# Patient Record
Sex: FEMALE | Race: WHITE | NOT HISPANIC OR LATINO | Employment: UNEMPLOYED | URBAN - METROPOLITAN AREA
[De-identification: names, ages, dates, MRNs, and addresses within clinical notes are randomized per-mention and may not be internally consistent; named-entity substitution may affect disease eponyms.]

---

## 2017-01-12 ENCOUNTER — HOSPITAL ENCOUNTER (EMERGENCY)
Facility: MEDICAL CENTER | Age: 57
End: 2017-01-12

## 2017-01-12 VITALS
BODY MASS INDEX: 24.68 KG/M2 | TEMPERATURE: 96.6 F | DIASTOLIC BLOOD PRESSURE: 93 MMHG | OXYGEN SATURATION: 99 % | HEART RATE: 85 BPM | RESPIRATION RATE: 20 BRPM | WEIGHT: 148.15 LBS | HEIGHT: 65 IN | SYSTOLIC BLOOD PRESSURE: 163 MMHG

## 2017-01-12 PROCEDURE — 302449 STATCHG TRIAGE ONLY (STATISTIC)

## 2017-01-13 NOTE — ED NOTES
Chief Complaint   Patient presents with   • Abdominal Pain     x2days   • Nausea/Vomiting/Diarrhea     Pt ambulated to triage with above complaints. Denies dysuria. Unable to eat for 2days.

## 2017-08-08 ENCOUNTER — HOSPITAL ENCOUNTER (EMERGENCY)
Facility: MEDICAL CENTER | Age: 57
End: 2017-08-08
Attending: EMERGENCY MEDICINE
Payer: MEDICAID

## 2017-08-08 ENCOUNTER — APPOINTMENT (OUTPATIENT)
Dept: RADIOLOGY | Facility: MEDICAL CENTER | Age: 57
End: 2017-08-08
Attending: EMERGENCY MEDICINE
Payer: MEDICAID

## 2017-08-08 VITALS
SYSTOLIC BLOOD PRESSURE: 135 MMHG | RESPIRATION RATE: 18 BRPM | DIASTOLIC BLOOD PRESSURE: 70 MMHG | HEART RATE: 85 BPM | TEMPERATURE: 97 F | HEIGHT: 65 IN | BODY MASS INDEX: 21.67 KG/M2 | OXYGEN SATURATION: 99 % | WEIGHT: 130.07 LBS

## 2017-08-08 DIAGNOSIS — R10.11 RUQ ABDOMINAL PAIN: ICD-10-CM

## 2017-08-08 LAB
ALBUMIN SERPL BCP-MCNC: 3.9 G/DL (ref 3.2–4.9)
ALBUMIN/GLOB SERPL: 1 G/DL
ALP SERPL-CCNC: 99 U/L (ref 30–99)
ALT SERPL-CCNC: <5 U/L (ref 2–50)
ANION GAP SERPL CALC-SCNC: 10 MMOL/L (ref 0–11.9)
APPEARANCE UR: ABNORMAL
AST SERPL-CCNC: 8 U/L (ref 12–45)
BACTERIA #/AREA URNS HPF: ABNORMAL /HPF
BASOPHILS # BLD AUTO: 0.3 % (ref 0–1.8)
BASOPHILS # BLD: 0.04 K/UL (ref 0–0.12)
BILIRUB SERPL-MCNC: 0.4 MG/DL (ref 0.1–1.5)
BILIRUB UR QL STRIP.AUTO: NEGATIVE
BUN SERPL-MCNC: 14 MG/DL (ref 8–22)
CALCIUM SERPL-MCNC: 9.8 MG/DL (ref 8.5–10.5)
CHLORIDE SERPL-SCNC: 106 MMOL/L (ref 96–112)
CO2 SERPL-SCNC: 23 MMOL/L (ref 20–33)
COLOR UR: ABNORMAL
CREAT SERPL-MCNC: 1.05 MG/DL (ref 0.5–1.4)
CULTURE IF INDICATED INDCX: YES UA CULTURE
EOSINOPHIL # BLD AUTO: 0.24 K/UL (ref 0–0.51)
EOSINOPHIL NFR BLD: 2 % (ref 0–6.9)
EPI CELLS #/AREA URNS HPF: ABNORMAL /HPF
ERYTHROCYTE [DISTWIDTH] IN BLOOD BY AUTOMATED COUNT: 47.7 FL (ref 35.9–50)
GFR SERPL CREATININE-BSD FRML MDRD: 54 ML/MIN/1.73 M 2
GLOBULIN SER CALC-MCNC: 3.9 G/DL (ref 1.9–3.5)
GLUCOSE SERPL-MCNC: 79 MG/DL (ref 65–99)
GLUCOSE UR STRIP.AUTO-MCNC: NEGATIVE MG/DL
HCG SERPL QL: NEGATIVE
HCT VFR BLD AUTO: 39.9 % (ref 37–47)
HGB BLD-MCNC: 12.9 G/DL (ref 12–16)
HYALINE CASTS #/AREA URNS LPF: ABNORMAL /LPF
IMM GRANULOCYTES # BLD AUTO: 0.03 K/UL (ref 0–0.11)
IMM GRANULOCYTES NFR BLD AUTO: 0.3 % (ref 0–0.9)
KETONES UR STRIP.AUTO-MCNC: ABNORMAL MG/DL
LEUKOCYTE ESTERASE UR QL STRIP.AUTO: ABNORMAL
LIPASE SERPL-CCNC: 58 U/L (ref 11–82)
LYMPHOCYTES # BLD AUTO: 4.05 K/UL (ref 1–4.8)
LYMPHOCYTES NFR BLD: 34.3 % (ref 22–41)
MCH RBC QN AUTO: 28.3 PG (ref 27–33)
MCHC RBC AUTO-ENTMCNC: 32.3 G/DL (ref 33.6–35)
MCV RBC AUTO: 87.5 FL (ref 81.4–97.8)
MICRO URNS: ABNORMAL
MONOCYTES # BLD AUTO: 0.66 K/UL (ref 0–0.85)
MONOCYTES NFR BLD AUTO: 5.6 % (ref 0–13.4)
NEUTROPHILS # BLD AUTO: 6.8 K/UL (ref 2–7.15)
NEUTROPHILS NFR BLD: 57.5 % (ref 44–72)
NITRITE UR QL STRIP.AUTO: NEGATIVE
NRBC # BLD AUTO: 0 K/UL
NRBC BLD AUTO-RTO: 0 /100 WBC
PH UR STRIP.AUTO: 5 [PH]
PLATELET # BLD AUTO: 430 K/UL (ref 164–446)
PMV BLD AUTO: 9.8 FL (ref 9–12.9)
POTASSIUM SERPL-SCNC: 3.6 MMOL/L (ref 3.6–5.5)
PROT SERPL-MCNC: 7.8 G/DL (ref 6–8.2)
PROT UR QL STRIP: 30 MG/DL
RBC # BLD AUTO: 4.56 M/UL (ref 4.2–5.4)
RBC # URNS HPF: ABNORMAL /HPF
RBC UR QL AUTO: ABNORMAL
SODIUM SERPL-SCNC: 139 MMOL/L (ref 135–145)
SP GR UR STRIP.AUTO: 1.04
UROBILINOGEN UR STRIP.AUTO-MCNC: 1 MG/DL
WBC # BLD AUTO: 11.8 K/UL (ref 4.8–10.8)
WBC #/AREA URNS HPF: ABNORMAL /HPF

## 2017-08-08 PROCEDURE — 83690 ASSAY OF LIPASE: CPT

## 2017-08-08 PROCEDURE — 700117 HCHG RX CONTRAST REV CODE 255: Performed by: EMERGENCY MEDICINE

## 2017-08-08 PROCEDURE — 85025 COMPLETE CBC W/AUTO DIFF WBC: CPT

## 2017-08-08 PROCEDURE — 76705 ECHO EXAM OF ABDOMEN: CPT

## 2017-08-08 PROCEDURE — 74177 CT ABD & PELVIS W/CONTRAST: CPT

## 2017-08-08 PROCEDURE — 96361 HYDRATE IV INFUSION ADD-ON: CPT

## 2017-08-08 PROCEDURE — 99285 EMERGENCY DEPT VISIT HI MDM: CPT

## 2017-08-08 PROCEDURE — 81001 URINALYSIS AUTO W/SCOPE: CPT

## 2017-08-08 PROCEDURE — 700105 HCHG RX REV CODE 258: Performed by: EMERGENCY MEDICINE

## 2017-08-08 PROCEDURE — 84703 CHORIONIC GONADOTROPIN ASSAY: CPT

## 2017-08-08 PROCEDURE — 87086 URINE CULTURE/COLONY COUNT: CPT

## 2017-08-08 PROCEDURE — 96374 THER/PROPH/DIAG INJ IV PUSH: CPT | Mod: XU

## 2017-08-08 PROCEDURE — 96375 TX/PRO/DX INJ NEW DRUG ADDON: CPT

## 2017-08-08 PROCEDURE — 80053 COMPREHEN METABOLIC PANEL: CPT

## 2017-08-08 PROCEDURE — 700111 HCHG RX REV CODE 636 W/ 250 OVERRIDE (IP): Performed by: EMERGENCY MEDICINE

## 2017-08-08 RX ORDER — HYDROCODONE BITARTRATE AND ACETAMINOPHEN 5; 325 MG/1; MG/1
1-2 TABLET ORAL EVERY 6 HOURS PRN
Qty: 20 TAB | Refills: 0 | Status: SHIPPED | OUTPATIENT
Start: 2017-08-08 | End: 2022-07-21

## 2017-08-08 RX ORDER — ONDANSETRON 4 MG/1
4 TABLET, ORALLY DISINTEGRATING ORAL EVERY 8 HOURS PRN
Qty: 20 TAB | Refills: 0 | Status: SHIPPED | OUTPATIENT
Start: 2017-08-08 | End: 2022-07-21

## 2017-08-08 RX ORDER — ONDANSETRON 2 MG/ML
4 INJECTION INTRAMUSCULAR; INTRAVENOUS ONCE
Status: COMPLETED | OUTPATIENT
Start: 2017-08-08 | End: 2017-08-08

## 2017-08-08 RX ORDER — CLOPIDOGREL BISULFATE 75 MG/1
75 TABLET ORAL DAILY
Status: ON HOLD | COMMUNITY
End: 2022-07-24 | Stop reason: SDUPTHER

## 2017-08-08 RX ORDER — SODIUM CHLORIDE 9 MG/ML
1000 INJECTION, SOLUTION INTRAVENOUS ONCE
Status: COMPLETED | OUTPATIENT
Start: 2017-08-08 | End: 2017-08-08

## 2017-08-08 RX ORDER — LISINOPRIL 20 MG/1
20 TABLET ORAL DAILY
Status: ON HOLD | COMMUNITY
End: 2022-09-17 | Stop reason: SDUPTHER

## 2017-08-08 RX ADMIN — HYDROMORPHONE HYDROCHLORIDE 1 MG: 1 INJECTION, SOLUTION INTRAMUSCULAR; INTRAVENOUS; SUBCUTANEOUS at 19:36

## 2017-08-08 RX ADMIN — SODIUM CHLORIDE 1000 ML: 9 INJECTION, SOLUTION INTRAVENOUS at 19:03

## 2017-08-08 RX ADMIN — ONDANSETRON 4 MG: 2 INJECTION INTRAMUSCULAR; INTRAVENOUS at 19:03

## 2017-08-08 RX ADMIN — IOHEXOL 100 ML: 350 INJECTION, SOLUTION INTRAVENOUS at 20:01

## 2017-08-08 ASSESSMENT — PAIN SCALES - GENERAL
PAINLEVEL_OUTOF10: 0
PAINLEVEL_OUTOF10: 9

## 2017-08-08 NOTE — ED AVS SNAPSHOT
Home Care Instructions                                                                                                                Kelly Santamaria   MRN: [id number]    Department:  [facility], Emergency Dept   Date of Visit:  8/8/2017            [facility], Emergency Dept    [address] [place] [id number]    Phone:  [phone]      You were seen by     Sandro Meraz M.D.      Your Diagnosis Was     RUQ abdominal pain     R10.11       These are the medications you received during your hospitalization from 08/08/2017 1529 to 08/08/2017 2110     Date/Time Order Dose Route Action    08/08/2017 1903 NS infusion 1,000 mL 1,000 mL Intravenous New Bag    08/08/2017 1936 HYDROmorphone (DILAUDID) injection 0.5-1 mg 1 mg Intravenous Given    08/08/2017 1903 ondansetron (ZOFRAN) syringe/vial injection 4 mg 4 mg Intravenous Given    08/08/2017 2001 iohexol (OMNIPAQUE) 350 mg/mL 100 mL Intravenous Given      Follow-up Information     1. Follow up with [facility], Emergency Dept.    Specialty:  Emergency Medicine    Why:  In 1-2 days for recheck if not improved.    Contact information    [address] [place] [id number] [phone]      Medication Information     Review all of your home medications and newly ordered medications with your primary doctor and/or pharmacist as soon as possible. Follow medication instructions as directed by your doctor and/or pharmacist.     Please keep your complete medication list with you and share with your physician. Update the information when medications are discontinued, doses are changed, or new medications (including over-the-counter products) are added; and carry medication information at all times in the event of emergency situations.               Medication List      START taking these medications        Instructions    Morning Afternoon Evening Bedtime    hydrocodone-acetaminophen 5-325 MG Tabs per  tablet   Commonly known as:  NORCO        Take 1-2 Tabs by mouth every 6 hours as needed.   Dose:  1-2 Tab                        ondansetron 4 MG Tbdp   Commonly known as:  ZOFRAN ODT        Take 1 Tab by mouth every 8 hours as needed for Nausea/Vomiting.   Dose:  4 mg                          ASK your doctor about these medications        Instructions    Morning Afternoon Evening Bedtime    LISINOPRIL PO        Take  by mouth.                        METFORMIN HCL PO        Take  by mouth.                        PLAVIX PO        Take  by mouth.                             Where to Get Your Medications      You can get these medications from any pharmacy     Bring a paper prescription for each of these medications    - hydrocodone-acetaminophen 5-325 MG Tabs per tablet  - ondansetron 4 MG Tbdp            Procedures and tests performed during your visit     CARDIAC MONITORING    CBC WITH DIFFERENTIAL    COMP METABOLIC PANEL    CONSENT FOR CONTRAST INJECTION    CT-ABDOMEN-PELVIS WITH    Cardiac Monitoring    ESTIMATED GFR    HCG Qual Serum    IV Saline Lock    LIPASE    O2 Protocol    Pulse Ox    SALINE LOCK    URINALYSIS CULTURE, IF INDICATED    URINE CULTURE(NEW)    URINE MICROSCOPIC (W/UA)    US-GALLBLADDER        Discharge Instructions       Please follow-up with your primary care provider for blood pressure management.    Return to the ER in 1-2 days for recheck unless improved. Return sooner if pain is worse, fevers, vomiting.        Abdominal Pain, Adult  Many things can cause abdominal pain. Usually, abdominal pain is not caused by a disease and will improve without treatment. It can often be observed and treated at home. Your health care provider will do a physical exam and possibly order blood tests and X-rays to help determine the seriousness of your pain. However, in many cases, more time must pass before a clear cause of the pain can be found. Before that point, your health care provider may not know if  you need more testing or further treatment.  HOME CARE INSTRUCTIONS   Monitor your abdominal pain for any changes. The following actions may help to alleviate any discomfort you are experiencing:  · Only take over-the-counter or prescription medicines as directed by your health care provider.  · Do not take laxatives unless directed to do so by your health care provider.  · Try a clear liquid diet (broth, tea, or water) as directed by your health care provider. Slowly move to a bland diet as tolerated.  SEEK MEDICAL CARE IF:  · You have unexplained abdominal pain.  · You have abdominal pain associated with nausea or diarrhea.  · You have pain when you urinate or have a bowel movement.  · You experience abdominal pain that wakes you in the night.  · You have abdominal pain that is worsened or improved by eating food.  · You have abdominal pain that is worsened with eating fatty foods.  · You have a fever.  SEEK IMMEDIATE MEDICAL CARE IF:   · Your pain does not go away within 2 hours.  · You keep throwing up (vomiting).  · Your pain is felt only in portions of the abdomen, such as the right side or the left lower portion of the abdomen.  · You pass bloody or black tarry stools.  MAKE SURE YOU:  · Understand these instructions.    · Will watch your condition.    · Will get help right away if you are not doing well or get worse.       This information is not intended to replace advice given to you by your health care provider. Make sure you discuss any questions you have with your health care provider.     Document Released: 09/27/2006 Document Revised: 01/08/2016 Document Reviewed: 08/27/2014  [employer] Interactive Patient Education ©2016 Elsevier Inc.            Patient Information     Patient Information    Following emergency treatment: all patient requiring follow-up care must return either to a private physician or a clinic if your condition worsens before you are able to obtain further medical attention, please  return to the emergency room.     Billing Information    At [facility], we work to make the billing process streamlined for our patients.  Our Representatives are here to answer any questions you may have regarding your hospital bill.  If you have insurance coverage and have supplied your insurance information to us, we will submit a claim to your insurer on your behalf.  Should you have any questions regarding your bill, we can be reached online or by phone as follows:  Online: You are able pay your bills online or live chat with our representatives about any billing questions you may have. We are here to help Monday - Friday from 8:00am to 7:30pm and 9:00am - 12:00pm on Saturdays.  Please visit [email or web address]  for more information.   Phone:  [phone] or [phone]    Please note that your emergency physician, surgeon, pathologist, radiologist, anesthesiologist, and other specialists are not employed by [facility] and will therefore bill separately for their services.  Please contact them directly for any questions concerning their bills at the numbers below:     Emergency Physician Services:  [phone]  [facility]:  [phone]  [facility]:  [phone]  [facility]:  [phone]    1. Your final bill may vary from the amount quoted upon discharge if all procedures are not complete at that time, or if your doctor has additional procedures of which we are not aware. You will receive an additional bill if you return to the Emergency Department at [facility] for suture removal regardless of the facility of which the sutures were placed.     2. Please arrange for settlement of this account at the emergency registration.    3. All self-pay accounts are due in full at the time of treatment.  If you are unable to meet this obligation then payment is expected within 4-5 days.     4. If you have had radiology studies  (CT, X-ray, Ultrasound, MRI), you have received a preliminary result during your emergency department visit. Please contact the radiology department [phone] to receive a copy of your final result. Please discuss the Final result with your primary physician or with the follow up physician provided.     Crisis Hotline:  [place] Crisis Hotline:  4-132-RJQKCUS or 1-630.720.5142  [place] Crisis Hotline:    1-158.116.2180 or 527-531-1781         ED Discharge Follow Up Questions    1. In order to provide you with very good care, we would like to follow up with a phone call in the next few days.  May we have your permission to contact you?     YES /  NO    2. What is the best phone number to call you? (       )_____-__________    3. What is the best time to call you?      Morning  /  Afternoon  /  Evening                   Patient Signature:  ____________________________________________________________    Date:  ____________________________________________________________

## 2017-08-08 NOTE — ED NOTES
"Chief Complaint   Patient presents with   • Abdominal Pain     Pt having pain on the right side of her stomach.    • N/V     /70 mmHg  Pulse 83  Temp(Src) 36.1 °C (97 °F) (Temporal)  Resp 18  Ht 1.651 m (5' 5\")  Wt 59 kg (130 lb 1.1 oz)  BMI 21.65 kg/m2  SpO2 99%  Pt placed back in lobby, educated on triage process, and told to inform staff of any change in condition.     "

## 2017-08-08 NOTE — ED AVS SNAPSHOT
8/8/2017    Kelly Dickerson Clair  [address] [place] [address] [place] [address]    Dear Kelly:    [facility] wants to ensure your discharge home is safe and you or your loved ones have had all of your questions answered regarding your care after you leave the hospital.    Below is a list of resources and contact information should you have any questions regarding your hospital stay, follow-up instructions, or active medical symptoms.    Questions or Concerns Regarding… Contact   Medical Questions Related to Your Discharge  (7 days a week, 8am-5pm) Contact a Nurse Care Coordinator   [phone]   Medical Questions Not Related to Your Discharge  (24 hours a day / 7 days a week)  Contact the Nurse Health Line   [phone]    Medications or Discharge Instructions Refer to your discharge packet   or contact your [facility] Primary Care Provider   [phone]   Follow-up Appointment(s) Schedule your appointment via SPHARES   or contact Scheduling [phone]   Billing Review your statement via SPHARES  or contact Billing [phone]   Medical Records Review your records via SPHARES   or contact Medical Records [phone]     You may receive a telephone call within two days of discharge. This call is to make certain you understand your discharge instructions and have the opportunity to have any questions answered. You can also easily access your medical information, test results and upcoming appointments via the SPHARES free online health management tool. You can learn more and sign up at [email or web address]. For assistance setting up your SPHARES account, please call [phone].    Once again, we want to ensure your discharge home is safe and that you have a clear understanding of any next steps in your care. If you have any questions or concerns, please do not hesitate to contact us, we are here for you. Thank you for choosing [facility] for your healthcare needs.    Sincerely,    Your [facility] Healthcare Team

## 2017-08-08 NOTE — ED AVS SNAPSHOT
L[employer] Access Code: [email or web address]  Expires: 9/7/2017  9:10 PM    Lattice Power  A secure, online tool to manage your health information     [employer]’s Lattice Power® is a secure, online tool that connects you to your personalized health information from the privacy of your home -- day or night - making it very easy for you to manage your healthcare. Once the activation process is completed, you can even access your medical information using the Lattice Power afsaneh, which is available for free in the Apple Afsaneh store or Google Play store.     Lattice Power provides the following levels of access (as shown below):   My Chart Features   [facility] Primary Care Doctor [facility]  Specialists [facility]  Urgent  Care Non-[facility]  Primary Care  Doctor   Email your healthcare team securely and privately 24/7 X X X X   Manage appointments: schedule your next appointment; view details of past/upcoming appointments X      Request prescription refills. X      View recent personal medical records, including lab and immunizations X X X X   View health record, including health history, allergies, medications X X X X   Read reports about your outpatient visits, procedures, consult and ER notes X X X X   See your discharge summary, which is a recap of your hospital and/or ER visit that includes your diagnosis, lab results, and care plan. X X       How to register for Lattice Power:  1. Go to  [email or web address].  2. Click on the Sign Up Now box, which takes you to the New Member Sign Up page. You will need to provide the following information:  a. Enter your Lattice Power Access Code exactly as it appears at the top of this page. (You will not need to use this code after you’ve completed the sign-up process. If you do not sign up before the expiration date, you must request a new code.)   b. Enter your date of birth.   c. Enter your home email address.   d. Click Submit, and follow the next screen’s instructions.  3. Create a Lattice Power ID. This will be your Lattice Power  login ID and cannot be changed, so think of one that is secure and easy to remember.  4. Create a truedash password. You can change your password at any time.  5. Enter your Password Reset Question and Answer. This can be used at a later time if you forget your password.   6. Enter your e-mail address. This allows you to receive e-mail notifications when new information is available in truedash.  7. Click Sign Up. You can now view your health information.    For assistance activating your truedash account, call [phone]

## 2017-08-09 NOTE — DISCHARGE INSTRUCTIONS
Please follow-up with your primary care provider for blood pressure management.    Return to the ER in 1-2 days for recheck unless improved. Return sooner if pain is worse, fevers, vomiting.        Abdominal Pain, Adult  Many things can cause abdominal pain. Usually, abdominal pain is not caused by a disease and will improve without treatment. It can often be observed and treated at home. Your health care provider will do a physical exam and possibly order blood tests and X-rays to help determine the seriousness of your pain. However, in many cases, more time must pass before a clear cause of the pain can be found. Before that point, your health care provider may not know if you need more testing or further treatment.  HOME CARE INSTRUCTIONS   Monitor your abdominal pain for any changes. The following actions may help to alleviate any discomfort you are experiencing:  · Only take over-the-counter or prescription medicines as directed by your health care provider.  · Do not take laxatives unless directed to do so by your health care provider.  · Try a clear liquid diet (broth, tea, or water) as directed by your health care provider. Slowly move to a bland diet as tolerated.  SEEK MEDICAL CARE IF:  · You have unexplained abdominal pain.  · You have abdominal pain associated with nausea or diarrhea.  · You have pain when you urinate or have a bowel movement.  · You experience abdominal pain that wakes you in the night.  · You have abdominal pain that is worsened or improved by eating food.  · You have abdominal pain that is worsened with eating fatty foods.  · You have a fever.  SEEK IMMEDIATE MEDICAL CARE IF:   · Your pain does not go away within 2 hours.  · You keep throwing up (vomiting).  · Your pain is felt only in portions of the abdomen, such as the right side or the left lower portion of the abdomen.  · You pass bloody or black tarry stools.  MAKE SURE YOU:  · Understand these instructions.    · Will watch your  condition.    · Will get help right away if you are not doing well or get worse.       This information is not intended to replace advice given to you by your health care provider. Make sure you discuss any questions you have with your health care provider.     Document Released: 09/27/2006 Document Revised: 01/08/2016 Document Reviewed: 08/27/2014  [employer] Interactive Patient Education ©2016 Elsevier Inc.

## 2017-08-09 NOTE — ED NOTES
Patient given discharge paperwork and verbalized understanding. Patient out of ED ambulatory with . Patient in stable condition and vitals stable and no distress noted.

## 2017-08-09 NOTE — ED PROVIDER NOTES
"ED Provider Note    Scribed for Sandro Meraz M.D. by Balaji Alexander. 8/8/2017  6:14 PM    Primary care provider: None noted  Means of arrival: Walk in  History obtained from: Patient  History limited by: None    CHIEF COMPLAINT  Chief Complaint   Patient presents with   • Abdominal Pain     Pt having pain on the right side of her stomach.    • N/V       HPI  Kelly Santamaria is a 56 y.o. female who presents to the Emergency Department complaining of right sided abdominal pain onset 2 days ago. Patient states the pain is constant. She reports associated nausea and four episodes of vomiting. Patient denies any fevers, diarrhea, dysuria, or hematuria. She had a normal bowel movement this morning. Patient denies any recent abdominal surgeries.    REVIEW OF SYSTEMS  Pertinent positives include right sided abdominal pain, nausea, vomiting. Pertinent negatives include no  fevers, diarrhea, dysuria, or hematuria.  All other systems reviewed and negative.  C    PAST MEDICAL HISTORY   None noted    SURGICAL HISTORY  patient denies any surgical history    SOCIAL HISTORY  None noted    FAMILY HISTORY  None noted    CURRENT MEDICATIONS  Home Medications     Reviewed by Kim Fernandes R.N. (Registered Nurse) on 08/08/17 at 1539  Med List Status: Partial    Medication Last Dose Status    Clopidogrel Bisulfate (PLAVIX PO)  Active    LISINOPRIL PO  Active    METFORMIN HCL PO  Active                ALLERGIES  Allergies   Allergen Reactions   • Pcn [Penicillins]    • Toradol        PHYSICAL EXAM  VITAL SIGNS: /70 mmHg  Pulse 83  Temp(Src) 36.1 °C (97 °F) (Temporal)  Resp 18  Ht 1.651 m (5' 5\")  Wt 59 kg (130 lb 1.1 oz)  BMI 21.65 kg/m2  SpO2 99%  Constitutional: Well developed, Well nourished, no acute distress, Non-toxic appearance.   HENT: Normocephalic, Atraumatic, Bilateral external ears normal, slightly dry mucus membranes, No oral exudates.   Eyes: PERRLA, EOMI, Conjunctiva normal, No discharge.   Neck: " No tenderness, Supple, No stridor.   Lymphatic: No lymphadenopathy noted.   Cardiovascular: Normal heart rate, Normal rhythm.   Thorax & Lungs: Clear to auscultation bilaterally, No respiratory distress, No wheezing, No crackles.   Abdomen: Soft, focal tenderness to palpation of right upper quadrant, No masses, No pulsatile masses. No rebound. Negative Gill's  Skin: Warm, Dry, No erythema, No rash.   Extremities:, No edema No cyanosis.   Musculoskeletal: No tenderness to palpation or major deformities noted.  Intact distal pulses  Neurologic: Awake, alert. Moves all extremities spontaneously.  Psychiatric: Affect normal, Judgment normal, Mood normal.     LABS  Results for orders placed or performed during the hospital encounter of 08/08/17   CBC WITH DIFFERENTIAL   Result Value Ref Range    WBC 11.8 (H) 4.8 - 10.8 K/uL    RBC 4.56 4.20 - 5.40 M/uL    Hemoglobin 12.9 12.0 - 16.0 g/dL    Hematocrit 39.9 37.0 - 47.0 %    MCV 87.5 81.4 - 97.8 fL    MCH 28.3 27.0 - 33.0 pg    MCHC 32.3 (L) 33.6 - 35.0 g/dL    RDW 47.7 35.9 - 50.0 fL    Platelet Count 430 164 - 446 K/uL    MPV 9.8 9.0 - 12.9 fL    Neutrophils-Polys 57.50 44.00 - 72.00 %    Lymphocytes 34.30 22.00 - 41.00 %    Monocytes 5.60 0.00 - 13.40 %    Eosinophils 2.00 0.00 - 6.90 %    Basophils 0.30 0.00 - 1.80 %    Immature Granulocytes 0.30 0.00 - 0.90 %    Nucleated RBC 0.00 /100 WBC    Neutrophils (Absolute) 6.80 2.00 - 7.15 K/uL    Lymphs (Absolute) 4.05 1.00 - 4.80 K/uL    Monos (Absolute) 0.66 0.00 - 0.85 K/uL    Eos (Absolute) 0.24 0.00 - 0.51 K/uL    Baso (Absolute) 0.04 0.00 - 0.12 K/uL    Immature Granulocytes (abs) 0.03 0.00 - 0.11 K/uL    NRBC (Absolute) 0.00 K/uL   COMP METABOLIC PANEL   Result Value Ref Range    Sodium 139 135 - 145 mmol/L    Potassium 3.6 3.6 - 5.5 mmol/L    Chloride 106 96 - 112 mmol/L    Co2 23 20 - 33 mmol/L    Anion Gap 10.0 0.0 - 11.9    Glucose 79 65 - 99 mg/dL    Bun 14 8 - 22 mg/dL    Creatinine 1.05 0.50 - 1.40 mg/dL     Calcium 9.8 8.5 - 10.5 mg/dL    AST(SGOT) 8 (L) 12 - 45 U/L    ALT(SGPT) <5 2 - 50 U/L    Alkaline Phosphatase 99 30 - 99 U/L    Total Bilirubin 0.4 0.1 - 1.5 mg/dL    Albumin 3.9 3.2 - 4.9 g/dL    Total Protein 7.8 6.0 - 8.2 g/dL    Globulin 3.9 (H) 1.9 - 3.5 g/dL    A-G Ratio 1.0 g/dL   LIPASE   Result Value Ref Range    Lipase 58 11 - 82 U/L   ESTIMATED GFR   Result Value Ref Range    GFR If African American >60 >60 mL/min/1.73 m 2    GFR If Non African American 54 (A) >60 mL/min/1.73 m 2   URINALYSIS CULTURE, IF INDICATED   Result Value Ref Range    Color DK Yellow     Character Turbid (A)     Specific Gravity 1.037 <1.035    Ph 5.0 5.0-8.0    Glucose Negative Negative mg/dL    Ketones Trace (A) Negative mg/dL    Protein 30 (A) Negative mg/dL    Bilirubin Negative Negative    Urobilinogen, Urine 1.0 Negative    Nitrite Negative Negative    Leukocyte Esterase Small (A) Negative    Occult Blood Trace (A) Negative    Micro Urine Req Microscopic     Culture Indicated Yes UA Culture   URINE MICROSCOPIC (W/UA)   Result Value Ref Range    WBC 20-50 (A) /hpf    RBC 0-2 /hpf    Bacteria Moderate (A) None /hpf    Epithelial Cells Few /hpf    Hyaline Cast 11-20 (A) /lpf   HCG Qual Serum   Result Value Ref Range    Beta-Hcg Qualitative Serum Negative Negative    All labs reviewed by me.    RADIOLOGY  CT-ABDOMEN-PELVIS WITH   Final Result      1.  No evidence of abdominal or pelvic inflammatory change.   2.  10 mm indeterminate LEFT renal hemorrhagic or proteinaceous cyst or small renal mass. Recommend further assessment with ultrasound when clinically appropriate.   3.  Findings compatible with LEFT pelvic congestion syndrome in the appropriate clinical setting   4.  Atherosclerosis                  US-GALLBLADDER   Final Result      Unremarkable gallbladder ultrasound.           The radiologist's interpretation of all radiological studies have been reviewed by me.    COURSE & MEDICAL DECISION MAKING  Pertinent Labs &  Imaging studies reviewed. (See chart for details)        6:41 PM - Patient seen and examined at bedside. The patient will be resuscitated with 1L NS IV due to vomiting.  Patient will be treated with zofran 4 mg, dilaudid 0.5-1mg. Ordered US gallbladder, HCG qual serum, urine microscopic, urinalysis culture, estimated GFR, POC urine pregnancy, CBC, CMP, lipase to evaluate her symptoms. The differential diagnoses include but are not limited to: gallbladder, appendicitis, diverticulitis    Decision Making:  Patient is having is intermittent right upper quadrant abdominal pain, ultrasound was negative, laboratory tests are nonspecific, got a CT scan of the pelvis that was negative, will discharge the patient home, have the patient return in the next 1-2 days if not improved, return sooner with worsening symptoms.    I reviewed prescription monitoring program for patient's narcotic use before prescribing a scheduled drug.The patient will not drink alcohol nor drive with prescribed medications.} The patient will return for new or worsening symptoms and is stable at the time of discharge.    The patient is referred to a primary physician for blood pressure management, diabetic screening, and for all other preventative health concerns.    DISPOSITION:  Patient will be discharged home in stable condition.    FOLLOW UP:  [facility], Emergency Dept  [address] [address] [phone]    In 1-2 days for recheck if not improved.      OUTPATIENT MEDICATIONS:  Discharge Medication List as of 8/8/2017  9:10 PM      START taking these medications    Details   hydrocodone-acetaminophen (NORCO) 5-325 MG Tab per tablet Take 1-2 Tabs by mouth every 6 hours as needed., Disp-20 Tab, R-0, Print Rx Paper      ondansetron (ZOFRAN ODT) 4 MG TABLET DISPERSIBLE Take 1 Tab by mouth every 8 hours as needed for Nausea/Vomiting., Disp-20 Tab, R-0, Print Rx Paper                 FINAL IMPRESSION  1. RUQ  abdominal pain          I, Balaji Alexander (Scribe), am scribing for, and in the presence of, Sandro Meraz M.D..    Electronically signed by: Balaji Alexander (Scribe), 8/8/2017    ISandro M.D. personally performed the services described in this documentation, as scribed by Balaji Alexander in my presence, and it is both accurate and complete.    The note accurately reflects work and decisions made by me.  Sandro Meraz  8/8/2017  9:37 PM

## 2017-08-10 LAB
BACTERIA UR CULT: NORMAL
SIGNIFICANT IND 70042: NORMAL
SITE SITE: NORMAL
SOURCE SOURCE: NORMAL

## 2018-09-09 ENCOUNTER — APPOINTMENT (OUTPATIENT)
Dept: RADIOLOGY | Facility: MEDICAL CENTER | Age: 58
End: 2018-09-09
Attending: EMERGENCY MEDICINE
Payer: MEDICAID

## 2018-09-09 ENCOUNTER — HOSPITAL ENCOUNTER (EMERGENCY)
Facility: MEDICAL CENTER | Age: 58
End: 2018-09-09
Attending: EMERGENCY MEDICINE
Payer: MEDICAID

## 2018-09-09 VITALS
BODY MASS INDEX: 24.06 KG/M2 | OXYGEN SATURATION: 97 % | HEART RATE: 68 BPM | SYSTOLIC BLOOD PRESSURE: 133 MMHG | HEIGHT: 65 IN | DIASTOLIC BLOOD PRESSURE: 72 MMHG | WEIGHT: 144.4 LBS | RESPIRATION RATE: 18 BRPM | TEMPERATURE: 97 F

## 2018-09-09 DIAGNOSIS — S83.92XA SPRAIN OF LEFT KNEE, UNSPECIFIED LIGAMENT, INITIAL ENCOUNTER: ICD-10-CM

## 2018-09-09 DIAGNOSIS — T07.XXXA MULTIPLE ABRASIONS: ICD-10-CM

## 2018-09-09 DIAGNOSIS — S50.02XA CONTUSION OF LEFT ELBOW, INITIAL ENCOUNTER: ICD-10-CM

## 2018-09-09 PROCEDURE — 73700 CT LOWER EXTREMITY W/O DYE: CPT | Mod: LT

## 2018-09-09 PROCEDURE — 99284 EMERGENCY DEPT VISIT MOD MDM: CPT

## 2018-09-09 PROCEDURE — 73080 X-RAY EXAM OF ELBOW: CPT | Mod: LT

## 2018-09-09 PROCEDURE — 700102 HCHG RX REV CODE 250 W/ 637 OVERRIDE(OP): Performed by: EMERGENCY MEDICINE

## 2018-09-09 PROCEDURE — 73564 X-RAY EXAM KNEE 4 OR MORE: CPT | Mod: LT

## 2018-09-09 PROCEDURE — 96372 THER/PROPH/DIAG INJ SC/IM: CPT

## 2018-09-09 PROCEDURE — 73590 X-RAY EXAM OF LOWER LEG: CPT | Mod: LT

## 2018-09-09 PROCEDURE — A9270 NON-COVERED ITEM OR SERVICE: HCPCS | Performed by: EMERGENCY MEDICINE

## 2018-09-09 PROCEDURE — 700111 HCHG RX REV CODE 636 W/ 250 OVERRIDE (IP): Performed by: EMERGENCY MEDICINE

## 2018-09-09 RX ORDER — OXYCODONE HYDROCHLORIDE AND ACETAMINOPHEN 5; 325 MG/1; MG/1
1 TABLET ORAL ONCE
Status: COMPLETED | OUTPATIENT
Start: 2018-09-09 | End: 2018-09-09

## 2018-09-09 RX ORDER — ONDANSETRON 2 MG/ML
4 INJECTION INTRAMUSCULAR; INTRAVENOUS ONCE
Status: DISCONTINUED | OUTPATIENT
Start: 2018-09-09 | End: 2018-09-09

## 2018-09-09 RX ORDER — ONDANSETRON 4 MG/1
4 TABLET, ORALLY DISINTEGRATING ORAL ONCE
Status: COMPLETED | OUTPATIENT
Start: 2018-09-09 | End: 2018-09-09

## 2018-09-09 RX ORDER — HYDROMORPHONE HYDROCHLORIDE 2 MG/ML
0.5 INJECTION, SOLUTION INTRAMUSCULAR; INTRAVENOUS; SUBCUTANEOUS ONCE
Status: DISCONTINUED | OUTPATIENT
Start: 2018-09-09 | End: 2018-09-09

## 2018-09-09 RX ORDER — HYDROCODONE BITARTRATE AND ACETAMINOPHEN 5; 325 MG/1; MG/1
1 TABLET ORAL EVERY 6 HOURS PRN
Qty: 14 TAB | Refills: 0 | Status: SHIPPED | OUTPATIENT
Start: 2018-09-09 | End: 2018-09-12

## 2018-09-09 RX ORDER — HYDROMORPHONE HYDROCHLORIDE 2 MG/ML
0.5 INJECTION, SOLUTION INTRAMUSCULAR; INTRAVENOUS; SUBCUTANEOUS ONCE
Status: COMPLETED | OUTPATIENT
Start: 2018-09-09 | End: 2018-09-09

## 2018-09-09 RX ADMIN — ONDANSETRON 4 MG: 4 TABLET, ORALLY DISINTEGRATING ORAL at 21:43

## 2018-09-09 RX ADMIN — HYDROMORPHONE HYDROCHLORIDE 0.5 MG: 2 INJECTION INTRAMUSCULAR; INTRAVENOUS; SUBCUTANEOUS at 21:43

## 2018-09-09 RX ADMIN — OXYCODONE HYDROCHLORIDE AND ACETAMINOPHEN 1 TABLET: 5; 325 TABLET ORAL at 19:23

## 2018-09-09 ASSESSMENT — PAIN SCALES - GENERAL: PAINLEVEL_OUTOF10: 9

## 2018-09-10 NOTE — ED NOTES
Pt back to room and chart up for erp.  Swelling to left knee with abrasions.  Small abrasion to left elbow, able to move arm without difficulty.

## 2018-09-10 NOTE — ED TRIAGE NOTES
Chief Complaint   Patient presents with   • Knee Pain     left     Pt had a fall after her left leg fell into a small hole.  LLE has multiple abrasions.  Positive CMS.  Triage process explained to patient.  Pt back to waiting room.  Pt instructed to inform RN if any changes or questions arise.

## 2018-09-10 NOTE — ED NOTES
D/C instructions provided to patient at bedside, verbalizes understanding and states plans for follow-up with PCP as recommended.   Scripts given  All belongings accounted for, all questions answered at this time.   Pt ambulated with west to car without assistance. Family bedside to drive.

## 2018-09-10 NOTE — ED PROVIDER NOTES
ED Provider Note    HPI: Patient is a 57-year-old female who presented to the emergency department September 9, 2018 5:53 PM with a chief complaint of left elbow and knee pain.    Patient also has pain below the knee down to about midway in the left lower extremity. The patient fell in a parking lot after tripping in a small hole. The patient denied any head or neck trauma. She denied numbness or tingling of the affected extremities. Patient is right-hand dominant. No other somatic complaints.    Review of Systems: Positive left elbow knee and left lower extremity pain above the ankle negative for numbness or tingling of the affected extremities.    Past medical/surgical history:     Medications:  Plavix    Allergies: Penicillin Toradol    Social History: Patient smokes 1-1/2 pack of cigarettes daily denies alcohol use      Physical exam: Constitutional: Chronically ill-appearing female awake alert cooperative  Vital signs:  Temperature 97.0 pulse 86 respirations 18 blood pressure 133/72 pulse oximetry 98%  Musculoskeletal: He complains of pain diffusely throughout the left elbow area and some minimal soft tissue swelling is present. She has pain in the left knee and left tib-fib region with some swelling noted medially on the knee. No other pain with palpation or movement of muscle bone or joint. No other musculoskeletal deformities identified.  Neurologic: alert and awake answers questions appropriately. Decreased range of motion of left upper extremity due to pain in the elbow area but the patient is able to flex and extend wrist and fingers on the affected extremity with no difficulty. Positive for decreased range of motion of left knee due to pain but the patient is able to flex and extend ankle and toes unaffected extremity. No other focal neurologic abnormalities identified  Skin: Abrasions noted left elbow left knee. No laceration identified. No other rash or lesion seen, no other palpable dermatologic  lesions identified.  Psychiatric: Patient appeared to be somewhat anxious. She was not delusional or hallucinating    Medical decision making: Patient given a Percocet followed by morphine and Zofran for pain with some improvement     Given themechanism of injury and presenting complaints x-ray of the left tib-fib left knee and left elbow obtained; the elbow and tib-fib films were unrevealing. There was a small ascitic density proximal to the anterior tibial plateau on the knee film it could represent a fracture fragment. To further evaluate this, CT imaging of the knee obtained; no evidence of fracture seen.    Patient discharged her knee immobilizer.  Patient will follow-up with orthopedics.  I carefully explained to the patient and family that while it is encouraging her x-rays are negative this does not rule out an occult injury.  She is written for small amount of pain medication (see below) she was carefully counseled to return to the [facility] pain numbness tingling or any other problems.  Patient has no evidence of a neurovascular injury and outpatient treatment and follow-up seems reasonable    Patient verbalized understanding of these instructions and states she will comply    I reviewed prescription monitoring program for patient's narcotic use before prescribing a scheduled drug.The patient will not drink alcohol nor drive with prescribed medications. The patient will return for new or worsening symptoms and is stable at the time of discharge.        In prescribing controlled substances to this patient, I certify that I have obtained and reviewed the medical history of . I have also made a good tracey effort to obtain applicable records from other providers who have treated the patient and records did not demonstrate any increased risk of substance abuse that would prevent me from prescribing controlled substances.      I have conducted a physical exam and documented it. I have reviewed Ms St.Lizzie's  prescription history as maintained by the [place] Prescription Monitoring Program.      I have assessed the patient’s risk for abuse, dependency, and addiction using the validated Opioid Risk Tool available at https://www.mdcalc.com/hoexor-knyd-qzwd-ort-narcotic-abuse.      Given the above, I believe the benefits of controlled substance therapy outweigh the risks. The reasons for prescribing controlled substances include in my professional opinion, controlled substances are the only reasonable choice for this patientAccordingly, I have discussed the risk and benefits, treatment plan, and alternative therapies with the patient.     Impression 1) left knee sprain  2) left elbow contusion  3) multiple abrasion

## 2022-07-21 ENCOUNTER — HOSPITAL ENCOUNTER (INPATIENT)
Facility: MEDICAL CENTER | Age: 62
LOS: 3 days | DRG: 282 | End: 2022-07-24
Attending: EMERGENCY MEDICINE | Admitting: HOSPITALIST
Payer: MEDICAID

## 2022-07-21 ENCOUNTER — APPOINTMENT (OUTPATIENT)
Dept: RADIOLOGY | Facility: MEDICAL CENTER | Age: 62
DRG: 282 | End: 2022-07-21
Attending: EMERGENCY MEDICINE
Payer: MEDICAID

## 2022-07-21 DIAGNOSIS — I25.10 CORONARY ARTERY DISEASE INVOLVING NATIVE CORONARY ARTERY OF NATIVE HEART WITHOUT ANGINA PECTORIS: ICD-10-CM

## 2022-07-21 DIAGNOSIS — I74.10 AORTIC THROMBUS (HCC): ICD-10-CM

## 2022-07-21 PROBLEM — D64.9 ANEMIA: Status: ACTIVE | Noted: 2022-07-21

## 2022-07-21 PROBLEM — I70.229 CRITICAL ISCHEMIA OF LOWER EXTREMITY (HCC): Status: ACTIVE | Noted: 2022-07-21

## 2022-07-21 PROBLEM — R94.31 QT PROLONGATION: Status: ACTIVE | Noted: 2022-07-21

## 2022-07-21 LAB
ABO + RH BLD: NORMAL
ABO GROUP BLD: NORMAL
ALBUMIN SERPL BCP-MCNC: 3.9 G/DL (ref 3.2–4.9)
ALBUMIN/GLOB SERPL: 0.8 G/DL
ALP SERPL-CCNC: 97 U/L (ref 30–99)
ALT SERPL-CCNC: 8 U/L (ref 2–50)
ANION GAP SERPL CALC-SCNC: 14 MMOL/L (ref 7–16)
APTT PPP: 29.9 SEC (ref 24.7–36)
APTT PPP: 30.8 SEC (ref 24.7–36)
AST SERPL-CCNC: 12 U/L (ref 12–45)
BASOPHILS # BLD AUTO: 0.5 % (ref 0–1.8)
BASOPHILS # BLD: 0.05 K/UL (ref 0–0.12)
BILIRUB SERPL-MCNC: 0.5 MG/DL (ref 0.1–1.5)
BLD GP AB SCN SERPL QL: NORMAL
BUN SERPL-MCNC: 35 MG/DL (ref 8–22)
CALCIUM SERPL-MCNC: 9.1 MG/DL (ref 8.5–10.5)
CHLORIDE SERPL-SCNC: 102 MMOL/L (ref 96–112)
CK SERPL-CCNC: 44 U/L (ref 0–154)
CO2 SERPL-SCNC: 20 MMOL/L (ref 20–33)
CREAT SERPL-MCNC: 1.31 MG/DL (ref 0.5–1.4)
EKG IMPRESSION: NORMAL
EOSINOPHIL # BLD AUTO: 0.11 K/UL (ref 0–0.51)
EOSINOPHIL NFR BLD: 1 % (ref 0–6.9)
ERYTHROCYTE [DISTWIDTH] IN BLOOD BY AUTOMATED COUNT: 55.5 FL (ref 35.9–50)
GFR SERPLBLD CREATININE-BSD FMLA CKD-EPI: 47 ML/MIN/1.73 M 2
GLOBULIN SER CALC-MCNC: 4.6 G/DL (ref 1.9–3.5)
GLUCOSE SERPL-MCNC: 116 MG/DL (ref 65–99)
HCT VFR BLD AUTO: 31 % (ref 37–47)
HGB BLD-MCNC: 9.7 G/DL (ref 12–16)
IMM GRANULOCYTES # BLD AUTO: 0.04 K/UL (ref 0–0.11)
IMM GRANULOCYTES NFR BLD AUTO: 0.4 % (ref 0–0.9)
INR PPP: 1.11 (ref 0.87–1.13)
INR PPP: 1.12 (ref 0.87–1.13)
LACTATE SERPL-SCNC: 1.8 MMOL/L (ref 0.5–2)
LYMPHOCYTES # BLD AUTO: 1.88 K/UL (ref 1–4.8)
LYMPHOCYTES NFR BLD: 17.2 % (ref 22–41)
MCH RBC QN AUTO: 27.6 PG (ref 27–33)
MCHC RBC AUTO-ENTMCNC: 31.3 G/DL (ref 33.6–35)
MCV RBC AUTO: 88.1 FL (ref 81.4–97.8)
MONOCYTES # BLD AUTO: 1.23 K/UL (ref 0–0.85)
MONOCYTES NFR BLD AUTO: 11.3 % (ref 0–13.4)
NEUTROPHILS # BLD AUTO: 7.62 K/UL (ref 2–7.15)
NEUTROPHILS NFR BLD: 69.6 % (ref 44–72)
NRBC # BLD AUTO: 0 K/UL
NRBC BLD-RTO: 0 /100 WBC
PLATELET # BLD AUTO: 404 K/UL (ref 164–446)
PMV BLD AUTO: 9.6 FL (ref 9–12.9)
POTASSIUM SERPL-SCNC: 4 MMOL/L (ref 3.6–5.5)
PROT SERPL-MCNC: 8.5 G/DL (ref 6–8.2)
PROTHROMBIN TIME: 14.2 SEC (ref 12–14.6)
PROTHROMBIN TIME: 14.3 SEC (ref 12–14.6)
RBC # BLD AUTO: 3.52 M/UL (ref 4.2–5.4)
RH BLD: NORMAL
SODIUM SERPL-SCNC: 136 MMOL/L (ref 135–145)
TROPONIN T SERPL-MCNC: 325 NG/L (ref 6–19)
UFH PPP CHRO-ACNC: <0.1 IU/ML
WBC # BLD AUTO: 10.9 K/UL (ref 4.8–10.8)

## 2022-07-21 PROCEDURE — 80053 COMPREHEN METABOLIC PANEL: CPT

## 2022-07-21 PROCEDURE — 700111 HCHG RX REV CODE 636 W/ 250 OVERRIDE (IP): Performed by: EMERGENCY MEDICINE

## 2022-07-21 PROCEDURE — 84484 ASSAY OF TROPONIN QUANT: CPT

## 2022-07-21 PROCEDURE — 93005 ELECTROCARDIOGRAM TRACING: CPT | Performed by: EMERGENCY MEDICINE

## 2022-07-21 PROCEDURE — 96376 TX/PRO/DX INJ SAME DRUG ADON: CPT

## 2022-07-21 PROCEDURE — 82550 ASSAY OF CK (CPK): CPT

## 2022-07-21 PROCEDURE — 93978 VASCULAR STUDY: CPT

## 2022-07-21 PROCEDURE — 86900 BLOOD TYPING SEROLOGIC ABO: CPT

## 2022-07-21 PROCEDURE — 86850 RBC ANTIBODY SCREEN: CPT

## 2022-07-21 PROCEDURE — 93925 LOWER EXTREMITY STUDY: CPT

## 2022-07-21 PROCEDURE — 99223 1ST HOSP IP/OBS HIGH 75: CPT | Performed by: HOSPITALIST

## 2022-07-21 PROCEDURE — 85610 PROTHROMBIN TIME: CPT | Mod: 91

## 2022-07-21 PROCEDURE — A9270 NON-COVERED ITEM OR SERVICE: HCPCS | Performed by: HOSPITALIST

## 2022-07-21 PROCEDURE — 83605 ASSAY OF LACTIC ACID: CPT

## 2022-07-21 PROCEDURE — 85730 THROMBOPLASTIN TIME PARTIAL: CPT

## 2022-07-21 PROCEDURE — 85520 HEPARIN ASSAY: CPT

## 2022-07-21 PROCEDURE — 99285 EMERGENCY DEPT VISIT HI MDM: CPT

## 2022-07-21 PROCEDURE — 36415 COLL VENOUS BLD VENIPUNCTURE: CPT

## 2022-07-21 PROCEDURE — 770020 HCHG ROOM/CARE - TELE (206)

## 2022-07-21 PROCEDURE — 96375 TX/PRO/DX INJ NEW DRUG ADDON: CPT

## 2022-07-21 PROCEDURE — 96365 THER/PROPH/DIAG IV INF INIT: CPT

## 2022-07-21 PROCEDURE — 700102 HCHG RX REV CODE 250 W/ 637 OVERRIDE(OP): Performed by: HOSPITALIST

## 2022-07-21 PROCEDURE — 85025 COMPLETE CBC W/AUTO DIFF WBC: CPT

## 2022-07-21 PROCEDURE — 86901 BLOOD TYPING SEROLOGIC RH(D): CPT

## 2022-07-21 RX ORDER — EZETIMIBE 10 MG/1
10 TABLET ORAL DAILY
COMMUNITY
End: 2022-10-18 | Stop reason: SDUPTHER

## 2022-07-21 RX ORDER — AMOXICILLIN 250 MG
2 CAPSULE ORAL 2 TIMES DAILY
Status: DISCONTINUED | OUTPATIENT
Start: 2022-07-21 | End: 2022-07-24 | Stop reason: HOSPADM

## 2022-07-21 RX ORDER — BISACODYL 10 MG
10 SUPPOSITORY, RECTAL RECTAL
Status: DISCONTINUED | OUTPATIENT
Start: 2022-07-21 | End: 2022-07-24 | Stop reason: HOSPADM

## 2022-07-21 RX ORDER — PRAMIPEXOLE DIHYDROCHLORIDE 0.12 MG/1
0.12 TABLET ORAL 3 TIMES DAILY
COMMUNITY
End: 2023-01-03 | Stop reason: SDUPTHER

## 2022-07-21 RX ORDER — ACETAMINOPHEN 500 MG
1000 TABLET ORAL EVERY 6 HOURS PRN
Status: ON HOLD | COMMUNITY
End: 2022-07-24

## 2022-07-21 RX ORDER — ATORVASTATIN CALCIUM 80 MG/1
80 TABLET, FILM COATED ORAL NIGHTLY
COMMUNITY
End: 2023-10-09

## 2022-07-21 RX ORDER — EZETIMIBE 10 MG/1
10 TABLET ORAL DAILY
Status: DISCONTINUED | OUTPATIENT
Start: 2022-07-22 | End: 2022-07-24 | Stop reason: HOSPADM

## 2022-07-21 RX ORDER — ASPIRIN 81 MG/1
81 TABLET, CHEWABLE ORAL DAILY
Status: DISCONTINUED | OUTPATIENT
Start: 2022-07-21 | End: 2022-07-24

## 2022-07-21 RX ORDER — POLYETHYLENE GLYCOL 3350 17 G/17G
1 POWDER, FOR SOLUTION ORAL
Status: DISCONTINUED | OUTPATIENT
Start: 2022-07-21 | End: 2022-07-24 | Stop reason: HOSPADM

## 2022-07-21 RX ORDER — ASPIRIN 81 MG/1
81 TABLET, CHEWABLE ORAL DAILY
Status: ON HOLD | COMMUNITY
End: 2022-07-24

## 2022-07-21 RX ORDER — LISINOPRIL 20 MG/1
20 TABLET ORAL DAILY
Status: DISCONTINUED | OUTPATIENT
Start: 2022-07-21 | End: 2022-07-24 | Stop reason: HOSPADM

## 2022-07-21 RX ORDER — HEPARIN SODIUM 5000 [USP'U]/100ML
0-30 INJECTION, SOLUTION INTRAVENOUS CONTINUOUS
Status: DISCONTINUED | OUTPATIENT
Start: 2022-07-21 | End: 2022-07-24

## 2022-07-21 RX ORDER — HEPARIN SODIUM 1000 [USP'U]/ML
80 INJECTION, SOLUTION INTRAVENOUS; SUBCUTANEOUS ONCE
Status: COMPLETED | OUTPATIENT
Start: 2022-07-21 | End: 2022-07-21

## 2022-07-21 RX ORDER — HEPARIN SODIUM 1000 [USP'U]/ML
40 INJECTION, SOLUTION INTRAVENOUS; SUBCUTANEOUS PRN
Status: DISCONTINUED | OUTPATIENT
Start: 2022-07-21 | End: 2022-07-24

## 2022-07-21 RX ORDER — OXYCODONE HYDROCHLORIDE 5 MG/1
5-10 TABLET ORAL EVERY 4 HOURS PRN
Status: DISCONTINUED | OUTPATIENT
Start: 2022-07-21 | End: 2022-07-24 | Stop reason: HOSPADM

## 2022-07-21 RX ORDER — ATORVASTATIN CALCIUM 80 MG/1
80 TABLET, FILM COATED ORAL NIGHTLY
Status: DISCONTINUED | OUTPATIENT
Start: 2022-07-21 | End: 2022-07-24 | Stop reason: HOSPADM

## 2022-07-21 RX ORDER — ACETAMINOPHEN 325 MG/1
650 TABLET ORAL EVERY 6 HOURS PRN
Status: DISCONTINUED | OUTPATIENT
Start: 2022-07-21 | End: 2022-07-24 | Stop reason: HOSPADM

## 2022-07-21 RX ORDER — CLOPIDOGREL BISULFATE 75 MG/1
75 TABLET ORAL DAILY
Status: DISCONTINUED | OUTPATIENT
Start: 2022-07-21 | End: 2022-07-24 | Stop reason: HOSPADM

## 2022-07-21 RX ADMIN — LISINOPRIL 20 MG: 20 TABLET ORAL at 20:14

## 2022-07-21 RX ADMIN — PRAMIPEXOLE DIHYDROCHLORIDE 0.38 MG: 0.12 TABLET ORAL at 20:14

## 2022-07-21 RX ADMIN — HEPARIN SODIUM 18 UNITS/KG/HR: 5000 INJECTION, SOLUTION INTRAVENOUS at 16:40

## 2022-07-21 RX ADMIN — SENNOSIDES AND DOCUSATE SODIUM 2 TABLET: 50; 8.6 TABLET ORAL at 20:14

## 2022-07-21 RX ADMIN — ASPIRIN 81 MG 81 MG: 81 TABLET ORAL at 20:14

## 2022-07-21 RX ADMIN — OXYCODONE 10 MG: 5 TABLET ORAL at 20:21

## 2022-07-21 RX ADMIN — HEPARIN SODIUM 3800 UNITS: 1000 INJECTION, SOLUTION INTRAVENOUS; SUBCUTANEOUS at 16:40

## 2022-07-21 RX ADMIN — FENTANYL CITRATE 50 MCG: 50 INJECTION, SOLUTION INTRAMUSCULAR; INTRAVENOUS at 16:46

## 2022-07-21 RX ADMIN — CLOPIDOGREL BISULFATE 75 MG: 75 TABLET ORAL at 20:14

## 2022-07-21 RX ADMIN — FENTANYL CITRATE 50 MCG: 50 INJECTION, SOLUTION INTRAMUSCULAR; INTRAVENOUS at 13:31

## 2022-07-21 RX ADMIN — ATORVASTATIN CALCIUM 80 MG: 80 TABLET, FILM COATED ORAL at 20:14

## 2022-07-21 ASSESSMENT — LIFESTYLE VARIABLES
EVER HAD A DRINK FIRST THING IN THE MORNING TO STEADY YOUR NERVES TO GET RID OF A HANGOVER: NO
EVER FELT BAD OR GUILTY ABOUT YOUR DRINKING: NO
AVERAGE NUMBER OF DAYS PER WEEK YOU HAVE A DRINK CONTAINING ALCOHOL: 0
CONSUMPTION TOTAL: NEGATIVE
ALCOHOL_USE: NO
HAVE PEOPLE ANNOYED YOU BY CRITICIZING YOUR DRINKING: NO
HOW MANY TIMES IN THE PAST YEAR HAVE YOU HAD 5 OR MORE DRINKS IN A DAY: 0
ON A TYPICAL DAY WHEN YOU DRINK ALCOHOL HOW MANY DRINKS DO YOU HAVE: 0
TOTAL SCORE: 0
TOTAL SCORE: 0
HAVE YOU EVER FELT YOU SHOULD CUT DOWN ON YOUR DRINKING: NO
TOTAL SCORE: 0
DOES PATIENT WANT TO STOP DRINKING: NO

## 2022-07-21 ASSESSMENT — COGNITIVE AND FUNCTIONAL STATUS - GENERAL
MOVING TO AND FROM BED TO CHAIR: A LITTLE
TURNING FROM BACK TO SIDE WHILE IN FLAT BAD: A LITTLE
SUGGESTED CMS G CODE MODIFIER DAILY ACTIVITY: CK
MOBILITY SCORE: 10
HELP NEEDED FOR BATHING: A LITTLE
TOILETING: A LOT
WALKING IN HOSPITAL ROOM: TOTAL
EATING MEALS: A LITTLE
STANDING UP FROM CHAIR USING ARMS: TOTAL
DRESSING REGULAR LOWER BODY CLOTHING: A LOT
MOVING FROM LYING ON BACK TO SITTING ON SIDE OF FLAT BED: UNABLE
SUGGESTED CMS G CODE MODIFIER MOBILITY: CL
PERSONAL GROOMING: A LOT
DRESSING REGULAR UPPER BODY CLOTHING: A LOT
CLIMB 3 TO 5 STEPS WITH RAILING: TOTAL
DAILY ACTIVITIY SCORE: 14

## 2022-07-21 ASSESSMENT — ENCOUNTER SYMPTOMS
FEVER: 0
CLAUDICATION: 1
CHILLS: 0
SHORTNESS OF BREATH: 0

## 2022-07-21 ASSESSMENT — PAIN DESCRIPTION - PAIN TYPE: TYPE: ACUTE PAIN

## 2022-07-21 ASSESSMENT — FIBROSIS 4 INDEX: FIB4 SCORE: 0.62

## 2022-07-21 NOTE — ED PROVIDER NOTES
ED Provider Note    CHIEF COMPLAINT  Chief Complaint   Patient presents with   • Sent by MD     Pt sent by Dr. Bauer to be admitted for bilateral leg ischemia    • Leg Pain     Bilateral leg pain increasing over last few months        HPI  Fouzia Santamaria is a 61 y.o. female who presents bilateral lower extremity pain over the past several months though rather consistent over the past week.  She has a known history of coronary artery disease with recent stenting earlier this month at [facility].  She is on dual antiplatelet therapy with Plavix and aspirin.  Denies any missed doses in her antiplatelet medication since stents were placed.    She has been followed by a primary care physician regarding claudication-like symptoms.  She had outpatient imaging at [facility] that are not immediately available to us about 1 month ago.  She has followed up with Dr. Miguel hayes from vascular surgery today and he had the patient sent immediately to the emergency department for definitive management of bilateral lower extremity ischemia.    The patient notes that she has constant bilateral lower extremity pain that is worse with ambulation.  No acute changes in her pain symptoms though has had pain for several months now and rather consistent over the past month.  No acute changes in her level of pain in the past few weeks.    REVIEW OF SYSTEMS  See HPI for further details. All other systems are negative.     PAST MEDICAL HISTORY   has a past medical history of Chronic obstructive pulmonary disease (HCC), Diabetes (HCC), Heart attack (HCC), Hypertension, and Stroke (HCC).    SOCIAL HISTORY  Social History     Tobacco Use   • Smoking status: Current Every Day Smoker     Packs/day: 1.50     Types: Cigarettes   • Smokeless tobacco: Never Used   Substance and Sexual Activity   • Alcohol use: No   • Drug use: No   • Sexual activity: Not on file       SURGICAL HISTORY   has a past surgical history that includes thyroidectomy  "and stent placement.    CURRENT MEDICATIONS  Home Medications     Reviewed by Rosa Greco R.N. (Registered Nurse) on 07/21/22 at 1238  Med List Status: Partial   Medication Last Dose Status   aspirin (ASA) 81 MG Chew Tab chewable tablet 7/20/2022 Active   atorvastatin (LIPITOR) 80 MG tablet 7/20/2022 Active   Clopidogrel Bisulfate (PLAVIX PO) 7/20/2022 Active   ezetimibe (ZETIA) 10 MG Tab 7/20/2022 Active   hydrocodone-acetaminophen (NORCO) 5-325 MG Tab per tablet  Active   LISINOPRIL PO 7/20/2022 Active   ondansetron (ZOFRAN ODT) 4 MG TABLET DISPERSIBLE prn Active   pramipexole (MIRAPEX) 0.125 MG Tab 7/20/2022 Active                ALLERGIES  Allergies   Allergen Reactions   • Pcn [Penicillins]    • Toradol        PHYSICAL EXAM  VITAL SIGNS: BP (!) 176/101   Pulse 98   Temp 36.2 °C (97.2 °F) (Temporal)   Resp 12   Ht 1.651 m (5' 5\")   Wt 47.2 kg (104 lb)   SpO2 100%   BMI 17.31 kg/m²   Pulse ox interpretation: I interpret this pulse ox as normal.  Constitutional: Alert in no apparent distress.  HENT: No signs of trauma, Bilateral external ears normal, Nose normal.   Eyes: Pupils are equal and reactive, Conjunctiva normal, Non-icteric.   Neck: Normal range of motion, No tenderness, Supple, No stridor.   Cardiovascular: Regular rate and rhythm.  Unable to palpate DP or PT pulses bilaterally.  1+ femoral pulses bilaterally.  Thorax & Lungs: No respiratory distress, No wheezing, No chest tenderness.   Abdomen: Soft, No tenderness  Skin: Warm, Dry, No erythema, No rash.   Back: No bony tenderness, No CVA tenderness.   Extremities: Inability to palpate pulses in bilateral lower extremities, No edema, No tenderness, No cyanosis  Musculoskeletal: Good range of motion in all major joints. No major deformities noted.   Neurologic: Alert, No focal deficits noted.       DIAGNOSTIC STUDIES / PROCEDURES    EKG - Physician interpretation  Results for orders placed or performed during the hospital encounter of " 22   EKG   Result Value Ref Range    Report       [facility] Emergency Dept.    Test Date:  2022  Pt Name:    CHRISTINA BLOUNT               Department: ER  MRN:        [id number]                      Room:        20  Gender:     Female                       Technician: [id number]  :        [id number]                   Requested By:ABRAN CARTER  Order #:    [id number]                    Reading MD:    Measurements  Intervals                                Axis  Rate:       76                           P:          80  FL:         148                          QRS:        -10  QRSD:       90                           T:          212  QT:         456  QTc:        513    Interpretive Statements  SINUS RHYTHM  ANTERIOR INFARCT, AGE INDETERMINATE  NONSPECIFIC T ABNORMALITIES, INFERIOR LEADS  PROLONGED QT INTERVAL  No previous ECG available for comparison           LABS  Labs Reviewed   CBC WITH DIFFERENTIAL - Abnormal; Notable for the following components:       Result Value    WBC 10.9 (*)     RBC 3.52 (*)     Hemoglobin 9.7 (*)     Hematocrit 31.0 (*)     MCHC 31.3 (*)     RDW 55.5 (*)     Lymphocytes 17.20 (*)     Neutrophils (Absolute) 7.62 (*)     Monos (Absolute) 1.23 (*)     All other components within normal limits    Narrative:     Indicate which anticoagulants the patient is on:->NONE   COMP METABOLIC PANEL - Abnormal; Notable for the following components:    Glucose 116 (*)     Bun 35 (*)     Total Protein 8.5 (*)     Globulin 4.6 (*)     All other components within normal limits    Narrative:     Indicate which anticoagulants the patient is on:->NONE   ESTIMATED GFR - Abnormal; Notable for the following components:    GFR (CKD-EPI) 47 (*)     All other components within normal limits    Narrative:     Indicate which anticoagulants the patient is on:->NONE   TROPONIN - Abnormal; Notable for the following components:    Troponin T 325 (*)     All other components within normal limits     Narrative:     Indicate which anticoagulants the patient is on:->NONE   LACTIC ACID    Narrative:     Indicate which anticoagulants the patient is on:->NONE   PROTHROMBIN TIME    Narrative:     Indicate which anticoagulants the patient is on:->NONE   APTT    Narrative:     Indicate which anticoagulants the patient is on:->NONE   COD (ADULT)   CREATINE KINASE    Narrative:     Indicate which anticoagulants the patient is on:->NONE   ABO RH CONFIRM   APTT    Narrative:     Indicate which anticoagulants the patient is on:->HEPARIN   PROTHROMBIN TIME    Narrative:     Indicate which anticoagulants the patient is on:->HEPARIN   HEPARIN XA (UNFRACTIONATED)    Narrative:     Indicate which anticoagulants the patient is on:->HEPARIN   APTT   HEPARIN XA (UNFRACTIONATED)   PROTHROMBIN TIME         RADIOLOGY  US-AORTA/ILIACS DUPLEX COMPLETE         US-EXTREMITY ARTERY LOWER BILAT               COURSE & MEDICAL DECISION MAKING    Medications   heparin infusion 25,000 units in 500 mL 0.45% NACL (18 Units/kg/hr × 47.2 kg Intravenous Rate Verify 7/21/22 2012)   heparin injection 1,900 Units (has no administration in time range)   aspirin (ASA) chewable tab 81 mg (81 mg Oral Given 7/21/22 2014)   atorvastatin (LIPITOR) tablet 80 mg (80 mg Oral Given 7/21/22 2014)   clopidogrel (PLAVIX) tablet 75 mg (75 mg Oral Given 7/21/22 2014)   ezetimibe (ZETIA) tablet 10 mg (has no administration in time range)   lisinopril (PRINIVIL) tablet 20 mg (20 mg Oral Given 7/21/22 2014)   pramipexole (MIRAPEX) tablet 0.375 mg (0.375 mg Oral Given 7/21/22 2014)   senna-docusate (PERICOLACE or SENOKOT S) 8.6-50 MG per tablet 2 Tablet (2 Tablets Oral Given 7/21/22 2014)     And   polyethylene glycol/lytes (MIRALAX) PACKET 1 Packet (has no administration in time range)     And   magnesium hydroxide (MILK OF MAGNESIA) suspension 30 mL (has no administration in time range)     And   bisacodyl (DULCOLAX) suppository 10 mg (has no administration in time  "range)   acetaminophen (Tylenol) tablet 650 mg (has no administration in time range)   oxyCODONE immediate-release (ROXICODONE) tablet 5-10 mg (10 mg Oral Given 7/21/22 2021)   fentaNYL (SUBLIMAZE) injection 50 mcg (50 mcg Intravenous Given 7/21/22 1331)   heparin injection 3,800 Units (3,800 Units Intravenous Given 7/21/22 1640)   fentaNYL (SUBLIMAZE) injection 50 mcg (50 mcg Intravenous Given 7/21/22 1646)       Pertinent Labs & Imaging studies reviewed. (See chart for details)  61 y.o. female presenting at the recommendation of Dr. Bauer from vascular surgery due to chronic lower extremity pain likely related to ischemia.  Unable to palpate pulses in her lower extremities.  Communicated with Dr. Bauer who was recommending ultrasound of the lower extremity arteries.  Recommending hospitalization for further stabilization and he will plan on operative management.  Recommending EKG and ultrasound of the heart given recent STEMI which was treated at [facility].  Negative CPK and lactic acid.  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     I did communicate with the vascular surgeon once again after ultrasound results.  Recommending heparin drip and hospitalization.    EKG was performed and the patient does have lateral Q waves with some residual ST elevation.  Patient denies any active chest pain symptoms.  We were able to obtain EKGs from [facility] that showed very significant ST elevation at the time of her STEMI about 11 days ago.  Patient states that she has been compliant with her medications including Plavix and aspirin.    I spoke with Dr. Sampson from the hospitalist service and he is agreeable to the hospitalization.      BP (!) 141/101   Pulse 75   Temp 36.7 °C (98.1 °F) (Temporal)   Resp 16   Ht 1.651 m (5' 5\")   Wt 46.7 kg (102 lb 15.3 oz)   SpO2 96%   BMI 17.13 kg/m²       FINAL " IMPRESSION  Bilateral lower extremity claudication      Electronically signed by: Ghassan Lemus M.D., 7/21/2022 12:46 PM

## 2022-07-21 NOTE — ED NOTES
Pharmacy Medication Reconciliation      ~Medication reconciliation updated and complete per patient & patient family at bedside with Rx bottles. Reviewed Rx bottles and returned at bedside  ~Allergies have been verified and updated   ~No oral ABX within the last 30 days  ~Patient home pharmacy: [facility]

## 2022-07-21 NOTE — ED TRIAGE NOTES
Pt to triage .  Chief Complaint   Patient presents with   • Sent by MD     Pt sent by Dr. Bauer to be admitted for bilateral leg ischemia    • Leg Pain     Bilateral leg pain increasing over last few months

## 2022-07-21 NOTE — ED NOTES
Pt states she was sent here to be admitted for surgery for bilateral leg ischemia. Pt c/o bilateral LE pain x several months. Bilateral LE cool but pink, no pedal pulses felt to either foot.     Pt a/o x4, speaking in full sentences.

## 2022-07-22 ENCOUNTER — APPOINTMENT (OUTPATIENT)
Dept: RADIOLOGY | Facility: MEDICAL CENTER | Age: 62
DRG: 282 | End: 2022-07-22
Attending: STUDENT IN AN ORGANIZED HEALTH CARE EDUCATION/TRAINING PROGRAM
Payer: MEDICAID

## 2022-07-22 ENCOUNTER — APPOINTMENT (OUTPATIENT)
Dept: CARDIOLOGY | Facility: MEDICAL CENTER | Age: 62
DRG: 282 | End: 2022-07-22
Attending: STUDENT IN AN ORGANIZED HEALTH CARE EDUCATION/TRAINING PROGRAM
Payer: MEDICAID

## 2022-07-22 LAB
APTT PPP: 42.2 SEC (ref 24.7–36)
EKG IMPRESSION: NORMAL
INR PPP: 1.15 (ref 0.87–1.13)
LV EJECT FRACT  99904: 35
LV EJECT FRACT MOD 2C 99903: 59.17
LV EJECT FRACT MOD 4C 99902: 30.97
LV EJECT FRACT MOD BP 99901: 47.95
PROTHROMBIN TIME: 14.6 SEC (ref 12–14.6)
TROPONIN T SERPL-MCNC: 270 NG/L (ref 6–19)
UFH PPP CHRO-ACNC: 0.14 IU/ML
UFH PPP CHRO-ACNC: 0.36 IU/ML
UFH PPP CHRO-ACNC: 0.49 IU/ML

## 2022-07-22 PROCEDURE — 700102 HCHG RX REV CODE 250 W/ 637 OVERRIDE(OP): Performed by: HOSPITALIST

## 2022-07-22 PROCEDURE — 99233 SBSQ HOSP IP/OBS HIGH 50: CPT | Performed by: STUDENT IN AN ORGANIZED HEALTH CARE EDUCATION/TRAINING PROGRAM

## 2022-07-22 PROCEDURE — 99255 IP/OBS CONSLTJ NEW/EST HI 80: CPT | Performed by: INTERNAL MEDICINE

## 2022-07-22 PROCEDURE — 36415 COLL VENOUS BLD VENIPUNCTURE: CPT

## 2022-07-22 PROCEDURE — 93306 TTE W/DOPPLER COMPLETE: CPT

## 2022-07-22 PROCEDURE — 93925 LOWER EXTREMITY STUDY: CPT | Mod: 26 | Performed by: INTERNAL MEDICINE

## 2022-07-22 PROCEDURE — 700111 HCHG RX REV CODE 636 W/ 250 OVERRIDE (IP): Performed by: EMERGENCY MEDICINE

## 2022-07-22 PROCEDURE — A9270 NON-COVERED ITEM OR SERVICE: HCPCS | Performed by: HOSPITALIST

## 2022-07-22 PROCEDURE — 770020 HCHG ROOM/CARE - TELE (206)

## 2022-07-22 PROCEDURE — 93306 TTE W/DOPPLER COMPLETE: CPT | Mod: 26 | Performed by: INTERNAL MEDICINE

## 2022-07-22 PROCEDURE — 85520 HEPARIN ASSAY: CPT

## 2022-07-22 PROCEDURE — 93005 ELECTROCARDIOGRAM TRACING: CPT | Performed by: STUDENT IN AN ORGANIZED HEALTH CARE EDUCATION/TRAINING PROGRAM

## 2022-07-22 PROCEDURE — 93010 ELECTROCARDIOGRAM REPORT: CPT | Performed by: INTERNAL MEDICINE

## 2022-07-22 PROCEDURE — 84484 ASSAY OF TROPONIN QUANT: CPT

## 2022-07-22 PROCEDURE — 99406 BEHAV CHNG SMOKING 3-10 MIN: CPT

## 2022-07-22 PROCEDURE — 71045 X-RAY EXAM CHEST 1 VIEW: CPT

## 2022-07-22 PROCEDURE — 93978 VASCULAR STUDY: CPT | Mod: 26 | Performed by: INTERNAL MEDICINE

## 2022-07-22 RX ADMIN — ATORVASTATIN CALCIUM 80 MG: 80 TABLET, FILM COATED ORAL at 19:52

## 2022-07-22 RX ADMIN — SENNOSIDES AND DOCUSATE SODIUM 2 TABLET: 50; 8.6 TABLET ORAL at 05:56

## 2022-07-22 RX ADMIN — LISINOPRIL 20 MG: 20 TABLET ORAL at 16:38

## 2022-07-22 RX ADMIN — HEPARIN SODIUM 22 UNITS/KG/HR: 5000 INJECTION, SOLUTION INTRAVENOUS at 19:08

## 2022-07-22 RX ADMIN — HEPARIN SODIUM 1900 UNITS: 1000 INJECTION, SOLUTION INTRAVENOUS; SUBCUTANEOUS at 00:47

## 2022-07-22 RX ADMIN — ASPIRIN 81 MG 81 MG: 81 TABLET ORAL at 16:38

## 2022-07-22 RX ADMIN — PRAMIPEXOLE DIHYDROCHLORIDE 0.38 MG: 0.12 TABLET ORAL at 16:39

## 2022-07-22 RX ADMIN — CLOPIDOGREL BISULFATE 75 MG: 75 TABLET ORAL at 16:38

## 2022-07-22 RX ADMIN — EZETIMIBE 10 MG: 10 TABLET ORAL at 05:56

## 2022-07-22 ASSESSMENT — COPD QUESTIONNAIRES
COPD SCREENING SCORE: 4
IN THE PAST 12 MONTHS DO YOU DO LESS THAN YOU USED TO BECAUSE OF YOUR BREATHING PROBLEMS: DISAGREE/UNSURE
DO YOU EVER COUGH UP ANY MUCUS OR PHLEGM?: NO/ONLY WITH OCCASIONAL COLDS OR INFECTIONS
HAVE YOU SMOKED AT LEAST 100 CIGARETTES IN YOUR ENTIRE LIFE: YES
DURING THE PAST 4 WEEKS HOW MUCH DID YOU FEEL SHORT OF BREATH: SOME OF THE TIME

## 2022-07-22 ASSESSMENT — PAIN DESCRIPTION - PAIN TYPE
TYPE: ACUTE PAIN
TYPE: ACUTE PAIN

## 2022-07-22 ASSESSMENT — FIBROSIS 4 INDEX: FIB4 SCORE: 0.62

## 2022-07-22 NOTE — PROGRESS NOTES
Received call for critical troponin of 270. Previous troponin was 325, trending down. MAKEDA Wilkes notified per protocol.

## 2022-07-22 NOTE — CARE PLAN
The patient is Watcher - Medium risk of patient condition declining or worsening    Shift Goals  Clinical Goals: (P) Herpain gtt  Patient Goals: (P) pain management    Progress made toward(s) clinical / shift goals:    Problem: Skin Integrity  Goal: Skin integrity is maintained or improved  Outcome: Progressing     Problem: Fall Risk  Goal: Patient will remain free from falls  Outcome: Progressing     Problem: Pain - Standard  Goal: Alleviation of pain or a reduction in pain to the patient’s comfort goal  Outcome: Progressing       Patient is not progressing towards the following goals:

## 2022-07-22 NOTE — H&P
Hospital Medicine History & Physical Note    Date of Service  7/21/2022    Primary Care Physician  [facility]     Consultants  vascular surgery    Specialist Names: Juancarlos    Code Status  Full Code    Chief Complaint  Chief Complaint   Patient presents with   • Sent by MD     Pt sent by Dr. Bauer to be admitted for bilateral leg ischemia    • Leg Pain     Bilateral leg pain increasing over last few months        History of Presenting Illness  Fouzia Santamaria is a 61 y.o. female who presented 7/21/2022 with lower extremity pain.  Ms. Santamaria has a past medical history of coronary artery disease and tobacco dependence that presented to [facility] 7/3/2022 with chest pain and into the arm and was found to have ST elevation on her EKG and was admitted with 2 stents placed to the LAD.  She came back on 7/9/2022 with chest pain and had a third stent placed to the LAD.  She was discharged and has had no subsequent chest pain or shortness of breath.  She saw Dr. Bauer vascular surgery today in the office due to escalating claudication symptoms to the point where she can only walk a few feet and has pain in her feet.  He noted that pulses were not palpable and referred her to the emergency room where she will be initiated on IV heparin drip and he will arrange surgery.  Of note is her troponin is 325 and this is likely in the setting of trending down given the partial resolution of her ST elevation anteriorly no we will check another troponin in the morning to ensure this trend.    I discussed the plan of care with Dr. Lemus.  Her , Harris is at bedside.    Review of Systems  Review of Systems   Constitutional: Negative for chills and fever.   Respiratory: Negative for shortness of breath.    Cardiovascular: Positive for claudication. Negative for chest pain and leg swelling.   All other systems reviewed and are negative.      Past Medical History   has a past medical history of Chronic  obstructive pulmonary disease (HCC), Diabetes (HCC), Heart attack (HCC), Hypertension, and Stroke (HCC).    Surgical History   has a past surgical history that includes thyroidectomy and stent placement.     Family History  No family history of heart attacks.    Social History   reports that she quit smoking cigarettes 7/3. She had been smoking about 1.50 packs per day. She has never used smokeless tobacco. She reports that she does not drink alcohol and does not use drugs.    Allergies  Allergies   Allergen Reactions   • Pcn [Penicillins] Vomiting and Nausea   • Toradol Vomiting and Nausea       Medications  Prior to Admission Medications   Prescriptions Last Dose Informant Patient Reported? Taking?   acetaminophen (TYLENOL) 500 MG Tab 7/20/2022 at PR[facility] Family Member Yes Yes   Sig: Take 1,000 mg by mouth every 6 hours as needed for Moderate Pain.   aspirin (ASA) 81 MG Chew Tab chewable tablet 7/20/2022 at UNK Rx Bottle (For Med Information) Yes Yes   Sig: Chew 81 mg every day.   atorvastatin (LIPITOR) 80 MG tablet 7/20/2022 at UNK Rx Bottle (For Med Information) Yes Yes   Sig: Take 80 mg by mouth every evening.   clopidogrel (PLAVIX) 75 MG Tab 7/20/2022 at UNK Rx Bottle (For Med Information) Yes No   Sig: Take 75 mg by mouth every day.   ezetimibe (ZETIA) 10 MG Tab 7/20/2022 at UNK Rx Bottle (For Med Information) Yes Yes   Sig: Take 10 mg by mouth every day.   lisinopril (PRINIVIL) 20 MG Tab 7/20/2022 at UNK Rx Bottle (For Med Information) Yes No   Sig: Take 20 mg by mouth every day.   pramipexole (MIRAPEX) 0.125 MG Tab 7/20/2022 at UNK Rx Bottle (For Med Information) Yes Yes   Sig: Take 0.375 mg by mouth every evening.      Facility-Administered Medications: None       Physical Exam  Temp:  [36.2 °C (97.2 °F)] 36.2 °C (97.2 °F)  Pulse:  [77-98] 83  Resp:  [12] 12  BP: (136-176)/() 143/89  SpO2:  [98 %-100 %] 99 %  Blood Pressure: (!) 143/89   Temperature: 36.2 °C (97.2 °F)   Pulse: 83   Respiration: 12    Pulse Oximetry: 99 %       Physical Exam  Vitals and nursing note reviewed.   Constitutional:       General: She is not in acute distress.     Appearance: She is ill-appearing.   HENT:      Head: Normocephalic and atraumatic.      Mouth/Throat:      Mouth: Mucous membranes are dry.      Pharynx: Oropharynx is clear.      Comments: Edentulous   Eyes:      General: No scleral icterus.     Conjunctiva/sclera: Conjunctivae normal.   Cardiovascular:      Rate and Rhythm: Normal rate and regular rhythm.      Heart sounds: No murmur heard.  Pulmonary:      Effort: Pulmonary effort is normal.      Breath sounds: Normal breath sounds.   Abdominal:      General: There is no distension.      Tenderness: There is no abdominal tenderness.   Musculoskeletal:      Cervical back: Normal range of motion and neck supple.      Right lower leg: No edema.      Left lower leg: No edema.      Comments: Feet are cool to touch  Pulses are not palpable    Skin:     General: Skin is warm and dry.   Neurological:      General: No focal deficit present.      Mental Status: She is alert and oriented to person, place, and time.   Psychiatric:         Mood and Affect: Mood normal.         Behavior: Behavior normal.         Thought Content: Thought content normal.         Judgment: Judgment normal.         Laboratory:  Recent Labs     07/21/22  1316   WBC 10.9*   RBC 3.52*   HEMOGLOBIN 9.7*   HEMATOCRIT 31.0*   MCV 88.1   MCH 27.6   MCHC 31.3*   RDW 55.5*   PLATELETCT 404   MPV 9.6     Recent Labs     07/21/22  1316   SODIUM 136   POTASSIUM 4.0   CHLORIDE 102   CO2 20   GLUCOSE 116*   BUN 35*   CREATININE 1.31   CALCIUM 9.1     Recent Labs     07/21/22  1316   ALTSGPT 8   ASTSGOT 12   ALKPHOSPHAT 97   TBILIRUBIN 0.5   GLUCOSE 116*     Recent Labs     07/21/22  1316 07/21/22  1633   APTT 29.9 30.8   INR 1.11 1.12     No results for input(s): NTPROBNP in the last 72 hours.      Recent Labs     07/21/22  1316   TROPONINT 325*        Imaging:  US-AORTA/ILIACS DUPLEX COMPLETE         US-EXTREMITY ARTERY LOWER BILAT             EKG interpreted by me sinus rhythm QTC of 513, Q waves anteriorly with ST elevation in V2 and V3, inverted T waves laterally.  I have compared this to the EKG on 7/9/2022 from [place] revealing very large ST elevation anteriorly from V2 through V5 with ST elevation also noted on 7/3/2022 from [place] though not as high as the EKG on 7/9    Assessment/Plan:  Justification for Admission Status  I anticipate this patient will require at least two midnights for appropriate medical management, necessitating inpatient admission because IV heparin drip until she has vascular surgery on both legs.    Patient will need a Telemetry bed on TELEMETRY service .  The need is secondary to recent heart attack on 7/3 and 7/9 with troponin 325. This will need to be trended.  * Critical ischemia of lower extremity (HCC)- (present on admission)  Assessment & Plan  Dr. Bauer vascular surgery sent her from the office for a heparin drip and will plan on surgery.  Pain control.   High intensity statin  She quit smoking 7/3 after her heart attack    CAD (coronary artery disease)- (present on admission)  Assessment & Plan  She had a heart attack on 7/3 with two sents to the LAD and came back on 7/9 with another stent to LAD.  Continue plavix, aspirin, statin. She is not on a beta blocker.  EKG from 7/9 at [place] reveal very elevated ST segments anteriorly. Her current EKG reveals Q waves and partial resolution. Troponin remains elevated at 325 and likely is trending down from her last MI but will check another level in the morning. She will be on a heparin drip due to the lower extremity ischemia.     QT prolongation- (present on admission)  Assessment & Plan  QTc 513  Refrain from medications that prolong QT interval    Anemia- (present on admission)  Assessment & Plan  Hb 9.7  Her baseline is unknown      VTE prophylaxis: IV  heparin drip and titrate Xa levels

## 2022-07-22 NOTE — PROGRESS NOTES
Assumed care of patient. Bedside report received from Marley MONTALVO. Updated POC, call light within reach, fall precautions in place. Bed locked, and in lowest position, bed alarm on and working.  Patient is A&Ox4. Assessment complete. Patient instructed to call for assistance. All questions answered, no further needs at this time.

## 2022-07-22 NOTE — CARE PLAN
The patient is Stable - Low risk of patient condition declining or worsening    Shift Goals  Clinical Goals: heparin gtt, pain management  Patient Goals: pain control      Problem: Knowledge Deficit - Standard  Goal: Patient and family/care givers will demonstrate understanding of plan of care, disease process/condition, diagnostic tests and medications  Outcome: Progressing  Note: Patient educated on POC, verbalizes understanding.     Problem: Skin Integrity  Goal: Skin integrity is maintained or improved  Outcome: Progressing     Problem: Fall Risk  Goal: Patient will remain free from falls  Outcome: Progressing     Problem: Pain - Standard  Goal: Alleviation of pain or a reduction in pain to the patient’s comfort goal  Outcome: Progressing       Progress made toward(s) clinical / shift goals:  Progressing

## 2022-07-22 NOTE — RESPIRATORY CARE
"COPD EDUCATION by COPD CLINICAL EDUCATOR  7/22/2022  at  12:53 PM by Tamiko Nava RRT     Patient interviewed by COPD education team.  Patient does not have a formal diagnosis of COPD, but was interested in the information, therefore a short intervention has been conducted.  A comprehensive packet including information about COPD, types of treatments to manage their disease and safe home Oxygen usage was provided and reviewed with patient at the bedside.  We discussed obtaining a referral to see a pulmonologist, and having pulmonary function testing done to assess the state of her lungs after years of smoking. Patient does not currently have any respiratory medications, and denies a need for.     Smoking Cessation Intervention and education completed, 5 minutes spent on smoking cessation education with patient. Patient recently quit smoking after having a heart attach, and states she is doing well. Congratulated patient and encouraged continued abstinence.     Provided smoking cessation packet with \"Tips to Quit\" and brochure for \"Free Smoking Cessation Classes\".       COPD Screen  COPD Risk Screening  Do you have a history of COPD?: No  COPD Population Screener  During the past 4 weeks, how much did you feel short of breath?: Some of the time  Do you ever cough up any mucus or phlegm?: No/only with occasional colds or infections  In the past 12 months, you do less than you used to because of your breathing problems: Disagree/unsure  Have you smoked at least 100 cigarettes in your entire life?: Yes  How old are you?: 50-59  COPD Screening Score: 4  COPD Coordinator Not Recommended: Yes    COPD Assessment  COPD Clinical Specialists ONLY  COPD Education Initiated: Yes--Short Intervention (Given COPD and Smoking Cessation literature and list of local pulmonary clinics, no resp meds.)  DME Company: none prior  DME Equipment Type: none prior  Physician Name: Pt states she has a PCP, can't remember their name or name of " clinic  Pulmonologist Name: given list of local pulmonary clinics  Referrals Initiated: Yes  Pulmonary Rehab: N/A  Smoking Cessation: Yes  $ Smoking Cessation 3-10 Minutes: Asymptomatic (5 min, quit 7/3/2022)  Hospice: N/A  Home Health Care: N/A  [facility] Outreach: N/A  [facility]: N/A  [facility]: Declined (given info)  Private In-Home Care Agency: N/A  Is this a COPD exacerbation patient?: No  $ Demo/Eval of SVN's, MDI's and Aerosols:  (no resp meds)    PFT Results    No results found for: PFT    Meds to Beds  Would the patient like to opt in for Bedside Medication Delivery at Discharge?: No

## 2022-07-22 NOTE — ASSESSMENT & PLAN NOTE
Dr. Bauer vascular surgery sent her from the office for a heparin drip and will plan on surgery.  Pain control.   High intensity statin  She quit smoking 7/3 after her heart attack

## 2022-07-22 NOTE — ASSESSMENT & PLAN NOTE
She had a heart attack on 7/3 with two sents to the LAD and came back on 7/9 with another stent to LAD.  Continue plavix, aspirin, statin. She is not on a beta blocker.  EKG from 7/9 at [place] reveal very elevated ST segments anteriorly. Her current EKG reveals Q waves and partial resolution. Troponin remains elevated at 325 and likely is trending down from her last MI but will check another level in the morning. She will be on a heparin drip due to the lower extremity ischemia.

## 2022-07-22 NOTE — PROGRESS NOTES
4 Eyes Skin Assessment Completed by vidhi, RN and KATIA Segovia.    Head WDL  Ears WDL  Nose WDL  Mouth WDL  Neck WDL  Breast/Chest WDL  Shoulder Blades WDL  Spine WDL  (R) Arm/Elbow/Hand WDL  (L) Arm/Elbow/Hand WDL  Abdomen WDL  Groin WDL  Scrotum/Coccyx/Buttocks WDL  (R) Leg WDL  (L) Leg WDL  (R) Heel/Foot/Toe Boggy  (L) Heel/Foot/Toe Boggy          Devices In Places Tele Box and Pulse Ox      Interventions In Place Pillows and Pressure Redistribution Mattress    Possible Skin Injury No    Pictures Uploaded Into Epic N/A  Wound Consult Placed N/A  RN Wound Prevention Protocol Ordered No

## 2022-07-22 NOTE — DIETARY
"Nutrition services: Day 1 of admit.  Fouzia Santamaria is a 61 y.o. female with admitting DX of Critical ischemia of lower extremity     Consult received for Malnutrition Screening Tool: 5 (poor appetite and weight loss of 34 lbs for > 1 year)    RD able to visit pt at bedside. States appetite has bene poor for over a month, pt estimates <50% of normal intake. States has had weight loss and mentions UBW of 140-150 lbs, though unsure when last weighed this. Pt mention awareness of muscle and fat loss to specific areas of the body. Pt mentions did not touch breakfast, though does not know what she wants instead. Pt appears a bit emotionally overwhelmed during interview, is more agreeable to protein shakes to help with intake at this time.     Assessment:  Height: 165.1 cm (5' 5\")  Weight: 46.7 kg (102 lb 15.3 oz)- bed scale   Body mass index is 17.13 kg/m²., BMI classification: Underweight   Diet/Intake: Cardiac     Evaluation:   1. Hx of CAD, critical ischemia of lower extremity   2. Pt intake PTA was insufficient and likely contributed to weight loss unintentionally, per estimation of <50% of usual intake, this indicates risk of malnutrition. Per ADLs, pt consuming <25% of meals at this time. RD adding Boost Plus TID.   3. Per chart review, weight hx with unknown measurement sources  Wt Readings from Last 4 Encounters:   07/21/22 46.7 kg (102 lb 15.3 oz)   09/09/18 65.5 kg (144 lb 6.4 oz)   08/08/17 59 kg (130 lb 1.1 oz)   01/12/17 67.2 kg (148 lb 2.4 oz)   o Pt reports UBW of 140 lbs though unsure of timeline. Wt hx per chart difficult to determine recent changes.   4. Per nutrition focused physical assessment, pt presents with severe muscle depletion bilateral on lower extremities and severe muscle wasting at clavicular region. Noted pt experienced a heart attack on 7/3 and stent on 7/9, this is likely contributing to challenges with nutrition needs.   5. Skin: no edema nor wounds noted   6. Pertinent labs: " Glucose 116, BUN 35  7. Pertinent meds: lipitor, plavix, zetia, prinivil, miralax, heparin   8. Last BM PTA     Malnutrition Risk: Pt with severe chronic malnutrition related to recurrent CAD occurrences evidenced by <50% of estimated energy needs x 1 month and severe muscle wasting per NFPA.     Problem: Nutritional:  Goal: Achieve adequate nutritional intake    Recommendations/Plan:  1. RD to add Boost Plus TID   2. Patient will consume >50% for meals/ supplements  3. Encourage intake of meals/ supplements   4. Document intake of all meals and/or supplements as % taken in ADL's to provide interdisciplinary communication across all shifts.   5. Monitor weight.  6. Nutrition rep will continue to see patient for ongoing meal and snack preferences.     RD following

## 2022-07-22 NOTE — PROGRESS NOTES
Bedside report received. Pt A&Ox 3. No reports of pain. POC discussed with pt. Pt verbalizes understanding. Call light and belongings within reach. Bed locked and in lowest position. Alarm and fall precautions in place.

## 2022-07-23 LAB — UFH PPP CHRO-ACNC: 0.49 IU/ML

## 2022-07-23 PROCEDURE — 99233 SBSQ HOSP IP/OBS HIGH 50: CPT | Performed by: STUDENT IN AN ORGANIZED HEALTH CARE EDUCATION/TRAINING PROGRAM

## 2022-07-23 PROCEDURE — 700102 HCHG RX REV CODE 250 W/ 637 OVERRIDE(OP): Performed by: HOSPITALIST

## 2022-07-23 PROCEDURE — 700111 HCHG RX REV CODE 636 W/ 250 OVERRIDE (IP): Performed by: EMERGENCY MEDICINE

## 2022-07-23 PROCEDURE — 85520 HEPARIN ASSAY: CPT

## 2022-07-23 PROCEDURE — A9270 NON-COVERED ITEM OR SERVICE: HCPCS | Performed by: HOSPITALIST

## 2022-07-23 PROCEDURE — 36415 COLL VENOUS BLD VENIPUNCTURE: CPT

## 2022-07-23 PROCEDURE — 770020 HCHG ROOM/CARE - TELE (206)

## 2022-07-23 RX ORDER — CITALOPRAM 20 MG/1
10 TABLET ORAL DAILY
Status: DISCONTINUED | OUTPATIENT
Start: 2022-07-24 | End: 2022-07-24

## 2022-07-23 RX ADMIN — ASPIRIN 81 MG 81 MG: 81 TABLET ORAL at 17:39

## 2022-07-23 RX ADMIN — LISINOPRIL 20 MG: 20 TABLET ORAL at 17:39

## 2022-07-23 RX ADMIN — HEPARIN SODIUM 22 UNITS/KG/HR: 5000 INJECTION, SOLUTION INTRAVENOUS at 20:07

## 2022-07-23 RX ADMIN — SENNOSIDES AND DOCUSATE SODIUM 2 TABLET: 50; 8.6 TABLET ORAL at 04:25

## 2022-07-23 RX ADMIN — EZETIMIBE 10 MG: 10 TABLET ORAL at 04:26

## 2022-07-23 RX ADMIN — PRAMIPEXOLE DIHYDROCHLORIDE 0.38 MG: 0.12 TABLET ORAL at 17:39

## 2022-07-23 RX ADMIN — ATORVASTATIN CALCIUM 80 MG: 80 TABLET, FILM COATED ORAL at 19:41

## 2022-07-23 RX ADMIN — CLOPIDOGREL BISULFATE 75 MG: 75 TABLET ORAL at 17:39

## 2022-07-23 ASSESSMENT — ENCOUNTER SYMPTOMS
MYALGIAS: 0
EYE DISCHARGE: 0
DIARRHEA: 0
DIAPHORESIS: 0
SHORTNESS OF BREATH: 0
VOMITING: 0
DOUBLE VISION: 0
MEMORY LOSS: 0
SPUTUM PRODUCTION: 0
BRUISES/BLEEDS EASILY: 0
EYE REDNESS: 0
COUGH: 0
PALPITATIONS: 0
DIZZINESS: 0
HEADACHES: 0
NECK PAIN: 0
CHILLS: 0
INSOMNIA: 0
EYE PAIN: 0
NAUSEA: 0
FEVER: 0
WEIGHT LOSS: 0
SINUS PAIN: 0
STRIDOR: 0
BLOOD IN STOOL: 0
NERVOUS/ANXIOUS: 0
FLANK PAIN: 0
TINGLING: 0
BACK PAIN: 0
SEIZURES: 0
SORE THROAT: 0
WEAKNESS: 0
WHEEZING: 0
POLYDIPSIA: 0

## 2022-07-23 ASSESSMENT — FIBROSIS 4 INDEX: FIB4 SCORE: 0.62

## 2022-07-23 ASSESSMENT — PAIN DESCRIPTION - PAIN TYPE: TYPE: ACUTE PAIN

## 2022-07-23 NOTE — CONSULTS
Cardiology Consult Note:    Yovany Cheema M.D.  Date & Time note created:    7/22/2022   6:05 PM     Referring MD:  Dr. Lester    Patient ID:   Name:             Fouzia Santamaria   YOB: 1960  Age:                 61 y.o.  female   MRN:               [id number]                                                             Reason for Consult:      Pre-op    History of Present Illness:    61-year-old female with past medical history of CAD status post stent out her LAD back in early January.  She was admitted to hospital for symptoms of bilateral leg ischemia and admitted by Dr. Buaer.  She is been having worsening bilateral leg pain.  He is planning on doing surgery.  As part of her echocardiogram her abdominal aorta was found to have a thrombus in the mid descending aorta.  She was started on a heparin drip.  She denies chest pain palpitations or PND.  Her echocardiogram showed an EF of 35%.    Review of Systems:      Constitutional: Denies fevers, Denies weight changes  Eyes: Denies changes in vision, no eye pain  Ears/Nose/Throat/Mouth: Denies nasal congestion or sore throat   Cardiovascular: no chest pain, no palpitations   Respiratory: no shortness of breath , Denies cough  Gastrointestinal/Hepatic: Denies abdominal pain, nausea, vomiting, diarrhea, constipation or GI bleeding   Genitourinary: Denies dysuria or frequency  Musculoskeletal/Rheum: Denies  joint pain and swelling, no edema  Skin: Denies rash  Neurological: Denies headache, confusion, memory loss or focal weakness/parasthesias  Psychiatric: denies mood disorder   Endocrine: Altagracia thyroid problems  Heme/Oncology/Lymph Nodes: Denies enlarged lymph nodes, denies brusing or known bleeding disorder  All other systems were reviewed and are negative (AMA/CMS criteria)                Past Medical History:   Past Medical History:   Diagnosis Date   • Chronic obstructive pulmonary disease (HCC)    • Diabetes (HCC)    • Heart attack  ([facility])    • Hypertension    • Stroke ([facility])      Active Hospital Problems    Diagnosis    • Critical ischemia of lower extremity ([facility]) [I70.229]    • CAD (coronary artery disease) [I25.10]    • Anemia [D64.9]    • QT prolongation [R94.31]        Past Surgical History:  Past Surgical History:   Procedure Laterality Date   • STENT PLACEMENT     • THYROIDECTOMY         Hospital Medications:  Current Facility-Administered Medications   Medication Dose   • heparin infusion 25,000 units in 500 mL 0.45% NACL  0-30 Units/kg/hr   • heparin injection 1,900 Units  40 Units/kg   • aspirin (ASA) chewable tab 81 mg  81 mg   • atorvastatin (LIPITOR) tablet 80 mg  80 mg   • clopidogrel (PLAVIX) tablet 75 mg  75 mg   • ezetimibe (ZETIA) tablet 10 mg  10 mg   • lisinopril (PRINIVIL) tablet 20 mg  20 mg   • pramipexole (MIRAPEX) tablet 0.375 mg  0.375 mg   • senna-docusate (PERICOLACE or SENOKOT S) 8.6-50 MG per tablet 2 Tablet  2 Tablet    And   • polyethylene glycol/lytes (MIRALAX) PACKET 1 Packet  1 Packet    And   • magnesium hydroxide (MILK OF MAGNESIA) suspension 30 mL  30 mL    And   • bisacodyl (DULCOLAX) suppository 10 mg  10 mg   • acetaminophen (Tylenol) tablet 650 mg  650 mg   • oxyCODONE immediate-release (ROXICODONE) tablet 5-10 mg  5-10 mg         Current Outpatient Medications:  Medications Prior to Admission   Medication Sig Dispense Refill Last Dose   • aspirin (ASA) 81 MG Chew Tab chewable tablet Chew 81 mg every day.   7/20/2022 at [facility]   • ezetimibe (ZETIA) 10 MG Tab Take 10 mg by mouth every day.   7/20/2022 at [facility]   • atorvastatin (LIPITOR) 80 MG tablet Take 80 mg by mouth every evening.   7/20/2022 at [facility]   • pramipexole (MIRAPEX) 0.125 MG Tab Take 0.375 mg by mouth every evening.   7/20/2022 at [facility]   • acetaminophen (TYLENOL) 500 MG Tab Take 1,000 mg by mouth every 6 hours as needed for Moderate Pain.   7/20/2022 at [facility]   • lisinopril (PRINIVIL) 20 MG Tab Take 20 mg by mouth every day.   7/20/2022 at [facility]   •  "clopidogrel (PLAVIX) 75 MG Tab Take 75 mg by mouth every day.   7/20/2022 at [facility]       Medication Allergy:  Allergies   Allergen Reactions   • Pcn [Penicillins] Vomiting and Nausea   • Toradol Vomiting and Nausea       Family History:  No family history on file.    Social History:  Social History     Socioeconomic History   • Marital status:      Spouse name: Not on file   • Number of children: Not on file   • Years of education: Not on file   • Highest education level: Not on file   Occupational History   • Not on file   Tobacco Use   • Smoking status: Current Every Day Smoker     Packs/day: 1.50     Types: Cigarettes   • Smokeless tobacco: Never Used   Substance and Sexual Activity   • Alcohol use: No   • Drug use: No   • Sexual activity: Not on file   Other Topics Concern   • Not on file   Social History Narrative   • Not on file     Social Determinants of Health     Financial Resource Strain: Not on file   Food Insecurity: Not on file   Transportation Needs: Not on file   Physical Activity: Not on file   Stress: Not on file   Social Connections: Not on file   Intimate Partner Violence: Not on file   Housing Stability: Not on file         Physical Exam:  Vitals/ General Appearance:   Weight/BMI: Body mass index is 17.13 kg/m².  /65   Pulse 81   Temp 36.7 °C (98.1 °F) (Temporal)   Resp 20   Ht 1.651 m (5' 5\")   Wt 46.7 kg (102 lb 15.3 oz)   SpO2 100%   Vitals:    07/22/22 0442 07/22/22 0744 07/22/22 1134 07/22/22 1605   BP: 121/66 102/74 134/74 133/65   Pulse: 76 65 82 81   Resp: 18 16 16 20   Temp:  36.7 °C (98.1 °F) 36.8 °C (98.2 °F) 36.7 °C (98.1 °F)   TempSrc: Temporal Temporal Temporal Temporal   SpO2: 98% 98% 99% 100%   Weight:       Height:         Oxygen Therapy:  Pulse Oximetry: 100 %, O2 (LPM): 0, O2 Delivery Device: None - Room Air    Constitutional:   Well developed, Well nourished, No acute distress  HENMT:  Normocephalic, Atraumatic, Oropharynx moist mucous membranes, No oral " exudates, Nose normal.  No thyromegaly.  Eyes:  EOMI, Conjunctiva normal, No discharge.  Neck:  Normal range of motion, No cervical tenderness,  no JVD.  Cardiovascular:  Normal heart rate, Normal rhythm, No murmurs, No rubs, No gallops.   Extremitites with intact distal pulses, no cyanosis, or edema.  Lungs:  Normal breath sounds, breath sounds clear to auscultation bilaterally,  no crackles, no wheezing.   Abdomen: Bowel sounds normal, Soft, No tenderness, No guarding, No rebound, No masses, No hepatosplenomegaly.  Skin: Warm, Dry, No erythema, No rash, no induration.  Neurologic: Alert & oriented x 3, No focal deficits noted, cranial nerves II through X are grossly intact.  Psychiatric: Affect normal, Judgment normal, Mood normal.      MDM (Data Review):     Records reviewed and summarized in current documentation    Lab Data Review:  Recent Results (from the past 24 hour(s))   aPTT    Collection Time: 22 10:45 PM   Result Value Ref Range    APTT 42.2 (H) 24.7 - 36.0 sec   Heparin Xa (Unfractionated)    Collection Time: 22 10:45 PM   Result Value Ref Range    Heparin Xa (UFH) 0.14 IU/mL   Prothrombin Time    Collection Time: 22 10:45 PM   Result Value Ref Range    PT 14.6 12.0 - 14.6 sec    INR 1.15 (H) 0.87 - 1.13   TROPONIN    Collection Time: 22  2:07 AM   Result Value Ref Range    Troponin T 270 (H) 6 - 19 ng/L   Heparin Anti-Xa    Collection Time: 22  6:50 AM   Result Value Ref Range    Heparin Xa (UFH) 0.49 IU/mL   EKG    Collection Time: 22 11:43 AM   Result Value Ref Range    Report       [facility]    Test Date:  2022  Pt Name:    CHRISTINA BLOUNT               Department: 183  MRN:        [id number]                      Room:       T807  Gender:     Female                       Technician: BELA  :        1960                   Requested By:CHARMAINE ARRIAGA  Order #:    [id number]                    Reading MD: Ramsey Huerta,  MD    Measurements  Intervals                                Axis  Rate:       74                           P:          71  MD:         157                          QRS:        38  QRSD:       129                          T:          228  QT:         493  QTc:        547    Interpretive Statements  SINUS RHYTHM  IVCD, CONSIDER ATYPICAL RBBB  ANTERIOR INFARCT, RECENT  ABNORMAL T, CONSIDER ISCHEMIA, LATERAL LEADS  Electronically Signed On 7- 14:22:29 PDT by Ramsey Huerta MD     Heparin Anti-Xa    Collection Time: 07/22/22  2:17 PM   Result Value Ref Range    Heparin Xa (UFH) 0.36 IU/mL   EC-ECHOCARDIOGRAM COMPLETE W/O CONT    Collection Time: 07/22/22  4:53 PM   Result Value Ref Range    Eject.Frac. MOD BP 47.95     Eject.Frac. MOD 4C 30.97     Eject.Frac. MOD 2C 59.17     Left Ventrical Ejection Fraction 35        Imaging/Procedures Review:    Chest Xray:  Reviewed    EKG:   As in HPI dated 7/22/2022 personally viewed inter myself showing normal sinus rhythm anterior infarct age undetermined.    ECHO:  Echocardiogram dated 7/22/2022 personally viewed inter myself showing EF of 35% with no valvular heart disease.  No LV thrombus.  I thrombus noted in the descending aorta.  MDM (Assessment and Plan):     Active Hospital Problems    Diagnosis    • Critical ischemia of lower extremity (HCC) [I70.229]    • CAD (coronary artery disease) [I25.10]    • Anemia [D64.9]    • QT prolongation [R94.31]      61-year-old female with recent MI on dual antiplatelet therapy with a thrombus in her lower descending aorta.  For preoperative stratification please keep her on dual antiplatelet therapy as tolerated.  She is not having ischemia and is not decompensated in her heart failure and therefore is no higher risk for surgery.  Given the finding of what appears to be an acute thrombus in her descending aorta I have asked and discussed the case with the attending hospitalist.  Have asked and discussed the case with Dr. Hilario  from vascular surgery about if this would change his management perioperatively.

## 2022-07-24 ENCOUNTER — PHARMACY VISIT (OUTPATIENT)
Dept: PHARMACY | Facility: MEDICAL CENTER | Age: 62
End: 2022-07-24
Payer: COMMERCIAL

## 2022-07-24 VITALS
TEMPERATURE: 97 F | RESPIRATION RATE: 16 BRPM | WEIGHT: 101.41 LBS | OXYGEN SATURATION: 99 % | HEART RATE: 60 BPM | BODY MASS INDEX: 16.9 KG/M2 | SYSTOLIC BLOOD PRESSURE: 110 MMHG | HEIGHT: 65 IN | DIASTOLIC BLOOD PRESSURE: 76 MMHG

## 2022-07-24 PROCEDURE — 99239 HOSP IP/OBS DSCHRG MGMT >30: CPT | Performed by: STUDENT IN AN ORGANIZED HEALTH CARE EDUCATION/TRAINING PROGRAM

## 2022-07-24 PROCEDURE — A9270 NON-COVERED ITEM OR SERVICE: HCPCS | Performed by: HOSPITALIST

## 2022-07-24 PROCEDURE — 700102 HCHG RX REV CODE 250 W/ 637 OVERRIDE(OP): Performed by: STUDENT IN AN ORGANIZED HEALTH CARE EDUCATION/TRAINING PROGRAM

## 2022-07-24 PROCEDURE — 700102 HCHG RX REV CODE 250 W/ 637 OVERRIDE(OP): Performed by: HOSPITALIST

## 2022-07-24 PROCEDURE — A9270 NON-COVERED ITEM OR SERVICE: HCPCS | Performed by: STUDENT IN AN ORGANIZED HEALTH CARE EDUCATION/TRAINING PROGRAM

## 2022-07-24 PROCEDURE — RXMED WILLOW AMBULATORY MEDICATION CHARGE: Performed by: STUDENT IN AN ORGANIZED HEALTH CARE EDUCATION/TRAINING PROGRAM

## 2022-07-24 PROCEDURE — 99232 SBSQ HOSP IP/OBS MODERATE 35: CPT | Performed by: SURGERY

## 2022-07-24 RX ORDER — CLOPIDOGREL BISULFATE 75 MG/1
75 TABLET ORAL DAILY
Qty: 30 TABLET | Refills: 3 | Status: SHIPPED | OUTPATIENT
Start: 2022-07-24 | End: 2022-10-18 | Stop reason: SDUPTHER

## 2022-07-24 RX ADMIN — CITALOPRAM HYDROBROMIDE 10 MG: 20 TABLET ORAL at 05:37

## 2022-07-24 RX ADMIN — EZETIMIBE 10 MG: 10 TABLET ORAL at 05:37

## 2022-07-24 RX ADMIN — SENNOSIDES AND DOCUSATE SODIUM 2 TABLET: 50; 8.6 TABLET ORAL at 05:37

## 2022-07-24 RX ADMIN — RIVAROXABAN 15 MG: 15 TABLET, FILM COATED ORAL at 15:31

## 2022-07-24 NOTE — CARE PLAN
The patient is Stable - Low risk of patient condition declining or worsening    Shift Goals  Clinical Goals: heparin drip, pain mangement, monitor BLE  Patient Goals: pain management    Progress made toward(s) clinical / shift goals:  Pt is AAOx4 and tearful at times. VSS on RA. Pt a/w critical ischemia of BLE and also found to have thrombus in descending aorta. Pt remains HLOC for heparin gtt @22units/kg/hr (therapeutic antiXA x3) and dual antiplatelet therapy while awaiting vascular surgery planned for Monday 7/25. Pedal pulses absent via palpation and doppler, BLE pale & cool to touch. Pt ambulates x1 assist to BR. Pt denies any pain today.    Problem: Knowledge Deficit - Standard  Goal: Patient and family/care givers will demonstrate understanding of plan of care, disease process/condition, diagnostic tests and medications  Outcome: Progressing     Problem: Skin Integrity  Goal: Skin integrity is maintained or improved  Outcome: Progressing     Problem: Fall Risk  Goal: Patient will remain free from falls  Outcome: Progressing     Problem: Pain - Standard  Goal: Alleviation of pain or a reduction in pain to the patient’s comfort goal  Outcome: Progressing

## 2022-07-24 NOTE — PROGRESS NOTES
Hospital Medicine Daily Progress Note    Date of Service  7/23/2022    Chief Complaint  Fouzia Santamaria is a 61 y.o. female admitted 7/21/2022 with limb ischemia    Hospital Course  Fouzia Santamaria is a 61 y.o. female who presented 7/21/2022 with lower extremity pain.  Ms. Santamaria has a past medical history of coronary artery disease and tobacco dependence that presented to [facility] 7/3/2022 with chest pain and into the arm and was found to have ST elevation on her EKG and was admitted with 2 stents placed to the LAD.  She came back on 7/9/2022 with chest pain and had a third stent placed to the LAD.  She was discharged and has had no subsequent chest pain or shortness of breath.  She saw Dr. Bauer vascular surgery today in the office due to escalating claudication symptoms to the point where she can only walk a few feet and has pain in her feet.  He noted that pulses were not palpable and referred her to the emergency room where she will be initiated on IV heparin drip and he will arrange surgery.  Of note is her troponin is 325 and this is likely in the setting of trending down given the partial resolution of her ST elevation anteriorly no we will check another troponin in the morning to ensure this trend.      Interval Problem Update  7/22/2022:  Evaluated patient at bedside patient does have nonpalpable dorsalis pedis she is presently on heparin drip she has no complaints of chest pain whatsoever.  Discussed case with vascular surgery Dr. Bauer recommendations are appreciated.  Recommendations from cardiology are also appreciated for preoperative restratification.  Presently we will continue DAPT continue heparin drip follow-up on imaging and scans.    I have discussed this patient's plan of care and discharge plan at IDT rounds today with Case Management, Nursing, Nursing leadership, and other members of the IDT team.    Consultants/Specialty  cardiology and vascular surgery    Code  Status  Full Code    Disposition  Patient is not medically cleared for discharge.   Anticipate discharge to to home with organized home healthcare and close outpatient follow-up.  I have placed the appropriate orders for post-discharge needs.    Review of Systems  Review of Systems   Constitutional: Negative for chills, diaphoresis, fever, malaise/fatigue and weight loss.   HENT: Negative for congestion, ear discharge, ear pain, hearing loss, nosebleeds, sinus pain, sore throat and tinnitus.    Eyes: Negative for double vision, pain, discharge and redness.   Respiratory: Negative for cough, sputum production, shortness of breath, wheezing and stridor.    Cardiovascular: Negative for chest pain, palpitations and leg swelling.   Gastrointestinal: Negative for blood in stool, diarrhea, nausea and vomiting.   Genitourinary: Negative for flank pain, frequency and urgency.   Musculoskeletal: Negative for back pain, joint pain, myalgias and neck pain.        Leg pain   Skin: Negative for itching and rash.   Neurological: Negative for dizziness, tingling, seizures, weakness and headaches.   Endo/Heme/Allergies: Negative for polydipsia. Does not bruise/bleed easily.   Psychiatric/Behavioral: Negative for memory loss. The patient is not nervous/anxious and does not have insomnia.         Physical Exam  Temp:  [36.5 °C (97.7 °F)-37.1 °C (98.7 °F)] 37.1 °C (98.7 °F)  Pulse:  [61-78] 77  Resp:  [15-18] 15  BP: (109-134)/(81-91) 116/83  SpO2:  [96 %-100 %] 100 %    Physical Exam  Vitals reviewed.   Constitutional:       General: She is not in acute distress.     Appearance: Normal appearance. She is obese. She is not ill-appearing.   HENT:      Head: Normocephalic and atraumatic.      Right Ear: External ear normal.      Left Ear: External ear normal.      Nose: No congestion or rhinorrhea.      Mouth/Throat:      Pharynx: No oropharyngeal exudate or posterior oropharyngeal erythema.   Eyes:      General: No scleral icterus.      Extraocular Movements: Extraocular movements intact.      Conjunctiva/sclera: Conjunctivae normal.      Pupils: Pupils are equal, round, and reactive to light.   Cardiovascular:      Rate and Rhythm: Normal rate and regular rhythm.      Heart sounds: No murmur heard.    No friction rub.      Comments: Patient's lower extremities are warm down to the point of the right dorsalis pedis at which point there is a cold transition to the rest of foot.  There are no dopplerable pulses on dorsalis pedis.  Pulmonary:      Effort: Pulmonary effort is normal.      Breath sounds: Normal breath sounds. No stridor. No wheezing, rhonchi or rales.   Abdominal:      General: Bowel sounds are normal. There is no distension.      Palpations: Abdomen is soft.      Tenderness: There is no abdominal tenderness. There is no guarding or rebound.   Musculoskeletal:         General: No swelling, tenderness, deformity or signs of injury. Normal range of motion.      Cervical back: Normal range of motion and neck supple. No rigidity. No muscular tenderness.   Skin:     General: Skin is warm and dry.      Coloration: Skin is not jaundiced or pale.      Findings: No bruising or erythema.   Neurological:      General: No focal deficit present.      Mental Status: She is alert and oriented to person, place, and time.      Cranial Nerves: No cranial nerve deficit.      Sensory: No sensory deficit.      Motor: No weakness.      Coordination: Coordination normal.   Psychiatric:         Mood and Affect: Mood normal.         Behavior: Behavior normal.         Thought Content: Thought content normal.         Fluids    Intake/Output Summary (Last 24 hours) at 7/23/2022 1834  Last data filed at 7/22/2022 2000  Gross per 24 hour   Intake 360 ml   Output --   Net 360 ml       Laboratory  Recent Labs     07/21/22  1316   WBC 10.9*   RBC 3.52*   HEMOGLOBIN 9.7*   HEMATOCRIT 31.0*   MCV 88.1   MCH 27.6   MCHC 31.3*   RDW 55.5*   PLATELETCT 404   MPV 9.6      Recent Labs     07/21/22  1316   SODIUM 136   POTASSIUM 4.0   CHLORIDE 102   CO2 20   GLUCOSE 116*   BUN 35*   CREATININE 1.31   CALCIUM 9.1     Recent Labs     07/21/22  1316 07/21/22  1633 07/21/22  2245   APTT 29.9 30.8 42.2*   INR 1.11 1.12 1.15*               Imaging  EC-ECHOCARDIOGRAM COMPLETE W/O CONT   Final Result      DX-CHEST-PORTABLE (1 VIEW)   Final Result      No radiographic evidence of acute cardiopulmonary disease.      US-AORTA/ILIACS DUPLEX COMPLETE   Final Result      US-EXTREMITY ARTERY LOWER BILAT   Final Result           Assessment/Plan  * Critical ischemia of lower extremity (HCC)- (present on admission)  Assessment & Plan  Dr. Bauer vascular surgery sent her from the office for a heparin drip and will plan on surgery.  Pain control.   High intensity statin  She quit smoking 7/3 after her heart attack    QT prolongation- (present on admission)  Assessment & Plan  QTc 513  Refrain from medications that prolong QT interval    Anemia- (present on admission)  Assessment & Plan  Hb 9.7  Her baseline is unknown    CAD (coronary artery disease)- (present on admission)  Assessment & Plan  She had a heart attack on 7/3 with two sents to the LAD and came back on 7/9 with another stent to LAD.  Continue plavix, aspirin, statin. She is not on a beta blocker.  EKG from 7/9 at [facility] [place] [facility] reveal very elevated ST segments anteriorly. Her current EKG reveals Q waves and partial resolution. Troponin remains elevated at 325 and likely is trending down from her last MI but will check another level in the morning. She will be on a heparin drip due to the lower extremity ischemia.      Please note that this dictation was created using voice recognition software. I have made every reasonable attempt to correct obvious errors, but I expect that there are errors of grammar and possibly context that I did not discover before finalizing the note.    VTE prophylaxis: therapeutic anticoagulation with Continue with  heparin infusion    I have performed a physical exam and reviewed and updated ROS and Plan today (7/23/2022). In review of yesterday's note (7/22/2022), there are no changes except as documented above.

## 2022-07-24 NOTE — PROGRESS NOTES
Hospital Medicine Daily Progress Note    Date of Service  7/23/2022    Chief Complaint  Fouzia Santamaria is a 61 y.o. female admitted 7/21/2022 with limb ischemia    Hospital Course  Fouzia Santamaria is a 61 y.o. female who presented 7/21/2022 with lower extremity pain.  Ms. Santamaria has a past medical history of coronary artery disease and tobacco dependence that presented to [facility] 7/3/2022 with chest pain and into the arm and was found to have ST elevation on her EKG and was admitted with 2 stents placed to the LAD.  She came back on 7/9/2022 with chest pain and had a third stent placed to the LAD.  She was discharged and has had no subsequent chest pain or shortness of breath.  She saw Dr. Bauer vascular surgery today in the office due to escalating claudication symptoms to the point where she can only walk a few feet and has pain in her feet.  He noted that pulses were not palpable and referred her to the emergency room where she will be initiated on IV heparin drip and he will arrange surgery.  Of note is her troponin is 325 and this is likely in the setting of trending down given the partial resolution of her ST elevation anteriorly no we will check another troponin in the morning to ensure this trend.      Interval Problem Update  7/22/2022:  Evaluated patient at bedside patient does have nonpalpable dorsalis pedis she is presently on heparin drip she has no complaints of chest pain whatsoever.  Discussed case with vascular surgery Dr. Bauer recommendations are appreciated.  Recommendations from cardiology are also appreciated for preoperative restratification.  Presently we will continue DAPT continue heparin drip follow-up on imaging and scans.  7/23/2022:  Continue present medical therapy echocardiogram significant for descending aortic thrombus.  Patient already on continuous infusion of heparin.  Continue dual antiplatelet therapy.  Appreciate vascular surgery and cardiology  recommendations.      I have discussed this patient's plan of care and discharge plan at IDT rounds today with Case Management, Nursing, Nursing leadership, and other members of the IDT team.    Consultants/Specialty  cardiology and vascular surgery    Code Status  Full Code    Disposition  Patient is not medically cleared for discharge.   Anticipate discharge to to home with organized home healthcare and close outpatient follow-up.  I have placed the appropriate orders for post-discharge needs.    Review of Systems  Review of Systems   Constitutional: Negative for chills, diaphoresis, fever, malaise/fatigue and weight loss.   HENT: Negative for congestion, ear discharge, ear pain, hearing loss, nosebleeds, sinus pain, sore throat and tinnitus.    Eyes: Negative for double vision, pain, discharge and redness.   Respiratory: Negative for cough, sputum production, shortness of breath, wheezing and stridor.    Cardiovascular: Negative for chest pain, palpitations and leg swelling.   Gastrointestinal: Negative for blood in stool, diarrhea, nausea and vomiting.   Genitourinary: Negative for flank pain, frequency and urgency.   Musculoskeletal: Negative for back pain, joint pain, myalgias and neck pain.        Leg pain   Skin: Negative for itching and rash.   Neurological: Negative for dizziness, tingling, seizures, weakness and headaches.   Endo/Heme/Allergies: Negative for polydipsia. Does not bruise/bleed easily.   Psychiatric/Behavioral: Negative for memory loss. The patient is not nervous/anxious and does not have insomnia.         Physical Exam  Temp:  [36.5 °C (97.7 °F)-37.1 °C (98.7 °F)] 37.1 °C (98.7 °F)  Pulse:  [61-78] 77  Resp:  [15-18] 15  BP: (109-134)/(81-91) 116/83  SpO2:  [96 %-100 %] 100 %    Physical Exam  Vitals reviewed.   Constitutional:       General: She is not in acute distress.     Appearance: Normal appearance. She is obese. She is not ill-appearing.   HENT:      Head: Normocephalic and  atraumatic.      Right Ear: External ear normal.      Left Ear: External ear normal.      Nose: No congestion or rhinorrhea.      Mouth/Throat:      Pharynx: No oropharyngeal exudate or posterior oropharyngeal erythema.   Eyes:      General: No scleral icterus.     Extraocular Movements: Extraocular movements intact.      Conjunctiva/sclera: Conjunctivae normal.      Pupils: Pupils are equal, round, and reactive to light.   Cardiovascular:      Rate and Rhythm: Normal rate and regular rhythm.      Heart sounds: No murmur heard.    No friction rub.      Comments: Patient's lower extremities are warm down to the point of the right dorsalis pedis at which point there is a cold transition to the rest of foot.  There are no dopplerable pulses on dorsalis pedis.  Pulmonary:      Effort: Pulmonary effort is normal.      Breath sounds: Normal breath sounds. No stridor. No wheezing, rhonchi or rales.   Abdominal:      General: Bowel sounds are normal. There is no distension.      Palpations: Abdomen is soft.      Tenderness: There is no abdominal tenderness. There is no guarding or rebound.   Musculoskeletal:         General: No swelling, tenderness, deformity or signs of injury. Normal range of motion.      Cervical back: Normal range of motion and neck supple. No rigidity. No muscular tenderness.   Skin:     General: Skin is warm and dry.      Coloration: Skin is not jaundiced or pale.      Findings: No bruising or erythema.   Neurological:      General: No focal deficit present.      Mental Status: She is alert and oriented to person, place, and time.      Cranial Nerves: No cranial nerve deficit.      Sensory: No sensory deficit.      Motor: No weakness.      Coordination: Coordination normal.   Psychiatric:         Mood and Affect: Mood normal.         Behavior: Behavior normal.         Thought Content: Thought content normal.         Fluids    Intake/Output Summary (Last 24 hours) at 7/23/2022 1840  Last data filed at  7/22/2022 2000  Gross per 24 hour   Intake 360 ml   Output --   Net 360 ml       Laboratory  Recent Labs     07/21/22  1316   WBC 10.9*   RBC 3.52*   HEMOGLOBIN 9.7*   HEMATOCRIT 31.0*   MCV 88.1   MCH 27.6   MCHC 31.3*   RDW 55.5*   PLATELETCT 404   MPV 9.6     Recent Labs     07/21/22  1316   SODIUM 136   POTASSIUM 4.0   CHLORIDE 102   CO2 20   GLUCOSE 116*   BUN 35*   CREATININE 1.31   CALCIUM 9.1     Recent Labs     07/21/22  1316 07/21/22  1633 07/21/22  2245   APTT 29.9 30.8 42.2*   INR 1.11 1.12 1.15*               Imaging  EC-ECHOCARDIOGRAM COMPLETE W/O CONT   Final Result      DX-CHEST-PORTABLE (1 VIEW)   Final Result      No radiographic evidence of acute cardiopulmonary disease.      US-AORTA/ILIACS DUPLEX COMPLETE   Final Result      US-EXTREMITY ARTERY LOWER BILAT   Final Result           Assessment/Plan  * Critical ischemia of lower extremity (HCC)- (present on admission)  Assessment & Plan  Dr. Bauer vascular surgery sent her from the office for a heparin drip and will plan on surgery.  Pain control.   High intensity statin  She quit smoking 7/3 after her heart attack    QT prolongation- (present on admission)  Assessment & Plan  QTc 513  Refrain from medications that prolong QT interval    Anemia- (present on admission)  Assessment & Plan  Hb 9.7  Her baseline is unknown    CAD (coronary artery disease)- (present on admission)  Assessment & Plan  She had a heart attack on 7/3 with two sents to the LAD and came back on 7/9 with another stent to LAD.  Continue plavix, aspirin, statin. She is not on a beta blocker.  EKG from 7/9 at [place] reveal very elevated ST segments anteriorly. Her current EKG reveals Q waves and partial resolution. Troponin remains elevated at 325 and likely is trending down from her last MI but will check another level in the morning. She will be on a heparin drip due to the lower extremity ischemia.      Please note that this dictation was created using voice recognition  software. I have made every reasonable attempt to correct obvious errors, but I expect that there are errors of grammar and possibly context that I did not discover before finalizing the note.    VTE prophylaxis: therapeutic anticoagulation with Continue with heparin infusion    I have performed a physical exam and reviewed and updated ROS and Plan today (7/23/2022). In review of yesterday's note (7/22/2022), there are no changes except as documented above.

## 2022-07-24 NOTE — PROGRESS NOTES
Pt discharged at this time. All discharge paperwork provided and signed. Education provided in regards to importance of anticoagulation therapies. Pt verbalized understanding. All pt belongings taken and pt left facility via wheelchair stable with no problems

## 2022-07-24 NOTE — DISCHARGE INSTRUCTIONS
Rivaroxaban oral tablets  What is this medicine?  RIVAROXABAN (ri va NI a ban) is an anticoagulant (blood thinner). It is used to treat blood clots in the lungs or in the veins. It is also used to prevent blood clots in the lungs or in the veins. It is also used to lower the chance of stroke in people with a medical condition called atrial fibrillation.  This medicine may be used for other purposes; ask your health care provider or pharmacist if you have questions.  COMMON BRAND NAME(S): Xarelto, Xarelto Starter Pack  What should I tell my health care provider before I take this medicine?  They need to know if you have any of these conditions:  antiphospholipid antibody syndrome  artificial heart valve  bleeding disorders  bleeding in the brain  blood in your stools (black or tarry stools) or if you have blood in your vomit  history of blood clots  history of stomach bleeding  kidney disease  liver disease  low blood counts, like low white cell, platelet, or red cell counts  recent or planned spinal or epidural procedure  take medicines that treat or prevent blood clots  an unusual or allergic reaction to rivaroxaban, other medicines, foods, dyes, or preservatives  pregnant or trying to get pregnant  breast-feeding  How should I use this medicine?  Take this medicine by mouth with a glass of water. Follow the directions on the prescription label. Take your medicine at regular intervals. Do not take it more often than directed. Do not stop taking except on your doctor's advice. Stopping this medicine may increase your risk of a blood clot. Be sure to refill your prescription before you run out of medicine.  If you are taking this medicine after hip or knee replacement surgery, take it with or without food. If you are taking this medicine for atrial fibrillation, take it with your evening meal. If you are taking this medicine to treat blood clots, take it with food at the same time each day. If you are unable to  swallow your tablet, you may crush the tablet and mix it in applesauce. Then, immediately eat the applesauce. You should eat more food right after you eat the applesauce containing the crushed tablet.  Talk to your pediatrician regarding the use of this medicine in children. Special care may be needed.  Overdosage: If you think you have taken too much of this medicine contact a poison control center or emergency room at once.  NOTE: This medicine is only for you. Do not share this medicine with others.  What if I miss a dose?  If you take your medicine once a day and miss a dose, take the missed dose as soon as you remember. If it is almost time for your next dose, take only that dose. Do not take double or extra doses.  If you take your medicine twice a day and miss a dose, take the missed dose immediately. In this instance, 2 tablets may be taken at the same time. The next day you should take 1 tablet twice a day as directed.  What may interact with this medicine?  Do not take this medicine with any of the following medications:  defibrotide  This medicine may also interact with the following medications:  aspirin and aspirin-like medicines  certain antibiotics like erythromycin, azithromycin, and clarithromycin  certain medicines for fungal infections like ketoconazole and itraconazole  certain medicines for irregular heart beat like amiodarone, quinidine, dronedarone  certain medicines for seizures like carbamazepine, phenytoin  certain medicines that treat or prevent blood clots like warfarin, enoxaparin, and dalteparin  conivaptan  felodipine  indinavir  lopinavir; ritonavir  NSAIDS, medicines for pain and inflammation, like ibuprofen or naproxen  ranolazine  rifampin  ritonavir  SNRIs, medicines for depression, like desvenlafaxine, duloxetine, levomilnacipran, venlafaxine  SSRIs, medicines for depression, like citalopram, escitalopram, fluoxetine, fluvoxamine, paroxetine, sertraline  [facility]  wort  verapamil  This list may not describe all possible interactions. Give your health care provider a list of all the medicines, herbs, non-prescription drugs, or dietary supplements you use. Also tell them if you smoke, drink alcohol, or use illegal drugs. Some items may interact with your medicine.  What should I watch for while using this medicine?  Visit your healthcare professional for regular checks on your progress. You may need blood work done while you are taking this medicine. Your condition will be monitored carefully while you are receiving this medicine. It is important not to miss any appointments.  Avoid sports and activities that might cause injury while you are using this medicine. Severe falls or injuries can cause unseen bleeding. Be careful when using sharp tools or knives. Consider using an electric razor. Take special care brushing or flossing your teeth. Report any injuries, bruising, or red spots on the skin to your healthcare professional.  If you are going to need surgery or other procedure, tell your healthcare professional that you are taking this medicine.  Wear a medical ID bracelet or chain. Carry a card that describes your disease and details of your medicine and dosage times.  What side effects may I notice from receiving this medicine?  Side effects that you should report to your doctor or health care professional as soon as possible:  allergic reactions like skin rash, itching or hives, swelling of the face, lips, or tongue  back pain  redness, blistering, peeling or loosening of the skin, including inside the mouth  signs and symptoms of bleeding such as bloody or black, tarry stools; red or dark-brown urine; spitting up blood or brown material that looks like coffee grounds; red spots on the skin; unusual bruising or bleeding from the eye, gums, or nose  signs and symptoms of a blood clot such as chest pain; shortness of breath; pain, swelling, or warmth in the leg  signs and  symptoms of a stroke such as changes in vision; confusion; trouble speaking or understanding; severe headaches; sudden numbness or weakness of the face, arm or leg; trouble walking; dizziness; loss of coordination  Side effects that usually do not require medical attention (report to your doctor or health care professional if they continue or are bothersome):  dizziness  muscle pain  This list may not describe all possible side effects. Call your doctor for medical advice about side effects. You may report side effects to FDA at 5-280-UXX-6138.  Where should I keep my medicine?  Keep out of the reach of children.  Store at room temperature between 15 and 30 degrees C (59 and 86 degrees F). Throw away any unused medicine after the expiration date.  NOTE: This sheet is a summary. It may not cover all possible information. If you have questions about this medicine, talk to your doctor, pharmacist, or health care provider.  © 2020 [employer] (2020-03-16 09:45:59)

## 2022-07-24 NOTE — PROGRESS NOTES
VASCULAR SURGERY PROGRESS NOTE    Awake, no complaints.  Denies rest pain of lower extremities.  Only pain with ambulation.  AF, VSS.    General: Thin female who appears to be older than stated age.  Upper extremities: Palpable bilateral radial pulses.  Old ecchymosis in left upper arm.  Abdomen: Soft, nondistended, nontender.  Lower extremities: Femoral and pedal pulses are not palpable.  Legs are warm with no cyanosis, discoloration, tissue loss, and ulceration.    Labs: Reviewed.    Assessment:  1) Peripheral arterial disease with severe bilateral lower extremity claudication and no rest pain or nonhealing ulceration.  2) Coronary artery disease with recent coronary stentings.  3) Thrombus in descending thoracic aorta.    Plan:  I had a long discussion with patient.  With recent cardiac events and coronary artery stentings in early part of this month and thrombus in the descending thoracic aorta, lower extremity revascularization should not be performed unless it is emergent.  Fortunately, patient does not have rest pain or nonhealing ulceration and therefore, lower extremity revascularization for severe claudication can be done on an elective basis.  Patient indicated understanding.  I advised patient not to walk barefooted at any time, even inside her house, and not to dig into the soft tissue when she trims her toenails to avoid causing wound which will not heal secondary to circulation issue.    From vascular standpoint, once patient is cleared by medicine and cardiology services, she may be discharged home on dual antiplatelet therapy as well as anticoagulant for her descending thoracic aorta thrombus and follow-up with Dr. Bauer for elective revascularization of her lower extremities.

## 2022-07-24 NOTE — DISCHARGE PLANNING
Spoke to pharmacy and they will provide patient with coupon for one months free xarelto for the 15mg tabs.  Plavix is covered and does not require a PA.  PA sent for xarelto 20mg tabs with approval received.

## 2022-07-24 NOTE — HOSPITAL COURSE
Fouzia Santamaria is a 61 y.o. female who presented 7/21/2022 with lower extremity pain.  Ms. Santamaria has a past medical history of coronary artery disease and tobacco dependence that presented to [facility] 7/3/2022 with chest pain and into the arm and was found to have ST elevation on her EKG and was admitted with 2 stents placed to the LAD.  She came back on 7/9/2022 with chest pain and had a third stent placed to the LAD.  She was discharged and has had no subsequent chest pain or shortness of breath.  She saw Dr. Bauer vascular surgery today in the office due to escalating claudication symptoms to the point where she can only walk a few feet and has pain in her feet.  He noted that pulses were not palpable and referred her to the emergency room where she will be initiated on IV heparin drip and he will arrange surgery.  Of note is her troponin is 325 and this is likely in the setting of trending down given the partial resolution of her ST elevation anteriorly no we will check another troponin in the morning to ensure this trend.

## 2022-07-25 NOTE — DISCHARGE SUMMARY
Discharge Summary    CHIEF COMPLAINT ON ADMISSION  Chief Complaint   Patient presents with   • Sent by MD     Pt sent by Dr. Bauer to be admitted for bilateral leg ischemia    • Leg Pain     Bilateral leg pain increasing over last few months        Reason for Admission  Leg Pain      Admission Date  7/21/2022    CODE STATUS  Full Code    HPI & HOSPITAL COURSE  This is a 61 y.o. female here with leg pain  Fouzia Santamaria is a 61 y.o. female who presented 7/21/2022 with lower extremity pain.  Ms. Santamaria has a past medical history of coronary artery disease and tobacco dependence that presented to [facility] 7/3/2022 with chest pain and into the arm and was found to have ST elevation on her EKG and was admitted with 2 stents placed to the LAD.  She came back on 7/9/2022 with chest pain and had a third stent placed to the LAD.  She was discharged and has had no subsequent chest pain or shortness of breath.  She saw Dr. Bauer vascular surgery today in the office due to escalating claudication symptoms to the point where she can only walk a few feet and has pain in her feet.  He noted that pulses were not palpable and referred her to the emergency room where she will be initiated on IV heparin drip and he will arrange surgery.  Of note is her troponin is 325 and this is likely in the setting of trending down given the partial resolution of her ST elevation anteriorly no we will check another troponin in the morning to ensure this trend.    Patient was placed on several days a heparin drip which he tolerated without issue.  Furthermore there was concern because patient had recent percutaneous coronary intervention in the form of stents in early July 2022.  Vascular surgery requested cardiology evaluation in the preoperative setting.  Cardiology evaluated the patient and recommendations were appreciated.  Patient echocardiogram performed which had results of a preserved ejection fraction however  descending thoracic aorta showed evidence of a thrombus.  We will not a contraindication to surgery after further evaluation was deemed patient is bypass of the lower extremity could be postponed while patient underwent anticoagulation therapy for thrombus.  Patient was transitioned off heparin drip and placed on Xarelto DVT dosing.  This medication was provided to her at bedside prior to departure.  Furthermore patient was also provided with Plavix as she has somehow run out of this medication.  Plavix will cover her recent stenting.  There is medication for triple therapy as the risk of bleeding with triple therapy in the form of aspirin, Plavix, Xarelto is high and it is indicated to be on Xarelto and Plavix in the setting of recent stents with thrombus.  This was also reviewed by cardiology.    Patient will have close follow-up with vascular surgery information contained below.  Therefore, she is discharged in good and stable condition to home with close outpatient follow-up.    The patient met 2-midnight criteria for an inpatient stay at the time of discharge.    Discharge Date  7/24/2022    FOLLOW UP ITEMS POST DISCHARGE  Take all medication as prescribed  Go to all follow-up appointments as indicated    DISCHARGE DIAGNOSES  Principal Problem:    Critical ischemia of lower extremity (HCC) POA: Yes  Active Problems:    CAD (coronary artery disease) POA: Yes    Anemia POA: Yes    QT prolongation POA: Yes  Resolved Problems:    * No resolved hospital problems. *      FOLLOW UP  No future appointments.  Young Bauer M.D.  [address] [facility] [address]  [facility] [id number] [phone]    Schedule an appointment as soon as possible for a visit in 1 week(s)  make appointment within 2 weeks post hospital discharge    [facility] ([facility]) - Primary Care and Family Medicine  [address] [place] [id number]  [phone]  Schedule an appointment as soon as possible for a visit  As needed  Post  hospital discharge      MEDICATIONS ON DISCHARGE     Medication List      START taking these medications      Instructions   * Xarelto Starter Pack 15 & 20 MG Tbpk  Generic drug: Rivaroxaban   Take as directed on package- 1 tablet twice daily for 21 days (15mg) then take 1 tablet once daily (20mg)  Dose: 15 mg     * rivaroxaban 20 MG Tabs tablet  Start taking on: August 14, 2022  Commonly known as: XARELTO   Take 1 Tablet by mouth with dinner.  Dose: 20 mg         * This list has 2 medication(s) that are the same as other medications prescribed for you. Read the directions carefully, and ask your doctor or other care provider to review them with you.            CONTINUE taking these medications      Instructions   atorvastatin 80 MG tablet  Commonly known as: LIPITOR   Take 80 mg by mouth every evening.  Dose: 80 mg     clopidogrel 75 MG Tabs  Commonly known as: PLAVIX   Take 1 Tablet by mouth every day.  Dose: 75 mg     ezetimibe 10 MG Tabs  Commonly known as: ZETIA   Take 10 mg by mouth every day.  Dose: 10 mg     lisinopril 20 MG Tabs  Commonly known as: PRINIVIL   Take 20 mg by mouth every day.  Dose: 20 mg     pramipexole 0.125 MG Tabs  Commonly known as: MIRAPEX   Take 0.375 mg by mouth every evening.  Dose: 0.375 mg        STOP taking these medications    acetaminophen 500 MG Tabs  Commonly known as: TYLENOL     aspirin 81 MG Chew chewable tablet  Commonly known as: ASA            Allergies  Allergies   Allergen Reactions   • Pcn [Penicillins] Vomiting and Nausea   • Toradol Vomiting and Nausea       DIET  Orders Placed This Encounter   Procedures   • Diet Order Diet: Cardiac     Standing Status:   Standing     Number of Occurrences:   1     Order Specific Question:   Diet:     Answer:   Cardiac [6]       ACTIVITY  As tolerated.  Weight bearing as tolerated    CONSULTATIONS  Vascular surgery  Cardiology    PROCEDURES  None    LABORATORY  Lab Results   Component Value Date    SODIUM 136 07/21/2022    POTASSIUM  4.0 07/21/2022    CHLORIDE 102 07/21/2022    CO2 20 07/21/2022    GLUCOSE 116 (H) 07/21/2022    BUN 35 (H) 07/21/2022    CREATININE 1.31 07/21/2022        Lab Results   Component Value Date    WBC 10.9 (H) 07/21/2022    HEMOGLOBIN 9.7 (L) 07/21/2022    HEMATOCRIT 31.0 (L) 07/21/2022    PLATELETCT 404 07/21/2022      Please note that this dictation was created using voice recognition software. I have made every reasonable attempt to correct obvious errors, but I expect that there are errors of grammar and possibly context that I did not discover before finalizing the note.    Total time of the discharge process exceeds 35 minutes.

## 2022-08-23 ENCOUNTER — APPOINTMENT (OUTPATIENT)
Dept: RADIOLOGY | Facility: MEDICAL CENTER | Age: 62
DRG: 270 | End: 2022-08-23
Attending: EMERGENCY MEDICINE
Payer: MEDICAID

## 2022-08-23 ENCOUNTER — APPOINTMENT (OUTPATIENT)
Dept: RADIOLOGY | Facility: MEDICAL CENTER | Age: 62
DRG: 270 | End: 2022-08-23
Attending: STUDENT IN AN ORGANIZED HEALTH CARE EDUCATION/TRAINING PROGRAM
Payer: MEDICAID

## 2022-08-23 ENCOUNTER — HOSPITAL ENCOUNTER (INPATIENT)
Facility: MEDICAL CENTER | Age: 62
LOS: 25 days | DRG: 270 | End: 2022-09-17
Attending: EMERGENCY MEDICINE | Admitting: STUDENT IN AN ORGANIZED HEALTH CARE EDUCATION/TRAINING PROGRAM
Payer: MEDICAID

## 2022-08-23 DIAGNOSIS — M79.606 ISCHEMIC LEG PAIN: ICD-10-CM

## 2022-08-23 DIAGNOSIS — J42 CHRONIC BRONCHITIS, UNSPECIFIED CHRONIC BRONCHITIS TYPE (HCC): ICD-10-CM

## 2022-08-23 DIAGNOSIS — Z95.828 S/P AORTIC BIFURCATION BYPASS GRAFT: ICD-10-CM

## 2022-08-23 DIAGNOSIS — I42.9 CARDIOMYOPATHY, UNSPECIFIED TYPE (HCC): ICD-10-CM

## 2022-08-23 DIAGNOSIS — I99.8 ISCHEMIC LEG PAIN: ICD-10-CM

## 2022-08-23 DIAGNOSIS — I50.22 CHRONIC SYSTOLIC CONGESTIVE HEART FAILURE (HCC): ICD-10-CM

## 2022-08-23 DIAGNOSIS — I77.9 PERIPHERAL ARTERIAL OCCLUSIVE DISEASE (HCC): ICD-10-CM

## 2022-08-23 DIAGNOSIS — I25.10 CORONARY ARTERY DISEASE INVOLVING NATIVE CORONARY ARTERY OF NATIVE HEART WITHOUT ANGINA PECTORIS: ICD-10-CM

## 2022-08-23 DIAGNOSIS — I70.229 CRITICAL ISCHEMIA OF LOWER EXTREMITY (HCC): ICD-10-CM

## 2022-08-23 DIAGNOSIS — I10 HYPERTENSION, UNSPECIFIED TYPE: ICD-10-CM

## 2022-08-23 DIAGNOSIS — G89.18 POST-OPERATIVE PAIN: ICD-10-CM

## 2022-08-23 DIAGNOSIS — I63.00 CEREBROVASCULAR ACCIDENT (CVA) DUE TO THROMBOSIS OF PRECEREBRAL ARTERY (HCC): ICD-10-CM

## 2022-08-23 DIAGNOSIS — N17.9 AKI (ACUTE KIDNEY INJURY) (HCC): ICD-10-CM

## 2022-08-23 DIAGNOSIS — I70.0 AORTIC OCCLUSION (HCC): ICD-10-CM

## 2022-08-23 LAB
ALBUMIN SERPL BCP-MCNC: 4.5 G/DL (ref 3.2–4.9)
ALBUMIN/GLOB SERPL: 1 G/DL
ALP SERPL-CCNC: 85 U/L (ref 30–99)
ALT SERPL-CCNC: 5 U/L (ref 2–50)
ANION GAP SERPL CALC-SCNC: 18 MMOL/L (ref 7–16)
AST SERPL-CCNC: 12 U/L (ref 12–45)
BASOPHILS # BLD AUTO: 0.6 % (ref 0–1.8)
BASOPHILS # BLD: 0.05 K/UL (ref 0–0.12)
BILIRUB SERPL-MCNC: 0.4 MG/DL (ref 0.1–1.5)
BUN SERPL-MCNC: 73 MG/DL (ref 8–22)
CALCIUM SERPL-MCNC: 9.7 MG/DL (ref 8.5–10.5)
CHLORIDE SERPL-SCNC: 104 MMOL/L (ref 96–112)
CK SERPL-CCNC: 50 U/L (ref 0–154)
CK SERPL-CCNC: 56 U/L (ref 0–154)
CO2 SERPL-SCNC: 14 MMOL/L (ref 20–33)
CREAT SERPL-MCNC: 2.62 MG/DL (ref 0.5–1.4)
EKG IMPRESSION: NORMAL
EOSINOPHIL # BLD AUTO: 0.23 K/UL (ref 0–0.51)
EOSINOPHIL NFR BLD: 2.6 % (ref 0–6.9)
ERYTHROCYTE [DISTWIDTH] IN BLOOD BY AUTOMATED COUNT: 59.3 FL (ref 35.9–50)
GFR SERPLBLD CREATININE-BSD FMLA CKD-EPI: 20 ML/MIN/1.73 M 2
GLOBULIN SER CALC-MCNC: 4.4 G/DL (ref 1.9–3.5)
GLUCOSE SERPL-MCNC: 99 MG/DL (ref 65–99)
HCT VFR BLD AUTO: 39.1 % (ref 37–47)
HGB BLD-MCNC: 12.4 G/DL (ref 12–16)
IMM GRANULOCYTES # BLD AUTO: 0.03 K/UL (ref 0–0.11)
IMM GRANULOCYTES NFR BLD AUTO: 0.3 % (ref 0–0.9)
INR PPP: 1.73 (ref 0.87–1.13)
LACTATE SERPL-SCNC: 1.4 MMOL/L (ref 0.5–2)
LYMPHOCYTES # BLD AUTO: 2.85 K/UL (ref 1–4.8)
LYMPHOCYTES NFR BLD: 32 % (ref 22–41)
MAGNESIUM SERPL-MCNC: 1.6 MG/DL (ref 1.5–2.5)
MCH RBC QN AUTO: 28.2 PG (ref 27–33)
MCHC RBC AUTO-ENTMCNC: 31.7 G/DL (ref 33.6–35)
MCV RBC AUTO: 89.1 FL (ref 81.4–97.8)
MONOCYTES # BLD AUTO: 0.64 K/UL (ref 0–0.85)
MONOCYTES NFR BLD AUTO: 7.2 % (ref 0–13.4)
NEUTROPHILS # BLD AUTO: 5.12 K/UL (ref 2–7.15)
NEUTROPHILS NFR BLD: 57.3 % (ref 44–72)
NRBC # BLD AUTO: 0 K/UL
NRBC BLD-RTO: 0 /100 WBC
PHOSPHATE SERPL-MCNC: 4.9 MG/DL (ref 2.5–4.5)
PLATELET # BLD AUTO: 321 K/UL (ref 164–446)
PMV BLD AUTO: 9.7 FL (ref 9–12.9)
POTASSIUM SERPL-SCNC: 4.1 MMOL/L (ref 3.6–5.5)
PROT SERPL-MCNC: 8.9 G/DL (ref 6–8.2)
PROTHROMBIN TIME: 19.8 SEC (ref 12–14.6)
RBC # BLD AUTO: 4.39 M/UL (ref 4.2–5.4)
SODIUM SERPL-SCNC: 136 MMOL/L (ref 135–145)
TROPONIN T SERPL-MCNC: 52 NG/L (ref 6–19)
TROPONIN T SERPL-MCNC: 66 NG/L (ref 6–19)
WBC # BLD AUTO: 8.9 K/UL (ref 4.8–10.8)

## 2022-08-23 PROCEDURE — 80053 COMPREHEN METABOLIC PANEL: CPT

## 2022-08-23 PROCEDURE — 700111 HCHG RX REV CODE 636 W/ 250 OVERRIDE (IP): Performed by: EMERGENCY MEDICINE

## 2022-08-23 PROCEDURE — 75635 CT ANGIO ABDOMINAL ARTERIES: CPT

## 2022-08-23 PROCEDURE — 99254 IP/OBS CNSLTJ NEW/EST MOD 60: CPT | Performed by: SURGERY

## 2022-08-23 PROCEDURE — 84100 ASSAY OF PHOSPHORUS: CPT

## 2022-08-23 PROCEDURE — 83735 ASSAY OF MAGNESIUM: CPT

## 2022-08-23 PROCEDURE — 700102 HCHG RX REV CODE 250 W/ 637 OVERRIDE(OP): Performed by: STUDENT IN AN ORGANIZED HEALTH CARE EDUCATION/TRAINING PROGRAM

## 2022-08-23 PROCEDURE — 700105 HCHG RX REV CODE 258: Performed by: EMERGENCY MEDICINE

## 2022-08-23 PROCEDURE — 85025 COMPLETE CBC W/AUTO DIFF WBC: CPT

## 2022-08-23 PROCEDURE — 84484 ASSAY OF TROPONIN QUANT: CPT

## 2022-08-23 PROCEDURE — 36415 COLL VENOUS BLD VENIPUNCTURE: CPT

## 2022-08-23 PROCEDURE — 82550 ASSAY OF CK (CPK): CPT

## 2022-08-23 PROCEDURE — 700111 HCHG RX REV CODE 636 W/ 250 OVERRIDE (IP): Performed by: STUDENT IN AN ORGANIZED HEALTH CARE EDUCATION/TRAINING PROGRAM

## 2022-08-23 PROCEDURE — 83605 ASSAY OF LACTIC ACID: CPT

## 2022-08-23 PROCEDURE — 700105 HCHG RX REV CODE 258: Performed by: STUDENT IN AN ORGANIZED HEALTH CARE EDUCATION/TRAINING PROGRAM

## 2022-08-23 PROCEDURE — 99285 EMERGENCY DEPT VISIT HI MDM: CPT

## 2022-08-23 PROCEDURE — 770020 HCHG ROOM/CARE - TELE (206)

## 2022-08-23 PROCEDURE — 96374 THER/PROPH/DIAG INJ IV PUSH: CPT

## 2022-08-23 PROCEDURE — 76775 US EXAM ABDO BACK WALL LIM: CPT

## 2022-08-23 PROCEDURE — 99223 1ST HOSP IP/OBS HIGH 75: CPT | Performed by: STUDENT IN AN ORGANIZED HEALTH CARE EDUCATION/TRAINING PROGRAM

## 2022-08-23 PROCEDURE — 85610 PROTHROMBIN TIME: CPT

## 2022-08-23 PROCEDURE — 93005 ELECTROCARDIOGRAM TRACING: CPT | Performed by: EMERGENCY MEDICINE

## 2022-08-23 PROCEDURE — 700117 HCHG RX CONTRAST REV CODE 255: Performed by: EMERGENCY MEDICINE

## 2022-08-23 PROCEDURE — A9270 NON-COVERED ITEM OR SERVICE: HCPCS | Performed by: STUDENT IN AN ORGANIZED HEALTH CARE EDUCATION/TRAINING PROGRAM

## 2022-08-23 RX ORDER — BISACODYL 10 MG
10 SUPPOSITORY, RECTAL RECTAL
Status: DISCONTINUED | OUTPATIENT
Start: 2022-08-23 | End: 2022-08-26

## 2022-08-23 RX ORDER — ENOXAPARIN SODIUM 100 MG/ML
30 INJECTION SUBCUTANEOUS DAILY
Status: DISCONTINUED | OUTPATIENT
Start: 2022-08-23 | End: 2022-08-24

## 2022-08-23 RX ORDER — LABETALOL HYDROCHLORIDE 5 MG/ML
10 INJECTION, SOLUTION INTRAVENOUS EVERY 4 HOURS PRN
Status: DISCONTINUED | OUTPATIENT
Start: 2022-08-23 | End: 2022-09-17 | Stop reason: HOSPADM

## 2022-08-23 RX ORDER — SODIUM CHLORIDE, SODIUM LACTATE, POTASSIUM CHLORIDE, CALCIUM CHLORIDE 600; 310; 30; 20 MG/100ML; MG/100ML; MG/100ML; MG/100ML
INJECTION, SOLUTION INTRAVENOUS CONTINUOUS
Status: ACTIVE | OUTPATIENT
Start: 2022-08-23 | End: 2022-08-24

## 2022-08-23 RX ORDER — PROCHLORPERAZINE EDISYLATE 5 MG/ML
5-10 INJECTION INTRAMUSCULAR; INTRAVENOUS EVERY 4 HOURS PRN
Status: DISCONTINUED | OUTPATIENT
Start: 2022-08-23 | End: 2022-09-17 | Stop reason: HOSPADM

## 2022-08-23 RX ORDER — ONDANSETRON 4 MG/1
4 TABLET, ORALLY DISINTEGRATING ORAL EVERY 4 HOURS PRN
Status: DISCONTINUED | OUTPATIENT
Start: 2022-08-23 | End: 2022-08-23

## 2022-08-23 RX ORDER — AMOXICILLIN 250 MG
2 CAPSULE ORAL 2 TIMES DAILY
Status: DISCONTINUED | OUTPATIENT
Start: 2022-08-23 | End: 2022-08-26

## 2022-08-23 RX ORDER — POLYETHYLENE GLYCOL 3350 17 G/17G
1 POWDER, FOR SOLUTION ORAL
Status: DISCONTINUED | OUTPATIENT
Start: 2022-08-23 | End: 2022-08-26

## 2022-08-23 RX ORDER — EZETIMIBE 10 MG/1
10 TABLET ORAL DAILY
Status: DISCONTINUED | OUTPATIENT
Start: 2022-08-24 | End: 2022-08-28

## 2022-08-23 RX ORDER — PRAMIPEXOLE DIHYDROCHLORIDE 0.12 MG/1
0.12 TABLET ORAL 3 TIMES DAILY
Status: DISCONTINUED | OUTPATIENT
Start: 2022-08-23 | End: 2022-08-28

## 2022-08-23 RX ORDER — PROMETHAZINE HYDROCHLORIDE 25 MG/1
12.5-25 TABLET ORAL EVERY 4 HOURS PRN
Status: DISCONTINUED | OUTPATIENT
Start: 2022-08-23 | End: 2022-08-28

## 2022-08-23 RX ORDER — MORPHINE SULFATE 4 MG/ML
4 INJECTION INTRAVENOUS ONCE
Status: COMPLETED | OUTPATIENT
Start: 2022-08-23 | End: 2022-08-23

## 2022-08-23 RX ORDER — ATORVASTATIN CALCIUM 40 MG/1
80 TABLET, FILM COATED ORAL NIGHTLY
Status: DISCONTINUED | OUTPATIENT
Start: 2022-08-23 | End: 2022-08-28

## 2022-08-23 RX ORDER — SODIUM CHLORIDE, SODIUM LACTATE, POTASSIUM CHLORIDE, CALCIUM CHLORIDE 600; 310; 30; 20 MG/100ML; MG/100ML; MG/100ML; MG/100ML
1000 INJECTION, SOLUTION INTRAVENOUS ONCE
Status: COMPLETED | OUTPATIENT
Start: 2022-08-23 | End: 2022-08-23

## 2022-08-23 RX ORDER — CLOPIDOGREL BISULFATE 75 MG/1
75 TABLET ORAL DAILY
Status: DISCONTINUED | OUTPATIENT
Start: 2022-08-24 | End: 2022-09-02

## 2022-08-23 RX ORDER — PROMETHAZINE HYDROCHLORIDE 12.5 MG/1
12.5-25 SUPPOSITORY RECTAL EVERY 4 HOURS PRN
Status: DISCONTINUED | OUTPATIENT
Start: 2022-08-23 | End: 2022-09-17 | Stop reason: HOSPADM

## 2022-08-23 RX ORDER — ACETAMINOPHEN 325 MG/1
650 TABLET ORAL EVERY 6 HOURS PRN
Status: DISCONTINUED | OUTPATIENT
Start: 2022-08-23 | End: 2022-08-28

## 2022-08-23 RX ORDER — ONDANSETRON 2 MG/ML
4 INJECTION INTRAMUSCULAR; INTRAVENOUS EVERY 4 HOURS PRN
Status: DISCONTINUED | OUTPATIENT
Start: 2022-08-23 | End: 2022-08-23

## 2022-08-23 RX ADMIN — PRAMIPEXOLE DIHYDROCHLORIDE 0.12 MG: 0.12 TABLET ORAL at 21:37

## 2022-08-23 RX ADMIN — SODIUM CHLORIDE, POTASSIUM CHLORIDE, SODIUM LACTATE AND CALCIUM CHLORIDE 1000 ML: 600; 310; 30; 20 INJECTION, SOLUTION INTRAVENOUS at 17:03

## 2022-08-23 RX ADMIN — MORPHINE SULFATE 4 MG: 4 INJECTION INTRAVENOUS at 15:50

## 2022-08-23 RX ADMIN — DOCUSATE SODIUM 50 MG AND SENNOSIDES 8.6 MG 2 TABLET: 8.6; 5 TABLET, FILM COATED ORAL at 21:37

## 2022-08-23 RX ADMIN — IOHEXOL 100 ML: 350 INJECTION, SOLUTION INTRAVENOUS at 17:30

## 2022-08-23 RX ADMIN — ENOXAPARIN SODIUM 30 MG: 30 INJECTION SUBCUTANEOUS at 21:38

## 2022-08-23 RX ADMIN — SODIUM CHLORIDE, POTASSIUM CHLORIDE, SODIUM LACTATE AND CALCIUM CHLORIDE: 600; 310; 30; 20 INJECTION, SOLUTION INTRAVENOUS at 21:35

## 2022-08-23 RX ADMIN — ATORVASTATIN CALCIUM 80 MG: 80 TABLET, FILM COATED ORAL at 21:37

## 2022-08-23 ASSESSMENT — LIFESTYLE VARIABLES
TOTAL SCORE: 0
TOTAL SCORE: 0
ALCOHOL_USE: NO
HAVE PEOPLE ANNOYED YOU BY CRITICIZING YOUR DRINKING: NO
EVER HAD A DRINK FIRST THING IN THE MORNING TO STEADY YOUR NERVES TO GET RID OF A HANGOVER: NO
AVERAGE NUMBER OF DAYS PER WEEK YOU HAVE A DRINK CONTAINING ALCOHOL: 0
DOES PATIENT WANT TO STOP DRINKING: NO
EVER FELT BAD OR GUILTY ABOUT YOUR DRINKING: NO
HAVE YOU EVER FELT YOU SHOULD CUT DOWN ON YOUR DRINKING: NO
TOTAL SCORE: 0
CONSUMPTION TOTAL: NEGATIVE
ON A TYPICAL DAY WHEN YOU DRINK ALCOHOL HOW MANY DRINKS DO YOU HAVE: 0
HOW MANY TIMES IN THE PAST YEAR HAVE YOU HAD 5 OR MORE DRINKS IN A DAY: 0

## 2022-08-23 ASSESSMENT — PATIENT HEALTH QUESTIONNAIRE - PHQ9
SUM OF ALL RESPONSES TO PHQ9 QUESTIONS 1 AND 2: 0
2. FEELING DOWN, DEPRESSED, IRRITABLE, OR HOPELESS: NOT AT ALL
1. LITTLE INTEREST OR PLEASURE IN DOING THINGS: NOT AT ALL

## 2022-08-23 ASSESSMENT — COGNITIVE AND FUNCTIONAL STATUS - GENERAL
DAILY ACTIVITIY SCORE: 14
EATING MEALS: A LITTLE
DRESSING REGULAR LOWER BODY CLOTHING: A LOT
CLIMB 3 TO 5 STEPS WITH RAILING: TOTAL
MOBILITY SCORE: 10
HELP NEEDED FOR BATHING: A LITTLE
DRESSING REGULAR UPPER BODY CLOTHING: A LOT
TOILETING: A LOT
SUGGESTED CMS G CODE MODIFIER MOBILITY: CL
SUGGESTED CMS G CODE MODIFIER DAILY ACTIVITY: CK
MOVING FROM LYING ON BACK TO SITTING ON SIDE OF FLAT BED: UNABLE
MOVING TO AND FROM BED TO CHAIR: A LITTLE
STANDING UP FROM CHAIR USING ARMS: TOTAL
PERSONAL GROOMING: A LOT
WALKING IN HOSPITAL ROOM: TOTAL
TURNING FROM BACK TO SIDE WHILE IN FLAT BAD: A LITTLE

## 2022-08-23 ASSESSMENT — FIBROSIS 4 INDEX
FIB4 SCORE: 0.62
FIB4 SCORE: 0.99

## 2022-08-23 ASSESSMENT — PAIN DESCRIPTION - PAIN TYPE: TYPE: ACUTE PAIN

## 2022-08-23 NOTE — ED PROVIDER NOTES
"ED Provider Note      Means of Arrival: Private vehicle  History obtained from: Patient, medical record    CHIEF COMPLAINT  Chief Complaint   Patient presents with    Leg Pain     Patient reports bilateral leg pain \"for a while\". Patient poor historian, unable to answer most questions       HPI  Fouzia Santamaria is a 61 y.o. female who presents with leg pain.  The patient has been having worsening left leg pain that has been steadily worsening since yesterday evening but fluctuating over the past week.  She reports that she was recently seen in the hospital for poor blood flow to her limbs and was started on blood thinners.  She has not had any interventions such as a bypass or stenting.    REVIEW OF SYSTEMS  CONSTITUTIONAL:  No fever.  CARDIOVASCULAR:  No chest discomfort.  RESPIRATORY:  No pleuritic chest pain.  GASTROINTESTINAL:  No abdominal pain.  GENITOURINARY:   No dysuria.    See HPI for further details.   All other systems are negative.     PAST MEDICAL HISTORY  Past Medical History:   Diagnosis Date    Chronic obstructive pulmonary disease (HCC)     Diabetes (HCC)     Heart attack (HCC)     Hypertension     Stroke (HCC)        FAMILY HISTORY  No family history on file.    SOCIAL HISTORY   reports that she has been smoking cigarettes. She has been smoking an average of 1.5 packs per day. She has never used smokeless tobacco. She reports that she does not drink alcohol and does not use drugs.    SURGICAL HISTORY  Past Surgical History:   Procedure Laterality Date    STENT PLACEMENT      THYROIDECTOMY         CURRENT MEDICATIONS  Home Medications       Reviewed by Tana Landaverde R.N. (Registered Nurse) on 08/23/22 at 1437  Med List Status: <None>     Medication Last Dose Status   atorvastatin (LIPITOR) 80 MG tablet  Active   clopidogrel (PLAVIX) 75 MG Tab  Active   ezetimibe (ZETIA) 10 MG Tab  Active   lisinopril (PRINIVIL) 20 MG Tab  Active   pramipexole (MIRAPEX) 0.125 MG Tab  Active   rivaroxaban " "(XARELTO) 20 MG Tab tablet  Active   Rivaroxaban 15 & 20 MG Tablet Therapy Pack  Active                    ALLERGIES  Allergies   Allergen Reactions    Pcn [Penicillins] Vomiting and Nausea    Toradol Vomiting and Nausea       PHYSICAL EXAM  VITAL SIGNS: BP (!) 142/83   Pulse (!) 101   Temp 36.6 °C (97.9 °F) (Temporal)   Resp 19   Ht 1.651 m (5' 5\")   Wt 45.4 kg (100 lb)   SpO2 96%   BMI 16.64 kg/m²    Gen: Alert, uncomfortable appearing  HENT: ATNC  Eyes: Normal conjunctiva  Neck: trachea midline  Resp: no respiratory distress  CV: No JVD, RRR, no murmurs, rubs, gallops.  2+ right posterior tibial and dorsalis pedis pulse.  Absence of left posterior tibial or dorsalis pedis pulse.  Delayed capillary refill entire left limb.  Left limb cool, pale.  Abd: non-distended, soft, minimal diffuse tenderness  Ext: No deformities  Psych: normal mood  Neuro: speech fluent       RADIOLOGY/PROCEDURES  CT-CTA AORTA-RO WITH & W/O-POST PROCESS   Final Result      1.  Occlusion of the abdominal aorta just below level of the renal arteries with occlusion of the common and external iliac arteries with reconstitution of flow via epigastric collaterals.   2.  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   3.  Diffusely small caliber bilateral lower extremity arteries with significant atherosclerosis of the bilateral superficial femoral arteries without high-grade stenosis.   4.  Likely single vessel runoff on the right and 2 vessel runoff on the left.   5.  Focal severe stenoses at the bilateral renal artery origins.      These findings were discussed with ESPERANZA DO on 8/23/2022 5:29 PM.          LABS  Labs Reviewed   CBC WITH DIFFERENTIAL - Abnormal; Notable for the following components:       Result Value    MCHC 31.7 (*)     RDW 59.3 (*)     All other components within normal limits    Narrative:     Indicate which anticoagulants the " patient is on:->UNKNOWN   COMP METABOLIC PANEL - Abnormal; Notable for the following components:    Co2 14 (*)     Anion Gap 18.0 (*)     Bun 73 (*)     Creatinine 2.62 (*)     Total Protein 8.9 (*)     Globulin 4.4 (*)     All other components within normal limits    Narrative:     Indicate which anticoagulants the patient is on:->UNKNOWN   PROTHROMBIN TIME - Abnormal; Notable for the following components:    PT 19.8 (*)     INR 1.73 (*)     All other components within normal limits    Narrative:     Indicate which anticoagulants the patient is on:->UNKNOWN   TROPONIN - Abnormal; Notable for the following components:    Troponin T 66 (*)     All other components within normal limits    Narrative:     Indicate which anticoagulants the patient is on:->UNKNOWN   ESTIMATED GFR - Abnormal; Notable for the following components:    GFR (CKD-EPI) 20 (*)     All other components within normal limits    Narrative:     Indicate which anticoagulants the patient is on:->UNKNOWN   CREATINE KINASE    Narrative:     Indicate which anticoagulants the patient is on:->UNKNOWN   LACTIC ACID    Narrative:     Indicate which anticoagulants the patient is on:->UNKNOWN       COURSE & MEDICAL DECISION MAKING  Pertinent Labs & Imaging studies reviewed. (See chart for details)    Review of medical records demonstrates the patient has a history of a chronic infrarenal aortic occlusion.  She was recently hospitalized and found to have a descending aorta thrombus, was started on blood thinners.  There is plan for outpatient follow-up with potential surgical intervention.  Patient does appear to have ischemia of her left leg with poor flow.  She does have some abdominal pain with mild tenderness but I do not believe this reaches the threshold for mesenteric ischemia.  We will also order lactic acid to evaluate further for this.    The patient does have an elevated troponin, however compared to prior this is improved.  She recently did have an MI  at outside hospital.  EKG similar to prior, low suspicion for acute ACS.    Given the patient's complicated aortic history, I ordered a CTA with runoff.  I found that she had acute kidney injury with reduced GFR, and discussed the case with Dr. Linn, vascular surgery, who recommended deferring imaging and he will evaluate the patient to determine any imaging requirements.  He requests I admit the patient to the medicine service.    I called CAT scan to cancel the order, however the patient had just received the CT scan.  My review the CAT scan demonstrates an infrarenal aortic occlusion.    Patient has taken her Xarelto this morning, should be therapeutic for today, no indication for initiation of heparin drip at this moment.    Call from radiology: Patient has an occluded aorta with poor runoff on the right leg.  Interesting that her symptoms appear to be more on the left side.  The aortic occlusion has been present on prior CAT scans from June at [facility].    Hydration: Patient received IV fluids for acute kidney injury, dehydration.  Patient was hospitalized prior to completion of fluids.      Appropriate PPE were worn at this encounter.     FINAL IMPRESSION  1. Ischemic leg pain    2. IVON (acute kidney injury) (HCC)    3. 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HCC)          Case discussed with Dr. Adkins , who will evaluate the patient for hospitalization. Patient will be hospitalized in guarded condition.    CRITICAL CARE TIME 37 minutes  There was a very real possibility of deterioration of the patient's condition.  This patient required the highest level of care.  I provided critical care services which included: review of the medical record, treatment orders, ordering and reviewing test results, frequent reevaluation of the patient's condition and response to treatment, as well as discussing the case with appropriate personnel and various consultants. The critical care time associated with the care of this patient is  exclusive of any procedures or specific interventions.      This dictation was created using voice recognition software. The accuracy of the dictation is limited to the abilities of the software. I expect there may be some errors of grammar and possibly content. The nursing notes were reviewed and certain aspects of this information were incorporated into this note.

## 2022-08-23 NOTE — ED TRIAGE NOTES
"Chief Complaint   Patient presents with    Leg Pain     Patient reports bilateral leg pain \"for a while\". Patient poor historian, unable to answer most questions       62 yo female to triage for above complaint. Patient tearful in triage, states bilateral leg pain \"for a while\", denies trauma.    Pt is alert and oriented, speaking in full sentences, follows commands and responds appropriately to questions.     Patient placed back in lobby and educated on triage process. Asked to inform RN of any changes.    BP (!) 142/83   Pulse (!) 101   Temp 36.6 °C (97.9 °F) (Temporal)   Resp 19   Ht 1.651 m (5' 5\")   Wt 45.4 kg (100 lb)   SpO2 96%   BMI 16.64 kg/m²     "

## 2022-08-24 ENCOUNTER — APPOINTMENT (OUTPATIENT)
Dept: RADIOLOGY | Facility: MEDICAL CENTER | Age: 62
DRG: 270 | End: 2022-08-24
Attending: STUDENT IN AN ORGANIZED HEALTH CARE EDUCATION/TRAINING PROGRAM
Payer: MEDICAID

## 2022-08-24 PROBLEM — N13.30 HYDRONEPHROSIS: Status: ACTIVE | Noted: 2022-08-24

## 2022-08-24 PROBLEM — J44.9 COPD (CHRONIC OBSTRUCTIVE PULMONARY DISEASE) (HCC): Status: ACTIVE | Noted: 2022-08-24

## 2022-08-24 PROBLEM — I74.10 AORTIC THROMBUS (HCC): Status: ACTIVE | Noted: 2022-08-24

## 2022-08-24 LAB
ANION GAP SERPL CALC-SCNC: 13 MMOL/L (ref 7–16)
APTT PPP: 28.5 SEC (ref 24.7–36)
BUN SERPL-MCNC: 63 MG/DL (ref 8–22)
CALCIUM SERPL-MCNC: 9 MG/DL (ref 8.5–10.5)
CHLORIDE SERPL-SCNC: 106 MMOL/L (ref 96–112)
CO2 SERPL-SCNC: 15 MMOL/L (ref 20–33)
CREAT SERPL-MCNC: 1.66 MG/DL (ref 0.5–1.4)
GFR SERPLBLD CREATININE-BSD FMLA CKD-EPI: 35 ML/MIN/1.73 M 2
GLUCOSE SERPL-MCNC: 93 MG/DL (ref 65–99)
INR PPP: 1.1 (ref 0.87–1.13)
POTASSIUM SERPL-SCNC: 3.8 MMOL/L (ref 3.6–5.5)
PROTHROMBIN TIME: 14.1 SEC (ref 12–14.6)
SODIUM SERPL-SCNC: 134 MMOL/L (ref 135–145)
TROPONIN T SERPL-MCNC: 53 NG/L (ref 6–19)
UFH PPP CHRO-ACNC: 0.41 IU/ML

## 2022-08-24 PROCEDURE — 74176 CT ABD & PELVIS W/O CONTRAST: CPT

## 2022-08-24 PROCEDURE — 700102 HCHG RX REV CODE 250 W/ 637 OVERRIDE(OP): Performed by: STUDENT IN AN ORGANIZED HEALTH CARE EDUCATION/TRAINING PROGRAM

## 2022-08-24 PROCEDURE — 36415 COLL VENOUS BLD VENIPUNCTURE: CPT

## 2022-08-24 PROCEDURE — 85730 THROMBOPLASTIN TIME PARTIAL: CPT

## 2022-08-24 PROCEDURE — 85520 HEPARIN ASSAY: CPT

## 2022-08-24 PROCEDURE — A9270 NON-COVERED ITEM OR SERVICE: HCPCS | Performed by: STUDENT IN AN ORGANIZED HEALTH CARE EDUCATION/TRAINING PROGRAM

## 2022-08-24 PROCEDURE — 84484 ASSAY OF TROPONIN QUANT: CPT

## 2022-08-24 PROCEDURE — 80048 BASIC METABOLIC PNL TOTAL CA: CPT

## 2022-08-24 PROCEDURE — 85610 PROTHROMBIN TIME: CPT

## 2022-08-24 PROCEDURE — 770020 HCHG ROOM/CARE - TELE (206)

## 2022-08-24 PROCEDURE — 700111 HCHG RX REV CODE 636 W/ 250 OVERRIDE (IP): Performed by: STUDENT IN AN ORGANIZED HEALTH CARE EDUCATION/TRAINING PROGRAM

## 2022-08-24 PROCEDURE — 99233 SBSQ HOSP IP/OBS HIGH 50: CPT | Performed by: STUDENT IN AN ORGANIZED HEALTH CARE EDUCATION/TRAINING PROGRAM

## 2022-08-24 RX ORDER — MORPHINE SULFATE 4 MG/ML
2 INJECTION INTRAVENOUS EVERY 4 HOURS PRN
Status: DISCONTINUED | OUTPATIENT
Start: 2022-08-24 | End: 2022-08-30

## 2022-08-24 RX ORDER — OXYCODONE HYDROCHLORIDE AND ACETAMINOPHEN 5; 325 MG/1; MG/1
1 TABLET ORAL EVERY 4 HOURS PRN
Status: DISCONTINUED | OUTPATIENT
Start: 2022-08-24 | End: 2022-08-28

## 2022-08-24 RX ORDER — HEPARIN SODIUM 5000 [USP'U]/100ML
0-30 INJECTION, SOLUTION INTRAVENOUS CONTINUOUS
Status: DISCONTINUED | OUTPATIENT
Start: 2022-08-24 | End: 2022-08-25

## 2022-08-24 RX ADMIN — ACETAMINOPHEN 650 MG: 325 TABLET, FILM COATED ORAL at 09:38

## 2022-08-24 RX ADMIN — ASPIRIN 81 MG: 81 TABLET, COATED ORAL at 05:54

## 2022-08-24 RX ADMIN — PRAMIPEXOLE DIHYDROCHLORIDE 0.12 MG: 0.12 TABLET ORAL at 12:12

## 2022-08-24 RX ADMIN — CLOPIDOGREL BISULFATE 75 MG: 75 TABLET ORAL at 05:54

## 2022-08-24 RX ADMIN — PRAMIPEXOLE DIHYDROCHLORIDE 0.12 MG: 0.12 TABLET ORAL at 05:54

## 2022-08-24 RX ADMIN — PRAMIPEXOLE DIHYDROCHLORIDE 0.12 MG: 0.12 TABLET ORAL at 16:44

## 2022-08-24 RX ADMIN — HEPARIN SODIUM 18 UNITS/KG/HR: 5000 INJECTION, SOLUTION INTRAVENOUS at 15:23

## 2022-08-24 RX ADMIN — OXYCODONE AND ACETAMINOPHEN 1 TABLET: 5; 325 TABLET ORAL at 12:16

## 2022-08-24 RX ADMIN — ATORVASTATIN CALCIUM 80 MG: 80 TABLET, FILM COATED ORAL at 22:43

## 2022-08-24 RX ADMIN — EZETIMIBE 10 MG: 10 TABLET ORAL at 05:54

## 2022-08-24 ASSESSMENT — ENCOUNTER SYMPTOMS
SHORTNESS OF BREATH: 0
COUGH: 0
FEVER: 0
MYALGIAS: 1
BACK PAIN: 0
NERVOUS/ANXIOUS: 0
CLAUDICATION: 1
SENSORY CHANGE: 0
NAUSEA: 0
CHILLS: 0
BLURRED VISION: 0
SPEECH CHANGE: 0
ABDOMINAL PAIN: 0
PALPITATIONS: 0
FLANK PAIN: 0
VOMITING: 0
DOUBLE VISION: 0
NECK PAIN: 0
DIARRHEA: 0
LOSS OF CONSCIOUSNESS: 0
HEADACHES: 0

## 2022-08-24 ASSESSMENT — PAIN DESCRIPTION - PAIN TYPE
TYPE: ACUTE PAIN

## 2022-08-24 ASSESSMENT — FIBROSIS 4 INDEX: FIB4 SCORE: 0.99

## 2022-08-24 NOTE — H&P
"Hospital Medicine History & Physical Note    Date of Service  8/23/2022    Primary Care Physician  Pcp Pt States None    Consultants  vascular surgery    Specialist Names: Dr. Siu    Code Status  Full Code    Chief Complaint  Chief Complaint   Patient presents with    Leg Pain     Patient reports bilateral leg pain \"for a while\". Patient poor historian, unable to answer most questions       History of Presenting Illness  Fouzia Santamaria is a 61 y.o. female with coronary artery disease status post stent placement LAD, peripheral arterial disease with chronic aortic occlusion and mural thrombus, hyperlipidemia, hypertension, who presented 8/23/2022 with lower extremity pain.  Fouzia has been experiencing bilateral lower extremity pain that has been waxing and waning in severity over the past few weeks but steadily worse over the past few days prior to presentation, left greater than right.  She has known chronic infrarenal aortic occlusion with collateral flow and is followed with vascular surgery as an outpatient.  Due to lower extremity pain now at rest she presented for evaluation.  She denies any headache or vision changes, fevers chills, chest pain dyspnea cough nausea vomiting dysuria or diarrhea.  Patient recently admitted to [facility] on July 3, 2022 and had 2 stents placed to LAD, subsequently to that was in the hospital from 7/21-7/24 when she was sent by vascular surgeon due to escalating claudication symptoms.  During that admission it was felt that vascular bypassing/repair of aorto was too high risk and she was discharged Xarelto DVT dosing to follow-up as an outpatient.  She was discharged on Plavix, Xarelto, aspirin and was to follow-up with vascular surgery as outpatient but due to progression of her pain she presented today to the ED.    In ED patient is afebrile pulse is ranged from  normal respiratory rate and room air oxygen saturation, systolic blood pressures ranged from 115-142.  " Initial work-up shows normal cell counts and CBC, CHEM panel with bicarb 14 BUN 73 serum creatinine 2.62 (1.31 a month ago), normal liver function, troponin T 66 INR 1.73. CTA aorta with and without shows occlusion of the abdominal aorta just below the level of renal arteries with occlusion of common and external iliac arteries with reconstitution of flow via epigastric collaterals as well as diffuse ectasia/aneurysm of descending thoracic aorta and upper abdominal aorta with prominent mural thrombus and atherosclerosis, diffusely small caliber bilateral lower extremity arteries with significant atherosclerosis of bilateral superficial femoral arteries without high-grade stenosis, likely single-vessel runoff of the right and two-vessel runoff on the left, focal severe stenosis of the bilateral renal artery origins.  EKG showed sinus rhythm and biatrial enlargement with an incomplete right bundle branch block and left ventricular hypertrophy, prolonged QTC.  ERP discussed case with Dr. Siu agrees to evaluate patient.  Patient subsequently referred to hospitalist for admission.    I discussed the plan of care with patient, bedside RN, and pharmacy.    Review of Systems  Review of Systems   Constitutional:  Negative for chills and fever.   HENT:  Negative for congestion and nosebleeds.    Eyes:  Negative for blurred vision and double vision.   Respiratory:  Negative for cough and shortness of breath.    Cardiovascular:  Positive for claudication. Negative for chest pain and palpitations.   Gastrointestinal:  Negative for diarrhea, nausea and vomiting.   Genitourinary:  Negative for dysuria and urgency.   Musculoskeletal:  Positive for myalgias. Negative for back pain and neck pain.   Neurological:  Negative for sensory change, speech change, loss of consciousness and headaches.   Psychiatric/Behavioral:  Negative for suicidal ideas. The patient is not nervous/anxious.      Past Medical History   has a past medical  history of Chronic obstructive pulmonary disease (HCC), Diabetes (HCC), Heart attack (HCC), Hypertension, and Stroke (HCC).    Surgical History   has a past surgical history that includes thyroidectomy and stent placement.     Family History  family history is not on file.   Family history reviewed with patient. There is no family history that is pertinent to the chief complaint.     Social History   reports that she has been smoking cigarettes. She has been smoking an average of 1.5 packs per day. She has never used smokeless tobacco. She reports that she does not drink alcohol and does not use drugs.    Allergies  Allergies   Allergen Reactions    Pcn [Penicillins] Vomiting and Nausea    Toradol Vomiting and Nausea       Medications  Prior to Admission Medications   Prescriptions Last Dose Informant Patient Reported? Taking?   Rivaroxaban 15 & 20 MG Tablet Therapy Pack 8/23/2022 at 0800  No No   Sig: Take as directed on package- 1 tablet twice daily for 21 days (15mg) then take 1 tablet once daily (20mg)   aspirin EC (ECOTRIN) 81 MG Tablet Delayed Response 8/23/2022 at 0800  Yes Yes   Sig: Take 81 mg by mouth every day.   atorvastatin (LIPITOR) 80 MG tablet  at 1930 Rx Bottle (For Med Information) Yes No   Sig: Take 80 mg by mouth every evening.   clopidogrel (PLAVIX) 75 MG Tab 8/23/2022 at 0800  No No   Sig: Take 1 Tablet by mouth every day.   ezetimibe (ZETIA) 10 MG Tab 8/23/2022 at 0800 Rx Bottle (For Med Information) Yes No   Sig: Take 10 mg by mouth every day.   lisinopril (PRINIVIL) 20 MG Tab 8/23/2022 at 0800 Rx Bottle (For Med Information) Yes No   Sig: Take 20 mg by mouth every day.   pramipexole (MIRAPEX) 0.125 MG Tab 8/23/2022 at 0800 Rx Bottle (For Med Information) Yes No   Sig: Take 0.125 mg by mouth 3 times a day.   rivaroxaban (XARELTO) 20 MG Tab tablet has not at started  No No   Sig: Take 1 Tablet by mouth with dinner.      Facility-Administered Medications: None       Physical Exam  Temp:  [36.6  °C (97.9 °F)] 36.6 °C (97.9 °F)  Pulse:  [] 71  Resp:  [16-19] 16  BP: (115-142)/(73-83) 115/73  SpO2:  [96 %-100 %] 100 %  Blood Pressure: 115/73   Temperature: 36.6 °C (97.9 °F)   Pulse: 71   Respiration: 16   Pulse Oximetry: 100 %       Physical Exam  Vitals and nursing note reviewed.   Constitutional:       General: She is not in acute distress.     Appearance: She is not toxic-appearing.      Comments: 61-year-old female appears older than stated age alert and conversant able to speak full sentences without becoming breathless no acute distress nontoxic-appearing   HENT:      Head: Normocephalic and atraumatic.      Nose: Nose normal. No rhinorrhea.      Mouth/Throat:      Mouth: Mucous membranes are moist.      Pharynx: Oropharynx is clear.   Eyes:      General: No scleral icterus.     Extraocular Movements: Extraocular movements intact.      Conjunctiva/sclera: Conjunctivae normal.      Pupils: Pupils are equal, round, and reactive to light.   Cardiovascular:      Rate and Rhythm: Normal rate and regular rhythm.      Heart sounds: No murmur heard.     Comments: Absent femoral, popliteal, pedal pulses  Pulmonary:      Effort: Pulmonary effort is normal. No respiratory distress.      Breath sounds: Normal breath sounds. No wheezing, rhonchi or rales.   Abdominal:      General: Bowel sounds are normal.      Palpations: Abdomen is soft.      Tenderness: There is no abdominal tenderness. There is no guarding or rebound.   Musculoskeletal:         General: Normal range of motion.      Cervical back: Normal range of motion and neck supple. No rigidity or tenderness.      Right lower leg: No edema.      Left lower leg: No edema.   Skin:     General: Skin is warm and dry.      Capillary Refill: Capillary refill takes less than 2 seconds.   Neurological:      General: No focal deficit present.      Mental Status: She is alert and oriented to person, place, and time.      Cranial Nerves: No cranial nerve deficit.       Sensory: No sensory deficit.      Motor: No weakness.      Coordination: Coordination normal.   Psychiatric:         Mood and Affect: Mood normal.         Behavior: Behavior normal.         Thought Content: Thought content normal.         Judgment: Judgment normal.       Laboratory:  Recent Labs     08/23/22  1545   WBC 8.9   RBC 4.39   HEMOGLOBIN 12.4   HEMATOCRIT 39.1   MCV 89.1   MCH 28.2   MCHC 31.7*   RDW 59.3*   PLATELETCT 321   MPV 9.7     Recent Labs     08/23/22  1545   SODIUM 136   POTASSIUM 4.1   CHLORIDE 104   CO2 14*   GLUCOSE 99   BUN 73*   CREATININE 2.62*   CALCIUM 9.7     Recent Labs     08/23/22  1545   ALTSGPT 5   ASTSGOT 12   ALKPHOSPHAT 85   TBILIRUBIN 0.4   GLUCOSE 99     Recent Labs     08/23/22  1545   INR 1.73*     No results for input(s): NTPROBNP in the last 72 hours.      Recent Labs     08/23/22  1545   TROPONINT 66*       Imaging:  CT-CTA AORTA-RO WITH & W/O-POST PROCESS   Final Result      1.  Occlusion of the abdominal aorta just below level of the renal arteries with occlusion of the common and external iliac arteries with reconstitution of flow via epigastric collaterals.   2.  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   3.  Diffusely small caliber bilateral lower extremity arteries with significant atherosclerosis of the bilateral superficial femoral arteries without high-grade stenosis.   4.  Likely single vessel runoff on the right and 2 vessel runoff on the left.   5.  Focal severe stenoses at the bilateral renal artery origins.      These findings were discussed with ESPERANZA DO on 8/23/2022 5:29 PM.      US-RENAL    (Results Pending)       EKG:  I have personally reviewed the images and compared with prior images. and My impression is: EKG showed sinus rhythm and biatrial enlargement with an incomplete right bundle branch block and left ventricular hypertrophy, prolonged  QTC.    Assessment/Plan:  Justification for Admission Status  I anticipate this patient will require at least two midnights for appropriate medical management, necessitating inpatient admission because Aortic occlusion requiring intervention, IVON.        * Peripheral arterial occlusive disease (HCC)- (present on admission)  Assessment & Plan  Vascular consulted in ED, will follow, Dr. Siu evaluated patient and states no indication for systemic anticoagulation at this time.  Optimize for likely surgical intervention.  Continue statin, aspirin, Plavix.  Monitor for signs of lower extremity pain or critical limb ischemia.    IVON (acute kidney injury) (HCC)- (present on admission)  Assessment & Plan  CT with focal severe stenosis of bilateral renal artery origins, patient has occlusive aortic vascular disease, vascular surgery consulted planning on intervention once IVON improves.  Renal US  Check U/a & CPK  Rule out post obstruction  IVF  Renal dose meds and avoid nephrotoxins  Monitor I&O's  Follow renal function      Hypertension- (present on admission)  Assessment & Plan  Holding home lisinopril given IVON.  IV antihypertensives with parameters.    QT prolongation- (present on admission)  Assessment & Plan  Avoid QTC prolonging medications    CAD (coronary artery disease)- (present on admission)  Assessment & Plan  With recent stent to LAD, continue aspirin, statin, Plavix      VTE prophylaxis: SCDs/TEDs and enoxaparin ppx

## 2022-08-24 NOTE — PROGRESS NOTES
Patient transported to unit on zoll with all of their belongings by flex RN. Patient is A&Ox3; disoriented to time, on room air, vital signs are stable an patient denies any pain at this time. Patient placed on telemetry monitoring and monitor room notified. Patient oriented to room and educated on th use of call light. All fall precautions are in place, bed is in locked and lowest position, and bed alarm is on. Plan of care discussed with patient and no questions verbalized at this time. Hourly rounding in place. Will continue plan of care.

## 2022-08-24 NOTE — CONSULTS
VASCULAR SURGERY CONSULTATION      DATE OF CONSULTATION: 8/23/2022     REFERRING PHYSICIAN: Balaji Petty MD    CONSULTING PHYSICIAN: Brandyn Siu M.D.    REASON FOR CONSULTATION: Evaluate patient with bilateral lower extremity leg pain.     HISTORY OF PRESENT ILLNESS: The patient is a 61 y.o. female who is well-known to the vascular surgery service.  Patient was seen last month and diagnosed with a chronic infrarenal aortic occlusion.  Patient had collateral flow and was disposition for outpatient work-up and eventual intervention.  The patient's been seeing Dr Bauer.  The patient returned to the emergency room today with persistent and worsening pain in her lower extremities.  Patient's CT scan was evaluated.  She has an infrarenal aortic occlusion which is clearly chronic as she has multiple collaterals visible.  Lower extremities have no ulcerations but she does have some arterial insufficiency verging on rest pain.  The patient appears unhealthy and much older than her stated age..     PAST MEDICAL HISTORY:  has a past medical history of Chronic obstructive pulmonary disease (HCC), Diabetes (HCC), Heart attack (HCC), Hypertension, and Stroke (HCC).     PAST SURGICAL HISTORY:  has a past surgical history that includes thyroidectomy and stent placement.     ALLERGIES:   Allergies   Allergen Reactions    Pcn [Penicillins] Vomiting and Nausea    Toradol Vomiting and Nausea        CURRENT MEDICATIONS:   Home Medications       Reviewed by Tana Landaverde R.N. (Registered Nurse) on 08/23/22 at 1437  Med List Status: <None>     Medication Last Dose Status   atorvastatin (LIPITOR) 80 MG tablet  Active   clopidogrel (PLAVIX) 75 MG Tab  Active   ezetimibe (ZETIA) 10 MG Tab  Active   lisinopril (PRINIVIL) 20 MG Tab  Active   pramipexole (MIRAPEX) 0.125 MG Tab  Active   rivaroxaban (XARELTO) 20 MG Tab tablet  Active   Rivaroxaban 15 & 20 MG Tablet Therapy Pack  Active                    FAMILY HISTORY: No family history  on file.    History reviewed. No pertinent family history.       SOCIAL HISTORY:   Social History     Tobacco Use    Smoking status: Every Day     Packs/day: 1.50     Types: Cigarettes    Smokeless tobacco: Never   Substance and Sexual Activity    Alcohol use: No    Drug use: No    Sexual activity: Not on file       ROS   Patient reports bilateral lower extremity leg pain  All other systems were reviewed and are negative (AMA/CMS criteria)                Vital signs reviewed  Chronically ill-appearing  Appears older than her stated age  No distress  Slight erythema left thigh  Neck supple  Lungs clear  Abdomen soft nontender  Absent femoral popliteal and pedal pulses no lower extremity ulcerations present    Neurologically intact      LABORATORY VALUES:   Recent Labs     08/23/22  1545   WBC 8.9   RBC 4.39   HEMOGLOBIN 12.4   HEMATOCRIT 39.1   MCV 89.1   MCH 28.2   MCHC 31.7*   RDW 59.3*   PLATELETCT 321   MPV 9.7     Recent Labs     08/23/22  1545   SODIUM 136   POTASSIUM 4.1   CHLORIDE 104   CO2 14*   GLUCOSE 99   BUN 73*   CREATININE 2.62*   CALCIUM 9.7     Recent Labs     08/23/22  1545   ASTSGOT 12   ALTSGPT 5   TBILIRUBIN 0.4   ALKPHOSPHAT 85   GLOBULIN 4.4*   INR 1.73*     Recent Labs     08/23/22  1545   INR 1.73*        IMAGING:   CT-CTA AORTA-RO WITH & W/O-POST PROCESS   Final Result      1.  Occlusion of the abdominal aorta just below level of the renal arteries with occlusion of the common and external iliac arteries with reconstitution of flow via epigastric collaterals.   2.  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   3.  Diffusely small caliber bilateral lower extremity arteries with significant atherosclerosis of the bilateral superficial femoral arteries without high-grade stenosis.   4.  Likely single vessel runoff on the right and 2 vessel runoff on the left.   5.  Focal severe stenoses at the bilateral  renal artery origins.      These findings were discussed with ESPERANZA DO on 8/23/2022 5:29 PM.          IMPRESSION AND PLAN    Chronically occluded infrarenal aorta with associated bilateral lower extremity ischemia verging on rest pain  Patient will require an aortobifemoral reconstruction  Renal insufficiency    Recommendation-    Patient will be admitted to the hospital per hospitalist service.  Patient's medical condition will be optimized over the next day or so.  Will plan aortobifemoral bypass during this hospitalization    No indication for systemic anticoagulation  Pain control  Hospitalist to evaluate and hopefully optimized for surgery  Hydrate and follow renal function  When patient optimized will consider aortic reconstruction    ____________________________________   Brandyn Siu M.D.          DD: 8/23/2022   DT: 5:55 PM

## 2022-08-24 NOTE — PROGRESS NOTES
This RN attempted to place order for wound prevention supplies per protocol based on patient's Manolo score of 17, but unable to at this time. Supplies needed for wound prevention pulled from floor stock and put in place. Will continue plan of care.

## 2022-08-24 NOTE — PROGRESS NOTES
Pt being transported to tele 7 by Charge RN. Bedside report given to KATIA Jones. Pt sent with all belongings; pt denies cp/sob

## 2022-08-24 NOTE — ASSESSMENT & PLAN NOTE
Mild right-sided nephrosis.  Patient is denying any flank pain.  We will order a CT renal to evaluate for kidney stones.  8/25: CT does not show hydronephrosis as what was noted on the ultrasound.  No further work-up at this time.

## 2022-08-24 NOTE — THERAPY
Missed Therapy     Patient Name: Fouzia Santamaria  Age:  61 y.o., Sex:  female  Medical Record #: [id number]  Today's Date: 8/24/2022 08/24/22 1114   Initial Contact Note    Initial Contact Note Order Received and Verified, Occupational Therapy Evaluation in Progress with Full Report to Follow.   Interdisciplinary Plan of Care Collaboration   Collaboration Comments OT eval held pending possible aortobifemoral bypass, will monitor status and initiate as medically appropriate   Session Information   Date / Session Number  8/24 held   Priority   (needs eval)

## 2022-08-24 NOTE — DISCHARGE PLANNING
"CRISTOFER Maldonado introduced community care management program to the patient bedside.    Patient is a poor historian, unable to answer most questions / formulate thoughts.     CHW asked the pt if she has a PCP. She states \"yes she does\" but was unable to answer who that is.     Pt asked for CHW to call her after DC to go over the program again.     Plan: CHW will call the patient after DC. CHW asked the patient for her phone number and she provided an invalid number.     "

## 2022-08-24 NOTE — PROGRESS NOTES
[facility] Medicine Daily Progress Note    Date of Service  8/24/2022    Chief Complaint  Fouzia Santamaria is a 61 y.o. female admitted 8/23/2022 with vascular claudication    Hospital Course  Fouzia Santamaria is a 61 y.o. female with coronary artery disease status post stent placement LAD, peripheral arterial disease with chronic aortic occlusion and mural thrombus, hyperlipidemia, hypertension, who presented 8/23/2022 with lower extremity pain.  Fouzia has been experiencing bilateral lower extremity pain that has been waxing and waning in severity over the past few weeks but steadily worse over the past few days prior to presentation, left greater than right.  She has known chronic infrarenal aortic occlusion with collateral flow and is followed with vascular surgery as an outpatient.  Due to lower extremity pain now at rest she presented for evaluation.  She denies any headache or vision changes, fevers chills, chest pain dyspnea cough nausea vomiting dysuria or diarrhea.  Patient recently admitted to [place] on July 3, 2022 and had 2 stents placed to LAD, subsequently to that was in the hospital from 7/21-7/24 when she was sent by vascular surgeon due to escalating claudication symptoms.  During that admission it was felt that vascular bypassing/repair of aorto was too high risk and she was discharged Xarelto DVT dosing to follow-up as an outpatient.  She was discharged on Plavix, Xarelto, aspirin and was to follow-up with vascular surgery as outpatient but due to progression of her pain she presented today to the ED.     In ED patient is afebrile pulse is ranged from  normal respiratory rate and room air oxygen saturation, systolic blood pressures ranged from 115-142.  Initial work-up shows normal cell counts and CBC, CHEM panel with bicarb 14 BUN 73 serum creatinine 2.62 (1.31 a month ago), normal liver function, troponin T 66 INR 1.73. CTA aorta with and without shows occlusion of the abdominal  aorta just below the level of renal arteries with occlusion of common and external iliac arteries with reconstitution of flow via epigastric collaterals as well as diffuse ectasia/aneurysm of descending thoracic aorta and upper abdominal aorta with prominent mural thrombus and atherosclerosis, diffusely small caliber bilateral lower extremity arteries with significant atherosclerosis of bilateral superficial femoral arteries without high-grade stenosis, likely single-vessel runoff of the right and two-vessel runoff on the left, focal severe stenosis of the bilateral renal artery origins.  EKG showed sinus rhythm and biatrial enlargement with an incomplete right bundle branch block and left ventricular hypertrophy, prolonged QTC.  ERP discussed case with Dr. Siu agrees to evaluate patient.  Patient subsequently referred to hospitalist for admission.    Interval Problem Update  Vitals are stable.  Patient continues to endorse leg pain.  We will start the patient on IV morphine and as needed Percocets.  Renal function is improving.  Renal ultrasound showing mild right hydronephrosis unclear the significance.  Urinalysis and CT renal pending.  Patient has a history of a descending thoracic thrombus and has previously been on Xarelto.  We will place the patient on heparin drip and await vascular surgery recommendations.    I have discussed this patient's plan of care and discharge plan at IDT rounds today with Case Management, Nursing, Nursing leadership, and other members of the IDT team.    Consultants/Specialty  vascular surgery    Code Status  Full Code    Disposition  Patient is not medically cleared for discharge.   Anticipate discharge to to home with organized home healthcare and close outpatient follow-up.  I have placed the appropriate orders for post-discharge needs.    Review of Systems  Review of Systems   Constitutional:  Negative for chills and fever.   Respiratory:  Negative for cough and shortness of  breath.    Cardiovascular:  Negative for chest pain and palpitations.   Gastrointestinal:  Negative for abdominal pain, nausea and vomiting.   Genitourinary:  Negative for flank pain.   Musculoskeletal:         Left leg pain   All other systems reviewed and are negative.     Physical Exam  Temp:  [35.9 °C (96.7 °F)-36.8 °C (98.2 °F)] 36.8 °C (98.2 °F)  Pulse:  [] 68  Resp:  [16-20] 18  BP: (115-142)/(64-91) 122/81  SpO2:  [96 %-100 %] 98 %    Physical Exam  Vitals and nursing note reviewed.   Constitutional:       General: She is not in acute distress.     Appearance: Normal appearance.   HENT:      Head: Normocephalic and atraumatic.   Eyes:      General: No scleral icterus.        Right eye: No discharge.         Left eye: No discharge.   Cardiovascular:      Rate and Rhythm: Normal rate and regular rhythm.      Heart sounds: Normal heart sounds.   Pulmonary:      Effort: No respiratory distress.      Breath sounds: No wheezing or rales.   Abdominal:      General: Abdomen is flat. There is no distension.      Palpations: Abdomen is soft.      Tenderness: There is no abdominal tenderness. There is no guarding.   Neurological:      Mental Status: She is alert.       Fluids    Intake/Output Summary (Last 24 hours) at 8/24/2022 1407  Last data filed at 8/24/2022 1000  Gross per 24 hour   Intake 120 ml   Output --   Net 120 ml       Laboratory  Recent Labs     08/23/22  1545   WBC 8.9   RBC 4.39   HEMOGLOBIN 12.4   HEMATOCRIT 39.1   MCV 89.1   MCH 28.2   MCHC 31.7*   RDW 59.3*   PLATELETCT 321   MPV 9.7     Recent Labs     08/23/22  1545 08/24/22  0237   SODIUM 136 134*   POTASSIUM 4.1 3.8   CHLORIDE 104 106   CO2 14* 15*   GLUCOSE 99 93   BUN 73* 63*   CREATININE 2.62* 1.66*   CALCIUM 9.7 9.0     Recent Labs     08/23/22  1545   INR 1.73*               Imaging  US-RENAL   Final Result         1.  Mild right hydronephrosis   2.  Punctate left renal calculi without visualized obstructive changes.      CT-CTA  AORTA-RO WITH & W/O-POST PROCESS   Final Result      1.  Occlusion of the abdominal aorta just below level of the renal arteries with occlusion of the common and external iliac arteries with reconstitution of flow via epigastric collaterals.   2.  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   3.  Diffusely small caliber bilateral lower extremity arteries with significant atherosclerosis of the bilateral superficial femoral arteries without high-grade stenosis.   4.  Likely single vessel runoff on the right and 2 vessel runoff on the left.   5.  Focal severe stenoses at the bilateral renal artery origins.      These findings were discussed with ESPERANZA DO on 8/23/2022 5:29 PM.      CT-RENAL COLIC EVALUATION(A/P W/O)    (Results Pending)        Assessment/Plan  * Peripheral arterial occlusive disease (HCC)- (present on admission)  Assessment & Plan  Vascular consulted in ED, will follow, Dr. Siu evaluated patient and states no indication for systemic anticoagulation at this time.  Optimize for likely surgical intervention.  Continue statin, aspirin, Plavix.  Monitor for signs of lower extremity pain or critical limb ischemia.    Aortic thrombus (HCC)  Assessment & Plan  Patient with a history of aortic thrombus on Xarelto at home.  Start patient on heparin drip    COPD (chronic obstructive pulmonary disease) (HCC)  Assessment & Plan  As needed albuterol    Hydronephrosis  Assessment & Plan  Mild right-sided nephrosis.  Patient is denying any flank pain.  We will order a CT renal to evaluate for kidney stones.    IVON (acute kidney injury) (HCC)- (present on admission)  Assessment & Plan  CT with focal severe stenosis of bilateral renal artery origins, patient has occlusive aortic vascular disease, vascular surgery consulted planning on intervention once IVON improves.  Renal US  Check U/a & CPK  Rule out post  obstruction  IVF  Renal dose meds and avoid nephrotoxins  Monitor I&O's  Follow renal function      Hypertension- (present on admission)  Assessment & Plan  Holding home lisinopril given IVON.  IV antihypertensives with parameters.    QT prolongation- (present on admission)  Assessment & Plan  Avoid QTC prolonging medications    CAD (coronary artery disease)- (present on admission)  Assessment & Plan  With recent stent to LAD, continue aspirin, statin, Plavix       VTE prophylaxis: enoxaparin ppx    I have performed a physical exam and reviewed and updated ROS and Plan today (8/24/2022). In review of yesterday's note (8/23/2022), there are no changes except as documented above.

## 2022-08-24 NOTE — DISCHARGE PLANNING
Case Management Discharge Planning    Admission Date: 8/23/2022  GMLOS: 1.9  ALOS: 1    6-Clicks ADL Score: 14  6-Clicks Mobility Score: 10  PT and/or OT Eval ordered: Yes  Post-acute Referrals Ordered: Yes  Post-acute Choice Obtained: Yes  Has referral(s) been sent to post-acute provider:  Yes      Anticipated Discharge Dispo: Discharge Disposition: D/T to SNF with Medicare cert in anticipation of skilled care (03)    DME Needed: No    Action(s) Taken: Patient discussed with CRISTOFER Yuan, she reported that the patient had difficulty answering questions.  Patient was noted to be a poor historian when she was in the ED.  Bedside RN Randi clarified that the patient has word finding difficulties due to a previous stroke, and patient is stressed from a difficult morning.    Patient discussed during IDT rounds.  Patient is undergoing medical optimization in preparation for an aortobifemoral bypass, sugery is not yet scheduled.  Patient has 6 clicks scores of 14 and 10, but this may be due to the patient's severe leg pain.  PT/OT evals will be completed after procedure.  It should be noted that SNF placement will likely be difficult due to Medicaid FFS insurance.    Escalations Completed: Social Work    Medically Clear: No    Next Steps: Case coordination to follow up with the patient to discuss the discharge plan.    Barriers to Discharge: Medical clearance, pending PT/OT evaluation    Is the patient up for discharge tomorrow: No

## 2022-08-24 NOTE — PROGRESS NOTES
4 Eyes Skin Assessment Completed by KATIA Hoffman and KATIA Jones.    Head Scabbing / heavy dandruff w/ brown hue generalized over scalp.    Ears WDL  Nose WDL  Mouth WDL. Missing teeth (No teeth).   Neck WDL  Breast/Chest WDL  Shoulder Blades WDL  Spine WDL  (R) Arm/Elbow/Hand WDL  (L) Arm/Elbow/Hand WDL  Abdomen WDL  Groin WDL  Scrotum/Coccyx/Buttocks WDL  (R) Leg WDL  (L) Leg WDL  (R) Heel/Foot/Toe WDL  (L) Heel/Foot/Toe WDL    Pt skin grossly intact. Pt ill appearing, skin gray and pale, w/ bluish tint. Pt underweight. Malnutrition possible present.      Devices In Places Tele Box, Blood Pressure Cuff, Pulse Ox, and SCD's      Interventions In Place Pillows and Pressure Redistribution Mattress    Possible Skin Injury No    Pictures Uploaded Into Epic N/A  Wound Consult Placed N/A  RN Wound Prevention Protocol Ordered No

## 2022-08-24 NOTE — THERAPY
Physical Therapy Contact Note    PT consult received and acknowledged. Per chart and RN patient with infrarenal aortic occlusion and general surgery recommending aortobifemoral bypass. Will hold PT until post op for accurate assessment of needs and DC planning.    Lilly Ortiz, PT, DPT  [phone]

## 2022-08-24 NOTE — CARE PLAN
The patient is Stable - Low risk of patient condition declining or worsening    Shift Goals  Clinical Goals: pain management, hemodynamic stability  Patient Goals: pain control, rest  Family Goals: AFIA    Progress made toward(s) clinical / shift goals:  Patient has been having increased pain. Notified MD and are adding more PRN medications.    Problem: Knowledge Deficit - Standard  Goal: Patient and family/care givers will demonstrate understanding of plan of care, disease process/condition, diagnostic tests and medications  Outcome: Progressing     Problem: Skin Integrity  Goal: Skin integrity is maintained or improved  Outcome: Progressing     Problem: Fall Risk  Goal: Patient will remain free from falls  Outcome: Progressing     Problem: Communication  Goal: The ability to communicate needs accurately and effectively will improve  Outcome: Progressing     Problem: Pain - Standard  Goal: Alleviation of pain or a reduction in pain to the patient’s comfort goal  Outcome: Progressing       Patient is not progressing towards the following goals:Patient has been anxious throughout the shift.    Problem: Psychosocial  Goal: Patient's level of anxiety will decrease  Outcome: Not Progressing

## 2022-08-24 NOTE — DIETARY
"Nutrition services: Day 1 of admit.  Fouzia Santamaria is a 61 y.o. female with admitting DX of peripheral arterial occlusive disease.   Consult received for Poor oral intake. MST score of 5 related to weight loss and poor PO.  BMI also low.     I met with patient and family at bedside.  Patients notes that she has lost weight in the last year and it has accelerated in the last 2-3 months related to pain and a decreased appetite.  Patient denies nausea.  She reports she is generally not a picky eater, but that she has lost her appetite. Patient uncertain of her usual body weight or exactly how much she has lost. Per chart review, it appears she has lost ~ 9 pounds in the last month or 8.8% which is considered severe.  She appears thin upon visual observation.       Assessment:  Height: 165.1 cm (5' 5\")  Weight: 42.1 kg (92 lb 13 oz)  Body mass index is 15.45 kg/m²., BMI classification: underweight  Diet/Intake: consistent carb diet; PO intake 50-75%    Evaluation:   History of diabetes and COPD, HTN and stroke.  Labs and meds reviewed   Last BM PTA     Malnutrition Risk: Patient presents with severe acute illness related malnutrition as evidenced by severe 8.8% weight loss in the last month accompanied with decreased oral intake < 50% of normal for at least the last week as well as likely moderate muscle/fat loss evidenced by sunken temple regions and protruding clavicle bones.     Recommendations/Plan:  Boost glucose control added BID.    Encourage intake of meals and supplements.   Document intake of all PO  as % taken in ADL's to provide interdisciplinary communication across all shifts.   Monitor weight.  Nutrition rep will continue to see patient for ongoing meal and snack preferences.         RD monitoring   "

## 2022-08-24 NOTE — PROGRESS NOTES
Report received and assumed patient care. Pt A&O x 3, disoriented to time, and on room air. Pt has tele box in place. Pt updated on plan of care for the day and has call light within reach. Fall precautions are in place.

## 2022-08-24 NOTE — ED NOTES
Med rec updated and complete. Allergies reviewed.confirmed name and date of birth. Interviewed family ( ) at bedside.   reports that  pt is to start rivaroxban 20 mg on 08/24/22. Pt will complete rivaroxaban 15 mg  ( BID) dosing on 08/23/22 .  No antibiotic use in last 30 days at home.        Home pharmacy [facility] [phone]

## 2022-08-24 NOTE — CARE PLAN
The patient is Stable - Low risk of patient condition declining or worsening    Shift Goals  Clinical Goals: Maintain pateint safety; maintain hemodynamic stability; administer meds per MAR  Patient Goals: Rest  Family Goals: AFIA    Progress made toward(s) clinical / shift goals:      Problem: Knowledge Deficit - Standard  Goal: Patient and family/care givers will demonstrate understanding of plan of care, disease process/condition, diagnostic tests and medications  Outcome: Progressing     Problem: Skin Integrity  Goal: Skin integrity is maintained or improved  Outcome: Progressing     Problem: Hemodynamics  Goal: Patient's hemodynamics, fluid balance and neurologic status will be stable or improve  Outcome: Progressing       Patient is not progressing towards the following goals:

## 2022-08-24 NOTE — ASSESSMENT & PLAN NOTE
Likely due to complex aortic/renal artery surgery, blood loss etc   Slowly improving   Avoid renal toxins, dose adjust as needed   Stable encourage oral hydration

## 2022-08-24 NOTE — RESPIRATORY CARE
COPD EDUCATION by COPD CLINICAL EDUCATOR  8/24/2022 at 11:45 AM by Katherin Delgadillo, RRT     Patient interviewed by COPD education team. Patient refused COPD education. She states she recently quit smoking. Denies any respiratory medications. No Action Plan completed.    COPD Assessment  COPD Clinical Specialists ONLY  COPD Education Initiated: Yes--Short Intervention  DME Equipment Type: none  Physician Name: Has local clinic information declined further  Pulmonologist Name: denied  Smoking Cessation: Yes (refused states she quit)  Is this a COPD exacerbation patient?: No  (OP) Pulmonary Function Testing:  (none)    PFT Results  No results found for: PFT

## 2022-08-25 PROBLEM — I50.22 CHRONIC SYSTOLIC CONGESTIVE HEART FAILURE (HCC): Status: ACTIVE | Noted: 2022-08-25

## 2022-08-25 PROBLEM — I70.0 OCCLUDED AORTA (HCC): Status: ACTIVE | Noted: 2022-08-25

## 2022-08-25 LAB
ALBUMIN SERPL BCP-MCNC: 4.3 G/DL (ref 3.2–4.9)
BUN SERPL-MCNC: 49 MG/DL (ref 8–22)
CALCIUM SERPL-MCNC: 9.2 MG/DL (ref 8.5–10.5)
CHLORIDE SERPL-SCNC: 109 MMOL/L (ref 96–112)
CO2 SERPL-SCNC: 18 MMOL/L (ref 20–33)
CREAT SERPL-MCNC: 1.11 MG/DL (ref 0.5–1.4)
ERYTHROCYTE [DISTWIDTH] IN BLOOD BY AUTOMATED COUNT: 57.1 FL (ref 35.9–50)
GFR SERPLBLD CREATININE-BSD FMLA CKD-EPI: 57 ML/MIN/1.73 M 2
GLUCOSE SERPL-MCNC: 95 MG/DL (ref 65–99)
HCT VFR BLD AUTO: 35.2 % (ref 37–47)
HGB BLD-MCNC: 11.5 G/DL (ref 12–16)
MCH RBC QN AUTO: 28.1 PG (ref 27–33)
MCHC RBC AUTO-ENTMCNC: 32.7 G/DL (ref 33.6–35)
MCV RBC AUTO: 86.1 FL (ref 81.4–97.8)
PHOSPHATE SERPL-MCNC: 3.2 MG/DL (ref 2.5–4.5)
PLATELET # BLD AUTO: 275 K/UL (ref 164–446)
PMV BLD AUTO: 9.7 FL (ref 9–12.9)
POTASSIUM SERPL-SCNC: 4.1 MMOL/L (ref 3.6–5.5)
RBC # BLD AUTO: 4.09 M/UL (ref 4.2–5.4)
SODIUM SERPL-SCNC: 140 MMOL/L (ref 135–145)
UFH PPP CHRO-ACNC: 0.61 IU/ML
UFH PPP CHRO-ACNC: 0.83 IU/ML
WBC # BLD AUTO: 7.5 K/UL (ref 4.8–10.8)

## 2022-08-25 PROCEDURE — 700102 HCHG RX REV CODE 250 W/ 637 OVERRIDE(OP): Performed by: STUDENT IN AN ORGANIZED HEALTH CARE EDUCATION/TRAINING PROGRAM

## 2022-08-25 PROCEDURE — 770020 HCHG ROOM/CARE - TELE (206)

## 2022-08-25 PROCEDURE — 36415 COLL VENOUS BLD VENIPUNCTURE: CPT

## 2022-08-25 PROCEDURE — A9270 NON-COVERED ITEM OR SERVICE: HCPCS | Performed by: STUDENT IN AN ORGANIZED HEALTH CARE EDUCATION/TRAINING PROGRAM

## 2022-08-25 PROCEDURE — 99233 SBSQ HOSP IP/OBS HIGH 50: CPT | Performed by: STUDENT IN AN ORGANIZED HEALTH CARE EDUCATION/TRAINING PROGRAM

## 2022-08-25 PROCEDURE — 85027 COMPLETE CBC AUTOMATED: CPT

## 2022-08-25 PROCEDURE — 85520 HEPARIN ASSAY: CPT | Mod: 91

## 2022-08-25 PROCEDURE — 80069 RENAL FUNCTION PANEL: CPT

## 2022-08-25 RX ADMIN — ATORVASTATIN CALCIUM 80 MG: 80 TABLET, FILM COATED ORAL at 22:22

## 2022-08-25 RX ADMIN — OXYCODONE AND ACETAMINOPHEN 1 TABLET: 5; 325 TABLET ORAL at 22:25

## 2022-08-25 RX ADMIN — PRAMIPEXOLE DIHYDROCHLORIDE 0.12 MG: 0.12 TABLET ORAL at 05:30

## 2022-08-25 RX ADMIN — ASPIRIN 81 MG: 81 TABLET, COATED ORAL at 05:30

## 2022-08-25 RX ADMIN — DOCUSATE SODIUM 50 MG AND SENNOSIDES 8.6 MG 2 TABLET: 8.6; 5 TABLET, FILM COATED ORAL at 17:03

## 2022-08-25 RX ADMIN — EZETIMIBE 10 MG: 10 TABLET ORAL at 05:30

## 2022-08-25 RX ADMIN — PRAMIPEXOLE DIHYDROCHLORIDE 0.12 MG: 0.12 TABLET ORAL at 11:34

## 2022-08-25 RX ADMIN — PRAMIPEXOLE DIHYDROCHLORIDE 0.12 MG: 0.12 TABLET ORAL at 17:03

## 2022-08-25 RX ADMIN — OXYCODONE AND ACETAMINOPHEN 1 TABLET: 5; 325 TABLET ORAL at 14:00

## 2022-08-25 RX ADMIN — CLOPIDOGREL BISULFATE 75 MG: 75 TABLET ORAL at 05:29

## 2022-08-25 RX ADMIN — OXYCODONE AND ACETAMINOPHEN 1 TABLET: 5; 325 TABLET ORAL at 08:39

## 2022-08-25 ASSESSMENT — ENCOUNTER SYMPTOMS
PALPITATIONS: 0
WEIGHT LOSS: 1
ABDOMINAL PAIN: 0
VOMITING: 0
COUGH: 0
SHORTNESS OF BREATH: 0
WHEEZING: 0
CHILLS: 0
NAUSEA: 0
FEVER: 0
FLANK PAIN: 0

## 2022-08-25 ASSESSMENT — PAIN DESCRIPTION - PAIN TYPE
TYPE: ACUTE PAIN

## 2022-08-25 NOTE — DISCHARGE PLANNING
Pt resides in a [facility] with her spuse and 4 children. Her spouse is also her caregiver. At baseline, pt ambulates with a walker. She denied any services such as HH. Pt has a PCP, but could not remember their name. Pt does not have an AD. Provided permission for staff to contact spouse for information. Pt's preference is to return home, however, she understands she may need placement after surgery.   Care Transition Team Assessment    Information Source  Orientation Level: Oriented X4  Information Given By: Patient  Who is responsible for making decisions for patient? : Patient    Readmission Evaluation  Is this a readmission?: Yes - unplanned readmission    Elopement Risk  Legal Hold: No  Ambulatory or Self Mobile in Wheelchair: No-Not an Elopement Risk  Elopement Risk: Not at Risk for Elopement    Interdisciplinary Discharge Planning  Primary Care Physician: Unknown  Lives with - Patient's Self Care Capacity: Adult Children, Spouse  Patient or legal guardian wants to designate a caregiver: No  Support Systems: Children, Spouse / Significant Other  Housing / Facility: [address]  Do You Take your Prescribed Medications Regularly: Yes  Able to Return to Previous ADL's: Future Time w/Therapy  Mobility Issues: Yes  Prior Services: None  Patient Prefers to be Discharged to:: home  Assistance Needed: Yes  Durable Medical Equipment: Walker    Discharge Preparedness  What is your plan after discharge?: Home with help  What are your discharge supports?: Child, Spouse  Prior Functional Level: Needs Assist with Activities of Daily Living, Needs Assist with Medication Management, Uses Walker    Functional Assesment  Prior Functional Level: Needs Assist with Activities of Daily Living, Needs Assist with Medication Management, Uses Walker    Finances  Financial Barriers to Discharge: No  Prescription Coverage: Yes    Vision / Hearing Impairment  Vision Impairment : No  Hearing Impairment : No         Advance Directive  Advance  Directive?: None    Domestic Abuse  Have you ever been the victim of abuse or violence?: No  Physical Abuse or Sexual Abuse: No  Verbal Abuse or Emotional Abuse: No  Possible Abuse/Neglect Reported to:: Not Applicable    Psychological Assessment  History of Substance Abuse: None  History of Psychiatric Problems: No  Non-compliant with Treatment: No    Discharge Risks or Barriers  Discharge risks or barriers?: Post-acute placement / services    Anticipated Discharge Information  Discharge Disposition: D/T to SNF with Medicare cert in anticipation of skilled care (03)

## 2022-08-25 NOTE — CARE PLAN
The patient is Watcher - Medium risk of patient condition declining or worsening    Shift Goals  Clinical Goals: pain management, hemodynamaic stability  Patient Goals: pain control, comfort  Family Goals: AFIA    Progress made toward(s) clinical / shift goals:  Patient has had increasing pain but per pt it is manageable with PRN medications. Pt to go for surgery tomorrow.    Problem: Knowledge Deficit - Standard  Goal: Patient and family/care givers will demonstrate understanding of plan of care, disease process/condition, diagnostic tests and medications  Outcome: Progressing     Problem: Fall Risk  Goal: Patient will remain free from falls  Outcome: Progressing     Problem: Psychosocial  Goal: Patient's level of anxiety will decrease  Outcome: Progressing     Problem: Hemodynamics  Goal: Patient's hemodynamics, fluid balance and neurologic status will be stable or improve  Outcome: Progressing     Problem: Respiratory  Goal: Patient will achieve/maintain optimum respiratory ventilation and gas exchange  Outcome: Progressing     Problem: Urinary Elimination  Goal: Establish and maintain regular urinary output  Outcome: Progressing     Problem: Mobility  Goal: Patient's capacity to carry out activities will improve  Outcome: Progressing       Patient is not progressing towards the following goals:

## 2022-08-25 NOTE — PROGRESS NOTES
Report received and assumed patient care. Pt A&O x 4 and on room air. Pt has tele box in place. Pt updated on plan of care for the day and has call light within reach. Fall precautions are in place.

## 2022-08-25 NOTE — DISCHARGE PLANNING
Case Management Discharge Planning    Admission Date: 8/23/2022  GMLOS: 1.9  ALOS: 2    6-Clicks ADL Score: 14  6-Clicks Mobility Score: 10  PT and/or OT Eval ordered: Yes  Post-acute Referrals Ordered: Yes  Post-acute Choice Obtained: Yes  Has referral(s) been sent to post-acute provider:  Yes      Anticipated Discharge Dispo: Discharge Disposition: D/T to SNF with Medicare cert in anticipation of skilled care (03)    DME Needed: No    Action(s) Taken: Patient discussed during IDT rounds.  Patient is pending vascular surgery tomorrow, and she will need at least a day in ICU post-op.  PT/OT evals will be completed after surgery.    RN CM met with the patient at bedside to discuss the discharge plan.  RN CM explained that the patient will likely need SNF placement after surgery, patient verbalized understanding.  Patient has never stayed at a SNF before.  RN LUCHO explained that finding SNF beds can be difficult with Medicaid FFS insurance, patient consented to sending a blanket referral to all [place] SNFs that take Medicaid.  Choice form faxed to RAMIN Zee.    Escalations Completed: Pending Discharge Destination    Medically Clear: No    Next Steps: Case coordination to follow up with SNF referrals once PT/OT evals are completed.    Barriers to Discharge: Medical clearance, Pending Placement, and Pending PT Evaluation    Is the patient up for discharge tomorrow: No

## 2022-08-25 NOTE — CARE PLAN
The patient is Watcher - Medium risk of patient condition declining or worsening    Shift Goals  Clinical Goals: pain management, hemodynamic stability  Patient Goals: pain control, rest  Family Goals: AFIA    Progress made toward(s) clinical / shift goals:    Problem: Knowledge Deficit - Standard  Goal: Patient and family/care givers will demonstrate understanding of plan of care, disease process/condition, diagnostic tests and medications  Outcome: Progressing     Problem: Skin Integrity  Goal: Skin integrity is maintained or improved  Outcome: Progressing     Problem: Fall Risk  Goal: Patient will remain free from falls  Outcome: Progressing     Problem: Communication  Goal: The ability to communicate needs accurately and effectively will improve  Outcome: Progressing     Problem: Discharge Barriers/Planning  Goal: Patient's continuum of care needs are met  Outcome: Progressing       Patient is not progressing towards the following goals:

## 2022-08-25 NOTE — PROGRESS NOTES
[facility] Medicine Daily Progress Note    Date of Service  8/25/2022    Chief Complaint  Fouzia Santamaria is a 61 y.o. female admitted 8/23/2022 with vascular claudication    Hospital Course  Fouzia Santamaria is a 61 y.o. female with coronary artery disease status post stent placement LAD, peripheral arterial disease with chronic aortic occlusion and mural thrombus, hyperlipidemia, hypertension, who presented 8/23/2022 with lower extremity pain.  Fouzia has been experiencing bilateral lower extremity pain that has been waxing and waning in severity over the past few weeks but steadily worse over the past few days prior to presentation, left greater than right.  She has known chronic infrarenal aortic occlusion with collateral flow and is followed with vascular surgery as an outpatient.  Due to lower extremity pain now at rest she presented for evaluation.  She denies any headache or vision changes, fevers chills, chest pain dyspnea cough nausea vomiting dysuria or diarrhea.  Patient recently admitted to [facility] on July 3, 2022 and had 2 stents placed to LAD, subsequently to that was in the hospital from 7/21-7/24 when she was sent by vascular surgeon due to escalating claudication symptoms.  During that admission it was felt that vascular bypassing/repair of aorto was too high risk and she was discharged Xarelto DVT dosing to follow-up as an outpatient.  She was discharged on Plavix, Xarelto, aspirin and was to follow-up with vascular surgery as outpatient but due to progression of her pain she presented today to the ED.     In ED patient is afebrile pulse is ranged from  normal respiratory rate and room air oxygen saturation, systolic blood pressures ranged from 115-142.  Initial work-up shows normal cell counts and CBC, CHEM panel with bicarb 14 BUN 73 serum creatinine 2.62 (1.31 a month ago), normal liver function, troponin T 66 INR 1.73. CTA aorta with and without shows occlusion of the abdominal  aorta just below the level of renal arteries with occlusion of common and external iliac arteries with reconstitution of flow via epigastric collaterals as well as diffuse ectasia/aneurysm of descending thoracic aorta and upper abdominal aorta with prominent mural thrombus and atherosclerosis, diffusely small caliber bilateral lower extremity arteries with significant atherosclerosis of bilateral superficial femoral arteries without high-grade stenosis, likely single-vessel runoff of the right and two-vessel runoff on the left, focal severe stenosis of the bilateral renal artery origins.  EKG showed sinus rhythm and biatrial enlargement with an incomplete right bundle branch block and left ventricular hypertrophy, prolonged QTC.  ERP discussed case with Dr. Siu agrees to evaluate patient.  Patient subsequently referred to hospitalist for admission.    Interval Problem Update  Vitals are stable.  Patient continues to endorse leg pain.  We will start the patient on IV morphine and as needed Percocets.  Renal function is improving.  Renal ultrasound showing mild right hydronephrosis unclear the significance.  Urinalysis and CT renal pending.  Patient has a history of a descending thoracic thrombus and has previously been on Xarelto.  We will place the patient on heparin drip and await vascular surgery recommendations.    8/25: Patient continuing to endorse bilateral leg pain.  Renal function has is significantly improved.  Patient is medically optimized for surgery tomorrow.  N.p.o. at midnight for surgery around 2 PM tomorrow.  Continue as needed pain meds.    I have discussed this patient's plan of care and discharge plan at IDT rounds today with Case Management, Nursing, Nursing leadership, and other members of the IDT team.    Consultants/Specialty  vascular surgery    Code Status  Full Code    Disposition  Patient is not medically cleared for discharge.   Anticipate discharge to to home with organized home  healthcare and close outpatient follow-up.  I have placed the appropriate orders for post-discharge needs.    Review of Systems  Review of Systems   Constitutional:  Positive for weight loss. Negative for chills, fever and malaise/fatigue.   Respiratory:  Negative for cough, shortness of breath and wheezing.    Cardiovascular:  Negative for chest pain and palpitations.   Gastrointestinal:  Negative for abdominal pain, nausea and vomiting.   Genitourinary:  Negative for flank pain.   Musculoskeletal:         Bilateral leg pain   All other systems reviewed and are negative.     Physical Exam  Temp:  [36 °C (96.8 °F)-36.6 °C (97.9 °F)] 36.6 °C (97.9 °F)  Pulse:  [59-96] 65  Resp:  [16-18] 18  BP: ()/(70-97) 121/84  SpO2:  [97 %-99 %] 99 %    Physical Exam  Vitals and nursing note reviewed.   Constitutional:       General: She is not in acute distress.     Appearance: Normal appearance. She is ill-appearing.   HENT:      Head: Normocephalic and atraumatic.   Eyes:      General: No scleral icterus.        Right eye: No discharge.         Left eye: No discharge.   Cardiovascular:      Rate and Rhythm: Normal rate and regular rhythm.      Heart sounds: Normal heart sounds.   Pulmonary:      Effort: No respiratory distress.      Breath sounds: No wheezing or rales.   Abdominal:      General: Abdomen is flat. There is no distension.      Palpations: Abdomen is soft.      Tenderness: There is no abdominal tenderness. There is no guarding.   Musculoskeletal:         General: Tenderness present.   Skin:     General: Skin is warm.      Coloration: Skin is pale. Skin is not jaundiced.   Neurological:      General: No focal deficit present.      Mental Status: She is alert and oriented to person, place, and time.      Cranial Nerves: No cranial nerve deficit.      Motor: Weakness (Bilateral legs) present.   Psychiatric:         Mood and Affect: Mood normal.         Behavior: Behavior normal.         Thought Content: Thought  content normal.       Fluids    Intake/Output Summary (Last 24 hours) at 8/25/2022 1309  Last data filed at 8/25/2022 0900  Gross per 24 hour   Intake 420 ml   Output --   Net 420 ml       Laboratory  Recent Labs     08/23/22  1545 08/25/22  0209   WBC 8.9 7.5   RBC 4.39 4.09*   HEMOGLOBIN 12.4 11.5*   HEMATOCRIT 39.1 35.2*   MCV 89.1 86.1   MCH 28.2 28.1   MCHC 31.7* 32.7*   RDW 59.3* 57.1*   PLATELETCT 321 275   MPV 9.7 9.7     Recent Labs     08/23/22  1545 08/24/22  0237 08/25/22  0209   SODIUM 136 134* 140   POTASSIUM 4.1 3.8 4.1   CHLORIDE 104 106 109   CO2 14* 15* 18*   GLUCOSE 99 93 95   BUN 73* 63* 49*   CREATININE 2.62* 1.66* 1.11   CALCIUM 9.7 9.0 9.2     Recent Labs     08/23/22  1545 08/24/22  1433   APTT  --  28.5   INR 1.73* 1.10               Imaging  CT-RENAL COLIC EVALUATION(A/P W/O)   Final Result         1. No hydronephrosis. Nonobstructive bilateral renal calculi seen on previous exam cannot be evaluated due to contrast in the collecting system.      2. Retained contrast in the kidney from prior CTA, likely due to nephropathy/renal failure.      US-RENAL   Final Result         1.  Mild right hydronephrosis   2.  Punctate left renal calculi without visualized obstructive changes.      CT-CTA AORTA-RO WITH & W/O-POST PROCESS   Final Result      1.  Occlusion of the abdominal aorta just below level of the renal arteries with occlusion of the common and external iliac arteries with reconstitution of flow via epigastric collaterals.   2.  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   3.  Diffusely small caliber bilateral lower extremity arteries with significant atherosclerosis of the bilateral superficial femoral arteries without high-grade stenosis.   4.  Likely single vessel runoff on the right and 2 vessel runoff on the left.   5.  Focal severe stenoses at the bilateral renal artery origins.      These  findings were discussed with ESPERANZA DO on 8/23/2022 5:29 PM.           Assessment/Plan  * Peripheral arterial occlusive disease (HCC)- (present on admission)  Assessment & Plan  Vascular consulted in ED, will follow, Dr. Siu evaluated patient and states no indication for systemic anticoagulation at this time.  Optimize for likely surgical intervention.  Continue statin, aspirin, Plavix.  Monitor for signs of lower extremity pain or critical limb ischemia.    Occluded aorta (HCC)  Assessment & Plan  Patient with chronically occluded infrarenal aorta.  Patient complaining of bilateral lower extremity pain.  Patient will require inpatient intervention as per vascular surgery.  Patient will require aortobifemoral reconstruction which will be performed tomorrow.  Postoperatively patient will require close monitoring and will be transferred to the ICU  Vascular surgery reports anticoagulation not needed at this time since it is chronically occluded and the anticoagulation will provide minimal benefit.  Smoking cessation  Atorvastatin, Zetia    Chronic systolic congestive heart failure (HCC)  Assessment & Plan  Last echocardiogram from July 2022 showed an EF of 35%.  Patient is not acutely decompensated.  We will need to monitor closely for fluid overload.  At home patient is on lisinopril, which was held to the renal failure.  Postoperatively if the patient's kidney function will tolerate we will restart the patient on lisinopril.  Will initiate the patient on metoprolol XL 12.5 mg post-operatively.    Aortic thrombus ([facility])  Assessment & Plan  Patient with a history of aortic thrombus on Xarelto at home.  Evaluated by vascular surgery, patient will not need a heparin drip at this time but will need the vascular surgery for intervention which is scheduled for tomorrow.    COPD (chronic obstructive pulmonary disease) ([facility])  Assessment & Plan  As needed albuterol.  Not in acute  exacerbation.    Hydronephrosis  Assessment & Plan  Mild right-sided nephrosis.  Patient is denying any flank pain.  We will order a CT renal to evaluate for kidney stones.  8/25: CT does not show hydronephrosis as what was noted on the ultrasound.  No further work-up at this time.    IVON (acute kidney injury) (HCC)- (present on admission)  Assessment & Plan  CT with focal severe stenosis of bilateral renal artery origins, patient has occlusive aortic vascular disease, vascular surgery consulted planning on intervention once IVON improves.  Renal US  Check U/a & CPK  Rule out post obstruction  IVF  Renal dose meds and avoid nephrotoxins  Monitor I&O's  Follow renal function  8/25: Acute renal failure has improved she is almost back to her baseline.  Postoperatively she will need close monitoring since she is at increased risk for renal failure and dialysis.    Hypertension- (present on admission)  Assessment & Plan  Holding home lisinopril given IVON.  IV antihypertensives with parameters.    QT prolongation- (present on admission)  Assessment & Plan  Avoid QTC prolonging medications    CAD (coronary artery disease)- (present on admission)  Assessment & Plan  With recent stent to LAD, continue aspirin, statin, Plavix       VTE prophylaxis: enoxaparin ppx    I have performed a physical exam and reviewed and updated ROS and Plan today (8/25/2022). In review of yesterday's note (8/24/2022), there are no changes except as documented above.

## 2022-08-25 NOTE — PROGRESS NOTES
12 hour chart check.    Have a great shift!    Monitor Summary     SB/SR 59-75  PVC(R), Bigem(R)  17/10/42

## 2022-08-25 NOTE — ASSESSMENT & PLAN NOTE
Metoprolol  Add ACEI/ARB and Aldactone when creatinine noted to be stable and BP able to tolerate  Monitor volume status

## 2022-08-25 NOTE — THERAPY
Physical Therapy Contact Note     Patient Name: Fouzia Santamaria  Age:  61 y.o., Sex:  female  Medical Record #: [id number]  Today's Date: 8/25/2022    Per EMR, pt still pending sx intervention for vascular occlusion.  Will hold eval until post-op for most accurate assessment.     Pablo Bo, PT, DPT [id number]

## 2022-08-25 NOTE — THERAPY
Missed Therapy     Patient Name: Fouzia Santamaria  Age:  61 y.o., Sex:  female  Medical Record #: [id number]  Today's Date: 8/25/2022 08/25/22 1419   Initial Contact Note    Initial Contact Note Order Received and Verified, Occupational Therapy Evaluation in Progress with Full Report to Follow.   Interdisciplinary Plan of Care Collaboration   Collaboration Comments OT eval remains on hold, chart indicates sx scheduled for tomorrow. Will follow up post op as medically appropriate   Session Information   Date / Session Number  8/25 HOLD   Priority 4  (needs weval 8/27)

## 2022-08-25 NOTE — DOCUMENTATION QUERY
[facility]                                                                       Query Response Note      PATIENT:               CHRISTINA BLOUNT  ACCT #:                  [id number]  MRN:                     [id number]  :                      1960  ADMIT DATE:       2022 2:42 PM  DISCH DATE:          RESPONDING  PROVIDER #:        [id number]           QUERY TEXT:    Based off of your review of the clinical indicators provided, please select the most appropriate diagnosis:    The patient's Clinical Indicators include:   Dietary Note:  severe acute illness related malnutrition as evidenced by severe 8.8% wt loss in the last month accompanied w/ decreased oral intake < 50% of normal for at least the last week, moderate muscle/ fat loss.  BMI 15.45, sunken temple regions and protruding clavicle bones.  Appears thin    Progress Note: normal appearance   Risk Factors: pain/ decreased appetite and PO intake, 8% wt loss, moderate fat and muscle loss  Treatment: dietary Eval, Boost control BID, encourage/ document PO intake, monitor weight, IVF    Thank you,  Ngozi Bach RN, BSN  Clinical   Connect via [email or web address]  Options provided:   -- Severe protein calorie malnutrition   -- Moderate protein calorie malnutrition   -- Other explanation, -please specify   -- Unable to determine      Query created by: Ngozi Bach on 2022 10:54 AM    RESPONSE TEXT:    Severe protein calorie malnutrition       QUERY TEXT:    BMI 15.45  is documented in the Medical Record.  Per the Dietary Eval, pt appears thin with sunken temple regions and protruding clavicle bones. Can a diagnosis be provided for patients body habitus?    The patient's Clinical Indicators include:   Dietary Note:  severe acute illness related malnutrition as evidenced by severe 8.8% wt loss in the last month accompanied w/ decreased oral  intake < 50% of normal for at least the last week, moderate muscle/ fat loss.  BMI 15.45, sunken temple regions and protruding clavicle bones.  Appears thin   8/24 Progress Note: normal appearance   Risk Factors: pain/ decreased appetite and PO intake, 8% wt loss, moderate fat and muscle loss  Treatment: Dietary Eval, Boost control BID, encourage/ document PO intake, monitor weight, IVF    Thank you,  Ngozi Bach RN, BSN  Clinical   Connect via [employer]  Options provided:   -- Cachexia   -- Underweight   -- Other explanation, -please specify   -- Findings of no clinical significance   -- Unable to determine      Query created by: Ngozi Bach on 8/25/2022 10:56 AM    RESPONSE TEXT:    Cachexia          Electronically signed by:  JAYCE CERVANTES MD 8/25/2022 12:51 PM

## 2022-08-25 NOTE — DISCHARGE PLANNING
Received Choice form at [phone]  Agency/Facility Name: [facility]  Referral sent per Choice form @ [phone]

## 2022-08-25 NOTE — PROGRESS NOTES
Handoff report received from day shift nurse. Patient care assumed. Patient is currently resting in bed. Plan of care discussed with the patient and the patient verbalizes no questions at this time. Patient is A&O x4, on room air, telemetry monitoring in place and rhythm verified, and vital signs are stable. Patient states their pain is 3/10 at this time. Call light and belongings within reach, patient educated on the use of call light. All fall precautions are in place, bed is in locked and lowest position. Hourly rounding in place. Will continue plan of care.

## 2022-08-26 ENCOUNTER — ANESTHESIA EVENT (OUTPATIENT)
Dept: SURGERY | Facility: MEDICAL CENTER | Age: 62
DRG: 270 | End: 2022-08-26
Payer: MEDICAID

## 2022-08-26 ENCOUNTER — ANESTHESIA (OUTPATIENT)
Dept: SURGERY | Facility: MEDICAL CENTER | Age: 62
DRG: 270 | End: 2022-08-26
Payer: MEDICAID

## 2022-08-26 ENCOUNTER — APPOINTMENT (OUTPATIENT)
Dept: CARDIOLOGY | Facility: MEDICAL CENTER | Age: 62
DRG: 270 | End: 2022-08-26
Attending: EMERGENCY MEDICINE
Payer: MEDICAID

## 2022-08-26 PROBLEM — I74.09 CHRONIC DISTAL AORTIC OCCLUSION (HCC): Status: ACTIVE | Noted: 2022-08-26

## 2022-08-26 LAB
ABO GROUP BLD: NORMAL
ANION GAP SERPL CALC-SCNC: 17 MMOL/L (ref 7–16)
BARCODED ABORH UBTYP: 600
BARCODED ABORH UBTYP: 6200
BARCODED ABORH UBTYP: 8400
BARCODED ABORH UBTYP: 8400
BARCODED ABORH UBTYP: 9500
BARCODED PRD CODE UBPRD: NORMAL
BARCODED UNIT NUM UBUNT: NORMAL
BASE EXCESS BLDA CALC-SCNC: -10 MMOL/L (ref -4–3)
BASE EXCESS BLDA CALC-SCNC: -17 MMOL/L (ref -4–3)
BASE EXCESS BLDA CALC-SCNC: -17 MMOL/L (ref -4–3)
BASE EXCESS BLDA CALC-SCNC: -8 MMOL/L (ref -4–3)
BLD GP AB SCN SERPL QL: NORMAL
BODY TEMPERATURE: ABNORMAL DEGREES
BREATHS SETTING VENT: 16
BREATHS SETTING VENT: 20
BREATHS SETTING VENT: 24
BUN SERPL-MCNC: 24 MG/DL (ref 8–22)
CALCIUM SERPL-MCNC: 8.6 MG/DL (ref 8.5–10.5)
CFT BLD TEG: 4.3 MIN (ref 4.6–9.1)
CFT P HPASE BLD TEG: 4.1 MIN (ref 4.3–8.3)
CHLORIDE SERPL-SCNC: 111 MMOL/L (ref 96–112)
CLOT ANGLE BLD TEG: 73.2 DEGREES (ref 63–78)
CLOT LYSIS 30M P MA LENFR BLD TEG: 0 % (ref 0–2.6)
CO2 BLDA-SCNC: 10 MMOL/L (ref 20–33)
CO2 BLDA-SCNC: 11 MMOL/L (ref 20–33)
CO2 BLDA-SCNC: 16 MMOL/L (ref 20–33)
CO2 BLDA-SCNC: 18 MMOL/L (ref 20–33)
CO2 SERPL-SCNC: 12 MMOL/L (ref 20–33)
COMPONENT F 8504F: NORMAL
COMPONENT P 8504P: NORMAL
COMPONENT P 8504P: NORMAL
COMPONENT R 8504R: NORMAL
CREAT SERPL-MCNC: 0.75 MG/DL (ref 0.5–1.4)
CT.EXTRINSIC BLD ROTEM: 1.4 MIN (ref 0.8–2.1)
DELSYS IDSYS: ABNORMAL
END TIDAL CARBON DIOXIDE IECO2: 19 MMHG
END TIDAL CARBON DIOXIDE IECO2: 20 MMHG
END TIDAL CARBON DIOXIDE IECO2: 22 MMHG
END TIDAL CARBON DIOXIDE IECO2: 22 MMHG
ERYTHROCYTE [DISTWIDTH] IN BLOOD BY AUTOMATED COUNT: 56 FL (ref 35.9–50)
GFR SERPLBLD CREATININE-BSD FMLA CKD-EPI: 91 ML/MIN/1.73 M 2
GLUCOSE BLD STRIP.AUTO-MCNC: 219 MG/DL (ref 65–99)
GLUCOSE SERPL-MCNC: 250 MG/DL (ref 65–99)
HCO3 BLDA-SCNC: 10.3 MMOL/L (ref 17–25)
HCO3 BLDA-SCNC: 15.3 MMOL/L (ref 17–25)
HCO3 BLDA-SCNC: 17.5 MMOL/L (ref 17–25)
HCO3 BLDA-SCNC: 9.4 MMOL/L (ref 17–25)
HCT VFR BLD AUTO: 16.2 % (ref 37–47)
HCT VFR BLD AUTO: 25.9 % (ref 37–47)
HCT VFR BLD CALC: 27 % (ref 37–47)
HGB BLD-MCNC: 5.4 G/DL (ref 12–16)
HGB BLD-MCNC: 8.4 G/DL (ref 12–16)
HGB BLD-MCNC: 9.2 G/DL (ref 12–16)
HOROWITZ INDEX BLDA+IHG-RTO: 384 MM[HG]
HOROWITZ INDEX BLDA+IHG-RTO: 420 MM[HG]
HOROWITZ INDEX BLDA+IHG-RTO: 453 MM[HG]
LACTATE BLD-SCNC: 6.5 MMOL/L (ref 0.5–2)
LACTATE BLD-SCNC: 6.8 MMOL/L (ref 0.5–2)
LACTATE SERPL-SCNC: 4.3 MMOL/L (ref 0.5–2)
MCF BLD TEG: 55.5 MM (ref 52–69)
MCF.PLATELET INHIB BLD ROTEM: 16.3 MM (ref 15–32)
MCH RBC QN AUTO: 30.4 PG (ref 27–33)
MCHC RBC AUTO-ENTMCNC: 32.4 G/DL (ref 33.6–35)
MCV RBC AUTO: 93.8 FL (ref 81.4–97.8)
MODE IMODE: ABNORMAL
O2/TOTAL GAS SETTING VFR VENT: 100 %
O2/TOTAL GAS SETTING VFR VENT: 40 %
O2/TOTAL GAS SETTING VFR VENT: 40 %
PA AA BLD-ACNC: 85.3 % (ref 0–11)
PA ADP BLD-ACNC: 73.2 % (ref 0–17)
PCO2 BLDA: 24.5 MMHG (ref 26–37)
PCO2 BLDA: 28.1 MMHG (ref 26–37)
PCO2 BLDA: 29.9 MMHG (ref 26–37)
PCO2 BLDA: 33.2 MMHG (ref 26–37)
PCO2 TEMP ADJ BLDA: 21.7 MMHG (ref 26–37)
PCO2 TEMP ADJ BLDA: 24.1 MMHG (ref 26–37)
PCO2 TEMP ADJ BLDA: 26.5 MMHG (ref 26–37)
PCO2 TEMP ADJ BLDA: 30.4 MMHG (ref 26–37)
PEEP END EXPIRATORY PRESSURE IPEEP: 5 CMH20
PEEP END EXPIRATORY PRESSURE IPEEP: 8 CMH20
PEEP END EXPIRATORY PRESSURE IPEEP: 8 CMH20
PH BLDA: 7.17 [PH] (ref 7.4–7.5)
PH BLDA: 7.19 [PH] (ref 7.4–7.5)
PH BLDA: 7.32 [PH] (ref 7.4–7.5)
PH BLDA: 7.33 [PH] (ref 7.4–7.5)
PH TEMP ADJ BLDA: 7.22 [PH] (ref 7.4–7.5)
PH TEMP ADJ BLDA: 7.23 [PH] (ref 7.4–7.5)
PH TEMP ADJ BLDA: 7.36 [PH] (ref 7.4–7.5)
PH TEMP ADJ BLDA: 7.36 [PH] (ref 7.4–7.5)
PLATELET # BLD AUTO: 117 K/UL (ref 164–446)
PMV BLD AUTO: 10.5 FL (ref 9–12.9)
PO2 BLDA: 168 MMHG (ref 64–87)
PO2 BLDA: 181 MMHG (ref 64–87)
PO2 BLDA: 384 MMHG (ref 64–87)
PO2 BLDA: 471 MMHG (ref 64–87)
PO2 TEMP ADJ BLDA: 153 MMHG (ref 64–87)
PO2 TEMP ADJ BLDA: 163 MMHG (ref 64–87)
PO2 TEMP ADJ BLDA: 369 MMHG (ref 64–87)
PO2 TEMP ADJ BLDA: 458 MMHG (ref 64–87)
POTASSIUM SERPL-SCNC: 4 MMOL/L (ref 3.6–5.5)
PRODUCT TYPE UPROD: NORMAL
RBC # BLD AUTO: 2.76 M/UL (ref 4.2–5.4)
RH BLD: NORMAL
SAO2 % BLDA: 100 % (ref 93–99)
SAO2 % BLDA: 100 % (ref 93–99)
SAO2 % BLDA: 99 % (ref 93–99)
SAO2 % BLDA: 99 % (ref 93–99)
SODIUM SERPL-SCNC: 140 MMOL/L (ref 135–145)
SPECIMEN DRAWN FROM PATIENT: ABNORMAL
TEG ALGORITHM TGALG: ABNORMAL
TIDAL VOLUME IVT: 350 ML
TIDAL VOLUME IVT: 350 ML
TIDAL VOLUME IVT: 400 ML
TRIGL SERPL-MCNC: 105 MG/DL (ref 0–149)
TROPONIN T SERPL-MCNC: 40 NG/L (ref 6–19)
UNIT STATUS USTAT: NORMAL
WBC # BLD AUTO: 11.8 K/UL (ref 4.8–10.8)

## 2022-08-26 PROCEDURE — 93005 ELECTROCARDIOGRAM TRACING: CPT | Performed by: SURGERY

## 2022-08-26 PROCEDURE — 04C90ZZ EXTIRPATION OF MATTER FROM RIGHT RENAL ARTERY, OPEN APPROACH: ICD-10-PCS | Performed by: SURGERY

## 2022-08-26 PROCEDURE — 84484 ASSAY OF TROPONIN QUANT: CPT

## 2022-08-26 PROCEDURE — 700105 HCHG RX REV CODE 258: Performed by: SURGERY

## 2022-08-26 PROCEDURE — 93313 ECHO TRANSESOPHAGEAL: CPT | Mod: 51 | Performed by: EMERGENCY MEDICINE

## 2022-08-26 PROCEDURE — 700101 HCHG RX REV CODE 250: Performed by: EMERGENCY MEDICINE

## 2022-08-26 PROCEDURE — 5A1955Z RESPIRATORY VENTILATION, GREATER THAN 96 CONSECUTIVE HOURS: ICD-10-PCS | Performed by: SURGERY

## 2022-08-26 PROCEDURE — 160048 HCHG OR STATISTICAL LEVEL 1-5: Performed by: SURGERY

## 2022-08-26 PROCEDURE — 37799 UNLISTED PX VASCULAR SURGERY: CPT

## 2022-08-26 PROCEDURE — 85576 BLOOD PLATELET AGGREGATION: CPT | Mod: 91

## 2022-08-26 PROCEDURE — 86850 RBC ANTIBODY SCREEN: CPT

## 2022-08-26 PROCEDURE — 700102 HCHG RX REV CODE 250 W/ 637 OVERRIDE(OP): Performed by: STUDENT IN AN ORGANIZED HEALTH CARE EDUCATION/TRAINING PROGRAM

## 2022-08-26 PROCEDURE — 160029 HCHG SURGERY MINUTES - 1ST 30 MINS LEVEL 4: Performed by: SURGERY

## 2022-08-26 PROCEDURE — 700102 HCHG RX REV CODE 250 W/ 637 OVERRIDE(OP): Performed by: SURGERY

## 2022-08-26 PROCEDURE — C1894 INTRO/SHEATH, NON-LASER: HCPCS | Performed by: SURGERY

## 2022-08-26 PROCEDURE — 36430 TRANSFUSION BLD/BLD COMPNT: CPT

## 2022-08-26 PROCEDURE — 04CA0ZZ EXTIRPATION OF MATTER FROM LEFT RENAL ARTERY, OPEN APPROACH: ICD-10-PCS | Performed by: SURGERY

## 2022-08-26 PROCEDURE — 770022 HCHG ROOM/CARE - ICU (200)

## 2022-08-26 PROCEDURE — 04100JK BYPASS ABDOMINAL AORTA TO BILATERAL FEMORAL ARTERIES WITH SYNTHETIC SUBSTITUTE, OPEN APPROACH: ICD-10-PCS | Performed by: SURGERY

## 2022-08-26 PROCEDURE — P9017 PLASMA 1 DONOR FRZ W/IN 8 HR: HCPCS | Mod: 91

## 2022-08-26 PROCEDURE — P9016 RBC LEUKOCYTES REDUCED: HCPCS | Mod: 91

## 2022-08-26 PROCEDURE — 84295 ASSAY OF SERUM SODIUM: CPT | Mod: 91

## 2022-08-26 PROCEDURE — 80048 BASIC METABOLIC PNL TOTAL CA: CPT | Mod: 91

## 2022-08-26 PROCEDURE — 700101 HCHG RX REV CODE 250: Performed by: SURGERY

## 2022-08-26 PROCEDURE — 0W3H0ZZ CONTROL BLEEDING IN RETROPERITONEUM, OPEN APPROACH: ICD-10-PCS | Performed by: SURGERY

## 2022-08-26 PROCEDURE — 84478 ASSAY OF TRIGLYCERIDES: CPT

## 2022-08-26 PROCEDURE — C1751 CATH, INF, PER/CENT/MIDLINE: HCPCS | Performed by: SURGERY

## 2022-08-26 PROCEDURE — 700105 HCHG RX REV CODE 258: Performed by: ANESTHESIOLOGY

## 2022-08-26 PROCEDURE — 700101 HCHG RX REV CODE 250: Performed by: ANESTHESIOLOGY

## 2022-08-26 PROCEDURE — 86900 BLOOD TYPING SEROLOGIC ABO: CPT

## 2022-08-26 PROCEDURE — 85027 COMPLETE CBC AUTOMATED: CPT

## 2022-08-26 PROCEDURE — B24BZZ4 ULTRASONOGRAPHY OF HEART WITH AORTA, TRANSESOPHAGEAL: ICD-10-PCS | Performed by: EMERGENCY MEDICINE

## 2022-08-26 PROCEDURE — P9045 ALBUMIN (HUMAN), 5%, 250 ML: HCPCS | Performed by: EMERGENCY MEDICINE

## 2022-08-26 PROCEDURE — 82330 ASSAY OF CALCIUM: CPT | Mod: 91

## 2022-08-26 PROCEDURE — C1725 CATH, TRANSLUMIN NON-LASER: HCPCS | Performed by: SURGERY

## 2022-08-26 PROCEDURE — 94799 UNLISTED PULMONARY SVC/PX: CPT

## 2022-08-26 PROCEDURE — 110454 HCHG SHELL REV 250: Performed by: SURGERY

## 2022-08-26 PROCEDURE — 85025 COMPLETE CBC W/AUTO DIFF WBC: CPT

## 2022-08-26 PROCEDURE — 99232 SBSQ HOSP IP/OBS MODERATE 35: CPT | Performed by: STUDENT IN AN ORGANIZED HEALTH CARE EDUCATION/TRAINING PROGRAM

## 2022-08-26 PROCEDURE — 700111 HCHG RX REV CODE 636 W/ 250 OVERRIDE (IP): Performed by: EMERGENCY MEDICINE

## 2022-08-26 PROCEDURE — 99292 CRITICAL CARE ADDL 30 MIN: CPT | Mod: 25 | Performed by: SURGERY

## 2022-08-26 PROCEDURE — 82803 BLOOD GASES ANY COMBINATION: CPT | Mod: 91

## 2022-08-26 PROCEDURE — 160009 HCHG ANES TIME/MIN: Performed by: SURGERY

## 2022-08-26 PROCEDURE — 99140 ANES COMP EMERGENCY COND: CPT | Performed by: ANESTHESIOLOGY

## 2022-08-26 PROCEDURE — C9248 INJ, CLEVIDIPINE BUTYRATE: HCPCS | Performed by: EMERGENCY MEDICINE

## 2022-08-26 PROCEDURE — 86923 COMPATIBILITY TEST ELECTRIC: CPT | Mod: 91

## 2022-08-26 PROCEDURE — 700111 HCHG RX REV CODE 636 W/ 250 OVERRIDE (IP)

## 2022-08-26 PROCEDURE — 85014 HEMATOCRIT: CPT | Mod: 91

## 2022-08-26 PROCEDURE — 94002 VENT MGMT INPAT INIT DAY: CPT

## 2022-08-26 PROCEDURE — 700111 HCHG RX REV CODE 636 W/ 250 OVERRIDE (IP): Performed by: SURGERY

## 2022-08-26 PROCEDURE — 160031 HCHG SURGERY MINUTES - 1ST 30 MINS LEVEL 5: Performed by: SURGERY

## 2022-08-26 PROCEDURE — 700105 HCHG RX REV CODE 258: Performed by: EMERGENCY MEDICINE

## 2022-08-26 PROCEDURE — 160041 HCHG SURGERY MINUTES - EA ADDL 1 MIN LEVEL 4: Performed by: SURGERY

## 2022-08-26 PROCEDURE — 93312 ECHO TRANSESOPHAGEAL: CPT

## 2022-08-26 PROCEDURE — 83605 ASSAY OF LACTIC ACID: CPT | Mod: 91

## 2022-08-26 PROCEDURE — 86901 BLOOD TYPING SEROLOGIC RH(D): CPT

## 2022-08-26 PROCEDURE — 00790 ANES IPER UPR ABD NOS: CPT | Performed by: ANESTHESIOLOGY

## 2022-08-26 PROCEDURE — C1768 GRAFT, VASCULAR: HCPCS | Performed by: SURGERY

## 2022-08-26 PROCEDURE — 99291 CRITICAL CARE FIRST HOUR: CPT | Mod: 25 | Performed by: SURGERY

## 2022-08-26 PROCEDURE — A9270 NON-COVERED ITEM OR SERVICE: HCPCS | Performed by: STUDENT IN AN ORGANIZED HEALTH CARE EDUCATION/TRAINING PROGRAM

## 2022-08-26 PROCEDURE — 160042 HCHG SURGERY MINUTES - EA ADDL 1 MIN LEVEL 5: Performed by: SURGERY

## 2022-08-26 PROCEDURE — 82962 GLUCOSE BLOOD TEST: CPT | Mod: 91

## 2022-08-26 PROCEDURE — 85384 FIBRINOGEN ACTIVITY: CPT | Mod: 91

## 2022-08-26 PROCEDURE — 01656 ANES ART SHO&AX AX-FEM BPG: CPT | Performed by: EMERGENCY MEDICINE

## 2022-08-26 PROCEDURE — 84132 ASSAY OF SERUM POTASSIUM: CPT | Mod: 91

## 2022-08-26 PROCEDURE — 85018 HEMOGLOBIN: CPT

## 2022-08-26 PROCEDURE — 85347 COAGULATION TIME ACTIVATED: CPT | Mod: 91

## 2022-08-26 PROCEDURE — P9034 PLATELETS, PHERESIS: HCPCS

## 2022-08-26 PROCEDURE — 700111 HCHG RX REV CODE 636 W/ 250 OVERRIDE (IP): Performed by: ANESTHESIOLOGY

## 2022-08-26 DEVICE — GRAFT HEMA BIFUR MDV 14X7: Type: IMPLANTABLE DEVICE | Status: FUNCTIONAL

## 2022-08-26 RX ORDER — ALBUMIN, HUMAN INJ 5% 5 %
25 SOLUTION INTRAVENOUS ONCE
Status: DISCONTINUED | OUTPATIENT
Start: 2022-08-26 | End: 2022-08-26

## 2022-08-26 RX ORDER — SODIUM CHLORIDE, SODIUM GLUCONATE, SODIUM ACETATE, POTASSIUM CHLORIDE AND MAGNESIUM CHLORIDE 526; 502; 368; 37; 30 MG/100ML; MG/100ML; MG/100ML; MG/100ML; MG/100ML
INJECTION, SOLUTION INTRAVENOUS
Status: DISCONTINUED | OUTPATIENT
Start: 2022-08-26 | End: 2022-08-26 | Stop reason: SURG

## 2022-08-26 RX ORDER — HEPARIN SODIUM,PORCINE 1000/ML
VIAL (ML) INJECTION
Status: DISCONTINUED | OUTPATIENT
Start: 2022-08-26 | End: 2022-08-26 | Stop reason: HOSPADM

## 2022-08-26 RX ORDER — MIDAZOLAM HYDROCHLORIDE 1 MG/ML
INJECTION INTRAMUSCULAR; INTRAVENOUS PRN
Status: DISCONTINUED | OUTPATIENT
Start: 2022-08-26 | End: 2022-08-26 | Stop reason: SURG

## 2022-08-26 RX ORDER — SODIUM CHLORIDE 9 MG/ML
INJECTION, SOLUTION INTRAVENOUS CONTINUOUS
Status: DISCONTINUED | OUTPATIENT
Start: 2022-08-26 | End: 2022-08-26

## 2022-08-26 RX ORDER — AMOXICILLIN 250 MG
1 CAPSULE ORAL
Status: DISCONTINUED | OUTPATIENT
Start: 2022-08-26 | End: 2022-08-28

## 2022-08-26 RX ORDER — SODIUM CHLORIDE 9 MG/ML
INJECTION, SOLUTION INTRAVENOUS
Status: DISCONTINUED | OUTPATIENT
Start: 2022-08-26 | End: 2022-08-26 | Stop reason: SURG

## 2022-08-26 RX ORDER — EPINEPHRINE HCL IN 0.9 % NACL 4MG/250ML
0-10 PLASTIC BAG, INJECTION (ML) INTRAVENOUS CONTINUOUS
Status: DISCONTINUED | OUTPATIENT
Start: 2022-08-26 | End: 2022-08-28

## 2022-08-26 RX ORDER — SODIUM CHLORIDE 9 MG/ML
1000 INJECTION, SOLUTION INTRAVENOUS ONCE
Status: COMPLETED | OUTPATIENT
Start: 2022-08-26 | End: 2022-08-26

## 2022-08-26 RX ORDER — EPINEPHRINE 1 MG/ML(1)
AMPUL (ML) INJECTION PRN
Status: DISCONTINUED | OUTPATIENT
Start: 2022-08-26 | End: 2022-08-26 | Stop reason: SURG

## 2022-08-26 RX ORDER — SODIUM BICARBONATE IN D5W 150/1000ML
PLASTIC BAG, INJECTION (ML) INTRAVENOUS CONTINUOUS
Status: DISCONTINUED | OUTPATIENT
Start: 2022-08-26 | End: 2022-08-26

## 2022-08-26 RX ORDER — ROCURONIUM BROMIDE 10 MG/ML
INJECTION, SOLUTION INTRAVENOUS PRN
Status: DISCONTINUED | OUTPATIENT
Start: 2022-08-26 | End: 2022-08-26 | Stop reason: SURG

## 2022-08-26 RX ORDER — ENEMA 19; 7 G/133ML; G/133ML
1 ENEMA RECTAL
Status: DISCONTINUED | OUTPATIENT
Start: 2022-08-26 | End: 2022-09-17 | Stop reason: HOSPADM

## 2022-08-26 RX ORDER — SODIUM CHLORIDE, SODIUM LACTATE, POTASSIUM CHLORIDE, CALCIUM CHLORIDE 600; 310; 30; 20 MG/100ML; MG/100ML; MG/100ML; MG/100ML
INJECTION, SOLUTION INTRAVENOUS
Status: DISCONTINUED | OUTPATIENT
Start: 2022-08-26 | End: 2022-08-26 | Stop reason: SURG

## 2022-08-26 RX ORDER — LIDOCAINE HYDROCHLORIDE 20 MG/ML
INJECTION, SOLUTION EPIDURAL; INFILTRATION; INTRACAUDAL; PERINEURAL PRN
Status: DISCONTINUED | OUTPATIENT
Start: 2022-08-26 | End: 2022-08-26 | Stop reason: SURG

## 2022-08-26 RX ORDER — CALCIUM GLUCONATE 20 MG/ML
INJECTION, SOLUTION INTRAVENOUS
Status: ACTIVE
Start: 2022-08-26 | End: 2022-08-27

## 2022-08-26 RX ORDER — NOREPINEPHRINE BITARTRATE 0.03 MG/ML
0-30 INJECTION, SOLUTION INTRAVENOUS CONTINUOUS
Status: DISCONTINUED | OUTPATIENT
Start: 2022-08-26 | End: 2022-08-29

## 2022-08-26 RX ORDER — CALCIUM CHLORIDE 100 MG/ML
1 INJECTION INTRAVENOUS; INTRAVENTRICULAR ONCE
Status: COMPLETED | OUTPATIENT
Start: 2022-08-26 | End: 2022-08-26

## 2022-08-26 RX ORDER — PROTAMINE SULFATE 10 MG/ML
INJECTION, SOLUTION INTRAVENOUS PRN
Status: DISCONTINUED | OUTPATIENT
Start: 2022-08-26 | End: 2022-08-26 | Stop reason: SURG

## 2022-08-26 RX ORDER — ALBUMIN, HUMAN INJ 5% 5 %
SOLUTION INTRAVENOUS PRN
Status: DISCONTINUED | OUTPATIENT
Start: 2022-08-26 | End: 2022-08-26 | Stop reason: SURG

## 2022-08-26 RX ORDER — DOCUSATE SODIUM 100 MG/1
100 CAPSULE, LIQUID FILLED ORAL 2 TIMES DAILY
Status: DISCONTINUED | OUTPATIENT
Start: 2022-08-26 | End: 2022-08-28

## 2022-08-26 RX ORDER — CALCIUM GLUCONATE 20 MG/ML
1 INJECTION, SOLUTION INTRAVENOUS ONCE
Status: COMPLETED | OUTPATIENT
Start: 2022-08-26 | End: 2022-08-26

## 2022-08-26 RX ORDER — ONDANSETRON 2 MG/ML
4 INJECTION INTRAMUSCULAR; INTRAVENOUS EVERY 4 HOURS PRN
Status: DISCONTINUED | OUTPATIENT
Start: 2022-08-26 | End: 2022-08-26

## 2022-08-26 RX ORDER — CEFAZOLIN SODIUM 1 G/3ML
INJECTION, POWDER, FOR SOLUTION INTRAMUSCULAR; INTRAVENOUS PRN
Status: DISCONTINUED | OUTPATIENT
Start: 2022-08-26 | End: 2022-08-26 | Stop reason: SURG

## 2022-08-26 RX ORDER — CALCIUM CHLORIDE 100 MG/ML
INJECTION INTRAVENOUS; INTRAVENTRICULAR PRN
Status: DISCONTINUED | OUTPATIENT
Start: 2022-08-26 | End: 2022-08-26 | Stop reason: SURG

## 2022-08-26 RX ORDER — AMOXICILLIN 250 MG
1 CAPSULE ORAL NIGHTLY
Status: DISCONTINUED | OUTPATIENT
Start: 2022-08-26 | End: 2022-08-28

## 2022-08-26 RX ORDER — FAMOTIDINE 20 MG/1
20 TABLET, FILM COATED ORAL DAILY
Status: DISCONTINUED | OUTPATIENT
Start: 2022-08-27 | End: 2022-08-27

## 2022-08-26 RX ORDER — POLYETHYLENE GLYCOL 3350 17 G/17G
1 POWDER, FOR SOLUTION ORAL 2 TIMES DAILY
Status: DISCONTINUED | OUTPATIENT
Start: 2022-08-26 | End: 2022-08-28

## 2022-08-26 RX ORDER — BISACODYL 10 MG
10 SUPPOSITORY, RECTAL RECTAL
Status: DISCONTINUED | OUTPATIENT
Start: 2022-08-26 | End: 2022-09-17 | Stop reason: HOSPADM

## 2022-08-26 RX ORDER — HEPARIN SODIUM 1000 [USP'U]/ML
INJECTION, SOLUTION INTRAVENOUS; SUBCUTANEOUS PRN
Status: DISCONTINUED | OUTPATIENT
Start: 2022-08-26 | End: 2022-08-26 | Stop reason: SURG

## 2022-08-26 RX ADMIN — ALBUMIN (HUMAN) 250 ML: 2.5 SOLUTION INTRAVENOUS at 16:22

## 2022-08-26 RX ADMIN — PROTAMINE SULFATE 50 MG: 10 INJECTION, SOLUTION INTRAVENOUS at 16:55

## 2022-08-26 RX ADMIN — CALCIUM CHLORIDE 1 G: 100 INJECTION INTRAVENOUS; INTRAVENTRICULAR at 22:05

## 2022-08-26 RX ADMIN — EPINEPHRINE 16 MCG: 1 INJECTION INTRAMUSCULAR; INTRAVENOUS; SUBCUTANEOUS at 16:58

## 2022-08-26 RX ADMIN — HEPARIN SODIUM 5000 UNITS: 1000 INJECTION, SOLUTION INTRAVENOUS; SUBCUTANEOUS at 15:57

## 2022-08-26 RX ADMIN — CALCIUM CHLORIDE 0.5 G: 100 INJECTION INTRAVENOUS; INTRAVENTRICULAR at 16:57

## 2022-08-26 RX ADMIN — SODIUM BICARBONATE 100 MEQ: 84 INJECTION, SOLUTION INTRAVENOUS at 21:10

## 2022-08-26 RX ADMIN — PRAMIPEXOLE DIHYDROCHLORIDE 0.12 MG: 0.12 TABLET ORAL at 06:02

## 2022-08-26 RX ADMIN — NOREPINEPHRINE BITARTRATE 2 MCG/MIN: 1 INJECTION, SOLUTION, CONCENTRATE INTRAVENOUS at 17:06

## 2022-08-26 RX ADMIN — LIDOCAINE HYDROCHLORIDE 100 MG: 20 INJECTION, SOLUTION EPIDURAL; INFILTRATION; INTRACAUDAL at 15:05

## 2022-08-26 RX ADMIN — DOCUSATE SODIUM 50 MG AND SENNOSIDES 8.6 MG 2 TABLET: 8.6; 5 TABLET, FILM COATED ORAL at 06:02

## 2022-08-26 RX ADMIN — SODIUM CHLORIDE, SODIUM GLUCONATE, SODIUM ACETATE, POTASSIUM CHLORIDE AND MAGNESIUM CHLORIDE: 526; 502; 368; 37; 30 INJECTION, SOLUTION INTRAVENOUS at 16:00

## 2022-08-26 RX ADMIN — FENTANYL CITRATE 100 MCG: 50 INJECTION, SOLUTION INTRAMUSCULAR; INTRAVENOUS at 16:14

## 2022-08-26 RX ADMIN — SODIUM CHLORIDE: 9 INJECTION, SOLUTION INTRAVENOUS at 18:56

## 2022-08-26 RX ADMIN — EPINEPHRINE 0.02 MCG/KG/MIN: 1 INJECTION INTRAMUSCULAR; INTRAVENOUS; SUBCUTANEOUS at 16:34

## 2022-08-26 RX ADMIN — CALCIUM GLUCONATE 1 G: 20 INJECTION, SOLUTION INTRAVENOUS at 21:12

## 2022-08-26 RX ADMIN — SODIUM CHLORIDE 1000 ML: 9 INJECTION, SOLUTION INTRAVENOUS at 21:40

## 2022-08-26 RX ADMIN — EPINEPHRINE 16 MCG: 1 INJECTION INTRAMUSCULAR; INTRAVENOUS; SUBCUTANEOUS at 17:10

## 2022-08-26 RX ADMIN — ROCURONIUM BROMIDE 50 MG: 10 INJECTION, SOLUTION INTRAVENOUS at 22:20

## 2022-08-26 RX ADMIN — CALCIUM CHLORIDE 1 G: 100 INJECTION INTRAVENOUS; INTRAVENTRICULAR at 17:35

## 2022-08-26 RX ADMIN — EPINEPHRINE 16 MCG: 1 INJECTION INTRAMUSCULAR; INTRAVENOUS; SUBCUTANEOUS at 16:36

## 2022-08-26 RX ADMIN — SODIUM BICARBONATE 50 MEQ: 84 INJECTION, SOLUTION INTRAVENOUS at 22:45

## 2022-08-26 RX ADMIN — EPINEPHRINE 16 MCG: 1 INJECTION INTRAMUSCULAR; INTRAVENOUS; SUBCUTANEOUS at 17:06

## 2022-08-26 RX ADMIN — EPINEPHRINE 16 MCG: 1 INJECTION INTRAMUSCULAR; INTRAVENOUS; SUBCUTANEOUS at 16:39

## 2022-08-26 RX ADMIN — SODIUM CHLORIDE, POTASSIUM CHLORIDE, SODIUM LACTATE AND CALCIUM CHLORIDE: 600; 310; 30; 20 INJECTION, SOLUTION INTRAVENOUS at 14:54

## 2022-08-26 RX ADMIN — FENTANYL CITRATE 50 MCG: 50 INJECTION, SOLUTION INTRAMUSCULAR; INTRAVENOUS at 16:17

## 2022-08-26 RX ADMIN — CLEVIPIDINE 200 MCG: 0.5 EMULSION INTRAVENOUS at 15:33

## 2022-08-26 RX ADMIN — SODIUM CHLORIDE, SODIUM GLUCONATE, SODIUM ACETATE, POTASSIUM CHLORIDE AND MAGNESIUM CHLORIDE: 526; 502; 368; 37; 30 INJECTION, SOLUTION INTRAVENOUS at 16:53

## 2022-08-26 RX ADMIN — FENTANYL CITRATE 100 MCG: 50 INJECTION, SOLUTION INTRAMUSCULAR; INTRAVENOUS at 15:30

## 2022-08-26 RX ADMIN — EPINEPHRINE 16 MCG: 1 INJECTION INTRAMUSCULAR; INTRAVENOUS; SUBCUTANEOUS at 16:32

## 2022-08-26 RX ADMIN — FENTANYL CITRATE 100 MCG: 50 INJECTION, SOLUTION INTRAMUSCULAR; INTRAVENOUS at 16:25

## 2022-08-26 RX ADMIN — CALCIUM CHLORIDE 0.5 G: 100 INJECTION INTRAVENOUS; INTRAVENTRICULAR at 16:40

## 2022-08-26 RX ADMIN — EZETIMIBE 10 MG: 10 TABLET ORAL at 06:01

## 2022-08-26 RX ADMIN — CEFAZOLIN 2 G: 330 INJECTION, POWDER, FOR SOLUTION INTRAMUSCULAR; INTRAVENOUS at 15:25

## 2022-08-26 RX ADMIN — SODIUM BICARBONATE 100 MEQ: 84 INJECTION INTRAVENOUS at 23:19

## 2022-08-26 RX ADMIN — SODIUM CHLORIDE, POTASSIUM CHLORIDE, SODIUM LACTATE AND CALCIUM CHLORIDE: 600; 310; 30; 20 INJECTION, SOLUTION INTRAVENOUS at 22:19

## 2022-08-26 RX ADMIN — MIDAZOLAM HYDROCHLORIDE 2 MG: 1 INJECTION, SOLUTION INTRAMUSCULAR; INTRAVENOUS at 15:00

## 2022-08-26 RX ADMIN — ASPIRIN 81 MG: 81 TABLET, COATED ORAL at 06:01

## 2022-08-26 RX ADMIN — CLEVIPIDINE 200 MCG: 0.5 EMULSION INTRAVENOUS at 15:44

## 2022-08-26 RX ADMIN — ALBUMIN (HUMAN) 250 ML: 2.5 SOLUTION INTRAVENOUS at 18:16

## 2022-08-26 RX ADMIN — SODIUM CHLORIDE, SODIUM GLUCONATE, SODIUM ACETATE, POTASSIUM CHLORIDE AND MAGNESIUM CHLORIDE: 526; 502; 368; 37; 30 INJECTION, SOLUTION INTRAVENOUS at 15:45

## 2022-08-26 RX ADMIN — FENTANYL CITRATE 150 MCG: 50 INJECTION, SOLUTION INTRAMUSCULAR; INTRAVENOUS at 15:05

## 2022-08-26 RX ADMIN — CLEVIPIDINE 2 MG/HR: 0.5 EMULSION INTRAVENOUS at 16:07

## 2022-08-26 RX ADMIN — ROCURONIUM BROMIDE 100 MG: 10 INJECTION, SOLUTION INTRAVENOUS at 15:06

## 2022-08-26 RX ADMIN — SODIUM BICARBONATE: 84 INJECTION, SOLUTION INTRAVENOUS at 21:33

## 2022-08-26 RX ADMIN — CLOPIDOGREL BISULFATE 75 MG: 75 TABLET ORAL at 06:01

## 2022-08-26 RX ADMIN — INSULIN HUMAN 1 UNITS: 100 INJECTION, SOLUTION PARENTERAL at 23:47

## 2022-08-26 RX ADMIN — INSULIN HUMAN 2 UNITS: 100 INJECTION, SOLUTION PARENTERAL at 20:05

## 2022-08-26 RX ADMIN — EPINEPHRINE 1 MCG/MIN: 1 INJECTION INTRAMUSCULAR; INTRAVENOUS; SUBCUTANEOUS at 18:54

## 2022-08-26 RX ADMIN — SODIUM CHLORIDE: 9 INJECTION, SOLUTION INTRAVENOUS at 18:10

## 2022-08-26 RX ADMIN — PROPOFOL 40 MG: 10 INJECTION, EMULSION INTRAVENOUS at 15:05

## 2022-08-26 RX ADMIN — EPINEPHRINE 16 MCG: 1 INJECTION INTRAMUSCULAR; INTRAVENOUS; SUBCUTANEOUS at 17:17

## 2022-08-26 ASSESSMENT — PAIN DESCRIPTION - PAIN TYPE
TYPE: ACUTE PAIN;SURGICAL PAIN
TYPE: ACUTE PAIN

## 2022-08-26 ASSESSMENT — ENCOUNTER SYMPTOMS
FLANK PAIN: 0
FEVER: 0
ABDOMINAL PAIN: 0
COUGH: 0
SHORTNESS OF BREATH: 0
PALPITATIONS: 0
NAUSEA: 0
WHEEZING: 0
VOMITING: 0
NERVOUS/ANXIOUS: 0
CHILLS: 0
WEIGHT LOSS: 1

## 2022-08-26 ASSESSMENT — FIBROSIS 4 INDEX
FIB4 SCORE: 1.15
FIB4 SCORE: 1.62

## 2022-08-26 NOTE — ANESTHESIA PROCEDURE NOTES
Airway    Date/Time: 8/26/2022 3:09 PM  Performed by: Ike Hoffmann M.D.  Authorized by: Ike Hoffmann M.D.     Location:  OR  Urgency:  Elective  Difficult Airway: No    Indications for Airway Management:  Anesthesia      Spontaneous Ventilation: absent    Sedation Level:  Deep  Preoxygenated: Yes    Patient Position:  Sniffing  Mask Difficulty Assessment:  2 - vent by mask + OA or adjuvant +/- NMBA  Final Airway Type:  Endotracheal airway  Final Endotracheal Airway:  ETT  Cuffed: Yes    Technique Used for Successful ETT Placement:  Direct laryngoscopy    Insertion Site:  Oral  Blade Type:  Crawford  Laryngoscope Blade/Videolaryngoscope Blade Size:  2  ETT Size (mm):  7.5  Measured from:  Gums  ETT to Gums (cm):  21  Placement Verified by: auscultation and capnometry    Cormack-Lehane Classification:  Grade IIa - partial view of glottis  Number of Attempts at Approach:  1

## 2022-08-26 NOTE — PROGRESS NOTES
12 hour chart check.    Have a great shift!    Monitor Summary     SB/SR 51-64  PVC(R)   16/10/36

## 2022-08-26 NOTE — ANESTHESIA PROCEDURE NOTES
Arterial Line  Performed by: Ike Hoffmann M.D.  Authorized by: Ike Hoffmann M.D.     Start Time:  8/26/2022 3:00 PM  End Time:  8/26/2022 3:03 PM  Localization: ultrasound guidance and surface landmarks    Patient Location:  OR  Indication: continuous blood pressure monitoring        Catheter Size:  20 G  Seldinger Technique?: Yes    Laterality:  Left  Site:  Radial artery  Line Secured:  Antimicrobial disc, tape and transparent dressing  Events: patient tolerated procedure well with no complications

## 2022-08-26 NOTE — CARE PLAN
The patient is Watcher - Medium risk of patient condition declining or worsening    Shift Goals  Clinical Goals: surgery  Patient Goals: rest  Family Goals: alan    Progress made toward(s) clinical / shift goals:    Problem: Knowledge Deficit - Standard  Goal: Patient and family/care givers will demonstrate understanding of plan of care, disease process/condition, diagnostic tests and medications  Outcome: Progressing     Problem: Skin Integrity  Goal: Skin integrity is maintained or improved  Outcome: Progressing       Patient is not progressing towards the following goals:

## 2022-08-26 NOTE — PROGRESS NOTES
[facility] Medicine Daily Progress Note    Date of Service  8/26/2022    Chief Complaint  Fouzia Santamaria is a 61 y.o. female admitted 8/23/2022 with vascular claudication    Hospital Course  Fouzia Santamaria is a 61 y.o. female with coronary artery disease status post stent placement LAD, peripheral arterial disease with chronic aortic occlusion and mural thrombus, hyperlipidemia, hypertension, who presented 8/23/2022 with lower extremity pain.  Fouzia has been experiencing bilateral lower extremity pain that has been waxing and waning in severity over the past few weeks but steadily worse over the past few days prior to presentation, left greater than right.  She has known chronic infrarenal aortic occlusion with collateral flow and is followed with vascular surgery as an outpatient.  Due to lower extremity pain now at rest she presented for evaluation.  She denies any headache or vision changes, fevers chills, chest pain dyspnea cough nausea vomiting dysuria or diarrhea.  Patient recently admitted to [place] on July 3, 2022 and had 2 stents placed to LAD, subsequently to that was in the hospital from 7/21-7/24 when she was sent by vascular surgeon due to escalating claudication symptoms.  During that admission it was felt that vascular bypassing/repair of aorto was too high risk and she was discharged Xarelto DVT dosing to follow-up as an outpatient.  She was discharged on Plavix, Xarelto, aspirin and was to follow-up with vascular surgery as outpatient but due to progression of her pain she presented today to the ED.     In ED patient is afebrile pulse is ranged from  normal respiratory rate and room air oxygen saturation, systolic blood pressures ranged from 115-142.  Initial work-up shows normal cell counts and CBC, CHEM panel with bicarb 14 BUN 73 serum creatinine 2.62 (1.31 a month ago), normal liver function, troponin T 66 INR 1.73. CTA aorta with and without shows occlusion of the abdominal  aorta just below the level of renal arteries with occlusion of common and external iliac arteries with reconstitution of flow via epigastric collaterals as well as diffuse ectasia/aneurysm of descending thoracic aorta and upper abdominal aorta with prominent mural thrombus and atherosclerosis, diffusely small caliber bilateral lower extremity arteries with significant atherosclerosis of bilateral superficial femoral arteries without high-grade stenosis, likely single-vessel runoff of the right and two-vessel runoff on the left, focal severe stenosis of the bilateral renal artery origins.  EKG showed sinus rhythm and biatrial enlargement with an incomplete right bundle branch block and left ventricular hypertrophy, prolonged QTC.  ERP discussed case with Dr. Siu agrees to evaluate patient.  Patient subsequently referred to hospitalist for admission.    Interval Problem Update  Vitals are stable.  Patient continues to endorse leg pain.  We will start the patient on IV morphine and as needed Percocets.  Renal function is improving.  Renal ultrasound showing mild right hydronephrosis unclear the significance.  Urinalysis and CT renal pending.  Patient has a history of a descending thoracic thrombus and has previously been on Xarelto.  We will place the patient on heparin drip and await vascular surgery recommendations.    8/25: Patient continuing to endorse bilateral leg pain.  Renal function has is significantly improved.  Patient is medically optimized for surgery tomorrow.  N.p.o. at midnight for surgery around 2 PM tomorrow.  Continue as needed pain meds.  8/26: Patient continuing to have leg pain which is improved with pain meds.  She is n.p.o. awaiting surgery today.  Patient reports she understands the risk benefits of the surgery.  Postoperatively plan is for patient to transfer to the ICU.    I have discussed this patient's plan of care and discharge plan at IDT rounds today with Case Management, Nursing,  Nursing leadership, and other members of the IDT team.    Consultants/Specialty  vascular surgery    Code Status  Full Code    Disposition  Patient is not medically cleared for discharge.   Anticipate discharge to to home with organized home healthcare and close outpatient follow-up.  I have placed the appropriate orders for post-discharge needs.    Review of Systems  Review of Systems   Constitutional:  Positive for weight loss. Negative for chills, fever and malaise/fatigue.   Respiratory:  Negative for cough, shortness of breath and wheezing.    Cardiovascular:  Negative for chest pain, palpitations and leg swelling.   Gastrointestinal:  Negative for abdominal pain, nausea and vomiting.   Genitourinary:  Negative for flank pain.   Musculoskeletal:         Bilateral leg pain   Psychiatric/Behavioral:  The patient is not nervous/anxious.    All other systems reviewed and are negative.     Physical Exam  Temp:  [35.8 °C (96.4 °F)-36.7 °C (98.1 °F)] 35.8 °C (96.4 °F)  Pulse:  [63-83] 75  Resp:  [17-18] 18  BP: (111-122)/(80-87) 119/82  SpO2:  [96 %-100 %] 100 %    Physical Exam  Vitals and nursing note reviewed.   Constitutional:       General: She is not in acute distress.     Appearance: Normal appearance. She is ill-appearing.   HENT:      Head: Normocephalic and atraumatic.   Eyes:      General: No scleral icterus.        Right eye: No discharge.         Left eye: No discharge.   Cardiovascular:      Rate and Rhythm: Normal rate and regular rhythm.      Heart sounds: Normal heart sounds.   Pulmonary:      Effort: No respiratory distress.      Breath sounds: No wheezing or rales.   Abdominal:      General: Abdomen is flat. There is no distension.      Palpations: Abdomen is soft.      Tenderness: There is no abdominal tenderness. There is no guarding.   Musculoskeletal:         General: Tenderness present.      Right lower leg: No edema.      Left lower leg: No edema.   Skin:     General: Skin is warm.       Coloration: Skin is pale. Skin is not jaundiced.   Neurological:      General: No focal deficit present.      Mental Status: She is alert and oriented to person, place, and time.      Cranial Nerves: No cranial nerve deficit.      Motor: Weakness (Bilateral legs) present.   Psychiatric:         Mood and Affect: Mood normal.         Behavior: Behavior normal.         Thought Content: Thought content normal.       Fluids    Intake/Output Summary (Last 24 hours) at 8/26/2022 1218  Last data filed at 8/25/2022 2200  Gross per 24 hour   Intake 420 ml   Output --   Net 420 ml         Laboratory  Recent Labs     08/23/22  1545 08/25/22  0209   WBC 8.9 7.5   RBC 4.39 4.09*   HEMOGLOBIN 12.4 11.5*   HEMATOCRIT 39.1 35.2*   MCV 89.1 86.1   MCH 28.2 28.1   MCHC 31.7* 32.7*   RDW 59.3* 57.1*   PLATELETCT 321 275   MPV 9.7 9.7       Recent Labs     08/23/22  1545 08/24/22  0237 08/25/22  0209   SODIUM 136 134* 140   POTASSIUM 4.1 3.8 4.1   CHLORIDE 104 106 109   CO2 14* 15* 18*   GLUCOSE 99 93 95   BUN 73* 63* 49*   CREATININE 2.62* 1.66* 1.11   CALCIUM 9.7 9.0 9.2       Recent Labs     08/23/22  1545 08/24/22  1433   APTT  --  28.5   INR 1.73* 1.10                 Imaging  CT-RENAL COLIC EVALUATION(A/P W/O)   Final Result         1. No hydronephrosis. Nonobstructive bilateral renal calculi seen on previous exam cannot be evaluated due to contrast in the collecting system.      2. Retained contrast in the kidney from prior CTA, likely due to nephropathy/renal failure.      US-RENAL   Final Result         1.  Mild right hydronephrosis   2.  Punctate left renal calculi without visualized obstructive changes.      CT-CTA AORTA-RO WITH & W/O-POST PROCESS   Final Result      1.  Occlusion of the abdominal aorta just below level of the renal arteries with occlusion of the common and external iliac arteries with reconstitution of flow via epigastric collaterals.   2.  Diffuse ectasia/aneurysm of the descending thoracic aorta and upper  abdominal aorta with prominent mural thrombus and atherosclerosis.   3.  Diffusely small caliber bilateral lower extremity arteries with significant atherosclerosis of the bilateral superficial femoral arteries without high-grade stenosis.   4.  Likely single vessel runoff on the right and 2 vessel runoff on the left.   5.  Focal severe stenoses at the bilateral renal artery origins.      These findings were discussed with ESPERANZA DO on 8/23/2022 5:29 PM.             Assessment/Plan  * Peripheral arterial occlusive disease (HCC)- (present on admission)  Assessment & Plan  Vascular consulted in ED, will follow, Dr. Siu evaluated patient and states no indication for systemic anticoagulation at this time.  Optimize for likely surgical intervention.  Continue statin, aspirin, Plavix.  Monitor for signs of lower extremity pain or critical limb ischemia.    Occluded aorta (HCC)  Assessment & Plan  Patient with chronically occluded infrarenal aorta.  Patient complaining of bilateral lower extremity pain.  Patient will require inpatient intervention as per vascular surgery.  Patient will require aortobifemoral reconstruction which will be performed tomorrow.  Postoperatively patient will require close monitoring and will be transferred to the ICU  Vascular surgery reports anticoagulation not needed at this time since it is chronically occluded and the anticoagulation will provide minimal benefit.  Smoking cessation  Atorvastatin, Zetia  8/26: Patient continuing to have persistent lower extremity pain.  N.p.o. awaiting surgery today.    Chronic systolic congestive heart failure (HCC)  Assessment & Plan  Last echocardiogram from July 2022 showed an EF of 35%.  Patient is not acutely decompensated.  We will need to monitor closely for fluid overload.  At home patient is on lisinopril, which was held to the renal failure.  Postoperatively if the patient's kidney function will tolerate we will restart the patient on  lisinopril.  Will initiate the patient on metoprolol XL 12.5 mg post-operatively.    Aortic thrombus ([facility])  Assessment & Plan  Patient with a history of aortic thrombus on Xarelto at home.  Evaluated by vascular surgery, patient will not need a heparin drip at this time but will need the vascular surgery for intervention which is scheduled for tomorrow.    COPD (chronic obstructive pulmonary disease) ([facility])  Assessment & Plan  As needed albuterol.  Not in acute exacerbation.    Hydronephrosis  Assessment & Plan  Mild right-sided nephrosis.  Patient is denying any flank pain.  We will order a CT renal to evaluate for kidney stones.  8/25: CT does not show hydronephrosis as what was noted on the ultrasound.  No further work-up at this time.    IVON (acute kidney injury) ([facility])- (present on admission)  Assessment & Plan  CT with focal severe stenosis of bilateral renal artery origins, patient has occlusive aortic vascular disease, vascular surgery consulted planning on intervention once IVON improves.  Renal US  Check U/a & CPK  Rule out post obstruction  IVF  Renal dose meds and avoid nephrotoxins  Monitor I&O's  Follow renal function  8/25: Acute renal failure has improved she is almost back to her baseline.  Postoperatively she will need close monitoring since she is at increased risk for renal failure and dialysis.    Hypertension- (present on admission)  Assessment & Plan  Holding home lisinopril given IVON.  IV antihypertensives with parameters.    QT prolongation- (present on admission)  Assessment & Plan  Avoid QTC prolonging medications    CAD (coronary artery disease)- (present on admission)  Assessment & Plan  With recent stent to LAD, continue aspirin, statin, Plavix         VTE prophylaxis: enoxaparin ppx    I have performed a physical exam and reviewed and updated ROS and Plan today (8/26/2022). In review of yesterday's note (8/25/2022), there are no changes except as documented above.

## 2022-08-26 NOTE — ANESTHESIA PROCEDURE NOTES
Central Venous Line  Performed by: Ike Hoffmann M.D.  Authorized by: Ike Hoffmann M.D.     Start Time:  8/26/2022 3:12 PM  End Time:  8/26/2022 3:22 PM  Patient Location:  OR  Indication: central venous access        provider hand hygiene performed prior to central venous catheter insertion, all 5 sterile barriers used (gloves, gown, cap, mask, large sterile drape) during central venous catheter insertion and skin prep agent completely dried prior to procedure    Patient Position:  Trendelenburg  Site:  Internal jugular  Prep:  Chlorhexidine  Catheter Size:  7 Fr  Catheter Length (cm):  20  Number of Lumens:  Triple lumen  target vein identified, needle advanced into vein and blood aspirated and guidewire advanced into vein    Seldinger Technique?: Yes    Ultrasound-Guided: ultrasound-guided  Image captured, interpreted and electronically stored.  Sterile Gel and Probe Cover Used for Ultrasound?: Yes    Intravenous Verification: verified by ultrasound, venous blood return and chest x-ray pending    all ports aspirated, all ports flushed easily, guidewire was removed intact, biopatch was applied, line was sutured in place and dressing was applied    Events: patient tolerated procedure well with no complications    PA Catheter Placed?: No

## 2022-08-26 NOTE — ANESTHESIA PREPROCEDURE EVALUATION
Case: [id number] Date/Time: 08/26/22 1316    Procedure: CREATION, BYPASS, ARTERIAL, AORTA TO FEMORAL, BILATERAL    Location: [facility] OR 09 / SURGERY [facility]    Surgeons: Brandyn Siu M.D.          Relevant Problems   PULMONARY   (positive) COPD (chronic obstructive pulmonary disease) (HCC)      CARDIAC   (positive) Aortic thrombus (HCC)   (positive) CAD (coronary artery disease)   (positive) Chronic systolic congestive heart failure (HCC)   (positive) Critical ischemia of lower extremity (HCC)   (positive) Hypertension   (positive) Peripheral arterial occlusive disease (HCC)         (positive) IVON (acute kidney injury) (HCC)   (positive) Hydronephrosis       Physical Exam    Airway   Mallampati: II  TM distance: >3 FB  Neck ROM: full       Cardiovascular - normal exam  Rhythm: regular  Rate: normal  (-) murmur     Dental   (+) upper dentures, lower dentures           Pulmonary - normal exam  Breath sounds clear to auscultation  (+) wheezes     Abdominal    Neurological     Comments: R sided weakness from previous CVA; memory/cognitive deficits from previous CVA with some dysarthria             Anesthesia Plan    ASA 4       Plan - general       Airway plan will be ETT    (60 yo F pmh CAD s/p PCI to LAD < 3 months ago following MI, prior CVA with residual deficits, COPD, HTN, HLD now with thrombus in aorta to OR for aorta-fem bypass. Plan for postop intubation and transport to ICU. Discussed with patient significant elevated risk due to recent MI and PCI.)      Induction: intravenous    Postoperative Plan: Postoperative administration of opioids is intended.    Pertinent diagnostic labs and testing reviewed    Informed Consent:    Anesthetic plan and risks discussed with patient.    Use of blood products discussed with: patient whom consented to blood products.

## 2022-08-27 ENCOUNTER — APPOINTMENT (OUTPATIENT)
Dept: RADIOLOGY | Facility: MEDICAL CENTER | Age: 62
DRG: 270 | End: 2022-08-27
Payer: MEDICAID

## 2022-08-27 ENCOUNTER — APPOINTMENT (OUTPATIENT)
Dept: RADIOLOGY | Facility: MEDICAL CENTER | Age: 62
DRG: 270 | End: 2022-08-27
Attending: SURGERY
Payer: MEDICAID

## 2022-08-27 PROBLEM — E86.1 HYPOTENSION DUE TO HYPOVOLEMIA: Status: ACTIVE | Noted: 2022-08-26

## 2022-08-27 PROBLEM — I95.89 HYPOTENSION DUE TO HYPOVOLEMIA: Status: ACTIVE | Noted: 2022-08-26

## 2022-08-27 PROBLEM — D68.9 COAGULOPATHY (HCC): Status: ACTIVE | Noted: 2022-08-26

## 2022-08-27 PROBLEM — D62 ACUTE BLOOD LOSS ANEMIA: Status: ACTIVE | Noted: 2022-07-21

## 2022-08-27 PROBLEM — J95.821 ACUTE POSTOPERATIVE RESPIRATORY FAILURE (HCC): Status: ACTIVE | Noted: 2022-08-26

## 2022-08-27 PROBLEM — E87.20 LACTIC ACIDOSIS: Status: ACTIVE | Noted: 2022-08-26

## 2022-08-27 LAB
ALBUMIN SERPL BCP-MCNC: 2.9 G/DL (ref 3.2–4.9)
ALBUMIN/GLOB SERPL: 1.6 G/DL
ALP SERPL-CCNC: 42 U/L (ref 30–99)
ALT SERPL-CCNC: 85 U/L (ref 2–50)
ANION GAP SERPL CALC-SCNC: 21 MMOL/L (ref 7–16)
ANION GAP SERPL CALC-SCNC: 22 MMOL/L (ref 7–16)
AST SERPL-CCNC: 96 U/L (ref 12–45)
BARCODED ABORH UBTYP: 5100
BARCODED ABORH UBTYP: 5100
BARCODED ABORH UBTYP: 600
BARCODED ABORH UBTYP: 6200
BARCODED ABORH UBTYP: 6200
BARCODED ABORH UBTYP: 8400
BARCODED PRD CODE UBPRD: NORMAL
BARCODED UNIT NUM UBUNT: NORMAL
BASE EXCESS BLDA CALC-SCNC: -20 MMOL/L (ref -4–3)
BASE EXCESS BLDA CALC-SCNC: -7 MMOL/L (ref -4–3)
BASE EXCESS BLDA CALC-SCNC: -8 MMOL/L (ref -4–3)
BASE EXCESS BLDA CALC-SCNC: -9 MMOL/L (ref -4–3)
BASOPHILS # BLD AUTO: 0 % (ref 0–1.8)
BASOPHILS # BLD AUTO: 0.2 % (ref 0–1.8)
BASOPHILS # BLD: 0 K/UL (ref 0–0.12)
BASOPHILS # BLD: 0.01 K/UL (ref 0–0.12)
BILIRUB SERPL-MCNC: 1.7 MG/DL (ref 0.1–1.5)
BODY TEMPERATURE: ABNORMAL DEGREES
BREATHS SETTING VENT: 24
BREATHS SETTING VENT: 24
BUN SERPL-MCNC: 20 MG/DL (ref 8–22)
BUN SERPL-MCNC: 22 MG/DL (ref 8–22)
CA-I BLD ISE-SCNC: 0.87 MMOL/L (ref 1.1–1.3)
CA-I BLD ISE-SCNC: 1.04 MMOL/L (ref 1.1–1.3)
CALCIUM SERPL-MCNC: 8.5 MG/DL (ref 8.5–10.5)
CALCIUM SERPL-MCNC: 9.1 MG/DL (ref 8.5–10.5)
CFT BLD TEG: 5.8 MIN (ref 4.6–9.1)
CFT BLD TEG: 7.1 MIN (ref 4.6–9.1)
CFT P HPASE BLD TEG: 5 MIN (ref 4.3–8.3)
CFT P HPASE BLD TEG: 6.4 MIN (ref 4.3–8.3)
CHLORIDE SERPL-SCNC: 111 MMOL/L (ref 96–112)
CHLORIDE SERPL-SCNC: 113 MMOL/L (ref 96–112)
CLOT ANGLE BLD TEG: 66.6 DEGREES (ref 63–78)
CLOT ANGLE BLD TEG: 74.8 DEGREES (ref 63–78)
CLOT LYSIS 30M P MA LENFR BLD TEG: 0 % (ref 0–2.6)
CLOT LYSIS 30M P MA LENFR BLD TEG: >22 % (ref 0–2.6)
CO2 BLDA-SCNC: 12 MMOL/L (ref 20–33)
CO2 BLDA-SCNC: 17 MMOL/L (ref 20–33)
CO2 BLDA-SCNC: 18 MMOL/L (ref 20–33)
CO2 BLDA-SCNC: 19 MMOL/L (ref 20–33)
CO2 SERPL-SCNC: 18 MMOL/L (ref 20–33)
CO2 SERPL-SCNC: 20 MMOL/L (ref 20–33)
COMPONENT CT 8504CT: NORMAL
COMPONENT F 8504F: NORMAL
COMPONENT F 8504F: NORMAL
COMPONENT FT 8504FT: NORMAL
COMPONENT P 8504P: NORMAL
COMPONENT P 8504P: NORMAL
CREAT SERPL-MCNC: 0.86 MG/DL (ref 0.5–1.4)
CREAT SERPL-MCNC: 0.98 MG/DL (ref 0.5–1.4)
CT.EXTRINSIC BLD ROTEM: 1.1 MIN (ref 0.8–2.1)
CT.EXTRINSIC BLD ROTEM: 2.6 MIN (ref 0.8–2.1)
DELSYS IDSYS: ABNORMAL
DELSYS IDSYS: ABNORMAL
EKG IMPRESSION: NORMAL
END TIDAL CARBON DIOXIDE IECO2: 23 MMHG
END TIDAL CARBON DIOXIDE IECO2: 23 MMHG
EOSINOPHIL # BLD AUTO: 0.01 K/UL (ref 0–0.51)
EOSINOPHIL # BLD AUTO: 0.03 K/UL (ref 0–0.51)
EOSINOPHIL NFR BLD: 0.3 % (ref 0–6.9)
EOSINOPHIL NFR BLD: 0.5 % (ref 0–6.9)
ERYTHROCYTE [DISTWIDTH] IN BLOOD BY AUTOMATED COUNT: 45.5 FL (ref 35.9–50)
ERYTHROCYTE [DISTWIDTH] IN BLOOD BY AUTOMATED COUNT: 46.1 FL (ref 35.9–50)
GFR SERPLBLD CREATININE-BSD FMLA CKD-EPI: 66 ML/MIN/1.73 M 2
GFR SERPLBLD CREATININE-BSD FMLA CKD-EPI: 77 ML/MIN/1.73 M 2
GLOBULIN SER CALC-MCNC: 1.8 G/DL (ref 1.9–3.5)
GLUCOSE BLD STRIP.AUTO-MCNC: 129 MG/DL (ref 65–99)
GLUCOSE BLD STRIP.AUTO-MCNC: 131 MG/DL (ref 65–99)
GLUCOSE BLD STRIP.AUTO-MCNC: 152 MG/DL (ref 65–99)
GLUCOSE SERPL-MCNC: 174 MG/DL (ref 65–99)
GLUCOSE SERPL-MCNC: 192 MG/DL (ref 65–99)
HCO3 BLDA-SCNC: 10.4 MMOL/L (ref 17–25)
HCO3 BLDA-SCNC: 16 MMOL/L (ref 17–25)
HCO3 BLDA-SCNC: 17.4 MMOL/L (ref 17–25)
HCO3 BLDA-SCNC: 17.5 MMOL/L (ref 17–25)
HCT VFR BLD AUTO: 21.5 % (ref 37–47)
HCT VFR BLD AUTO: 27.6 % (ref 37–47)
HCT VFR BLD AUTO: 29.7 % (ref 37–47)
HCT VFR BLD AUTO: 31.9 % (ref 37–47)
HCT VFR BLD CALC: 33 % (ref 37–47)
HGB BLD-MCNC: 10.1 G/DL (ref 12–16)
HGB BLD-MCNC: 11.2 G/DL (ref 12–16)
HGB BLD-MCNC: 11.2 G/DL (ref 12–16)
HGB BLD-MCNC: 11.9 G/DL (ref 12–16)
HGB BLD-MCNC: 7.5 G/DL (ref 12–16)
HOROWITZ INDEX BLDA+IHG-RTO: 390 MM[HG]
HOROWITZ INDEX BLDA+IHG-RTO: 410 MM[HG]
IMM GRANULOCYTES # BLD AUTO: 0.03 K/UL (ref 0–0.11)
IMM GRANULOCYTES # BLD AUTO: 0.06 K/UL (ref 0–0.11)
IMM GRANULOCYTES NFR BLD AUTO: 0.8 % (ref 0–0.9)
IMM GRANULOCYTES NFR BLD AUTO: 0.9 % (ref 0–0.9)
LACTATE BLD-SCNC: 11 MMOL/L (ref 0.5–2)
LACTATE SERPL-SCNC: 12.2 MMOL/L (ref 0.5–2)
LACTATE SERPL-SCNC: 3.1 MMOL/L (ref 0.5–2)
LACTATE SERPL-SCNC: 4.3 MMOL/L (ref 0.5–2)
LACTATE SERPL-SCNC: 5.3 MMOL/L (ref 0.5–2)
LACTATE SERPL-SCNC: 8.9 MMOL/L (ref 0.5–2)
LYMPHOCYTES # BLD AUTO: 0.4 K/UL (ref 1–4.8)
LYMPHOCYTES # BLD AUTO: 0.73 K/UL (ref 1–4.8)
LYMPHOCYTES NFR BLD: 10 % (ref 22–41)
LYMPHOCYTES NFR BLD: 11.2 % (ref 22–41)
MCF BLD TEG: 63.1 MM (ref 52–69)
MCF BLD TEG: <40 MM (ref 52–69)
MCF.PLATELET INHIB BLD ROTEM: 20.9 MM (ref 15–32)
MCF.PLATELET INHIB BLD ROTEM: 7.6 MM (ref 15–32)
MCH RBC QN AUTO: 32.3 PG (ref 27–33)
MCH RBC QN AUTO: 32.6 PG (ref 27–33)
MCHC RBC AUTO-ENTMCNC: 34.9 G/DL (ref 33.6–35)
MCHC RBC AUTO-ENTMCNC: 36.6 G/DL (ref 33.6–35)
MCV RBC AUTO: 88.2 FL (ref 81.4–97.8)
MCV RBC AUTO: 93.5 FL (ref 81.4–97.8)
MODE IMODE: ABNORMAL
MODE IMODE: ABNORMAL
MONOCYTES # BLD AUTO: 0.44 K/UL (ref 0–0.85)
MONOCYTES # BLD AUTO: 0.47 K/UL (ref 0–0.85)
MONOCYTES NFR BLD AUTO: 11 % (ref 0–13.4)
MONOCYTES NFR BLD AUTO: 7.2 % (ref 0–13.4)
NEUTROPHILS # BLD AUTO: 3.12 K/UL (ref 2–7.15)
NEUTROPHILS # BLD AUTO: 5.2 K/UL (ref 2–7.15)
NEUTROPHILS NFR BLD: 77.9 % (ref 44–72)
NEUTROPHILS NFR BLD: 80 % (ref 44–72)
NRBC # BLD AUTO: 0 K/UL
NRBC # BLD AUTO: 0 K/UL
NRBC BLD-RTO: 0 /100 WBC
NRBC BLD-RTO: 0 /100 WBC
O2/TOTAL GAS SETTING VFR VENT: 40 %
O2/TOTAL GAS SETTING VFR VENT: 40 %
PA AA BLD-ACNC: 64.2 % (ref 0–11)
PA AA BLD-ACNC: ABNORMAL % (ref 0–11)
PA ADP BLD-ACNC: 50 % (ref 0–17)
PA ADP BLD-ACNC: ABNORMAL % (ref 0–17)
PCO2 BLDA: 27.5 MMHG (ref 26–37)
PCO2 BLDA: 30.4 MMHG (ref 26–37)
PCO2 BLDA: 34.7 MMHG (ref 26–37)
PCO2 BLDA: 40.1 MMHG (ref 26–37)
PCO2 TEMP ADJ BLDA: 26.2 MMHG (ref 26–37)
PCO2 TEMP ADJ BLDA: 28.3 MMHG (ref 26–37)
PEEP END EXPIRATORY PRESSURE IPEEP: 5 CMH20
PEEP END EXPIRATORY PRESSURE IPEEP: 5 CMH20
PH BLDA: 7.02 [PH] (ref 7.4–7.5)
PH BLDA: 7.31 [PH] (ref 7.4–7.5)
PH BLDA: 7.33 [PH] (ref 7.4–7.5)
PH BLDA: 7.41 [PH] (ref 7.4–7.5)
PH TEMP ADJ BLDA: 7.35 [PH] (ref 7.4–7.5)
PH TEMP ADJ BLDA: 7.43 [PH] (ref 7.4–7.5)
PLATELET # BLD AUTO: 165 K/UL (ref 164–446)
PLATELET # BLD AUTO: 196 K/UL (ref 164–446)
PMV BLD AUTO: 10.1 FL (ref 9–12.9)
PMV BLD AUTO: 10.6 FL (ref 9–12.9)
PO2 BLDA: 156 MMHG (ref 64–87)
PO2 BLDA: 164 MMHG (ref 64–87)
PO2 BLDA: 460 MMHG (ref 64–87)
PO2 BLDA: 481 MMHG (ref 64–87)
PO2 TEMP ADJ BLDA: 150 MMHG (ref 64–87)
PO2 TEMP ADJ BLDA: 155 MMHG (ref 64–87)
POTASSIUM BLD-SCNC: 3.2 MMOL/L (ref 3.6–5.5)
POTASSIUM BLD-SCNC: 4.1 MMOL/L (ref 3.6–5.5)
POTASSIUM SERPL-SCNC: 3.5 MMOL/L (ref 3.6–5.5)
POTASSIUM SERPL-SCNC: 3.8 MMOL/L (ref 3.6–5.5)
PRODUCT TYPE UPROD: NORMAL
PROT SERPL-MCNC: 4.7 G/DL (ref 6–8.2)
RBC # BLD AUTO: 2.3 M/UL (ref 4.2–5.4)
RBC # BLD AUTO: 3.13 M/UL (ref 4.2–5.4)
SAO2 % BLDA: 100 % (ref 93–99)
SAO2 % BLDA: 100 % (ref 93–99)
SAO2 % BLDA: 99 % (ref 93–99)
SAO2 % BLDA: 99 % (ref 93–99)
SODIUM BLD-SCNC: 139 MMOL/L (ref 135–145)
SODIUM BLD-SCNC: 146 MMOL/L (ref 135–145)
SODIUM SERPL-SCNC: 150 MMOL/L (ref 135–145)
SODIUM SERPL-SCNC: 155 MMOL/L (ref 135–145)
SPECIMEN DRAWN FROM PATIENT: ABNORMAL
TEG ALGORITHM TGALG: ABNORMAL
TEG ALGORITHM TGALG: ABNORMAL
TIDAL VOLUME IVT: 350 ML
TIDAL VOLUME IVT: 350 ML
TROPONIN T SERPL-MCNC: 117 NG/L (ref 6–19)
TROPONIN T SERPL-MCNC: 155 NG/L (ref 6–19)
TROPONIN T SERPL-MCNC: 239 NG/L (ref 6–19)
TROPONIN T SERPL-MCNC: 270 NG/L (ref 6–19)
UNIT STATUS USTAT: NORMAL
WBC # BLD AUTO: 4 K/UL (ref 4.8–10.8)
WBC # BLD AUTO: 6.5 K/UL (ref 4.8–10.8)

## 2022-08-27 PROCEDURE — 700111 HCHG RX REV CODE 636 W/ 250 OVERRIDE (IP): Performed by: SURGERY

## 2022-08-27 PROCEDURE — 85025 COMPLETE CBC W/AUTO DIFF WBC: CPT

## 2022-08-27 PROCEDURE — 85576 BLOOD PLATELET AGGREGATION: CPT

## 2022-08-27 PROCEDURE — 36430 TRANSFUSION BLD/BLD COMPNT: CPT

## 2022-08-27 PROCEDURE — 30233M1 TRANSFUSION OF NONAUTOLOGOUS PLASMA CRYOPRECIPITATE INTO PERIPHERAL VEIN, PERCUTANEOUS APPROACH: ICD-10-PCS | Performed by: SURGERY

## 2022-08-27 PROCEDURE — 85014 HEMATOCRIT: CPT

## 2022-08-27 PROCEDURE — 700105 HCHG RX REV CODE 258: Performed by: SURGERY

## 2022-08-27 PROCEDURE — 71045 X-RAY EXAM CHEST 1 VIEW: CPT

## 2022-08-27 PROCEDURE — 80053 COMPREHEN METABOLIC PANEL: CPT

## 2022-08-27 PROCEDURE — 82803 BLOOD GASES ANY COMBINATION: CPT | Mod: 91

## 2022-08-27 PROCEDURE — P9017 PLASMA 1 DONOR FRZ W/IN 8 HR: HCPCS

## 2022-08-27 PROCEDURE — 94003 VENT MGMT INPAT SUBQ DAY: CPT

## 2022-08-27 PROCEDURE — 700101 HCHG RX REV CODE 250: Performed by: STUDENT IN AN ORGANIZED HEALTH CARE EDUCATION/TRAINING PROGRAM

## 2022-08-27 PROCEDURE — 99291 CRITICAL CARE FIRST HOUR: CPT | Performed by: SURGERY

## 2022-08-27 PROCEDURE — 700102 HCHG RX REV CODE 250 W/ 637 OVERRIDE(OP): Performed by: SURGERY

## 2022-08-27 PROCEDURE — 700101 HCHG RX REV CODE 250: Performed by: SURGERY

## 2022-08-27 PROCEDURE — 37799 UNLISTED PX VASCULAR SURGERY: CPT

## 2022-08-27 PROCEDURE — 94799 UNLISTED PULMONARY SVC/PX: CPT

## 2022-08-27 PROCEDURE — 700102 HCHG RX REV CODE 250 W/ 637 OVERRIDE(OP): Performed by: STUDENT IN AN ORGANIZED HEALTH CARE EDUCATION/TRAINING PROGRAM

## 2022-08-27 PROCEDURE — 84484 ASSAY OF TROPONIN QUANT: CPT | Mod: 91

## 2022-08-27 PROCEDURE — 36620 INSERTION CATHETER ARTERY: CPT

## 2022-08-27 PROCEDURE — 30233N1 TRANSFUSION OF NONAUTOLOGOUS RED BLOOD CELLS INTO PERIPHERAL VEIN, PERCUTANEOUS APPROACH: ICD-10-PCS | Performed by: SURGERY

## 2022-08-27 PROCEDURE — 83605 ASSAY OF LACTIC ACID: CPT | Mod: 91

## 2022-08-27 PROCEDURE — 85384 FIBRINOGEN ACTIVITY: CPT

## 2022-08-27 PROCEDURE — P9016 RBC LEUKOCYTES REDUCED: HCPCS | Mod: 91

## 2022-08-27 PROCEDURE — 93010 ELECTROCARDIOGRAM REPORT: CPT | Performed by: INTERNAL MEDICINE

## 2022-08-27 PROCEDURE — A9270 NON-COVERED ITEM OR SERVICE: HCPCS | Performed by: STUDENT IN AN ORGANIZED HEALTH CARE EDUCATION/TRAINING PROGRAM

## 2022-08-27 PROCEDURE — 94150 VITAL CAPACITY TEST: CPT

## 2022-08-27 PROCEDURE — 770022 HCHG ROOM/CARE - ICU (200)

## 2022-08-27 PROCEDURE — 30233K1 TRANSFUSION OF NONAUTOLOGOUS FROZEN PLASMA INTO PERIPHERAL VEIN, PERCUTANEOUS APPROACH: ICD-10-PCS | Performed by: SURGERY

## 2022-08-27 PROCEDURE — 82962 GLUCOSE BLOOD TEST: CPT

## 2022-08-27 PROCEDURE — P9034 PLATELETS, PHERESIS: HCPCS

## 2022-08-27 PROCEDURE — 85018 HEMOGLOBIN: CPT | Mod: 91

## 2022-08-27 PROCEDURE — A9270 NON-COVERED ITEM OR SERVICE: HCPCS | Performed by: SURGERY

## 2022-08-27 PROCEDURE — 85347 COAGULATION TIME ACTIVATED: CPT

## 2022-08-27 PROCEDURE — P9012 CRYOPRECIPITATE EACH UNIT: HCPCS

## 2022-08-27 RX ORDER — CEFAZOLIN SODIUM 2 G/100ML
2 INJECTION, SOLUTION INTRAVENOUS EVERY 8 HOURS
Status: DISCONTINUED | OUTPATIENT
Start: 2022-08-27 | End: 2022-08-27

## 2022-08-27 RX ORDER — SODIUM CHLORIDE, SODIUM LACTATE, POTASSIUM CHLORIDE, AND CALCIUM CHLORIDE .6; .31; .03; .02 G/100ML; G/100ML; G/100ML; G/100ML
1000 INJECTION, SOLUTION INTRAVENOUS ONCE
Status: COMPLETED | OUTPATIENT
Start: 2022-08-27 | End: 2022-08-27

## 2022-08-27 RX ORDER — FAMOTIDINE 20 MG/1
20 TABLET, FILM COATED ORAL EVERY 12 HOURS
Status: DISCONTINUED | OUTPATIENT
Start: 2022-08-27 | End: 2022-08-28

## 2022-08-27 RX ADMIN — CEFAZOLIN SODIUM 2 G: 2 INJECTION, SOLUTION INTRAVENOUS at 06:44

## 2022-08-27 RX ADMIN — SODIUM BICARBONATE: 84 INJECTION, SOLUTION INTRAVENOUS at 09:18

## 2022-08-27 RX ADMIN — FAMOTIDINE 20 MG: 10 INJECTION, SOLUTION INTRAVENOUS at 06:44

## 2022-08-27 RX ADMIN — PRAMIPEXOLE DIHYDROCHLORIDE 0.12 MG: 0.12 TABLET ORAL at 11:42

## 2022-08-27 RX ADMIN — PIPERACILLIN AND TAZOBACTAM 3.38 G: 3; .375 INJECTION, POWDER, LYOPHILIZED, FOR SOLUTION INTRAVENOUS; PARENTERAL at 20:35

## 2022-08-27 RX ADMIN — TRANEXAMIC ACID 1000 MG: 100 INJECTION, SOLUTION INTRAVENOUS at 02:39

## 2022-08-27 RX ADMIN — PRAMIPEXOLE DIHYDROCHLORIDE 0.12 MG: 0.12 TABLET ORAL at 18:04

## 2022-08-27 RX ADMIN — FAMOTIDINE 20 MG: 20 TABLET, FILM COATED ORAL at 18:04

## 2022-08-27 RX ADMIN — POLYETHYLENE GLYCOL 3350 1 PACKET: 17 POWDER, FOR SOLUTION ORAL at 18:04

## 2022-08-27 RX ADMIN — DOCUSATE SODIUM 50 MG AND SENNOSIDES 8.6 MG 1 TABLET: 8.6; 5 TABLET, FILM COATED ORAL at 20:35

## 2022-08-27 RX ADMIN — BISACODYL 10 MG: 10 SUPPOSITORY RECTAL at 18:04

## 2022-08-27 RX ADMIN — ATORVASTATIN CALCIUM 80 MG: 80 TABLET, FILM COATED ORAL at 20:36

## 2022-08-27 RX ADMIN — PROPOFOL 5 MCG/KG/MIN: 10 INJECTION, EMULSION INTRAVENOUS at 03:25

## 2022-08-27 RX ADMIN — LABETALOL HYDROCHLORIDE 10 MG: 5 INJECTION, SOLUTION INTRAVENOUS at 12:57

## 2022-08-27 RX ADMIN — OXYCODONE AND ACETAMINOPHEN 1 TABLET: 5; 325 TABLET ORAL at 20:10

## 2022-08-27 RX ADMIN — SODIUM CHLORIDE, POTASSIUM CHLORIDE, SODIUM LACTATE AND CALCIUM CHLORIDE 1000 ML: 600; 310; 30; 20 INJECTION, SOLUTION INTRAVENOUS at 12:14

## 2022-08-27 RX ADMIN — Medication 50 MCG/HR: at 03:07

## 2022-08-27 RX ADMIN — PIPERACILLIN AND TAZOBACTAM 3.38 G: 3; .375 INJECTION, POWDER, LYOPHILIZED, FOR SOLUTION INTRAVENOUS; PARENTERAL at 11:42

## 2022-08-27 RX ADMIN — PIPERACILLIN AND TAZOBACTAM 3.38 G: 3; .375 INJECTION, POWDER, LYOPHILIZED, FOR SOLUTION INTRAVENOUS; PARENTERAL at 08:01

## 2022-08-27 RX ADMIN — INSULIN HUMAN 1 UNITS: 100 INJECTION, SOLUTION PARENTERAL at 06:45

## 2022-08-27 ASSESSMENT — PULMONARY FUNCTION TESTS: FVC: 375

## 2022-08-27 ASSESSMENT — PAIN DESCRIPTION - PAIN TYPE
TYPE: ACUTE PAIN

## 2022-08-27 ASSESSMENT — FIBROSIS 4 INDEX: FIB4 SCORE: 1.62

## 2022-08-27 NOTE — PROGRESS NOTES
Lab called for critical result for Lactic Acid of 5.3. Dr. Siu notified, no intervention needed at this time. Continuing to trend Lactic Acids every 4 hours at this time.

## 2022-08-27 NOTE — ANESTHESIA TIME REPORT
Anesthesia Start and Stop Event Times     Date Time Event    8/26/2022 2158 Ready for Procedure     2219 Anesthesia Start     2318 Anesthesia Stop        Responsible Staff  08/26/22    Name Role Begin End    Vincenzo Rebolledo M.D. Anesth [id number]        Overtime Reason:  no overtime (within assigned shift)    Comments:

## 2022-08-27 NOTE — CARE PLAN
Ventilator Daily Summary    Vent Day # 2     ETT 7.5 @ 21    Ventilator settings changed this shift:yes based on ABGs    APVcmv  24 350 5 30%    Weaning trials:no    Respiratory Procedures:no    Plan: Continue current ventilator settings and wean mechanical ventilation as tolerated per physician orders.

## 2022-08-27 NOTE — CARE PLAN
The patient is Unstable - High likelihood or risk of patient condition declining or worsening    Shift Goals  Clinical Goals: Hemodynamic stability, neuro improvement    Neuro improvement evident throughout shift.   Hemodynamics unstable during beginning of shift and stabilized 0100.     Patient Goals: AFIA  Family Goals: AFIA    Progress made toward(s) clinical / shift goals:    Problem: Fluid Volume  Goal: Fluid volume balance will be maintained  Outcome: Progressing    Pt received multiple RBCs, platelets, FFP, IV fluids throughout shift to maintain fluid volume     Problem: Urinary Elimination  Goal: Establish and maintain regular urinary output  Outcome: Progressing       Problem: Respiratory  Goal: Patient will achieve/maintain optimum respiratory ventilation and gas exchange  Outcome: Progressing  Improved throughout shift.

## 2022-08-27 NOTE — ANESTHESIA PREPROCEDURE EVALUATION
Case: [id number] Date/Time: 08/26/22 2055    Procedure: CREATION, BYPASS, ARTERIAL, FEMORAL TO FEMORAL, USING GRAFT    Location: [facility] OR 09 / SURGERY [facility]    Surgeons: Brandyn Siu M.D.        Aorto-bifem earlier today, bring back for bleeding and instability.     Relevant Problems   PULMONARY   (positive) COPD (chronic obstructive pulmonary disease) (HCC)      CARDIAC   (positive) Aortic thrombus (HCC)   (positive) CAD (coronary artery disease)   (positive) Chronic distal aortic occlusion (HCC)   (positive) Chronic systolic congestive heart failure (HCC)   (positive) Critical ischemia of lower extremity (HCC)   (positive) Hypertension   (positive) Peripheral arterial occlusive disease (HCC)         (positive) IVON (acute kidney injury) (HCC)   (positive) Hydronephrosis       Physical Exam    Airway - unable to assess       Cardiovascular - normal exam  Rhythm: regular  Rate: normal  (-) murmur     Dental    Pulmonary - normal exam  Breath sounds clear to auscultation     Abdominal    Neurological - sedated/unconcious                 Anesthesia Plan    ASA 4- EMERGENT   ASA physical status 4 criteria: MI - recent (< 3 months)ASA physical status emergent criteria: acute hemorrhage    Plan - general       Airway plan will be ETT          Induction: intravenous    Postoperative Plan: Postoperative administration of opioids is intended.    Pertinent diagnostic labs and testing reviewed    Informed Consent:  Emergent - Consent given by clinician

## 2022-08-27 NOTE — PROGRESS NOTES
Tech from Lab called with critical result of Lactic Acid of 4.3 at 12:00. Critical lab result read back to Tech.     12:10: Notified Dr. Damico of lab results to include Lactic, Hgb, and Troponin. MD notified of low urine output of 20mL in the last 2 hours, LR bolus to be infused.

## 2022-08-27 NOTE — PROGRESS NOTES
"  DATE: 8/27/2022    Post Operative Day  0  status-post aortobifemoral bypass .    INTERVAL EVENTS:  Returned from OR on Levophed, intra-operative had varying epinephrine and levophed requirements to maintain pressures. Post-procedure KLEBER in the OR showed depressed EF consistent with most recent Echo as well as decreased anterior wall motion consistent with recent infarct. Vasopressor requirement increased shortly after arrival to SICU, pressures fluid responsive but unable to wean vasopressors. Remained cold despite active rewarming measures. Abdomen initially soft however overtime became more distended. Discussed with Dr Siu who took the patient emergently back to the operating room for exploration where bleeding was found at the proximal anastomosis of the graft. Returned from OR with open abdomen and coagulopathic. Vasopressors weaned and transfused to correct TEG, urine output improved with slowly improving lactate.    REVIEW OF SYSTEMS: Comprehensive review of systems is negative with the exception of the aforementioned HPI, PMH, and PSH bullets in accordance with CMS guidelines.    PHYSICAL EXAMINATION:      Vital Signs: BP (!) 148/100   Pulse (!) 115   Temp 36.2 °C (97.2 °F)   Resp (!) 24   Ht 1.651 m (5' 5\")   Wt 55.5 kg (122 lb 5.7 oz)   SpO2 94%   Physical Exam  Vitals and nursing note reviewed.   Constitutional:       Appearance: She is underweight.      Interventions: She is intubated and restrained.   HENT:      Head: Normocephalic and atraumatic.      Right Ear: External ear normal.      Left Ear: External ear normal.      Nose: Nose normal.      Mouth/Throat:      Mouth: Mucous membranes are dry.      Comments: Endotracheal tube in place  Eyes:      General: No scleral icterus.     Pupils: Pupils are equal, round, and reactive to light.   Cardiovascular:      Rate and Rhythm: Regular rhythm. Tachycardia present.   Pulmonary:      Effort: She is intubated.      Comments: Mechanically " ventilated  Chest:      Chest wall: No lacerations, deformity, tenderness or crepitus.   Abdominal:      General: There is distension.      Palpations: Abdomen is soft.      Tenderness: There is no abdominal tenderness. There is no guarding or rebound.      Comments: Midline wound with dressing in place with strike-through.  Bilateral femoral wounds with dressing in place with strike-through.   Genitourinary:     Comments: Guerrero catheter in place  Musculoskeletal:         General: No swelling or deformity.      Cervical back: Neck supple.   Skin:     General: Skin is cool and dry.      Capillary Refill: Capillary refill takes 2 to 3 seconds.   Neurological:      GCS: GCS eye subscore is 1. GCS verbal subscore is 1. GCS motor subscore is 4.   Psychiatric:      Comments: Unable to evaluate due to clinical condition       LABORATORY VALUES:   Recent Labs     08/26/22 1845 08/26/22 2123 08/26/22 2330 08/27/22  0450   WBC 11.8*  --  6.5 4.0*   RBC 2.76*  --  2.30* 3.13*   HEMOGLOBIN 8.4* 5.4* 7.5* 10.1*   HEMATOCRIT 25.9* 16.2* 21.5* 27.6*   MCV 93.8  --  93.5 88.2   MCH 30.4  --  32.6 32.3   MCHC 32.4*  --  34.9 36.6*   RDW 56.0*  --  45.5 46.1   PLATELETCT 117*  --  196 165   MPV 10.5  --  10.6 10.1     Recent Labs     08/26/22 1845 08/26/22 2330 08/27/22  0450   SODIUM 140 155* 150*   POTASSIUM 4.0 3.8 3.5*   CHLORIDE 111 113* 111   CO2 12* 20 18*   GLUCOSE 250* 192* 174*   BUN 24* 20 22   CREATININE 0.75 0.86 0.98   CALCIUM 8.6 9.1 8.5     Recent Labs     08/24/22  1433 08/27/22  0450   ASTSGOT  --  96*   ALTSGPT  --  85*   TBILIRUBIN  --  1.7*   ALKPHOSPHAT  --  42   GLOBULIN  --  1.8*   INR 1.10  --      Recent Labs     08/24/22 1433   APTT 28.5   INR 1.10        IMAGING:   EC-KLEBER W/O CONT         CT-RENAL COLIC EVALUATION(A/P W/O)   Final Result         1. No hydronephrosis. Nonobstructive bilateral renal calculi seen on previous exam cannot be evaluated due to contrast in the collecting system.      2.  Retained contrast in the kidney from prior CTA, likely due to nephropathy/renal failure.      US-RENAL   Final Result         1.  Mild right hydronephrosis   2.  Punctate left renal calculi without visualized obstructive changes.      CT-CTA AORTA-RO WITH & W/O-POST PROCESS   Final Result      1.  Occlusion of the abdominal aorta just below level of the renal arteries with occlusion of the common and external iliac arteries with reconstitution of flow via epigastric collaterals.   2.  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   3.  Diffusely small caliber bilateral lower extremity arteries with significant atherosclerosis of the bilateral superficial femoral arteries without high-grade stenosis.   4.  Likely single vessel runoff on the right and 2 vessel runoff on the left.   5.  Focal severe stenoses at the bilateral renal artery origins.      These findings were discussed with ESPERANZA DO on 8/23/2022 5:29 PM.      DX-CHEST-PORTABLE (1 VIEW)    (Results Pending)       ASSESSMENT AND PLAN:     Acute postoperative respiratory failure (HCC)  Assessment & Plan  Remained intubated post-operatively.  Continue full mechanical ventilatory support. Ventilator bundle and Trauma weaning protocol.    Hypotension due to hypovolemia  Assessment & Plan  Given 2 FFP, 2 PRBC prior to initiation of massive transfusion and return to operating room for exploration.  Trending end-points of resuscitation.  Vasopressors weaned after take-back.    Coagulopathy (HCC)  Assessment & Plan  Post-take back TEG with low MA, functional fibrinogen, and high Ly30  Given Cryo, Platelets, and TXA  Output from wound vac decreasing  Trending serial labs and TEGs    Acute blood loss anemia  Assessment & Plan  Transfuse to maintain Hgb ~9 given recent cardiac event.    CAD (coronary artery disease)- (present on admission)  Assessment & Plan  History of MI with  PCI one-month ago, on DAPT prior to take-back.  Transfuse to maintain hemoglobin 9.  Wet read of intra-operative KLEBER during index procedure with EF 30-40% and anterior wall motion abnormality consistent with area of infarct from recent MI.  Follow-up final read of KLEBER, consider repeat Echo if vasopressor requirements return.    The patient is critically ill with acute respiratory failure, shock, and coagulopathy.  The patient was seen and examined, discussed with the multidisciplinary critical care team and consulting physicians. Critically evaluated laboratory tests, culture data, medications, imaging, and other diagnostic tests.    The patient has impairment of one or more vital organ systems and a high probability of imminent or life-threatening deterioration in condition. Provided high complexity decision making to assess, manipulate, and support vital system functions to treat vital organ system failure and/or to prevent further life-threatening deterioration of the patient's condition. Requires continued ICU and hospital admission.    Critical care interventions include: integration of multiple data points and associated complex medical decision making, ventilator management, titration of vasopressors, Evaluation and management of critical anemia and blood component transfusion therapy., traumatic shock resuscitation, and management of thrombotic surveillance and risk mitigation.    CRITICAL CARE TIME, EXCLUDING PROCEDURES: 100 minutes.     ____________________________________     Melquiades Russell M.D.    DD: 8/26/2022  6:08 PM

## 2022-08-27 NOTE — PROGRESS NOTES
Pt is tachycardic with HR in the 120s, then bradys down to HR 50s-60s, unable to obtain mechanical BP, manual BP 65/50. Pt placed on code cart, Dr. Russell at bedside,1 unit PRBCs ordered and 1 L bolus of NS started per Dr. Russell.

## 2022-08-27 NOTE — ANESTHESIA POSTPROCEDURE EVALUATION
Patient: Fouzia Santamaria    Procedure Summary     Date: 08/26/22 Room / Location: [facility] OR 09 / SURGERY [facility]    Anesthesia Start: 1454 Anesthesia Stop: 1850    Procedures:       CREATION, BYPASS, ARTERIAL, AORTA TO FEMORAL, BILATERAL (Bilateral: Abdomen)      RENAL ARTERY ENDARTERECTOMY (Bilateral: Abdomen) Diagnosis: (Infrarenal abdominal aortic occlusion with bilateral lower extremity rest pain, Ischemic cardiomyopathy)    Surgeons: Brandyn Siu M.D. Responsible Provider: Ike Hoffmann M.D.    Anesthesia Type: general ASA Status: 4          Final Anesthesia Type: general  Last vitals  BP   Blood Pressure: 119/82    Temp   (!) 35.8 °C (96.4 °F)    Pulse   75   Resp   18    SpO2   100 %      Anesthesia Post Evaluation    Patient location during evaluation: ICU  Patient participation: complete - patient cannot participate  Post-procedure mental status: Sedated.    Airway patency: patent  Anesthetic complications: no  Cardiovascular status: hypotensive and unstable  Respiratory status: acceptable, ventilator, intubated and ETT  Hydration status: euvolemic  Comments: Pt given extensive resuscitation in OR and appeared to be fluid responsive however would subsequently become hypotensive following fluid bolus. Intraoperative KLEBER for hypotension revealed some degree of hypovolemia as well as HFrEF with EF 35-40 with akinesis of the anterior, anteroseptal, and inferoseptal walls. The patient remains in critical condition. Extensive handoff given to the ICU team given the marked instability the patient demonstrated intraoperatively.     PONV: none          No notable events documented.     Nurse Pain Score: 0 (NPRS)

## 2022-08-27 NOTE — PROGRESS NOTES
Tech from Lab called with critical result drop in hgb, hct, and platelets at 1934. Critical lab result read back to tech.   Dr. Russell notified of critical lab result at 1935.  Critical lab result read back by Dr. Russell.

## 2022-08-27 NOTE — PROGRESS NOTES
Vascular surgery    Patient clinically improved this morning after takeback last night for bleeding  Patient clinical status seems to be stabilizing  Remains critically ill  ABThera in place  Serosanguineous drainage  Excellent urine output  Hemoglobin stabilized    Continue resuscitation and critical care management per surgical critical care team  Plan return to operating room tomorrow morning for abdominal closure  Discussed at length with surgical critical care team    Brandyn Siu MD

## 2022-08-27 NOTE — CARE PLAN
The patient is Watcher - Medium risk of patient condition declining or worsening    Shift Goals  Clinical Goals: Hemodynamic stability, Neuro improvement, and possible surgical closure of abdomen.  Patient Goals: AFIA  Family Goals: AFIA    Progress made toward(s) clinical / shift goals:  Patient to go to surgery tomorrow, 8/28/2022 to close abdomen. Patient is still intubated and neuro assessment has been the same throughout the shift, patient following commands intermittently. Family at bedside and updated on plan of care.     Problem: Knowledge Deficit - Standard  Goal: Patient and family/care givers will demonstrate understanding of plan of care, disease process/condition, diagnostic tests and medications  Outcome: Progressing     Problem: Fall Risk  Goal: Patient will remain free from falls  Outcome: Progressing     Problem: Hemodynamics  Goal: Patient's hemodynamics, fluid balance and neurologic status will be stable or improve  Outcome: Progressing     Problem: Safety - Medical Restraint  Goal: Remains free of injury from restraints (Restraint for Interference with Medical Device)  Outcome: Progressing

## 2022-08-27 NOTE — PROGRESS NOTES
tech from Lab called with critical result of lactic acid at 2003. Critical lab result read back to tech.   Dr. Russell notified of critical lab result at 2003.  Critical lab result read back by Dr. Russell.

## 2022-08-27 NOTE — ASSESSMENT & PLAN NOTE
8/29 Iron studies low - replace per protocol.  Continue to trend closely.  Transfuse 1 unit PRBC's for hemoglobin less than 7.

## 2022-08-27 NOTE — ASSESSMENT & PLAN NOTE
8/26 Aorto-bifem with renal artery endarterectomy. Take back for post op bleeding-packed.  8/28 Removed packing and closed. Prevena dressing.  Young Bauer MD. Vascular Surgery.

## 2022-08-27 NOTE — CARE PLAN
Problem: Ventilation  Goal: Ability to achieve and maintain unassisted ventilation or tolerate decreased levels of ventilator support  Description: Target End Date:  4 days     Document on Vent flowsheet    1.  Support and monitor invasive and noninvasive mechanical ventilation  2.  Monitor ventilator weaning response  3.  Perform ventilator associated pneumonia prevention interventions  4.  Manage ventilation therapy by monitoring diagnostic test results  Outcome: Progressing    Vent Day # 2     Ventilator settings changed this shift: %     Weaning trials: 1 hour in the PM     Respiratory Procedures: None     Plan: Continue current ventilator settings and wean mechanical ventilation as tolerated per physician orders.

## 2022-08-27 NOTE — PROGRESS NOTES
Total blood products received by pt during NOC shift:    11 U PRBCs  6 U FFP  3 U platelets  1 U cryoprecipitate  1 g TXA

## 2022-08-27 NOTE — ANESTHESIA POSTPROCEDURE EVALUATION
Patient: Fouzia Santamaria    Procedure Summary     Date: 08/26/22 Room / Location: [facility] OR 09 / SURGERY [facility]    Anesthesia Start: 2219 Anesthesia Stop: 2318    Procedures:       LAPAROTOMY, EXPLORATORY; CONTROL OF POST-OPERATIVE BLEEDING (Abdomen)      APPLICATION OR REPLACEMENT, WOUND VAC (Abdomen) Diagnosis: (Postoperative bleeding status post aortic reconstruction)    Surgeons: Brandyn Siu M.D. Responsible Provider: Vincenzo Rebolledo M.D.    Anesthesia Type: general ASA Status: 4 - Emergent          Final Anesthesia Type: general  Last vitals  BP   Blood Pressure: (!) 85/68    Temp   (!) 33.1 °C (91.6 °F)    Pulse   (!) 113   Resp   (!) 0    SpO2   97 %      Anesthesia Post Evaluation    Patient location during evaluation: ICU  Patient participation: complete - patient cannot participate  Level of consciousness: obtunded/minimal responses    Airway patency: patent  Anesthetic complications: no  Cardiovascular status: adequate (On epi/norepi infusions)  Respiratory status: intubated and ventilator  Hydration status: acceptable    PONV: none          Encounter Notable Events   Notable Event Outcome Phase Comment   Bleeding requiring intervention  Intraprocedure    Hypothermia requiring warming blanket  Intraprocedure    Temperature out of range (< 36°C)  Intraprocedure         Nurse Pain Score: 0 (NPRS)

## 2022-08-27 NOTE — PROGRESS NOTES
Pt arrives to floor with OR team at 1831, originally brought to OR from telemetry 7. Assumed pt care. Continuous monitoring in place. Received report. Per OR team, pt became hypotensive during surgery and received 2 U PRBCs, 4 fluid boluses, and 50 g albumin.    Temp: 92.5 F, bladder  VS per Epic  Weight: 44.8 kg    Pt unresponsive, GCS of 3 post-op, MD aware. Fall precautions in place. Bed alarm on. Bed is locked and in low position. Treaded slippers in place.     Pt belongings: cell phone and , danial in belongings bag in closet     2 RN skin check complete with KATIA Duff    Current impaired skin areas:    - facial swelling, R eye swelling  - midline abdominal incision with island dressing in place, sanguineous drainage, marked upon arrival  - bilateral groin sites with dressings in place, sanguineous drainage, marked upon arrival  - redness to sacrum with scarring     Devices in place: ETT, SpO2 probe, ECG electrodes x 5, BP cuff, PIV x 2, RIJ CVC, SCDs placed     Devices assessed and repositioned PRN.     The following preventative measures and interventions in place: Q2 turns, mepilex placed to sacrum, pillows used to float elbows and heels    Wound consult placedYES/NO: yes

## 2022-08-27 NOTE — ANESTHESIA PROCEDURE NOTES
KLEBER    Date/Time: 8/26/2022 5:30 PM  Performed by: Ike Hoffmann M.D.  Authorized by: Ike Hoffmann M.D.     Start Time:8/26/2022 5:30 PM  Preanesthetic Checklist: patient identified, IV checked, site marked, risks and benefits discussed, surgical consent, monitors and equipment checked, pre-op evaluation and timeout performed    Indication for KLEBER: diagnostic   Patient Location: OR  Intubated: Yes  Bite Block: No  Heart Visualized: Yes  Insertion: atraumatic    **See FULL KLEBER report in patient's chart via CV Synapse**

## 2022-08-27 NOTE — ASSESSMENT & PLAN NOTE
Given 2 FFP, 2 PRBC prior to initiation of massive transfusion and return to operating room for exploration.  Trending end-points of resuscitation.  Vasopressors weaned after take-back.

## 2022-08-27 NOTE — ASSESSMENT & PLAN NOTE
Post-take back TEG with low MA, functional fibrinogen, and high LY30  Given Cryo, Platelets, and TXA  Output from ABThera decreasing  Trending serial labs  Repeat TEG improved

## 2022-08-27 NOTE — ASSESSMENT & PLAN NOTE
7/3/22 MI with PCI she received 3 drug-eluting stents to the LAD.  On DAPT prior to take-back.  Transfuse to maintain hemoglobin 9.  Wet read of intra-operative KLEBER during index procedure with EF 30-40% and anterior wall motion abnormality consistent with area of infarct from recent MI.  8/29 Restart ASA. KLEBER Moderately depressed LV EF 30-35% visually estimated. Akinesis of anterior and anteroseptal walls.   Hypokinesis of anterolateral and inferoseptal segments. Normal wall motion of inferior and inferolateral walls. Preserved RV systolic.  9/1 Plavix restarted.

## 2022-08-27 NOTE — OP REPORT
[facility] OPERATIVE NOTE    08/26/22      PRE-OPERATIVE DIAGNOSIS -     Postoperative bleeding status post aortic reconstruction  Shock  Cardiomyopathy    POST-OPERATIVE DIAGNOSIS -same    PROCEDURE PERFORMED -     Emergent return to surgery for postoperative bleeding  Exploratory laparotomy  Control of postoperative bleeding  Abdominal packing and ABThera placement    SURGEON - Brandyn Siu M.D.    ASSISTANT -Melquiades Russell MD    TYPE OF ANESTHESIA -  General     ANESTHESIOLOGIST  - No anesthesia staff entered.     SPECIMENS -none    INDICATIONS - 61 y.o. who underwent bilateral renal artery eversion endarterectomy with aortobifemoral reconstruction earlier today.  The patient was suffering from severe bilateral lower extremity ischemia with intractable rest pain.  Patient had bilateral critical renal artery stenosis.  Patient was taken to the operating room for renal artery endarterectomy and reconstruction.  Patient's postoperative course was complicated by hypotension and evidence of hemorrhage.  Patient is being taken back to the operating room emergently for exploration.    PROCEDURE IN DETAIL -     Patient was taken to the operating suite emergently placed in the supine position.  The patient's abdomen was rapidly prepped and draped in sterile fashion.  The patient's staples were removed.  There was a large hemoperitoneum upon entering the patient's abdominal cavity.  Active resuscitation was being undertaken by anesthesia.  The patient's abdomen is rapidly evacuated and packed with multiple laparotomy pad pads.  Upon exploration of the abdominal cavity the patient noted to have bleeding from the aortotomy site at the level of the anastomosis.  Several 3-0 Prolene pledgeted sutures were placed to control this area of bleeding.  The patient had generalized oozing from the entire retroperitoneal space area.  The patient had no clot noted within the abdominal cavity suggesting significant coagulopathy.  Based on  the continuing use femoral surface of the retroperitoneum the decision was made to pack the patient place an ABThera and take the patient back to the intensive care unit for rewarming and correction of coagulopathy.  Multiple laparotomy pads were packed throughout the peritoneal cavity and ABThera was placed and the patient was taken to the SICU for rewarming and correction of coagulopathy.      _______________________  Brandyn Siu M.D.

## 2022-08-27 NOTE — PROGRESS NOTES
Trauma / Surgical Daily Progress Note    Date of Service  8/27/2022    Chief Complaint  61 y.o. 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 Underwent operative repair.    Interval Events  Hospital day #5  Critically ill  Requires continued ICU and hospital admission  Seen on rounds and discussed with multidisciplinary team  Injuries and physiologic derangements pose a significant risk of mortality and long term morbidity  Critical care interventions include:  integration of multiple data points and associated complex medical decisions    Mgt of ventilator-weaning  Ongoing post operative resuscitation continues-several crystalloid boluses. Serial labs. Following lactic acid and hgbs  Pain control  Plan return to OR in 1 day    Review of Systems  Review of Systems   Unable to perform ROS: Intubated      Vital Signs for last 24 hours  Temp:  [33.1 °C (91.6 °F)-36.3 °C (97.3 °F)] 36.2 °C (97.2 °F)  Pulse:  [] 97  Resp:  [0-32] 20  BP: ()/() 169/109  SpO2:  [85 %-100 %] 100 %    Hemodynamic parameters for last 24 hours       Respiratory Data     Respiration: 20, Pulse Oximetry: 100 %     Work Of Breathing / Effort: Vented  RUL Breath Sounds: Clear, RML Breath Sounds: Clear;Diminished, RLL Breath Sounds: Clear;Diminished, ZAINAB Breath Sounds: Clear, LLL Breath Sounds: Clear;Diminished    Physical Exam  Physical Exam  Vitals and nursing note reviewed.   Constitutional:       General: She is not in acute distress.     Appearance: She is ill-appearing. She is not toxic-appearing.   HENT:      Head: Normocephalic.      Right Ear: External ear normal.      Left Ear: External ear normal.   Eyes:      Extraocular Movements: Extraocular movements intact.      Pupils: Pupils are equal, round, and reactive to light.   Cardiovascular:      Rate and Rhythm: Normal rate and regular rhythm.      Pulses: Normal pulses.      Heart sounds: Normal heart sounds.   Pulmonary:      Effort: No respiratory  distress.      Breath sounds: No wheezing.      Comments: On vent  Abdominal:      General: There is distension.      Comments: Abthera in place with serosanguinous output   Genitourinary:     Comments: Guerrero in place  Musculoskeletal:         General: Normal range of motion.      Cervical back: Normal range of motion.   Skin:     General: Skin is warm and dry.      Capillary Refill: Capillary refill takes less than 2 seconds.   Neurological:      General: No focal deficit present.      Comments: Sedated  follows   Psychiatric:      Comments: Unable to assess       Laboratory  Recent Results (from the past 24 hour(s))   COD - Adult (Type and Screen)    Collection Time: 08/26/22  1:12 PM   Result Value Ref Range    ABO Grouping Only A     Rh Grouping Only NEG     Antibody Screen-Cod NEG     Component R       R99                 Red Cells, LR       [id number]   transfused   08/26/22   18:17      Product Type R99     Dispense Status transfused     Unit Number (Barcoded) [id number]     Product Code (Barcoded) Q3003Y25     Blood Type (Barcoded) 0600     Component R       R99                 Red Cells, LR       [id number]   transfused   08/26/22   14:30      Product Type R99     Dispense Status transfused     Unit Number (Barcoded) [id number]     Product Code (Barcoded) Y4060D18     Blood Type (Barcoded) 0600     Component R       R33                 Red Blood Cells     [id number]   transfused   08/26/22   19:29      Product Type Red Blood Cells LR Pheresis     Dispense Status transfused     Unit Number (Barcoded) [id number]     Product Code (Barcoded) H9513Z66     Blood Type (Barcoded) 0600     Component R       R3.                 Red Blood Cells     [id number]   transfused   08/26/22   20:46      Product Type Red Blood Cells  LR Pheresis     Dispense Status transfused     Unit Number (Barcoded) [id number]     Product Code (Barcoded) M1632A68     Blood Type (Barcoded) 0600     Component R        R99                 Red Cells, LR       [id number]   transfused   08/26/22   21:35      Product Type R99     Dispense Status transfused     Unit Number (Barcoded) [id number]     Product Code (Barcoded) W1229X68     Blood Type (Barcoded) 0600     Component R       R4                  Red Blood Cells     [id number]   issued       08/27/22   02:45      Product Type Red Blood Cells LR Pheresis     Dispense Status issued     Unit Number (Barcoded) [id number]     Product Code (Barcoded) G7643U44     Blood Type (Barcoded) 0600     Component R       R99                 Red Cells, LR       [id number]   issued       08/27/22   04:27      Product Type R99     Dispense Status issued     Unit Number (Barcoded) [id number]     Product Code (Barcoded) D0946L81     Blood Type (Barcoded) 9500    MASSIVE TRANSFUSION    Collection Time: 08/26/22  1:12 PM   Result Value Ref Range    Component P       P70                 Plts,Pheresis       [id number]   transfused   08/26/22   21:45      Product Type Platelets Pheresis LR     Dispense Status transfused     Unit Number (Barcoded) [id number]     Product Code (Barcoded) E7151N98     Blood Type (Barcoded) 6200     Component R       R7                  Red Blood Cells7    [id number]   transfused   08/26/22   21:45      Product Type Red Blood Cells LR Pheresis     Dispense Status transfused     Unit Number (Barcoded) [id number]     Product Code (Barcoded) X1029Q09     Blood Type (Barcoded) 0600     Component R       R99                 Red Cells, LR       [id number]   transfused   08/26/22   21:45      Product Type R99     Dispense Status transfused     Unit Number (Barcoded) [id number]     Product Code (Barcoded) K7221C09     Blood Type (Barcoded) 0600     Component R       R99                 Red Cells, LR       [id number]   transfused   08/26/22   21:45      Product Type R99     Dispense Status transfused     Unit Number (Barcoded) [id number]      Product Code (Barcoded) O6340M07     Blood Type (Barcoded) 0600     Component R       R99                 Red Cells, LR       [id number]   transfused   08/26/22   21:45      Product Type R99     Dispense Status transfused     Unit Number (Barcoded) [id number]     Product Code (Barcoded) V2911R07     Blood Type (Barcoded) 0600     Component F       LP                  LiquidPlasmaIRR     [id number]   transfused   08/26/22   21:45      Product Type LP     Dispense Status transfused     Unit Number (Barcoded) [id number]     Product Code (Barcoded) R3760S62     Blood Type (Barcoded) 0600     Component F       LP                  LiquidPlasmaIRR     [id number]   transfused   08/26/22   21:45      Product Type LP     Dispense Status transfused     Unit Number (Barcoded) [id number]     Product Code (Barcoded) E3493R94     Blood Type (Barcoded) 6200     Component F       LP                  LiquidPlasmaIRR     [id number]   transfused   08/26/22   21:45      Product Type LP     Dispense Status transfused     Unit Number (Barcoded) [id number]     Product Code (Barcoded) D5998K57     Blood Type (Barcoded) 8400     Component F       LP                  LiquidPlasmaIRR     [id number]   transfused   08/26/22   21:45      Product Type LP     Dispense Status transfused     Unit Number (Barcoded) [id number]     Product Code (Barcoded) Q9480D78     Blood Type (Barcoded) 0600    MASSIVE TRANSFUSION    Collection Time: 08/26/22  1:12 PM   Result Value Ref Range    Component R       R4                  Red Blood Cells     [id number]   transfused   08/26/22   22:25      Product Type Red Blood Cells LR Pheresis     Dispense Status transfused     Unit Number (Barcoded) [id number]     Product Code (Barcoded) P4661G14     Blood Type (Barcoded) 9500     Component R       R4                  Red Blood Cells     [id number]   released     08/27/22   01:43      Product Type Red Blood Cells LR Pheresis      Dispense Status /Released     Unit Number (Barcoded) [id number]     Product Code (Barcoded) A0932Q26     Blood Type (Barcoded) 0600     Component R       R99                 Red Cells, LR       [id number]   released     22   01:43      Product Type R99     Dispense Status /Released     Unit Number (Barcoded) [id number]     Product Code (Barcoded) U3238A60     Blood Type (Barcoded) 9500     Component R       R4                  Red Blood Cells     [id number]   released     22   01:43      Product Type Red Blood Cells LR Pheresis     Dispense Status /Released     Unit Number (Barcoded) [id number]     Product Code (Barcoded) F4649I78     Blood Type (Barcoded) 9500     Component F       LP                  LiquidPlasmaIRR     [id number]   released     22   01:38      Product Type LP     Dispense Status /Released     Unit Number (Barcoded) [id number]     Product Code (Barcoded) X8361G69     Blood Type (Barcoded) 8400     Component F       LP                  LiquidPlasmaIRR     [id number]   released     22   01:38      Product Type LP     Dispense Status /Released     Unit Number (Barcoded) [id number]     Product Code (Barcoded) A7800C62     Blood Type (Barcoded) 0600     Component F       LP                  LiquidPlasmaIRR     [id number]   released     22   01:38      Product Type LP     Dispense Status /Released     Unit Number (Barcoded) [id number]     Product Code (Barcoded) D3305Z73     Blood Type (Barcoded) 6200     Component F       LP                  LiquidPlasmaIRR     [id number]   released     22   01:38      Product Type LP     Dispense Status /Released     Unit Number (Barcoded) [id number]     Product Code (Barcoded) D0710E77     Blood Type (Barcoded) 6200     Component P       P72                 Plts,Pheresis       [id number]   released     22   00:55      Product Type  Platelets Pheresis LR     Dispense Status /Released     Unit Number (Barcoded) [id number]     Product Code (Barcoded) L0096L10     Blood Type (Barcoded) 9500    POCT arterial blood gas device results    Collection Time: 22  4:26 PM   Result Value Ref Range    Ph 7.330 (L) 7.400 - 7.500    Pco2 33.2 26.0 - 37.0 mmHg    Po2 471 (H) 64 - 87 mmHg    Tco2 18 (L) 20 - 33 mmol/L    S02 100 (H) 93 - 99 %    Hco3 17.5 17.0 - 25.0 mmol/L    BE -8 (L) -4 - 3 mmol/L    Body Temp 35.0 C degrees    Ph Temp Yoandy 7.358 (L) 7.400 - 7.500    Pco2 Temp Co 30.4 26.0 - 37.0 mmHg    Po2 Temp Cor 458 (H) 64 - 87 mmHg    Specimen Arterial     End Tidal Carbon Dioxide 22 mmhg   POCT sodium device results    Collection Time: 22  4:26 PM   Result Value Ref Range    Istat Sodium 139 135 - 145 mmol/L   POCT potassium device results    Collection Time: 22  4:26 PM   Result Value Ref Range    Istat Potassium 3.2 (L) 3.6 - 5.5 mmol/L   POCT ionized CA device results    Collection Time: 22  4:26 PM   Result Value Ref Range    Istat Ionized Calcium 1.04 (L) 1.10 - 1.30 mmol/L   POCT hematocrit and hemoglobin device results    Collection Time: 22  4:26 PM   Result Value Ref Range    Istat Hematocrit 27 (L) 37 - 47 %    Istat Hemoglobin 9.2 (L) 12.0 - 16.0 g/dL   POCT glucose device results    Collection Time: 22  6:42 PM   Result Value Ref Range    POC Glucose, Blood 219 (H) 65 - 99 mg/dL   Platelet Mapping (TEG) - Order if platelet inhibitions are required    Collection Time: 22  6:45 PM   Result Value Ref Range    Reaction Time Initial-R 4.3 (L) 4.6 - 9.1 min    React Time Initial Hep 4.1 (L) 4.3 - 8.3 min    Clot Kinetics-K 1.4 0.8 - 2.1 min    Clot Angle-Angle 73.2 63.0 - 78.0 degrees    Maximum Clot Strength-MA 55.5 52.0 - 69.0 mm    TEG Functional Fibrinogen(MA) 16.3 15.0 - 32.0 mm    Lysis 30 minutes-LY30 0.0 0.0 - 2.6 %    % Inhibition ADP 73.2 (H) 0.0 - 17.0 %    % Inhibition AA 85.3 (H)  0.0 - 11.0 %    TEG Algorithm Link Algorithm    CBC WITHOUT DIFFERENTIAL    Collection Time: 08/26/22  6:45 PM   Result Value Ref Range    WBC 11.8 (H) 4.8 - 10.8 K/uL    RBC 2.76 (L) 4.20 - 5.40 M/uL    Hemoglobin 8.4 (L) 12.0 - 16.0 g/dL    Hematocrit 25.9 (L) 37.0 - 47.0 %    MCV 93.8 81.4 - 97.8 fL    MCH 30.4 27.0 - 33.0 pg    MCHC 32.4 (L) 33.6 - 35.0 g/dL    RDW 56.0 (H) 35.9 - 50.0 fL    Platelet Count 117 (L) 164 - 446 K/uL    MPV 10.5 9.0 - 12.9 fL   Basic Metabolic Panel    Collection Time: 08/26/22  6:45 PM   Result Value Ref Range    Sodium 140 135 - 145 mmol/L    Potassium 4.0 3.6 - 5.5 mmol/L    Chloride 111 96 - 112 mmol/L    Co2 12 (L) 20 - 33 mmol/L    Glucose 250 (H) 65 - 99 mg/dL    Bun 24 (H) 8 - 22 mg/dL    Creatinine 0.75 0.50 - 1.40 mg/dL    Calcium 8.6 8.5 - 10.5 mg/dL    Anion Gap 17.0 (H) 7.0 - 16.0   TROPONIN    Collection Time: 08/26/22  6:45 PM   Result Value Ref Range    Troponin T 40 (H) 6 - 19 ng/L   POCT arterial blood gas device results    Collection Time: 08/26/22  6:45 PM   Result Value Ref Range    Ph 7.316 (L) 7.400 - 7.500    Pco2 29.9 26.0 - 37.0 mmHg    Po2 384 (H) 64 - 87 mmHg    Tco2 16 (L) 20 - 33 mmol/L    S02 100 (H) 93 - 99 %    Hco3 15.3 (L) 17.0 - 25.0 mmol/L    BE -10 (L) -4 - 3 mmol/L    Body Temp 93.6 F degrees    O2 Therapy 100 %    iPF Ratio 384     Ph Temp Yoandy 7.355 (L) 7.400 - 7.500    Pco2 Temp Co 26.5 26.0 - 37.0 mmHg    Po2 Temp Cor 369 (H) 64 - 87 mmHg    Specimen Arterial     DelSys Vent     End Tidal Carbon Dioxide 20 mmhg    Tidal Volume 400 mL    Peep End Expiratory Pressure 8 cmh20    Set Rate 20     Mode APV-CMV    Triglyceride    Collection Time: 08/26/22  6:45 PM   Result Value Ref Range    Triglycerides 105 0 - 149 mg/dL   LACTIC ACID    Collection Time: 08/26/22  6:45 PM   Result Value Ref Range    Lactic Acid 4.3 (HH) 0.5 - 2.0 mmol/L   ESTIMATED GFR    Collection Time: 08/26/22  6:45 PM   Result Value Ref Range    GFR (CKD-EPI) 91 >60  mL/min/1.73 m 2   EKG    Collection Time: 22  6:53 PM   Result Value Ref Range    Report       [facility]    Test Date:  2022  Pt Name:    CHRISTINA BLOUNT               Department: [facility]  MRN:        [id number]                      Room:       [facility]  Gender:     Female                       Technician: ESTEBAN  :        [id number]                   Requested By:ORLANDO SIMON  Order #:    [id number]                    Reading MD: Fidel Henson MD    Measurements  Intervals                                Axis  Rate:       129                          P:          90  WA:         139                          QRS:        73  QRSD:       88                           T:          94  QT:         298  QTc:        437    Interpretive Statements  SINUS TACHYCARDIA  ANTERIOR INFARCT, ACUTE (LAD)  Electronically Signed On 2022 8:07:59 PDT by Fidel Henson MD     POCT arterial blood gas device results    Collection Time: 22  7:33 PM   Result Value Ref Range    Ph 7.192 (LL) 7.400 - 7.500    Pco2 24.5 (L) 26.0 - 37.0 mmHg    Po2 168 (H) 64 - 87 mmHg    Tco2 10 (L) 20 - 33 mmol/L    S02 99 93 - 99 %    Hco3 9.4 (L) 17.0 - 25.0 mmol/L    BE -17 (L) -4 - 3 mmol/L    Body Temp 93.6 F degrees    O2 Therapy 40 %    iPF Ratio 420     Ph Temp Yoandy 7.229 (LL) 7.400 - 7.500    Pco2 Temp Co 21.7 (L) 26.0 - 37.0 mmHg    Po2 Temp Cor 153 (H) 64 - 87 mmHg    Specimen Arterial     DelSys Vent     End Tidal Carbon Dioxide 22 mmhg    Tidal Volume 350 mL    Peep End Expiratory Pressure 8 cmh20    Set Rate 16     Mode APV-CMV    POCT lactate device results    Collection Time: 22  7:33 PM   Result Value Ref Range    iStat Lactate 6.8 (HH) 0.5 - 2.0 mmol/L   FRESH FROZEN PLASMA    Collection Time: 22  7:44 PM   Result Value Ref Range    Component Ft       FPT                 Plasma, Thawed      [id number]   transfused   22   19:49      Product Type Plasma  Thawed     Dispense Status transfused     Unit Number  (Barcoded) [id number]     Product Code (Barcoded) T9158B51     Blood Type (Barcoded) 0600     Component F       TA4                 Thawed Plasma 4     [id number]   transfused   08/26/22   20:18      Product Type Thawed Apheresis Plasma 4th Cont     Dispense Status transfused     Unit Number (Barcoded) [id number]     Product Code (Barcoded) F7556S30     Blood Type (Barcoded) 8400     Component F       TA1                 Thawed Plasma 1     [id number]   issued       08/27/22   05:06      Product Type Thawed Apheresis Plasma 1st Cont     Dispense Status issued     Unit Number (Barcoded) [id number]     Product Code (Barcoded) Q0966J20     Blood Type (Barcoded) 6200    POCT arterial blood gas device results    Collection Time: 08/26/22  8:35 PM   Result Value Ref Range    Ph 7.171 (LL) 7.400 - 7.500    Pco2 28.1 26.0 - 37.0 mmHg    Po2 181 (H) 64 - 87 mmHg    Tco2 11 (L) 20 - 33 mmol/L    S02 99 93 - 99 %    Hco3 10.3 (L) 17.0 - 25.0 mmol/L    BE -17 (L) -4 - 3 mmol/L    Body Temp 92.3 F degrees    O2 Therapy 40 %    iPF Ratio 453     Ph Temp Yoandy 7.217 (LL) 7.400 - 7.500    Pco2 Temp Co 24.1 (L) 26.0 - 37.0 mmHg    Po2 Temp Cor 163 (H) 64 - 87 mmHg    Specimen Arterial     DelSys Vent     End Tidal Carbon Dioxide 19 mmhg    Tidal Volume 350 mL    Peep End Expiratory Pressure 5 cmh20    Set Rate 24     Mode APV-CMV    POCT lactate device results    Collection Time: 08/26/22  8:35 PM   Result Value Ref Range    iStat Lactate 6.5 (HH) 0.5 - 2.0 mmol/L   HEMOGLOBIN AND HEMATOCRIT    Collection Time: 08/26/22  9:23 PM   Result Value Ref Range    Hemoglobin 5.4 (LL) 12.0 - 16.0 g/dL    Hematocrit 16.2 (LL) 37.0 - 47.0 %   PLATELETS REQUEST    Collection Time: 08/26/22  9:24 PM   Result Value Ref Range    Component P       P72                 Plts,Pheresis       [id number]   transfused   08/26/22   21:53      Product Type Platelets Pheresis LR     Dispense Status transfused     Unit Number (Barcoded)  [id number]     Product Code (Barcoded) X8975K41     Blood Type (Barcoded) 5100     Component P       P71                 Plts,Pheresis       [id number]   issued       08/27/22   03:34      Product Type Platelets Pheresis LR     Dispense Status issued     Unit Number (Barcoded) [id number]     Product Code (Barcoded) O9086J16     Blood Type (Barcoded) 6200    PLATELET MAPPING WITH BASIC TEG    Collection Time: 08/26/22 10:38 PM   Result Value Ref Range    Reaction Time Initial-R 7.1 4.6 - 9.1 min    React Time Initial Hep 6.4 4.3 - 8.3 min    Clot Kinetics-K 2.6 (H) 0.8 - 2.1 min    Clot Angle-Angle 66.6 63.0 - 78.0 degrees    Maximum Clot Strength-MA <40.0 (L) 52.0 - 69.0 mm    TEG Functional Fibrinogen(MA) 7.6 (L) 15.0 - 32.0 mm    Lysis 30 minutes-LY30 >22.0 (HH) 0.0 - 2.6 %    % Inhibition ADP See Comment 0.0 - 17.0 %    % Inhibition AA See Comment 0.0 - 11.0 %    TEG Algorithm Link Algorithm    Basic Metabolic Panel    Collection Time: 08/26/22 11:30 PM   Result Value Ref Range    Sodium 155 (H) 135 - 145 mmol/L    Potassium 3.8 3.6 - 5.5 mmol/L    Chloride 113 (H) 96 - 112 mmol/L    Co2 20 20 - 33 mmol/L    Glucose 192 (H) 65 - 99 mg/dL    Bun 20 8 - 22 mg/dL    Creatinine 0.86 0.50 - 1.40 mg/dL    Calcium 9.1 8.5 - 10.5 mg/dL    Anion Gap 22.0 (H) 7.0 - 16.0   LACTIC ACID    Collection Time: 08/26/22 11:30 PM   Result Value Ref Range    Lactic Acid 12.2 (HH) 0.5 - 2.0 mmol/L   CBC WITH DIFFERENTIAL    Collection Time: 08/26/22 11:30 PM   Result Value Ref Range    WBC 6.5 4.8 - 10.8 K/uL    RBC 2.30 (L) 4.20 - 5.40 M/uL    Hemoglobin 7.5 (L) 12.0 - 16.0 g/dL    Hematocrit 21.5 (L) 37.0 - 47.0 %    MCV 93.5 81.4 - 97.8 fL    MCH 32.6 27.0 - 33.0 pg    MCHC 34.9 33.6 - 35.0 g/dL    RDW 45.5 35.9 - 50.0 fL    Platelet Count 196 164 - 446 K/uL    MPV 10.6 9.0 - 12.9 fL    Neutrophils-Polys 80.00 (H) 44.00 - 72.00 %    Lymphocytes 11.20 (L) 22.00 - 41.00 %    Monocytes 7.20 0.00 - 13.40 %    Eosinophils 0.50  0.00 - 6.90 %    Basophils 0.20 0.00 - 1.80 %    Immature Granulocytes 0.90 0.00 - 0.90 %    Nucleated RBC 0.00 /100 WBC    Neutrophils (Absolute) 5.20 2.00 - 7.15 K/uL    Lymphs (Absolute) 0.73 (L) 1.00 - 4.80 K/uL    Monos (Absolute) 0.47 0.00 - 0.85 K/uL    Eos (Absolute) 0.03 0.00 - 0.51 K/uL    Baso (Absolute) 0.01 0.00 - 0.12 K/uL    Immature Granulocytes (abs) 0.06 0.00 - 0.11 K/uL    NRBC (Absolute) 0.00 K/uL   TROPONIN    Collection Time: 08/26/22 11:30 PM   Result Value Ref Range    Troponin T 117 (H) 6 - 19 ng/L   ESTIMATED GFR    Collection Time: 08/26/22 11:30 PM   Result Value Ref Range    GFR (CKD-EPI) 77 >60 mL/min/1.73 m 2   POCT glucose device results    Collection Time: 08/26/22 11:43 PM   Result Value Ref Range    POC Glucose, Blood 152 (H) 65 - 99 mg/dL   POCT arterial blood gas device results    Collection Time: 08/27/22 12:12 AM   Result Value Ref Range    Ph 7.330 (L) 7.400 - 7.500    Pco2 30.4 26.0 - 37.0 mmHg    Po2 164 (H) 64 - 87 mmHg    Tco2 17 (L) 20 - 33 mmol/L    S02 99 93 - 99 %    Hco3 16.0 (L) 17.0 - 25.0 mmol/L    BE -9 (L) -4 - 3 mmol/L    Body Temp 95.7 F degrees    O2 Therapy 40 %    iPF Ratio 410     Ph Temp Yoandy 7.353 (L) 7.400 - 7.500    Pco2 Temp Co 28.3 26.0 - 37.0 mmHg    Po2 Temp Cor 155 (H) 64 - 87 mmHg    Specimen Arterial     DelSys Vent     End Tidal Carbon Dioxide 23 mmhg    Tidal Volume 350 mL    Peep End Expiratory Pressure 5 cmh20    Set Rate 24     Mode APV-CMV    POCT lactate device results    Collection Time: 08/27/22 12:12 AM   Result Value Ref Range    iStat Lactate 11.0 (HH) 0.5 - 2.0 mmol/L   CRYOPRECIPITATE    Collection Time: 08/27/22  1:52 AM   Result Value Ref Range    Component Ct       CTP                 Cryoprecipitate     [id number]   issued       08/27/22   02:08      Product Type CTP     Dispense Status issued     Unit Number (Barcoded) [id number]     Product Code (Barcoded) P6529Z06     Blood Type (Barcoded) 5100    POCT arterial blood  gas device results    Collection Time: 08/27/22  2:51 AM   Result Value Ref Range    Ph 7.411 7.400 - 7.500    Pco2 27.5 26.0 - 37.0 mmHg    Po2 156 (H) 64 - 87 mmHg    Tco2 18 (L) 20 - 33 mmol/L    S02 99 93 - 99 %    Hco3 17.4 17.0 - 25.0 mmol/L    BE -7 (L) -4 - 3 mmol/L    Body Temp 96.6 F degrees    O2 Therapy 40 %    iPF Ratio 390     Ph Temp Yoandy 7.427 7.400 - 7.500    Pco2 Temp Co 26.2 26.0 - 37.0 mmHg    Po2 Temp Cor 150 (H) 64 - 87 mmHg    Specimen Arterial     DelSys Vent     End Tidal Carbon Dioxide 23 mmhg    Tidal Volume 350 mL    Peep End Expiratory Pressure 5 cmh20    Set Rate 24     Mode APV-CMV    CBC with Differential: Tomorrow AM    Collection Time: 08/27/22  4:50 AM   Result Value Ref Range    WBC 4.0 (L) 4.8 - 10.8 K/uL    RBC 3.13 (L) 4.20 - 5.40 M/uL    Hemoglobin 10.1 (L) 12.0 - 16.0 g/dL    Hematocrit 27.6 (L) 37.0 - 47.0 %    MCV 88.2 81.4 - 97.8 fL    MCH 32.3 27.0 - 33.0 pg    MCHC 36.6 (H) 33.6 - 35.0 g/dL    RDW 46.1 35.9 - 50.0 fL    Platelet Count 165 164 - 446 K/uL    MPV 10.1 9.0 - 12.9 fL    Neutrophils-Polys 77.90 (H) 44.00 - 72.00 %    Lymphocytes 10.00 (L) 22.00 - 41.00 %    Monocytes 11.00 0.00 - 13.40 %    Eosinophils 0.30 0.00 - 6.90 %    Basophils 0.00 0.00 - 1.80 %    Immature Granulocytes 0.80 0.00 - 0.90 %    Nucleated RBC 0.00 /100 WBC    Neutrophils (Absolute) 3.12 2.00 - 7.15 K/uL    Lymphs (Absolute) 0.40 (L) 1.00 - 4.80 K/uL    Monos (Absolute) 0.44 0.00 - 0.85 K/uL    Eos (Absolute) 0.01 0.00 - 0.51 K/uL    Baso (Absolute) 0.00 0.00 - 0.12 K/uL    Immature Granulocytes (abs) 0.03 0.00 - 0.11 K/uL    NRBC (Absolute) 0.00 K/uL   PLATELET MAPPING WITH BASIC TEG    Collection Time: 08/27/22  4:50 AM   Result Value Ref Range    Reaction Time Initial-R 5.8 4.6 - 9.1 min    React Time Initial Hep 5.0 4.3 - 8.3 min    Clot Kinetics-K 1.1 0.8 - 2.1 min    Clot Angle-Angle 74.8 63.0 - 78.0 degrees    Maximum Clot Strength-MA 63.1 52.0 - 69.0 mm    TEG Functional  Fibrinogen(MA) 20.9 15.0 - 32.0 mm    Lysis 30 minutes-LY30 0.0 0.0 - 2.6 %    % Inhibition ADP 50.0 (H) 0.0 - 17.0 %    % Inhibition AA 64.2 (H) 0.0 - 11.0 %    TEG Algorithm Link Algorithm    TROPONIN    Collection Time: 08/27/22  4:50 AM   Result Value Ref Range    Troponin T 155 (H) 6 - 19 ng/L   Comp Metabolic Panel    Collection Time: 08/27/22  4:50 AM   Result Value Ref Range    Sodium 150 (H) 135 - 145 mmol/L    Potassium 3.5 (L) 3.6 - 5.5 mmol/L    Chloride 111 96 - 112 mmol/L    Co2 18 (L) 20 - 33 mmol/L    Anion Gap 21.0 (H) 7.0 - 16.0    Glucose 174 (H) 65 - 99 mg/dL    Bun 22 8 - 22 mg/dL    Creatinine 0.98 0.50 - 1.40 mg/dL    Calcium 8.5 8.5 - 10.5 mg/dL    AST(SGOT) 96 (H) 12 - 45 U/L    ALT(SGPT) 85 (H) 2 - 50 U/L    Alkaline Phosphatase 42 30 - 99 U/L    Total Bilirubin 1.7 (H) 0.1 - 1.5 mg/dL    Albumin 2.9 (L) 3.2 - 4.9 g/dL    Total Protein 4.7 (L) 6.0 - 8.2 g/dL    Globulin 1.8 (L) 1.9 - 3.5 g/dL    A-G Ratio 1.6 g/dL   LACTIC ACID    Collection Time: 08/27/22  4:50 AM   Result Value Ref Range    Lactic Acid 8.9 (HH) 0.5 - 2.0 mmol/L   ESTIMATED GFR    Collection Time: 08/27/22  4:50 AM   Result Value Ref Range    GFR (CKD-EPI) 66 >60 mL/min/1.73 m 2   LACTIC ACID    Collection Time: 08/27/22  7:50 AM   Result Value Ref Range    Lactic Acid 5.3 (HH) 0.5 - 2.0 mmol/L   HEMOGLOBIN AND HEMATOCRIT    Collection Time: 08/27/22 11:15 AM   Result Value Ref Range    Hemoglobin 11.2 (L) 12.0 - 16.0 g/dL    Hematocrit 29.7 (L) 37.0 - 47.0 %   TROPONIN    Collection Time: 08/27/22 11:15 AM   Result Value Ref Range    Troponin T 239 (H) 6 - 19 ng/L   LACTIC ACID    Collection Time: 08/27/22 11:15 AM   Result Value Ref Range    Lactic Acid 4.3 (HH) 0.5 - 2.0 mmol/L   POCT glucose device results    Collection Time: 08/27/22 11:16 AM   Result Value Ref Range    POC Glucose, Blood 131 (H) 65 - 99 mg/dL       Fluids    Intake/Output Summary (Last 24 hours) at 8/27/2022 1306  Last data filed at 8/27/2022  1200  Gross per 24 hour   Intake 63772.71 ml   Output 4480 ml   Net 94399.71 ml       Core Measures & Quality Metrics  Labs reviewed and Radiology images reviewed  Guerrero catheter: Critically Ill - Requiring Accurate Measurement of Urinary Output                RAP Score Total: 0  ETOH Screening    Assessment/Plan  Acute postoperative respiratory failure (HCC)  Assessment & Plan  Remained intubated post-operatively.  Continue full mechanical ventilatory support.   Ventilator bundle and Trauma weaning protocol.    Coagulopathy (HCC)  Assessment & Plan  Post-take back TEG with low MA, functional fibrinogen, and high Ly30  Given Cryo, Platelets, and TXA  Output from wound vac decreasing  Trending serial labs and TEGs    Lactic acidosis  Assessment & Plan  Significantly elevated post-op  Decreasing with resuscitation    Occluded aorta (HCC)  Assessment & Plan  aorto-bifem with renal artery endarterectomy  Take back for post op bleeding  Dr. Siu    Acute blood loss anemia  Assessment & Plan  Follow serial hgb  Transfuse to maintain Hgb ~9 given recent cardiac event.    CAD (coronary artery disease)- (present on admission)  Assessment & Plan  History of MI with PCI one-month ago, on DAPT prior to take-back.  Transfuse to maintain hemoglobin 9.  Wet read of intra-operative KLEBER during index procedure with EF 30-40% and anterior wall motion abnormality consistent with area of infarct from recent MI.   final read of KLEBER pending.     Hypotension due to hypovolemia  Assessment & Plan  Given 2 FFP, 2 PRBC prior to initiation of massive transfusion and return to operating room for exploration.  Trending end-points of resuscitation.  Vasopressors weaned after take-back.        Discussed patient condition with RN, RT, and Pharmacy.  CRITICAL CARE TIME EXCLUDING PROCEDURES: 38    minutes

## 2022-08-27 NOTE — OP REPORT
[facility] OPERATIVE NOTE    08/26/22      PRE-OPERATIVE DIAGNOSIS -     Infrarenal abdominal aortic occlusion with bilateral lower extremity rest pain  Bilateral critical renal artery stenosis  Ischemic cardiomyopathy    POST-OPERATIVE DIAGNOSIS -same    PROCEDURE PERFORMED -     Bilateral renal artery eversion endarterectomies  Aortobifemoral bypass using 14 x 7 bifurcated dacryon    SURGEON - Brandyn Siu M.D.    ASSISTANT - BINTA Gipson  The nature of the surgical procedure warranted additional skilled operative assistance from an Advanced Registered Nurse Practitioner (ARNP). The assistant was present during the entire operation. The surgical assistant performed the following provided assistance with optimal surgical exposure of the operative field and provided high complexity, subspecialty decision making input.     TYPE OF ANESTHESIA -  General     ANESTHESIOLOGIST  - Anesthesiologist: Ike Hoffmann M.D.     SPECIMENS -none    INDICATIONS - 61 y.o. female who has significant comorbidities including ischemic cardiomyopathy.  Patient has an infrarenal aortic occlusion which is resulting in bilateral lower extremity rest pain and severe disability.  Patient also has critical bilateral renal artery stenosis.  The patient underwent coronary artery angioplasty and stenting approximately 1 month ago.  Patient is being taken to the operating room to do today for aortobifemoral bypass surgery with bilateral renal artery endarterectomies.    PROCEDURE IN DETAIL -     Patient was taken to the operating suite placed in the supine position.  After adequate anesthesia the patient's abdomen and bilateral groins and thighs were prepped and draped in sterile fashion.  An incision was made in the right groin overlying the right femoral artery.  The incision was carried down to the subcutaneous tissue and the right common femoral profunda femoris and proximal superficial femoral arteries were dissected from the  surrounding tissue and isolated.  A mirror image incision was made on the contralateral side and the left common femoral artery was dissected from the surrounding tissue and isolated.  Next an incision was made from xiphoid to pubis the incision was carried down through the subcutaneous tissue and the peritoneal cavity was opened in standard fashion.  The patient's colon was retracted cephalad and the small bowel was retracted to the patient right.  The retroperitoneum was entered into and the aorta was identified.  The pararenal aorta was dissected from the surrounding tissue.  The left renal vein was retracted cephalad and the renal arteries were encircled using vessel loops.  After exposure tunnels were created between the abdominal incision in the groins bilaterally and umbilical tapes were passed.  Next 5000 units of heparin was administered and after an appropriate period of time a suprarenal aortic cross-clamp was placed.  A longitudinal aortotomy was made.  The renal arteries were secured with the Silastic loops prior to clamping to prevent any distal embolization.  After be the aortotomy was performed the aorta was cleared of all atheromatous debris and and endarterectomy was performed of the aorta as well as an eversion endarterectomy on both renal arteries successfully opening both renal arteries.  Once that was completed a 3-0 Prolene suture was then used to run the aortotomy closed down to below the renal arteries and the clamp was repositioned below the renal arteries.  Excellent Doppler flow was noted in both renal arteries after repositioning of the clamp.  Total suprarenal clamp time was less than 10 minutes.  Next a 14 x 7 bifurcated dacryon graft was selected and was properly spatulated.  An end-to-side anastomosis was was created between the aorta and the graft using 3-0 Prolene sutures in a running fashion.  Pledgets were also used at the origin and termination of the anastomosis.  Once that  anastomosis was secured the limbs were passed through the previously dissected tracks into the femoral arteries.  A longitudinal arteriotomy was made in the right common femoral artery and carried down onto the profunda femoris artery.  The right limb of the dacryon graft was spatulated and an end-to-side anastomosis was completed between the right limb of the graft and the femoral artery using 5-0 Prolene sutures in heel toe fashion.  After that anastomosis was completed flow was established first retrograde and then antegrade down the right leg.  Hemostasis was assured.  On the left side a mirror image incision was made in the common femoral artery the graft was properly spatulated and an end-to-side anastomosis was completed between the left limb of the dacryon graft and the common femoral artery using 5-0 Prolene sutures in heel toe fashion.  After flushing the anastomosis was completed and flow was established.  Hemostasis was assured at all anastomotic sites the aortotomy was also checked for hemostasis.  The dacryon graft was then closed behind the retroperitoneum by running a 2-0 Vicryl running suture.  Hemostasis was assured.  The peritoneal cavity was closed using #1 Vicryl running sutures x2.  The femoral subcutaneous tissue was reapproximated using interrupted 2-0 Vicryl sutures and skin staples were used for all skin incisions.  The patient was taken to the ICU intubated.      _______________________  Brandyn Siu M.D.

## 2022-08-27 NOTE — ASSESSMENT & PLAN NOTE
Remained intubated post-operatively.  Continue full mechanical ventilatory support. Ventilator bundle and Trauma weaning protocol.  9/2 Extubated.  Respiratory protocol.

## 2022-08-27 NOTE — ANESTHESIA TIME REPORT
Anesthesia Start and Stop Event Times     Date Time Event    8/26/2022 1414 Ready for Procedure     1454 Anesthesia Start     1850 Anesthesia Stop        Responsible Staff  08/26/22    Name Role Begin End    Ike Hoffmann M.D. Anesth 1454 1850        Overtime Reason:  overtime    Comments:

## 2022-08-27 NOTE — OR NURSING
Initial instrument count was incomplete upon patient arrival, and unable to be performed due to the emergent nature of the case. Patient abdomen was not closed; Abthera placed; 7 lap sponges left in the abdominal cavity; x-ray was not taken per Dr. Siu patient was too unstable. Final counts incorrect.

## 2022-08-28 ENCOUNTER — APPOINTMENT (OUTPATIENT)
Dept: RADIOLOGY | Facility: MEDICAL CENTER | Age: 62
DRG: 270 | End: 2022-08-28
Attending: PSYCHIATRY & NEUROLOGY
Payer: MEDICAID

## 2022-08-28 ENCOUNTER — ANESTHESIA (OUTPATIENT)
Dept: SURGERY | Facility: MEDICAL CENTER | Age: 62
DRG: 270 | End: 2022-08-28
Payer: MEDICAID

## 2022-08-28 ENCOUNTER — ANESTHESIA EVENT (OUTPATIENT)
Dept: SURGERY | Facility: MEDICAL CENTER | Age: 62
DRG: 270 | End: 2022-08-28
Payer: MEDICAID

## 2022-08-28 ENCOUNTER — APPOINTMENT (OUTPATIENT)
Dept: RADIOLOGY | Facility: MEDICAL CENTER | Age: 62
DRG: 270 | End: 2022-08-28
Attending: SURGERY
Payer: MEDICAID

## 2022-08-28 LAB
ALBUMIN SERPL BCP-MCNC: 2.1 G/DL (ref 3.2–4.9)
ALBUMIN SERPL BCP-MCNC: 2.2 G/DL (ref 3.2–4.9)
ALBUMIN/GLOB SERPL: 0.9 G/DL
ALBUMIN/GLOB SERPL: 0.9 G/DL
ALP SERPL-CCNC: 44 U/L (ref 30–99)
ALP SERPL-CCNC: 44 U/L (ref 30–99)
ALT SERPL-CCNC: 15 U/L (ref 2–50)
ALT SERPL-CCNC: 22 U/L (ref 2–50)
ANION GAP SERPL CALC-SCNC: 11 MMOL/L (ref 7–16)
ANION GAP SERPL CALC-SCNC: 12 MMOL/L (ref 7–16)
AST SERPL-CCNC: 42 U/L (ref 12–45)
AST SERPL-CCNC: 55 U/L (ref 12–45)
BASE EXCESS BLDA CALC-SCNC: 12 MMOL/L (ref -4–3)
BASOPHILS # BLD AUTO: 0 % (ref 0–1.8)
BASOPHILS # BLD AUTO: 0 % (ref 0–1.8)
BASOPHILS # BLD: 0 K/UL (ref 0–0.12)
BASOPHILS # BLD: 0 K/UL (ref 0–0.12)
BILIRUB SERPL-MCNC: 1.3 MG/DL (ref 0.1–1.5)
BILIRUB SERPL-MCNC: 1.8 MG/DL (ref 0.1–1.5)
BODY TEMPERATURE: ABNORMAL DEGREES
BUN SERPL-MCNC: 36 MG/DL (ref 8–22)
BUN SERPL-MCNC: 39 MG/DL (ref 8–22)
CALCIUM SERPL-MCNC: 7.4 MG/DL (ref 8.5–10.5)
CALCIUM SERPL-MCNC: 7.5 MG/DL (ref 8.5–10.5)
CHLORIDE SERPL-SCNC: 102 MMOL/L (ref 96–112)
CHLORIDE SERPL-SCNC: 104 MMOL/L (ref 96–112)
CO2 BLDA-SCNC: 34 MMOL/L (ref 20–33)
CO2 SERPL-SCNC: 28 MMOL/L (ref 20–33)
CO2 SERPL-SCNC: 29 MMOL/L (ref 20–33)
CREAT SERPL-MCNC: 1.82 MG/DL (ref 0.5–1.4)
CREAT SERPL-MCNC: 1.88 MG/DL (ref 0.5–1.4)
DELSYS IDSYS: ABNORMAL
END TIDAL CARBON DIOXIDE IECO2: 27 MMHG
EOSINOPHIL # BLD AUTO: 0 K/UL (ref 0–0.51)
EOSINOPHIL # BLD AUTO: 0 K/UL (ref 0–0.51)
EOSINOPHIL NFR BLD: 0 % (ref 0–6.9)
EOSINOPHIL NFR BLD: 0 % (ref 0–6.9)
ERYTHROCYTE [DISTWIDTH] IN BLOOD BY AUTOMATED COUNT: 47.4 FL (ref 35.9–50)
ERYTHROCYTE [DISTWIDTH] IN BLOOD BY AUTOMATED COUNT: 49.3 FL (ref 35.9–50)
GFR SERPLBLD CREATININE-BSD FMLA CKD-EPI: 30 ML/MIN/1.73 M 2
GFR SERPLBLD CREATININE-BSD FMLA CKD-EPI: 31 ML/MIN/1.73 M 2
GLOBULIN SER CALC-MCNC: 2.3 G/DL (ref 1.9–3.5)
GLOBULIN SER CALC-MCNC: 2.5 G/DL (ref 1.9–3.5)
GLUCOSE BLD STRIP.AUTO-MCNC: 108 MG/DL (ref 65–99)
GLUCOSE BLD STRIP.AUTO-MCNC: 90 MG/DL (ref 65–99)
GLUCOSE BLD STRIP.AUTO-MCNC: 97 MG/DL (ref 65–99)
GLUCOSE SERPL-MCNC: 109 MG/DL (ref 65–99)
GLUCOSE SERPL-MCNC: 112 MG/DL (ref 65–99)
HCO3 BLDA-SCNC: 33.5 MMOL/L (ref 17–25)
HCT VFR BLD AUTO: 30.2 % (ref 37–47)
HCT VFR BLD AUTO: 30.7 % (ref 37–47)
HGB BLD-MCNC: 11.1 G/DL (ref 12–16)
HGB BLD-MCNC: 11.1 G/DL (ref 12–16)
HOROWITZ INDEX BLDA+IHG-RTO: 230 MM[HG]
LACTATE SERPL-SCNC: 2.5 MMOL/L (ref 0.5–2)
LACTATE SERPL-SCNC: 2.7 MMOL/L (ref 0.5–2)
LYMPHOCYTES # BLD AUTO: 0.94 K/UL (ref 1–4.8)
LYMPHOCYTES # BLD AUTO: 1.29 K/UL (ref 1–4.8)
LYMPHOCYTES NFR BLD: 10.4 % (ref 22–41)
LYMPHOCYTES NFR BLD: 16.5 % (ref 22–41)
MANUAL DIFF BLD: ABNORMAL
MANUAL DIFF BLD: ABNORMAL
MCH RBC QN AUTO: 30.7 PG (ref 27–33)
MCH RBC QN AUTO: 30.7 PG (ref 27–33)
MCHC RBC AUTO-ENTMCNC: 36.2 G/DL (ref 33.6–35)
MCHC RBC AUTO-ENTMCNC: 36.8 G/DL (ref 33.6–35)
MCV RBC AUTO: 83.7 FL (ref 81.4–97.8)
MCV RBC AUTO: 85 FL (ref 81.4–97.8)
METAMYELOCYTES NFR BLD MANUAL: 8.7 %
METAMYELOCYTES NFR BLD MANUAL: 9.6 %
MODE IMODE: ABNORMAL
MONOCYTES # BLD AUTO: 0.07 K/UL (ref 0–0.85)
MONOCYTES # BLD AUTO: 0.23 K/UL (ref 0–0.85)
MONOCYTES NFR BLD AUTO: 0.9 % (ref 0–13.4)
MONOCYTES NFR BLD AUTO: 2.6 % (ref 0–13.4)
MORPHOLOGY BLD-IMP: NORMAL
MORPHOLOGY BLD-IMP: NORMAL
MYELOCYTES NFR BLD MANUAL: 0.9 %
MYELOCYTES NFR BLD MANUAL: 5.2 %
NEUTROPHILS # BLD AUTO: 5.69 K/UL (ref 2–7.15)
NEUTROPHILS # BLD AUTO: 6.5 K/UL (ref 2–7.15)
NEUTROPHILS NFR BLD: 36.5 % (ref 44–72)
NEUTROPHILS NFR BLD: 39.1 % (ref 44–72)
NEUTS BAND NFR BLD MANUAL: 33.1 % (ref 0–10)
NEUTS BAND NFR BLD MANUAL: 36.5 % (ref 0–10)
NRBC # BLD AUTO: 0 K/UL
NRBC # BLD AUTO: 0 K/UL
NRBC BLD-RTO: 0 /100 WBC
NRBC BLD-RTO: 0 /100 WBC
O2/TOTAL GAS SETTING VFR VENT: 30 %
PCO2 BLDA: 31.7 MMHG (ref 26–37)
PCO2 TEMP ADJ BLDA: 31.4 MMHG (ref 26–37)
PEEP END EXPIRATORY PRESSURE IPEEP: 5 CMH20
PERCENT MINUTE VOLUME IPMV: 120
PH BLDA: 7.63 [PH] (ref 7.4–7.5)
PH TEMP ADJ BLDA: 7.63 [PH] (ref 7.4–7.5)
PLATELET # BLD AUTO: 88 K/UL (ref 164–446)
PLATELET # BLD AUTO: 99 K/UL (ref 164–446)
PLATELET BLD QL SMEAR: NORMAL
PLATELET BLD QL SMEAR: NORMAL
PMV BLD AUTO: 11.1 FL (ref 9–12.9)
PMV BLD AUTO: 11.3 FL (ref 9–12.9)
PO2 BLDA: 69 MMHG (ref 64–87)
PO2 TEMP ADJ BLDA: 68 MMHG (ref 64–87)
POIKILOCYTOSIS BLD QL SMEAR: NORMAL
POIKILOCYTOSIS BLD QL SMEAR: NORMAL
POTASSIUM SERPL-SCNC: 3.5 MMOL/L (ref 3.6–5.5)
POTASSIUM SERPL-SCNC: 3.8 MMOL/L (ref 3.6–5.5)
PROT SERPL-MCNC: 4.4 G/DL (ref 6–8.2)
PROT SERPL-MCNC: 4.7 G/DL (ref 6–8.2)
RBC # BLD AUTO: 3.61 M/UL (ref 4.2–5.4)
RBC # BLD AUTO: 3.61 M/UL (ref 4.2–5.4)
RBC BLD AUTO: PRESENT
RBC BLD AUTO: PRESENT
SAO2 % BLDA: 97 % (ref 93–99)
SODIUM SERPL-SCNC: 142 MMOL/L (ref 135–145)
SODIUM SERPL-SCNC: 144 MMOL/L (ref 135–145)
SPECIMEN DRAWN FROM PATIENT: ABNORMAL
TARGETS BLD QL SMEAR: NORMAL
TARGETS BLD QL SMEAR: NORMAL
TROPONIN T SERPL-MCNC: 196 NG/L (ref 6–19)
TROPONIN T SERPL-MCNC: 206 NG/L (ref 6–19)
TROPONIN T SERPL-MCNC: 233 NG/L (ref 6–19)
WBC # BLD AUTO: 7.8 K/UL (ref 4.8–10.8)
WBC # BLD AUTO: 9 K/UL (ref 4.8–10.8)

## 2022-08-28 PROCEDURE — 99291 CRITICAL CARE FIRST HOUR: CPT | Performed by: SURGERY

## 2022-08-28 PROCEDURE — 94799 UNLISTED PULMONARY SVC/PX: CPT

## 2022-08-28 PROCEDURE — 71045 X-RAY EXAM CHEST 1 VIEW: CPT

## 2022-08-28 PROCEDURE — 700102 HCHG RX REV CODE 250 W/ 637 OVERRIDE(OP): Performed by: SURGERY

## 2022-08-28 PROCEDURE — 160048 HCHG OR STATISTICAL LEVEL 1-5: Performed by: SURGERY

## 2022-08-28 PROCEDURE — 99255 IP/OBS CONSLTJ NEW/EST HI 80: CPT | Performed by: PSYCHIATRY & NEUROLOGY

## 2022-08-28 PROCEDURE — 700105 HCHG RX REV CODE 258: Performed by: SURGERY

## 2022-08-28 PROCEDURE — 85025 COMPLETE CBC W/AUTO DIFF WBC: CPT

## 2022-08-28 PROCEDURE — 82803 BLOOD GASES ANY COMBINATION: CPT

## 2022-08-28 PROCEDURE — 160031 HCHG SURGERY MINUTES - 1ST 30 MINS LEVEL 5: Performed by: SURGERY

## 2022-08-28 PROCEDURE — 74018 RADEX ABDOMEN 1 VIEW: CPT

## 2022-08-28 PROCEDURE — 770022 HCHG ROOM/CARE - ICU (200)

## 2022-08-28 PROCEDURE — 0W9H0ZZ DRAINAGE OF RETROPERITONEUM, OPEN APPROACH: ICD-10-PCS | Performed by: SURGERY

## 2022-08-28 PROCEDURE — 99140 ANES COMP EMERGENCY COND: CPT | Performed by: ANESTHESIOLOGY

## 2022-08-28 PROCEDURE — A9270 NON-COVERED ITEM OR SERVICE: HCPCS | Performed by: SURGERY

## 2022-08-28 PROCEDURE — 84484 ASSAY OF TROPONIN QUANT: CPT

## 2022-08-28 PROCEDURE — 700111 HCHG RX REV CODE 636 W/ 250 OVERRIDE (IP): Performed by: STUDENT IN AN ORGANIZED HEALTH CARE EDUCATION/TRAINING PROGRAM

## 2022-08-28 PROCEDURE — 160009 HCHG ANES TIME/MIN: Performed by: SURGERY

## 2022-08-28 PROCEDURE — 770001 HCHG ROOM/CARE - MED/SURG/GYN PRIV*

## 2022-08-28 PROCEDURE — 700102 HCHG RX REV CODE 250 W/ 637 OVERRIDE(OP): Performed by: STUDENT IN AN ORGANIZED HEALTH CARE EDUCATION/TRAINING PROGRAM

## 2022-08-28 PROCEDURE — 80053 COMPREHEN METABOLIC PANEL: CPT

## 2022-08-28 PROCEDURE — 94003 VENT MGMT INPAT SUBQ DAY: CPT

## 2022-08-28 PROCEDURE — 00790 ANES IPER UPR ABD NOS: CPT | Performed by: ANESTHESIOLOGY

## 2022-08-28 PROCEDURE — 700111 HCHG RX REV CODE 636 W/ 250 OVERRIDE (IP): Performed by: SURGERY

## 2022-08-28 PROCEDURE — 70544 MR ANGIOGRAPHY HEAD W/O DYE: CPT

## 2022-08-28 PROCEDURE — 97602 WOUND(S) CARE NON-SELECTIVE: CPT

## 2022-08-28 PROCEDURE — 82962 GLUCOSE BLOOD TEST: CPT

## 2022-08-28 PROCEDURE — 70450 CT HEAD/BRAIN W/O DYE: CPT

## 2022-08-28 PROCEDURE — 37799 UNLISTED PX VASCULAR SURGERY: CPT

## 2022-08-28 PROCEDURE — 94760 N-INVAS EAR/PLS OXIMETRY 1: CPT

## 2022-08-28 PROCEDURE — 700101 HCHG RX REV CODE 250: Performed by: ANESTHESIOLOGY

## 2022-08-28 PROCEDURE — 92950 HEART/LUNG RESUSCITATION CPR: CPT

## 2022-08-28 PROCEDURE — 83605 ASSAY OF LACTIC ACID: CPT | Mod: 91

## 2022-08-28 PROCEDURE — 85007 BL SMEAR W/DIFF WBC COUNT: CPT

## 2022-08-28 PROCEDURE — 70551 MRI BRAIN STEM W/O DYE: CPT

## 2022-08-28 PROCEDURE — 700111 HCHG RX REV CODE 636 W/ 250 OVERRIDE (IP): Performed by: ANESTHESIOLOGY

## 2022-08-28 PROCEDURE — 160042 HCHG SURGERY MINUTES - EA ADDL 1 MIN LEVEL 5: Performed by: SURGERY

## 2022-08-28 PROCEDURE — A9270 NON-COVERED ITEM OR SERVICE: HCPCS | Performed by: STUDENT IN AN ORGANIZED HEALTH CARE EDUCATION/TRAINING PROGRAM

## 2022-08-28 RX ORDER — ACETAMINOPHEN 325 MG/1
650 TABLET ORAL EVERY 6 HOURS PRN
Status: DISCONTINUED | OUTPATIENT
Start: 2022-08-28 | End: 2022-09-03

## 2022-08-28 RX ORDER — SODIUM CHLORIDE, SODIUM LACTATE, POTASSIUM CHLORIDE, AND CALCIUM CHLORIDE .6; .31; .03; .02 G/100ML; G/100ML; G/100ML; G/100ML
500 INJECTION, SOLUTION INTRAVENOUS ONCE
Status: COMPLETED | OUTPATIENT
Start: 2022-08-28 | End: 2022-08-28

## 2022-08-28 RX ORDER — ATORVASTATIN CALCIUM 40 MG/1
80 TABLET, FILM COATED ORAL NIGHTLY
Status: DISCONTINUED | OUTPATIENT
Start: 2022-08-28 | End: 2022-09-03

## 2022-08-28 RX ORDER — SODIUM CHLORIDE 9 MG/ML
500 INJECTION, SOLUTION INTRAVENOUS ONCE
Status: COMPLETED | OUTPATIENT
Start: 2022-08-28 | End: 2022-08-28

## 2022-08-28 RX ORDER — ROCURONIUM BROMIDE 10 MG/ML
INJECTION, SOLUTION INTRAVENOUS PRN
Status: DISCONTINUED | OUTPATIENT
Start: 2022-08-28 | End: 2022-08-28 | Stop reason: SURG

## 2022-08-28 RX ORDER — PHENYLEPHRINE HCL IN 0.9% NACL 0.5 MG/5ML
SYRINGE (ML) INTRAVENOUS PRN
Status: DISCONTINUED | OUTPATIENT
Start: 2022-08-28 | End: 2022-08-28 | Stop reason: SURG

## 2022-08-28 RX ORDER — DOCUSATE SODIUM 50 MG/5ML
100 LIQUID ORAL 2 TIMES DAILY
Status: DISCONTINUED | OUTPATIENT
Start: 2022-08-28 | End: 2022-09-03

## 2022-08-28 RX ORDER — SODIUM CHLORIDE, SODIUM LACTATE, POTASSIUM CHLORIDE, CALCIUM CHLORIDE 600; 310; 30; 20 MG/100ML; MG/100ML; MG/100ML; MG/100ML
INJECTION, SOLUTION INTRAVENOUS CONTINUOUS
Status: DISCONTINUED | OUTPATIENT
Start: 2022-08-28 | End: 2022-09-03

## 2022-08-28 RX ORDER — SODIUM CHLORIDE, SODIUM LACTATE, POTASSIUM CHLORIDE, AND CALCIUM CHLORIDE .6; .31; .03; .02 G/100ML; G/100ML; G/100ML; G/100ML
1000 INJECTION, SOLUTION INTRAVENOUS ONCE
Status: COMPLETED | OUTPATIENT
Start: 2022-08-28 | End: 2022-08-28

## 2022-08-28 RX ORDER — FAMOTIDINE 20 MG/1
20 TABLET, FILM COATED ORAL DAILY
Status: DISCONTINUED | OUTPATIENT
Start: 2022-08-29 | End: 2022-09-02

## 2022-08-28 RX ORDER — SODIUM CHLORIDE 9 MG/ML
1000 INJECTION, SOLUTION INTRAVENOUS ONCE
Status: COMPLETED | OUTPATIENT
Start: 2022-08-28 | End: 2022-08-28

## 2022-08-28 RX ORDER — MIDAZOLAM HYDROCHLORIDE 1 MG/ML
INJECTION INTRAMUSCULAR; INTRAVENOUS PRN
Status: DISCONTINUED | OUTPATIENT
Start: 2022-08-28 | End: 2022-08-28 | Stop reason: SURG

## 2022-08-28 RX ORDER — AMOXICILLIN 250 MG
1 CAPSULE ORAL NIGHTLY
Status: DISCONTINUED | OUTPATIENT
Start: 2022-08-28 | End: 2022-09-03

## 2022-08-28 RX ORDER — EZETIMIBE 10 MG/1
10 TABLET ORAL DAILY
Status: DISCONTINUED | OUTPATIENT
Start: 2022-08-29 | End: 2022-09-03

## 2022-08-28 RX ORDER — OXYCODONE HYDROCHLORIDE AND ACETAMINOPHEN 5; 325 MG/1; MG/1
1 TABLET ORAL EVERY 4 HOURS PRN
Status: DISCONTINUED | OUTPATIENT
Start: 2022-08-28 | End: 2022-08-29

## 2022-08-28 RX ORDER — CEFAZOLIN SODIUM 1 G/3ML
INJECTION, POWDER, FOR SOLUTION INTRAMUSCULAR; INTRAVENOUS PRN
Status: DISCONTINUED | OUTPATIENT
Start: 2022-08-28 | End: 2022-08-28 | Stop reason: SURG

## 2022-08-28 RX ORDER — POLYETHYLENE GLYCOL 3350 17 G/17G
1 POWDER, FOR SOLUTION ORAL 2 TIMES DAILY
Status: DISCONTINUED | OUTPATIENT
Start: 2022-08-28 | End: 2022-09-03

## 2022-08-28 RX ORDER — AMOXICILLIN 250 MG
1 CAPSULE ORAL
Status: DISCONTINUED | OUTPATIENT
Start: 2022-08-28 | End: 2022-09-03

## 2022-08-28 RX ORDER — PROMETHAZINE HYDROCHLORIDE 25 MG/1
12.5-25 TABLET ORAL EVERY 4 HOURS PRN
Status: DISCONTINUED | OUTPATIENT
Start: 2022-08-28 | End: 2022-09-03

## 2022-08-28 RX ORDER — PRAMIPEXOLE DIHYDROCHLORIDE 0.12 MG/1
0.12 TABLET ORAL 3 TIMES DAILY
Status: DISCONTINUED | OUTPATIENT
Start: 2022-08-28 | End: 2022-09-03

## 2022-08-28 RX ADMIN — SODIUM CHLORIDE, POTASSIUM CHLORIDE, SODIUM LACTATE AND CALCIUM CHLORIDE: 600; 310; 30; 20 INJECTION, SOLUTION INTRAVENOUS at 19:02

## 2022-08-28 RX ADMIN — OXYCODONE AND ACETAMINOPHEN 1 TABLET: 5; 325 TABLET ORAL at 22:03

## 2022-08-28 RX ADMIN — FENTANYL CITRATE 50 MCG: 50 INJECTION, SOLUTION INTRAMUSCULAR; INTRAVENOUS at 10:30

## 2022-08-28 RX ADMIN — PRAMIPEXOLE DIHYDROCHLORIDE 0.12 MG: 0.12 TABLET ORAL at 12:46

## 2022-08-28 RX ADMIN — ROCURONIUM BROMIDE 20 MG: 10 INJECTION, SOLUTION INTRAVENOUS at 10:45

## 2022-08-28 RX ADMIN — PRAMIPEXOLE DIHYDROCHLORIDE 0.12 MG: 0.12 TABLET ORAL at 05:30

## 2022-08-28 RX ADMIN — SODIUM CHLORIDE 1000 ML: 9 INJECTION, SOLUTION INTRAVENOUS at 22:45

## 2022-08-28 RX ADMIN — PIPERACILLIN AND TAZOBACTAM 3.38 G: 3; .375 INJECTION, POWDER, LYOPHILIZED, FOR SOLUTION INTRAVENOUS; PARENTERAL at 12:46

## 2022-08-28 RX ADMIN — MORPHINE SULFATE 2 MG: 4 INJECTION INTRAVENOUS at 05:00

## 2022-08-28 RX ADMIN — SODIUM CHLORIDE 500 ML: 9 INJECTION, SOLUTION INTRAVENOUS at 21:30

## 2022-08-28 RX ADMIN — OXYCODONE AND ACETAMINOPHEN 1 TABLET: 5; 325 TABLET ORAL at 06:00

## 2022-08-28 RX ADMIN — SODIUM CHLORIDE, POTASSIUM CHLORIDE, SODIUM LACTATE AND CALCIUM CHLORIDE 500 ML: 600; 310; 30; 20 INJECTION, SOLUTION INTRAVENOUS at 09:37

## 2022-08-28 RX ADMIN — SODIUM CHLORIDE, POTASSIUM CHLORIDE, SODIUM LACTATE AND CALCIUM CHLORIDE: 600; 310; 30; 20 INJECTION, SOLUTION INTRAVENOUS at 02:12

## 2022-08-28 RX ADMIN — SUGAMMADEX 200 MG: 100 INJECTION, SOLUTION INTRAVENOUS at 11:05

## 2022-08-28 RX ADMIN — SODIUM CHLORIDE, POTASSIUM CHLORIDE, SODIUM LACTATE AND CALCIUM CHLORIDE 1000 ML: 600; 310; 30; 20 INJECTION, SOLUTION INTRAVENOUS at 15:51

## 2022-08-28 RX ADMIN — FENTANYL CITRATE 50 MCG: 50 INJECTION, SOLUTION INTRAMUSCULAR; INTRAVENOUS at 10:08

## 2022-08-28 RX ADMIN — POTASSIUM BICARBONATE 25 MEQ: 977.5 TABLET, EFFERVESCENT ORAL at 15:52

## 2022-08-28 RX ADMIN — FAMOTIDINE 20 MG: 20 TABLET, FILM COATED ORAL at 05:30

## 2022-08-28 RX ADMIN — FENTANYL CITRATE 50 MCG: 50 INJECTION, SOLUTION INTRAMUSCULAR; INTRAVENOUS at 10:36

## 2022-08-28 RX ADMIN — LABETALOL HYDROCHLORIDE 10 MG: 5 INJECTION, SOLUTION INTRAVENOUS at 06:04

## 2022-08-28 RX ADMIN — OXYCODONE AND ACETAMINOPHEN 1 TABLET: 5; 325 TABLET ORAL at 00:10

## 2022-08-28 RX ADMIN — FENTANYL CITRATE 50 MCG: 50 INJECTION, SOLUTION INTRAMUSCULAR; INTRAVENOUS at 10:40

## 2022-08-28 RX ADMIN — CEFAZOLIN 2 G: 330 INJECTION, POWDER, FOR SOLUTION INTRAMUSCULAR; INTRAVENOUS at 10:00

## 2022-08-28 RX ADMIN — PIPERACILLIN AND TAZOBACTAM 3.38 G: 3; .375 INJECTION, POWDER, LYOPHILIZED, FOR SOLUTION INTRAVENOUS; PARENTERAL at 05:31

## 2022-08-28 RX ADMIN — PRAMIPEXOLE DIHYDROCHLORIDE 0.12 MG: 0.12 TABLET ORAL at 19:02

## 2022-08-28 RX ADMIN — MIDAZOLAM HYDROCHLORIDE 2 MG: 1 INJECTION, SOLUTION INTRAMUSCULAR; INTRAVENOUS at 09:56

## 2022-08-28 RX ADMIN — SODIUM CHLORIDE, POTASSIUM CHLORIDE, SODIUM LACTATE AND CALCIUM CHLORIDE 1000 ML: 600; 310; 30; 20 INJECTION, SOLUTION INTRAVENOUS at 04:45

## 2022-08-28 RX ADMIN — PIPERACILLIN AND TAZOBACTAM 3.38 G: 3; .375 INJECTION, POWDER, LYOPHILIZED, FOR SOLUTION INTRAVENOUS; PARENTERAL at 21:42

## 2022-08-28 RX ADMIN — Medication 100 MCG: at 10:02

## 2022-08-28 RX ADMIN — EZETIMIBE 10 MG: 10 TABLET ORAL at 05:29

## 2022-08-28 RX ADMIN — DOCUSATE SODIUM 50 MG AND SENNOSIDES 8.6 MG 1 TABLET: 8.6; 5 TABLET, FILM COATED ORAL at 21:42

## 2022-08-28 RX ADMIN — ROCURONIUM BROMIDE 50 MG: 10 INJECTION, SOLUTION INTRAVENOUS at 10:00

## 2022-08-28 RX ADMIN — ROCURONIUM BROMIDE 30 MG: 10 INJECTION, SOLUTION INTRAVENOUS at 10:25

## 2022-08-28 RX ADMIN — ATORVASTATIN CALCIUM 80 MG: 40 TABLET, FILM COATED ORAL at 21:42

## 2022-08-28 ASSESSMENT — PAIN SCALES - GENERAL: PAIN_LEVEL: 0

## 2022-08-28 ASSESSMENT — FIBROSIS 4 INDEX: FIB4 SCORE: 3.72

## 2022-08-28 NOTE — CARE PLAN
Ventilator Daily Summary    Vent Day # 3    ETT  7.5 @ 21    Ventilator settings changed this shift:no    ASV  120 5 30%    Weaning trials:no    Respiratory Procedures:no    Plan: Continue current ventilator settings and wean mechanical ventilation as tolerated per physician orders.

## 2022-08-28 NOTE — ANESTHESIA POSTPROCEDURE EVALUATION
Patient: Fouzia Santamaria    Procedure Summary     Date: 08/28/22 Room / Location: [facility] 09 / SURGERY [facility]    Anesthesia Start: 0956 Anesthesia Stop: 1117    Procedure: LAPAROTOMY, EXPLORATORY (Abdomen) Diagnosis: (abdominal wound)    Surgeons: Brandyn Siu M.D. Responsible Provider: Young Sue M.D.    Anesthesia Type: general ASA Status: 4 - Emergent          Final Anesthesia Type: general  Last vitals  BP   Blood Pressure: (!) 90/67, Arterial BP: (!) 94/62    Temp   36.2 °C (97.2 °F)    Pulse   79   Resp   18    SpO2   99 %      Anesthesia Post Evaluation    Patient location during evaluation: ICU  Patient participation: complete - patient cannot participate  Level of consciousness: sleepy but conscious  Pain score: 0    Airway patency: patent  Anesthetic complications: no  Cardiovascular status: hemodynamically stable  Respiratory status: acceptable, ETT and ventilator  Hydration status: acceptable    PONV: none          No notable events documented.     Nurse Pain Score: 0 (NPRS)

## 2022-08-28 NOTE — ASSESSMENT & PLAN NOTE
Elevated creatinine with good urine output.  HD not indicated.  Renal dose medications. Avoid nephrotoxic medications.  Serial laboratory studies.

## 2022-08-28 NOTE — ASSESSMENT & PLAN NOTE
8/28 Right sided deficits.  CTA consistent with left sided infarcts.  8/29 Recommend:  - Plan for carotid dopplers on outpatient basis to complete embolic workup.  - May consider outpatient long-term cardiac monitoring.  - No need for high intensity statin, on zetia, LDL at goal < 70.  - Restart ASA 81mg daily, plavix 75mg daily when able.  8/29 Aspirin restarted.  9/1 Plavix restarted.  Neurology consulted- Dr. Terry (sign off 8/29).

## 2022-08-28 NOTE — WOUND TEAM
"[facility]  Inpatient Services  Wound and Skin Care Brief Evaluation    Admission Date: 8/23/2022     Last order of IP CONSULT TO WOUND CARE was found on 8/27/2022 from Hospital Encounter on 8/23/2022     HPI, PMH, SH: Reviewed    Chief Complaint   Patient presents with    Leg Pain     Patient reports bilateral leg pain \"for a while\". Patient poor historian, unable to answer most questions     Diagnosis: Peripheral arterial occlusive disease (HCC) [I77.9]  Chronic distal aortic occlusion (HCC) [I74.09]    Unit where seen by Wound Team: S111/00     Wound consult placed regarding sacrum. Chart and images reviewed. This discussed with bedside RN Angela . This RN in to assess patient. Pt pleasant and agreeable. 4 eyes skin check performed with bedside RNs. Non-selectively debrided with NS/wound cleanser and gauze OR moist warm washcloth and no rinse foam soap. No pressure injuries or advanced wound care needs identified. All areas are blanching, slow to modesta.  Mepilex applied.  Wound consult completed. No further follow up unless indicated and consulted.           RSKIN:   CURRENTLY IN PLACE (X), APPLIED THIS VISIT (A), ORDERED (O):   Q shift Manolo:  X  Q shift pressure point assessments:  X    Surface/Positioning   Pressure redistribution mattress            Low Airloss     ICU CHEO     Bariatric foam      Bariatric CHEO     Waffle cushion        Waffle Overlay          Reposition q 2 hours      TAPs Turning system     Z Milad Pillow     Offloading/Redistribution   Sacral Mepilex (Silicone dressing)   A  Heel Mepilex (Silicone dressing)         Heel float boots (Prevalon boot)     x        Float Heels off Bed with Pillows           Respiratory   Silicone O2 tubing         Gray Foam Ear protectors     Cannula fixation Device (Tender )          High flow offloading Clip  x  Elastic head band offloading device      Anchorfast                                                         Trach with " Optifoam split foam             Containment/Moisture Prevention     Rectal tube or BMS    Purwick/Condom Cath        Guerrero Catheter  x  Barrier wipes           Barrier paste       Antifungal tx      Interdry        Mobilization NA      Up to chair        Ambulate      PT/OT      Nutrition NA      Dietician        Diabetes Education      PO     TF     TPN     NPO   # days     Other

## 2022-08-28 NOTE — OR NURSING
Pt arrived to pre op holding. Consents for anesthesia and surgery completed over the phone with pt's spouse Basilio. Armbands verified with OR RN. Pt taken to OR quickly with OR staff. Full pre op assessment unable to be completed.

## 2022-08-28 NOTE — PROGRESS NOTES
Dr. Damico notified of patient's low urine output, 10mL in the last 2 hours. Dr. Damico gave orders for 1L LR bolus to be run now.

## 2022-08-28 NOTE — PROGRESS NOTES
Patient back from surgery and transported to CT scan with RT. Patient RASS -3 at this time.     Per Dr. Siu, mobility as tolerated with abdominal binder.

## 2022-08-28 NOTE — PROGRESS NOTES
4 Eyes Skin Assessment Completed by KATIA Morrison and KATIA Oquendo.    Head WDL  Ears WDL  Nose WDL  Mouth WDL  Neck WDL bruising L neck , central line R SCL  Breast/Chest WDL  Shoulder Blades WDL  Spine WDL  (R) Arm/Elbow/Hand Edema  (L) Arm/Elbow/Hand Edema  Abdomen Incision midline incision with island dressing CDI   Groin Bruising and Incision B/L femoral surgical sites with staples and dressings. Shadowing  and drainage from L groin site  Scrotum/Coccyx/Buttocks Redness, Blanching, and Scar  (R) Leg WDL small scabs to lower shin   (L) Leg Redness to knees  (R) Heel/Foot/Toe WDL bruising top of R foot   (L) Heel/Foot/Toe WDL          Devices In Places ECG, Blood Pressure Cuff, Pulse Ox, Guerrero, Arterial Line, SCD's, ET Tube, Cortrak, and Central Line      Interventions In Place Sacral Mepilex, TAP System, Pillows, Q2 Turns, ZFlo Pillow, Heels Loaded W/Pillows, and Pressure Redistribution Mattress

## 2022-08-28 NOTE — OP REPORT
[facility] OPERATIVE NOTE    08/28/22      PRE-OPERATIVE DIAGNOSIS -     Open abdomen  Status post bilateral renal artery endarterectomies and aortobifem  Respiratory failure  Possible stroke  Multisystem organ dysfunction    POST-OPERATIVE DIAGNOSIS -same    PROCEDURE PERFORMED -    Abdominal washout with removal of laparotomy pads  Delayed closure abdomen    SURGEON - Brandyn Siu M.D.    ASSISTANT - BINTA Gipson    TYPE OF ANESTHESIA -  General     ANESTHESIOLOGIST  - Anesthesiologist: Young Sue M.D.     SPECIMENS -none    PROCEDURE IN DETAIL -     Patient was taken to the operating suite intubated placed in the supine position.  The patient's ABThera was removed and the patient's abdomen was prepped and draped in sterile fashion.  Upon entering the peritoneal cavity the peritoneal cavity was irrigated with copious amounts of normal saline and the laparotomy pads were slowly withdrawn from the abdominal cavity.  There was no further bleeding noted.  The patient's aortobifemoral graft was noted to be patent as well as both renal arteries.  After irrigating the abdominal cavity with multiple liters of normal saline the retroperitoneum was closed over the aortobifemoral graft using 2-0 Vicryl running sutures.  Peritoneal cavity was then closed using #1 Vicryl running sutures x2.  This was followed by skin staples.  X-ray was obtained at the completion of the stapling and 1 retained sponge was noted in the right upper quadrant.  The upper portion of the incision was reopened and the sponge was retrieved.  After reclosure of the abdomen at the abdomen second x-ray demonstrated no further laparotomy pads in the abdominal cavity.  Sterile dressings were applied and the patient was taken back over to the intensive care unit in critical condition.      _______________________  Brandyn Siu M.D.

## 2022-08-28 NOTE — PROGRESS NOTES
1450-Neurology at the bedside. MRI/MRA ordered stat per MD. No plan for intervention at this time.     1455-MRI screening completed with  Basilio at the bedside. Call placed to Dr Siu regarding graft and MRI compatibility and updates provided. Per Dr Siu, MRI is not necessary at this time, and informed me to speak to Dr Damico about plan of care.      1500-Updated Dr Damico in unit, he would like the scans to be completed when safe/able.

## 2022-08-28 NOTE — PROGRESS NOTES
Cortrak Placement    Tube Team verified patient name and medical record number prior to tube placement.  Cortrak tube (55 inches, 10 Cymraes) placed at 60 cm in left nare.  Per Cortrak picture, tube appears to be in the stomach.  Nursing Instructions: Awaiting KUB to confirm placement before use for medications or feeding. Once placement confirmed, flush tube with 30 ml of water, and then remove and save stylet, in patient medication drawer.

## 2022-08-28 NOTE — ANESTHESIA TIME REPORT
Anesthesia Start and Stop Event Times     Date Time Event    8/28/2022 0929 Ready for Procedure     0956 Anesthesia Start     1117 Anesthesia Stop        Responsible Staff  08/28/22    Name Role Begin End    Young Sue M.D. Anesth 0956 1115        Overtime Reason:  no overtime (within assigned shift)    Comments:

## 2022-08-28 NOTE — PROGRESS NOTES
Trauma / Surgical Daily Progress Note    Date of Service  8/28/2022    Chief Complaint  61 y.o. 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 Underwent operative repair.    Interval Events  Hospital day #6  Critically ill  Requires continued ICU and hospital admission  Seen on rounds and discussed with multidisciplinary team  Injuries and physiologic derangements pose a significant risk of mortality and long term morbidity  Critical care interventions include:  integration of multiple data points and associated complex medical decisions    Mgt of ventilator-weaning  Ongoing post operative resuscitation continues-several crystalloid boluses. Serial labs. Following lactic acid and hgbs  Returned to OR today for pack removal and fascial closure  Clinically with stroke-w/u in progress, neurology consulted    Review of Systems  Review of Systems   Unable to perform ROS: Intubated      Vital Signs for last 24 hours  Temp:  [36.5 °C (97.7 °F)] 36.5 °C (97.7 °F)  Pulse:  [] 81  Resp:  [6-35] 12  BP: ()/() 154/86  SpO2:  [93 %-100 %] 98 %    Hemodynamic parameters for last 24 hours       Respiratory Data     Respiration: 12, Pulse Oximetry: 98 %     Work Of Breathing / Effort: Vented  RUL Breath Sounds: Clear, RML Breath Sounds: Clear;Diminished, RLL Breath Sounds: Clear;Diminished, ZAINAB Breath Sounds: Clear, LLL Breath Sounds: Clear;Diminished    Physical Exam  Physical Exam  Vitals and nursing note reviewed.   Constitutional:       General: She is not in acute distress.     Appearance: She is ill-appearing. She is not toxic-appearing.   HENT:      Head: Normocephalic.      Right Ear: External ear normal.      Left Ear: External ear normal.   Eyes:      General: No scleral icterus.     Extraocular Movements: Extraocular movements intact.      Pupils: Pupils are equal, round, and reactive to light.   Cardiovascular:      Rate and Rhythm: Normal rate and regular rhythm.      Pulses: Normal pulses.       Heart sounds: Normal heart sounds.   Pulmonary:      Effort: No respiratory distress.      Breath sounds: No wheezing.      Comments: On vent  Abdominal:      General: There is distension.      Comments: Abthera in place with serosanguinous output   Genitourinary:     Comments: Guerrero in place  Musculoskeletal:         General: Normal range of motion.      Cervical back: Normal range of motion.   Skin:     General: Skin is warm and dry.      Capillary Refill: Capillary refill takes less than 2 seconds.   Neurological:      Comments: Sedated  Follows  Right sided weakness noted   Psychiatric:      Comments: Unable to assess       Laboratory  Recent Results (from the past 24 hour(s))   HEMOGLOBIN AND HEMATOCRIT    Collection Time: 08/27/22  6:00 PM   Result Value Ref Range    Hemoglobin 11.9 (L) 12.0 - 16.0 g/dL    Hematocrit 31.9 (L) 37.0 - 47.0 %   TROPONIN    Collection Time: 08/27/22  6:00 PM   Result Value Ref Range    Troponin T 270 (H) 6 - 19 ng/L   LACTIC ACID    Collection Time: 08/27/22  6:00 PM   Result Value Ref Range    Lactic Acid 3.1 (H) 0.5 - 2.0 mmol/L   POCT glucose device results    Collection Time: 08/27/22  6:01 PM   Result Value Ref Range    POC Glucose, Blood 129 (H) 65 - 99 mg/dL   TROPONIN    Collection Time: 08/28/22 12:00 AM   Result Value Ref Range    Troponin T 233 (H) 6 - 19 ng/L   LACTIC ACID    Collection Time: 08/28/22 12:00 AM   Result Value Ref Range    Lactic Acid 2.7 (H) 0.5 - 2.0 mmol/L   POCT glucose device results    Collection Time: 08/28/22 12:20 AM   Result Value Ref Range    POC Glucose, Blood 97 65 - 99 mg/dL   POCT arterial blood gas device results    Collection Time: 08/28/22  1:39 AM   Result Value Ref Range    Ph 7.632 (H) 7.400 - 7.500    Pco2 31.7 26.0 - 37.0 mmHg    Po2 69 64 - 87 mmHg    Tco2 34 (H) 20 - 33 mmol/L    S02 97 93 - 99 %    Hco3 33.5 (H) 17.0 - 25.0 mmol/L    BE 12 (H) -4 - 3 mmol/L    Body Temp 98.2 F degrees    O2 Therapy 30 %    iPF Ratio 230      Ph Temp Yoandy 7.635 (H) 7.400 - 7.500    Pco2 Temp Co 31.4 26.0 - 37.0 mmHg    Po2 Temp Cor 68 64 - 87 mmHg    Specimen Arterial     DelSys Vent     End Tidal Carbon Dioxide 27 mmhg    Peep End Expiratory Pressure 5 cmh20    Percent Minute Volume 120     Mode ASV    TROPONIN    Collection Time: 08/28/22  5:15 AM   Result Value Ref Range    Troponin T 206 (H) 6 - 19 ng/L   LACTIC ACID    Collection Time: 08/28/22  5:15 AM   Result Value Ref Range    Lactic Acid 2.7 (H) 0.5 - 2.0 mmol/L   CBC with Differential: Tomorrow AM    Collection Time: 08/28/22  5:15 AM   Result Value Ref Range    WBC 9.0 4.8 - 10.8 K/uL    RBC 3.61 (L) 4.20 - 5.40 M/uL    Hemoglobin 11.1 (L) 12.0 - 16.0 g/dL    Hematocrit 30.2 (L) 37.0 - 47.0 %    MCV 83.7 81.4 - 97.8 fL    MCH 30.7 27.0 - 33.0 pg    MCHC 36.8 (H) 33.6 - 35.0 g/dL    RDW 47.4 35.9 - 50.0 fL    Platelet Count 99 (L) 164 - 446 K/uL    MPV 11.3 9.0 - 12.9 fL    Neutrophils-Polys 39.10 (L) 44.00 - 72.00 %    Lymphocytes 10.40 (L) 22.00 - 41.00 %    Monocytes 2.60 0.00 - 13.40 %    Eosinophils 0.00 0.00 - 6.90 %    Basophils 0.00 0.00 - 1.80 %    Nucleated RBC 0.00 /100 WBC    Neutrophils (Absolute) 6.50 2.00 - 7.15 K/uL    Lymphs (Absolute) 0.94 (L) 1.00 - 4.80 K/uL    Monos (Absolute) 0.23 0.00 - 0.85 K/uL    Eos (Absolute) 0.00 0.00 - 0.51 K/uL    Baso (Absolute) 0.00 0.00 - 0.12 K/uL    NRBC (Absolute) 0.00 K/uL   Comp Metabolic Panel (CMP): Tomorrow AM    Collection Time: 08/28/22  5:15 AM   Result Value Ref Range    Sodium 144 135 - 145 mmol/L    Potassium 3.5 (L) 3.6 - 5.5 mmol/L    Chloride 104 96 - 112 mmol/L    Co2 28 20 - 33 mmol/L    Anion Gap 12.0 7.0 - 16.0    Glucose 112 (H) 65 - 99 mg/dL    Bun 36 (H) 8 - 22 mg/dL    Creatinine 1.82 (H) 0.50 - 1.40 mg/dL    Calcium 7.5 (L) 8.5 - 10.5 mg/dL    AST(SGOT) 55 (H) 12 - 45 U/L    ALT(SGPT) 22 2 - 50 U/L    Alkaline Phosphatase 44 30 - 99 U/L    Total Bilirubin 1.8 (H) 0.1 - 1.5 mg/dL    Albumin 2.2 (L) 3.2 - 4.9 g/dL     Total Protein 4.7 (L) 6.0 - 8.2 g/dL    Globulin 2.5 1.9 - 3.5 g/dL    A-G Ratio 0.9 g/dL   ESTIMATED GFR    Collection Time: 08/28/22  5:15 AM   Result Value Ref Range    GFR (CKD-EPI) 31 (A) >60 mL/min/1.73 m 2   DIFFERENTIAL MANUAL    Collection Time: 08/28/22  5:15 AM   Result Value Ref Range    Bands-Stabs 33.10 (H) 0.00 - 10.00 %    Metamyelocytes 9.60 %    Myelocytes 5.20 %    Manual Diff Status PERFORMED    PERIPHERAL SMEAR REVIEW    Collection Time: 08/28/22  5:15 AM   Result Value Ref Range    Peripheral Smear Review see below    PLATELET ESTIMATE    Collection Time: 08/28/22  5:15 AM   Result Value Ref Range    Plt Estimation Decreased    MORPHOLOGY    Collection Time: 08/28/22  5:15 AM   Result Value Ref Range    RBC Morphology Present     Poikilocytosis 1+     Target Cells 1+    CBC WITH DIFFERENTIAL    Collection Time: 08/28/22 11:25 AM   Result Value Ref Range    WBC 7.8 4.8 - 10.8 K/uL    RBC 3.61 (L) 4.20 - 5.40 M/uL    Hemoglobin 11.1 (L) 12.0 - 16.0 g/dL    Hematocrit 30.7 (L) 37.0 - 47.0 %    MCV 85.0 81.4 - 97.8 fL    MCH 30.7 27.0 - 33.0 pg    MCHC 36.2 (H) 33.6 - 35.0 g/dL    RDW 49.3 35.9 - 50.0 fL    Platelet Count 88 (L) 164 - 446 K/uL    MPV 11.1 9.0 - 12.9 fL    Neutrophils-Polys 36.50 (L) 44.00 - 72.00 %    Lymphocytes 16.50 (L) 22.00 - 41.00 %    Monocytes 0.90 0.00 - 13.40 %    Eosinophils 0.00 0.00 - 6.90 %    Basophils 0.00 0.00 - 1.80 %    Nucleated RBC 0.00 /100 WBC    Neutrophils (Absolute) 5.69 2.00 - 7.15 K/uL    Lymphs (Absolute) 1.29 1.00 - 4.80 K/uL    Monos (Absolute) 0.07 0.00 - 0.85 K/uL    Eos (Absolute) 0.00 0.00 - 0.51 K/uL    Baso (Absolute) 0.00 0.00 - 0.12 K/uL    NRBC (Absolute) 0.00 K/uL   TROPONIN    Collection Time: 08/28/22 11:25 AM   Result Value Ref Range    Troponin T 196 (H) 6 - 19 ng/L   LACTIC ACID    Collection Time: 08/28/22 11:25 AM   Result Value Ref Range    Lactic Acid 2.5 (H) 0.5 - 2.0 mmol/L   Comp Metabolic Panel    Collection Time: 08/28/22 11:25  AM   Result Value Ref Range    Sodium 142 135 - 145 mmol/L    Potassium 3.8 3.6 - 5.5 mmol/L    Chloride 102 96 - 112 mmol/L    Co2 29 20 - 33 mmol/L    Anion Gap 11.0 7.0 - 16.0    Glucose 109 (H) 65 - 99 mg/dL    Bun 39 (H) 8 - 22 mg/dL    Creatinine 1.88 (H) 0.50 - 1.40 mg/dL    Calcium 7.4 (L) 8.5 - 10.5 mg/dL    AST(SGOT) 42 12 - 45 U/L    ALT(SGPT) 15 2 - 50 U/L    Alkaline Phosphatase 44 30 - 99 U/L    Total Bilirubin 1.3 0.1 - 1.5 mg/dL    Albumin 2.1 (L) 3.2 - 4.9 g/dL    Total Protein 4.4 (L) 6.0 - 8.2 g/dL    Globulin 2.3 1.9 - 3.5 g/dL    A-G Ratio 0.9 g/dL   DIFFERENTIAL MANUAL    Collection Time: 08/28/22 11:25 AM   Result Value Ref Range    Bands-Stabs 36.50 (H) 0.00 - 10.00 %    Metamyelocytes 8.70 %    Myelocytes 0.90 %    Manual Diff Status PERFORMED    PERIPHERAL SMEAR REVIEW    Collection Time: 08/28/22 11:25 AM   Result Value Ref Range    Peripheral Smear Review see below    PLATELET ESTIMATE    Collection Time: 08/28/22 11:25 AM   Result Value Ref Range    Plt Estimation Decreased    MORPHOLOGY    Collection Time: 08/28/22 11:25 AM   Result Value Ref Range    RBC Morphology Present     Poikilocytosis 1+     Target Cells 1+    ESTIMATED GFR    Collection Time: 08/28/22 11:25 AM   Result Value Ref Range    GFR (CKD-EPI) 30 (A) >60 mL/min/1.73 m 2   POCT glucose device results    Collection Time: 08/28/22 11:28 AM   Result Value Ref Range    POC Glucose, Blood 108 (H) 65 - 99 mg/dL       Fluids    Intake/Output Summary (Last 24 hours) at 8/28/2022 1326  Last data filed at 8/28/2022 1200  Gross per 24 hour   Intake 4454.6 ml   Output 2040 ml   Net 2414.6 ml         Core Measures & Quality Metrics  Labs reviewed and Radiology images reviewed  Guerrero catheter: Critically Ill - Requiring Accurate Measurement of Urinary Output      DVT Prophylaxis: Contraindicated - High bleeding risk  DVT prophylaxis - mechanical: SCDs  Ulcer prophylaxis: Yes      RAP Score Total: 0  ETOH  Screening    Assessment/Plan  Stroke ([facility])- (present on admission)  Assessment & Plan  Right sided deficits noted  CT consistent with left sided infarcts  Neurology consulted- Dr. Terry    Acute postoperative respiratory failure ([facility])  Assessment & Plan  Remained intubated post-operatively.  Continue full mechanical ventilatory support.   Ventilator bundle and Trauma weaning protocol.    Coagulopathy ([facility])  Assessment & Plan  Post-take back TEG with low MA, functional fibrinogen, and high Ly30  Given Cryo, Platelets, and TXA  Output from wound vac decreasing  Trending serial labs and TEGs    Lactic acidosis  Assessment & Plan  Significantly elevated post-op  Decreasing with resuscitation.    Occluded aorta ([facility])  Assessment & Plan  aorto-bifem with renal artery endarterectomy  Take back for post op bleeding-packed  8/28 removed packing and closed  Dr. Siu    Acute blood loss anemia  Assessment & Plan  Follow serial hgb  Transfuse to maintain Hgb ~9 given recent cardiac event.    CAD (coronary artery disease)- (present on admission)  Assessment & Plan  History of MI with PCI one-month ago, on DAPT prior to take-back.  Transfuse to maintain hemoglobin 9.  Wet read of intra-operative KLEBER during index procedure with EF 30-40% and anterior wall motion abnormality consistent with area of infarct from recent MI.  8/28  final read of KLEBER still pending.     Hypotension due to hypovolemia  Assessment & Plan  Given 2 FFP, 2 PRBC prior to initiation of massive transfusion and return to operating room for exploration.  Trending end-points of resuscitation.  Vasopressors weaned after take-back.    IVON (acute kidney injury) ([facility])- (present on admission)  Assessment & Plan  Responding to resuscitation        Discussed patient condition with RN, RT, and Pharmacy.  CRITICAL CARE TIME EXCLUDING PROCEDURES: 35   minutes

## 2022-08-28 NOTE — PROGRESS NOTES
Patient transported to OR on monitor and bag respirations per RT. LR 500mL bolus running and all other continuous drips held at this time. Report given to surgical team. Consents to be obtained by surgical team.

## 2022-08-28 NOTE — ANESTHESIA PREPROCEDURE EVALUATION
Case: [id number] Date/Time: 08/28/22 0945    Procedure: LAPAROTOMY, EXPLORATORY    Location: [facility] OR 09 / SURGERY [facility]    Surgeons: Brandyn Siu M.D.          Relevant Problems   PULMONARY   (positive) COPD (chronic obstructive pulmonary disease) (HCC)      NEURO   (positive) Stroke (HCC)      CARDIAC   (positive) Aortic thrombus (HCC)   (positive) CAD (coronary artery disease)   (positive) Chronic distal aortic occlusion (HCC)   (positive) Chronic systolic congestive heart failure (HCC)   (positive) Critical ischemia of lower extremity (HCC)   (positive) Hypertension   (positive) Peripheral arterial occlusive disease (HCC)         (positive) IVON (acute kidney injury) (HCC)   (positive) Hydronephrosis      Other   (positive) Acute blood loss anemia   (positive) Acute postoperative respiratory failure (HCC)   (positive) Coagulopathy (HCC)   (positive) Hypotension due to hypovolemia   (positive) Lactic acidosis   (positive) Occluded aorta (HCC)   (positive) QT prolongation   1mo ago, 3rd surg for oclcusive aorta, critically ill    Physical Exam    Airway - unable to assess  Patient is intubated/trached     Cardiovascular   Rate: abnormal     Dental     Unable to assess dental       Pulmonary    Abdominal    Neurological - sedated/unconcious                 Anesthesia Plan    ASA 4- EMERGENT   ASA physical status 4 criteria: CAD/stents - recent (< 3 months), CVA or TIA - recent (< 3 months), MI - recent (< 3 months), severe reduction of ejection fractions and respiratory failureASA physical status emergent criteria: compromised vital organ, limb or tissue and acute ischemia (limb, body part, tissue)    Plan - general       Airway plan will be ETT          Induction: intravenous    Postoperative Plan: Postoperative administration of opioids is intended.    Pertinent diagnostic labs and testing reviewed    Informed Consent:    Anesthetic plan and risks discussed with patient.    Use of blood products discussed  with: patient whom consented to blood products.

## 2022-08-28 NOTE — DIETARY
"Nutrition Support Assessment   Day 5 of admit.  Fouzia Santamaria is a 61 y.o. female with admitting DX of Peripheral arterial occlusive disease and chronic distal aortic occlusion.      Current problem list:  Stroke  Lactic acidosis   Coagulopathy   Hypotension due to hypovolemia   Acute postoperative respiratory failure  Occluded aorta   Chronic systolic congestive heart failure  Hydronephrosis   COPD  Aortic thrombus   HTN  IVON  CAD   Acute blood loss anemia   QT prolongation      Assessment:  Estimated Nutritional Needs: based on:   Height: 165.1 cm (5' 5\")  Weight: 50.5 kg (111 lb 5.3 oz) via bed scale   Weight to Use in Calculations: 50.5 kg (111 lb 5.3 oz)  Body mass index is 18.53 kg/m²., BMI classification: Normal but low for age    Calculation/Equation:   RMR per PSU (VE: 5.8 L/min and Tmax 24 hrs: 37 C)=1184 kcals/day   REE per HIY=9096 kcals/day (x1.5=3731 kcals/day)  Total Calories / day: 1263 - 1515 (Calories / k - 30)  Total Grams Protein / day: 51 - 61 (Grams Protein / k.0 - 1.2)     Evaluation:   Pt intubated s/p OR. TF consult placed. Pt with poor PO prior to intubation and noted severe wt loss in the last month. Nutrition support appropriate to meet pt's estimated needs.   Cortrak placed  confirmed via RN note however not yet confirmed by diagnostic imaging.   Current clinical picture and MD progress notes reviewed. Pt admitted with large thrombus in aorta, underwent operative repair  and returned to OR  for pack removal and fascial closure.    Labs () Glu 109 (H), Ca 7.4 (L),   Meds: LR @ 100 mL/hr  Skin: generalized edema noted, 2+ bilateral leg edema   +BM   Fibersource HN is an appropriate formula to meet pt's estimated needs.        Recommendations/Plan:  Once Cortrak placement is confirmed and MD advances TF rate, initiate Fibersouce HN @ 45 mL/hr providing 1296 kcals, 58 grams protein and 875 mL free water.   Fluids per MD   Diet advancement as medically " feasible per MD   Monitor weight     RD following

## 2022-08-29 ENCOUNTER — APPOINTMENT (OUTPATIENT)
Dept: RADIOLOGY | Facility: MEDICAL CENTER | Age: 62
DRG: 270 | End: 2022-08-29
Attending: SURGERY
Payer: MEDICAID

## 2022-08-29 PROBLEM — E83.42 HYPOMAGNESEMIA: Status: ACTIVE | Noted: 2022-08-29

## 2022-08-29 LAB
ALBUMIN SERPL BCP-MCNC: 1.9 G/DL (ref 3.2–4.9)
ALBUMIN/GLOB SERPL: 0.7 G/DL
ALP SERPL-CCNC: 51 U/L (ref 30–99)
ALT SERPL-CCNC: 7 U/L (ref 2–50)
ANION GAP SERPL CALC-SCNC: 12 MMOL/L (ref 7–16)
ANISOCYTOSIS BLD QL SMEAR: ABNORMAL
AST SERPL-CCNC: 36 U/L (ref 12–45)
BASE EXCESS BLDA CALC-SCNC: -21 MMOL/L (ref -4–3)
BASOPHILS # BLD AUTO: 0 % (ref 0–1.8)
BASOPHILS # BLD: 0 K/UL (ref 0–0.12)
BILIRUB SERPL-MCNC: 1.3 MG/DL (ref 0.1–1.5)
BODY TEMPERATURE: ABNORMAL DEGREES
BUN SERPL-MCNC: 41 MG/DL (ref 8–22)
CA-I BLD ISE-SCNC: 0.96 MMOL/L (ref 1.1–1.3)
CA-I BLD ISE-SCNC: 1.4 MMOL/L (ref 1.1–1.3)
CALCIUM SERPL-MCNC: 7 MG/DL (ref 8.5–10.5)
CHLORIDE SERPL-SCNC: 103 MMOL/L (ref 96–112)
CHOLEST SERPL-MCNC: 89 MG/DL (ref 100–199)
CO2 BLDA-SCNC: 11 MMOL/L (ref 20–33)
CO2 SERPL-SCNC: 27 MMOL/L (ref 20–33)
CREAT SERPL-MCNC: 2.29 MG/DL (ref 0.5–1.4)
CRP SERPL HS-MCNC: 38.36 MG/DL (ref 0–0.75)
EOSINOPHIL # BLD AUTO: 0 K/UL (ref 0–0.51)
EOSINOPHIL NFR BLD: 0 % (ref 0–6.9)
ERYTHROCYTE [DISTWIDTH] IN BLOOD BY AUTOMATED COUNT: 51.9 FL (ref 35.9–50)
EST. AVERAGE GLUCOSE BLD GHB EST-MCNC: 114 MG/DL
GFR SERPLBLD CREATININE-BSD FMLA CKD-EPI: 24 ML/MIN/1.73 M 2
GLOBULIN SER CALC-MCNC: 2.7 G/DL (ref 1.9–3.5)
GLUCOSE BLD STRIP.AUTO-MCNC: 103 MG/DL (ref 65–99)
GLUCOSE BLD STRIP.AUTO-MCNC: 79 MG/DL (ref 65–99)
GLUCOSE BLD STRIP.AUTO-MCNC: 87 MG/DL (ref 65–99)
GLUCOSE SERPL-MCNC: 88 MG/DL (ref 65–99)
HBA1C MFR BLD: 5.6 % (ref 4–5.6)
HCO3 BLDA-SCNC: 9.5 MMOL/L (ref 17–25)
HCT VFR BLD AUTO: 27.3 % (ref 37–47)
HCT VFR BLD CALC: 19 % (ref 37–47)
HDLC SERPL-MCNC: 32 MG/DL
HGB BLD-MCNC: 6.5 G/DL (ref 12–16)
HGB BLD-MCNC: 9.6 G/DL (ref 12–16)
IRON SATN MFR SERPL: 15 % (ref 15–55)
IRON SERPL-MCNC: 20 UG/DL (ref 40–170)
LDLC SERPL CALC-MCNC: 34 MG/DL
LYMPHOCYTES # BLD AUTO: 1.6 K/UL (ref 1–4.8)
LYMPHOCYTES NFR BLD: 11.3 % (ref 22–41)
MAGNESIUM SERPL-MCNC: 1 MG/DL (ref 1.5–2.5)
MANUAL DIFF BLD: NORMAL
MCH RBC QN AUTO: 30.4 PG (ref 27–33)
MCHC RBC AUTO-ENTMCNC: 35.2 G/DL (ref 33.6–35)
MCV RBC AUTO: 86.4 FL (ref 81.4–97.8)
MICROCYTES BLD QL SMEAR: ABNORMAL
MONOCYTES # BLD AUTO: 0 K/UL (ref 0–0.85)
MONOCYTES NFR BLD AUTO: 0 % (ref 0–13.4)
MORPHOLOGY BLD-IMP: NORMAL
NEUTROPHILS # BLD AUTO: 12.6 K/UL (ref 2–7.15)
NEUTROPHILS NFR BLD: 87 % (ref 44–72)
NEUTS BAND NFR BLD MANUAL: 1.7 % (ref 0–10)
NRBC # BLD AUTO: 0.02 K/UL
NRBC BLD-RTO: 0.1 /100 WBC
NT-PROBNP SERPL IA-MCNC: ABNORMAL PG/ML (ref 0–125)
PCO2 BLDA: 38.5 MMHG (ref 26–37)
PCO2 TEMP ADJ BLDA: 32 MMHG (ref 26–37)
PH BLDA: 7 [PH] (ref 7.4–7.5)
PH TEMP ADJ BLDA: 7.05 [PH] (ref 7.4–7.5)
PHOSPHATE SERPL-MCNC: 3.8 MG/DL (ref 2.5–4.5)
PLATELET # BLD AUTO: 75 K/UL (ref 164–446)
PLATELET BLD QL SMEAR: NORMAL
PMV BLD AUTO: 11.9 FL (ref 9–12.9)
PO2 BLDA: 438 MMHG (ref 64–87)
PO2 TEMP ADJ BLDA: 413 MMHG (ref 64–87)
POLYCHROMASIA BLD QL SMEAR: NORMAL
POTASSIUM BLD-SCNC: 3.3 MMOL/L (ref 3.6–5.5)
POTASSIUM SERPL-SCNC: 3.5 MMOL/L (ref 3.6–5.5)
PREALB SERPL-MCNC: 8.9 MG/DL (ref 18–38)
PROT SERPL-MCNC: 4.6 G/DL (ref 6–8.2)
RBC # BLD AUTO: 3.16 M/UL (ref 4.2–5.4)
RBC BLD AUTO: PRESENT
SAO2 % BLDA: 100 % (ref 93–99)
SODIUM BLD-SCNC: 140 MMOL/L (ref 135–145)
SODIUM SERPL-SCNC: 142 MMOL/L (ref 135–145)
SPECIMEN DRAWN FROM PATIENT: ABNORMAL
TIBC SERPL-MCNC: 132 UG/DL (ref 250–450)
TRIGL SERPL-MCNC: 117 MG/DL (ref 0–149)
UIBC SERPL-MCNC: 112 UG/DL (ref 110–370)
WBC # BLD AUTO: 14.2 K/UL (ref 4.8–10.8)

## 2022-08-29 PROCEDURE — 83550 IRON BINDING TEST: CPT

## 2022-08-29 PROCEDURE — 71045 X-RAY EXAM CHEST 1 VIEW: CPT

## 2022-08-29 PROCEDURE — 99291 CRITICAL CARE FIRST HOUR: CPT | Performed by: SURGERY

## 2022-08-29 PROCEDURE — 94003 VENT MGMT INPAT SUBQ DAY: CPT

## 2022-08-29 PROCEDURE — 80061 LIPID PANEL: CPT

## 2022-08-29 PROCEDURE — 83540 ASSAY OF IRON: CPT

## 2022-08-29 PROCEDURE — 85007 BL SMEAR W/DIFF WBC COUNT: CPT

## 2022-08-29 PROCEDURE — 700105 HCHG RX REV CODE 258: Performed by: SURGERY

## 2022-08-29 PROCEDURE — 86140 C-REACTIVE PROTEIN: CPT

## 2022-08-29 PROCEDURE — 700111 HCHG RX REV CODE 636 W/ 250 OVERRIDE (IP): Performed by: SURGERY

## 2022-08-29 PROCEDURE — 700102 HCHG RX REV CODE 250 W/ 637 OVERRIDE(OP): Performed by: SURGERY

## 2022-08-29 PROCEDURE — 82962 GLUCOSE BLOOD TEST: CPT | Mod: 91

## 2022-08-29 PROCEDURE — A9270 NON-COVERED ITEM OR SERVICE: HCPCS | Performed by: SURGERY

## 2022-08-29 PROCEDURE — 83036 HEMOGLOBIN GLYCOSYLATED A1C: CPT

## 2022-08-29 PROCEDURE — 770022 HCHG ROOM/CARE - ICU (200)

## 2022-08-29 PROCEDURE — 94760 N-INVAS EAR/PLS OXIMETRY 1: CPT

## 2022-08-29 PROCEDURE — 99232 SBSQ HOSP IP/OBS MODERATE 35: CPT | Performed by: PSYCHIATRY & NEUROLOGY

## 2022-08-29 PROCEDURE — 84134 ASSAY OF PREALBUMIN: CPT

## 2022-08-29 PROCEDURE — 83880 ASSAY OF NATRIURETIC PEPTIDE: CPT

## 2022-08-29 PROCEDURE — 84100 ASSAY OF PHOSPHORUS: CPT

## 2022-08-29 PROCEDURE — 83735 ASSAY OF MAGNESIUM: CPT

## 2022-08-29 PROCEDURE — 80053 COMPREHEN METABOLIC PANEL: CPT

## 2022-08-29 PROCEDURE — 85025 COMPLETE CBC W/AUTO DIFF WBC: CPT

## 2022-08-29 PROCEDURE — 770001 HCHG ROOM/CARE - MED/SURG/GYN PRIV*

## 2022-08-29 PROCEDURE — 94799 UNLISTED PULMONARY SVC/PX: CPT

## 2022-08-29 RX ORDER — OXYCODONE HYDROCHLORIDE 5 MG/1
5 TABLET ORAL EVERY 4 HOURS PRN
Status: DISCONTINUED | OUTPATIENT
Start: 2022-08-29 | End: 2022-09-03

## 2022-08-29 RX ORDER — HYDROMORPHONE HYDROCHLORIDE 1 MG/ML
0.5 INJECTION, SOLUTION INTRAMUSCULAR; INTRAVENOUS; SUBCUTANEOUS
Status: DISCONTINUED | OUTPATIENT
Start: 2022-08-29 | End: 2022-09-03

## 2022-08-29 RX ORDER — MAGNESIUM SULFATE HEPTAHYDRATE 40 MG/ML
2 INJECTION, SOLUTION INTRAVENOUS EVERY 6 HOURS
Status: COMPLETED | OUTPATIENT
Start: 2022-08-29 | End: 2022-08-29

## 2022-08-29 RX ORDER — ASPIRIN 81 MG/1
81 TABLET, CHEWABLE ORAL DAILY
Status: DISCONTINUED | OUTPATIENT
Start: 2022-08-29 | End: 2022-09-03

## 2022-08-29 RX ORDER — FUROSEMIDE 10 MG/ML
40 INJECTION INTRAMUSCULAR; INTRAVENOUS ONCE
Status: COMPLETED | OUTPATIENT
Start: 2022-08-29 | End: 2022-08-29

## 2022-08-29 RX ADMIN — OXYCODONE AND ACETAMINOPHEN 1 TABLET: 5; 325 TABLET ORAL at 06:00

## 2022-08-29 RX ADMIN — DOCUSATE SODIUM 100 MG: 50 LIQUID ORAL at 05:41

## 2022-08-29 RX ADMIN — PRAMIPEXOLE DIHYDROCHLORIDE 0.12 MG: 0.12 TABLET ORAL at 11:04

## 2022-08-29 RX ADMIN — PRAMIPEXOLE DIHYDROCHLORIDE 0.12 MG: 0.12 TABLET ORAL at 17:29

## 2022-08-29 RX ADMIN — MAGNESIUM SULFATE HEPTAHYDRATE 2 G: 40 INJECTION, SOLUTION INTRAVENOUS at 12:34

## 2022-08-29 RX ADMIN — PRAMIPEXOLE DIHYDROCHLORIDE 0.12 MG: 0.12 TABLET ORAL at 05:36

## 2022-08-29 RX ADMIN — ATORVASTATIN CALCIUM 80 MG: 40 TABLET, FILM COATED ORAL at 20:37

## 2022-08-29 RX ADMIN — MAGNESIUM SULFATE HEPTAHYDRATE 2 G: 40 INJECTION, SOLUTION INTRAVENOUS at 17:24

## 2022-08-29 RX ADMIN — FAMOTIDINE 20 MG: 20 TABLET, FILM COATED ORAL at 05:41

## 2022-08-29 RX ADMIN — DOCUSATE SODIUM 100 MG: 50 LIQUID ORAL at 17:23

## 2022-08-29 RX ADMIN — EZETIMIBE 10 MG: 10 TABLET ORAL at 05:34

## 2022-08-29 RX ADMIN — ASPIRIN 81 MG 81 MG: 81 TABLET ORAL at 11:02

## 2022-08-29 RX ADMIN — PIPERACILLIN AND TAZOBACTAM 3.38 G: 3; .375 INJECTION, POWDER, LYOPHILIZED, FOR SOLUTION INTRAVENOUS; PARENTERAL at 05:41

## 2022-08-29 RX ADMIN — FUROSEMIDE 40 MG: 10 INJECTION, SOLUTION INTRAMUSCULAR; INTRAVENOUS at 02:20

## 2022-08-29 RX ADMIN — OXYCODONE 5 MG: 5 TABLET ORAL at 15:07

## 2022-08-29 RX ADMIN — SODIUM CHLORIDE 250 MG: 9 INJECTION, SOLUTION INTRAVENOUS at 17:26

## 2022-08-29 RX ADMIN — POTASSIUM BICARBONATE 50 MEQ: 978 TABLET, EFFERVESCENT ORAL at 12:35

## 2022-08-29 RX ADMIN — OXYCODONE AND ACETAMINOPHEN 1 TABLET: 5; 325 TABLET ORAL at 02:10

## 2022-08-29 ASSESSMENT — FIBROSIS 4 INDEX: FIB4 SCORE: 9.12

## 2022-08-29 NOTE — CARE PLAN
Problem: Ventilation  Goal: Ability to achieve and maintain unassisted ventilation or tolerate decreased levels of ventilator support  Description: Target End Date:  4 days     Document on Vent flowsheet    1.  Support and monitor invasive and noninvasive mechanical ventilation  2.  Monitor ventilator weaning response  3.  Perform ventilator associated pneumonia prevention interventions  4.  Manage ventilation therapy by monitoring diagnostic test results  Outcome: Progressing       Ventilator Daily Summary    Vent Day # 3    Ventilator settings changed this shift: asv 100 +5 30%    Weaning trials: yes x1, failed due to apnea     Respiratory Procedures: none     Plan: Continue current ventilator settings and wean mechanical ventilation as tolerated per physician orders.

## 2022-08-29 NOTE — CARE PLAN
Problem: Ventilation  Goal: Ability to achieve and maintain unassisted ventilation or tolerate decreased levels of ventilator support  Description: Target End Date:  4 days     Document on Vent flowsheet    1.  Support and monitor invasive and noninvasive mechanical ventilation  2.  Monitor ventilator weaning response  3.  Perform ventilator associated pneumonia prevention interventions  4.  Manage ventilation therapy by monitoring diagnostic test results  Outcome: Progressing                                   Ventilator Daily Summary     Vent Day # 4     Ventilator settings changed this shift: asv 100 +5 30%     Weaning trials: none this shift      Respiratory Procedures: none      Plan: Continue current ventilator settings and wean mechanical ventilation as tolerated per physician orders.

## 2022-08-29 NOTE — CONSULTS
Referring Physician: Dr. Edward Damico    Referral Reason:    Stroke    HPI:  Ms. Fouzia Santamaria is a 61 y.o. female with history of coronary artery disease status post stent placement LAD, peripheral arterial disease with chronic aortic occlusion and mural thrombus, hyperlipidemia, hypertension, who presented 8/23/2022 with lower extremity pain.  Apparently has been experiencing bilateral lower extremity pain that has been waxing and waning in severity over the past few weeks but steadily worse over the past few days prior to presentation, left greater than right.  She has known chronic infrarenal aortic occlusion with collateral flow and is followed with vascular surgery as an outpatient.    Patient recently admitted to [place] on July 3, 2022 and had 2 stents placed to LAD, subsequently to that was in the hospital from 7/21-7/24 when she was sent by vascular surgeon due to escalating claudication symptoms.   CTA aorta with and without shows occlusion of the abdominal aorta just below the level of renal arteries with occlusion of common and external iliac arteries with reconstitution of flow via epigastric collaterals as well as diffuse ectasia/aneurysm of descending thoracic aorta and upper abdominal aorta with prominent mural thrombus and atherosclerosis, diffusely small caliber bilateral lower extremity arteries with significant atherosclerosis of bilateral superficial femoral arteries without high-grade stenosis, likely single-vessel runoff of the right and two-vessel runoff on the left, focal severe stenosis of the bilateral renal artery origins.  She was subsequently evaluated by her vascular surgeon Dr. Siu and underwent bilateral renal artery eversion endarterectomies  and aortobifemoral bypass on 8/26/2022.  She remains intubated and difficult to assess but last evening and today she was noted by nursing staff to have some right-sided weakness for which she underwent a brain CT which revealed  patchy areas of ill-defined hypodensity in the left frontal and parietal lobe concerning for multifocal infarcts for which I was asked to see her.  She remains intubated and her neurological examination is limited.  Her  and daughter are at bedside and told me she has had multiple stroke in the past but no significant residual symptom.    ROS:   Unable to obtain.    Past Medical History:   Past Medical History:   Diagnosis Date    Chronic obstructive pulmonary disease (HCC)     Diabetes (HCC)     Heart attack (HCC)     Hypertension     Stroke (HCC)        Past Surgical History:   Past Surgical History:   Procedure Laterality Date    NH EXPLORATORY OF ABDOMEN  8/28/2022    Procedure: LAPAROTOMY, EXPLORATORY;  Surgeon: Brandyn Siu M.D.;  Location: SURGERY [facility];  Service: General    AORTOBIFEM BYPASS Bilateral 8/26/2022    Procedure: CREATION, BYPASS, ARTERIAL, AORTA TO FEMORAL, BILATERAL;  Surgeon: Brandyn Siu M.D.;  Location: SURGERY [facility];  Service: General    ENDARTERECTOMY Bilateral 8/26/2022    Procedure: RENAL ARTERY ENDARTERECTOMY;  Surgeon: Brandyn Siu M.D.;  Location: SURGERY [facility];  Service: General    NH EXPLORATORY OF ABDOMEN  8/26/2022    Procedure: LAPAROTOMY, EXPLORATORY; CONTROL OF POST-OPERATIVE BLEEDING;  Surgeon: Brandyn Siu M.D.;  Location: SURGERY [facility];  Service: General    APPLICATION OR REPLACEMENT, WOUND VAC  8/26/2022    Procedure: APPLICATION OR REPLACEMENT, WOUND VAC;  Surgeon: Brandyn Siu M.D.;  Location: SURGERY [facility];  Service: General    STENT PLACEMENT      THYROIDECTOMY         Social History:   Social History     Socioeconomic History    Marital status:      Spouse name: Not on file    Number of children: Not on file    Years of education: Not on file    Highest education level: Not on file   Occupational History    Not on file   Tobacco Use    Smoking status: Every Day     Packs/day: 1.50     Types: Cigarettes    Smokeless  tobacco: Never   Substance and Sexual Activity    Alcohol use: No    Drug use: No    Sexual activity: Not on file   Other Topics Concern    Not on file   Social History Narrative    Not on file     Social Determinants of Health     Financial Resource Strain: Not on file   Food Insecurity: Not on file   Transportation Needs: Not on file   Physical Activity: Not on file   Stress: Not on file   Social Connections: Not on file   Intimate Partner Violence: Not on file   Housing Stability: Not on file       Family Hx: History reviewed. No pertinent family history.    Current Medications:   Current Facility-Administered Medications   Medication Dose Route Frequency Provider Last Rate Last Admin    lactated ringers infusion   Intravenous Continuous Melquiades Russell M.D. 100 mL/hr at 08/28/22 1230 Restarted at 08/28/22 1230    acetaminophen (Tylenol) tablet 650 mg  650 mg Enteral Tube Q6HRS PRN Red Damico M.D.        oxyCODONE-acetaminophen (PERCOCET) 5-325 MG per tablet 1 Tablet  1 Tablet Enteral Tube Q4HRS PRN Red Damico M.D.        atorvastatin (LIPITOR) tablet 80 mg  80 mg Enteral Tube Nightly Red Damico M.D.        [START ON 8/29/2022] ezetimibe (ZETIA) tablet 10 mg  10 mg Enteral Tube DAILY Red Damico M.D.        polyethylene glycol/lytes (MIRALAX) PACKET 1 Packet  1 Packet Enteral Tube BID Red Damico M.D.        senna-docusate (PERICOLACE or SENOKOT S) 8.6-50 MG per tablet 1 Tablet  1 Tablet Enteral Tube Nightly Red Damico M.D.        senna-docusate (PERICOLACE or SENOKOT S) 8.6-50 MG per tablet 1 Tablet  1 Tablet Enteral Tube Q24HRS PRN Red Damico M.D.        pramipexole (MIRAPEX) tablet 0.125 mg  0.125 mg Enteral Tube TID Red Damico M.D.   0.125 mg at 08/28/22 1246    promethazine (PHENERGAN) tablet 12.5-25 mg  12.5-25 mg Enteral Tube Q4HRS PRN Red Damico M.D.        [START ON 8/29/2022] famotidine (PEPCID) tablet 20 mg  20 mg Enteral Tube DAILY Red Damico M.D.        Or     [START ON 8/29/2022] famotidine (PEPCID) injection 20 mg  20 mg Intravenous DAILY Red Damico M.D.        Pharmacy Consult: Enteral tube insertion - review meds/change route/product selection  1 Each Other PHARMACY TO DOSE Red Damico M.D.        piperacillin-tazobactam (Zosyn) 3.375 g in  mL IVPB  3.375 g Intravenous Q8HRS Melquiades Russell M.D.   Stopped at 08/28/22 1646    Respiratory Therapy Consult   Nebulization Continuous RT Melquiades Russell M.D.        Pharmacy Consult Request ...Pain Management Review 1 Each  1 Each Other PHARMACY TO DOSE Melquiades Russell M.D.        docusate sodium (COLACE) capsule 100 mg  100 mg Oral BID Melquiades Russell M.D.        bisacodyl (DULCOLAX) suppository 10 mg  10 mg Rectal Q24HRS PRN Melquiades Russell M.D.   10 mg at 08/27/22 1804    sodium phosphate (Fleet) enema 133 mL  1 Each Rectal Once PRN Melquiades Russell M.D.        Respiratory Therapy Consult   Nebulization Continuous RT Melquiades Russell M.D.        propofol (DIPRIVAN) injection  0-80 mcg/kg/min Intravenous Continuous Melquiades Russell M.D.   Paused at 08/27/22 1210    fentaNYL (SUBLIMAZE) 50 mcg/mL in 50mL (Continuous Infusion)   Intravenous Continuous Melquiades Russell M.D.   Paused at 08/28/22 0945    insulin regular (HumuLIN R,NovoLIN R) injection  1-6 Units Subcutaneous Q6HRS Melquiades Russell M.D.   1 Units at 08/27/22 0645    And    dextrose 10 % BOLUS 25 g  25 g Intravenous Q15 MIN PRN Melquiades Russell M.D.        norepinephrine (Levophed) 8 mg in 250 mL NS infusion (premix)  0-30 mcg/min Intravenous Continuous Melquiades Russell M.D.   Stopped at 08/27/22 0000    morphine 4 MG/ML injection 2 mg  2 mg Intravenous Q4HRS PRN Brandyn Calabrese D.O.   2 mg at 08/28/22 0500    albuterol (PROVENTIL) 2.5mg/0.5ml nebulizer solution 2.5 mg  2.5 mg Nebulization Q4H PRN (RT) Brandyn Calabrese D.O.        [Held by provider] aspirin EC (ECOTRIN) tablet 81 mg  81 mg Oral DAILY Tani Adkins M.D.   81 mg at 08/26/22 0601    [Held by provider] clopidogrel (PLAVIX)  tablet 75 mg  75 mg Oral DAILY Tani Adkins M.D.   75 mg at 08/26/22 0601    Pharmacy Consult Request - to monitor for nephrotoxic agents  1 Each Other PHARMACY TO DOSE Tani Adkins M.D.        labetalol (NORMODYNE/TRANDATE) injection 10 mg  10 mg Intravenous Q4HRS PRN Tani Adkins M.D.   10 mg at 08/28/22 0604    promethazine (PHENERGAN) suppository 12.5-25 mg  12.5-25 mg Rectal Q4HRS PRN Tani Adkins M.D.        prochlorperazine (COMPAZINE) injection 5-10 mg  5-10 mg Intravenous Q4HRS PRN Tani Adkins M.D.           Allergies:   Allergies   Allergen Reactions    Pcn [Penicillins] Vomiting and Nausea    Toradol Vomiting and Nausea       Physical Exam:   Vitals:    08/28/22 1500 08/28/22 1600 08/28/22 1700 08/28/22 1800   BP: (!) 144/85 (!) 149/84 (!) 162/84    Pulse: 77 82 95 100   Resp: 20 16 18 19   Temp:       TempSrc:       SpO2: 100% 100% 99% 100%   Weight:       Height:           Physical Exam   GENERAL:  Lying in the hospital bed in no apparent distress.  Head: Normocephalic and atraumatic.   Eyes: Pupils are equal, round, and reactive to light.  Extraocular movement cannot be tested.  She open her eyes intermittently.  Cardiovascular: Normal rate and regular rhythm.    Pulmonary/Chest: Breath sounds normal.   Abdominal: Soft. Bowel sounds are normal. He exhibits no distension. There is no tenderness.   Skin: Skin is warm and dry. No rash noted. No erythema.  Neuro Exam  She is intubated and lightly sedated with fentanyl but no propofol or Versed.  I do not appreciate facial asymmetry.  Facial sensation cannot be tested.  Pupils are equal and reactive.  She does not follow commands.  Apparently she had some spontaneous movement of the left side but has no or limited movement of right upper and lower extremity.  Sensation and coordination cannot be tested.  Plantar reflexes are upgoing on the right, downgoing on the left.  GAIT:  Deferred       NIH Stroke Scale: Cannot be assessed as  patient is intubated and sedated and does not follow commands.          Labs:  Recent Labs     08/27/22  0450 08/27/22  1115 08/27/22  1800 08/28/22  0515 08/28/22  1125   WBC 4.0*  --   --  9.0 7.8   RBC 3.13*  --   --  3.61* 3.61*   HEMOGLOBIN 10.1*   < > 11.9* 11.1* 11.1*   HEMATOCRIT 27.6*   < > 31.9* 30.2* 30.7*   MCV 88.2  --   --  83.7 85.0   MCH 32.3  --   --  30.7 30.7   MCHC 36.6*  --   --  36.8* 36.2*   RDW 46.1  --   --  47.4 49.3   PLATELETCT 165  --   --  99* 88*   MPV 10.1  --   --  11.3 11.1    < > = values in this interval not displayed.     Recent Labs     08/27/22  0450 08/28/22  0515 08/28/22  1125   SODIUM 150* 144 142   POTASSIUM 3.5* 3.5* 3.8   CHLORIDE 111 104 102   CO2 18* 28 29   GLUCOSE 174* 112* 109*   BUN 22 36* 39*   CREATININE 0.98 1.82* 1.88*   CALCIUM 8.5 7.5* 7.4*                 Recent Labs     08/26/22  1845   TRIGLYCERIDE 105     Recent Labs     08/27/22  0450 08/28/22  0515 08/28/22  1125   SODIUM 150* 144 142   POTASSIUM 3.5* 3.5* 3.8   CHLORIDE 111 104 102   CO2 18* 28 29   GLUCOSE 174* 112* 109*   BUN 22 36* 39*     Recent Labs     08/27/22  0450 08/28/22  0515 08/28/22  1125   SODIUM 150* 144 142   POTASSIUM 3.5* 3.5* 3.8   CHLORIDE 111 104 102   CO2 18* 28 29   BUN 22 36* 39*   CREATININE 0.98 1.82* 1.88*   CALCIUM 8.5 7.5* 7.4*         No results found for this or any previous visit.      Imaging reviewed:    DX-ABDOMEN FOR TUBE PLACEMENT   Final Result         Feeding tube with tip projecting over the expected area of the stomach.      CT-HEAD W/O   Final Result         Patchy areas of ill-defined hypodensity in the left frontal and parietal lobes, concerning for acute multifocal infarcts. Further evaluation with MRI is recommended.      No acute intracranial hemorrhage.                  [id number] VIEW   Final Result         No radiopaque foreign body identified. Previous sponge was removed.      [id number] VIEW   Final Result      1.  There is a curvilinear density  in the right upper abdomen consistent with a retained surgical lap sponge. This was subsequently removed as there has already been a subsequent x-ray and this was no longer identified.   2.  There are new cutaneous skin staples in midline of the abdomen and pelvis.   3.  Bowel gas pattern is nonobstructive.      DX-CHEST-PORTABLE (1 VIEW)   Final Result      No acute cardiopulmonary abnormality.      DX-ABDOMEN FOR TUBE PLACEMENT   Final Result      1.  Enteric tube overlies the proximal stomach.      DX-CHEST-PORTABLE (1 VIEW)   Final Result         1.  No acute cardiopulmonary disease.   2.  Nasogastric tube is coiled back the side port with tip projecting cephalad terminating in the distal esophagus, recommend advancement.      EC-KLEBER W/O CONT         CT-RENAL COLIC EVALUATION(A/P W/O)   Final Result         1. No hydronephrosis. Nonobstructive bilateral renal calculi seen on previous exam cannot be evaluated due to contrast in the collecting system.      2. Retained contrast in the kidney from prior CTA, likely due to nephropathy/renal failure.      US-RENAL   Final Result         1.  Mild right hydronephrosis   2.  Punctate left renal calculi without visualized obstructive changes.      CT-CTA AORTA-RO WITH & W/O-POST PROCESS   Final Result      1.  Occlusion of the abdominal aorta just below level of the renal arteries with occlusion of the common and external iliac arteries with reconstitution of flow via epigastric collaterals.   2.  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   3.  Diffusely small caliber bilateral lower extremity arteries with significant atherosclerosis of the bilateral superficial femoral arteries without high-grade stenosis.   4.  Likely single vessel runoff on the right and 2 vessel runoff on the left.   5.  Focal severe stenoses at the bilateral renal artery origins.      These findings were  discussed with ESPERANZA DO on 8/23/2022 5:29 PM.      MR-BRAIN-W/O    (Results Pending)   MR-MRA HEAD-W/O    (Results Pending)          Assessment/Plan:   61 y.o. female with multiple medical problem as outlined above and in particular history of previous stroke with no significant residual symptom who was admitted on 8/23/2022 with lower extremity pain. apparently has been experiencing bilateral lower extremity pain that has been waxing and waning in severity over the past few weeks but steadily worse over the past few days prior to presentation, left greater than right.  She has known chronic infrarenal aortic occlusion with collateral flow and is followed with vascular surgery as an outpatient.   CTA aorta with and without shows occlusion of the abdominal aorta just below the level of renal arteries with occlusion of common and external iliac arteries with reconstitution of flow via epigastric collaterals as well as diffuse ectasia/aneurysm of descending thoracic aorta and upper abdominal aorta with prominent mural thrombus and atherosclerosis, diffusely small caliber bilateral lower extremity arteries with significant atherosclerosis of bilateral superficial femoral arteries without high-grade stenosis, likely single-vessel runoff of the right and two-vessel runoff on the left, focal severe stenosis of the bilateral renal artery origins.  She was subsequently evaluated by her vascular surgeon Dr. Siu and underwent bilateral renal artery eversion endarterectomies  and aortobifemoral bypass on 8/26/2022.  She was really taken to the OR today for abdominal washout with removal of laparotomy pads as well as delayed closure abdomen. She remains intubated and difficult to assess but last evening and today she was noted by nursing staff to have some right-sided weakness for which she underwent a brain CT which revealed patchy areas of ill-defined hypodensity in the left frontal and parietal lobe concerning for  multifocal infarcts for which I was asked to see her.  Initially ordered CT angiogram of the head and neck and CT perfusion, however due to her renal failure and evidence of subacute infarct on CT, CT angiogram was canceled.  I obtain a stat brain MRI and MRA of the head which confirmed acute infarct in left JOSAFAT as well as right thalamus.  MRA of the head revealed left distal JOSAFAT occlusion and case was discussed with neuro -interventionist and no intervention deemed indicated.  Given recent surgery and also coagulopathy her antiplatelet medication is on hold until cleared by surgery.  Etiology of her stroke is likely artery to artery emboli.  Unfortunately she is not a candidate for any acute stroke intervention.  Continue supportive care and obtain physical therapy, Occupational Therapy and speech therapy after she is extubated.  She underwent KLEBER and the result is pending.  Obtain lipid profile and hemoglobin A1c.  Continue with every 2 hours neuro check.      The patient situation is critical with a real chance of deterioration and life threatening worsening of symptom requiring my involvement. Total critical care time spent was 45 minutes.

## 2022-08-29 NOTE — CARE PLAN
The patient is Watcher - Medium risk of patient condition declining or worsening    Shift Goals  Clinical Goals: Surgical closure of abdomen, neuro assessments, CT scan.  Patient Goals: AFIA  Family Goals: AFIA    Progress made toward(s) clinical / shift goals:  Patient went to surgery to have abdomen closed. Post surgery the patient got a CT scan and an MRI. Family updated on plan of care.     Problem: Knowledge Deficit - Standard  Goal: Patient and family/care givers will demonstrate understanding of plan of care, disease process/condition, diagnostic tests and medications  Outcome: Progressing     Problem: Skin Integrity  Goal: Skin integrity is maintained or improved  Outcome: Progressing     Problem: Respiratory  Goal: Patient will achieve/maintain optimum respiratory ventilation and gas exchange  Outcome: Progressing     Problem: Pain - Standard  Goal: Alleviation of pain or a reduction in pain to the patient’s comfort goal  Outcome: Progressing     Problem: Safety - Medical Restraint  Goal: Remains free of injury from restraints (Restraint for Interference with Medical Device)  Outcome: Progressing

## 2022-08-29 NOTE — PROGRESS NOTES
ACUTE CARE VASCULAR SERVICE  PROGRESS NOTE    More alert today, still not following commands  Intubated, low settings  HDS, no pressors, Hgb 11 -> 9.6 today  Creatinine 1.9 -> 2.3 today, UOP about 100-200 per shift which   NPO presently  WBC 8 -> 14 today, no apparent infection so likely reactive, no ABx, will monitor    Palpable DP pulses bilaterally, Bandages with moderate serous drainage (change PRN)    Appreciate CC team support  Following along    Young Bauer MD  [facility]  [place] preferred. Otherwise text to cell [phone] or call my office [phone]  __________________________________________________________________  Patient:Fouzia Santamaria   MRN:[id number]   CSN:[id number]

## 2022-08-29 NOTE — PROGRESS NOTES
Neurology Progress Note  Neurohospitalist Service, [facility]    Referring Physician: Brandyn Calabrese D.O.      Interval History:  No change in exam, moves L side more than R.  Does not follow commands.     Review of systems: In addition to what is detailed in the HPI and/or updated in the interval history, all other systems reviewed and are negative.    Past Medical History, Past Surgical History and Social History reviewed and unchanged from prior    Medications:    Current Facility-Administered Medications:     oxyCODONE immediate-release (ROXICODONE) tablet 5 mg, 5 mg, Enteral Tube, Q4HRS PRN, Brandyn Jones M.D.    HYDROmorphone (Dilaudid) injection 0.5 mg, 0.5 mg, Intravenous, Q HOUR PRN, Brandyn Jones M.D.    aspirin (ASA) chewable tab 81 mg, 81 mg, Enteral Tube, DAILY, Brandyn Jones M.D., 81 mg at 08/29/22 1102    magnesium sulfate IVPB premix 2 g, 2 g, Intravenous, Q6HRS, Brandyn Jones M.D.    potassium bicarbonate (KLYTE) effervescent tablet 50 mEq, 50 mEq, Enteral Tube, Once, Brandyn Jones M.D.    ferric gluconate complex (Ferrlecit) 250 mg in  mL IVPB, 250 mg, Intravenous, BID, Brandyn Jones M.D.    lactated ringers infusion, , Intravenous, Continuous, Brandyn Jones M.D., Last Rate: 50 mL/hr at 08/29/22 0959, Rate Change at 08/29/22 0959    acetaminophen (Tylenol) tablet 650 mg, 650 mg, Enteral Tube, Q6HRS PRN, Red Damico M.D.    atorvastatin (LIPITOR) tablet 80 mg, 80 mg, Enteral Tube, Nightly, Red Damico M.D., 80 mg at 08/28/22 4322    ezetimibe (ZETIA) tablet 10 mg, 10 mg, Enteral Tube, DAILY, Red Damico M.D., 10 mg at 08/29/22 0503    polyethylene glycol/lytes (MIRALAX) PACKET 1 Packet, 1 Packet, Enteral Tube, BID, Red Damico M.D.    senna-docusate (PERICOLACE or SENOKOT S) 8.6-50 MG per tablet 1 Tablet, 1 Tablet, Enteral Tube, Nightly, Red Damico M.D., 1 Tablet at 08/28/22 2142    senna-docusate  (PERICOLACE or SENOKOT S) 8.6-50 MG per tablet 1 Tablet, 1 Tablet, Enteral Tube, Q24HRS PRN, Red Damico M.D.    pramipexole (MIRAPEX) tablet 0.125 mg, 0.125 mg, Enteral Tube, TID, Red Damico M.D., 0.125 mg at 08/29/22 1104    promethazine (PHENERGAN) tablet 12.5-25 mg, 12.5-25 mg, Enteral Tube, Q4HRS PRN, Red Damico M.D.    famotidine (PEPCID) tablet 20 mg, 20 mg, Enteral Tube, DAILY, 20 mg at 08/29/22 0541 **OR** famotidine (PEPCID) injection 20 mg, 20 mg, Intravenous, DAILY, Red Damico M.D.    Pharmacy Consult: Enteral tube insertion - review meds/change route/product selection, 1 Each, Other, PHARMACY TO DOSE, Red Damico M.D.    docusate sodium (Colace) oral solution 100 mg, 100 mg, Enteral Tube, BID, Red Damico M.D., 100 mg at 08/29/22 0541    Respiratory Therapy Consult, , Nebulization, Continuous RT, Melquiades Russell M.D.    Pharmacy Consult Request ...Pain Management Review 1 Each, 1 Each, Other, PHARMACY TO DOSE, Melquiades Russell M.D.    bisacodyl (DULCOLAX) suppository 10 mg, 10 mg, Rectal, Q24HRS PRN, Melquiades Russell M.D., 10 mg at 08/27/22 1804    sodium phosphate (Fleet) enema 133 mL, 1 Each, Rectal, Once PRN, Melquiades Russell M.D.    Respiratory Therapy Consult, , Nebulization, Continuous RT, Melquiades Russell M.D.    propofol (DIPRIVAN) injection, 0-80 mcg/kg/min, Intravenous, Continuous, Paused at 08/27/22 1210 **AND** Triglycerides Starting now and then Every 3 Days, , , Every 3 Days (0300), Melquiades Russell M.D.    insulin regular (HumuLIN R,NovoLIN R) injection, 1-6 Units, Subcutaneous, Q6HRS, 1 Units at 08/27/22 0645 **AND** POC blood glucose manual result, , , Q6H **AND** NOTIFY MD and PharmD, , , Once **AND** Administer 20 grams of glucose (approximately 8 ounces of fruit juice) every 15 minutes PRN FSBG less than 70 mg/dL, , , PRN **AND** dextrose 10 % BOLUS 25 g, 25 g, Intravenous, Q15 MIN PRN, Melquiades Russell M.D.    morphine 4 MG/ML injection 2 mg, 2 mg, Intravenous, Q4HRS PRN, Brandyn LANZA  "RODRIGO Calabrese, 2 mg at 08/28/22 0500    albuterol (PROVENTIL) 2.5mg/0.5ml nebulizer solution 2.5 mg, 2.5 mg, Nebulization, Q4H PRN (RT), Brandyn Calabrese D.O.    [Held by provider] clopidogrel (PLAVIX) tablet 75 mg, 75 mg, Oral, DAILY, Tani Adkins M.D., 75 mg at 08/26/22 0601    Pharmacy Consult Request - to monitor for nephrotoxic agents, 1 Each, Other, PHARMACY TO DOSE, Tani Adkins M.D.    labetalol (NORMODYNE/TRANDATE) injection 10 mg, 10 mg, Intravenous, Q4HRS PRN, Tani Adkins M.D., 10 mg at 08/28/22 0604    promethazine (PHENERGAN) suppository 12.5-25 mg, 12.5-25 mg, Rectal, Q4HRS PRN, Tani Adkins M.D.    prochlorperazine (COMPAZINE) injection 5-10 mg, 5-10 mg, Intravenous, Q4HRS PRN, Tani Adkins M.D.    Physical Examination:   /62   Pulse 91   Temp 36.8 °C (98.3 °F) (Temporal)   Resp 12   Ht 1.651 m (5' 5\")   Wt 61.7 kg (136 lb 0.4 oz)   SpO2 96%   BMI 22.64 kg/m²       General:  Intubated eyes closed  Neck: There is normal range of motion  CV: Regular rate   Extremities:  Warm, dry, and intact, without peripheral lower extremity edema    NEUROLOGICAL EXAM:     Mental status:  Intubated, eyes closed, opens to light tactile  Speech and language: Intubated, no command following  Cranial nerve exam: Blinks to threat bilaterally, does not track me.  Face grossly symmetric.   Motor exam: Does not participate in motor exam- there is more spontaneous movements noted on L, particularly with knee and elbow flexion.  She has triple flexion response in R LE, otherwise there is flexion withdrawal in all other limbs.  Sensory exam:  Reacts to tactile in all 4 extremities, no obvious neglect  Coordination: No ataxia on elicited movements    NIHSS: National Institutes of Health Stroke Scale    [1] 1a:Level of Consciousness    0-alert 1-drowsy   2-stupor   3-coma  [2] 1b:LOC Questions                  0-both  1-one      2-neither  [2] 1c:LOC Commands                   0-both  1-one    "   2-neither  [0] 2: Best Gaze                     0-nl    1-partial  2-forced  [0] 3: Visual Fields                   0-nl    1-partial  2-complete 3-bilat  [0] 4: Facial Paresis                0-nl    1-minor    2-partial  3-full  MOTOR                       0-nl  [3] 5: Right Arm           1-drift  [3] 6: Left Arm             2-some effort vs gravity  [3] 7: Right Leg           3-no effort vs gravity  [3] 8: Left Leg             4-no movement                             x-untestable  [0] 9: Limb Ataxia                    0-abs   1-1_limb   2-2+_limbs       x-untestable  [0] 10:Sensory                        0-nl    1-partial  2-dense  [2] 11:Best Language/Aphasia         0-nl    1-mild/mod 2-severe   3-mute  [x] 12:Dysarthria                     0-nl    1-mild/mod 2-severe       x-untestable  [0] 13:Neglect/Inattention            0-none  1-partial  2-complete  [19] TOTAL      Objective Data:    Labs:  Lab Results   Component Value Date/Time    PROTHROMBTM 14.1 08/24/2022 02:33 PM    INR 1.10 08/24/2022 02:33 PM      Lab Results   Component Value Date/Time    WBC 14.2 (H) 08/29/2022 05:15 AM    RBC 3.16 (L) 08/29/2022 05:15 AM    HEMOGLOBIN 9.6 (L) 08/29/2022 05:15 AM    HEMATOCRIT 27.3 (L) 08/29/2022 05:15 AM    MCV 86.4 08/29/2022 05:15 AM    MCH 30.4 08/29/2022 05:15 AM    MCHC 35.2 (H) 08/29/2022 05:15 AM    MPV 11.9 08/29/2022 05:15 AM    NEUTSPOLYS 87.00 (H) 08/29/2022 05:15 AM    LYMPHOCYTES 11.30 (L) 08/29/2022 05:15 AM    MONOCYTES 0.00 08/29/2022 05:15 AM    EOSINOPHILS 0.00 08/29/2022 05:15 AM    BASOPHILS 0.00 08/29/2022 05:15 AM    ANISOCYTOSIS 1+ 08/29/2022 05:15 AM      Lab Results   Component Value Date/Time    SODIUM 142 08/29/2022 05:15 AM    POTASSIUM 3.5 (L) 08/29/2022 05:15 AM    CHLORIDE 103 08/29/2022 05:15 AM    CO2 27 08/29/2022 05:15 AM    GLUCOSE 88 08/29/2022 05:15 AM    BUN 41 (H) 08/29/2022 05:15 AM    CREATININE 2.29 (H) 08/29/2022 05:15 AM      Lab Results   Component Value Date/Time     CHOLSTRLTOT 89 (L) 08/29/2022 05:15 AM    LDL 34 08/29/2022 05:15 AM    HDL 32 (A) 08/29/2022 05:15 AM    TRIGLYCERIDE 117 08/29/2022 05:15 AM       Lab Results   Component Value Date/Time    ALKPHOSPHAT 51 08/29/2022 05:15 AM    ASTSGOT 36 08/29/2022 05:15 AM    ALTSGPT 7 08/29/2022 05:15 AM    TBILIRUBIN 1.3 08/29/2022 05:15 AM        Imaging/Testing:    I interpreted and/or reviewed the patient's neuroimaging    DX-CHEST-PORTABLE (1 VIEW)   Final Result         1.  No acute cardiopulmonary disease.      MR-MRA HEAD-W/O   Final Result      1.  A2/A3 segment left anterior cerebral artery occlusion.   2.  Occlusion of the intradural left vertebral artery.   3.  Asymmetrically small caliber of the distal left internal carotid artery in the left middle cerebral artery.   4.  Focal stenosis of the distal M1 right middle cerebral artery.      MR-BRAIN-W/O   Final Result      1.  At least moderate sized acute/subacute left anterior cerebral artery infarct.   2.  Additional small recent right thalamic infarct.   3.  Multiple chronic bilateral cerebral and cerebellar infarcts.   4.  Cerebral atrophy and chronic microvascular ischemic type changes.   5.  No acute intracranial hemorrhage.      DX-ABDOMEN FOR TUBE PLACEMENT   Final Result         Feeding tube with tip projecting over the expected area of the stomach.      CT-HEAD W/O   Final Result         Patchy areas of ill-defined hypodensity in the left frontal and parietal lobes, concerning for acute multifocal infarcts. Further evaluation with MRI is recommended.      No acute intracranial hemorrhage.                  FV-XSAVUOC-0 VIEW   Final Result         No radiopaque foreign body identified. Previous sponge was removed.      EG-MAYTBBL-4 VIEW   Final Result      1.  There is a curvilinear density in the right upper abdomen consistent with a retained surgical lap sponge. This was subsequently removed as there has already been a subsequent x-ray and this was no  longer identified.   2.  There are new cutaneous skin staples in midline of the abdomen and pelvis.   3.  Bowel gas pattern is nonobstructive.      DX-CHEST-PORTABLE (1 VIEW)   Final Result      No acute cardiopulmonary abnormality.      DX-ABDOMEN FOR TUBE PLACEMENT   Final Result      1.  Enteric tube overlies the proximal stomach.      DX-CHEST-PORTABLE (1 VIEW)   Final Result         1.  No acute cardiopulmonary disease.   2.  Nasogastric tube is coiled back the side port with tip projecting cephalad terminating in the distal esophagus, recommend advancement.      EC-KLEBER W/O CONT         CT-RENAL COLIC EVALUATION(A/P W/O)   Final Result         1. No hydronephrosis. Nonobstructive bilateral renal calculi seen on previous exam cannot be evaluated due to contrast in the collecting system.      2. Retained contrast in the kidney from prior CTA, likely due to nephropathy/renal failure.      US-RENAL   Final Result         1.  Mild right hydronephrosis   2.  Punctate left renal calculi without visualized obstructive changes.      CT-CTA AORTA-RO WITH & W/O-POST PROCESS   Final Result      1.  Occlusion of the abdominal aorta just below level of the renal arteries with occlusion of the common and external iliac arteries with reconstitution of flow via epigastric collaterals.   2.  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   3.  Diffusely small caliber bilateral lower extremity arteries with significant atherosclerosis of the bilateral superficial femoral arteries without high-grade stenosis.   4.  Likely single vessel runoff on the right and 2 vessel runoff on the left.   5.  Focal severe stenoses at the bilateral renal artery origins.      These findings were discussed with ESPERANZA DO on 8/23/2022 5:29 PM.          Assessment and Plan:  Fouzia Santamaria is a 61 year-old woman with acute multifocal strokes following  vascular repair for occluded aorta.  Given temporal correlation, suspect perioperative event.  There was no acute stroke intervention possible.  She will return to [facility] given her cardiac stent when possible.  There is no need for permissive HTN at this time.    Problem list:   Multifocal stroke   Aortic dissection repair    Plan:   - q4h neurochecks ok   - no indication for permissive HTN, long-term goal 110-130/60-80 ok   - may get carotid dopplers on outpatient basis to complete embolic workup   - TTE with LVEF 35%, no evidence of atrial fib on telemetry to date- may consider outpatient long-term cardiac monitoring   - stroke labs:  HgbA1c, LDL 34   - no need for high intensity statin, on zetia, LDL at goal < 70   - restart ASA 81mg daily, plavix 75mg daily when able   - PT/OT/SLP when able   - please reconsult Neurology with additional questions or concerns    The evaluation of the patient, and recommended management, was discussed with the ICU staff. I have performed a physical exam and reviewed and updated ROS and Plan today (8/29/2022).     Basilio Johnson MD  Neurohospitalist, Acute Care Services

## 2022-08-29 NOTE — CARE PLAN
Problem: Ventilation  Goal: Ability to achieve and maintain unassisted ventilation or tolerate decreased levels of ventilator support  Description: Target End Date:  4 days     Document on Vent flowsheet    1.  Support and monitor invasive and noninvasive mechanical ventilation  2.  Monitor ventilator weaning response  3.  Perform ventilator associated pneumonia prevention interventions  4.  Manage ventilation therapy by monitoring diagnostic test results  Outcome: Not Met      Ventilator Daily Summary    Vent Day #4    Ventilator settings changed this shift: No (100/+8/30%)    Weaning trials: SBT x 2, not following.     Respiratory Procedures: No    Plan: Continue current ventilator settings and wean mechanical ventilation as tolerated per physician orders.

## 2022-08-29 NOTE — PROGRESS NOTES
Transport at bedside to take patient to MRI. Patient taken to MRI on monitor and ventilator with RT. Patient RASS -2.

## 2022-08-29 NOTE — CARE PLAN
The patient is Watcher - Medium risk of patient condition declining or worsening    Shift Goals  Clinical Goals: Improved neuro exam, hemodynamic stability  Patient Goals: Unable to assess  Family Goals: No family present    Progress made toward(s) clinical / shift goals:    Problem: Skin Integrity  Goal: Skin integrity is maintained or improved  Outcome: Progressing   Q2h turns. Pillows, wedges and heel float boots in use for support/positioning.     Problem: Fall Risk  Goal: Patient will remain free from falls  Outcome: Progressing   Fall precautions in place.     Problem: Mobility  Goal: Patient's capacity to carry out activities will improve  Outcome: Progressing     Problem: Pain - Standard  Goal: Alleviation of pain or a reduction in pain to the patient’s comfort goal  Outcome: Progressing     Problem: Safety - Medical Restraint  Goal: Remains free of injury from restraints (Restraint for Interference with Medical Device)  Outcome: Progressing   Q2h restraint monitoring.

## 2022-08-30 ENCOUNTER — APPOINTMENT (OUTPATIENT)
Dept: RADIOLOGY | Facility: MEDICAL CENTER | Age: 62
DRG: 270 | End: 2022-08-30
Attending: SURGERY
Payer: MEDICAID

## 2022-08-30 LAB
ALBUMIN SERPL BCP-MCNC: 1.3 G/DL (ref 3.2–4.9)
ALBUMIN/GLOB SERPL: ABNORMAL G/DL
ALP SERPL-CCNC: 60 U/L (ref 30–99)
ALT SERPL-CCNC: <5 U/L (ref 2–50)
ANION GAP SERPL CALC-SCNC: 12 MMOL/L (ref 7–16)
AST SERPL-CCNC: 37 U/L (ref 12–45)
BASOPHILS # BLD AUTO: 0.1 % (ref 0–1.8)
BASOPHILS # BLD: 0.02 K/UL (ref 0–0.12)
BILIRUB SERPL-MCNC: 0.9 MG/DL (ref 0.1–1.5)
BUN SERPL-MCNC: 46 MG/DL (ref 8–22)
CALCIUM SERPL-MCNC: 6.8 MG/DL (ref 8.5–10.5)
CHLORIDE SERPL-SCNC: 103 MMOL/L (ref 96–112)
CO2 SERPL-SCNC: 28 MMOL/L (ref 20–33)
CREAT SERPL-MCNC: 2.75 MG/DL (ref 0.5–1.4)
EOSINOPHIL # BLD AUTO: 0.1 K/UL (ref 0–0.51)
EOSINOPHIL NFR BLD: 0.7 % (ref 0–6.9)
ERYTHROCYTE [DISTWIDTH] IN BLOOD BY AUTOMATED COUNT: 54.1 FL (ref 35.9–50)
GFR SERPLBLD CREATININE-BSD FMLA CKD-EPI: 19 ML/MIN/1.73 M 2
GLOBULIN SER CALC-MCNC: ABNORMAL G/DL (ref 1.9–3.5)
GLUCOSE BLD STRIP.AUTO-MCNC: 119 MG/DL (ref 65–99)
GLUCOSE BLD STRIP.AUTO-MCNC: 124 MG/DL (ref 65–99)
GLUCOSE BLD STRIP.AUTO-MCNC: 137 MG/DL (ref 65–99)
GLUCOSE BLD STRIP.AUTO-MCNC: 139 MG/DL (ref 65–99)
GLUCOSE BLD STRIP.AUTO-MCNC: 99 MG/DL (ref 65–99)
GLUCOSE SERPL-MCNC: 117 MG/DL (ref 65–99)
HCT VFR BLD AUTO: 25.6 % (ref 37–47)
HGB BLD-MCNC: 8.8 G/DL (ref 12–16)
HGB BLD-MCNC: NORMAL G/DL (ref 12–16)
IMM GRANULOCYTES # BLD AUTO: 0.1 K/UL (ref 0–0.11)
IMM GRANULOCYTES NFR BLD AUTO: 0.7 % (ref 0–0.9)
IRON SATN MFR SERPL: 89 % (ref 15–55)
IRON SERPL-MCNC: 131 UG/DL (ref 40–170)
LYMPHOCYTES # BLD AUTO: 1.11 K/UL (ref 1–4.8)
LYMPHOCYTES NFR BLD: 7.3 % (ref 22–41)
MAGNESIUM SERPL-MCNC: 2.1 MG/DL (ref 1.5–2.5)
MCH RBC QN AUTO: 30.6 PG (ref 27–33)
MCHC RBC AUTO-ENTMCNC: 34.4 G/DL (ref 33.6–35)
MCV RBC AUTO: 88.9 FL (ref 81.4–97.8)
MONOCYTES # BLD AUTO: 0.65 K/UL (ref 0–0.85)
MONOCYTES NFR BLD AUTO: 4.3 % (ref 0–13.4)
NEUTROPHILS # BLD AUTO: 13.19 K/UL (ref 2–7.15)
NEUTROPHILS NFR BLD: 86.9 % (ref 44–72)
NRBC # BLD AUTO: 0.02 K/UL
NRBC BLD-RTO: 0.1 /100 WBC
PHOSPHATE SERPL-MCNC: 2.8 MG/DL (ref 2.5–4.5)
PLATELET # BLD AUTO: 88 K/UL (ref 164–446)
PMV BLD AUTO: 12 FL (ref 9–12.9)
POTASSIUM SERPL-SCNC: 3.9 MMOL/L (ref 3.6–5.5)
PROT SERPL-MCNC: 4.5 G/DL (ref 6–8.2)
RBC # BLD AUTO: 2.88 M/UL (ref 4.2–5.4)
SODIUM SERPL-SCNC: 143 MMOL/L (ref 135–145)
TIBC SERPL-MCNC: 148 UG/DL (ref 250–450)
UIBC SERPL-MCNC: <17 UG/DL (ref 110–370)
WBC # BLD AUTO: 15.2 K/UL (ref 4.8–10.8)

## 2022-08-30 PROCEDURE — 700102 HCHG RX REV CODE 250 W/ 637 OVERRIDE(OP): Performed by: SURGERY

## 2022-08-30 PROCEDURE — 94760 N-INVAS EAR/PLS OXIMETRY 1: CPT

## 2022-08-30 PROCEDURE — 84100 ASSAY OF PHOSPHORUS: CPT

## 2022-08-30 PROCEDURE — A9270 NON-COVERED ITEM OR SERVICE: HCPCS | Performed by: SURGERY

## 2022-08-30 PROCEDURE — 770001 HCHG ROOM/CARE - MED/SURG/GYN PRIV*

## 2022-08-30 PROCEDURE — 97167 OT EVAL HIGH COMPLEX 60 MIN: CPT

## 2022-08-30 PROCEDURE — 99291 CRITICAL CARE FIRST HOUR: CPT | Performed by: SURGERY

## 2022-08-30 PROCEDURE — 94150 VITAL CAPACITY TEST: CPT

## 2022-08-30 PROCEDURE — 770022 HCHG ROOM/CARE - ICU (200)

## 2022-08-30 PROCEDURE — 85025 COMPLETE CBC W/AUTO DIFF WBC: CPT

## 2022-08-30 PROCEDURE — 700105 HCHG RX REV CODE 258: Performed by: SURGERY

## 2022-08-30 PROCEDURE — 82962 GLUCOSE BLOOD TEST: CPT | Mod: 91

## 2022-08-30 PROCEDURE — 94003 VENT MGMT INPAT SUBQ DAY: CPT

## 2022-08-30 PROCEDURE — 83735 ASSAY OF MAGNESIUM: CPT

## 2022-08-30 PROCEDURE — 71045 X-RAY EXAM CHEST 1 VIEW: CPT

## 2022-08-30 PROCEDURE — 700111 HCHG RX REV CODE 636 W/ 250 OVERRIDE (IP): Performed by: SURGERY

## 2022-08-30 PROCEDURE — 94799 UNLISTED PULMONARY SVC/PX: CPT

## 2022-08-30 PROCEDURE — 83540 ASSAY OF IRON: CPT

## 2022-08-30 PROCEDURE — 80053 COMPREHEN METABOLIC PANEL: CPT

## 2022-08-30 PROCEDURE — 97163 PT EVAL HIGH COMPLEX 45 MIN: CPT

## 2022-08-30 PROCEDURE — 83550 IRON BINDING TEST: CPT

## 2022-08-30 RX ADMIN — POLYETHYLENE GLYCOL 3350 1 PACKET: 17 POWDER, FOR SOLUTION ORAL at 17:12

## 2022-08-30 RX ADMIN — ATORVASTATIN CALCIUM 80 MG: 40 TABLET, FILM COATED ORAL at 21:25

## 2022-08-30 RX ADMIN — FAMOTIDINE 20 MG: 20 TABLET, FILM COATED ORAL at 05:30

## 2022-08-30 RX ADMIN — PRAMIPEXOLE DIHYDROCHLORIDE 0.12 MG: 0.12 TABLET ORAL at 17:11

## 2022-08-30 RX ADMIN — EZETIMIBE 10 MG: 10 TABLET ORAL at 05:31

## 2022-08-30 RX ADMIN — SODIUM CHLORIDE, POTASSIUM CHLORIDE, SODIUM LACTATE AND CALCIUM CHLORIDE: 600; 310; 30; 20 INJECTION, SOLUTION INTRAVENOUS at 05:34

## 2022-08-30 RX ADMIN — PRAMIPEXOLE DIHYDROCHLORIDE 0.12 MG: 0.12 TABLET ORAL at 05:31

## 2022-08-30 RX ADMIN — OXYCODONE 5 MG: 5 TABLET ORAL at 17:11

## 2022-08-30 RX ADMIN — OXYCODONE 5 MG: 5 TABLET ORAL at 00:27

## 2022-08-30 RX ADMIN — DOCUSATE SODIUM 50 MG AND SENNOSIDES 8.6 MG 1 TABLET: 8.6; 5 TABLET, FILM COATED ORAL at 21:25

## 2022-08-30 RX ADMIN — OXYCODONE 5 MG: 5 TABLET ORAL at 21:25

## 2022-08-30 RX ADMIN — PRAMIPEXOLE DIHYDROCHLORIDE 0.12 MG: 0.12 TABLET ORAL at 11:30

## 2022-08-30 RX ADMIN — SODIUM CHLORIDE 250 MG: 9 INJECTION, SOLUTION INTRAVENOUS at 17:11

## 2022-08-30 RX ADMIN — OXYCODONE 5 MG: 5 TABLET ORAL at 10:15

## 2022-08-30 RX ADMIN — ASPIRIN 81 MG 81 MG: 81 TABLET ORAL at 05:30

## 2022-08-30 RX ADMIN — DOCUSATE SODIUM 100 MG: 50 LIQUID ORAL at 05:30

## 2022-08-30 RX ADMIN — DOCUSATE SODIUM 100 MG: 50 LIQUID ORAL at 17:11

## 2022-08-30 RX ADMIN — SODIUM CHLORIDE 250 MG: 9 INJECTION, SOLUTION INTRAVENOUS at 05:31

## 2022-08-30 ASSESSMENT — COGNITIVE AND FUNCTIONAL STATUS - GENERAL
DRESSING REGULAR UPPER BODY CLOTHING: TOTAL
EATING MEALS: TOTAL
WALKING IN HOSPITAL ROOM: TOTAL
DRESSING REGULAR LOWER BODY CLOTHING: TOTAL
SUGGESTED CMS G CODE MODIFIER MOBILITY: CN
HELP NEEDED FOR BATHING: TOTAL
TURNING FROM BACK TO SIDE WHILE IN FLAT BAD: UNABLE
MOVING FROM LYING ON BACK TO SITTING ON SIDE OF FLAT BED: UNABLE
DAILY ACTIVITIY SCORE: 6
STANDING UP FROM CHAIR USING ARMS: TOTAL
PERSONAL GROOMING: TOTAL
MOVING TO AND FROM BED TO CHAIR: UNABLE
SUGGESTED CMS G CODE MODIFIER DAILY ACTIVITY: CN
MOBILITY SCORE: 6
CLIMB 3 TO 5 STEPS WITH RAILING: TOTAL
TOILETING: TOTAL

## 2022-08-30 ASSESSMENT — FIBROSIS 4 INDEX: FIB4 SCORE: 11.69

## 2022-08-30 ASSESSMENT — PULMONARY FUNCTION TESTS: FVC: 0.5

## 2022-08-30 ASSESSMENT — GAIT ASSESSMENTS: GAIT LEVEL OF ASSIST: UNABLE TO PARTICIPATE

## 2022-08-30 NOTE — THERAPY
Occupational Therapy   Initial Evaluation     Patient Name: Fouzia Santamaria  Age:  61 y.o., Sex:  female  Medical Record #: [id number]  Today's Date: 8/30/2022    Precautions: (P) Fall Risk  Comments: abdominal binder on with mobilty    Assessment  Patient is 61 y.o. female admitted with aortoiliac occlusive disease s/p vascular repair for occluded aorta and had subsequent multifocal strokes following repair and respiratory failure requiring intubation. Pt seen for OT evaluation and presented at total A level for mobility and self cares. Pt not following commands, demonstrated spontaneous LUE/LLE movement, and RUE/LE flaccidity. Will follow for skilled OT services.    Plan    Recommend Occupational Therapy 3 times per week until therapy goals are met for the following treatments:  Adaptive Equipment, Cognitive Skill Development, Neuro Re-Education / Balance, Self Care/Activities of Daily Living, Therapeutic Activities, and Therapeutic Exercises.    DC Equipment Recommendations: (P) Unable to determine at this time  Discharge Recommendations: (P) Recommend post-acute placement for additional occupational therapy services prior to discharge home      08/30/22 1052   Prior Living Situation   Prior Services Unable To Determine At This Time   Housing / Facility Unable To Determine At This Time   Comments Per chart pt resides in a [facility] with her spouse and 4 children. Her spouse is also her caregiver. At baseline, pt ambulates with a walker.   Prior Level of ADL Function   Self Feeding Unable To Determine At This Time   Grooming / Hygiene Unable To Determine At This Time   Bathing Unable To Determine At This Time   Dressing Unable To Determine At This Time   Prior Level of IADL Function   Medication Management Unable To Determine At This Time   Precautions   Precautions Fall Risk   Cognition    Cognition / Consciousness X   Speech/ Communication Intubated / Trached   Level of Consciousness Responds to pain   Ability To  Follow Commands Unable to Follow 1 Step Commands   Comments poor command following   Active ROM Upper Body   Active ROM Upper Body  X   Comments spontaneous LUE, and LE movement, flaccid RUE   Strength Upper Body   Upper Body Strength  X   Balance Assessment   Sitting Balance (Static) Dependent   Sitting Balance (Dynamic) Dependent   Weight Shift Sitting Absent   Bed Mobility    Supine to Sit Total Assist   Sit to Supine Total Assist   Scooting Total Assist   ADL Assessment   Upper Body Dressing Total Assist   Lower Body Dressing Total Assist   Toileting Total Assist   How much help from another person does the patient currently need...   6 Clicks Daily Activity Score 6   Functional Mobility   Sit to Stand Unable to Participate   Bed, Chair, Wheelchair Transfer Unable to Participate   Edema / Skin Assessment   Comments edema in BLE and LUE   Patient / Family Goals   Patient / Family Goal #1 none stated   Short Term Goals   Short Term Goal # 1 Pt will initate purposeful movement with LUE   Short Term Goal # 2 Pt will follow 1 step commands with 100% accuracy   Short Term Goal # 3 Pt will demo fair- sitting balance in prep for seated ADLs   Education Group   Role of Occupational Therapist Patient Response Patient;No Learning Evidence   Problem List   Problem List Decreased Active Daily Living Skills;Decreased Homemaking Skills;Decreased Upper Extremity Strength Right;Decreased Upper Extremity Strength Left;Decreased Upper Extremity AROM Left;Decreased Upper Extremity AROM Right;Decreased Functional Mobility;Decreased Activity Tolerance;Safety Awareness Deficits / Cognition;Impaired Coordination Left Upper Extremity;Impaired Coordination Right Upper Extremity;Impaired Postural Control / Balance   Anticipated Discharge Equipment and Recommendations   DC Equipment Recommendations Unable to determine at this time   Discharge Recommendations Recommend post-acute placement for additional occupational therapy services prior  to discharge home

## 2022-08-30 NOTE — PROGRESS NOTES
Trauma / Surgical Daily Progress Note    Date of Service  8/29/2022    Chief Complaint  61 y.o. female admitted 8/23/2022 with aortoiliac occlusive disease    Interval Events    Remains hemodynamically stable  Ventilator weaning as tolerated  Restart ASA due to drug-eluting stents x3 in LAD  Continue rise in creatinine but improved urine output -continue to follow  Initiate iron replacement with ferric gluconate   Tube feeds initiated-increase per protocol to goal    Review of Systems  Review of Systems   Unable to perform ROS: Intubated      Vital Signs for last 24 hours  Temp:  [36.8 °C (98.2 °F)-37 °C (98.6 °F)] 36.8 °C (98.3 °F)  Pulse:  [] 79  Resp:  [12-31] 16  BP: (102-172)/(57-93) 138/70  SpO2:  [91 %-100 %] 98 %    Hemodynamic parameters for last 24 hours       Respiratory Data     Respiration: 16, Pulse Oximetry: 98 %     Work Of Breathing / Effort: Vented  RUL Breath Sounds: Clear, RML Breath Sounds: Diminished, RLL Breath Sounds: Diminished, ZAINAB Breath Sounds: Clear, LLL Breath Sounds: Diminished    Physical Exam  Physical Exam  Vitals and nursing note reviewed.   Constitutional:       General: She is not in acute distress.     Appearance: She is not ill-appearing or toxic-appearing.      Comments: Eyes are open   HENT:      Head: Normocephalic.      Right Ear: External ear normal.      Left Ear: External ear normal.   Eyes:      General: No scleral icterus.     Extraocular Movements: Extraocular movements intact.      Pupils: Pupils are equal, round, and reactive to light.   Cardiovascular:      Rate and Rhythm: Normal rate and regular rhythm.      Pulses: Normal pulses.      Heart sounds: Normal heart sounds.   Pulmonary:      Effort: No respiratory distress.      Breath sounds: No wheezing.      Comments: On vent  Abdominal:      General: There is distension.   Genitourinary:     Comments: Guerrero to gravity.  Musculoskeletal:         General: Normal range of motion.      Cervical back: Normal  range of motion.   Skin:     General: Skin is warm and dry.      Capillary Refill: Capillary refill takes less than 2 seconds.   Neurological:      Comments: Follows  Right sided weakness noted       Laboratory  Recent Results (from the past 24 hour(s))   POCT glucose device results    Collection Time: 08/29/22  1:36 AM   Result Value Ref Range    POC Glucose, Blood 79 65 - 99 mg/dL   Magnesium: Every Monday and Thursday AM    Collection Time: 08/29/22  5:15 AM   Result Value Ref Range    Magnesium 1.0 (L) 1.5 - 2.5 mg/dL   Phosphorus: Every Monday and Thursday AM    Collection Time: 08/29/22  5:15 AM   Result Value Ref Range    Phosphorus 3.8 2.5 - 4.5 mg/dL   Lipid Profile    Collection Time: 08/29/22  5:15 AM   Result Value Ref Range    Cholesterol,Tot 89 (L) 100 - 199 mg/dL    Triglycerides 117 0 - 149 mg/dL    HDL 32 (A) >=40 mg/dL    LDL 34 <100 mg/dL   HEMOGLOBIN A1C    Collection Time: 08/29/22  5:15 AM   Result Value Ref Range    Glycohemoglobin 5.6 4.0 - 5.6 %    Est Avg Glucose 114 mg/dL   CRP Quantitive (Non-Cardiac)    Collection Time: 08/29/22  5:15 AM   Result Value Ref Range    Stat C-Reactive Protein 38.36 (H) 0.00 - 0.75 mg/dL   Prealbumin    Collection Time: 08/29/22  5:15 AM   Result Value Ref Range    Pre-Albumin 8.9 (L) 18.0 - 38.0 mg/dL   IRON/TOTAL IRON BIND    Collection Time: 08/29/22  5:15 AM   Result Value Ref Range    Iron 20 (L) 40 - 170 ug/dL    Total Iron Binding 132 (L) 250 - 450 ug/dL    Unsat Iron Binding 112 110 - 370 ug/dL    % Saturation 15 15 - 55 %   proBrain Natriuretic Peptide, NT    Collection Time: 08/29/22  5:15 AM   Result Value Ref Range    NT-proBNP 17521 (H) 0 - 125 pg/mL   CBC WITH DIFFERENTIAL    Collection Time: 08/29/22  5:15 AM   Result Value Ref Range    WBC 14.2 (H) 4.8 - 10.8 K/uL    RBC 3.16 (L) 4.20 - 5.40 M/uL    Hemoglobin 9.6 (L) 12.0 - 16.0 g/dL    Hematocrit 27.3 (L) 37.0 - 47.0 %    MCV 86.4 81.4 - 97.8 fL    MCH 30.4 27.0 - 33.0 pg    MCHC 35.2 (H)  33.6 - 35.0 g/dL    RDW 51.9 (H) 35.9 - 50.0 fL    Platelet Count 75 (L) 164 - 446 K/uL    MPV 11.9 9.0 - 12.9 fL    Neutrophils-Polys 87.00 (H) 44.00 - 72.00 %    Lymphocytes 11.30 (L) 22.00 - 41.00 %    Monocytes 0.00 0.00 - 13.40 %    Eosinophils 0.00 0.00 - 6.90 %    Basophils 0.00 0.00 - 1.80 %    Nucleated RBC 0.10 /100 WBC    Neutrophils (Absolute) 12.60 (H) 2.00 - 7.15 K/uL    Lymphs (Absolute) 1.60 1.00 - 4.80 K/uL    Monos (Absolute) 0.00 0.00 - 0.85 K/uL    Eos (Absolute) 0.00 0.00 - 0.51 K/uL    Baso (Absolute) 0.00 0.00 - 0.12 K/uL    NRBC (Absolute) 0.02 K/uL    Anisocytosis 1+     Microcytosis 1+    Comp Metabolic Panel    Collection Time: 08/29/22  5:15 AM   Result Value Ref Range    Sodium 142 135 - 145 mmol/L    Potassium 3.5 (L) 3.6 - 5.5 mmol/L    Chloride 103 96 - 112 mmol/L    Co2 27 20 - 33 mmol/L    Anion Gap 12.0 7.0 - 16.0    Glucose 88 65 - 99 mg/dL    Bun 41 (H) 8 - 22 mg/dL    Creatinine 2.29 (H) 0.50 - 1.40 mg/dL    Calcium 7.0 (L) 8.5 - 10.5 mg/dL    AST(SGOT) 36 12 - 45 U/L    ALT(SGPT) 7 2 - 50 U/L    Alkaline Phosphatase 51 30 - 99 U/L    Total Bilirubin 1.3 0.1 - 1.5 mg/dL    Albumin 1.9 (L) 3.2 - 4.9 g/dL    Total Protein 4.6 (L) 6.0 - 8.2 g/dL    Globulin 2.7 1.9 - 3.5 g/dL    A-G Ratio 0.7 g/dL   ESTIMATED GFR    Collection Time: 08/29/22  5:15 AM   Result Value Ref Range    GFR (CKD-EPI) 24 (A) >60 mL/min/1.73 m 2   DIFFERENTIAL MANUAL    Collection Time: 08/29/22  5:15 AM   Result Value Ref Range    Bands-Stabs 1.70 0.00 - 10.00 %    Manual Diff Status PERFORMED    PERIPHERAL SMEAR REVIEW    Collection Time: 08/29/22  5:15 AM   Result Value Ref Range    Peripheral Smear Review see below    PLATELET ESTIMATE    Collection Time: 08/29/22  5:15 AM   Result Value Ref Range    Plt Estimation Decreased    MORPHOLOGY    Collection Time: 08/29/22  5:15 AM   Result Value Ref Range    RBC Morphology Present     Polychromia 1+    POCT glucose device results    Collection Time: 08/29/22  11:11 AM   Result Value Ref Range    POC Glucose, Blood 87 65 - 99 mg/dL   POCT glucose device results    Collection Time: 08/29/22  5:32 PM   Result Value Ref Range    POC Glucose, Blood 103 (H) 65 - 99 mg/dL       Fluids    Intake/Output Summary (Last 24 hours) at 8/29/2022 1818  Last data filed at 8/29/2022 1800  Gross per 24 hour   Intake 4000.84 ml   Output 860 ml   Net 3140.84 ml       Core Measures & Quality Metrics  Labs reviewed, Medications reviewed and Radiology images reviewed  Guerrero catheter: Critically Ill - Requiring Accurate Measurement of Urinary Output      DVT Prophylaxis: Contraindicated - High bleeding risk  DVT prophylaxis - mechanical: SCDs  Ulcer prophylaxis: Yes      RAP Score Total: 0  ETOH Screening    Assessment/Plan  Stroke (HCC)- (present on admission)  Assessment & Plan  8/28 Right sided deficits noted  CTA  consistent with left sided infarcts  Neurology consulted- Dr. Terry    Acute postoperative respiratory failure (HCC)  Assessment & Plan  Remained intubated post-operatively.  Continue full mechanical ventilatory support.   Ventilator bundle and Trauma weaning protocol.    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 Siu    Hypomagnesemia  Assessment & Plan  Magnesium low - replace and follow.    Lactic acidosis  Assessment & Plan  Significantly elevated post-op  Decreasing with resuscitation  8/28 LA 2.7    IVON (acute kidney injury) (HCC)- (present on admission)  Assessment & Plan  8/28  Cr 1.88  8/28 2.29  Renal dose medications  Avoid nephrotoxic medications    Acute blood loss anemia  Assessment & Plan  Follow serial Hb  Transfuse 1 unit PRBC if Hb < 9.0 given recent cardiac event.  8/29 Iron studies low - replace per protocol.    CAD (coronary artery disease)- (present on admission)  Assessment & Plan  7/3/22  MI with PCI she received 3 drug-eluting stents to the LAD.  On DAPT prior to take-back.  Transfuse to maintain hemoglobin 9.  Wet read of intra-operative KLEBER during index procedure with EF 30-40% and anterior wall motion abnormality consistent with area of infarct from recent MI.  8/29 Restart ASA / KLEBER result pending.     Hypotension due to hypovolemia  Assessment & Plan  Given 2 FFP, 2 PRBC prior to initiation of massive transfusion and return to operating room for exploration.  Trending end-points of resuscitation.  Vasopressors weaned after take-back.    Coagulopathy (HCC)  Assessment & Plan  Post-take back TEG with low MA, functional fibrinogen, and high LY30  Given Cryo, Platelets, and TXA  Output from ABThera decreasing  Trending serial labs  Repeat TEG improved      Discussed patient condition with RN, RT, Pharmacy, , Patient, and vascular surgery.  CRITICAL CARE TIME EXCLUDING PROCEDURES: 40  minutes

## 2022-08-30 NOTE — CARE PLAN
Problem: Ventilation  Goal: Ability to achieve and maintain unassisted ventilation or tolerate decreased levels of ventilator support  Description: Target End Date:  4 days     Document on Vent flowsheet    1.  Support and monitor invasive and noninvasive mechanical ventilation  2.  Monitor ventilator weaning response  3.  Perform ventilator associated pneumonia prevention interventions  4.  Manage ventilation therapy by monitoring diagnostic test results  Outcome: Not Met      Ventilator Daily Summary    Vent Day #5    Ventilator settings changed this shift: No (100%/+8/40%)    Weaning trials: SBT x 1    Respiratory Procedures: No    Plan: Continue current ventilator settings and wean mechanical ventilation as tolerated per physician orders.

## 2022-08-30 NOTE — CARE PLAN
The patient is Watcher - Medium risk of patient condition declining or worsening    Shift Goals  Clinical Goals: mobility, vent weaning, wound care  Patient Goals: unable to assess  Family Goals: unable to assess      Problem: Knowledge Deficit - Standard  Goal: Patient and family/care givers will demonstrate understanding of plan of care, disease process/condition, diagnostic tests and medications  Outcome: Progressing     Problem: Skin Integrity  Goal: Skin integrity is maintained or improved  Outcome: Progressing     Problem: Respiratory  Goal: Patient will achieve/maintain optimum respiratory ventilation and gas exchange  Outcome: Progressing     Problem: Mobility  Goal: Patient's capacity to carry out activities will improve  Outcome: Progressing

## 2022-08-30 NOTE — CARE PLAN
Problem: Ventilation  Goal: Ability to achieve and maintain unassisted ventilation or tolerate decreased levels of ventilator support  Description: Target End Date:  4 days     Document on Vent flowsheet    1.  Support and monitor invasive and noninvasive mechanical ventilation  2.  Monitor ventilator weaning response  3.  Perform ventilator associated pneumonia prevention interventions  4.  Manage ventilation therapy by monitoring diagnostic test results  Outcome: Not Met                                   Ventilator Daily Summary     Vent Day #5     Ventilator settings changed this shift: No (100/+8/30%)     Weaning trials: None this shift     Respiratory Procedures: No     Plan: Continue current ventilator settings and wean mechanical ventilation as tolerated per physician orders.

## 2022-08-30 NOTE — THERAPY
Physical Therapy   Initial Evaluation     Patient Name: Fouzia Santamaria  Age:  61 y.o., Sex:  female  Medical Record #: [id number]  Today's Date: 8/30/2022     Precautions  Precautions: Fall Risk;Other (See Comments)  Comments: abdominal binder on with mobilty    Assessment  Patient is 61 y.o. female admitted with lower extremity pain and found to have a large thrombus in her abdominal aorta. Pt is now POD #1 aorto-bifem with renal artery endarterectomy. Post operatively pt noted to have right sided deficits and found to have left sided infarcts as well as post op respiratory failure requiring intubation. Abdominal binder donned in supine prior to mobility. Total assist required for bed mob and EOB sitting. L LE movement noted while EOB but no movement noted in R LE throughout evaluation. No command following but pt tracking therapist in room and waved with L UE when therapist waved. PT will cont while pt is in acute care setting to address strength, ROM, balance, activity tolerance, and mobility    Plan    Recommend Physical Therapy 3 times per week until therapy goals are met for the following treatments:  Bed Mobility, Neuro Re-Education / Balance, Self Care/Home Evaluation, Therapeutic Activities, and Therapeutic Exercises    DC Equipment Recommendations: Unable to determine at this time  Discharge Recommendations: Recommend post-acute placement for additional physical therapy services prior to discharge home          08/30/22 1050   Prior Living Situation   Lives with - Patient's Self Care Capacity Adult Children;Spouse   Prior Level of Functional Mobility   Bed Mobility Unable To Determine At This Time   Cognition    Cognition / Consciousness X   Speech/ Communication Intubated / Trached   Level of Consciousness Responds to pain   Ability To Follow Commands Unable to Follow 1 Step Commands   Comments no command following noted   Active ROM Lower Body    Active ROM Lower Body  X   Comments slight movement  noted in L LE, no movement noted in R LE   Strength Lower Body   Lower Body Strength  X   Comments flaccid R LE, minimal movement noted in LLE   Sensation Lower Body   Lower Extremity Sensation   Not Tested   Lower Body Muscle Tone   Lower Body Muscle Tone  X   Rt Lower Extremity Muscle Tone Non Functional   Vision   Vision Comments spontaneous tracking noted around room when therapist and nurse walking   Balance Assessment   Sitting Balance (Static) Dependent   Sitting Balance (Dynamic) Dependent   Weight Shift Sitting Absent   Gait Analysis   Gait Level Of Assist Unable to Participate   Bed Mobility    Supine to Sit Total Assist   Sit to Supine Total Assist   Scooting Total Assist   Rolling Total Assist to Rt.   Functional Mobility   Sit to Stand Unable to Participate   Bed, Chair, Wheelchair Transfer Unable to Participate   Mobility EOB only   Edema / Skin Assessment   Edema / Skin  Not Assessed   Comments pitting edema throughout body   Patient / Family Goals    Patient / Family Goal #1 pt unable to state   Short Term Goals    Short Term Goal # 1 pt will be able to complete supine<>Sitting with mod assist in 6tx in order to progress   Short Term Goal # 2 pt will be able to sit EOB with fair- balance in 6tx in order to decrease fall risk   Short Term Goal # 3 pt will be able to complete bed<>chair transfer with max assist in 6tx in order to progress   Education Group   Education Provided Role of Physical Therapist   Role of Physical Therapist Patient Response Patient;Nonacceptance;Explanation;Demonstration;No Learning Evidence   Anticipated Discharge Equipment and Recommendations   DC Equipment Recommendations Unable to determine at this time   Discharge Recommendations Recommend post-acute placement for additional physical therapy services prior to discharge home

## 2022-08-30 NOTE — CARE PLAN
The patient is Watcher - Medium risk of patient condition declining or worsening    Shift Goals  Clinical Goals: hemodynamic stability, improved neuro  Patient Goals: AFIA  Family Goals: AFIA    Progress made toward(s) clinical / shift goals:    Problem: Knowledge Deficit - Standard  Goal: Patient and family/care givers will demonstrate understanding of plan of care, disease process/condition, diagnostic tests and medications  Outcome: Progressing     Problem: Skin Integrity  Goal: Skin integrity is maintained or improved  Outcome: Progressing     Problem: Fall Risk  Goal: Patient will remain free from falls  Outcome: Progressing     Problem: Psychosocial  Goal: Patient's level of anxiety will decrease  Outcome: Progressing

## 2022-08-30 NOTE — DIETARY
Nutrition Services Brief Update:  Pt tolerating Fibersource HN for tube feed. Tube feed advanced to 25 ml/hour per MD. Now ok to advance to goal rate per MD in IDT rounds. Continue Fibersource HN and advance to goal rate of 45 ml/hour. RD following.    Problem: Nutritional:  Goal: Nutrition support tolerated and meeting greater than 85% of estimated needs  Outcome: Progressing

## 2022-08-31 ENCOUNTER — APPOINTMENT (OUTPATIENT)
Dept: RADIOLOGY | Facility: MEDICAL CENTER | Age: 62
DRG: 270 | End: 2022-08-31
Attending: SURGERY
Payer: MEDICAID

## 2022-08-31 LAB
ALBUMIN SERPL BCP-MCNC: 2 G/DL (ref 3.2–4.9)
ALBUMIN/GLOB SERPL: 0.8 G/DL
ALP SERPL-CCNC: 79 U/L (ref 30–99)
ALT SERPL-CCNC: <5 U/L (ref 2–50)
ANION GAP SERPL CALC-SCNC: 10 MMOL/L (ref 7–16)
AST SERPL-CCNC: 39 U/L (ref 12–45)
BASOPHILS # BLD AUTO: 0.4 % (ref 0–1.8)
BASOPHILS # BLD: 0.05 K/UL (ref 0–0.12)
BILIRUB SERPL-MCNC: 0.9 MG/DL (ref 0.1–1.5)
BUN SERPL-MCNC: 51 MG/DL (ref 8–22)
CALCIUM SERPL-MCNC: 7.2 MG/DL (ref 8.5–10.5)
CHLORIDE SERPL-SCNC: 100 MMOL/L (ref 96–112)
CO2 SERPL-SCNC: 29 MMOL/L (ref 20–33)
CREAT SERPL-MCNC: 3.11 MG/DL (ref 0.5–1.4)
EOSINOPHIL # BLD AUTO: 0.28 K/UL (ref 0–0.51)
EOSINOPHIL NFR BLD: 2.2 % (ref 0–6.9)
ERYTHROCYTE [DISTWIDTH] IN BLOOD BY AUTOMATED COUNT: 54.3 FL (ref 35.9–50)
GFR SERPLBLD CREATININE-BSD FMLA CKD-EPI: 17 ML/MIN/1.73 M 2
GLOBULIN SER CALC-MCNC: 2.6 G/DL (ref 1.9–3.5)
GLUCOSE BLD STRIP.AUTO-MCNC: 136 MG/DL (ref 65–99)
GLUCOSE BLD STRIP.AUTO-MCNC: 143 MG/DL (ref 65–99)
GLUCOSE BLD STRIP.AUTO-MCNC: 157 MG/DL (ref 65–99)
GLUCOSE SERPL-MCNC: 155 MG/DL (ref 65–99)
HCT VFR BLD AUTO: 24.9 % (ref 37–47)
HGB BLD-MCNC: 8.4 G/DL (ref 12–16)
IMM GRANULOCYTES # BLD AUTO: 0.15 K/UL (ref 0–0.11)
IMM GRANULOCYTES NFR BLD AUTO: 1.2 % (ref 0–0.9)
LV EJECT FRACT  99904: 35
LYMPHOCYTES # BLD AUTO: 1.12 K/UL (ref 1–4.8)
LYMPHOCYTES NFR BLD: 8.8 % (ref 22–41)
MAGNESIUM SERPL-MCNC: 2 MG/DL (ref 1.5–2.5)
MCH RBC QN AUTO: 30.3 PG (ref 27–33)
MCHC RBC AUTO-ENTMCNC: 33.7 G/DL (ref 33.6–35)
MCV RBC AUTO: 89.9 FL (ref 81.4–97.8)
MONOCYTES # BLD AUTO: 1.07 K/UL (ref 0–0.85)
MONOCYTES NFR BLD AUTO: 8.4 % (ref 0–13.4)
NEUTROPHILS # BLD AUTO: 10.1 K/UL (ref 2–7.15)
NEUTROPHILS NFR BLD: 79 % (ref 44–72)
NRBC # BLD AUTO: 0.03 K/UL
NRBC BLD-RTO: 0.2 /100 WBC
PHOSPHATE SERPL-MCNC: 2.3 MG/DL (ref 2.5–4.5)
PLATELET # BLD AUTO: 87 K/UL (ref 164–446)
PMV BLD AUTO: 11 FL (ref 9–12.9)
POTASSIUM SERPL-SCNC: 3.7 MMOL/L (ref 3.6–5.5)
PROT SERPL-MCNC: 4.6 G/DL (ref 6–8.2)
RBC # BLD AUTO: 2.77 M/UL (ref 4.2–5.4)
SODIUM SERPL-SCNC: 139 MMOL/L (ref 135–145)
TRIGL SERPL-MCNC: 111 MG/DL (ref 0–149)
WBC # BLD AUTO: 12.8 K/UL (ref 4.8–10.8)

## 2022-08-31 PROCEDURE — 700111 HCHG RX REV CODE 636 W/ 250 OVERRIDE (IP): Performed by: SURGERY

## 2022-08-31 PROCEDURE — A9270 NON-COVERED ITEM OR SERVICE: HCPCS | Performed by: SURGERY

## 2022-08-31 PROCEDURE — 770022 HCHG ROOM/CARE - ICU (200)

## 2022-08-31 PROCEDURE — 700105 HCHG RX REV CODE 258: Performed by: SURGERY

## 2022-08-31 PROCEDURE — 700102 HCHG RX REV CODE 250 W/ 637 OVERRIDE(OP): Performed by: SURGERY

## 2022-08-31 PROCEDURE — 85025 COMPLETE CBC W/AUTO DIFF WBC: CPT

## 2022-08-31 PROCEDURE — 770001 HCHG ROOM/CARE - MED/SURG/GYN PRIV*

## 2022-08-31 PROCEDURE — 94799 UNLISTED PULMONARY SVC/PX: CPT

## 2022-08-31 PROCEDURE — 84478 ASSAY OF TRIGLYCERIDES: CPT

## 2022-08-31 PROCEDURE — 83735 ASSAY OF MAGNESIUM: CPT

## 2022-08-31 PROCEDURE — 94760 N-INVAS EAR/PLS OXIMETRY 1: CPT

## 2022-08-31 PROCEDURE — 80053 COMPREHEN METABOLIC PANEL: CPT

## 2022-08-31 PROCEDURE — 84100 ASSAY OF PHOSPHORUS: CPT

## 2022-08-31 PROCEDURE — 82962 GLUCOSE BLOOD TEST: CPT | Mod: 91

## 2022-08-31 PROCEDURE — 99291 CRITICAL CARE FIRST HOUR: CPT | Performed by: SURGERY

## 2022-08-31 PROCEDURE — 71045 X-RAY EXAM CHEST 1 VIEW: CPT

## 2022-08-31 PROCEDURE — 94150 VITAL CAPACITY TEST: CPT

## 2022-08-31 PROCEDURE — 94003 VENT MGMT INPAT SUBQ DAY: CPT

## 2022-08-31 RX ADMIN — OXYCODONE 5 MG: 5 TABLET ORAL at 05:36

## 2022-08-31 RX ADMIN — ASPIRIN 81 MG 81 MG: 81 TABLET ORAL at 05:28

## 2022-08-31 RX ADMIN — PRAMIPEXOLE DIHYDROCHLORIDE 0.12 MG: 0.12 TABLET ORAL at 17:23

## 2022-08-31 RX ADMIN — INSULIN HUMAN 1 UNITS: 100 INJECTION, SOLUTION PARENTERAL at 11:33

## 2022-08-31 RX ADMIN — PRAMIPEXOLE DIHYDROCHLORIDE 0.12 MG: 0.12 TABLET ORAL at 05:28

## 2022-08-31 RX ADMIN — ATORVASTATIN CALCIUM 80 MG: 40 TABLET, FILM COATED ORAL at 20:27

## 2022-08-31 RX ADMIN — OXYCODONE 5 MG: 5 TABLET ORAL at 20:27

## 2022-08-31 RX ADMIN — OXYCODONE 5 MG: 5 TABLET ORAL at 15:13

## 2022-08-31 RX ADMIN — POLYETHYLENE GLYCOL 3350 1 PACKET: 17 POWDER, FOR SOLUTION ORAL at 05:28

## 2022-08-31 RX ADMIN — SODIUM CHLORIDE 250 MG: 9 INJECTION, SOLUTION INTRAVENOUS at 05:29

## 2022-08-31 RX ADMIN — PRAMIPEXOLE DIHYDROCHLORIDE 0.12 MG: 0.12 TABLET ORAL at 11:33

## 2022-08-31 RX ADMIN — DOCUSATE SODIUM 100 MG: 50 LIQUID ORAL at 05:27

## 2022-08-31 RX ADMIN — EZETIMIBE 10 MG: 10 TABLET ORAL at 05:29

## 2022-08-31 RX ADMIN — FAMOTIDINE 20 MG: 20 TABLET, FILM COATED ORAL at 05:28

## 2022-08-31 ASSESSMENT — PULMONARY FUNCTION TESTS
FVC: 0

## 2022-08-31 ASSESSMENT — FIBROSIS 4 INDEX: FIB4 SCORE: 11.69

## 2022-08-31 NOTE — DOCUMENTATION QUERY
[facility]                                                                       Query Response Note      PATIENT:               CHRISTINA BLOUNT  ACCT #:                  [id number]  MRN:                     [id number]  :                      1960  ADMIT DATE:       2022 2:42 PM  DISCH DATE:          RESPONDING  PROVIDER #:        [id number]           QUERY TEXT:    Acute post operative respiratory failure is documented in the Progress Notes.  Pt remained intubated following  surgery with intubation time > 96 hours.  Can the relationship between respiratory failure and surgery be clarified?                        Documentation indicators that raised basis for question:   Per Progress Notes: acute postoperative respiratory failure, pt remained intubated post operatively   ABG Ph 6.999, 7.021   Risk Factors: s/p bilateral renal artery eversion endarterectomies/ aortibifemoral bypass complicated by   postoperative bleeding w/ return to OR, fluid overload   Treatment: mechanical ventilation, ABG's, ventilator weaning/ SBT's    Thank you,  Ngozi Bach RN, BSN  Clinical   Connect via [employer]  Options provided:   -- Postoperative respiratory failure is unexpected and is a complication of the procedure performed   -- Postoperative respiratory failure is not a complication due to or associated with the procedure (coded to acute respiratory failure -, (please document underlying etiology)   -- Other explanation, -please specify   -- Unable to determine      Query created by: Ngozi Bach on 2022 7:00 AM    RESPONSE TEXT:    Postoperative respiratory failure is unexpected and is a complication of the procedure performed          Electronically signed by:  JAYCE BLEVINS MD 2022 1:38 PM

## 2022-08-31 NOTE — PROGRESS NOTES
Trauma / Surgical Daily Progress Note    Date of Service  8/30/2022    Chief Complaint  61 y.o. female admitted 8/23/2022 with aortoiliac occlusive disease    Interval Events  POD #2 abdominal washout and closure  POD D #4 bilateral renal artery endarterectomy, aortobifemoral bypass /exploratory laparotomy with control bleeding and packing with temper abdominal closure    Alert and more interactive  Ventilator weaning -SBT trials  Continue markedly fluid overloaded  Improving urine output but creatinine continues to slowly rise  Minimize IV fluids and renal dose all medications  Increase tube feeds to goal  Ongoing iron replacement    Review of Systems  Review of Systems   Unable to perform ROS: Intubated      Vital Signs for last 24 hours  Temp:  [36.5 °C (97.7 °F)-36.9 °C (98.5 °F)] 36.8 °C (98.2 °F)  Pulse:  [] 104  Resp:  [0-20] 10  BP: (100-163)/() 158/75  SpO2:  [91 %-100 %] 98 %    Hemodynamic parameters for last 24 hours       Respiratory Data     Respiration: (!) 10, Pulse Oximetry: 98 %     Work Of Breathing / Effort: Vented  RUL Breath Sounds: Clear, RML Breath Sounds: Diminished, RLL Breath Sounds: Diminished, ZAINAB Breath Sounds: Clear, LLL Breath Sounds: Diminished    Physical Exam  Physical Exam  Vitals and nursing note reviewed.   Constitutional:       General: She is not in acute distress.     Appearance: She is not ill-appearing or toxic-appearing.   HENT:      Head: Normocephalic.      Right Ear: External ear normal.      Left Ear: External ear normal.      Mouth/Throat:      Mouth: Mucous membranes are moist.   Eyes:      General: No scleral icterus.     Extraocular Movements: Extraocular movements intact.      Pupils: Pupils are equal, round, and reactive to light.   Cardiovascular:      Rate and Rhythm: Normal rate and regular rhythm.      Pulses: Normal pulses.      Heart sounds: Normal heart sounds.   Pulmonary:      Effort: No respiratory distress.      Breath sounds: No  wheezing.      Comments: On vent  Abdominal:      General: There is no distension.      Tenderness: There is abdominal tenderness. There is no guarding or rebound.   Genitourinary:     Comments: Guerrero to gravity.  Musculoskeletal:         General: Normal range of motion.      Cervical back: Normal range of motion.   Skin:     General: Skin is warm and dry.      Capillary Refill: Capillary refill takes less than 2 seconds.   Neurological:      Comments: More awake and interactive  Follows  Right sided weakness noted       Laboratory  Recent Results (from the past 24 hour(s))   POCT glucose device results    Collection Time: 08/29/22 11:53 PM   Result Value Ref Range    POC Glucose, Blood 99 65 - 99 mg/dL   CBC with Differential: Tomorrow AM    Collection Time: 08/30/22  4:40 AM   Result Value Ref Range    WBC 15.2 (H) 4.8 - 10.8 K/uL    RBC 2.88 (L) 4.20 - 5.40 M/uL    Hemoglobin 8.8 (L) 12.0 - 16.0 g/dL    Hematocrit 25.6 (L) 37.0 - 47.0 %    MCV 88.9 81.4 - 97.8 fL    MCH 30.6 27.0 - 33.0 pg    MCHC 34.4 33.6 - 35.0 g/dL    RDW 54.1 (H) 35.9 - 50.0 fL    Platelet Count 88 (L) 164 - 446 K/uL    MPV 12.0 9.0 - 12.9 fL    Neutrophils-Polys 86.90 (H) 44.00 - 72.00 %    Lymphocytes 7.30 (L) 22.00 - 41.00 %    Monocytes 4.30 0.00 - 13.40 %    Eosinophils 0.70 0.00 - 6.90 %    Basophils 0.10 0.00 - 1.80 %    Immature Granulocytes 0.70 0.00 - 0.90 %    Nucleated RBC 0.10 /100 WBC    Neutrophils (Absolute) 13.19 (H) 2.00 - 7.15 K/uL    Lymphs (Absolute) 1.11 1.00 - 4.80 K/uL    Monos (Absolute) 0.65 0.00 - 0.85 K/uL    Eos (Absolute) 0.10 0.00 - 0.51 K/uL    Baso (Absolute) 0.02 0.00 - 0.12 K/uL    Immature Granulocytes (abs) 0.10 0.00 - 0.11 K/uL    NRBC (Absolute) 0.02 K/uL   Comp Metabolic Panel (CMP): Tomorrow AM    Collection Time: 08/30/22  4:40 AM   Result Value Ref Range    Sodium 143 135 - 145 mmol/L    Potassium 3.9 3.6 - 5.5 mmol/L    Chloride 103 96 - 112 mmol/L    Co2 28 20 - 33 mmol/L    Anion Gap 12.0 7.0 -  16.0    Glucose 117 (H) 65 - 99 mg/dL    Bun 46 (H) 8 - 22 mg/dL    Creatinine 2.75 (H) 0.50 - 1.40 mg/dL    Calcium 6.8 (LL) 8.5 - 10.5 mg/dL    AST(SGOT) 37 12 - 45 U/L    ALT(SGPT) <5 2 - 50 U/L    Alkaline Phosphatase 60 30 - 99 U/L    Total Bilirubin 0.9 0.1 - 1.5 mg/dL    Albumin 1.3 (L) 3.2 - 4.9 g/dL    Total Protein 4.5 (L) 6.0 - 8.2 g/dL    Globulin see below 1.9 - 3.5 g/dL    A-G Ratio see below g/dL   MAGNESIUM    Collection Time: 08/30/22  4:40 AM   Result Value Ref Range    Magnesium 2.1 1.5 - 2.5 mg/dL   PHOSPHORUS    Collection Time: 08/30/22  4:40 AM   Result Value Ref Range    Phosphorus 2.8 2.5 - 4.5 mg/dL   IRON/TOTAL IRON BIND    Collection Time: 08/30/22  4:40 AM   Result Value Ref Range    Iron 131 40 - 170 ug/dL    Total Iron Binding 148 (L) 250 - 450 ug/dL    Unsat Iron Binding <17 (L) 110 - 370 ug/dL    % Saturation 89 (H) 15 - 55 %   ESTIMATED GFR    Collection Time: 08/30/22  4:40 AM   Result Value Ref Range    GFR (CKD-EPI) 19 (A) >60 mL/min/1.73 m 2   POCT glucose device results    Collection Time: 08/30/22  5:53 AM   Result Value Ref Range    POC Glucose, Blood 119 (H) 65 - 99 mg/dL   POCT glucose device results    Collection Time: 08/30/22 11:30 AM   Result Value Ref Range    POC Glucose, Blood 139 (H) 65 - 99 mg/dL   POCT glucose device results    Collection Time: 08/30/22  5:10 PM   Result Value Ref Range    POC Glucose, Blood 137 (H) 65 - 99 mg/dL       Fluids    Intake/Output Summary (Last 24 hours) at 8/30/2022 1812  Last data filed at 8/30/2022 1600  Gross per 24 hour   Intake 1723.5 ml   Output 1075 ml   Net 648.5 ml       Core Measures & Quality Metrics  Labs reviewed, Medications reviewed and Radiology images reviewed  Guerrero catheter: Critically Ill - Requiring Accurate Measurement of Urinary Output      DVT Prophylaxis: Contraindicated - High bleeding risk  DVT prophylaxis - mechanical: SCDs  Ulcer prophylaxis: Yes      RAP Score Total: 0  ETOH  Screening    Assessment/Plan  Stroke ([facility])- (present on admission)  Assessment & Plan  8/28 Right sided deficits noted  CTA  consistent with left sided infarcts  Neurology consulted- Dr. Terry    Acute postoperative respiratory failure (HCC)  Assessment & Plan  Remained intubated post-operatively.  Continue full mechanical ventilatory support.   Ventilator bundle and Trauma weaning protocol.    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 Siu    Hypomagnesemia  Assessment & Plan  Magnesium low - replace and follow.    Lactic acidosis  Assessment & Plan  Significantly elevated post-op  Decreasing with resuscitation  8/28 LA 2.7    IVON (acute kidney injury) ([facility])- (present on admission)  Assessment & Plan  8/28  Cr 1.88  8/28 2.29  8/30 2.75 - increasing UO  Renal dose medications  Avoid nephrotoxic medications    Acute blood loss anemia  Assessment & Plan  Follow serial Hb  Transfuse 1 unit PRBC if Hb < 9.0 given recent cardiac event.  8/29 Iron studies low - replace per protocol.    CAD (coronary artery disease)- (present on admission)  Assessment & Plan  7/3/22 MI with PCI she received 3 drug-eluting stents to the LAD.  On DAPT prior to take-back.  Transfuse to maintain hemoglobin 9.  Wet read of intra-operative KLEBER during index procedure with EF 30-40% and anterior wall motion abnormality consistent with area of infarct from recent MI.  8/29 Restart ASA / KLEBER result pending.     Hypotension due to hypovolemia  Assessment & Plan  Given 2 FFP, 2 PRBC prior to initiation of massive transfusion and return to operating room for exploration.  Trending end-points of resuscitation.  Vasopressors weaned after take-back.    Coagulopathy ([facility])  Assessment & Plan  Post-take back TEG with low MA, functional fibrinogen, and high LY30  Given Cryo, Platelets,  and TXA  Output from ABThera decreasing  Trending serial labs  Repeat TEG improved        Discussed patient condition with RN, RT, Pharmacy, Dietary, , and Patient.  CRITICAL CARE TIME EXCLUDING PROCEDURES: 40  minutes

## 2022-08-31 NOTE — CARE PLAN
Problem: Ventilation  Goal: Ability to achieve and maintain unassisted ventilation or tolerate decreased levels of ventilator support  Description: Target End Date:  4 days     Document on Vent flowsheet    1.  Support and monitor invasive and noninvasive mechanical ventilation  2.  Monitor ventilator weaning response  3.  Perform ventilator associated pneumonia prevention interventions  4.  Manage ventilation therapy by monitoring diagnostic test results  Outcome: Not Met                                   Ventilator Daily Summary     Vent Day #6     Ventilator settings changed this shift: No (100%/+8/40%)     Weaning trials: None this shift     Respiratory Procedures: No     Plan: Continue current ventilator settings and wean mechanical ventilation as tolerated per physician orders.

## 2022-08-31 NOTE — CARE PLAN
The patient is Watcher - Medium risk of patient condition declining or worsening    Shift Goals  Clinical Goals: mobility  Patient Goals: unable to assess  Family Goals: unable to assess      Problem: Knowledge Deficit - Standard  Goal: Patient and family/care givers will demonstrate understanding of plan of care, disease process/condition, diagnostic tests and medications  Outcome: Progressing     Problem: Skin Integrity  Goal: Skin integrity is maintained or improved  Outcome: Progressing     Problem: Respiratory  Goal: Patient will achieve/maintain optimum respiratory ventilation and gas exchange  Outcome: Progressing     Problem: Bowel Elimination  Goal: Establish and maintain regular bowel function  Outcome: Progressing     Problem: Mobility  Goal: Patient's capacity to carry out activities will improve  Outcome: Progressing     Problem: Pain - Standard  Goal: Alleviation of pain or a reduction in pain to the patient’s comfort goal  Outcome: Progressing

## 2022-08-31 NOTE — CARE PLAN
The patient is Watcher - Medium risk of patient condition declining or worsening    Shift Goals  Clinical Goals: mobility, vent weaning, wound care  Patient Goals: unable to assess  Family Goals: unable to assess    Progress made toward(s) clinical / shift goals:    Problem: Mobility  Goal: Patient's capacity to carry out activities will improve  Outcome: Progressing     Problem: Infection - Standard  Goal: Patient will remain free from infection  Outcome: Progressing     Problem: Pain - Standard  Goal: Alleviation of pain or a reduction in pain to the patient’s comfort goal  Outcome: Progressing     Problem: Safety - Medical Restraint  Goal: Remains free of injury from restraints (Restraint for Interference with Medical Device)  Outcome: Progressing

## 2022-08-31 NOTE — DIETARY
Nutrition Services Brief Update:  Tube feed with Fibersource HN at goal rate of 45 ml/hour. RD following.  Problem: Nutritional:  Goal: Nutrition support tolerated and meeting greater than 85% of estimated needs  Outcome: Met

## 2022-08-31 NOTE — CARE PLAN
Problem: Ventilation  Goal: Ability to achieve and maintain unassisted ventilation or tolerate decreased levels of ventilator support  Description: Target End Date:  4 days     Document on Vent flowsheet    1.  Support and monitor invasive and noninvasive mechanical ventilation  2.  Monitor ventilator weaning response  3.  Perform ventilator associated pneumonia prevention interventions  4.  Manage ventilation therapy by monitoring diagnostic test results  Outcome: Not Progressing      Ventilator Daily Summary    Vent Day # 6    Ventilator settings changed this shift: no     Weaning trials:Sbt bid, does not follow, low vt and desat    Respiratory Procedures:no    Plan: Continue current ventilator settings and wean mechanical ventilation as tolerated per physician orders.

## 2022-09-01 ENCOUNTER — APPOINTMENT (OUTPATIENT)
Dept: RADIOLOGY | Facility: MEDICAL CENTER | Age: 62
DRG: 270 | End: 2022-09-01
Attending: SURGERY
Payer: MEDICAID

## 2022-09-01 PROBLEM — R13.12 OROPHARYNGEAL DYSPHAGIA: Status: ACTIVE | Noted: 2022-09-01

## 2022-09-01 LAB
ALBUMIN SERPL BCP-MCNC: 2 G/DL (ref 3.2–4.9)
ALBUMIN/GLOB SERPL: 0.6 G/DL
ALP SERPL-CCNC: 86 U/L (ref 30–99)
ALT SERPL-CCNC: <5 U/L (ref 2–50)
ANION GAP SERPL CALC-SCNC: 11 MMOL/L (ref 7–16)
AST SERPL-CCNC: 39 U/L (ref 12–45)
BASOPHILS # BLD AUTO: 0.3 % (ref 0–1.8)
BASOPHILS # BLD: 0.03 K/UL (ref 0–0.12)
BILIRUB SERPL-MCNC: 0.9 MG/DL (ref 0.1–1.5)
BUN SERPL-MCNC: 53 MG/DL (ref 8–22)
CALCIUM SERPL-MCNC: 7.7 MG/DL (ref 8.5–10.5)
CFT BLD TEG: 10.6 MIN (ref 4.6–9.1)
CFT P HPASE BLD TEG: 8.1 MIN (ref 4.3–8.3)
CHLORIDE SERPL-SCNC: 104 MMOL/L (ref 96–112)
CLOT ANGLE BLD TEG: 66.6 DEGREES (ref 63–78)
CLOT LYSIS 30M P MA LENFR BLD TEG: 3 % (ref 0–2.6)
CO2 SERPL-SCNC: 27 MMOL/L (ref 20–33)
CREAT SERPL-MCNC: 3.16 MG/DL (ref 0.5–1.4)
CT.EXTRINSIC BLD ROTEM: 1.8 MIN (ref 0.8–2.1)
EOSINOPHIL # BLD AUTO: 0.38 K/UL (ref 0–0.51)
EOSINOPHIL NFR BLD: 3.6 % (ref 0–6.9)
ERYTHROCYTE [DISTWIDTH] IN BLOOD BY AUTOMATED COUNT: 52.7 FL (ref 35.9–50)
GFR SERPLBLD CREATININE-BSD FMLA CKD-EPI: 16 ML/MIN/1.73 M 2
GLOBULIN SER CALC-MCNC: 3.1 G/DL (ref 1.9–3.5)
GLUCOSE BLD STRIP.AUTO-MCNC: 121 MG/DL (ref 65–99)
GLUCOSE BLD STRIP.AUTO-MCNC: 126 MG/DL (ref 65–99)
GLUCOSE BLD STRIP.AUTO-MCNC: 131 MG/DL (ref 65–99)
GLUCOSE BLD STRIP.AUTO-MCNC: 152 MG/DL (ref 65–99)
GLUCOSE SERPL-MCNC: 133 MG/DL (ref 65–99)
HCT VFR BLD AUTO: 25 % (ref 37–47)
HGB BLD-MCNC: 8.4 G/DL (ref 12–16)
IMM GRANULOCYTES # BLD AUTO: 0.42 K/UL (ref 0–0.11)
IMM GRANULOCYTES NFR BLD AUTO: 4 % (ref 0–0.9)
LYMPHOCYTES # BLD AUTO: 1.25 K/UL (ref 1–4.8)
LYMPHOCYTES NFR BLD: 11.9 % (ref 22–41)
MAGNESIUM SERPL-MCNC: 1.9 MG/DL (ref 1.5–2.5)
MCF BLD TEG: 65.2 MM (ref 52–69)
MCF.PLATELET INHIB BLD ROTEM: >52 MM (ref 15–32)
MCH RBC QN AUTO: 30.2 PG (ref 27–33)
MCHC RBC AUTO-ENTMCNC: 33.6 G/DL (ref 33.6–35)
MCV RBC AUTO: 89.9 FL (ref 81.4–97.8)
MONOCYTES # BLD AUTO: 1.41 K/UL (ref 0–0.85)
MONOCYTES NFR BLD AUTO: 13.5 % (ref 0–13.4)
NEUTROPHILS # BLD AUTO: 6.99 K/UL (ref 2–7.15)
NEUTROPHILS NFR BLD: 66.7 % (ref 44–72)
NRBC # BLD AUTO: 0.03 K/UL
NRBC BLD-RTO: 0.3 /100 WBC
PA AA BLD-ACNC: ABNORMAL % (ref 0–11)
PA ADP BLD-ACNC: ABNORMAL % (ref 0–17)
PHOSPHATE SERPL-MCNC: 2.4 MG/DL (ref 2.5–4.5)
PLATELET # BLD AUTO: 113 K/UL (ref 164–446)
PMV BLD AUTO: 11.2 FL (ref 9–12.9)
POTASSIUM SERPL-SCNC: 4.1 MMOL/L (ref 3.6–5.5)
PROT SERPL-MCNC: 5.1 G/DL (ref 6–8.2)
RBC # BLD AUTO: 2.78 M/UL (ref 4.2–5.4)
SODIUM SERPL-SCNC: 142 MMOL/L (ref 135–145)
TEG ALGORITHM TGALG: ABNORMAL
WBC # BLD AUTO: 10.5 K/UL (ref 4.8–10.8)

## 2022-09-01 PROCEDURE — 94799 UNLISTED PULMONARY SVC/PX: CPT

## 2022-09-01 PROCEDURE — 700102 HCHG RX REV CODE 250 W/ 637 OVERRIDE(OP): Performed by: SURGERY

## 2022-09-01 PROCEDURE — 85576 BLOOD PLATELET AGGREGATION: CPT | Mod: 91

## 2022-09-01 PROCEDURE — 85384 FIBRINOGEN ACTIVITY: CPT

## 2022-09-01 PROCEDURE — A9270 NON-COVERED ITEM OR SERVICE: HCPCS | Performed by: SURGERY

## 2022-09-01 PROCEDURE — 85025 COMPLETE CBC W/AUTO DIFF WBC: CPT

## 2022-09-01 PROCEDURE — 97530 THERAPEUTIC ACTIVITIES: CPT

## 2022-09-01 PROCEDURE — 84100 ASSAY OF PHOSPHORUS: CPT

## 2022-09-01 PROCEDURE — 71045 X-RAY EXAM CHEST 1 VIEW: CPT

## 2022-09-01 PROCEDURE — 85347 COAGULATION TIME ACTIVATED: CPT

## 2022-09-01 PROCEDURE — 80053 COMPREHEN METABOLIC PANEL: CPT

## 2022-09-01 PROCEDURE — 99291 CRITICAL CARE FIRST HOUR: CPT | Performed by: SURGERY

## 2022-09-01 PROCEDURE — 82962 GLUCOSE BLOOD TEST: CPT | Mod: 91

## 2022-09-01 PROCEDURE — 94760 N-INVAS EAR/PLS OXIMETRY 1: CPT

## 2022-09-01 PROCEDURE — 94003 VENT MGMT INPAT SUBQ DAY: CPT

## 2022-09-01 PROCEDURE — 83735 ASSAY OF MAGNESIUM: CPT

## 2022-09-01 PROCEDURE — 770001 HCHG ROOM/CARE - MED/SURG/GYN PRIV*

## 2022-09-01 PROCEDURE — 770022 HCHG ROOM/CARE - ICU (200)

## 2022-09-01 RX ORDER — CLOPIDOGREL BISULFATE 75 MG/1
300 TABLET ORAL ONCE
Status: COMPLETED | OUTPATIENT
Start: 2022-09-01 | End: 2022-09-01

## 2022-09-01 RX ADMIN — ASPIRIN 81 MG 81 MG: 81 TABLET ORAL at 05:05

## 2022-09-01 RX ADMIN — CLOPIDOGREL BISULFATE 300 MG: 75 TABLET ORAL at 11:23

## 2022-09-01 RX ADMIN — ATORVASTATIN CALCIUM 80 MG: 40 TABLET, FILM COATED ORAL at 22:23

## 2022-09-01 RX ADMIN — PRAMIPEXOLE DIHYDROCHLORIDE 0.12 MG: 0.12 TABLET ORAL at 05:06

## 2022-09-01 RX ADMIN — PRAMIPEXOLE DIHYDROCHLORIDE 0.12 MG: 0.12 TABLET ORAL at 17:24

## 2022-09-01 RX ADMIN — PRAMIPEXOLE DIHYDROCHLORIDE 0.12 MG: 0.12 TABLET ORAL at 11:23

## 2022-09-01 RX ADMIN — INSULIN HUMAN 1 UNITS: 100 INJECTION, SOLUTION PARENTERAL at 17:24

## 2022-09-01 RX ADMIN — OXYCODONE 5 MG: 5 TABLET ORAL at 17:24

## 2022-09-01 RX ADMIN — DIBASIC SODIUM PHOSPHATE, MONOBASIC POTASSIUM PHOSPHATE AND MONOBASIC SODIUM PHOSPHATE 500 MG: 852; 155; 130 TABLET ORAL at 17:24

## 2022-09-01 RX ADMIN — DIBASIC SODIUM PHOSPHATE, MONOBASIC POTASSIUM PHOSPHATE AND MONOBASIC SODIUM PHOSPHATE 500 MG: 852; 155; 130 TABLET ORAL at 11:22

## 2022-09-01 RX ADMIN — OXYCODONE 5 MG: 5 TABLET ORAL at 13:02

## 2022-09-01 RX ADMIN — OXYCODONE 5 MG: 5 TABLET ORAL at 05:05

## 2022-09-01 RX ADMIN — FAMOTIDINE 20 MG: 20 TABLET, FILM COATED ORAL at 05:05

## 2022-09-01 RX ADMIN — EZETIMIBE 10 MG: 10 TABLET ORAL at 05:06

## 2022-09-01 RX ADMIN — OXYCODONE 5 MG: 5 TABLET ORAL at 22:45

## 2022-09-01 ASSESSMENT — COGNITIVE AND FUNCTIONAL STATUS - GENERAL
DRESSING REGULAR LOWER BODY CLOTHING: TOTAL
DAILY ACTIVITIY SCORE: 6
SUGGESTED CMS G CODE MODIFIER DAILY ACTIVITY: CN
EATING MEALS: TOTAL
DRESSING REGULAR UPPER BODY CLOTHING: TOTAL
HELP NEEDED FOR BATHING: TOTAL
TOILETING: TOTAL
PERSONAL GROOMING: TOTAL

## 2022-09-01 ASSESSMENT — FIBROSIS 4 INDEX: FIB4 SCORE: 9.6

## 2022-09-01 NOTE — THERAPY
Occupational Therapy  Daily Treatment     Patient Name: Fouzia Santamaria  Age:  61 y.o., Sex:  female  Medical Record #: [id number]  Today's Date: 9/1/2022     Precautions  Precautions: Fall Risk  Comments: abdominal binder on with mobilty    Assessment    Pt currently limited by decreased functional mobility, activity tolerance, cognition, sensation, strength, AROM, coordination, balance, and pain which are affecting pt's ability to complete ADLs/IADLs at baseline. Pt would benefit from OT services in the acute care setting to maximize functional recovery.      Plan    Continue current treatment plan.    DC Equipment Recommendations: Unable to determine at this time  Discharge Recommendations: (P) Recommend post-acute placement for additional occupational therapy services prior to discharge home         09/01/22 0824   Activities of Daily Living   Grooming Total Assist   Upper Body Dressing Total Assist   Lower Body Dressing Total Assist   Toileting Total Assist   Functional Mobility   Sit to Stand Maximal Assist   Bed, Chair, Wheelchair Transfer Unable to Participate   Short Term Goals   Short Term Goal # 1 Pt will initate purposeful movement with LUE   Goal Outcome # 1 Progressing slower than expected   Short Term Goal # 2 Pt will follow 1 step commands with 100% accuracy   Goal Outcome # 2 Progressing slower than expected   Short Term Goal # 3 Pt will demo fair- sitting balance in prep for seated ADLs   Goal Outcome # 3 Progressing slower than expected   Anticipated Discharge Equipment and Recommendations   Discharge Recommendations Recommend post-acute placement for additional occupational therapy services prior to discharge home

## 2022-09-01 NOTE — PROGRESS NOTES
Trauma / Surgical Daily Progress Note    Date of Service  9/1/2022    Chief Complaint  61 y.o. female admitted 8/23/2022 with distal aortic occlusion    Interval Events    Not able to cooperate for vent weaning parameters  Continue SBT trials  If not able to cooperate more with as vent weaning will likely need trach  Increasing UO and stable Cr  Tolerating tube feeds at goal    Review of Systems  Review of Systems   Unable to perform ROS: Intubated      Vital Signs for last 24 hours  Pulse:  [] 89  Resp:  [5-30] 30  BP: (106-181)/() 181/101  SpO2:  [92 %-100 %] 99 %    Hemodynamic parameters for last 24 hours       Respiratory Data     Respiration: (!) 30, Pulse Oximetry: 99 %     Work Of Breathing / Effort: Vented  RUL Breath Sounds: Clear, RML Breath Sounds: Clear, RLL Breath Sounds: Crackles, ZAINAB Breath Sounds: Clear, LLL Breath Sounds: Crackles    Physical Exam  Physical Exam  Vitals and nursing note reviewed.   Constitutional:       General: She is not in acute distress.     Appearance: She is not ill-appearing or toxic-appearing.   HENT:      Head: Normocephalic.      Right Ear: External ear normal.      Left Ear: External ear normal.      Mouth/Throat:      Mouth: Mucous membranes are moist.   Eyes:      General: No scleral icterus.     Extraocular Movements: Extraocular movements intact.      Pupils: Pupils are equal, round, and reactive to light.   Cardiovascular:      Rate and Rhythm: Normal rate and regular rhythm.      Pulses: Normal pulses.      Heart sounds: Normal heart sounds.   Pulmonary:      Effort: No respiratory distress.      Breath sounds: No wheezing.      Comments: On vent  Abdominal:      General: There is no distension.      Palpations: Abdomen is soft.      Tenderness: There is no abdominal tenderness. There is no guarding or rebound.      Comments: Rachna to midline and groin incisions   Genitourinary:     Comments: Guerrero to gravity.  Musculoskeletal:         General:  Normal range of motion.      Cervical back: Normal range of motion.   Skin:     General: Skin is warm and dry.      Capillary Refill: Capillary refill takes less than 2 seconds.   Neurological:      Mental Status: She is alert.      Comments: More awake and interactive  Nodding to questions  Follows commands  Right sided weakness noted       Laboratory  Recent Results (from the past 24 hour(s))   POCT glucose device results    Collection Time: 08/31/22  5:22 PM   Result Value Ref Range    POC Glucose, Blood 136 (H) 65 - 99 mg/dL   POCT glucose device results    Collection Time: 09/01/22  1:48 AM   Result Value Ref Range    POC Glucose, Blood 121 (H) 65 - 99 mg/dL   CBC with Differential: Tomorrow AM    Collection Time: 09/01/22  5:10 AM   Result Value Ref Range    WBC 10.5 4.8 - 10.8 K/uL    RBC 2.78 (L) 4.20 - 5.40 M/uL    Hemoglobin 8.4 (L) 12.0 - 16.0 g/dL    Hematocrit 25.0 (L) 37.0 - 47.0 %    MCV 89.9 81.4 - 97.8 fL    MCH 30.2 27.0 - 33.0 pg    MCHC 33.6 33.6 - 35.0 g/dL    RDW 52.7 (H) 35.9 - 50.0 fL    Platelet Count 113 (L) 164 - 446 K/uL    MPV 11.2 9.0 - 12.9 fL    Neutrophils-Polys 66.70 44.00 - 72.00 %    Lymphocytes 11.90 (L) 22.00 - 41.00 %    Monocytes 13.50 (H) 0.00 - 13.40 %    Eosinophils 3.60 0.00 - 6.90 %    Basophils 0.30 0.00 - 1.80 %    Immature Granulocytes 4.00 (H) 0.00 - 0.90 %    Nucleated RBC 0.30 /100 WBC    Neutrophils (Absolute) 6.99 2.00 - 7.15 K/uL    Lymphs (Absolute) 1.25 1.00 - 4.80 K/uL    Monos (Absolute) 1.41 (H) 0.00 - 0.85 K/uL    Eos (Absolute) 0.38 0.00 - 0.51 K/uL    Baso (Absolute) 0.03 0.00 - 0.12 K/uL    Immature Granulocytes (abs) 0.42 (H) 0.00 - 0.11 K/uL    NRBC (Absolute) 0.03 K/uL   Comp Metabolic Panel (CMP): Tomorrow AM    Collection Time: 09/01/22  5:10 AM   Result Value Ref Range    Sodium 142 135 - 145 mmol/L    Potassium 4.1 3.6 - 5.5 mmol/L    Chloride 104 96 - 112 mmol/L    Co2 27 20 - 33 mmol/L    Anion Gap 11.0 7.0 - 16.0    Glucose 133 (H) 65 - 99  mg/dL    Bun 53 (H) 8 - 22 mg/dL    Creatinine 3.16 (H) 0.50 - 1.40 mg/dL    Calcium 7.7 (L) 8.5 - 10.5 mg/dL    AST(SGOT) 39 12 - 45 U/L    ALT(SGPT) <5 2 - 50 U/L    Alkaline Phosphatase 86 30 - 99 U/L    Total Bilirubin 0.9 0.1 - 1.5 mg/dL    Albumin 2.0 (L) 3.2 - 4.9 g/dL    Total Protein 5.1 (L) 6.0 - 8.2 g/dL    Globulin 3.1 1.9 - 3.5 g/dL    A-G Ratio 0.6 g/dL   Magnesium: Every Monday and Thursday AM    Collection Time: 09/01/22  5:10 AM   Result Value Ref Range    Magnesium 1.9 1.5 - 2.5 mg/dL   Phosphorus: Every Monday and Thursday AM    Collection Time: 09/01/22  5:10 AM   Result Value Ref Range    Phosphorus 2.4 (L) 2.5 - 4.5 mg/dL   PLATELET MAPPING WITH BASIC TEG    Collection Time: 09/01/22  5:10 AM   Result Value Ref Range    Reaction Time Initial-R 10.6 (H) 4.6 - 9.1 min    React Time Initial Hep 8.1 4.3 - 8.3 min    Clot Kinetics-K 1.8 0.8 - 2.1 min    Clot Angle-Angle 66.6 63.0 - 78.0 degrees    Maximum Clot Strength-MA 65.2 52.0 - 69.0 mm    TEG Functional Fibrinogen(MA) >52.0 (H) 15.0 - 32.0 mm    Lysis 30 minutes-LY30 3.0 (H) 0.0 - 2.6 %    % Inhibition ADP See Comment 0.0 - 17.0 %    % Inhibition AA See Comment 0.0 - 11.0 %    TEG Algorithm Link Algorithm    ESTIMATED GFR    Collection Time: 09/01/22  5:10 AM   Result Value Ref Range    GFR (CKD-EPI) 16 (A) >60 mL/min/1.73 m 2   POCT glucose device results    Collection Time: 09/01/22  5:12 AM   Result Value Ref Range    POC Glucose, Blood 126 (H) 65 - 99 mg/dL   POCT glucose device results    Collection Time: 09/01/22 11:13 AM   Result Value Ref Range    POC Glucose, Blood 131 (H) 65 - 99 mg/dL       Fluids    Intake/Output Summary (Last 24 hours) at 9/1/2022 1555  Last data filed at 9/1/2022 1400  Gross per 24 hour   Intake 1800 ml   Output 2165 ml   Net -365 ml       Core Measures & Quality Metrics  Labs reviewed, Medications reviewed and Radiology images reviewed  Guerrero catheter: Critically Ill - Requiring Accurate Measurement of Urinary  Output      DVT Prophylaxis: Contraindicated - High bleeding risk  DVT prophylaxis - mechanical: SCDs  Ulcer prophylaxis: Yes      RAP Score Total: 0  ETOH Screening    Assessment/Plan  Stroke (HCC)- (present on admission)  Assessment & Plan  8/28 Right sided deficits noted  CTA  consistent with left sided infarcts  Neurology consulted- Dr. Terry    Acute postoperative respiratory failure (HCC)  Assessment & Plan  Remained intubated post-operatively.  Continue full mechanical ventilatory support.   Ventilator bundle and Trauma weaning protocol.  9/1 not able to cooperate for weaning parameters    Occluded aorta (HCC)- (present on admission)  Assessment & Plan  8/26 Aorto-bifem with renal artery endarterectomy  Take back for post op bleeding-packed  8/28 removed packing and closed  Vascular Surgery - Dr. Siu    Hypomagnesemia  Assessment & Plan  Magnesium low - replace and follow.    Lactic acidosis  Assessment & Plan  Significantly elevated post-op  Decreasing with resuscitation  8/28 LA 2.7    IVON (acute kidney injury) (HCC)- (present on admission)  Assessment & Plan  8/28  Cr 1.88  8/30 2.75 - increasing UO  9/1 Cr 3.16 , OU 1665 cc  Renal dose medications  Avoid nephrotoxic medications    Acute blood loss anemia  Assessment & Plan  Follow serial Hb  Transfuse 1 unit PRBC if Hb < 9.0 given recent cardiac event.  8/29 Iron studies low - replace per protocol.    CAD (coronary artery disease)- (present on admission)  Assessment & Plan  7/3/22 MI with PCI she received 3 drug-eluting stents to the LAD.  On DAPT prior to take-back.  Transfuse to maintain hemoglobin 9.  Wet read of intra-operative KLEBER during index procedure with EF 30-40% and anterior wall motion abnormality consistent with area of infarct from recent MI.  8/29 Restart ASA / KLEBER Moderately depressed LV EF 30-35% visually estimated. Akinesis of anterior and anteroseptal walls.   Hypokinesis of anterolateral and inferoseptal segments. Normal wall  motion of inferior and inferolateral walls. Preserved RV systolic   9/1 restart Plavix     Hypotension due to hypovolemia  Assessment & Plan  Given 2 FFP, 2 PRBC prior to initiation of massive transfusion and return to operating room for exploration.  Trending end-points of resuscitation.  Vasopressors weaned after take-back.    Coagulopathy (HCC)  Assessment & Plan  Post-take back TEG with low MA, functional fibrinogen, and high LY30  Given Cryo, Platelets, and TXA  Output from ABThera decreasing  Trending serial labs  Repeat TEG improved        Discussed patient condition with Family, RN, RT, Pharmacy, , Patient, and vascular surgery.  CRITICAL CARE TIME EXCLUDING PROCEDURES: 40   minutes

## 2022-09-01 NOTE — PROGRESS NOTES
Trauma / Surgical Daily Progress Note    Date of Service  8/31/2022    Chief Complaint  61 y.o. female admitted 8/23/2022 with aortoiliac occlusive disease    Interval Events    Discussion with family at bedside regarding ongoing ventilator as well as renal issues  She continues to improve may be able to attempt extubation tomorrow  If she has further issues she may require tracheostomy    Patient with basic understanding of the current renal issues -she is starting to make more urine but not clearing her creatinine  Discussed potential need for hemodialysis  Patient is on ASA for her drug-eluting stents - check platelet mapping    Review of Systems  Review of Systems   Unable to perform ROS: Intubated      Vital Signs for last 24 hours  Temp:  [37.2 °C (99 °F)] 37.2 °C (99 °F)  Pulse:  [] 82  Resp:  [5-33] 19  BP: (106-174)/(58-98) 106/60  SpO2:  [85 %-100 %] 100 %    Hemodynamic parameters for last 24 hours       Respiratory Data     Respiration: 19, Pulse Oximetry: 100 %     Work Of Breathing / Effort: Vented  RUL Breath Sounds: Clear, RML Breath Sounds: Diminished, RLL Breath Sounds: Diminished, ZAINAB Breath Sounds: Clear, LLL Breath Sounds: Diminished    Physical Exam  Physical Exam  Vitals and nursing note reviewed.   Constitutional:       General: She is not in acute distress.     Appearance: She is not ill-appearing or toxic-appearing.   HENT:      Head: Normocephalic.      Right Ear: External ear normal.      Left Ear: External ear normal.      Mouth/Throat:      Mouth: Mucous membranes are moist.   Eyes:      General: No scleral icterus.     Extraocular Movements: Extraocular movements intact.      Pupils: Pupils are equal, round, and reactive to light.   Cardiovascular:      Rate and Rhythm: Normal rate and regular rhythm.      Pulses: Normal pulses.      Heart sounds: Normal heart sounds.   Pulmonary:      Effort: No respiratory distress.      Breath sounds: No wheezing.      Comments: On  vent  Abdominal:      General: There is no distension.      Tenderness: There is no abdominal tenderness. There is no guarding or rebound.      Comments: Rachna to midline and groin incisions   Genitourinary:     Comments: Guerrero to gravity.  Musculoskeletal:         General: Normal range of motion.      Cervical back: Normal range of motion.   Skin:     General: Skin is warm and dry.      Capillary Refill: Capillary refill takes less than 2 seconds.   Neurological:      Comments: More awake and interactive  Nodding to questions  Follows commands  Right sided weakness noted       Laboratory  Recent Results (from the past 24 hour(s))   POCT glucose device results    Collection Time: 08/30/22 11:13 PM   Result Value Ref Range    POC Glucose, Blood 124 (H) 65 - 99 mg/dL   CBC with Differential: Tomorrow AM    Collection Time: 08/31/22  4:25 AM   Result Value Ref Range    WBC 12.8 (H) 4.8 - 10.8 K/uL    RBC 2.77 (L) 4.20 - 5.40 M/uL    Hemoglobin 8.4 (L) 12.0 - 16.0 g/dL    Hematocrit 24.9 (L) 37.0 - 47.0 %    MCV 89.9 81.4 - 97.8 fL    MCH 30.3 27.0 - 33.0 pg    MCHC 33.7 33.6 - 35.0 g/dL    RDW 54.3 (H) 35.9 - 50.0 fL    Platelet Count 87 (L) 164 - 446 K/uL    MPV 11.0 9.0 - 12.9 fL    Neutrophils-Polys 79.00 (H) 44.00 - 72.00 %    Lymphocytes 8.80 (L) 22.00 - 41.00 %    Monocytes 8.40 0.00 - 13.40 %    Eosinophils 2.20 0.00 - 6.90 %    Basophils 0.40 0.00 - 1.80 %    Immature Granulocytes 1.20 (H) 0.00 - 0.90 %    Nucleated RBC 0.20 /100 WBC    Neutrophils (Absolute) 10.10 (H) 2.00 - 7.15 K/uL    Lymphs (Absolute) 1.12 1.00 - 4.80 K/uL    Monos (Absolute) 1.07 (H) 0.00 - 0.85 K/uL    Eos (Absolute) 0.28 0.00 - 0.51 K/uL    Baso (Absolute) 0.05 0.00 - 0.12 K/uL    Immature Granulocytes (abs) 0.15 (H) 0.00 - 0.11 K/uL    NRBC (Absolute) 0.03 K/uL   Comp Metabolic Panel (CMP): Tomorrow AM    Collection Time: 08/31/22  4:25 AM   Result Value Ref Range    Sodium 139 135 - 145 mmol/L    Potassium 3.7 3.6 - 5.5 mmol/L     Chloride 100 96 - 112 mmol/L    Co2 29 20 - 33 mmol/L    Anion Gap 10.0 7.0 - 16.0    Glucose 155 (H) 65 - 99 mg/dL    Bun 51 (H) 8 - 22 mg/dL    Creatinine 3.11 (H) 0.50 - 1.40 mg/dL    Calcium 7.2 (L) 8.5 - 10.5 mg/dL    AST(SGOT) 39 12 - 45 U/L    ALT(SGPT) <5 2 - 50 U/L    Alkaline Phosphatase 79 30 - 99 U/L    Total Bilirubin 0.9 0.1 - 1.5 mg/dL    Albumin 2.0 (L) 3.2 - 4.9 g/dL    Total Protein 4.6 (L) 6.0 - 8.2 g/dL    Globulin 2.6 1.9 - 3.5 g/dL    A-G Ratio 0.8 g/dL   MAGNESIUM    Collection Time: 08/31/22  4:25 AM   Result Value Ref Range    Magnesium 2.0 1.5 - 2.5 mg/dL   PHOSPHORUS    Collection Time: 08/31/22  4:25 AM   Result Value Ref Range    Phosphorus 2.3 (L) 2.5 - 4.5 mg/dL   Triglyceride    Collection Time: 08/31/22  4:25 AM   Result Value Ref Range    Triglycerides 111 0 - 149 mg/dL   ESTIMATED GFR    Collection Time: 08/31/22  4:25 AM   Result Value Ref Range    GFR (CKD-EPI) 17 (A) >60 mL/min/1.73 m 2   POCT glucose device results    Collection Time: 08/31/22  5:23 AM   Result Value Ref Range    POC Glucose, Blood 143 (H) 65 - 99 mg/dL   POCT glucose device results    Collection Time: 08/31/22 11:32 AM   Result Value Ref Range    POC Glucose, Blood 157 (H) 65 - 99 mg/dL   POCT glucose device results    Collection Time: 08/31/22  5:22 PM   Result Value Ref Range    POC Glucose, Blood 136 (H) 65 - 99 mg/dL       Fluids    Intake/Output Summary (Last 24 hours) at 8/31/2022 1959  Last data filed at 8/31/2022 1800  Gross per 24 hour   Intake 2858.33 ml   Output 2375 ml   Net 483.33 ml       Core Measures & Quality Metrics  Labs reviewed, Medications reviewed and Radiology images reviewed  Guerrero catheter: Critically Ill - Requiring Accurate Measurement of Urinary Output      DVT Prophylaxis: Contraindicated - High bleeding risk  DVT prophylaxis - mechanical: SCDs  Ulcer prophylaxis: Yes      RAP Score Total: 0  ETOH Screening    Assessment/Plan  Stroke (HCC)- (present on admission)  Assessment &  Plan  8/28 Right sided deficits noted  CTA  consistent with left sided infarcts  Neurology consulted- Dr. Terry    Acute postoperative respiratory failure (HCC)  Assessment & Plan  Remained intubated post-operatively.  Continue full mechanical ventilatory support.   Ventilator bundle and Trauma weaning protocol.    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 Siu    Hypomagnesemia  Assessment & Plan  Magnesium low - replace and follow.    Lactic acidosis  Assessment & Plan  Significantly elevated post-op  Decreasing with resuscitation  8/28 LA 2.7    IVON (acute kidney injury) ([facility])- (present on admission)  Assessment & Plan  8/28  Cr 1.88  8/28 2.29  8/30 2.75 - increasing UO  Renal dose medications  Avoid nephrotoxic medications    Acute blood loss anemia  Assessment & Plan  Follow serial Hb  Transfuse 1 unit PRBC if Hb < 9.0 given recent cardiac event.  8/29 Iron studies low - replace per protocol.    CAD (coronary artery disease)- (present on admission)  Assessment & Plan  7/3/22 MI with PCI she received 3 drug-eluting stents to the LAD.  On DAPT prior to take-back.  Transfuse to maintain hemoglobin 9.  Wet read of intra-operative KLEBER during index procedure with EF 30-40% and anterior wall motion abnormality consistent with area of infarct from recent MI.  8/29 Restart ASA / KLEBER result pending.     Hypotension due to hypovolemia  Assessment & Plan  Given 2 FFP, 2 PRBC prior to initiation of massive transfusion and return to operating room for exploration.  Trending end-points of resuscitation.  Vasopressors weaned after take-back.    Coagulopathy (HCC)  Assessment & Plan  Post-take back TEG with low MA, functional fibrinogen, and high LY30  Given Cryo, Platelets, and TXA  Output from ABThera decreasing  Trending serial labs  Repeat TEG  improved        Discussed patient condition with Family, RN, RT, Pharmacy, Dietary, , Patient, and vascular surgery.  CRITICAL CARE TIME EXCLUDING PROCEDURES: 45  minutes

## 2022-09-01 NOTE — CARE PLAN
The patient is Watcher - Medium risk of patient condition declining or worsening    Shift Goals  Clinical Goals: mobility, vent weaning  Patient Goals: alan  Family Goals: alan      Problem: Skin Integrity  Goal: Skin integrity is maintained or improved  Outcome: Progressing     Problem: Respiratory  Goal: Patient will achieve/maintain optimum respiratory ventilation and gas exchange  Outcome: Progressing     Problem: Mobility  Goal: Patient's capacity to carry out activities will improve  Outcome: Progressing     Problem: Pain - Standard  Goal: Alleviation of pain or a reduction in pain to the patient’s comfort goal  Outcome: Progressing

## 2022-09-01 NOTE — CARE PLAN
Problem: Knowledge Deficit - Standard  Goal: Patient and family/care givers will demonstrate understanding of plan of care, disease process/condition, diagnostic tests and medications  Outcome: Not Met     Problem: Skin Integrity  Goal: Skin integrity is maintained or improved  Outcome: Progressing     Problem: Fall Risk  Goal: Patient will remain free from falls  Outcome: Progressing   The patient is Watcher - Medium risk of patient condition declining or worsening    Shift Goals  Clinical Goals: stable vitals  Patient Goals: NA  Family Goals: unable to assess    Progress made toward(s) clinical / shift goals:  yes      Patient is not progressing towards the following goals:      Problem: Knowledge Deficit - Standard  Goal: Patient and family/care givers will demonstrate understanding of plan of care, disease process/condition, diagnostic tests and medications  Outcome: Not Met

## 2022-09-01 NOTE — DISCHARGE PLANNING
Chart review completed. Patient was transferred to ICU on 08/26 for postoperatively management. She remains intubated. Goal for extubation however if patient can not tolerate then plan for trach. Dr. Jones spoke with family yesterday regarding POC. MD aware of prior dc plan to SNF and the denials due to pt's insurance. LMSW will continue to follow.

## 2022-09-02 ENCOUNTER — APPOINTMENT (OUTPATIENT)
Dept: RADIOLOGY | Facility: MEDICAL CENTER | Age: 62
DRG: 270 | End: 2022-09-02
Attending: SURGERY
Payer: MEDICAID

## 2022-09-02 LAB
ALBUMIN SERPL BCP-MCNC: 1.9 G/DL (ref 3.2–4.9)
ALBUMIN/GLOB SERPL: 0.6 G/DL
ALP SERPL-CCNC: 93 U/L (ref 30–99)
ALT SERPL-CCNC: <5 U/L (ref 2–50)
ANION GAP SERPL CALC-SCNC: 10 MMOL/L (ref 7–16)
AST SERPL-CCNC: 31 U/L (ref 12–45)
BASOPHILS # BLD AUTO: 0.4 % (ref 0–1.8)
BASOPHILS # BLD: 0.06 K/UL (ref 0–0.12)
BILIRUB SERPL-MCNC: 1 MG/DL (ref 0.1–1.5)
BUN SERPL-MCNC: 53 MG/DL (ref 8–22)
CALCIUM SERPL-MCNC: 7.8 MG/DL (ref 8.5–10.5)
CHLORIDE SERPL-SCNC: 104 MMOL/L (ref 96–112)
CO2 SERPL-SCNC: 27 MMOL/L (ref 20–33)
CREAT SERPL-MCNC: 2.82 MG/DL (ref 0.5–1.4)
EOSINOPHIL # BLD AUTO: 0.3 K/UL (ref 0–0.51)
EOSINOPHIL NFR BLD: 2.2 % (ref 0–6.9)
ERYTHROCYTE [DISTWIDTH] IN BLOOD BY AUTOMATED COUNT: 52.8 FL (ref 35.9–50)
GFR SERPLBLD CREATININE-BSD FMLA CKD-EPI: 19 ML/MIN/1.73 M 2
GLOBULIN SER CALC-MCNC: 3.4 G/DL (ref 1.9–3.5)
GLUCOSE BLD STRIP.AUTO-MCNC: 117 MG/DL (ref 65–99)
GLUCOSE BLD STRIP.AUTO-MCNC: 138 MG/DL (ref 65–99)
GLUCOSE BLD STRIP.AUTO-MCNC: 142 MG/DL (ref 65–99)
GLUCOSE BLD STRIP.AUTO-MCNC: 146 MG/DL (ref 65–99)
GLUCOSE BLD STRIP.AUTO-MCNC: 167 MG/DL (ref 65–99)
GLUCOSE SERPL-MCNC: 166 MG/DL (ref 65–99)
HCT VFR BLD AUTO: 26.6 % (ref 37–47)
HGB BLD-MCNC: 8.7 G/DL (ref 12–16)
IMM GRANULOCYTES # BLD AUTO: 0.66 K/UL (ref 0–0.11)
IMM GRANULOCYTES NFR BLD AUTO: 4.8 % (ref 0–0.9)
LYMPHOCYTES # BLD AUTO: 1.26 K/UL (ref 1–4.8)
LYMPHOCYTES NFR BLD: 9.1 % (ref 22–41)
MCH RBC QN AUTO: 30.1 PG (ref 27–33)
MCHC RBC AUTO-ENTMCNC: 32.7 G/DL (ref 33.6–35)
MCV RBC AUTO: 92 FL (ref 81.4–97.8)
MONOCYTES # BLD AUTO: 1.6 K/UL (ref 0–0.85)
MONOCYTES NFR BLD AUTO: 11.6 % (ref 0–13.4)
NEUTROPHILS # BLD AUTO: 9.94 K/UL (ref 2–7.15)
NEUTROPHILS NFR BLD: 71.9 % (ref 44–72)
NRBC # BLD AUTO: 0.03 K/UL
NRBC BLD-RTO: 0.2 /100 WBC
PHOSPHATE SERPL-MCNC: 3.1 MG/DL (ref 2.5–4.5)
PLATELET # BLD AUTO: 146 K/UL (ref 164–446)
PMV BLD AUTO: 11.3 FL (ref 9–12.9)
POTASSIUM SERPL-SCNC: 4.2 MMOL/L (ref 3.6–5.5)
PROT SERPL-MCNC: 5.3 G/DL (ref 6–8.2)
RBC # BLD AUTO: 2.89 M/UL (ref 4.2–5.4)
SODIUM SERPL-SCNC: 141 MMOL/L (ref 135–145)
WBC # BLD AUTO: 13.8 K/UL (ref 4.8–10.8)

## 2022-09-02 PROCEDURE — 700102 HCHG RX REV CODE 250 W/ 637 OVERRIDE(OP): Performed by: SURGERY

## 2022-09-02 PROCEDURE — A9270 NON-COVERED ITEM OR SERVICE: HCPCS | Performed by: STUDENT IN AN ORGANIZED HEALTH CARE EDUCATION/TRAINING PROGRAM

## 2022-09-02 PROCEDURE — 700102 HCHG RX REV CODE 250 W/ 637 OVERRIDE(OP): Performed by: STUDENT IN AN ORGANIZED HEALTH CARE EDUCATION/TRAINING PROGRAM

## 2022-09-02 PROCEDURE — 94003 VENT MGMT INPAT SUBQ DAY: CPT

## 2022-09-02 PROCEDURE — 92610 EVALUATE SWALLOWING FUNCTION: CPT

## 2022-09-02 PROCEDURE — 700111 HCHG RX REV CODE 636 W/ 250 OVERRIDE (IP): Performed by: SURGERY

## 2022-09-02 PROCEDURE — 97530 THERAPEUTIC ACTIVITIES: CPT

## 2022-09-02 PROCEDURE — 71045 X-RAY EXAM CHEST 1 VIEW: CPT

## 2022-09-02 PROCEDURE — 99291 CRITICAL CARE FIRST HOUR: CPT | Performed by: SURGERY

## 2022-09-02 PROCEDURE — 84100 ASSAY OF PHOSPHORUS: CPT

## 2022-09-02 PROCEDURE — 82962 GLUCOSE BLOOD TEST: CPT | Mod: 91

## 2022-09-02 PROCEDURE — A9270 NON-COVERED ITEM OR SERVICE: HCPCS | Performed by: SURGERY

## 2022-09-02 PROCEDURE — 85025 COMPLETE CBC W/AUTO DIFF WBC: CPT

## 2022-09-02 PROCEDURE — 80053 COMPREHEN METABOLIC PANEL: CPT

## 2022-09-02 PROCEDURE — 770022 HCHG ROOM/CARE - ICU (200)

## 2022-09-02 PROCEDURE — 94799 UNLISTED PULMONARY SVC/PX: CPT

## 2022-09-02 RX ORDER — CLOPIDOGREL BISULFATE 75 MG/1
75 TABLET ORAL DAILY
Status: DISCONTINUED | OUTPATIENT
Start: 2022-09-03 | End: 2022-09-03

## 2022-09-02 RX ADMIN — INSULIN HUMAN 1 UNITS: 100 INJECTION, SOLUTION PARENTERAL at 06:28

## 2022-09-02 RX ADMIN — ATORVASTATIN CALCIUM 80 MG: 40 TABLET, FILM COATED ORAL at 21:45

## 2022-09-02 RX ADMIN — OXYCODONE 5 MG: 5 TABLET ORAL at 16:53

## 2022-09-02 RX ADMIN — PRAMIPEXOLE DIHYDROCHLORIDE 0.12 MG: 0.12 TABLET ORAL at 11:36

## 2022-09-02 RX ADMIN — PRAMIPEXOLE DIHYDROCHLORIDE 0.12 MG: 0.12 TABLET ORAL at 05:57

## 2022-09-02 RX ADMIN — FAMOTIDINE 20 MG: 20 TABLET, FILM COATED ORAL at 05:56

## 2022-09-02 RX ADMIN — HYDROMORPHONE HYDROCHLORIDE 0.5 MG: 1 INJECTION, SOLUTION INTRAMUSCULAR; INTRAVENOUS; SUBCUTANEOUS at 19:42

## 2022-09-02 RX ADMIN — CLOPIDOGREL BISULFATE 75 MG: 75 TABLET ORAL at 05:56

## 2022-09-02 RX ADMIN — ASPIRIN 81 MG 81 MG: 81 TABLET ORAL at 05:56

## 2022-09-02 RX ADMIN — PRAMIPEXOLE DIHYDROCHLORIDE 0.12 MG: 0.12 TABLET ORAL at 16:51

## 2022-09-02 RX ADMIN — EZETIMIBE 10 MG: 10 TABLET ORAL at 05:57

## 2022-09-02 ASSESSMENT — ENCOUNTER SYMPTOMS
ABDOMINAL DISTENTION: 0
NUMBNESS: 0
WHEEZING: 0
CONSTIPATION: 0
COUGH: 1
WEAKNESS: 0
MYALGIAS: 1
HEADACHES: 0
DIZZINESS: 0
BACK PAIN: 0
NAUSEA: 0
SHORTNESS OF BREATH: 0
STRIDOR: 0
CHEST TIGHTNESS: 0
NECK PAIN: 0
ABDOMINAL PAIN: 1
ARTHRALGIAS: 0

## 2022-09-02 ASSESSMENT — COGNITIVE AND FUNCTIONAL STATUS - GENERAL
CLIMB 3 TO 5 STEPS WITH RAILING: TOTAL
WALKING IN HOSPITAL ROOM: TOTAL
MOBILITY SCORE: 6
STANDING UP FROM CHAIR USING ARMS: TOTAL
SUGGESTED CMS G CODE MODIFIER MOBILITY: CN
MOVING TO AND FROM BED TO CHAIR: UNABLE
TURNING FROM BACK TO SIDE WHILE IN FLAT BAD: UNABLE
MOVING FROM LYING ON BACK TO SITTING ON SIDE OF FLAT BED: UNABLE

## 2022-09-02 ASSESSMENT — PAIN SCALES - WONG BAKER
WONGBAKER_NUMERICALRESPONSE: HURTS JUST A LITTLE BIT
WONGBAKER_NUMERICALRESPONSE: HURTS A WHOLE LOT

## 2022-09-02 ASSESSMENT — FIBROSIS 4 INDEX: FIB4 SCORE: 9.6

## 2022-09-02 ASSESSMENT — GAIT ASSESSMENTS: GAIT LEVEL OF ASSIST: UNABLE TO PARTICIPATE

## 2022-09-02 NOTE — THERAPY
"Physical Therapy   Daily Treatment     Patient Name: Fouzia Santamaria  Age:  61 y.o., Sex:  female  Medical Record #: [id number]  Today's Date: 9/2/2022     Precautions  Precautions: Fall Risk  Comments: abdominal binder on with mobilty    Assessment    Pt received in bed and agreeable to PT session. Pt was extubated this AM and RN assisted with mobilization this session. Pt presents with R hemiplegia, impaired balance, and difficulty with communication. Pt required max to total A for mobilizing to edge of bed and min to mod A maintaining seated balance at edge of bed. Pt was limited by fatigue and weakness. Will continue to follow for acute PT to progress as able. Recommending PM&R consult.    Plan    Continue current treatment plan.    DC Equipment Recommendations: Unable to determine at this time  Discharge Recommendations: Recommend post-acute placement for additional physical therapy services prior to discharge home      Subjective    Pt able to respond best to yes/no questions.     Objective       09/02/22 1131   Precautions   Precautions Fall Risk   Comments abdominal binder on with mobilty   Vitals   Vitals Comments Pt extubated this AM and now on room air. Vitals stable during session.   Cognition    Speech/ Communication Global Aphasia   Level of Consciousness Alert   Ability To Follow Commands 1 Step   Comments Pt now extubated and notable appears to be aphasic. Pt had some difficulty following commands, but more difficulty speaking. Pt mainly was able to respond with single word answers or short phrases.   Active ROM Lower Body    Comments Able to lift LLE against gravity, RLE flaccid   Strength Lower Body   Comments Flaccid RLE. Able to lift RLE against gravity, unable to do MMT due to impaired command following.   Sensation Lower Body   Comments Pt stated \"no\" when asked if RLE feels the same to light touch as the LLE.   Lower Body Muscle Tone   Rt Lower Extremity Muscle Tone Non Functional "   Neuro-Muscular Treatments   Neuro-Muscular Treatments Anterior weight shift;Tactile Cuing;Weight Shift Right;Weight Shift Left;Verbal Cuing   Comments Pt required cues to use LUE for support in sitting. Unable to maintain seated balance without assist.   Vision   Vision Comments Pt reports vision is normal.   Neurological Concerns   Comments RLE flaccid. Some return in RUE movement, grossly 2-/5 and pt not using for support.   Balance   Sitting Balance (Static) Poor -   Sitting Balance (Dynamic) Trace +   Weight Shift Sitting Poor   Skilled Intervention Verbal Cuing;Tactile Cuing;Facilitation   Comments Pt required facilitation for maintaining seated balance at edge of bed.   Gait Analysis   Gait Level Of Assist Unable to Participate   Bed Mobility    Supine to Sit Maximal Assist   Sit to Supine Total Assist   Scooting Total Assist   Rolling Maximal Assist to Rt.;Maximum Assist to Lt.   Skilled Intervention Sequencing;Tactile Cuing;Verbal Cuing;Facilitation   Functional Mobility   Sit to Stand Unable to Participate   Bed, Chair, Wheelchair Transfer Unable to Participate   How much difficulty does the patient currently have...   Turning over in bed (including adjusting bedclothes, sheets and blankets)? 1   Sitting down on and standing up from a chair with arms (e.g., wheelchair, bedside commode, etc.) 1   Moving from lying on back to sitting on the side of the bed? 1   How much help from another person does the patient currently need...   Moving to and from a bed to a chair (including a wheelchair)? 1   Need to walk in a hospital room? 1   Climbing 3-5 steps with a railing? 1   6 clicks Mobility Score 6   Short Term Goals    Short Term Goal # 1 pt will be able to complete supine<>Sitting with mod assist in 6tx in order to progress   Goal Outcome # 1 goal not met   Short Term Goal # 2 pt will be able to sit EOB with fair- balance in 6tx in order to decrease fall risk   Goal Outcome # 2 Goal not met   Short Term Goal  # 3 pt will be able to complete bed<>chair transfer with max assist in 6tx in order to progress   Goal Outcome # 3 Goal not met   Anticipated Discharge Equipment and Recommendations   DC Equipment Recommendations Unable to determine at this time   Discharge Recommendations Recommend post-acute placement for additional physical therapy services prior to discharge home   Interdisciplinary Plan of Care Collaboration   IDT Collaboration with  Nursing   Patient Position at End of Therapy In Bed;Call Light within Reach   Collaboration Comments RN present during session   Session Information   Date / Session Number  9/2-2(2/3, 9/5)

## 2022-09-02 NOTE — CARE PLAN
Problem: Fall Risk  Goal: Patient will remain free from falls  Outcome: Progressing     Problem: Hemodynamics  Goal: Patient's hemodynamics, fluid balance and neurologic status will be stable or improve  Outcome: Progressing     Problem: Nutrition  Goal: Enteral nutrition will be maintained or improve  Outcome: Progressing     Problem: Urinary Elimination  Goal: Establish and maintain regular urinary output  Outcome: Progressing     Problem: Mobility  Goal: Patient's capacity to carry out activities will improve  Outcome: Progressing     Problem: Mobility  Goal: Patient's capacity to carry out activities will improve  Outcome: Progressing     Problem: Self Care  Goal: Patient will have the ability to perform ADLs independently or with assistance (bathe, groom, dress, toilet and feed)  Outcome: Progressing     Problem: Infection - Standard  Goal: Patient will remain free from infection  Outcome: Progressing     Problem: Pain - Standard  Goal: Alleviation of pain or a reduction in pain to the patient’s comfort goal  Outcome: Progressing     Problem: Safety - Medical Restraint  Goal: Remains free of injury from restraints (Restraint for Interference with Medical Device)  Outcome: Progressing  Goal: Free from restraint(s) (Restraint for Interference with Medical Device)  Outcome: Progressing   The patient is Watcher - Medium risk of patient condition declining or worsening    Shift Goals  Clinical Goals: mobility, vent weaning  Patient Goals: alan  Family Goals: alan    Progress made toward(s) clinical / shift goals:  yes      Patient is not progressing towards the following goals:

## 2022-09-02 NOTE — CARE PLAN
The patient is Stable - Low risk of patient condition declining or worsening    Shift Goals  Clinical Goals: mobility, extubate  Patient Goals: drink soda  Family Goals: alan      Problem: Knowledge Deficit - Standard  Goal: Patient and family/care givers will demonstrate understanding of plan of care, disease process/condition, diagnostic tests and medications  Outcome: Progressing     Problem: Skin Integrity  Goal: Skin integrity is maintained or improved  Outcome: Progressing     Problem: Communication  Goal: The ability to communicate needs accurately and effectively will improve  Outcome: Progressing     Problem: Respiratory  Goal: Patient will achieve/maintain optimum respiratory ventilation and gas exchange  Outcome: Progressing     Problem: Mobility  Goal: Patient's capacity to carry out activities will improve  Outcome: Progressing     Problem: Pain - Standard  Goal: Alleviation of pain or a reduction in pain to the patient’s comfort goal  Outcome: Progressing

## 2022-09-02 NOTE — THERAPY
"Speech Language Pathology   Clinical Swallow Evaluation     Patient Name: Fouzia Santamaria  AGE:  61 y.o., SEX:  female  Medical Record #: [id number]  Today's Date: 2022     Precautions  Precautions: Fall Risk, Swallow Precautions ( See Comments)  Comments: abdominal binder on with mobilty    HPI: Pt is 61 y.o. female admitted with aortoiliac occlusive disease s/p vascular repair for occluded aorta and had subsequent multifocal strokes following repair and respiratory failure requiring intubation. Intubated -. No Hx SLP in [facility], however aphasia at baseline. S/p Bilateral renal artery eversion endarterectomies .    CMHx: Peripheral arterial occlusive disease, IVON, acute post-op respiratory failure  PMHx: L CVA, HTN, CAD, COPD, hx smoking 30 years    CXR 22:  \"1.  Hazy retrocardiac and left lung base atelectasis or infiltrate, somewhat increased since prior study.  2.  Trace left pleural effusion  3.  Atherosclerosis\"    Level of Consciousness: Alert, Awake  Affect/Behavior: Appropriate, Cooperative  Follows Directives: Yes - simple commands only  Orientation: Self,  - orientation assessed w/ Y/N. Oriented to city and state. Answered \"yes\" to both hospital and home. Oriented to season.   Hearing: Functional hearing  Vision: Functional vision      Prior Living Situation & Level of Function:  Pt w/ expressive aphasia at baseline. \"She knows what people are talking about, but she has a hard time getting things out\" per spouse at bedside. Spouse reports that pt sometimes has increased WOB w/ sequential sips.      Oral Mechanism Evaluation  Facial Symmetry: Equal  Facial Sensation: Equal  Labial Observations: WFL  Lingual Observations: Midline  Dentition: Edentulous  Comments:      Voice  Quality: WFL  Resonance: WFL  Intensity: Appropriate  Cough: Perceptually weak  Comments:      Current Method of Nutrition   Per RN and pt's family, pt pulled Cortrak today. Now NPO/NN.      Subjective  Pt awake and " alert, two familt members at bedside. Agreeable and cooperative w/ SLP evaluation, wanting PO. Of note - pt w/ aphasia at baseline. Followed one-step directions and answered Y/N questions to complete evaluation.       Assessment  Positioning: Solorio's (60-90 degrees)  Bolus Administration: SLP and Patient  Oxygen Requirements:  Room air, O2 available  Factor(s) Affecting Performance: None    Swallowing Trials  Ice: WFL  Thin Liquid (TN0): Impaired  Pureed (PU4): WFL  Regular (RG7): Impaired    Comments:  Pt w/ appropriate oral acceptance and mildly impaired bolus stripping from spoon. Appropriate oral clearance throughout. Pt w/ slightly increased mastication time, likely d/t edentulous state. Increased WOB noted and multiple swallows noted w/ solid consistencies, concerning for impaired swallowing efficiency. Pt demonstrated single and sequential sips via straw of thin liquids. Pt w/ exhale following each sip. Increased WOB w/ sequential sips thin during 3-oz water test. No cough/clear, change in O2 status, or change in vocal quality. Pt able to self-feed; however, pt did not demonstrate independent adherence to swallowing precautions as instructed by SLP. Concerning for impulsivity.        Clinical Impressions  Pt w/ limited s/sx of oropharyngeal dysphagia, including impaired stripping of bolus from spoon, increased WOB w/ mastication, and increased WOB w/ sequential sips. However, given swallow precautions, pt appropriate for a trial diet of minced and moist solids w/ single sips thin and high fullow up w/ SLP for tolerance. Given impulsivity, pt needs supervision during meals.  w/ good verbalized understanding of recommendations; pt can eat w/  or staff.     Recommendations  1.  TRIAL diet of minced and moist w/ thin liquids - SINGLE SIPS ONLY  2.  Instrumentation: Instrumental swallow study pending clinical progress, FEES or MBS given decline in status or increase in respiratory needs  3.   "Swallowing Instructions & Precautions:   Supervision: Direct supervision during meals, Monitor due to impulsive behavior, Encourage self-feeding  Positioning: Fully upright and midline during oral intake  Medication: Whole with puree, Crush with applesauce or puree, as appropriate  Strategies: Small bites/sips, Slow rate of intake  Oral Care: Q4h    Plan    Recommend Speech Therapy 3 times per week until therapy goals are met for the following treatments:  Dysphagia Training.    Discharge Recommendations: Other (Pending clinical progression)       Objective   09/02/22 1452   Initial Contact Note    Initial Contact Note  Order Received and Verified, Speech Therapy Evaluation in Progress with Full Report to Follow.   Precautions   Precautions Fall Risk;Swallow Precautions ( See Comments)   Comments abdominal binder on with mobilty   Vitals   O2 (LPM) 0   O2 Delivery Device Room air w/o2 available   Pain 0 - 10 Group   Therapist Pain Assessment Post Activity Pain Same as Prior to Activity;Nurse Notified;0   Prior Living Situation   Lives with - Patient's Self Care Capacity Adult Children;Spouse   Prior Level Of Function   Communication Impaired   Swallow Within Functional Limits   Dentition Edentulous   Patient's Primary Language English   Patient / Family Goals   Patient / Family Goal #1 \"Yes\" when asked if she wanted more to drink   Short Term Goals   Short Term Goal # 1 Pt will consume diet of MM5/TN0 given swallow precautions w/ no overt s/sx of aspiration and no worsening of respiratory status.   Education Group   Education Provided Dysphagia;Role of Speech Therapy   Dysphagia Patient Response Patient;Family;Significant Other;Acceptance;Explanation;Verbal Demonstration;Action Demonstration;Reinforcement Needed   Role of SLP Patient Response Patient;Family;Significant Other;Acceptance;Explanation;Verbal Demonstration;Reinforcement Needed   Additional Comments Pt understand possibility for instrumental swallowing " evaluation given changes and is agreeable.  w/ good recognition of precautions   Problem List   Problem List Dysphagia;Aphasia   Anticipated Discharge Needs   Discharge Recommendations Other  (Pending clinical progression)   Therapy Recommendations Upon DC Dysphagia Training;Patient / Family / Caregiver Education   Interdisciplinary Plan of Care Collaboration   IDT Collaboration with  Nursing;Family / Caregiver   Patient Position at End of Therapy In Bed;Bed Alarm On;Call Light within Reach;Family / Friend in Room  (All verbalized needs met)   Collaboration Comments RN aware of results and recommendations

## 2022-09-03 ENCOUNTER — APPOINTMENT (OUTPATIENT)
Dept: RADIOLOGY | Facility: MEDICAL CENTER | Age: 62
DRG: 270 | End: 2022-09-03
Attending: SURGERY
Payer: MEDICAID

## 2022-09-03 PROBLEM — I95.89 HYPOTENSION DUE TO HYPOVOLEMIA: Status: RESOLVED | Noted: 2022-08-26 | Resolved: 2022-09-03

## 2022-09-03 PROBLEM — E87.20 LACTIC ACIDOSIS: Status: RESOLVED | Noted: 2022-08-26 | Resolved: 2022-09-03

## 2022-09-03 PROBLEM — E86.1 HYPOTENSION DUE TO HYPOVOLEMIA: Status: RESOLVED | Noted: 2022-08-26 | Resolved: 2022-09-03

## 2022-09-03 LAB
ALBUMIN SERPL BCP-MCNC: 2 G/DL (ref 3.2–4.9)
ALBUMIN/GLOB SERPL: 0.6 G/DL
ALP SERPL-CCNC: 87 U/L (ref 30–99)
ALT SERPL-CCNC: <5 U/L (ref 2–50)
ANION GAP SERPL CALC-SCNC: 8 MMOL/L (ref 7–16)
AST SERPL-CCNC: 28 U/L (ref 12–45)
BASOPHILS # BLD AUTO: 0.3 % (ref 0–1.8)
BASOPHILS # BLD: 0.05 K/UL (ref 0–0.12)
BILIRUB SERPL-MCNC: 1.1 MG/DL (ref 0.1–1.5)
BUN SERPL-MCNC: 48 MG/DL (ref 8–22)
CALCIUM SERPL-MCNC: 7.8 MG/DL (ref 8.5–10.5)
CHLORIDE SERPL-SCNC: 104 MMOL/L (ref 96–112)
CO2 SERPL-SCNC: 28 MMOL/L (ref 20–33)
CREAT SERPL-MCNC: 2.46 MG/DL (ref 0.5–1.4)
EOSINOPHIL # BLD AUTO: 0.33 K/UL (ref 0–0.51)
EOSINOPHIL NFR BLD: 2.1 % (ref 0–6.9)
ERYTHROCYTE [DISTWIDTH] IN BLOOD BY AUTOMATED COUNT: 54 FL (ref 35.9–50)
GFR SERPLBLD CREATININE-BSD FMLA CKD-EPI: 22 ML/MIN/1.73 M 2
GLOBULIN SER CALC-MCNC: 3.1 G/DL (ref 1.9–3.5)
GLUCOSE BLD STRIP.AUTO-MCNC: 92 MG/DL (ref 65–99)
GLUCOSE SERPL-MCNC: 100 MG/DL (ref 65–99)
HCT VFR BLD AUTO: 23.3 % (ref 37–47)
HGB BLD-MCNC: 7.7 G/DL (ref 12–16)
IMM GRANULOCYTES # BLD AUTO: 0.65 K/UL (ref 0–0.11)
IMM GRANULOCYTES NFR BLD AUTO: 4.1 % (ref 0–0.9)
LYMPHOCYTES # BLD AUTO: 1.51 K/UL (ref 1–4.8)
LYMPHOCYTES NFR BLD: 9.6 % (ref 22–41)
MCH RBC QN AUTO: 30.8 PG (ref 27–33)
MCHC RBC AUTO-ENTMCNC: 33 G/DL (ref 33.6–35)
MCV RBC AUTO: 93.2 FL (ref 81.4–97.8)
MONOCYTES # BLD AUTO: 1.71 K/UL (ref 0–0.85)
MONOCYTES NFR BLD AUTO: 10.9 % (ref 0–13.4)
NEUTROPHILS # BLD AUTO: 11.49 K/UL (ref 2–7.15)
NEUTROPHILS NFR BLD: 73 % (ref 44–72)
NRBC # BLD AUTO: 0.02 K/UL
NRBC BLD-RTO: 0.1 /100 WBC
PHOSPHATE SERPL-MCNC: 2.7 MG/DL (ref 2.5–4.5)
PLATELET # BLD AUTO: 164 K/UL (ref 164–446)
PMV BLD AUTO: 11.1 FL (ref 9–12.9)
POTASSIUM SERPL-SCNC: 4.1 MMOL/L (ref 3.6–5.5)
PROT SERPL-MCNC: 5.1 G/DL (ref 6–8.2)
RBC # BLD AUTO: 2.5 M/UL (ref 4.2–5.4)
SODIUM SERPL-SCNC: 140 MMOL/L (ref 135–145)
WBC # BLD AUTO: 15.7 K/UL (ref 4.8–10.8)

## 2022-09-03 PROCEDURE — 700102 HCHG RX REV CODE 250 W/ 637 OVERRIDE(OP): Performed by: SURGERY

## 2022-09-03 PROCEDURE — A9270 NON-COVERED ITEM OR SERVICE: HCPCS | Performed by: SURGERY

## 2022-09-03 PROCEDURE — 84100 ASSAY OF PHOSPHORUS: CPT

## 2022-09-03 PROCEDURE — 700102 HCHG RX REV CODE 250 W/ 637 OVERRIDE(OP): Performed by: HOSPITALIST

## 2022-09-03 PROCEDURE — A9270 NON-COVERED ITEM OR SERVICE: HCPCS | Performed by: HOSPITALIST

## 2022-09-03 PROCEDURE — 82962 GLUCOSE BLOOD TEST: CPT

## 2022-09-03 PROCEDURE — 80053 COMPREHEN METABOLIC PANEL: CPT

## 2022-09-03 PROCEDURE — 85025 COMPLETE CBC W/AUTO DIFF WBC: CPT

## 2022-09-03 PROCEDURE — 770001 HCHG ROOM/CARE - MED/SURG/GYN PRIV*

## 2022-09-03 PROCEDURE — 99232 SBSQ HOSP IP/OBS MODERATE 35: CPT | Performed by: SURGERY

## 2022-09-03 PROCEDURE — 99233 SBSQ HOSP IP/OBS HIGH 50: CPT | Performed by: HOSPITALIST

## 2022-09-03 PROCEDURE — 71045 X-RAY EXAM CHEST 1 VIEW: CPT

## 2022-09-03 PROCEDURE — 700105 HCHG RX REV CODE 258: Performed by: SURGERY

## 2022-09-03 PROCEDURE — 700111 HCHG RX REV CODE 636 W/ 250 OVERRIDE (IP): Performed by: SURGERY

## 2022-09-03 PROCEDURE — 99233 SBSQ HOSP IP/OBS HIGH 50: CPT | Performed by: NURSE PRACTITIONER

## 2022-09-03 RX ORDER — PRAMIPEXOLE DIHYDROCHLORIDE 0.12 MG/1
0.12 TABLET ORAL 3 TIMES DAILY
Status: DISCONTINUED | OUTPATIENT
Start: 2022-09-03 | End: 2022-09-17 | Stop reason: HOSPADM

## 2022-09-03 RX ORDER — OXYCODONE HYDROCHLORIDE 5 MG/1
5 TABLET ORAL EVERY 4 HOURS PRN
Status: DISCONTINUED | OUTPATIENT
Start: 2022-09-03 | End: 2022-09-17 | Stop reason: HOSPADM

## 2022-09-03 RX ORDER — CLOPIDOGREL BISULFATE 75 MG/1
75 TABLET ORAL DAILY
Status: DISCONTINUED | OUTPATIENT
Start: 2022-09-04 | End: 2022-09-17 | Stop reason: HOSPADM

## 2022-09-03 RX ORDER — ATORVASTATIN CALCIUM 80 MG/1
80 TABLET, FILM COATED ORAL NIGHTLY
Status: DISCONTINUED | OUTPATIENT
Start: 2022-09-03 | End: 2022-09-17 | Stop reason: HOSPADM

## 2022-09-03 RX ORDER — POLYETHYLENE GLYCOL 3350 17 G/17G
1 POWDER, FOR SOLUTION ORAL 2 TIMES DAILY
Status: DISCONTINUED | OUTPATIENT
Start: 2022-09-03 | End: 2022-09-17 | Stop reason: HOSPADM

## 2022-09-03 RX ORDER — EZETIMIBE 10 MG/1
10 TABLET ORAL DAILY
Status: DISCONTINUED | OUTPATIENT
Start: 2022-09-04 | End: 2022-09-17 | Stop reason: HOSPADM

## 2022-09-03 RX ORDER — DOCUSATE SODIUM 100 MG/1
100 CAPSULE, LIQUID FILLED ORAL 2 TIMES DAILY
Status: DISCONTINUED | OUTPATIENT
Start: 2022-09-03 | End: 2022-09-17 | Stop reason: HOSPADM

## 2022-09-03 RX ORDER — AMOXICILLIN 250 MG
1 CAPSULE ORAL NIGHTLY
Status: DISCONTINUED | OUTPATIENT
Start: 2022-09-03 | End: 2022-09-17 | Stop reason: HOSPADM

## 2022-09-03 RX ORDER — AMOXICILLIN 250 MG
1 CAPSULE ORAL
Status: DISCONTINUED | OUTPATIENT
Start: 2022-09-03 | End: 2022-09-17 | Stop reason: HOSPADM

## 2022-09-03 RX ORDER — ASPIRIN 81 MG/1
81 TABLET, CHEWABLE ORAL DAILY
Status: DISCONTINUED | OUTPATIENT
Start: 2022-09-04 | End: 2022-09-17 | Stop reason: HOSPADM

## 2022-09-03 RX ORDER — ACETAMINOPHEN 325 MG/1
650 TABLET ORAL EVERY 6 HOURS PRN
Status: DISCONTINUED | OUTPATIENT
Start: 2022-09-03 | End: 2022-09-13

## 2022-09-03 RX ORDER — PROMETHAZINE HYDROCHLORIDE 25 MG/1
12.5-25 TABLET ORAL EVERY 4 HOURS PRN
Status: DISCONTINUED | OUTPATIENT
Start: 2022-09-03 | End: 2022-09-17 | Stop reason: HOSPADM

## 2022-09-03 RX ADMIN — OXYCODONE 5 MG: 5 TABLET ORAL at 14:58

## 2022-09-03 RX ADMIN — PRAMIPEXOLE DIHYDROCHLORIDE 0.12 MG: 0.12 TABLET ORAL at 13:14

## 2022-09-03 RX ADMIN — HYDROMORPHONE HYDROCHLORIDE 0.5 MG: 1 INJECTION, SOLUTION INTRAMUSCULAR; INTRAVENOUS; SUBCUTANEOUS at 04:01

## 2022-09-03 RX ADMIN — DOCUSATE SODIUM 50 MG AND SENNOSIDES 8.6 MG 1 TABLET: 8.6; 5 TABLET, FILM COATED ORAL at 20:21

## 2022-09-03 RX ADMIN — ASPIRIN 81 MG 81 MG: 81 TABLET ORAL at 05:53

## 2022-09-03 RX ADMIN — HYDROMORPHONE HYDROCHLORIDE 0.5 MG: 1 INJECTION, SOLUTION INTRAMUSCULAR; INTRAVENOUS; SUBCUTANEOUS at 01:09

## 2022-09-03 RX ADMIN — POLYETHYLENE GLYCOL 3350 1 PACKET: 17 POWDER, FOR SOLUTION ORAL at 18:26

## 2022-09-03 RX ADMIN — SODIUM CHLORIDE, POTASSIUM CHLORIDE, SODIUM LACTATE AND CALCIUM CHLORIDE: 600; 310; 30; 20 INJECTION, SOLUTION INTRAVENOUS at 04:30

## 2022-09-03 RX ADMIN — OXYCODONE 5 MG: 5 TABLET ORAL at 08:08

## 2022-09-03 RX ADMIN — OXYCODONE 5 MG: 5 TABLET ORAL at 20:26

## 2022-09-03 RX ADMIN — EZETIMIBE 10 MG: 10 TABLET ORAL at 05:53

## 2022-09-03 RX ADMIN — HYDROMORPHONE HYDROCHLORIDE 0.5 MG: 1 INJECTION, SOLUTION INTRAMUSCULAR; INTRAVENOUS; SUBCUTANEOUS at 05:54

## 2022-09-03 RX ADMIN — METOPROLOL TARTRATE 25 MG: 25 TABLET, FILM COATED ORAL at 18:25

## 2022-09-03 RX ADMIN — ATORVASTATIN CALCIUM 80 MG: 80 TABLET, FILM COATED ORAL at 20:21

## 2022-09-03 RX ADMIN — CLOPIDOGREL BISULFATE 75 MG: 75 TABLET ORAL at 05:53

## 2022-09-03 RX ADMIN — OXYCODONE 5 MG: 5 TABLET ORAL at 16:19

## 2022-09-03 RX ADMIN — PRAMIPEXOLE DIHYDROCHLORIDE 0.12 MG: 0.12 TABLET ORAL at 18:26

## 2022-09-03 ASSESSMENT — ENCOUNTER SYMPTOMS
FEVER: 0
BRUISES/BLEEDS EASILY: 0
FLANK PAIN: 0
RESPIRATORY NEGATIVE: 1
HEADACHES: 0
SHORTNESS OF BREATH: 0
CARDIOVASCULAR NEGATIVE: 1
HEARTBURN: 0
EYES NEGATIVE: 1
FOCAL WEAKNESS: 1
NAUSEA: 0
WHEEZING: 0
ABDOMINAL PAIN: 1
WEIGHT LOSS: 1
SPEECH CHANGE: 0
NECK PAIN: 0
NERVOUS/ANXIOUS: 1
PALPITATIONS: 0
CHILLS: 0
COUGH: 0
WEAKNESS: 1
POLYDIPSIA: 0
DIZZINESS: 0
SPEECH CHANGE: 1
DEPRESSION: 0
MUSCULOSKELETAL NEGATIVE: 1
VOMITING: 0
BACK PAIN: 0

## 2022-09-03 ASSESSMENT — PAIN SCALES - WONG BAKER
WONGBAKER_NUMERICALRESPONSE: HURTS JUST A LITTLE BIT
WONGBAKER_NUMERICALRESPONSE: HURTS A LITTLE MORE

## 2022-09-03 ASSESSMENT — FIBROSIS 4 INDEX: FIB4 SCORE: 5.26

## 2022-09-03 NOTE — PROGRESS NOTES
Trauma / Surgical Daily Progress Note    Date of Service  9/3/2022    Chief Complaint  61 y.o. female admitted 8/23/2022 with aortoiliac occlusive disease with postop ventilator dependent respiratory failure.    POD # 8 Bilateral renal artery eversion endarterectomies, aortobifemoral bypass using 14 x 7 bifurcated dacryon.  POD # 8 Emergent return to surgery for postoperative bleeding, exploratory laparotomy, control of postoperative bleeding, abdominal packing and ABThera placement.  POD # 6 Abdominal washout with removal of laparotomy pads, delayed closure abdomen.    Interval Events  Tolerating extubation and oral diet  Adequate pain control  WBC trend up, afebrile, nontoxic appearance  Prevena dressing removed with Dr. Bauer, wound ok, plan to replace by vascular surgery    - DC williamson  - Obtain PIV and remove central line  - Medicine consult to assume primary management  - Dr. Siu for vascular surgery  - Transfer to med/surg    Review of Systems  Review of Systems   Constitutional:  Negative for chills and fever.   Respiratory:  Negative for shortness of breath.    Cardiovascular:  Negative for chest pain.   Gastrointestinal:  Positive for abdominal pain (incisional). Negative for nausea and vomiting.   Skin:  Negative for rash.   Neurological:  Negative for speech change.      Vital Signs  Pulse:  [] 113  Resp:  [9-38] 11  BP: (117-196)/() 148/101  SpO2:  [95 %-99 %] 96 %    Physical Exam  Physical Exam  Vitals and nursing note reviewed.   Constitutional:       General: She is awake. She is not in acute distress.     Appearance: She is well-developed. She is ill-appearing. She is not toxic-appearing or diaphoretic.   Cardiovascular:      Pulses: Normal pulses.   Pulmonary:      Effort: Pulmonary effort is normal. No respiratory distress.   Abdominal:      Comments: Prevena removed, incision c/d/i   Genitourinary:     Comments: Williamson in place  Musculoskeletal:      Cervical back: Neck supple.    Skin:     General: Skin is warm and dry.      Coloration: Skin is pale.   Neurological:      Mental Status: She is alert and oriented to person, place, and time.   Psychiatric:         Mood and Affect: Mood normal.         Behavior: Behavior normal. Behavior is cooperative.       Laboratory  Recent Results (from the past 24 hour(s))   POCT glucose device results    Collection Time: 09/02/22  4:51 PM   Result Value Ref Range    POC Glucose, Blood 138 (H) 65 - 99 mg/dL   POCT glucose device results    Collection Time: 09/02/22  9:23 PM   Result Value Ref Range    POC Glucose, Blood 117 (H) 65 - 99 mg/dL   CBC with Differential: Tomorrow AM    Collection Time: 09/03/22  5:35 AM   Result Value Ref Range    WBC 15.7 (H) 4.8 - 10.8 K/uL    RBC 2.50 (L) 4.20 - 5.40 M/uL    Hemoglobin 7.7 (L) 12.0 - 16.0 g/dL    Hematocrit 23.3 (L) 37.0 - 47.0 %    MCV 93.2 81.4 - 97.8 fL    MCH 30.8 27.0 - 33.0 pg    MCHC 33.0 (L) 33.6 - 35.0 g/dL    RDW 54.0 (H) 35.9 - 50.0 fL    Platelet Count 164 164 - 446 K/uL    MPV 11.1 9.0 - 12.9 fL    Neutrophils-Polys 73.00 (H) 44.00 - 72.00 %    Lymphocytes 9.60 (L) 22.00 - 41.00 %    Monocytes 10.90 0.00 - 13.40 %    Eosinophils 2.10 0.00 - 6.90 %    Basophils 0.30 0.00 - 1.80 %    Immature Granulocytes 4.10 (H) 0.00 - 0.90 %    Nucleated RBC 0.10 /100 WBC    Neutrophils (Absolute) 11.49 (H) 2.00 - 7.15 K/uL    Lymphs (Absolute) 1.51 1.00 - 4.80 K/uL    Monos (Absolute) 1.71 (H) 0.00 - 0.85 K/uL    Eos (Absolute) 0.33 0.00 - 0.51 K/uL    Baso (Absolute) 0.05 0.00 - 0.12 K/uL    Immature Granulocytes (abs) 0.65 (H) 0.00 - 0.11 K/uL    NRBC (Absolute) 0.02 K/uL   Comp Metabolic Panel (CMP): Tomorrow AM    Collection Time: 09/03/22  5:35 AM   Result Value Ref Range    Sodium 140 135 - 145 mmol/L    Potassium 4.1 3.6 - 5.5 mmol/L    Chloride 104 96 - 112 mmol/L    Co2 28 20 - 33 mmol/L    Anion Gap 8.0 7.0 - 16.0    Glucose 100 (H) 65 - 99 mg/dL    Bun 48 (H) 8 - 22 mg/dL    Creatinine 2.46 (H)  0.50 - 1.40 mg/dL    Calcium 7.8 (L) 8.5 - 10.5 mg/dL    AST(SGOT) 28 12 - 45 U/L    ALT(SGPT) <5 2 - 50 U/L    Alkaline Phosphatase 87 30 - 99 U/L    Total Bilirubin 1.1 0.1 - 1.5 mg/dL    Albumin 2.0 (L) 3.2 - 4.9 g/dL    Total Protein 5.1 (L) 6.0 - 8.2 g/dL    Globulin 3.1 1.9 - 3.5 g/dL    A-G Ratio 0.6 g/dL   PHOSPHORUS    Collection Time: 09/03/22  5:35 AM   Result Value Ref Range    Phosphorus 2.7 2.5 - 4.5 mg/dL   ESTIMATED GFR    Collection Time: 09/03/22  5:35 AM   Result Value Ref Range    GFR (CKD-EPI) 22 (A) >60 mL/min/1.73 m 2   POCT glucose device results    Collection Time: 09/03/22  7:57 AM   Result Value Ref Range    POC Glucose, Blood 92 65 - 99 mg/dL       Fluids    Intake/Output Summary (Last 24 hours) at 9/3/2022 1216  Last data filed at 9/3/2022 1000  Gross per 24 hour   Intake 1270 ml   Output 2035 ml   Net -765 ml       Core Measures & Quality Metrics  Labs reviewed, Medications reviewed and Radiology images reviewed  Williamson Catheter: Trial williamson removal.  : Remove central line.    DVT: ASA/Plavix.  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 Take back for post op bleeding-packed.  8/28 Removed packing and closed. Prevena dressing.  Young Bauer MD. Vascular Surgery.    Oropharyngeal dysphagia- (present on admission)  Assessment & Plan  Cortrak with TF while intubated.  9/2 Swallow eval completed, minced and moist/thin liquids initiated.  SLP following.    Hypomagnesemia  Assessment & Plan  Magnesium low - replace and follow.    Stroke (HCC)- (present on admission)  Assessment & Plan  8/28 Right sided deficits.  CTA consistent with left sided infarcts.  8/29 Recommend:  - Plan for carotid dopplers on outpatient basis to complete embolic workup.  - May consider outpatient long-term cardiac  monitoring.  - No need for high intensity statin, on zetia, LDL at goal < 70.  - Restart ASA 81mg daily, plavix 75mg daily when able.  8/29 Aspirin restarted.  9/1 Plavix restarted.  Neurology consulted- Dr. Terry (sign off 8/29).    IVON (acute kidney injury) (HCC)- (present on admission)  Assessment & Plan  Elevated creatinine with good urine output.  HD not indicated.  Renal dose medications. Avoid nephrotoxic medications.  Serial laboratory studies.    Acute blood loss anemia  Assessment & Plan  8/29 Iron studies low - replace per protocol.  Continue to trend closely.  Transfuse 1 unit PRBC's for hemoglobin less than 7.    CAD (coronary artery disease)- (present on admission)  Assessment & Plan  7/3/22 MI with PCI she received 3 drug-eluting stents to the LAD.  On DAPT prior to take-back.  Transfuse to maintain hemoglobin 9.  Wet read of intra-operative KLEBER during index procedure with EF 30-40% and anterior wall motion abnormality consistent with area of infarct from recent MI.  8/29 Restart ASA. KLEBER Moderately depressed LV EF 30-35% visually estimated. Akinesis of anterior and anteroseptal walls.   Hypokinesis of anterolateral and inferoseptal segments. Normal wall motion of inferior and inferolateral walls. Preserved RV systolic.  9/1 Plavix restarted.    Acute postoperative respiratory failure (HCC)  Assessment & Plan  Remained intubated post-operatively.  Continue full mechanical ventilatory support. Ventilator bundle and Trauma weaning protocol.  9/2 Extubated.  Respiratory protocol.    Coagulopathy (HCC)  Assessment & Plan  Post-take back TEG with low MA, functional fibrinogen, and high LY30  Given Cryo, Platelets, and TXA  Output from ABThera decreasing  Trending serial labs  Repeat TEG improved      Discussed patient condition with RN, RT, Pharmacy, Dietary, , , Patient, and general surgery. Dr. Shen

## 2022-09-03 NOTE — CARE PLAN
Problem: Knowledge Deficit - Standard  Goal: Patient and family/care givers will demonstrate understanding of plan of care, disease process/condition, diagnostic tests and medications  Outcome: Not Progressing     Problem: Fall Risk  Goal: Patient will remain free from falls  Outcome: Not Progressing     Problem: Psychosocial  Goal: Patient's ability to verbalize feelings about condition will improve  Outcome: Not Progressing  Goal: Patient's ability to re-evaluate and adapt role responsibilities will improve  Outcome: Not Progressing  Goal: Patient and family will demonstrate ability to cope with life altering diagnosis and/or procedure  Outcome: Not Progressing     Problem: Communication  Goal: The ability to communicate needs accurately and effectively will improve  Outcome: Not Progressing     Problem: Self Care  Goal: Patient will have the ability to perform ADLs independently or with assistance (bathe, groom, dress, toilet and feed)  Outcome: Not Progressing     Problem: Pain - Standard  Goal: Alleviation of pain or a reduction in pain to the patient’s comfort goal  Outcome: Not Progressing   The patient is Stable - Low risk of patient condition declining or worsening    Shift Goals  Clinical Goals: mobility, vent weaning  Patient Goals: rest, sleep  Family Goals: alan    Progress made toward(s) clinical / shift goals:    Problem: Skin Integrity  Goal: Skin integrity is maintained or improved  Outcome: Progressing     Problem: Psychosocial  Goal: Patient's level of anxiety will decrease  Outcome: Progressing  Goal: Spiritual and cultural needs incorporated into hospitalization  Outcome: Progressing     Problem: Discharge Barriers/Planning  Goal: Patient's continuum of care needs are met  Outcome: Progressing     Problem: Hemodynamics  Goal: Patient's hemodynamics, fluid balance and neurologic status will be stable or improve  Outcome: Progressing     Problem: Respiratory  Goal: Patient will achieve/maintain  optimum respiratory ventilation and gas exchange  Outcome: Progressing     Problem: Fluid Volume  Goal: Fluid volume balance will be maintained  Outcome: Progressing     Problem: Nutrition  Goal: Patient's nutritional and fluid intake will be adequate or improve  Outcome: Progressing     Problem: Urinary Elimination  Goal: Establish and maintain regular urinary output  Outcome: Progressing     Problem: Bowel Elimination  Goal: Establish and maintain regular bowel function  Outcome: Progressing     Problem: Mobility  Goal: Patient's capacity to carry out activities will improve  Outcome: Progressing     Problem: Infection - Standard  Goal: Patient will remain free from infection  Outcome: Progressing       Patient is not progressing towards the following goals:      Problem: Knowledge Deficit - Standard  Goal: Patient and family/care givers will demonstrate understanding of plan of care, disease process/condition, diagnostic tests and medications  Outcome: Not Progressing     Problem: Fall Risk  Goal: Patient will remain free from falls  Outcome: Not Progressing     Problem: Psychosocial  Goal: Patient's ability to verbalize feelings about condition will improve  Outcome: Not Progressing  Goal: Patient's ability to re-evaluate and adapt role responsibilities will improve  Outcome: Not Progressing  Goal: Patient and family will demonstrate ability to cope with life altering diagnosis and/or procedure  Outcome: Not Progressing     Problem: Communication  Goal: The ability to communicate needs accurately and effectively will improve  Outcome: Not Progressing     Problem: Self Care  Goal: Patient will have the ability to perform ADLs independently or with assistance (bathe, groom, dress, toilet and feed)  Outcome: Not Progressing     Problem: Pain - Standard  Goal: Alleviation of pain or a reduction in pain to the patient’s comfort goal  Outcome: Not Progressing

## 2022-09-03 NOTE — CARE PLAN
Problem: Knowledge Deficit - Standard  Goal: Patient and family/care givers will demonstrate understanding of plan of care, disease process/condition, diagnostic tests and medications  Outcome: Progressing     Problem: Pain - Standard  Goal: Alleviation of pain or a reduction in pain to the patient’s comfort goal  Outcome: Progressing   The patient is Stable - Low risk of patient condition declining or worsening    Shift Goals  Clinical Goals: Hemodynamic stability  Patient Goals: Pain control  Family Goals: AFIA

## 2022-09-03 NOTE — PROGRESS NOTES
4 Eyes Skin Assessment Completed by KATIA Lopez and KATIA Bradshaw.    Head WDL  Ears WDL  Nose WDL  Mouth WDL  Neck Bruising  Breast/Chest: WDL  Shoulder Blades WDL  Spine WDL  (R) Arm/Elbow/Hand Bruising  (L) Arm/Elbow/Hand Bruising  Abdomen Bruising and Incision  Groin Bruising and Incision  Scrotum/Coccyx/Buttocks Scar  (R) Leg Swelling  (L) Leg Swelling  (R) Heel/Foot/Toe Swelling  (L) Heel/Foot/Toe Swelling          Devices In Places: Purewick, Woundvac      Interventions In Place Barrier Cream    Possible Skin Injury No    Pictures Uploaded Into Epic N/A  Wound Consult Placed N/A  RN Wound Prevention Protocol Ordered No

## 2022-09-03 NOTE — PROGRESS NOTES
"  Trauma / Surgical Daily Progress Note    Date of Service  9/2/2022    Chief Complaint  61 y.o. female admitted 8/23/2022 with aortoiliac occlusive disease with postop ventilator dependent respiratory failure    Interval Events  CRITICAL CARE DECISION MAKING:    Patient examined and discussed with team at bedside.    No signs of neurovascular compromise.:  Continue vascular checks    Addressed pulmonary hygiene concerns as well as oxygenation/ventilation.:  Awake with good weaning parameters today.  Extubated with team at the bedside.  Initial increased work of breathing this mitigated by aggressive pulmonary hygiene.  Patient on nasal cannula at this time.  Continue I-S.  Labs reviewed, electrolytes addressed, renal function assessed  Reviewed nutrition strategies, recent indices: Core track feeding, will try clears  Addressed GI prophylaxis and bowel frequency: Pepcid and bowel protocol  Assessed/discussed/titrated analgesics and need for sedatives: Hold sedatives, titrating analgesics  Addressed DVT prophylaxis: Lovenox and SCDs  Addressed line days, williamson catheter days, access needs: DC A-line, try to establish peripheral IV access  Addressed family and discharge concerns      Review of Systems  Review of Systems   Respiratory:  Positive for cough. Negative for chest tightness, shortness of breath, wheezing and stridor.    Gastrointestinal:  Positive for abdominal pain. Negative for abdominal distention, constipation and nausea.   Musculoskeletal:  Positive for myalgias. Negative for arthralgias, back pain and neck pain.   Neurological:  Negative for dizziness, weakness, numbness and headaches.      Vital Signs for last 24 hours  Pulse:  [] 105  Resp:  [11-40] 20  BP: (133-196)/() 196/104  SpO2:  [86 %-100 %] 99 %    Hemodynamic parameters for last 24 hours   BP (!) 196/104   Pulse (!) 105   Temp 37.2 °C (99 °F) (Temporal)   Resp 20   Ht 1.651 m (5' 5\")   Wt 65.2 kg (143 lb 11.8 oz)   SpO2 99% "   BMI 23.92 kg/m²     Respiratory Data     Respiration: 20, Pulse Oximetry: 99 %     Work Of Breathing / Effort: Within Normal Limits  RUL Breath Sounds: Clear, RML Breath Sounds: Diminished, RLL Breath Sounds: Diminished, ZAINAB Breath Sounds: Clear, LLL Breath Sounds: Diminished    Physical Exam  Physical Exam  Vitals and nursing note reviewed.   HENT:      Head: Normocephalic and atraumatic.      Nose: Nose normal.      Mouth/Throat:      Mouth: Mucous membranes are moist.      Pharynx: Oropharynx is clear.   Eyes:      Extraocular Movements: Extraocular movements intact.      Conjunctiva/sclera: Conjunctivae normal.      Pupils: Pupils are equal, round, and reactive to light.   Cardiovascular:      Rate and Rhythm: Normal rate and regular rhythm.      Pulses: Normal pulses.   Abdominal:      General: Abdomen is flat.      Palpations: Abdomen is soft.      Comments: Dressing CDI   Genitourinary:     Comments: Guerrero  Musculoskeletal:      Cervical back: Normal range of motion and neck supple.      Right lower leg: Edema present.      Left lower leg: Edema present.      Comments: Dressings CDI   Skin:     General: Skin is warm and dry.   Neurological:      General: No focal deficit present.      Mental Status: She is alert and oriented to person, place, and time.      Sensory: No sensory deficit.      Motor: No weakness.       Laboratory  Recent Results (from the past 24 hour(s))   POCT glucose device results    Collection Time: 09/02/22 12:20 AM   Result Value Ref Range    POC Glucose, Blood 142 (H) 65 - 99 mg/dL   CBC with Differential: Tomorrow AM    Collection Time: 09/02/22  4:40 AM   Result Value Ref Range    WBC 13.8 (H) 4.8 - 10.8 K/uL    RBC 2.89 (L) 4.20 - 5.40 M/uL    Hemoglobin 8.7 (L) 12.0 - 16.0 g/dL    Hematocrit 26.6 (L) 37.0 - 47.0 %    MCV 92.0 81.4 - 97.8 fL    MCH 30.1 27.0 - 33.0 pg    MCHC 32.7 (L) 33.6 - 35.0 g/dL    RDW 52.8 (H) 35.9 - 50.0 fL    Platelet Count 146 (L) 164 - 446 K/uL    MPV  11.3 9.0 - 12.9 fL    Neutrophils-Polys 71.90 44.00 - 72.00 %    Lymphocytes 9.10 (L) 22.00 - 41.00 %    Monocytes 11.60 0.00 - 13.40 %    Eosinophils 2.20 0.00 - 6.90 %    Basophils 0.40 0.00 - 1.80 %    Immature Granulocytes 4.80 (H) 0.00 - 0.90 %    Nucleated RBC 0.20 /100 WBC    Neutrophils (Absolute) 9.94 (H) 2.00 - 7.15 K/uL    Lymphs (Absolute) 1.26 1.00 - 4.80 K/uL    Monos (Absolute) 1.60 (H) 0.00 - 0.85 K/uL    Eos (Absolute) 0.30 0.00 - 0.51 K/uL    Baso (Absolute) 0.06 0.00 - 0.12 K/uL    Immature Granulocytes (abs) 0.66 (H) 0.00 - 0.11 K/uL    NRBC (Absolute) 0.03 K/uL   Comp Metabolic Panel (CMP): Tomorrow AM    Collection Time: 09/02/22  4:40 AM   Result Value Ref Range    Sodium 141 135 - 145 mmol/L    Potassium 4.2 3.6 - 5.5 mmol/L    Chloride 104 96 - 112 mmol/L    Co2 27 20 - 33 mmol/L    Anion Gap 10.0 7.0 - 16.0    Glucose 166 (H) 65 - 99 mg/dL    Bun 53 (H) 8 - 22 mg/dL    Creatinine 2.82 (H) 0.50 - 1.40 mg/dL    Calcium 7.8 (L) 8.5 - 10.5 mg/dL    AST(SGOT) 31 12 - 45 U/L    ALT(SGPT) <5 2 - 50 U/L    Alkaline Phosphatase 93 30 - 99 U/L    Total Bilirubin 1.0 0.1 - 1.5 mg/dL    Albumin 1.9 (L) 3.2 - 4.9 g/dL    Total Protein 5.3 (L) 6.0 - 8.2 g/dL    Globulin 3.4 1.9 - 3.5 g/dL    A-G Ratio 0.6 g/dL   PHOSPHORUS    Collection Time: 09/02/22  4:40 AM   Result Value Ref Range    Phosphorus 3.1 2.5 - 4.5 mg/dL   ESTIMATED GFR    Collection Time: 09/02/22  4:40 AM   Result Value Ref Range    GFR (CKD-EPI) 19 (A) >60 mL/min/1.73 m 2   POCT glucose device results    Collection Time: 09/02/22  6:25 AM   Result Value Ref Range    POC Glucose, Blood 167 (H) 65 - 99 mg/dL   POCT glucose device results    Collection Time: 09/02/22 11:20 AM   Result Value Ref Range    POC Glucose, Blood 146 (H) 65 - 99 mg/dL   POCT glucose device results    Collection Time: 09/02/22  4:51 PM   Result Value Ref Range    POC Glucose, Blood 138 (H) 65 - 99 mg/dL       Fluids    Intake/Output Summary (Last 24 hours) at  9/2/2022 1850  Last data filed at 9/2/2022 1800  Gross per 24 hour   Intake 2250 ml   Output 2460 ml   Net -210 ml       Core Measures & Quality Metrics    Guerrero catheter: Critically Ill - Requiring Accurate Measurement of Urinary Output  Central line in place: Need for access and Vasopressors    DVT Prophylaxis: Enoxaparin (Lovenox) and Contraindicated - High bleeding risk  DVT prophylaxis - mechanical: SCDs  Ulcer prophylaxis: Yes    Assessed for rehab: Patient was assess for and/or received rehabilitation services during this hospitalization  RAP Score Total: 0  ETOH Screening    Assessment/Plan  Stroke (HCC)- (present on admission)  Assessment & Plan  8/28 Right sided deficits noted  CTA  consistent with left sided infarcts  Neurology consulted- Dr. Terry    Acute postoperative respiratory failure (HCC)  Assessment & Plan  Remained intubated post-operatively.  Continue full mechanical ventilatory support.   Ventilator bundle and Trauma weaning protocol.  9/1 not able to cooperate for weaning parameters  9/2 Extubated.  Respiratory protocol.    Occluded aorta ([facility])- (present on admission)  Assessment & Plan  8/26 Aorto-bifem with renal artery endarterectomy  Take back for post op bleeding-packed  8/28 removed packing and closed  Vascular Surgery - Dr. Siu    Hypomagnesemia  Assessment & Plan  Magnesium low - replace and follow.    Lactic acidosis  Assessment & Plan  Significantly elevated post-op  Decreasing with resuscitation  8/28 LA 2.7    IVON (acute kidney injury) ([facility])- (present on admission)  Assessment & Plan  8/28  Cr 1.88  8/30 2.75 - increasing UO  9/1 Cr 3.16 , OU 1665 cc  Renal dose medications  Avoid nephrotoxic medications    Acute blood loss anemia  Assessment & Plan  Follow serial Hb  Transfuse 1 unit PRBC if Hb < 9.0 given recent cardiac event.  8/29 Iron studies low - replace per protocol.    CAD (coronary artery disease)- (present on admission)  Assessment & Plan  7/3/22 MI with PCI she  received 3 drug-eluting stents to the LAD.  On DAPT prior to take-back.  Transfuse to maintain hemoglobin 9.  Wet read of intra-operative KLEBER during index procedure with EF 30-40% and anterior wall motion abnormality consistent with area of infarct from recent MI.  8/29 Restart ASA / KLEBER Moderately depressed LV EF 30-35% visually estimated. Akinesis of anterior and anteroseptal walls.   Hypokinesis of anterolateral and inferoseptal segments. Normal wall motion of inferior and inferolateral walls. Preserved RV systolic   9/1 Non responder to ASA per TEG results. Plavix restarted.     Hypotension due to hypovolemia  Assessment & Plan  Given 2 FFP, 2 PRBC prior to initiation of massive transfusion and return to operating room for exploration.  Trending end-points of resuscitation.  Vasopressors weaned after take-back.    Coagulopathy (HCC)  Assessment & Plan  Post-take back TEG with low MA, functional fibrinogen, and high LY30  Given Cryo, Platelets, and TXA  Output from ABThera decreasing  Trending serial labs  Repeat TEG improved        Discussed patient condition with RN, RT, Pharmacy, Dietary, , and Patient.  CRITICAL CARE TIME EXCLUDING PROCEDURES: 35  minutes

## 2022-09-03 NOTE — ASSESSMENT & PLAN NOTE
Cortrak with TF while intubated.  9/2 Swallow eval completed, minced and moist/thin liquids initiated.  SLP following.

## 2022-09-03 NOTE — PROGRESS NOTES
ACUTE CARE VASCULAR SERVICE  PROGRESS NOTE    Extubated yesterday  More alert today, conversed with me somewhat, she is tolerating diet  HDS, no pressors, Hgb 11 -> 9.6 -> 8.7 -> 7.7 today  Creatinine 2.46 today, UOP about 75 per hour so auto-diuresing    WBC 10 -> 14 -> 16 today, no apparent infection, no ABx, will monitor    Palpable DP pulses bilaterally,  New prevena applied to cover all incisions    ASA/Plavix for recent coronary stent      Appreciate CC team support  TTF  Following along    Young Bauer MD  [facility]  Voalte preferred. Otherwise text to cell [phone] or call my office [phone]  __________________________________________________________________  Patient:oFuzia Santamaria   MRN:[id number]   CSN:[id number]

## 2022-09-03 NOTE — PROGRESS NOTES
Report given to John on S6. Pt transported to [address] with transport personnel. Belongings with patient at time of transfer.     Guerrero catheter removed at 1530 per order. Pt educated on voiding within 6 hours. Pt verbalized understanding.

## 2022-09-04 ENCOUNTER — APPOINTMENT (OUTPATIENT)
Dept: RADIOLOGY | Facility: MEDICAL CENTER | Age: 62
DRG: 270 | End: 2022-09-04
Attending: STUDENT IN AN ORGANIZED HEALTH CARE EDUCATION/TRAINING PROGRAM
Payer: MEDICAID

## 2022-09-04 PROBLEM — I70.0 AORTIC OCCLUSION (HCC): Status: ACTIVE | Noted: 2022-09-04

## 2022-09-04 LAB
ALBUMIN SERPL BCP-MCNC: 2.1 G/DL (ref 3.2–4.9)
ALBUMIN/GLOB SERPL: 0.6 G/DL
ALP SERPL-CCNC: 130 U/L (ref 30–99)
ALT SERPL-CCNC: 6 U/L (ref 2–50)
ANION GAP SERPL CALC-SCNC: 9 MMOL/L (ref 7–16)
AST SERPL-CCNC: 37 U/L (ref 12–45)
BILIRUB SERPL-MCNC: 1.9 MG/DL (ref 0.1–1.5)
BUN SERPL-MCNC: 44 MG/DL (ref 8–22)
CALCIUM SERPL-MCNC: 8.3 MG/DL (ref 8.5–10.5)
CHLORIDE SERPL-SCNC: 101 MMOL/L (ref 96–112)
CO2 SERPL-SCNC: 25 MMOL/L (ref 20–33)
CREAT SERPL-MCNC: 2.42 MG/DL (ref 0.5–1.4)
EKG IMPRESSION: NORMAL
ERYTHROCYTE [DISTWIDTH] IN BLOOD BY AUTOMATED COUNT: 53.8 FL (ref 35.9–50)
FERRITIN SERPL-MCNC: 4284 NG/ML (ref 10–291)
GFR SERPLBLD CREATININE-BSD FMLA CKD-EPI: 22 ML/MIN/1.73 M 2
GLOBULIN SER CALC-MCNC: 3.5 G/DL (ref 1.9–3.5)
GLUCOSE BLD STRIP.AUTO-MCNC: 153 MG/DL (ref 65–99)
GLUCOSE SERPL-MCNC: 121 MG/DL (ref 65–99)
HCT VFR BLD AUTO: 24.6 % (ref 37–47)
HGB BLD-MCNC: 7.9 G/DL (ref 12–16)
IRON SATN MFR SERPL: 22 % (ref 15–55)
IRON SERPL-MCNC: 40 UG/DL (ref 40–170)
MAGNESIUM SERPL-MCNC: 1.5 MG/DL (ref 1.5–2.5)
MCH RBC QN AUTO: 30.4 PG (ref 27–33)
MCHC RBC AUTO-ENTMCNC: 32.1 G/DL (ref 33.6–35)
MCV RBC AUTO: 94.6 FL (ref 81.4–97.8)
NT-PROBNP SERPL IA-MCNC: ABNORMAL PG/ML (ref 0–125)
PHOSPHATE SERPL-MCNC: 2.8 MG/DL (ref 2.5–4.5)
PLATELET # BLD AUTO: 233 K/UL (ref 164–446)
PMV BLD AUTO: 10.5 FL (ref 9–12.9)
POTASSIUM SERPL-SCNC: 4.2 MMOL/L (ref 3.6–5.5)
PROT SERPL-MCNC: 5.6 G/DL (ref 6–8.2)
RBC # BLD AUTO: 2.6 M/UL (ref 4.2–5.4)
SODIUM SERPL-SCNC: 135 MMOL/L (ref 135–145)
TIBC SERPL-MCNC: 182 UG/DL (ref 250–450)
TROPONIN T SERPL-MCNC: 134 NG/L (ref 6–19)
UIBC SERPL-MCNC: 142 UG/DL (ref 110–370)
WBC # BLD AUTO: 15.7 K/UL (ref 4.8–10.8)

## 2022-09-04 PROCEDURE — A9270 NON-COVERED ITEM OR SERVICE: HCPCS | Performed by: SURGERY

## 2022-09-04 PROCEDURE — 83735 ASSAY OF MAGNESIUM: CPT

## 2022-09-04 PROCEDURE — 93010 ELECTROCARDIOGRAM REPORT: CPT | Performed by: INTERNAL MEDICINE

## 2022-09-04 PROCEDURE — 93010 ELECTROCARDIOGRAM REPORT: CPT | Performed by: STUDENT IN AN ORGANIZED HEALTH CARE EDUCATION/TRAINING PROGRAM

## 2022-09-04 PROCEDURE — 82962 GLUCOSE BLOOD TEST: CPT

## 2022-09-04 PROCEDURE — 36415 COLL VENOUS BLD VENIPUNCTURE: CPT

## 2022-09-04 PROCEDURE — 93005 ELECTROCARDIOGRAM TRACING: CPT | Performed by: STUDENT IN AN ORGANIZED HEALTH CARE EDUCATION/TRAINING PROGRAM

## 2022-09-04 PROCEDURE — A9270 NON-COVERED ITEM OR SERVICE: HCPCS | Performed by: HOSPITALIST

## 2022-09-04 PROCEDURE — 700102 HCHG RX REV CODE 250 W/ 637 OVERRIDE(OP): Performed by: HOSPITALIST

## 2022-09-04 PROCEDURE — 71045 X-RAY EXAM CHEST 1 VIEW: CPT

## 2022-09-04 PROCEDURE — 83550 IRON BINDING TEST: CPT

## 2022-09-04 PROCEDURE — 770020 HCHG ROOM/CARE - TELE (206)

## 2022-09-04 PROCEDURE — 99232 SBSQ HOSP IP/OBS MODERATE 35: CPT | Performed by: STUDENT IN AN ORGANIZED HEALTH CARE EDUCATION/TRAINING PROGRAM

## 2022-09-04 PROCEDURE — 700102 HCHG RX REV CODE 250 W/ 637 OVERRIDE(OP): Performed by: SURGERY

## 2022-09-04 PROCEDURE — 80053 COMPREHEN METABOLIC PANEL: CPT

## 2022-09-04 PROCEDURE — 82728 ASSAY OF FERRITIN: CPT

## 2022-09-04 PROCEDURE — 84100 ASSAY OF PHOSPHORUS: CPT

## 2022-09-04 PROCEDURE — 83880 ASSAY OF NATRIURETIC PEPTIDE: CPT

## 2022-09-04 PROCEDURE — 83540 ASSAY OF IRON: CPT

## 2022-09-04 PROCEDURE — 84484 ASSAY OF TROPONIN QUANT: CPT

## 2022-09-04 PROCEDURE — 85027 COMPLETE CBC AUTOMATED: CPT

## 2022-09-04 RX ADMIN — OXYCODONE 5 MG: 5 TABLET ORAL at 00:40

## 2022-09-04 RX ADMIN — POLYETHYLENE GLYCOL 3350 1 PACKET: 17 POWDER, FOR SOLUTION ORAL at 05:05

## 2022-09-04 RX ADMIN — POLYETHYLENE GLYCOL 3350 1 PACKET: 17 POWDER, FOR SOLUTION ORAL at 17:21

## 2022-09-04 RX ADMIN — PRAMIPEXOLE DIHYDROCHLORIDE 0.12 MG: 0.12 TABLET ORAL at 05:05

## 2022-09-04 RX ADMIN — ASPIRIN 81 MG 81 MG: 81 TABLET ORAL at 05:05

## 2022-09-04 RX ADMIN — OXYCODONE 5 MG: 5 TABLET ORAL at 12:45

## 2022-09-04 RX ADMIN — ATORVASTATIN CALCIUM 80 MG: 80 TABLET, FILM COATED ORAL at 22:03

## 2022-09-04 RX ADMIN — DOCUSATE SODIUM 100 MG: 100 CAPSULE, LIQUID FILLED ORAL at 17:21

## 2022-09-04 RX ADMIN — OXYCODONE 5 MG: 5 TABLET ORAL at 22:03

## 2022-09-04 RX ADMIN — DOCUSATE SODIUM 50 MG AND SENNOSIDES 8.6 MG 1 TABLET: 8.6; 5 TABLET, FILM COATED ORAL at 21:00

## 2022-09-04 RX ADMIN — OXYCODONE 5 MG: 5 TABLET ORAL at 05:05

## 2022-09-04 RX ADMIN — CLOPIDOGREL BISULFATE 75 MG: 75 TABLET ORAL at 05:05

## 2022-09-04 RX ADMIN — PRAMIPEXOLE DIHYDROCHLORIDE 0.12 MG: 0.12 TABLET ORAL at 12:01

## 2022-09-04 RX ADMIN — DOCUSATE SODIUM 100 MG: 100 CAPSULE, LIQUID FILLED ORAL at 05:05

## 2022-09-04 RX ADMIN — METOPROLOL TARTRATE 25 MG: 25 TABLET, FILM COATED ORAL at 17:21

## 2022-09-04 RX ADMIN — EZETIMIBE 10 MG: 10 TABLET ORAL at 05:05

## 2022-09-04 RX ADMIN — OXYCODONE 5 MG: 5 TABLET ORAL at 17:21

## 2022-09-04 RX ADMIN — METOPROLOL TARTRATE 25 MG: 25 TABLET, FILM COATED ORAL at 05:05

## 2022-09-04 RX ADMIN — PRAMIPEXOLE DIHYDROCHLORIDE 0.12 MG: 0.12 TABLET ORAL at 17:21

## 2022-09-04 ASSESSMENT — ENCOUNTER SYMPTOMS
NAUSEA: 0
VOMITING: 0
ABDOMINAL PAIN: 0
EYES NEGATIVE: 1
SHORTNESS OF BREATH: 0
COUGH: 0
MYALGIAS: 0
WEAKNESS: 1
CHILLS: 0
SPEECH CHANGE: 1
FEVER: 0
FOCAL WEAKNESS: 1

## 2022-09-04 NOTE — PROGRESS NOTES
[facility] Medicine Daily Progress Note    Date of Service  9/3/2022    Chief Complaint  Fouzia Santamaria is a 61 y.o. female admitted 8/23/2022 with vascular claudication    Hospital Course  Fouzia Santamaria is a 61 y.o. female with coronary artery disease status post stent placement LAD, peripheral arterial disease with chronic aortic occlusion and mural thrombus, hyperlipidemia, hypertension, who presented 8/23/2022 with lower extremity pain.  Fouzia has been experiencing bilateral lower extremity pain that has been waxing and waning in severity over the past few weeks but steadily worse over the past few days prior to presentation, left greater than right.  She has known chronic infrarenal aortic occlusion with collateral flow and is followed with vascular surgery as an outpatient.  Due to lower extremity pain now at rest she presented for evaluation.  She denies any headache or vision changes, fevers chills, chest pain dyspnea cough nausea vomiting dysuria or diarrhea.  Patient recently admitted to [facility] on July 3, 2022 and had 2 stents placed to LAD, subsequently to that was in the hospital from 7/21-7/24 when she was sent by vascular surgeon due to escalating claudication symptoms.  During that admission it was felt that vascular bypassing/repair of aorto was too high risk and she was discharged Xarelto DVT dosing to follow-up as an outpatient.  She was discharged on Plavix, Xarelto, aspirin and was to follow-up with vascular surgery as outpatient but due to progression of her pain she presented today to the ED.     In ED patient is afebrile pulse is ranged from  normal respiratory rate and room air oxygen saturation, systolic blood pressures ranged from 115-142.  Initial work-up shows normal cell counts and CBC, CHEM panel with bicarb 14 BUN 73 serum creatinine 2.62 (1.31 a month ago), normal liver function, troponin T 66 INR 1.73. CTA aorta with and without shows occlusion of the abdominal  aorta just below the level of renal arteries with occlusion of common and external iliac arteries with reconstitution of flow via epigastric collaterals as well as diffuse ectasia/aneurysm of descending thoracic aorta and upper abdominal aorta with prominent mural thrombus and atherosclerosis, diffusely small caliber bilateral lower extremity arteries with significant atherosclerosis of bilateral superficial femoral arteries without high-grade stenosis, likely single-vessel runoff of the right and two-vessel runoff on the left, focal severe stenosis of the bilateral renal artery origins.  EKG showed sinus rhythm and biatrial enlargement with an incomplete right bundle branch block and left ventricular hypertrophy, prolonged QTC.  ERP discussed case with Dr. Siu agrees to evaluate patient.  Patient subsequently referred to hospitalist for admission.  The patient was treated with vascular surgery intervention including aorto bifemoral bypass as well as bilateral left renal artery eversion endarterectomies, the patient had some postoperative bleeding and required to have revision for bleeding,  The patient developed a CVA with right-sided deficits and expressive aphasia, a CTA confirmed left-sided infarcts, the patient was restarted on aspirin and Plavix.  Neurology was consulted.  The patient identified to have HF R EF with ejection fraction of 30 % and anterior wall motion abnormalities.  Interval Problem Update  Patient seen and examined today.  Data, Medication data reviewed.  Case discussed with nursing as available.  Plan of Care reviewed with patient and notified of changes.   9/3 the patient extubated, tolerating, tolerating oral diet, the patient is moaning and groaning and is frustrated because of her expressive aphasia, surgical dressings are in place, Guerrero is in place, laboratory data reviewed, a white cell count 15.7, hemoglobin 7.7, renal function is slowly improving, creatinine 2.46, good urine  output    I have discussed this patient's plan of care and discharge plan at IDT rounds today with Case Management, Nursing, Nursing leadership, and other members of the IDT team.    Consultants/Specialty  vascular surgery  Surgical intensivist    Code Status  Full Code    Disposition  Patient is not medically cleared for discharge.   Anticipate discharge to to home with organized home healthcare and close outpatient follow-up.  I have placed the appropriate orders for post-discharge needs.    Review of Systems  Review of Systems   Constitutional:  Positive for weight loss. Negative for chills, fever and malaise/fatigue.   HENT: Negative.     Eyes: Negative.    Respiratory: Negative.  Negative for cough, shortness of breath and wheezing.    Cardiovascular: Negative.  Negative for chest pain, palpitations and leg swelling.   Gastrointestinal:  Positive for abdominal pain. Negative for heartburn, nausea and vomiting.   Genitourinary: Negative.  Negative for dysuria, flank pain and frequency.   Musculoskeletal: Negative.  Negative for back pain and neck pain.        Bilateral leg pain   Skin: Negative.  Negative for itching and rash.   Neurological:  Positive for speech change, focal weakness and weakness. Negative for dizziness and headaches.   Endo/Heme/Allergies: Negative.  Negative for polydipsia. Does not bruise/bleed easily.   Psychiatric/Behavioral:  Negative for depression. The patient is nervous/anxious.    All other systems reviewed and are negative.     Physical Exam  Temp:  [36.7 °C (98 °F)] 36.7 °C (98 °F)  Pulse:  [] 99  Resp:  [9-32] 20  BP: (117-172)/() 144/80  SpO2:  [94 %-99 %] 98 %    Physical Exam  Vitals and nursing note reviewed.   Constitutional:       General: She is not in acute distress.     Appearance: Normal appearance. She is well-developed. She is ill-appearing. She is not diaphoretic.   HENT:      Head: Normocephalic and atraumatic.      Nose: Nose normal.      Mouth/Throat:       Mouth: Mucous membranes are dry.      Comments: Poor dentition  Eyes:      General: No scleral icterus.        Right eye: No discharge.         Left eye: No discharge.      Conjunctiva/sclera: Conjunctivae normal.      Pupils: Pupils are equal, round, and reactive to light.   Neck:      Thyroid: No thyromegaly.      Vascular: No JVD.   Cardiovascular:      Rate and Rhythm: Regular rhythm. Tachycardia present.      Heart sounds: Normal heart sounds.     No friction rub. No gallop.   Pulmonary:      Effort: Pulmonary effort is normal. No respiratory distress.      Breath sounds: Rhonchi present. No wheezing or rales.   Abdominal:      General: Abdomen is flat. Bowel sounds are normal. There is no distension.      Palpations: Abdomen is soft. There is no mass.      Tenderness: There is abdominal tenderness. There is no guarding or rebound.      Comments: Surgical dressing in place   Musculoskeletal:         General: Tenderness present. Normal range of motion.      Cervical back: Normal range of motion and neck supple.      Right lower leg: No edema.      Left lower leg: No edema.   Lymphadenopathy:      Cervical: No cervical adenopathy.   Skin:     General: Skin is warm and dry.      Coloration: Skin is pale. Skin is not jaundiced.      Findings: Bruising present.   Neurological:      Mental Status: She is alert. She is disoriented.      Cranial Nerves: No cranial nerve deficit.      Motor: Weakness (Bilateral legs) present.      Coordination: Coordination abnormal.      Comments: Aphasia   Psychiatric:      Comments: Patient appears frustrated secondary to her neurologic debility, aphasia       Fluids    Intake/Output Summary (Last 24 hours) at 9/3/2022 1713  Last data filed at 9/3/2022 1500  Gross per 24 hour   Intake 715 ml   Output 2060 ml   Net -1345 ml         Laboratory  Recent Labs     09/01/22  0510 09/02/22  0440 09/03/22  0535   WBC 10.5 13.8* 15.7*   RBC 2.78* 2.89* 2.50*   HEMOGLOBIN 8.4* 8.7* 7.7*    HEMATOCRIT 25.0* 26.6* 23.3*   MCV 89.9 92.0 93.2   MCH 30.2 30.1 30.8   MCHC 33.6 32.7* 33.0*   RDW 52.7* 52.8* 54.0*   PLATELETCT 113* 146* 164   MPV 11.2 11.3 11.1       Recent Labs     09/01/22  0510 09/02/22  0440 09/03/22  0535   SODIUM 142 141 140   POTASSIUM 4.1 4.2 4.1   CHLORIDE 104 104 104   CO2 27 27 28   GLUCOSE 133* 166* 100*   BUN 53* 53* 48*   CREATININE 3.16* 2.82* 2.46*   CALCIUM 7.7* 7.8* 7.8*                       Imaging  DX-CHEST-PORTABLE (1 VIEW)   Final Result      1.  Interval removal of endotracheal tube and enteric catheter      2.  No other change      DX-CHEST-PORTABLE (1 VIEW)   Final Result      No significant interval change.      DX-CHEST-PORTABLE (1 VIEW)   Final Result         1.  Hazy retrocardiac and left lung base atelectasis or infiltrate, somewhat increased since prior study.   2.  Trace left pleural effusion   3.  Atherosclerosis      DX-CHEST-PORTABLE (1 VIEW)   Final Result         1.  Hazy retrocardiac and left lung base atelectasis or infiltrate.   2.  Trace left pleural effusion   3.  Atherosclerosis      DX-CHEST-PORTABLE (1 VIEW)   Final Result         1.  No acute cardiopulmonary disease.      DX-CHEST-PORTABLE (1 VIEW)   Final Result         1.  No acute cardiopulmonary disease.      MR-MRA HEAD-W/O   Final Result      1.  A2/A3 segment left anterior cerebral artery occlusion.   2.  Occlusion of the intradural left vertebral artery.   3.  Asymmetrically small caliber of the distal left internal carotid artery in the left middle cerebral artery.   4.  Focal stenosis of the distal M1 right middle cerebral artery.      MR-BRAIN-W/O   Final Result      1.  At least moderate sized acute/subacute left anterior cerebral artery infarct.   2.  Additional small recent right thalamic infarct.   3.  Multiple chronic bilateral cerebral and cerebellar infarcts.   4.  Cerebral atrophy and chronic microvascular ischemic type changes.   5.  No acute intracranial hemorrhage.       DX-ABDOMEN FOR TUBE PLACEMENT   Final Result         Feeding tube with tip projecting over the expected area of the stomach.      CT-HEAD W/O   Final Result         Patchy areas of ill-defined hypodensity in the left frontal and parietal lobes, concerning for acute multifocal infarcts. Further evaluation with MRI is recommended.      No acute intracranial hemorrhage.                  [id number] VIEW   Final Result         No radiopaque foreign body identified. Previous sponge was removed.      [id number] VIEW   Final Result      1.  There is a curvilinear density in the right upper abdomen consistent with a retained surgical lap sponge. This was subsequently removed as there has already been a subsequent x-ray and this was no longer identified.   2.  There are new cutaneous skin staples in midline of the abdomen and pelvis.   3.  Bowel gas pattern is nonobstructive.      DX-CHEST-PORTABLE (1 VIEW)   Final Result      No acute cardiopulmonary abnormality.      DX-ABDOMEN FOR TUBE PLACEMENT   Final Result      1.  Enteric tube overlies the proximal stomach.      DX-CHEST-PORTABLE (1 VIEW)   Final Result         1.  No acute cardiopulmonary disease.   2.  Nasogastric tube is coiled back the side port with tip projecting cephalad terminating in the distal esophagus, recommend advancement.      EC-KLEBER W/O CONT   Final Result      CT-RENAL COLIC EVALUATION(A/P W/O)   Final Result         1. No hydronephrosis. Nonobstructive bilateral renal calculi seen on previous exam cannot be evaluated due to contrast in the collecting system.      2. Retained contrast in the kidney from prior CTA, likely due to nephropathy/renal failure.      US-RENAL   Final Result         1.  Mild right hydronephrosis   2.  Punctate left renal calculi without visualized obstructive changes.      CT-CTA AORTA-RO WITH & W/O-POST PROCESS   Final Result      1.  Occlusion of the abdominal aorta just below level of the renal arteries with occlusion  of the common and external iliac arteries with reconstitution of flow via epigastric collaterals.   2.  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   3.  Diffusely small caliber bilateral lower extremity arteries with significant atherosclerosis of the bilateral superficial femoral arteries without high-grade stenosis.   4.  Likely single vessel runoff on the right and 2 vessel runoff on the left.   5.  Focal severe stenoses at the bilateral renal artery origins.      These findings were discussed with ESPERANZA DO on 8/23/2022 5:29 PM.             Assessment/Plan  * Peripheral arterial occlusive disease (HCC)- (present on admission)  Assessment & Plan  S/p operative vascular intervention,    Oropharyngeal dysphagia- (present on admission)  Assessment & Plan  Patient passed speech evaluation, monitor    Hypomagnesemia  Assessment & Plan  Monitor, replace as indicated    Stroke (HCC)- (present on admission)  Assessment & Plan  Acute likely intraoperative CVA  S/p evaluation from neurology, no intervention at the time  Antiplatelet therapy  Consult rehab  PT OT as tolerated    Acute postoperative respiratory failure (HCC)  Assessment & Plan  Improved, currently extubated, monitor, respiratory protocol, incentive spirometry as tolerated    Coagulopathy (HCC)  Assessment & Plan  Monitor,    Chronic distal aortic occlusion (HCC)- (present on admission)  Assessment & Plan  S/p aortobifem bypass  Vascular surgery managing    Chronic systolic congestive heart failure (HCC)- (present on admission)  Assessment & Plan  Ejection fraction around 30%.   Reinstitute medical regimen as tolerated  Monitor volume status closely    Occluded aorta (HCC)- (present on admission)  Assessment & Plan  S/p aortobifemoral bypass, endovascular repair    Aortic thrombus (HCC)- (present on admission)  Assessment & Plan  S/p vascular repair    COPD (chronic  obstructive pulmonary disease) (HCC)- (present on admission)  Assessment & Plan  Respiratory protocol    IVON (acute kidney injury) (HCC)- (present on admission)  Assessment & Plan  CT with focal severe stenosis of bilateral renal artery origins, patient has occlusive aortic vascular disease  S/p surgical intervention on bilateral renal arteries  Creatinine slowly improving, good urine output  Monitor closely  Avoid nephrotoxins    Hypertension- (present on admission)  Assessment & Plan  History of, monitor, as needed medication available  Will reinstitute beta-blockade    QT prolongation- (present on admission)  Assessment & Plan  Avoid QTC prolonging medications    Acute blood loss anemia  Assessment & Plan  Secondary to blood loss from surgery, currently recovering, monitor, transfuse as indicated    CAD (coronary artery disease)- (present on admission)  Assessment & Plan  With recent stent to LAD, continue aspirin, statin, Plavix     Plan  Continue with dual antiplatelet therapy  Continue to monitor renal function and avoid nephrotoxins  Blood pressure control  Eventually will need reinstitution of her heart failure medication once renal function is improved  PT OT eval  Rehab eval  Pain control  Medically complex high risk patient  See orders    VTE prophylaxis: enoxaparin ppx    I have performed a physical exam and reviewed and updated ROS and Plan today (9/3/2022). In review of yesterday's note (9/2/2022), there are no changes except as documented above.      Please note that this dictation was created using voice recognition software. I have made every reasonable attempt to correct obvious errors, but I expect that there are errors of grammar and possibly context that I did not discover before finalizing the note.

## 2022-09-04 NOTE — CARE PLAN
The patient is Stable - Low risk of patient condition declining or worsening    Shift Goals  Clinical Goals: pain manangement  Patient Goals: pain control  Family Goals: AFIA    Progress made toward(s) clinical / shift goals:  Pt was moaning and restless most of the night.  She received her ordered pain medication    Patient is not progressing towards the following goals:      Problem: Pain - Standard  Goal: Alleviation of pain or a reduction in pain to the patient’s comfort goal  Outcome: Not Progressing

## 2022-09-04 NOTE — PROGRESS NOTES
4 Eyes Skin Assessment Completed by KATIA Meek and KATIA Weldon.    Head WDL  Ears WDL  Nose WDL  Mouth WDL  Neck WDL  Breast/Chest WDL  Shoulder Blades WDL  Spine WDL  (R) Arm/Elbow/Hand WDL  (L) Arm/Elbow/Hand WDL  Abdomen Surgical incision with prevena wound vac  Groin WDL  Scrotum/Coccyx/Buttocks Blanching/Redness  (R) Leg Bruising  (L) Leg Bruising  (R) Heel/Foot/Toe WDL  (L) Heel/Foot/Toe WDL          Devices In Places N/A      Interventions In Place TAP System and Q2 Turns    Possible Skin Injury No    Pictures Uploaded Into Epic N/A  Wound Consult Placed N/A  RN Wound Prevention Protocol Ordered No

## 2022-09-04 NOTE — PROGRESS NOTES
Patient arrived to T428 at 0845. Patient has expressive aphasia. No complaints of pain. Assessment complete, patient on room air. Prevena wound vac to abdomen, dressing CD&I. Patient updated on plan of care, encouraged to use call light for any needs/assistance. Safety education provided.     At 1230, patient moaning and restless in bed. Patient reporting pain and pointing to mid chest. PRN oxycodone administered. Heart rate ranging between 70 and 120. Rapid response called. Dr. Mg and Dr. Bauer notified. New orders placed by rapid RNs. Dr. Mg to bedside. Remote telemetry initiated.

## 2022-09-04 NOTE — HOSPITAL COURSE
A 61-year-old woman with h/o HTN, HLD, CAD, s/p stent in LAD, PAD with chronic aortic occlusion and mural thrombus presented with lower extremity pain.    Patient recently admitted to [facility] on July 3, 2022 and had 2 stents placed to LAD, subsequently to that was in the hospital from 7/21-7/24 when she was sent by vascular surgeon due to escalating claudication symptoms. During that admission it was felt that vascular bypassing/repair of aorto was too high risk and she was discharged on Plavix, Xarelto, aspirin.     CHEM panel with bicarb 14 BUN 73 serum creatinine 2.62 (1.31 a month ago), troponin T 66 INR 1.73. CTA showed occlusion of the abdominal aorta just below the level of renal arteries with occlusion of common and external iliac arteries with reconstitution of flow via epigastric collaterals as well as diffuse ectasia/aneurysm of descending thoracic aorta and upper abdominal aorta with prominent mural thrombus and atherosclerosis, diffusely small caliber bilateral lower extremity arteries with significant atherosclerosis of bilateral superficial femoral arteries without high-grade stenosis, likely single-vessel runoff of the right and two-vessel runoff on the left, focal severe stenosis of the bilateral renal artery origins.  EKG showed sinus rhythm and biatrial enlargement with an incomplete right bundle branch block and left ventricular hypertrophy, prolonged QTC.    Patient underwent aorto bifemoral bypass, bilateral left renal artery eversion endarterectomies.  Patient had some postoperative bleeding requiring revision.  Patient developed CVA with right-sided deficits and expressive aphasia. CTA confirmed left-sided infarcts, the patient was restarted on aspirin and Plavix.  Neurology was consulted.  The patient identified to have HF R EF with ejection fraction of 30 % and anterior wall motion abnormalities.    On 9/3 patient extubated.

## 2022-09-04 NOTE — PROGRESS NOTES
Hospital Medicine Daily Progress Note    Date of Service  9/4/2022    Chief Complaint  Fouzia Santamaria is a 61 y.o. female admitted 8/23/2022 with lower extremity pain    Hospital Course  A 61-year-old woman with h/o HTN, HLD, CAD, s/p stent in LAD, PAD with chronic aortic occlusion and mural thrombus presented with lower extremity pain.    Patient recently admitted to [facility] on July 3, 2022 and had 2 stents placed to LAD, subsequently to that was in the hospital from 7/21-7/24 when she was sent by vascular surgeon due to escalating claudication symptoms. During that admission it was felt that vascular bypassing/repair of aorto was too high risk and she was discharged on Plavix, Xarelto, aspirin.     CHEM panel with bicarb 14 BUN 73 serum creatinine 2.62 (1.31 a month ago), troponin T 66 INR 1.73. CTA showed occlusion of the abdominal aorta just below the level of renal arteries with occlusion of common and external iliac arteries with reconstitution of flow via epigastric collaterals as well as diffuse ectasia/aneurysm of descending thoracic aorta and upper abdominal aorta with prominent mural thrombus and atherosclerosis, diffusely small caliber bilateral lower extremity arteries with significant atherosclerosis of bilateral superficial femoral arteries without high-grade stenosis, likely single-vessel runoff of the right and two-vessel runoff on the left, focal severe stenosis of the bilateral renal artery origins.  EKG showed sinus rhythm and biatrial enlargement with an incomplete right bundle branch block and left ventricular hypertrophy, prolonged QTC.    Patient underwent aorto bifemoral bypass, bilateral left renal artery eversion endarterectomies.  Patient had some postoperative bleeding requiring revision.  Patient developed CVA with right-sided deficits and expressive aphasia. CTA confirmed left-sided infarcts, the patient was restarted on aspirin and Plavix.  Neurology was consulted.  The  patient identified to have HF R EF with ejection fraction of 30 % and anterior wall motion abnormalities.    On 9/3 patient extubated.    Interval Problem Update  Transferred from ICU to S6 overnight. Afebrile, tachycardic, . BP stable. On room air.  Patient denies any pain.  PT, OT recommended post-acute placement.  SNF referral placed.    RRT was called in the afternoon. Patient was complaining of chest pain, tachycardic.  ECG showed sinus tachycardia, no new ST, T wave changes. Ordered troponin. Cardiac monitoring.     I have discussed this patient's plan of care and discharge plan at IDT rounds today with Case Management, Nursing, Nursing leadership, and other members of the IDT team.    Consultants/Specialty  general surgery and neurology    Code Status  Full Code    Disposition  Patient is not medically cleared for discharge.   Anticipate discharge to to skilled nursing facility.  I have placed the appropriate orders for post-discharge needs.    Review of Systems  Review of Systems   Constitutional:  Positive for malaise/fatigue. Negative for chills and fever.   HENT: Negative.     Eyes: Negative.    Respiratory:  Negative for cough and shortness of breath.    Cardiovascular:  Negative for chest pain and leg swelling.   Gastrointestinal:  Negative for abdominal pain, nausea and vomiting.   Genitourinary:  Negative for dysuria.   Musculoskeletal:  Negative for myalgias.   Skin:  Negative for rash.   Neurological:  Positive for speech change, focal weakness and weakness.      Physical Exam  Temp:  [35.9 °C (96.7 °F)-37.2 °C (98.9 °F)] 35.9 °C (96.7 °F)  Pulse:  [] 88  Resp:  [11-30] 17  BP: (126-171)/() 167/96  SpO2:  [93 %-98 %] 93 %    Physical Exam  Vitals and nursing note reviewed.   Constitutional:       Appearance: She is ill-appearing.   HENT:      Head: Normocephalic and atraumatic.      Nose: Nose normal.      Mouth/Throat:      Mouth: Mucous membranes are moist.      Pharynx:  Oropharynx is clear.   Eyes:      Conjunctiva/sclera: Conjunctivae normal.      Pupils: Pupils are equal, round, and reactive to light.   Cardiovascular:      Rate and Rhythm: Normal rate and regular rhythm.   Pulmonary:      Effort: Pulmonary effort is normal. No respiratory distress.      Breath sounds: No wheezing or rales.   Abdominal:      General: Abdomen is flat. Bowel sounds are normal. There is no distension.      Tenderness: There is no abdominal tenderness. There is no guarding.   Musculoskeletal:         General: Normal range of motion.      Cervical back: Normal range of motion and neck supple.   Skin:     General: Skin is warm.   Neurological:      Mental Status: She is alert and oriented to person, place, and time.      Motor: Weakness (b/l legs) present.      Comments: Aphasia        Fluids    Intake/Output Summary (Last 24 hours) at 9/4/2022 0940  Last data filed at 9/4/2022 0600  Gross per 24 hour   Intake 285 ml   Output 675 ml   Net -390 ml       Laboratory  Recent Labs     09/02/22  0440 09/03/22  0535   WBC 13.8* 15.7*   RBC 2.89* 2.50*   HEMOGLOBIN 8.7* 7.7*   HEMATOCRIT 26.6* 23.3*   MCV 92.0 93.2   MCH 30.1 30.8   MCHC 32.7* 33.0*   RDW 52.8* 54.0*   PLATELETCT 146* 164   MPV 11.3 11.1     Recent Labs     09/02/22  0440 09/03/22  0535   SODIUM 141 140   POTASSIUM 4.2 4.1   CHLORIDE 104 104   CO2 27 28   GLUCOSE 166* 100*   BUN 53* 48*   CREATININE 2.82* 2.46*   CALCIUM 7.8* 7.8*                   Imaging  DX-CHEST-PORTABLE (1 VIEW)   Final Result      1.  Interval removal of endotracheal tube and enteric catheter      2.  No other change      DX-CHEST-PORTABLE (1 VIEW)   Final Result      No significant interval change.      DX-CHEST-PORTABLE (1 VIEW)   Final Result         1.  Hazy retrocardiac and left lung base atelectasis or infiltrate, somewhat increased since prior study.   2.  Trace left pleural effusion   3.  Atherosclerosis      DX-CHEST-PORTABLE (1 VIEW)   Final Result         1.   Hazy retrocardiac and left lung base atelectasis or infiltrate.   2.  Trace left pleural effusion   3.  Atherosclerosis      DX-CHEST-PORTABLE (1 VIEW)   Final Result         1.  No acute cardiopulmonary disease.      DX-CHEST-PORTABLE (1 VIEW)   Final Result         1.  No acute cardiopulmonary disease.      MR-MRA HEAD-W/O   Final Result      1.  A2/A3 segment left anterior cerebral artery occlusion.   2.  Occlusion of the intradural left vertebral artery.   3.  Asymmetrically small caliber of the distal left internal carotid artery in the left middle cerebral artery.   4.  Focal stenosis of the distal M1 right middle cerebral artery.      MR-BRAIN-W/O   Final Result      1.  At least moderate sized acute/subacute left anterior cerebral artery infarct.   2.  Additional small recent right thalamic infarct.   3.  Multiple chronic bilateral cerebral and cerebellar infarcts.   4.  Cerebral atrophy and chronic microvascular ischemic type changes.   5.  No acute intracranial hemorrhage.      DX-ABDOMEN FOR TUBE PLACEMENT   Final Result         Feeding tube with tip projecting over the expected area of the stomach.      CT-HEAD W/O   Final Result         Patchy areas of ill-defined hypodensity in the left frontal and parietal lobes, concerning for acute multifocal infarcts. Further evaluation with MRI is recommended.      No acute intracranial hemorrhage.                  [id number] VIEW   Final Result         No radiopaque foreign body identified. Previous sponge was removed.      [id number] VIEW   Final Result      1.  There is a curvilinear density in the right upper abdomen consistent with a retained surgical lap sponge. This was subsequently removed as there has already been a subsequent x-ray and this was no longer identified.   2.  There are new cutaneous skin staples in midline of the abdomen and pelvis.   3.  Bowel gas pattern is nonobstructive.      DX-CHEST-PORTABLE (1 VIEW)   Final Result      No acute  cardiopulmonary abnormality.      DX-ABDOMEN FOR TUBE PLACEMENT   Final Result      1.  Enteric tube overlies the proximal stomach.      DX-CHEST-PORTABLE (1 VIEW)   Final Result         1.  No acute cardiopulmonary disease.   2.  Nasogastric tube is coiled back the side port with tip projecting cephalad terminating in the distal esophagus, recommend advancement.      EC-KLEBER W/O CONT   Final Result      CT-RENAL COLIC EVALUATION(A/P W/O)   Final Result         1. No hydronephrosis. Nonobstructive bilateral renal calculi seen on previous exam cannot be evaluated due to contrast in the collecting system.      2. Retained contrast in the kidney from prior CTA, likely due to nephropathy/renal failure.      US-RENAL   Final Result         1.  Mild right hydronephrosis   2.  Punctate left renal calculi without visualized obstructive changes.      CT-CTA AORTA-RO WITH & W/O-POST PROCESS   Final Result      1.  Occlusion of the abdominal aorta just below level of the renal arteries with occlusion of the common and external iliac arteries with reconstitution of flow via epigastric collaterals.   2.  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   3.  Diffusely small caliber bilateral lower extremity arteries with significant atherosclerosis of the bilateral superficial femoral arteries without high-grade stenosis.   4.  Likely single vessel runoff on the right and 2 vessel runoff on the left.   5.  Focal severe stenoses at the bilateral renal artery origins.      These findings were discussed with ESPERANZA DO on 8/23/2022 5:29 PM.           Assessment/Plan  * Peripheral arterial occlusive disease (HCC)- (present on admission)  Assessment & Plan  S/p operative vascular intervention,    Oropharyngeal dysphagia- (present on admission)  Assessment & Plan  Patient passed speech evaluation, monitor    Hypomagnesemia  Assessment &  Plan  Monitor, replace as indicated    Stroke ([facility])- (present on admission)  Assessment & Plan  Acute likely intraoperative CVA  S/p evaluation from neurology, no intervention at the time  Antiplatelet therapy  Consult rehab  PT OT as tolerated    Acute postoperative respiratory failure ([facility])  Assessment & Plan  Improved, currently extubated, monitor, respiratory protocol, incentive spirometry as tolerated    Coagulopathy ([facility])  Assessment & Plan  Monitor,    Chronic distal aortic occlusion ([facility])- (present on admission)  Assessment & Plan  S/p aortobifem bypass  Vascular surgery managing    Chronic systolic congestive heart failure ([facility])- (present on admission)  Assessment & Plan  Ejection fraction around 30%.   Reinstitute medical regimen as tolerated  Monitor volume status closely    Occluded aorta ([facility])- (present on admission)  Assessment & Plan  S/p aortobifemoral bypass, endovascular repair    Aortic thrombus ([facility])- (present on admission)  Assessment & Plan  S/p vascular repair    COPD (chronic obstructive pulmonary disease) ([facility])- (present on admission)  Assessment & Plan  Respiratory protocol    IVON (acute kidney injury) ([facility])- (present on admission)  Assessment & Plan  CT with focal severe stenosis of bilateral renal artery origins, patient has occlusive aortic vascular disease  S/p surgical intervention on bilateral renal arteries  Creatinine slowly improving, good urine output  Monitor closely  Avoid nephrotoxins    Hypertension- (present on admission)  Assessment & Plan  History of, monitor, as needed medication available  Will reinstitute beta-blockade    QT prolongation- (present on admission)  Assessment & Plan  Avoid QTC prolonging medications    Acute blood loss anemia  Assessment & Plan  Secondary to blood loss from surgery, currently recovering, monitor, transfuse as indicated    CAD (coronary artery disease)- (present on admission)  Assessment & Plan  With recent stent to LAD, continue aspirin,  statin, Plavix         VTE prophylaxis: SCDs/TEDs    I have performed a physical exam and reviewed and updated ROS and Plan today (9/4/2022). In review of yesterday's note (9/3/2022), there are no changes except as documented above.

## 2022-09-04 NOTE — PROGRESS NOTES
Report received at bedside from NOC RN, pt care assumed. Pt aaox4, no signs of distress noted at this time. Patient resting in bed moaning and distressed. POC discussed with pt - pt has expressive aphasia and is unable to verbalize concerns. Pt c/o of no pain but is moaning and grimacing. Pt denies any additional needs at this time. Bed in lowest position, pt educated on fall risk and verbalized understanding, call light within reach, bed alarm plugged in and on.

## 2022-09-05 LAB
ALBUMIN SERPL BCP-MCNC: 2.2 G/DL (ref 3.2–4.9)
ALBUMIN/GLOB SERPL: 0.6 G/DL
ALP SERPL-CCNC: 139 U/L (ref 30–99)
ALT SERPL-CCNC: 6 U/L (ref 2–50)
ANION GAP SERPL CALC-SCNC: 9 MMOL/L (ref 7–16)
AST SERPL-CCNC: 39 U/L (ref 12–45)
BILIRUB SERPL-MCNC: 1.8 MG/DL (ref 0.1–1.5)
BUN SERPL-MCNC: 41 MG/DL (ref 8–22)
CALCIUM SERPL-MCNC: 8.3 MG/DL (ref 8.5–10.5)
CHLORIDE SERPL-SCNC: 103 MMOL/L (ref 96–112)
CO2 SERPL-SCNC: 24 MMOL/L (ref 20–33)
CREAT SERPL-MCNC: 2.4 MG/DL (ref 0.5–1.4)
GFR SERPLBLD CREATININE-BSD FMLA CKD-EPI: 23 ML/MIN/1.73 M 2
GLOBULIN SER CALC-MCNC: 3.7 G/DL (ref 1.9–3.5)
GLUCOSE SERPL-MCNC: 107 MG/DL (ref 65–99)
POTASSIUM SERPL-SCNC: 4.4 MMOL/L (ref 3.6–5.5)
PROT SERPL-MCNC: 5.9 G/DL (ref 6–8.2)
SODIUM SERPL-SCNC: 136 MMOL/L (ref 135–145)

## 2022-09-05 PROCEDURE — A9270 NON-COVERED ITEM OR SERVICE: HCPCS | Performed by: HOSPITALIST

## 2022-09-05 PROCEDURE — 700111 HCHG RX REV CODE 636 W/ 250 OVERRIDE (IP): Performed by: HOSPITALIST

## 2022-09-05 PROCEDURE — 700102 HCHG RX REV CODE 250 W/ 637 OVERRIDE(OP): Performed by: HOSPITALIST

## 2022-09-05 PROCEDURE — 97530 THERAPEUTIC ACTIVITIES: CPT

## 2022-09-05 PROCEDURE — 97112 NEUROMUSCULAR REEDUCATION: CPT

## 2022-09-05 PROCEDURE — 97535 SELF CARE MNGMENT TRAINING: CPT

## 2022-09-05 PROCEDURE — 770020 HCHG ROOM/CARE - TELE (206)

## 2022-09-05 PROCEDURE — 700102 HCHG RX REV CODE 250 W/ 637 OVERRIDE(OP): Performed by: SURGERY

## 2022-09-05 PROCEDURE — 80053 COMPREHEN METABOLIC PANEL: CPT

## 2022-09-05 PROCEDURE — A9270 NON-COVERED ITEM OR SERVICE: HCPCS | Performed by: SURGERY

## 2022-09-05 PROCEDURE — 92526 ORAL FUNCTION THERAPY: CPT

## 2022-09-05 PROCEDURE — 36415 COLL VENOUS BLD VENIPUNCTURE: CPT

## 2022-09-05 PROCEDURE — 99233 SBSQ HOSP IP/OBS HIGH 50: CPT | Performed by: HOSPITALIST

## 2022-09-05 RX ORDER — ENOXAPARIN SODIUM 100 MG/ML
40 INJECTION SUBCUTANEOUS DAILY
Status: DISCONTINUED | OUTPATIENT
Start: 2022-09-05 | End: 2022-09-05

## 2022-09-05 RX ORDER — METOPROLOL TARTRATE 50 MG/1
50 TABLET, FILM COATED ORAL TWICE DAILY
Status: DISCONTINUED | OUTPATIENT
Start: 2022-09-05 | End: 2022-09-07

## 2022-09-05 RX ORDER — HEPARIN SODIUM 5000 [USP'U]/ML
5000 INJECTION, SOLUTION INTRAVENOUS; SUBCUTANEOUS EVERY 8 HOURS
Status: DISCONTINUED | OUTPATIENT
Start: 2022-09-05 | End: 2022-09-17 | Stop reason: HOSPADM

## 2022-09-05 RX ADMIN — PRAMIPEXOLE DIHYDROCHLORIDE 0.12 MG: 0.12 TABLET ORAL at 17:12

## 2022-09-05 RX ADMIN — METOPROLOL TARTRATE 25 MG: 25 TABLET, FILM COATED ORAL at 05:00

## 2022-09-05 RX ADMIN — DOCUSATE SODIUM 50 MG AND SENNOSIDES 8.6 MG 1 TABLET: 8.6; 5 TABLET, FILM COATED ORAL at 21:30

## 2022-09-05 RX ADMIN — HEPARIN SODIUM 5000 UNITS: 5000 INJECTION, SOLUTION INTRAVENOUS; SUBCUTANEOUS at 21:31

## 2022-09-05 RX ADMIN — PRAMIPEXOLE DIHYDROCHLORIDE 0.12 MG: 0.12 TABLET ORAL at 11:29

## 2022-09-05 RX ADMIN — EZETIMIBE 10 MG: 10 TABLET ORAL at 05:00

## 2022-09-05 RX ADMIN — ACETAMINOPHEN 650 MG: 325 TABLET ORAL at 05:00

## 2022-09-05 RX ADMIN — PRAMIPEXOLE DIHYDROCHLORIDE 0.12 MG: 0.12 TABLET ORAL at 05:00

## 2022-09-05 RX ADMIN — OXYCODONE 5 MG: 5 TABLET ORAL at 15:42

## 2022-09-05 RX ADMIN — POLYETHYLENE GLYCOL 3350 1 PACKET: 17 POWDER, FOR SOLUTION ORAL at 05:00

## 2022-09-05 RX ADMIN — OXYCODONE 5 MG: 5 TABLET ORAL at 11:43

## 2022-09-05 RX ADMIN — CLOPIDOGREL BISULFATE 75 MG: 75 TABLET ORAL at 05:00

## 2022-09-05 RX ADMIN — METOPROLOL TARTRATE 50 MG: 50 TABLET, FILM COATED ORAL at 17:11

## 2022-09-05 RX ADMIN — OXYCODONE 5 MG: 5 TABLET ORAL at 21:29

## 2022-09-05 RX ADMIN — HEPARIN SODIUM 5000 UNITS: 5000 INJECTION, SOLUTION INTRAVENOUS; SUBCUTANEOUS at 14:35

## 2022-09-05 RX ADMIN — ASPIRIN 81 MG 81 MG: 81 TABLET ORAL at 05:01

## 2022-09-05 RX ADMIN — OXYCODONE 5 MG: 5 TABLET ORAL at 02:10

## 2022-09-05 RX ADMIN — ATORVASTATIN CALCIUM 80 MG: 80 TABLET, FILM COATED ORAL at 21:29

## 2022-09-05 ASSESSMENT — COGNITIVE AND FUNCTIONAL STATUS - GENERAL
SUGGESTED CMS G CODE MODIFIER MOBILITY: CM
TOILETING: A LOT
HELP NEEDED FOR BATHING: A LOT
TOILETING: A LOT
TURNING FROM BACK TO SIDE WHILE IN FLAT BAD: UNABLE
PERSONAL GROOMING: A LOT
SUGGESTED CMS G CODE MODIFIER MOBILITY: CN
MOVING TO AND FROM BED TO CHAIR: UNABLE
TURNING FROM BACK TO SIDE WHILE IN FLAT BAD: A LOT
DRESSING REGULAR LOWER BODY CLOTHING: TOTAL
MOVING FROM LYING ON BACK TO SITTING ON SIDE OF FLAT BED: UNABLE
DRESSING REGULAR LOWER BODY CLOTHING: TOTAL
MOVING TO AND FROM BED TO CHAIR: A LOT
STANDING UP FROM CHAIR USING ARMS: TOTAL
DRESSING REGULAR UPPER BODY CLOTHING: A LOT
SUGGESTED CMS G CODE MODIFIER DAILY ACTIVITY: CL
SUGGESTED CMS G CODE MODIFIER DAILY ACTIVITY: CL
MOVING FROM LYING ON BACK TO SITTING ON SIDE OF FLAT BED: A LOT
CLIMB 3 TO 5 STEPS WITH RAILING: TOTAL
DRESSING REGULAR UPPER BODY CLOTHING: A LOT
WALKING IN HOSPITAL ROOM: TOTAL
CLIMB 3 TO 5 STEPS WITH RAILING: TOTAL
WALKING IN HOSPITAL ROOM: TOTAL
DAILY ACTIVITIY SCORE: 9
STANDING UP FROM CHAIR USING ARMS: TOTAL
EATING MEALS: TOTAL
MOBILITY SCORE: 6
PERSONAL GROOMING: A LOT
MOBILITY SCORE: 9
EATING MEALS: TOTAL
DAILY ACTIVITIY SCORE: 10
HELP NEEDED FOR BATHING: TOTAL

## 2022-09-05 ASSESSMENT — GAIT ASSESSMENTS: GAIT LEVEL OF ASSIST: UNABLE TO PARTICIPATE

## 2022-09-05 NOTE — CARE PLAN
The patient is Stable - Low risk of patient condition declining or worsening    Shift Goals  Clinical Goals: pain control, monitor wound vac, Q2 hour turns  Patient Goals: pain control  Family Goals: n/a    Progress made toward(s) clinical / shift goals:  Patient engaged in occupational therapy today and was able to start brushing her own hair. Patient has been tolerating current interventions in place. Q4 hour pulse checks have been discontinued by order. Patient has been medicated for pain according to MAR parameters.    Problem: Knowledge Deficit - Standard  Goal: Patient and family/care givers will demonstrate understanding of plan of care, disease process/condition, diagnostic tests and medications  Outcome: Progressing     Problem: Skin Integrity  Goal: Skin integrity is maintained or improved  Outcome: Progressing     Problem: Urinary Elimination  Goal: Establish and maintain regular urinary output  Outcome: Progressing     Problem: Self Care  Goal: Patient will have the ability to perform ADLs independently or with assistance (bathe, groom, dress, toilet and feed)  Outcome: Progressing     Patient is not progressing towards the following goals: Patient has not yet been cleared to discharge.

## 2022-09-05 NOTE — THERAPY
"Speech Language Pathology  Daily Treatment     Patient Name: Fouzia Santamaria  Age:  61 y.o., Sex:  female  Medical Record #: [id number]  Today's Date: 9/5/2022     Precautions  Precautions: (P) Fall Risk, Swallow Precautions ( See Comments)  Comments: abdominal binder on with mobilty    Assessment    The patient was seen on this date for a dysphagia therapy with her minced moist breakfast tray. The patient noted to be restless, and moaning upon arrival. With yes/no questions, patient denying current pain but endorsing feeling sad and overwhelmed. Agreeable to limited trials of PO intake. Patient consumed x3 sips of thin liquids without any overt s/sx of aspiration. Consumed x2 bites of puree without any difficulty. Appropriate oral clearance appreciated. No change in respiratory status, patient appears appropriate to resume current diet texture.     Recommendations  1.  Continue minced and moist w/ thin liquids  2.  Instrumentation: Instrumental swallow study pending clinical progress  3.  Swallowing Instructions & Precautions:   Supervision: Monitor due to impulsive behavior, Encourage self-feeding  Positioning: Fully upright and midline during oral intake  Medication: Whole with puree, Crush with applesauce or puree, as appropriate  Strategies: Small bites/sips, Slow rate of intake  Oral Care: Q4h    Plan    Continue current treatment plan.    Discharge Recommendations: Other (Pending clinical progression)     Objective       09/05/22 0759   Precautions   Precautions Fall Risk;Swallow Precautions ( See Comments)   Vitals   O2 Delivery Device None - Room Air   Pain 0 - 10 Group   Therapist Pain Assessment Post Activity Pain Same as Prior to Activity;Nurse Notified;0   Patient / Family Goals   Patient / Family Goal #1 \"Yes\" when asked if she wanted more to drink   Goal #1 Outcome Progressing as expected   Short Term Goals   Short Term Goal # 1 Pt will consume diet of MM5/TN0 given swallow precautions w/ no overt " s/sx of aspiration and no worsening of respiratory status.   Goal Outcome # 1 Progressing as expected   Education Group   Dysphagia Patient Response Patient;Acceptance;Explanation;Verbal Demonstration;Reinforcement Needed

## 2022-09-05 NOTE — PROGRESS NOTES
Hospital Medicine Daily Progress Note    Date of Service  9/5/2022    Chief Complaint  Fouzia Santamaria is a 61 y.o. female admitted 8/23/2022 with leg pain    Hospital Course  62yo CAD with stetn to LAD, PAD with chronic aortic occlusion, HLD, HTN, DM, ongoing tobacco abuse, COPD.  In [facility] for AMI and had stent to LAD in July this year.  At that time found to have infra-renal aortic occlusion which was not addressed as it was felt to be too high risk.  DC'd on Rivaroxaban.  Presented here with increasing claudication pain.  Creat 2.6 up from 1.3.  on 8/26 went for Aortobifem bypass and renal artery eversion endarterectomies with Dr Siu with subsequent return to the OR on 8/28 for washout and closure.  While still on the vent 8/28 noted to have R side weakness.  CT  imaging showing L frontal/parietal infarcts.  MRA showing JOSAFAT occlusion.  Evaluated by Neuro and Neuro Rad and felt not to be an interventional candidate.  Extubated 9/2    Interval Problem Update  ROS limited by expressive aphasia    AFebrile  Sinus 90s  -120s  On RA    DW  Basilio by phone    I have discussed this patient's plan of care and discharge plan at IDT rounds today with Case Management, Nursing, Nursing leadership, and other members of the IDT team.    Consultants/Specialty  nephrology and neurology    Code Status  Full Code    Disposition  Patient is medically cleared for discharge.   Anticipate discharge to to an inpatient rehabilitation hospital.  I have placed the appropriate orders for post-discharge needs.    Review of Systems  Review of Systems   Unable to perform ROS: Other      Physical Exam  Temp:  [35.9 °C (96.7 °F)-37.7 °C (99.8 °F)] 36.4 °C (97.5 °F)  Pulse:  [] 96  Resp:  [15-22] 18  BP: ()/() 125/68  SpO2:  [93 %-100 %] 96 %    Physical Exam  Vitals reviewed.   Constitutional:       General: She is not in acute distress.     Appearance: Normal appearance. She is well-developed. She is not  diaphoretic.   HENT:      Head: Normocephalic and atraumatic.   Neck:      Vascular: No JVD.   Cardiovascular:      Rate and Rhythm: Normal rate and regular rhythm.      Heart sounds: Murmur heard.   Pulmonary:      Effort: Pulmonary effort is normal. No respiratory distress.      Breath sounds: No stridor. No wheezing or rales.   Abdominal:      Palpations: Abdomen is soft.      Tenderness: There is no abdominal tenderness. There is no guarding or rebound.   Musculoskeletal:         General: No tenderness.      Cervical back: Normal range of motion.      Right lower leg: No edema.      Left lower leg: No edema.   Skin:     General: Skin is warm and dry.      Capillary Refill: Capillary refill takes less than 2 seconds.      Findings: No rash.   Neurological:      Mental Status: She is alert.      Comments: Alert and attentive  Follows visual commands fairly consistently  Does not follow verbal commands  Speech is word salad  Puropseful with B upper extremities; unable to follow to test R leg       Fluids    Intake/Output Summary (Last 24 hours) at 9/5/2022 0710  Last data filed at 9/5/2022 0708  Gross per 24 hour   Intake 120 ml   Output 0 ml   Net 120 ml       Laboratory  Recent Labs     09/03/22  0535 09/04/22  1101   WBC 15.7* 15.7*   RBC 2.50* 2.60*   HEMOGLOBIN 7.7* 7.9*   HEMATOCRIT 23.3* 24.6*   MCV 93.2 94.6   MCH 30.8 30.4   MCHC 33.0* 32.1*   RDW 54.0* 53.8*   PLATELETCT 164 233   MPV 11.1 10.5     Recent Labs     09/03/22  0535 09/04/22  1101 09/05/22  0059   SODIUM 140 135 136   POTASSIUM 4.1 4.2 4.4   CHLORIDE 104 101 103   CO2 28 25 24   GLUCOSE 100* 121* 107*   BUN 48* 44* 41*   CREATININE 2.46* 2.42* 2.40*   CALCIUM 7.8* 8.3* 8.3*                   Imaging  DX-CHEST-PORTABLE (1 VIEW)   Final Result      1.  Mild worsening hypoinflation.   2.  Supportive tubing as described above.   3.  No other significant change from prior exam.            DX-CHEST-PORTABLE (1 VIEW)   Final Result      1.  Interval  removal of endotracheal tube and enteric catheter      2.  No other change      DX-CHEST-PORTABLE (1 VIEW)   Final Result      No significant interval change.      DX-CHEST-PORTABLE (1 VIEW)   Final Result         1.  Hazy retrocardiac and left lung base atelectasis or infiltrate, somewhat increased since prior study.   2.  Trace left pleural effusion   3.  Atherosclerosis      DX-CHEST-PORTABLE (1 VIEW)   Final Result         1.  Hazy retrocardiac and left lung base atelectasis or infiltrate.   2.  Trace left pleural effusion   3.  Atherosclerosis      DX-CHEST-PORTABLE (1 VIEW)   Final Result         1.  No acute cardiopulmonary disease.      DX-CHEST-PORTABLE (1 VIEW)   Final Result         1.  No acute cardiopulmonary disease.      MR-MRA HEAD-W/O   Final Result      1.  A2/A3 segment left anterior cerebral artery occlusion.   2.  Occlusion of the intradural left vertebral artery.   3.  Asymmetrically small caliber of the distal left internal carotid artery in the left middle cerebral artery.   4.  Focal stenosis of the distal M1 right middle cerebral artery.      MR-BRAIN-W/O   Final Result      1.  At least moderate sized acute/subacute left anterior cerebral artery infarct.   2.  Additional small recent right thalamic infarct.   3.  Multiple chronic bilateral cerebral and cerebellar infarcts.   4.  Cerebral atrophy and chronic microvascular ischemic type changes.   5.  No acute intracranial hemorrhage.      DX-ABDOMEN FOR TUBE PLACEMENT   Final Result         Feeding tube with tip projecting over the expected area of the stomach.      CT-HEAD W/O   Final Result         Patchy areas of ill-defined hypodensity in the left frontal and parietal lobes, concerning for acute multifocal infarcts. Further evaluation with MRI is recommended.      No acute intracranial hemorrhage.                  BA-EBBOUIG-9 VIEW   Final Result         No radiopaque foreign body identified. Previous sponge was removed.       OY-[id number]-7 VIEW   Final Result      1.  There is a curvilinear density in the right upper abdomen consistent with a retained surgical lap sponge. This was subsequently removed as there has already been a subsequent x-ray and this was no longer identified.   2.  There are new cutaneous skin staples in midline of the abdomen and pelvis.   3.  Bowel gas pattern is nonobstructive.      DX-CHEST-PORTABLE (1 VIEW)   Final Result      No acute cardiopulmonary abnormality.      DX-ABDOMEN FOR TUBE PLACEMENT   Final Result      1.  Enteric tube overlies the proximal stomach.      DX-CHEST-PORTABLE (1 VIEW)   Final Result         1.  No acute cardiopulmonary disease.   2.  Nasogastric tube is coiled back the side port with tip projecting cephalad terminating in the distal esophagus, recommend advancement.      EC-KLEBER W/O CONT   Final Result      CT-RENAL COLIC EVALUATION(A/P W/O)   Final Result         1. No hydronephrosis. Nonobstructive bilateral renal calculi seen on previous exam cannot be evaluated due to contrast in the collecting system.      2. Retained contrast in the kidney from prior CTA, likely due to nephropathy/renal failure.      US-RENAL   Final Result         1.  Mild right hydronephrosis   2.  Punctate left renal calculi without visualized obstructive changes.      CT-CTA AORTA-RO WITH & W/O-POST PROCESS   Final Result      1.  Occlusion of the abdominal aorta just below level of the renal arteries with occlusion of the common and external iliac arteries with reconstitution of flow via epigastric collaterals.   2.  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   3.  Diffusely small caliber bilateral lower extremity arteries with significant atherosclerosis of the bilateral superficial femoral arteries without high-grade stenosis.   4.  Likely single vessel runoff on the right and 2 vessel runoff on the left.   5.   Focal severe stenoses at the bilateral renal artery origins.      These findings were discussed with ESPERANZA DO on 8/23/2022 5:29 PM.           Assessment/Plan  * Peripheral arterial occlusive disease (HCC)- (present on admission)  Assessment & Plan  S/p operative vascular intervention,    Oropharyngeal dysphagia- (present on admission)  Assessment & Plan  Patient passed speech evaluation, monitor    Hypomagnesemia  Assessment & Plan  Monitor, replace as indicated    Stroke ([facility])- (present on admission)  Assessment & Plan  Acute likely intraoperative CVA  S/p evaluation from neurology, no intervention at the time  Antiplatelet therapy  Consult rehab  PT OT as tolerated    Acute postoperative respiratory failure ([facility])  Assessment & Plan  Improved, currently extubated, monitor, respiratory protocol, incentive spirometry as tolerated    Coagulopathy ([facility])  Assessment & Plan  Monitor,    Chronic distal aortic occlusion ([facility])- (present on admission)  Assessment & Plan  S/p aortobifem bypass  Vascular surgery managing    Chronic systolic congestive heart failure ([facility])- (present on admission)  Assessment & Plan  Ejection fraction around 30%.   Increase metoprolol to 50mg BID: plan transition to SR on DC  Add ARB as renal function and pressures allow  Monitor volume status    Occluded aorta ([facility])- (present on admission)  Assessment & Plan  S/p aortobifemoral bypass, endovascular repair    Aortic thrombus ([facility])- (present on admission)  Assessment & Plan  S/p vascular repair    COPD (chronic obstructive pulmonary disease) ([facility])- (present on admission)  Assessment & Plan  Respiratory protocol    IVON (acute kidney injury) ([facility])- (present on admission)  Assessment & Plan  CT with focal severe stenosis of bilateral renal artery origins, patient has occlusive aortic vascular disease  S/p surgical intervention on bilateral renal arteries  Creatinine slowly improving, good urine output  Monitor closely  Avoid  nephrotoxins    Hypertension- (present on admission)  Assessment & Plan  History of, monitor, as needed medication available  Will reinstitute beta-blockade    QT prolongation- (present on admission)  Assessment & Plan  Avoid QTC prolonging medications    Acute blood loss anemia  Assessment & Plan  Secondary to blood loss from surgery, currently recovering, monitor, transfuse as indicated    CAD (coronary artery disease)- (present on admission)  Assessment & Plan  stent to LAD in July: Needs to continue on DAPT  Cont BB and statin       VTE prophylaxis: heparin ppx    I have performed a physical exam and reviewed and updated ROS and Plan today (9/5/2022). In review of yesterday's note (9/4/2022), there are no changes except as documented above.

## 2022-09-05 NOTE — THERAPY
Physical Therapy   Daily Treatment     Patient Name: Fouzia Santamaria  Age:  61 y.o., Sex:  female  Medical Record #: [id number]  Today's Date: 9/5/2022     Precautions  Precautions: Fall Risk;Swallow Precautions ( See Comments)  Comments: abdominal binder on with mobilty    Assessment    Pt progressing as expected given diagnosis. Pt mobilized as detailed below. Pt requiring BUE support seated EOB, requiring Min/ModA to maintain midline. Worked on seated reaching with LUE anteriorly/towards L 2/2 R lean. L elbow propping x4 with ModA and hand over hand facilitation to complete. Joint approximation to BLE on floor. Co-contraction of BUE extremities in supine to facilitate RUE functional flexion/extension with improvement compared to RUE solely. Recommend placement 2/2 R hemiparesis, impaired balance, cognition, activity tolerance and endurance.     Plan    Continue current treatment plan.    DC Equipment Recommendations: Unable to determine at this time  Discharge Recommendations: Recommend post-acute placement for additional physical therapy services prior to discharge home      Subjective    Pt able to verbalize Yes and No.      Objective     09/05/22 1437   Vitals   O2 Delivery Device None - Room Air   Pain 0 - 10 Group   Therapist Pain Assessment Post Activity Pain Same as Prior to Activity;Nurse Notified  (intermittent c/o pain, not rated, agreeable to mobility)   Cognition    Cognition / Consciousness X   Speech/ Communication Global Aphasia;Slurred;Nods Appropriately   Level of Consciousness Alert   Ability To Follow Commands 1 Step  (75% of time follows verbal commands and gestures)   Sequencing Impaired   Initiation Impaired   Comments Pleasant and cooperative. Able to verbally respond to yes/no questions   Passive ROM Lower Body   Passive ROM Lower Body X   Comments R ankle DF to neutral   Active ROM Lower Body    Active ROM Lower Body  X   Comments no active movement noted RLE, LLE able to lift against  gravity   Strength Lower Body   Lower Body Strength  X   Comments Flaccid RLE. Able to lift RLE against gravity, unable to do MMT due to impaired command following.   Sensation Lower Body   Lower Extremity Sensation   X   Comments impaired pain and LT to RUE/LE compared to Left   Lower Body Muscle Tone   Lower Body Muscle Tone  X   Rt Lower Extremity Muscle Tone Non Functional   Comments Pt fluctuated btw RLE rigid and flaccid durring session. Initially unable to flex R knee, however, once sitting up RLE appearing flaccid. Upon passive range appeared to have some tone   Supine Lower Body Exercise   Comments passive stretching R ankle into PF, 5 seconds x5   Neuro-Muscular Treatments   Neuro-Muscular Treatments Anterior weight shift;Tactile Cuing;Weight Shift Right;Weight Shift Left;Verbal Cuing;Reciprocal Inhibition;Postural Facilitation;Joint Approximation;Facilitation;Co-Contraction   Comments BUE support seated EOB, requiring Min/ModA to maintain midline. Worked on seated reaching with LUE anteriorly/towards L 2/2 R lean. L elbow propping x4 with ModA and hand over hand facilitation to complete. Joint approximation to BLE on floor. Co-contraction of BUE extremities in supine to facilitate RUE functional flexion/extension with improvement compared to RUE solely.   Neurological Concerns   Neurological Concerns Yes   Comments Given R hemiparesis and global aphasia   Balance   Sitting Balance (Static) Poor -   Sitting Balance (Dynamic) Trace +   Weight Shift Sitting Poor   Skilled Intervention Verbal Cuing;Tactile Cuing;Sequencing;Postural Facilitation;Facilitation;Compensatory Strategies   Comments Via Min/ModA to maintain midline EOB 2/2 R lean and poor trunk control   Gait Analysis   Gait Level Of Assist Unable to Participate   Bed Mobility    Supine to Sit Total Assist   Sit to Supine Total Assist   Scooting Total Assist   Rolling Maximum Assist to Lt.;Maximal Assist to Rt.   Skilled Intervention Verbal  Cuing;Tactile Cuing;Facilitation;Sequencing;Compensatory Strategies   Functional Mobility   Sit to Stand Unable to Participate   Bed, Chair, Wheelchair Transfer Unable to Participate   Mobility EOB only   How much difficulty does the patient currently have...   Turning over in bed (including adjusting bedclothes, sheets and blankets)? 1   Sitting down on and standing up from a chair with arms (e.g., wheelchair, bedside commode, etc.) 1   Moving from lying on back to sitting on the side of the bed? 1   How much help from another person does the patient currently need...   Moving to and from a bed to a chair (including a wheelchair)? 1   Need to walk in a hospital room? 1   Climbing 3-5 steps with a railing? 1   6 clicks Mobility Score 6   Activity Tolerance   Sitting Edge of Bed 10 min   Comments limited 2/2 balance, R hemibody weakness   Short Term Goals    Short Term Goal # 1 pt will be able to complete supine<>Sitting with mod assist in 6tx in order to progress   Goal Outcome # 1 goal not met   Short Term Goal # 2 pt will be able to sit EOB with fair- balance in 6tx in order to decrease fall risk   Goal Outcome # 2 Goal not met   Short Term Goal # 3 pt will be able to complete bed<>chair transfer with max assist in 6tx in order to progress   Goal Outcome # 3 Goal not met   Education Group   Education Provided Role of Physical Therapist   Role of Physical Therapist Patient Response Patient;Acceptance;Verbal Demonstration;Reinforcement Needed   Anticipated Discharge Equipment and Recommendations   DC Equipment Recommendations Unable to determine at this time   Discharge Recommendations Recommend post-acute placement for additional physical therapy services prior to discharge home   Interdisciplinary Plan of Care Collaboration   IDT Collaboration with  Nursing;Therapy Tech   Patient Position at End of Therapy In Bed;Bed Alarm On;Call Light within Reach;Phone within Reach;Tray Table within Reach;Family / Friend in  Room  (RN in room)   Collaboration Comments Rn updated   Session Information   Date / Session Number  9/5- 3 (3/3, 9/5)

## 2022-09-05 NOTE — PROGRESS NOTES
Report received from Mary MONTALVO and care of patient assumed at 0645. Prevena wound vac is covering patient's abdominal incisions. No leak assessed. Patient was medicated for pain according to MAR parameters. Patient tolerated occupational therapy session earlier this afternoon. Patient continues to require 1:1 supervision and assistance with feeding. Doppler pulses confirmed BLE. Patient is being encouraged to change position frequently. Waffle mattress overlay in place. White board updated; call light within patient reach.

## 2022-09-05 NOTE — PROGRESS NOTES
"2 RN skin check complete:     Devices in place: Prevena wound vac to wound vac cannister. Purewick.     Skin assessed under devices: above as much as possible.     Scattered bruising noted to bilateral neck, BUE, labia, BLE. Large areas of purpura to back or right upper arm and right hip/upper thigh. Prevena wound vac in upside down \"Y\" shaped presentation to abdomen/lower abdominal region towards groin. Truncal, perineal, BLE edema noted. BLE cool, pale. Blanchable redness to sacrum, discoloration noted. Scattered tattoos.     Confirmed pressure ulcers found on: NA.  New potential pressure ulcers noted on: sacrum.     Wound consult placed: NA.  The following interventions in place: Q2 turns. Pillows for limb positioning/elevation. Waffle overlay/TAP system ordered. Protective mepilex placed to sacral area.   "

## 2022-09-05 NOTE — PROGRESS NOTES
Report received from AM RN; assumed care. Assessment partially completed. Unable to assess fully d/t expressive aphasia. Patient stating yes to questions asked. A&O person/place. Patient couldn't articulate time/situation. VSS, intermittently tachycardic/irregular. 97% on RA. Medicated for pain based on restlessness/grimacing/moaning; see MAR. Abdomen distended, round, semi-firm. Hypoactive BS x 4 noted. + void per CNA, lost to linens. Purewick in place. + eructation. + flatus heard while turning patient from side to side. LBM 09/3 per charging. Patient has SLP diet, patient slept through dinner. Patient is a set up/supervision/feeder when needing. PT/OT evaluation ordered.  Patient unable to ambulate per report. Discussed plan of care with patient. All questions answered.  High fall risk. Bed/strip alarm engaged. Bed in locked/lowest position.  Call light/personal belongings within reach.  All needs met, patient resting at present time.

## 2022-09-05 NOTE — CARE PLAN
The patient is Watcher - Medium risk of patient condition declining or worsening    Shift Goals  Clinical Goals: Pain management  Patient Goals: Pain management    Progress made toward(s) clinical / shift goals: PRN oxycodone administered per MAR.

## 2022-09-05 NOTE — PROGRESS NOTES
ACUTE CARE VASCULAR SERVICE  PROGRESS NOTE    TTF yesterday  More alert today, no distress, she is tolerating diet  HDS, no pressors, Hgb 11 -> 9.6 -> 8.7 -> 7.7 -> 7.9 yesterday  Creatinine 2.4 yesterday, UOP adequate    WBC 10 -> 14 -> 16 -> 16 yesterday, no apparent infection, no ABx, will monitor    Palpable DP pulses bilaterally,  New prevena applied to cover all incisions with minimal output    ASA/Plavix for recent coronary stent    PT/OT, dispo (possible SNF)    Appreciate Hospitalist support  Following along    Young Bauer MD  [facility]  Voalte preferred. Otherwise text to cell [phone] or call my office [phone]  __________________________________________________________________  Patient:Fouzia [address]   MRN:[id number]   CSN:[id number]

## 2022-09-05 NOTE — PROGRESS NOTES
Monitor Summary  SB-ST   Touchdown 40   Rare pvc   Rare pac  (B) pac   .13/.08/.44    Per monitor room

## 2022-09-05 NOTE — DIETARY
Nutrition Services: Brief Update    Pt previously on tube feeding.  Swallow evaluation by SLP 9/2; recommended diet advancement to L5/L0 (minced and moist with thin liquids).  Per ADL flow sheet, PO has been 25-75% of meals.  Cortrak removed, tube feeding order discontinued.    Recommendations/Plan:  Diet texture per SLP.  Encourage intake of meals.  Document intake of all meals as % taken in ADLs to provide interdisciplinary communication across all shifts.   Monitor weight.  Nutrition rep will continue to see patient for ongoing meal and snack preferences.   Oral nutrition supplements as needed to optimize intake.    RD will continue to follow.

## 2022-09-05 NOTE — CARE PLAN
The patient is Watcher.     Shift Goals  Clinical Goals: Comfort,  repositioning, UO, Q2 turns, tele monitoring, rest  Patient Goals: Pain control  Family Goals: AFIA    Progress made toward(s) clinical / shift goals:  Medicated for pain based on nonverbal communication. Q2 turning being provided. Pulse checks performed. Tele monitoring in place. Patient sleeping intermittently during shift.     Patient is not progressing towards the following goals: NA.

## 2022-09-05 NOTE — PROGRESS NOTES
Received call from Autumn in lab at 1700 with critical troponin of 134. Updated Dr. Mg via Voalte message.

## 2022-09-05 NOTE — THERAPY
Occupational Therapy  Daily Treatment     Patient Name: Fouzia Santamaria  Age:  61 y.o., Sex:  female  Medical Record #: [id number]  Today's Date: 9/5/2022     Precautions  Precautions: Fall Risk, Swallow Precautions ( See Comments)  Comments: abdominal binder on with mobilty    Assessment    Pt seen for OT tx w/ therapy tech. Pt was able to intermittently answer yes/no questions appropriately throughout the session. Pt required total A to move from supine to sit and initially required CGA to sit EOB. With fatigue, pt required max A to remain EOB. Pt unable to voluntarily flex shoulder, flex/extend elbow, or flex/extend wrist on R UE; grasping noted, but unclear whether it was voluntary or reflexive. Additionally, pt noted to have sublux in R shoulder. Pt able to initiate brushing hair w/ L hand while sitting EOB, but required max A to complete the task. Pt noted to have posterior/R push w/ fatigue this session. Pt demonstrates impaired cognition, balance, strength, and demonstrates hemiparesis that are limiting functional performance. Pt will continue to benefit from skilled OT while admitted to acute care.     Plan    Continue current treatment plan.    DC Equipment Recommendations: Unable to determine at this time  Discharge Recommendations: Recommend post-acute placement for additional occupational therapy services prior to discharge home    Subjective    Pleasant and agreeable; nodded yes when informed of plan for OT session     Objective     09/05/22 1144   Pain   Pain Scales 0 to 10 Scale    Pain 0 - 10 Group   Therapist Pain Assessment Post Activity Pain Same as Prior to Activity  (intermittent complaints of pain throughout session; answered yes when asked if it was in her R UE)   Non Verbal Descriptors   Non Verbal Scale  Calm;Unlabored Breathing   Cognition    Cognition / Consciousness X   Speech/ Communication Global Aphasia;Nods Appropriately;Slurred  (intermittently answering yes/no questions  appropriately)   Level of Consciousness Alert   Ability To Follow Commands 1 Step   Sequencing Impaired   Initiation Impaired   Comments Pleasant and cooperative.   Active ROM Upper Body   Active ROM Upper Body  X   Comments able to squeeze R hand, unclear if reflexive or voluntary   Upper Body Muscle Tone   Upper Body Muscle Tone  X   Rt Upper Extremity Muscle Tone Hypertonic   Comments sublux R shoulder noted   Balance   Sitting Balance (Static) Poor -   Sitting Balance (Dynamic) Trace +   Weight Shift Sitting Poor   Skilled Intervention Verbal Cuing;Tactile Cuing;Postural Facilitation;Compensatory Strategies;Facilitation   Comments started w/ CGA, but progressed to max A by end of session w/ posterior push   Bed Mobility    Supine to Sit Total Assist   Sit to Supine Total Assist   Scooting Total Assist   Skilled Intervention Verbal Cuing;Tactile Cuing;Facilitation;Compensatory Strategies   Comments HOB raised   Activities of Daily Living   Grooming Maximal Assist  (sitting EOB, brushing hair)   Skilled Intervention Verbal Cuing;Tactile Cuing;Facilitation;Compensatory Strategies   Functional Mobility   Sit to Stand Unable to Participate   Bed, Chair, Wheelchair Transfer Unable to Participate   Mobility EOB only due to poor cog, pain, and posterior push   Skilled Intervention Facilitation;Tactile Cuing;Verbal Cuing;Postural Facilitation   Visual Perception   Visual Perception  Not Tested   Activity Tolerance   Sitting in Chair NT   Sitting Edge of Bed ~10 min   Standing NT   Comments impaired strength and hemibody weakness   Patient / Family Goals   Patient / Family Goal #1 none stated   Short Term Goals   Short Term Goal # 1 Pt will initate purposeful movement with LUE   Goal Outcome # 1 Progressing slower than expected   Short Term Goal # 2 Pt will follow 1 step commands with 100% accuracy   Goal Outcome # 2 Progressing as expected   Short Term Goal # 3 Pt will demo fair- sitting balance in prep for seated ADLs    Goal Outcome # 3 Progressing slower than expected   Education Group   Education Provided Role of Occupational Therapist;Activities of Daily Living   Role of Occupational Therapist Patient Response Patient;Explanation;Verbal Demonstration;Acceptance   ADL Patient Response Patient;Acceptance;Explanation;Demonstration;Verbal Demonstration;Action Demonstration;Reinforcement Needed

## 2022-09-06 LAB
ANION GAP SERPL CALC-SCNC: 12 MMOL/L (ref 7–16)
BUN SERPL-MCNC: 40 MG/DL (ref 8–22)
CALCIUM SERPL-MCNC: 8.5 MG/DL (ref 8.5–10.5)
CHLORIDE SERPL-SCNC: 107 MMOL/L (ref 96–112)
CO2 SERPL-SCNC: 19 MMOL/L (ref 20–33)
CREAT SERPL-MCNC: 2.33 MG/DL (ref 0.5–1.4)
GFR SERPLBLD CREATININE-BSD FMLA CKD-EPI: 23 ML/MIN/1.73 M 2
GLUCOSE SERPL-MCNC: 94 MG/DL (ref 65–99)
POTASSIUM SERPL-SCNC: 5.2 MMOL/L (ref 3.6–5.5)
SODIUM SERPL-SCNC: 138 MMOL/L (ref 135–145)

## 2022-09-06 PROCEDURE — 700111 HCHG RX REV CODE 636 W/ 250 OVERRIDE (IP): Performed by: HOSPITALIST

## 2022-09-06 PROCEDURE — 36415 COLL VENOUS BLD VENIPUNCTURE: CPT

## 2022-09-06 PROCEDURE — 700102 HCHG RX REV CODE 250 W/ 637 OVERRIDE(OP): Performed by: SURGERY

## 2022-09-06 PROCEDURE — 700102 HCHG RX REV CODE 250 W/ 637 OVERRIDE(OP): Performed by: HOSPITALIST

## 2022-09-06 PROCEDURE — 770020 HCHG ROOM/CARE - TELE (206)

## 2022-09-06 PROCEDURE — 80048 BASIC METABOLIC PNL TOTAL CA: CPT

## 2022-09-06 PROCEDURE — A9270 NON-COVERED ITEM OR SERVICE: HCPCS | Performed by: HOSPITALIST

## 2022-09-06 PROCEDURE — 99254 IP/OBS CNSLTJ NEW/EST MOD 60: CPT | Performed by: PHYSICAL MEDICINE & REHABILITATION

## 2022-09-06 PROCEDURE — A9270 NON-COVERED ITEM OR SERVICE: HCPCS | Performed by: SURGERY

## 2022-09-06 PROCEDURE — 99232 SBSQ HOSP IP/OBS MODERATE 35: CPT | Performed by: HOSPITALIST

## 2022-09-06 RX ADMIN — DOCUSATE SODIUM 100 MG: 100 CAPSULE, LIQUID FILLED ORAL at 17:24

## 2022-09-06 RX ADMIN — POLYETHYLENE GLYCOL 3350 1 PACKET: 17 POWDER, FOR SOLUTION ORAL at 06:05

## 2022-09-06 RX ADMIN — METOPROLOL TARTRATE 50 MG: 50 TABLET, FILM COATED ORAL at 17:20

## 2022-09-06 RX ADMIN — PRAMIPEXOLE DIHYDROCHLORIDE 0.12 MG: 0.12 TABLET ORAL at 17:20

## 2022-09-06 RX ADMIN — ASPIRIN 81 MG 81 MG: 81 TABLET ORAL at 06:04

## 2022-09-06 RX ADMIN — OXYCODONE 5 MG: 5 TABLET ORAL at 02:49

## 2022-09-06 RX ADMIN — ATORVASTATIN CALCIUM 80 MG: 80 TABLET, FILM COATED ORAL at 20:47

## 2022-09-06 RX ADMIN — HEPARIN SODIUM 5000 UNITS: 5000 INJECTION, SOLUTION INTRAVENOUS; SUBCUTANEOUS at 20:46

## 2022-09-06 RX ADMIN — DOCUSATE SODIUM 50 MG AND SENNOSIDES 8.6 MG 1 TABLET: 8.6; 5 TABLET, FILM COATED ORAL at 20:46

## 2022-09-06 RX ADMIN — PRAMIPEXOLE DIHYDROCHLORIDE 0.12 MG: 0.12 TABLET ORAL at 12:27

## 2022-09-06 RX ADMIN — METOPROLOL TARTRATE 50 MG: 50 TABLET, FILM COATED ORAL at 06:05

## 2022-09-06 RX ADMIN — CLOPIDOGREL BISULFATE 75 MG: 75 TABLET ORAL at 06:04

## 2022-09-06 RX ADMIN — POLYETHYLENE GLYCOL 3350 1 PACKET: 17 POWDER, FOR SOLUTION ORAL at 17:24

## 2022-09-06 RX ADMIN — OXYCODONE 5 MG: 5 TABLET ORAL at 17:21

## 2022-09-06 RX ADMIN — OXYCODONE 5 MG: 5 TABLET ORAL at 11:19

## 2022-09-06 RX ADMIN — OXYCODONE 5 MG: 5 TABLET ORAL at 21:19

## 2022-09-06 RX ADMIN — PRAMIPEXOLE DIHYDROCHLORIDE 0.12 MG: 0.12 TABLET ORAL at 06:05

## 2022-09-06 RX ADMIN — EZETIMIBE 10 MG: 10 TABLET ORAL at 06:04

## 2022-09-06 RX ADMIN — HEPARIN SODIUM 5000 UNITS: 5000 INJECTION, SOLUTION INTRAVENOUS; SUBCUTANEOUS at 06:07

## 2022-09-06 NOTE — PROGRESS NOTES
Hospital Medicine Daily Progress Note    Date of Service  9/6/2022    Chief Complaint  Fouzia Santamaria is a 61 y.o. female admitted 8/23/2022 with leg pain    Hospital Course  60yo CAD with stetn to LAD, PAD with chronic aortic occlusion, HLD, HTN, DM, ongoing tobacco abuse, COPD.  In [facility] for AMI and had stent to LAD in July this year.  At that time found to have infra-renal aortic occlusion which was not addressed as it was felt to be too high risk.  DC'd on Rivaroxaban.  Presented here with increasing claudication pain.  Creat 2.6 up from 1.3.  on 8/26 went for Aortobifem bypass and renal artery eversion endarterectomies with Dr Siu with subsequent return to the OR on 8/28 for washout and closure.  While still on the vent 8/28 noted to have R side weakness.  CT  imaging showing L frontal/parietal infarcts.  MRA showing JOSAFAT occlusion.  Evaluated by Neuro and Neuro Rad and felt not to be an interventional candidate.  Extubated 9/2    Interval Problem Update  9/6: Patient was upset on bedside. Rehab has denied the patient due to low function.  I have ordered LTAC since she also has a Rachna.  I have ordered blood work for tomorrow since her hemoglobin was 7.9.    I have discussed this patient's plan of car placement in. short e and discharge plan at IDT rounds today with Case Management, Nursing, Nursing leadership, and other members of the IDT team.    Consultants/Specialty  nephrology and neurology    Code Status  Full Code    Disposition  Patient is medically cleared for discharge.   Anticipate discharge to to an inpatient rehabilitation hospital.  I have placed the appropriate orders for post-discharge needs.    Review of Systems  Review of Systems   Unable to perform ROS: Other      Physical Exam  Temp:  [36.1 °C (97 °F)-37.2 °C (98.9 °F)] 37 °C (98.6 °F)  Pulse:  [74-97] 97  Resp:  [18-19] 19  BP: (105-131)/(52-83) 129/77  SpO2:  [97 %-100 %] 99 %    Physical Exam  Vitals reviewed.   Constitutional:        General: She is not in acute distress.     Appearance: Normal appearance. She is well-developed. She is not diaphoretic.   HENT:      Head: Normocephalic and atraumatic.   Neck:      Vascular: No JVD.   Cardiovascular:      Rate and Rhythm: Normal rate and regular rhythm.      Heart sounds: Murmur heard.   Pulmonary:      Effort: Pulmonary effort is normal. No respiratory distress.      Breath sounds: No stridor. No wheezing or rales.   Abdominal:      Palpations: Abdomen is soft.      Tenderness: There is no abdominal tenderness. There is no guarding or rebound.   Musculoskeletal:         General: No tenderness.      Cervical back: Normal range of motion.      Right lower leg: No edema.      Left lower leg: No edema.   Skin:     General: Skin is warm and dry.      Capillary Refill: Capillary refill takes less than 2 seconds.      Findings: No rash.   Neurological:      Mental Status: She is alert.      Comments: Alert and attentive  Follows visual commands fairly consistently  Does not follow verbal commands  Speech is word salad  Puropseful with B upper extremities; unable to follow to test R leg       Fluids    Intake/Output Summary (Last 24 hours) at 9/6/2022 1357  Last data filed at 9/6/2022 1210  Gross per 24 hour   Intake --   Output 1125 ml   Net -1125 ml       Laboratory  Recent Labs     09/04/22  1101   WBC 15.7*   RBC 2.60*   HEMOGLOBIN 7.9*   HEMATOCRIT 24.6*   MCV 94.6   MCH 30.4   MCHC 32.1*   RDW 53.8*   PLATELETCT 233   MPV 10.5     Recent Labs     09/04/22  1101 09/05/22  0059 09/06/22  1019   SODIUM 135 136 138   POTASSIUM 4.2 4.4 5.2   CHLORIDE 101 103 107   CO2 25 24 19*   GLUCOSE 121* 107* 94   BUN 44* 41* 40*   CREATININE 2.42* 2.40* 2.33*   CALCIUM 8.3* 8.3* 8.5                   Imaging  DX-CHEST-PORTABLE (1 VIEW)   Final Result      1.  Mild worsening hypoinflation.   2.  Supportive tubing as described above.   3.  No other significant change from prior exam.             DX-CHEST-PORTABLE (1 VIEW)   Final Result      1.  Interval removal of endotracheal tube and enteric catheter      2.  No other change      DX-CHEST-PORTABLE (1 VIEW)   Final Result      No significant interval change.      DX-CHEST-PORTABLE (1 VIEW)   Final Result         1.  Hazy retrocardiac and left lung base atelectasis or infiltrate, somewhat increased since prior study.   2.  Trace left pleural effusion   3.  Atherosclerosis      DX-CHEST-PORTABLE (1 VIEW)   Final Result         1.  Hazy retrocardiac and left lung base atelectasis or infiltrate.   2.  Trace left pleural effusion   3.  Atherosclerosis      DX-CHEST-PORTABLE (1 VIEW)   Final Result         1.  No acute cardiopulmonary disease.      DX-CHEST-PORTABLE (1 VIEW)   Final Result         1.  No acute cardiopulmonary disease.      MR-MRA HEAD-W/O   Final Result      1.  A2/A3 segment left anterior cerebral artery occlusion.   2.  Occlusion of the intradural left vertebral artery.   3.  Asymmetrically small caliber of the distal left internal carotid artery in the left middle cerebral artery.   4.  Focal stenosis of the distal M1 right middle cerebral artery.      MR-BRAIN-W/O   Final Result      1.  At least moderate sized acute/subacute left anterior cerebral artery infarct.   2.  Additional small recent right thalamic infarct.   3.  Multiple chronic bilateral cerebral and cerebellar infarcts.   4.  Cerebral atrophy and chronic microvascular ischemic type changes.   5.  No acute intracranial hemorrhage.      DX-ABDOMEN FOR TUBE PLACEMENT   Final Result         Feeding tube with tip projecting over the expected area of the stomach.      CT-HEAD W/O   Final Result         Patchy areas of ill-defined hypodensity in the left frontal and parietal lobes, concerning for acute multifocal infarcts. Further evaluation with MRI is recommended.      No acute intracranial hemorrhage.                  [id number] VIEW   Final Result         No radiopaque foreign  body identified. Previous sponge was removed.      [id number] VIEW   Final Result      1.  There is a curvilinear density in the right upper abdomen consistent with a retained surgical lap sponge. This was subsequently removed as there has already been a subsequent x-ray and this was no longer identified.   2.  There are new cutaneous skin staples in midline of the abdomen and pelvis.   3.  Bowel gas pattern is nonobstructive.      DX-CHEST-PORTABLE (1 VIEW)   Final Result      No acute cardiopulmonary abnormality.      DX-ABDOMEN FOR TUBE PLACEMENT   Final Result      1.  Enteric tube overlies the proximal stomach.      DX-CHEST-PORTABLE (1 VIEW)   Final Result         1.  No acute cardiopulmonary disease.   2.  Nasogastric tube is coiled back the side port with tip projecting cephalad terminating in the distal esophagus, recommend advancement.      EC-KLEBER W/O CONT   Final Result      CT-RENAL COLIC EVALUATION(A/P W/O)   Final Result         1. No hydronephrosis. Nonobstructive bilateral renal calculi seen on previous exam cannot be evaluated due to contrast in the collecting system.      2. Retained contrast in the kidney from prior CTA, likely due to nephropathy/renal failure.      US-RENAL   Final Result         1.  Mild right hydronephrosis   2.  Punctate left renal calculi without visualized obstructive changes.      CT-CTA AORTA-RO WITH & W/O-POST PROCESS   Final Result      1.  Occlusion of the abdominal aorta just below level of the renal arteries with occlusion of the common and external iliac arteries with reconstitution of flow via epigastric collaterals.   2.  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   3.  Diffusely small caliber bilateral lower extremity arteries with significant atherosclerosis of the bilateral superficial femoral arteries without high-grade stenosis.   4.  Likely single vessel runoff on  the right and 2 vessel runoff on the left.   5.  Focal severe stenoses at the bilateral renal artery origins.      These findings were discussed with ESPERANZA DO on 8/23/2022 5:29 PM.           Assessment/Plan  * Peripheral arterial occlusive disease (HCC)- (present on admission)  Assessment & Plan  S/p operative vascular intervention,    Oropharyngeal dysphagia- (present on admission)  Assessment & Plan  Patient passed speech evaluation, monitor    Hypomagnesemia  Assessment & Plan  Monitor, replace as indicated    Stroke ([facility])- (present on admission)  Assessment & Plan  Acute possibly intraoperative CVA  S/p evaluation from neurology, no intervention at the time  Antiplatelet therapy  Statin  Consult rehab  PT OT SLP  Plan Rehab DC if appropriate per  Physiatry    Acute postoperative respiratory failure ([facility])  Assessment & Plan  Improved, currently extubated, monitor, respiratory protocol, incentive spirometry as tolerated    Coagulopathy ([facility])  Assessment & Plan  Monitor,    Chronic distal aortic occlusion ([facility])- (present on admission)  Assessment & Plan  S/p aortobifem bypass  Vascular surgery managing    Chronic systolic congestive heart failure ([facility])- (present on admission)  Assessment & Plan  Ejection fraction around 30%.   Increase metoprolol to 50mg BID: plan transition to SR on DC  Add ARB as renal function and pressures allow  Monitor volume status    Occluded aorta ([facility])- (present on admission)  Assessment & Plan  S/p aortobifemoral bypass, endovascular repair    Aortic thrombus ([facility])- (present on admission)  Assessment & Plan  S/p vascular repair    COPD (chronic obstructive pulmonary disease) ([facility])- (present on admission)  Assessment & Plan  Respiratory protocol    IVON (acute kidney injury) ([facility])- (present on admission)  Assessment & Plan  CT with focal severe stenosis of bilateral renal artery origins, patient has occlusive aortic vascular disease  S/p surgical intervention on bilateral renal  arteries  Creatinine slowly improving, good urine output  Monitor closely  Avoid nephrotoxins    Hypertension- (present on admission)  Assessment & Plan  History of, monitor, as needed medication available  Will reinstitute beta-blockade    QT prolongation- (present on admission)  Assessment & Plan  Avoid QTC prolonging medications    Acute blood loss anemia  Assessment & Plan  Secondary to blood loss from surgery, currently recovering, monitor, transfuse as indicated    CAD (coronary artery disease)- (present on admission)  Assessment & Plan  stent to LAD in July: Needs to continue on DAPT  Cont BB and statin       VTE prophylaxis: heparin ppx    I have performed a physical exam and reviewed and updated ROS and Plan today (9/6/2022). In review of yesterday's note (9/5/2022), there are no changes except as documented above.

## 2022-09-06 NOTE — DISCHARGE PLANNING
[facility] Transitional Care Coordination    Referral from: Dr. Nair    Insurance Provider on Facesheet: CHRISTIANO FFS    Potential Rehab Diagnosis: CVA    Chart review indicates patient may have on going medical management and may have therapy needs to possibly meet inpatient rehab facility criteria with the goal of returning to community.    D/C support will need to be verified: Spouse    Physiatry consultation forwarded per protocol.      Thank you for the referral.

## 2022-09-06 NOTE — PROGRESS NOTES
4 Eyes Skin Assessment Completed by KATIA Linda and KATIA Gunderson.    Head WDL  Ears WDL  Nose WDL  Mouth WDL  Neck Bruising  Breast/Chest Bruising  Shoulder Blades WDL  Spine WDL  (R) Arm/Elbow/Hand WDL  (L) Arm/Elbow/Hand WDL  Abdomen Incision  Groin Redness, Blanching, and Swelling  Scrotum/Coccyx/Buttocks Redness and Blanching  (R) Leg Bruising  (L) Leg Bruising  (R) Heel/Foot/Toe Edema  (L) Heel/Foot/Toe Edema          Devices In Places Pulse Ox      Interventions In Place Waffle Overlay, Pillows, Elbow Mepilex, Q2 Turns, Barrier Cream, and Pressure Redistribution Mattress    Possible Skin Injury No    Pictures Uploaded Into Epic N/A  Wound Consult Placed - Previously placed  RN Wound Prevention Protocol Ordered No

## 2022-09-06 NOTE — DISCHARGE PLANNING
Agency/Facility Name: [facility]   Outcome: DPA could not leave a voice message. Voicemail was full.  DPA will call back for updates.

## 2022-09-06 NOTE — CONSULTS
Physical Medicine and Rehabilitation Consultation              Date of initial consultation: 2022  Requesting provider: Micheal Bee DO   Consulting provider: Cathleen Mcgee D.O.  Reason for consultation: assess for acute inpatient rehab appropriateness  LOS: 14 Day(s)    Chief complaint: Right sided weakness, expressive aphasia    HPI: The patient is a 61 y.o. female with a past medical history of HTN, DM, HLD, tobacco abuse, and PAD with chronic aortic occlusion;  who presented on 2022  2:42 PM with incrasing claudication pain in her leg. Patient was evaluated in the ED and found to have elevated Cr, patient was admitted and vascular surgery was consulted. Patient was taken to the OR on  for renal artery eversion endarterectomies performed by Dr. Siu. Patient required return to OR on  for wash out and closure. Post operatively, patient had R sided weakness. Neurology was consulted due to concern for cva. CT head was obtained that showed L frontal/parietal infarccts. MRA showed JOSAFAT occlusion. Patient was deemed not to be a candidate for intervention. Currently, patient continues to have a wound vac in place for her abdo wash out, and she is total A for mobility.     Unable to obtained ROS due to expressive aphasia     Social Hx:  Patient lives with , unclear of housing set up. Family not present, unlikely to have  support. Family has not been persent per nursing.     Tobacco: regular tobacco use   Alcohol: unknown   Drugs: unknown     THERAPY:  Restrictions: Fall Risk, Swallow Precautions   PT: Functional mobility    Total A bed mobility, unable to participate in sit to stand     OT: ADLs   Max A  seated grooming     SLP:    Trial of minced and moist with thin liquids     IMAGIN/28 CT Head   IMPRESSION:        Patchy areas of ill-defined hypodensity in the left frontal and parietal lobes, concerning for acute multifocal infarcts. Further evaluation with  MRI is recommended.     No acute intracranial hemorrhage.    8/28 MRI Brain   IMPRESSION:     1.  At least moderate sized acute/subacute left anterior cerebral artery infarct.  2.  Additional small recent right thalamic infarct.  3.  Multiple chronic bilateral cerebral and cerebellar infarcts.  4.  Cerebral atrophy and chronic microvascular ischemic type changes.  5.  No acute intracranial hemorrhage.    PROCEDURES:  8/28 Open abdomen s/p bilateral renal artery endartectomy performed by Dr. Siu     PMH:  Past Medical History:   Diagnosis Date    Chronic obstructive pulmonary disease (HCC)     Diabetes (HCC)     Heart attack (HCC)     Hypertension     Stroke (HCC)        PSH:  Past Surgical History:   Procedure Laterality Date    MS EXPLORATORY OF ABDOMEN  8/28/2022    Procedure: LAPAROTOMY, EXPLORATORY;  Surgeon: Brandyn Siu M.D.;  Location: SURGERY [facility];  Service: General    AORTOBIFEM BYPASS Bilateral 8/26/2022    Procedure: CREATION, BYPASS, ARTERIAL, AORTA TO FEMORAL, BILATERAL;  Surgeon: Branydn Siu M.D.;  Location: SURGERY [facility];  Service: General    ENDARTERECTOMY Bilateral 8/26/2022    Procedure: RENAL ARTERY ENDARTERECTOMY;  Surgeon: Brandyn Siu M.D.;  Location: SURGERY [facility];  Service: General    MS EXPLORATORY OF ABDOMEN  8/26/2022    Procedure: LAPAROTOMY, EXPLORATORY; CONTROL OF POST-OPERATIVE BLEEDING;  Surgeon: Brandyn Siu M.D.;  Location: SURGERY [facility];  Service: General    APPLICATION OR REPLACEMENT, WOUND VAC  8/26/2022    Procedure: APPLICATION OR REPLACEMENT, WOUND VAC;  Surgeon: Brandyn Siu M.D.;  Location: SURGERY [facility];  Service: General    STENT PLACEMENT      THYROIDECTOMY         FHX:  History reviewed. No pertinent family history.    Medications:  Current Facility-Administered Medications   Medication Dose    metoprolol tartrate (LOPRESSOR) tablet 50 mg  50 mg    heparin injection 5,000 Units  5,000 Units    acetaminophen (Tylenol) tablet  "650 mg  650 mg    aspirin (ASA) chewable tab 81 mg  81 mg    oxyCODONE immediate-release (ROXICODONE) tablet 5 mg  5 mg    atorvastatin (LIPITOR) tablet 80 mg  80 mg    ezetimibe (ZETIA) tablet 10 mg  10 mg    docusate sodium (COLACE) capsule 100 mg  100 mg    polyethylene glycol/lytes (MIRALAX) PACKET 1 Packet  1 Packet    senna-docusate (PERICOLACE or SENOKOT S) 8.6-50 MG per tablet 1 Tablet  1 Tablet    senna-docusate (PERICOLACE or SENOKOT S) 8.6-50 MG per tablet 1 Tablet  1 Tablet    clopidogrel (PLAVIX) tablet 75 mg  75 mg    pramipexole (MIRAPEX) tablet 0.125 mg  0.125 mg    promethazine (PHENERGAN) tablet 12.5-25 mg  12.5-25 mg    Pharmacy Consult Request ...Pain Management Review 1 Each  1 Each    bisacodyl (DULCOLAX) suppository 10 mg  10 mg    sodium phosphate (Fleet) enema 133 mL  1 Each    Respiratory Therapy Consult      albuterol (PROVENTIL) 2.5mg/0.5ml nebulizer solution 2.5 mg  2.5 mg    Pharmacy Consult Request - to monitor for nephrotoxic agents  1 Each    labetalol (NORMODYNE/TRANDATE) injection 10 mg  10 mg    promethazine (PHENERGAN) suppository 12.5-25 mg  12.5-25 mg    prochlorperazine (COMPAZINE) injection 5-10 mg  5-10 mg       Allergies:  Allergies   Allergen Reactions    Pcn [Penicillins] Vomiting and Nausea    Toradol Vomiting and Nausea       Physical Exam:  Vitals: /73   Pulse 81   Temp 36.1 °C (97 °F) (Temporal)   Resp 18   Ht 1.651 m (5' 5\")   Wt 62.6 kg (138 lb 0.1 oz)   SpO2 100%   Gen: NAD, laying in bed, says \"ow\" during turning with nurses   Head: NC/AT  Eyes/ Nose/ Mouth: PERRLA, moist mucous membranes  Cardio: RRR, good distal perfusion, warm extremities  Pulm: normal respiratory effort, no cyanosis   Abd: Soft NT, wound vac in place   Ext: No peripheral edema. No calf tenderness. No clubbing.    Mental status: not oriented to person, place, situation   Speech: non fluent, states \"ow\"     CRANIAL NERVES:  2,3: visual acuity grossly intact, PERRL  3,4,6: EOMI " bilaterally, no nystagmus or diplopia  5: sensation intact to light touch bilaterally and symmetric  7: no facial asymmetry    Motor:moves LUE and LLE freely at least 3/5, unable to perform formal MMT due to impaired command following     Sensory:   intact to light touch through out left side       Labs: Reviewed and significant for   Recent Labs     09/04/22  1101   RBC 2.60*   HEMOGLOBIN 7.9*   HEMATOCRIT 24.6*   PLATELETCT 233   IRON 40   FERRITIN 4284.0*   TOTIRONBC 182*     Recent Labs     09/04/22  1101 09/05/22  0059   SODIUM 135 136   POTASSIUM 4.2 4.4   CHLORIDE 101 103   CO2 25 24   GLUCOSE 121* 107*   BUN 44* 41*   CREATININE 2.42* 2.40*   CALCIUM 8.3* 8.3*     No results found for this or any previous visit (from the past 24 hour(s)).      ASSESSMENT:  Patient is a 61 y.o. female admitted with PAD, now with L JOSAFAT     IGC Code / Diagnosis to Support: 0001.2 - Stroke: Right Body Involvement (Left Brain)    Rehabilitation: Impaired ADLs and mobility  Patient is a poor candidate for inpatient rehab based on poor tolerance for intensive reahb     Barriers to transfer include: Insurance authorization, TCCs to verify disposition, medical clearance and bed availability     Additional Recommendations:  Acute L JOSAFAT CVA  - greatest deficits are R sided weakness, expressive aphasia, and dysphagia  - occurred 8/8, post operatively after vascular intervention for open abdomen  artery eversion endarterectomies performed by Dr. Siu   - MRI with evidence of L JOSAFAT CVA  - neurology consulted, secondary stroke ppx with ASA, statin, plavix  - continue with PT/OT/SLP, goals for improved seated tolerance after deconditioned abdomen and R sided weakness       Chronic distal aortic occlusion  -s/p 8/26 aortobifem bypass  - with return to OR on 8/28  for open abomen endovascular repair   - vascular following, has wound vac in place     Dispo:  - patient is currently functioning below her level of function, will need post acute  rehab  - given patient's total A level of function and impaired command following, in addition to her wound vacs, recommend sending referals to LTAC; discussed with Hospitalist   - patient is currently not a candidate for IRF level therapy   - PM&R to follow peripherally         Medical Complexity:  PAD   L JOSAFAT occlusion   Chronic distal aortic occlusion   Dysphagia  Expressive aphasia   HTN  CAD  ABLA         DVT PPX: Heparin       Thank you for allowing us to participate in the care of this patient.     Patient was seen for 85 minutes on unit/floor of which > 50% of time was spent on counseling and coordination of care regarding the above, including prognosis, risk reduction, benefits of treatment, and options for next stage of care.    Cathleen Mcgee D.O.   Physical Medicine and Rehabilitation     Please note that this dictation was created using voice recognition software. I have made every reasonable attempt to correct obvious errors, but there may be errors of grammar and possibly content that I did not discover before finalizing the note.

## 2022-09-06 NOTE — DISCHARGE PLANNING
Case Management Discharge Planning    Admission Date: 8/23/2022  GMLOS: 6.6  ALOS: 14    6-Clicks ADL Score: 9  6-Clicks Mobility Score: 6  PT and/or OT Eval ordered: Yes  Post-acute Referrals Ordered: Yes  Post-acute Choice Obtained: Yes  Has referral(s) been sent to post-acute provider:  Yes      Anticipated Discharge Dispo: Discharge Disposition: D/T to SNF with Medicare cert in anticipation of skilled care (03)    DME Needed: No    Action(s) Taken: OTHER. Per [facility], [facility] declined. LSW requested DPA continues to monitor the referral to SNF.    Escalations Completed: Social Work. Ida, Director of [facility]. Dara, Supervisor of . Irwin, Supervisor of Social.      Medically Clear: Yes    Next Steps: Awaiting Accepting SNF.    Barriers to Discharge: Pending Placement, Medicaid FFS.    Is the patient up for discharge tomorrow: VÍCTOR

## 2022-09-06 NOTE — PROGRESS NOTES
Vascular surgery    Patient awake  Persistent neurologic deficits consistent with patient's stroke  Patient will need placement    Appreciate hospitalist help    Vascular surgery following    Brandyn Siu MD

## 2022-09-06 NOTE — WOUND TEAM
"[facility]  Inpatient Services  Wound and Skin Care Brief Evaluation    Admission Date: 8/23/2022     Last order of IP CONSULT TO WOUND CARE was found on 9/5/2022 from Hospital Encounter on 8/23/2022     HPI, PMH, SH: Reviewed    Chief Complaint   Patient presents with    Leg Pain     Patient reports bilateral leg pain \"for a while\". Patient poor historian, unable to answer most questions     Diagnosis: Peripheral arterial occlusive disease (HCC) [I77.9]  Chronic distal aortic occlusion (HCC) [I74.09]  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HCC) [I70.0]    Unit where seen by Wound Team: T428/02     Wound consult placed regarding Sacrum. Chart and images reviewed. This discussed with bedside RN, Evelyn. This RN in to assess patient. Pt pleasant and agreeable. Pt was turned to the right side with some assistance from bedside RN. Non-selectively debrided with  moist warm washcloth and no rinse foam soap. No pressure injuries or advanced wound care needs identified. Wound consult completed. No further follow up unless indicated and consulted.               RSKIN:   CURRENTLY IN PLACE (X), APPLIED THIS VISIT (A), ORDERED (O):   Q shift Manolo:  X  Q shift pressure point assessments:  X    Surface/Positioning   Pressure redistribution mattress       X     Low Airloss          Bariatric foam      Bariatric CHEO     Waffle cushion        Waffle Overlay    X      Reposition q 2 hours   X   TAPs Turning system   X  Z Milad Pillow     Offloading/Redistribution   Sacral Mepilex (Silicone dressing)   NA incontinence  Heel Mepilex (Silicone dressing)         Heel float boots (Prevalon boot)             Float Heels off Bed with Pillows           Respiratory   Silicone O2 tubing         Gray Foam Ear protectors     Cannula fixation Device (Tender )          High flow offloading Clip    Elastic head band offloading device      Anchorfast                                                         Trach with Optifoam split foam           "   Containment/Moisture Prevention     Rectal tube or BMS    Purwick/Condom Cath        Guerrero Catheter    Barrier wipes    X       Barrier paste     X  Antifungal tx      Interdry        Mobilization X, AFIA      Up to chair        Ambulate      PT/OT      Nutrition       Dietician        Diabetes Education      PO  X   TF     TPN     NPO   # days     Other

## 2022-09-06 NOTE — CARE PLAN
The patient is Stable - Low risk of patient condition declining or worsening    Shift Goals  Clinical Goals: Pain control, Q2 turns, WV monitoring, rest  Patient Goals: AFIA  Family Goals: n/a    Progress made toward(s) clinical / shift goals:  Medicated for pain; see MAR. Q2 turns in place. WV, no drainage noted/cannister changed. On prevena setting. Patient slept intermittently during shift.     Patient is not progressing towards the following goals: NA.

## 2022-09-07 LAB
ALBUMIN SERPL BCP-MCNC: 2.5 G/DL (ref 3.2–4.9)
ALBUMIN/GLOB SERPL: 0.6 G/DL
ALP SERPL-CCNC: 132 U/L (ref 30–99)
ALT SERPL-CCNC: 6 U/L (ref 2–50)
ANION GAP SERPL CALC-SCNC: 11 MMOL/L (ref 7–16)
AST SERPL-CCNC: 32 U/L (ref 12–45)
BASOPHILS # BLD AUTO: 0.6 % (ref 0–1.8)
BASOPHILS # BLD: 0.08 K/UL (ref 0–0.12)
BILIRUB SERPL-MCNC: 1.5 MG/DL (ref 0.1–1.5)
BUN SERPL-MCNC: 39 MG/DL (ref 8–22)
CALCIUM SERPL-MCNC: 8.3 MG/DL (ref 8.5–10.5)
CHLORIDE SERPL-SCNC: 108 MMOL/L (ref 96–112)
CO2 SERPL-SCNC: 20 MMOL/L (ref 20–33)
CREAT SERPL-MCNC: 2.3 MG/DL (ref 0.5–1.4)
EOSINOPHIL # BLD AUTO: 0.18 K/UL (ref 0–0.51)
EOSINOPHIL NFR BLD: 1.4 % (ref 0–6.9)
ERYTHROCYTE [DISTWIDTH] IN BLOOD BY AUTOMATED COUNT: 59.1 FL (ref 35.9–50)
GFR SERPLBLD CREATININE-BSD FMLA CKD-EPI: 24 ML/MIN/1.73 M 2
GLOBULIN SER CALC-MCNC: 3.9 G/DL (ref 1.9–3.5)
GLUCOSE SERPL-MCNC: 83 MG/DL (ref 65–99)
HCT VFR BLD AUTO: 24.7 % (ref 37–47)
HGB BLD-MCNC: 8 G/DL (ref 12–16)
IMM GRANULOCYTES # BLD AUTO: 0.21 K/UL (ref 0–0.11)
IMM GRANULOCYTES NFR BLD AUTO: 1.6 % (ref 0–0.9)
LYMPHOCYTES # BLD AUTO: 1.78 K/UL (ref 1–4.8)
LYMPHOCYTES NFR BLD: 13.9 % (ref 22–41)
MCH RBC QN AUTO: 31.4 PG (ref 27–33)
MCHC RBC AUTO-ENTMCNC: 32.4 G/DL (ref 33.6–35)
MCV RBC AUTO: 96.9 FL (ref 81.4–97.8)
MONOCYTES # BLD AUTO: 1.26 K/UL (ref 0–0.85)
MONOCYTES NFR BLD AUTO: 9.9 % (ref 0–13.4)
NEUTROPHILS # BLD AUTO: 9.26 K/UL (ref 2–7.15)
NEUTROPHILS NFR BLD: 72.6 % (ref 44–72)
NRBC # BLD AUTO: 0.03 K/UL
NRBC BLD-RTO: 0.2 /100 WBC
PLATELET # BLD AUTO: 446 K/UL (ref 164–446)
PMV BLD AUTO: 10.4 FL (ref 9–12.9)
POTASSIUM SERPL-SCNC: 4.8 MMOL/L (ref 3.6–5.5)
PROT SERPL-MCNC: 6.4 G/DL (ref 6–8.2)
RBC # BLD AUTO: 2.55 M/UL (ref 4.2–5.4)
SODIUM SERPL-SCNC: 139 MMOL/L (ref 135–145)
WBC # BLD AUTO: 12.8 K/UL (ref 4.8–10.8)

## 2022-09-07 PROCEDURE — 700111 HCHG RX REV CODE 636 W/ 250 OVERRIDE (IP): Performed by: HOSPITALIST

## 2022-09-07 PROCEDURE — 700102 HCHG RX REV CODE 250 W/ 637 OVERRIDE(OP): Performed by: HOSPITALIST

## 2022-09-07 PROCEDURE — 99232 SBSQ HOSP IP/OBS MODERATE 35: CPT | Performed by: HOSPITALIST

## 2022-09-07 PROCEDURE — 80053 COMPREHEN METABOLIC PANEL: CPT

## 2022-09-07 PROCEDURE — 85025 COMPLETE CBC W/AUTO DIFF WBC: CPT

## 2022-09-07 PROCEDURE — 770020 HCHG ROOM/CARE - TELE (206)

## 2022-09-07 PROCEDURE — A9270 NON-COVERED ITEM OR SERVICE: HCPCS | Performed by: SURGERY

## 2022-09-07 PROCEDURE — 36415 COLL VENOUS BLD VENIPUNCTURE: CPT

## 2022-09-07 PROCEDURE — 700102 HCHG RX REV CODE 250 W/ 637 OVERRIDE(OP): Performed by: SURGERY

## 2022-09-07 PROCEDURE — A9270 NON-COVERED ITEM OR SERVICE: HCPCS | Performed by: HOSPITALIST

## 2022-09-07 PROCEDURE — 97535 SELF CARE MNGMENT TRAINING: CPT

## 2022-09-07 RX ADMIN — OXYCODONE 5 MG: 5 TABLET ORAL at 01:57

## 2022-09-07 RX ADMIN — METOPROLOL TARTRATE 25 MG: 25 TABLET, FILM COATED ORAL at 18:07

## 2022-09-07 RX ADMIN — METOPROLOL TARTRATE 50 MG: 50 TABLET, FILM COATED ORAL at 06:50

## 2022-09-07 RX ADMIN — DOCUSATE SODIUM 100 MG: 100 CAPSULE, LIQUID FILLED ORAL at 06:50

## 2022-09-07 RX ADMIN — HEPARIN SODIUM 5000 UNITS: 5000 INJECTION, SOLUTION INTRAVENOUS; SUBCUTANEOUS at 06:51

## 2022-09-07 RX ADMIN — HEPARIN SODIUM 5000 UNITS: 5000 INJECTION, SOLUTION INTRAVENOUS; SUBCUTANEOUS at 22:17

## 2022-09-07 RX ADMIN — ATORVASTATIN CALCIUM 80 MG: 80 TABLET, FILM COATED ORAL at 22:17

## 2022-09-07 RX ADMIN — ASPIRIN 81 MG 81 MG: 81 TABLET ORAL at 06:50

## 2022-09-07 RX ADMIN — DOCUSATE SODIUM 50 MG AND SENNOSIDES 8.6 MG 1 TABLET: 8.6; 5 TABLET, FILM COATED ORAL at 22:20

## 2022-09-07 RX ADMIN — HEPARIN SODIUM 5000 UNITS: 5000 INJECTION, SOLUTION INTRAVENOUS; SUBCUTANEOUS at 13:20

## 2022-09-07 RX ADMIN — PRAMIPEXOLE DIHYDROCHLORIDE 0.12 MG: 0.12 TABLET ORAL at 13:20

## 2022-09-07 RX ADMIN — CLOPIDOGREL BISULFATE 75 MG: 75 TABLET ORAL at 06:50

## 2022-09-07 RX ADMIN — OXYCODONE 5 MG: 5 TABLET ORAL at 06:51

## 2022-09-07 RX ADMIN — ACETAMINOPHEN 650 MG: 325 TABLET ORAL at 03:58

## 2022-09-07 RX ADMIN — PRAMIPEXOLE DIHYDROCHLORIDE 0.12 MG: 0.12 TABLET ORAL at 06:50

## 2022-09-07 RX ADMIN — PRAMIPEXOLE DIHYDROCHLORIDE 0.12 MG: 0.12 TABLET ORAL at 18:08

## 2022-09-07 RX ADMIN — OXYCODONE 5 MG: 5 TABLET ORAL at 22:17

## 2022-09-07 RX ADMIN — EZETIMIBE 10 MG: 10 TABLET ORAL at 06:50

## 2022-09-07 RX ADMIN — OXYCODONE 5 MG: 5 TABLET ORAL at 16:09

## 2022-09-07 ASSESSMENT — LIFESTYLE VARIABLES: SUBSTANCE_ABUSE: 0

## 2022-09-07 ASSESSMENT — ENCOUNTER SYMPTOMS
HEARTBURN: 0
TINGLING: 0
BLURRED VISION: 0
HEADACHES: 0
HEMOPTYSIS: 0
SPUTUM PRODUCTION: 0
BRUISES/BLEEDS EASILY: 0
EYE PAIN: 0
POLYDIPSIA: 0
PHOTOPHOBIA: 0
SINUS PAIN: 0
ABDOMINAL PAIN: 0
CHILLS: 0
SHORTNESS OF BREATH: 0
DIZZINESS: 0
DEPRESSION: 0
FEVER: 0
DIAPHORESIS: 0
DOUBLE VISION: 0
TREMORS: 0
FLANK PAIN: 0
VOMITING: 0
WEAKNESS: 0
FALLS: 0
BACK PAIN: 0
STRIDOR: 0
HALLUCINATIONS: 0
WHEEZING: 0
PND: 0
NECK PAIN: 0
CONSTIPATION: 0
ORTHOPNEA: 0
NAUSEA: 0
COUGH: 0
CLAUDICATION: 0
MYALGIAS: 0
PALPITATIONS: 0
SORE THROAT: 0
BLOOD IN STOOL: 0
DIARRHEA: 0

## 2022-09-07 NOTE — PROGRESS NOTES
Diminished vasculature; Multiple attempts to start PIV by this RN unsuccessful  Escalated to 2 different Ultra sound certified ICU RNs, both unsuccessful. NOC VAT team unavailable.  MD notified, order received ok to leave out

## 2022-09-07 NOTE — CARE PLAN
Problem: Skin Integrity  Goal: Skin integrity is maintained or improved  Outcome: Progressing     Problem: Fall Risk  Goal: Patient will remain free from falls  Outcome: Progressing     The patient is Watcher - Medium risk of patient condition declining or worsening    Shift Goals  Clinical Goals: Pain control  Patient Goals: rest  Family Goals: n/a    Progress made toward(s) clinical / shift goals:  Pain medicated PRN, non-verbal pain assessment scale in place. Q2H turns in place, pt compliant. 1-1 supervised feeds, diet per SLP    Patient is not progressing towards the following goals:

## 2022-09-07 NOTE — THERAPY
"   09/07/22 1400   Interdisciplinary Plan of Care Collaboration   Collaboration Comments OT tx attempted x2. Pt batting at therapist/tech's hands and stating \"no\" when therapist/tech attempted to move blankets or initiate therapy. Attempted to determine whether pt in pain, fatigued, or nauseous with yes/no questions. Pt stated \"no\" to these questions. Pt stated yes and responded w/ \"I am frustrated\" with slow/dysarthric speech when asked if pt feeling down. Therapist educated pt on importance of getting OOB, and pathology of bedrest. OT discussed missed therapy w/ RN who reported pt mentation was better in the am this date and agreed that it may be more beneficial to attempt again tomorrow. Will follow up for OT tx tomorrow as able.     "

## 2022-09-07 NOTE — PROGRESS NOTES
Hospital Medicine Daily Progress Note    Date of Service  9/7/2022    Chief Complaint  Fouzia Santamaria is a 61 y.o. female admitted 8/23/2022 with leg pain    Hospital Course  62yo CAD with stetn to LAD, PAD with chronic aortic occlusion, HLD, HTN, DM, ongoing tobacco abuse, COPD.  In [facility] for AMI and had stent to LAD in July this year.  At that time found to have infra-renal aortic occlusion which was not addressed as it was felt to be too high risk.  DC'd on Rivaroxaban.  Presented here with increasing claudication pain.  Creat 2.6 up from 1.3.  on 8/26 went for Aortobifem bypass and renal artery eversion endarterectomies with Dr Siu with subsequent return to the OR on 8/28 for washout and closure.  While still on the vent 8/28 noted to have R side weakness.  CT  imaging showing L frontal/parietal infarcts.  MRA showing JOSAFAT occlusion.  Evaluated by Neuro and Neuro Rad and felt not to be an interventional candidate.  Extubated 9/2    Interval Problem Update  9/6: Patient was upset on bedside. Rehab has denied the patient due to low function.  I have ordered LTAC since she also has a Rachna.  I have ordered blood work for tomorrow since her hemoglobin was 7.9.  9/7: Hemoglobin has improved to 8.  WBC has down trended to 12.8.  She was less tearful on examination today.  SNF placement pending.    I have discussed this patient's plan of car placement in. short e and discharge plan at IDT rounds today with Case Management, Nursing, Nursing leadership, and other members of the IDT team.    Consultants/Specialty  nephrology and neurology    Code Status  Full Code    Disposition  Patient is medically cleared for discharge.   Anticipate discharge to to an inpatient rehabilitation hospital.  I have placed the appropriate orders for post-discharge needs.    Review of Systems  Review of Systems   Unable to perform ROS: Acuity of condition   Constitutional:  Negative for chills, diaphoresis, fever and malaise/fatigue.    HENT:  Negative for congestion, ear discharge, ear pain, hearing loss, nosebleeds, sinus pain, sore throat and tinnitus.    Eyes:  Negative for blurred vision, double vision, photophobia and pain.   Respiratory:  Negative for cough, hemoptysis, sputum production, shortness of breath, wheezing and stridor.    Cardiovascular:  Negative for chest pain, palpitations, orthopnea, claudication, leg swelling and PND.   Gastrointestinal:  Negative for abdominal pain, blood in stool, constipation, diarrhea, heartburn, melena, nausea and vomiting.   Genitourinary:  Negative for dysuria, flank pain, frequency, hematuria and urgency.   Musculoskeletal:  Negative for back pain, falls, joint pain, myalgias and neck pain.   Skin:  Negative for itching and rash.   Neurological:  Negative for dizziness, tingling, tremors, weakness and headaches.   Endo/Heme/Allergies:  Negative for environmental allergies and polydipsia. Does not bruise/bleed easily.   Psychiatric/Behavioral:  Negative for depression, hallucinations, substance abuse and suicidal ideas.       Physical Exam  Temp:  [36.2 °C (97.2 °F)-37 °C (98.6 °F)] 36.8 °C (98.3 °F)  Pulse:  [] 88  Resp:  [18] 18  BP: (110-146)/(66-84) 110/80  SpO2:  [94 %-100 %] 100 %    Physical Exam  Vitals reviewed.   Constitutional:       General: She is not in acute distress.     Appearance: Normal appearance. She is well-developed. She is not diaphoretic.   HENT:      Head: Normocephalic and atraumatic.   Neck:      Vascular: No JVD.   Cardiovascular:      Rate and Rhythm: Normal rate and regular rhythm.      Heart sounds: Murmur heard.   Pulmonary:      Effort: Pulmonary effort is normal. No respiratory distress.      Breath sounds: No stridor. No wheezing or rales.   Abdominal:      Palpations: Abdomen is soft.      Tenderness: There is no abdominal tenderness. There is no guarding or rebound.   Musculoskeletal:         General: No tenderness.      Cervical back: Normal range of  motion.      Right lower leg: No edema.      Left lower leg: No edema.   Skin:     General: Skin is warm and dry.      Capillary Refill: Capillary refill takes less than 2 seconds.      Findings: No rash.   Neurological:      Mental Status: She is alert.      Comments: Alert and attentive  Follows visual commands fairly consistently  Does not follow verbal commands  Speech is word salad  Puropseful with B upper extremities; unable to follow to test R leg       Fluids    Intake/Output Summary (Last 24 hours) at 9/7/2022 1238  Last data filed at 9/7/2022 1046  Gross per 24 hour   Intake 0 ml   Output 400 ml   Net -400 ml       Laboratory  Recent Labs     09/07/22  1027   WBC 12.8*   RBC 2.55*   HEMOGLOBIN 8.0*   HEMATOCRIT 24.7*   MCV 96.9   MCH 31.4   MCHC 32.4*   RDW 59.1*   PLATELETCT 446   MPV 10.4     Recent Labs     09/05/22  0059 09/06/22  1019 09/07/22  0310   SODIUM 136 138 139   POTASSIUM 4.4 5.2 4.8   CHLORIDE 103 107 108   CO2 24 19* 20   GLUCOSE 107* 94 83   BUN 41* 40* 39*   CREATININE 2.40* 2.33* 2.30*   CALCIUM 8.3* 8.5 8.3*                   Imaging  DX-CHEST-PORTABLE (1 VIEW)   Final Result      1.  Mild worsening hypoinflation.   2.  Supportive tubing as described above.   3.  No other significant change from prior exam.            DX-CHEST-PORTABLE (1 VIEW)   Final Result      1.  Interval removal of endotracheal tube and enteric catheter      2.  No other change      DX-CHEST-PORTABLE (1 VIEW)   Final Result      No significant interval change.      DX-CHEST-PORTABLE (1 VIEW)   Final Result         1.  Hazy retrocardiac and left lung base atelectasis or infiltrate, somewhat increased since prior study.   2.  Trace left pleural effusion   3.  Atherosclerosis      DX-CHEST-PORTABLE (1 VIEW)   Final Result         1.  Hazy retrocardiac and left lung base atelectasis or infiltrate.   2.  Trace left pleural effusion   3.  Atherosclerosis      DX-CHEST-PORTABLE (1 VIEW)   Final Result         1.  No  acute cardiopulmonary disease.      DX-CHEST-PORTABLE (1 VIEW)   Final Result         1.  No acute cardiopulmonary disease.      MR-MRA HEAD-W/O   Final Result      1.  A2/A3 segment left anterior cerebral artery occlusion.   2.  Occlusion of the intradural left vertebral artery.   3.  Asymmetrically small caliber of the distal left internal carotid artery in the left middle cerebral artery.   4.  Focal stenosis of the distal M1 right middle cerebral artery.      MR-BRAIN-W/O   Final Result      1.  At least moderate sized acute/subacute left anterior cerebral artery infarct.   2.  Additional small recent right thalamic infarct.   3.  Multiple chronic bilateral cerebral and cerebellar infarcts.   4.  Cerebral atrophy and chronic microvascular ischemic type changes.   5.  No acute intracranial hemorrhage.      DX-ABDOMEN FOR TUBE PLACEMENT   Final Result         Feeding tube with tip projecting over the expected area of the stomach.      CT-HEAD W/O   Final Result         Patchy areas of ill-defined hypodensity in the left frontal and parietal lobes, concerning for acute multifocal infarcts. Further evaluation with MRI is recommended.      No acute intracranial hemorrhage.                  [id number] VIEW   Final Result         No radiopaque foreign body identified. Previous sponge was removed.      [id number] VIEW   Final Result      1.  There is a curvilinear density in the right upper abdomen consistent with a retained surgical lap sponge. This was subsequently removed as there has already been a subsequent x-ray and this was no longer identified.   2.  There are new cutaneous skin staples in midline of the abdomen and pelvis.   3.  Bowel gas pattern is nonobstructive.      DX-CHEST-PORTABLE (1 VIEW)   Final Result      No acute cardiopulmonary abnormality.      DX-ABDOMEN FOR TUBE PLACEMENT   Final Result      1.  Enteric tube overlies the proximal stomach.      DX-CHEST-PORTABLE (1 VIEW)   Final Result          1.  No acute cardiopulmonary disease.   2.  Nasogastric tube is coiled back the side port with tip projecting cephalad terminating in the distal esophagus, recommend advancement.      EC-KLEBER W/O CONT   Final Result      CT-RENAL COLIC EVALUATION(A/P W/O)   Final Result         1. No hydronephrosis. Nonobstructive bilateral renal calculi seen on previous exam cannot be evaluated due to contrast in the collecting system.      2. Retained contrast in the kidney from prior CTA, likely due to nephropathy/renal failure.      US-RENAL   Final Result         1.  Mild right hydronephrosis   2.  Punctate left renal calculi without visualized obstructive changes.      CT-CTA AORTA-RO WITH & W/O-POST PROCESS   Final Result      1.  Occlusion of the abdominal aorta just below level of the renal arteries with occlusion of the common and external iliac arteries with reconstitution of flow via epigastric collaterals.   2.  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   3.  Diffusely small caliber bilateral lower extremity arteries with significant atherosclerosis of the bilateral superficial femoral arteries without high-grade stenosis.   4.  Likely single vessel runoff on the right and 2 vessel runoff on the left.   5.  Focal severe stenoses at the bilateral renal artery origins.      These findings were discussed with ESPERANZA DO on 8/23/2022 5:29 PM.           Assessment/Plan  * Peripheral arterial occlusive disease (HCC)- (present on admission)  Assessment & Plan  S/p operative vascular intervention,    Oropharyngeal dysphagia- (present on admission)  Assessment & Plan  Patient passed speech evaluation, monitor    Hypomagnesemia  Assessment & Plan  Monitor, replace as indicated    Stroke (HCC)- (present on admission)  Assessment & Plan  Acute possibly intraoperative CVA  S/p evaluation from neurology, no intervention at the  time  Antiplatelet therapy  Statin  Consult rehab  PT OT SLP  Plan Rehab DC if appropriate per  Physiatry    Acute postoperative respiratory failure (HCC)  Assessment & Plan  Improved, currently extubated, monitor, respiratory protocol, incentive spirometry as tolerated    Coagulopathy ([facility])  Assessment & Plan  Monitor,    Chronic distal aortic occlusion ([facility])- (present on admission)  Assessment & Plan  S/p aortobifem bypass  Vascular surgery managing    Chronic systolic congestive heart failure ([facility])- (present on admission)  Assessment & Plan  Ejection fraction around 30%.   Increase metoprolol to 50mg BID: plan transition to SR on DC  Add ARB as renal function and pressures allow  Monitor volume status    Occluded aorta ([facility])- (present on admission)  Assessment & Plan  S/p aortobifemoral bypass, endovascular repair    Aortic thrombus ([facility])- (present on admission)  Assessment & Plan  S/p vascular repair    COPD (chronic obstructive pulmonary disease) ([facility])- (present on admission)  Assessment & Plan  Respiratory protocol    IVON (acute kidney injury) ([facility])- (present on admission)  Assessment & Plan  CT with focal severe stenosis of bilateral renal artery origins, patient has occlusive aortic vascular disease  S/p surgical intervention on bilateral renal arteries  Creatinine slowly improving, good urine output  Monitor closely  Avoid nephrotoxins    Hypertension- (present on admission)  Assessment & Plan  History of, monitor, as needed medication available  Will reinstitute beta-blockade    QT prolongation- (present on admission)  Assessment & Plan  Avoid QTC prolonging medications    Acute blood loss anemia  Assessment & Plan  Secondary to blood loss from surgery, currently recovering, monitor, transfuse as indicated    CAD (coronary artery disease)- (present on admission)  Assessment & Plan  stent to LAD in July: Needs to continue on DAPT  Cont BB and statin       VTE prophylaxis: heparin ppx    I have performed a  physical exam and reviewed and updated ROS and Plan today (9/7/2022). In review of yesterday's note (9/6/2022), there are no changes except as documented above.

## 2022-09-07 NOTE — DISCHARGE PLANNING
Following for post acute services per Dr. Mcgee consult Patient is a poor candidate for inpatient rehab based on poor tolerance for intensive rehab. Anticipate skilled nursing for post acute care.

## 2022-09-07 NOTE — DISCHARGE PLANNING
Agency/Facility Name: [facility]   Spoke To: Juan Antonio   Outcome: Referral is currently under review at this time. DPA was notfied that updated PT/OT noted are needed.    SW notifed

## 2022-09-08 ENCOUNTER — APPOINTMENT (OUTPATIENT)
Dept: RADIOLOGY | Facility: MEDICAL CENTER | Age: 62
DRG: 270 | End: 2022-09-08
Attending: HOSPITALIST
Payer: MEDICAID

## 2022-09-08 LAB
ALBUMIN SERPL BCP-MCNC: 2.6 G/DL (ref 3.2–4.9)
ALBUMIN/GLOB SERPL: 0.7 G/DL
ALP SERPL-CCNC: 115 U/L (ref 30–99)
ALT SERPL-CCNC: <5 U/L (ref 2–50)
ANION GAP SERPL CALC-SCNC: 9 MMOL/L (ref 7–16)
AST SERPL-CCNC: 27 U/L (ref 12–45)
BASOPHILS # BLD AUTO: 0.2 % (ref 0–1.8)
BASOPHILS # BLD: 0.03 K/UL (ref 0–0.12)
BILIRUB SERPL-MCNC: 0.8 MG/DL (ref 0.1–1.5)
BUN SERPL-MCNC: 38 MG/DL (ref 8–22)
CALCIUM SERPL-MCNC: 8.2 MG/DL (ref 8.5–10.5)
CHLORIDE SERPL-SCNC: 110 MMOL/L (ref 96–112)
CO2 SERPL-SCNC: 21 MMOL/L (ref 20–33)
CREAT SERPL-MCNC: 2.4 MG/DL (ref 0.5–1.4)
EKG IMPRESSION: NORMAL
EOSINOPHIL # BLD AUTO: 0.12 K/UL (ref 0–0.51)
EOSINOPHIL NFR BLD: 1 % (ref 0–6.9)
ERYTHROCYTE [DISTWIDTH] IN BLOOD BY AUTOMATED COUNT: 62.6 FL (ref 35.9–50)
GFR SERPLBLD CREATININE-BSD FMLA CKD-EPI: 23 ML/MIN/1.73 M 2
GLOBULIN SER CALC-MCNC: 3.7 G/DL (ref 1.9–3.5)
GLUCOSE SERPL-MCNC: 115 MG/DL (ref 65–99)
HCT VFR BLD AUTO: 23.4 % (ref 37–47)
HGB BLD-MCNC: 7.3 G/DL (ref 12–16)
IMM GRANULOCYTES # BLD AUTO: 0.11 K/UL (ref 0–0.11)
IMM GRANULOCYTES NFR BLD AUTO: 0.9 % (ref 0–0.9)
LYMPHOCYTES # BLD AUTO: 1.52 K/UL (ref 1–4.8)
LYMPHOCYTES NFR BLD: 12.4 % (ref 22–41)
MCH RBC QN AUTO: 31.3 PG (ref 27–33)
MCHC RBC AUTO-ENTMCNC: 31.2 G/DL (ref 33.6–35)
MCV RBC AUTO: 100.4 FL (ref 81.4–97.8)
MONOCYTES # BLD AUTO: 1.16 K/UL (ref 0–0.85)
MONOCYTES NFR BLD AUTO: 9.5 % (ref 0–13.4)
NEUTROPHILS # BLD AUTO: 9.29 K/UL (ref 2–7.15)
NEUTROPHILS NFR BLD: 76 % (ref 44–72)
NRBC # BLD AUTO: 0 K/UL
NRBC BLD-RTO: 0 /100 WBC
PLATELET # BLD AUTO: 526 K/UL (ref 164–446)
PMV BLD AUTO: 10.1 FL (ref 9–12.9)
POTASSIUM SERPL-SCNC: 5.3 MMOL/L (ref 3.6–5.5)
PROT SERPL-MCNC: 6.3 G/DL (ref 6–8.2)
RBC # BLD AUTO: 2.33 M/UL (ref 4.2–5.4)
SODIUM SERPL-SCNC: 140 MMOL/L (ref 135–145)
TROPONIN T SERPL-MCNC: 103 NG/L (ref 6–19)
TROPONIN T SERPL-MCNC: 106 NG/L (ref 6–19)
WBC # BLD AUTO: 12.2 K/UL (ref 4.8–10.8)

## 2022-09-08 PROCEDURE — 36415 COLL VENOUS BLD VENIPUNCTURE: CPT

## 2022-09-08 PROCEDURE — 84484 ASSAY OF TROPONIN QUANT: CPT | Mod: 91

## 2022-09-08 PROCEDURE — 99232 SBSQ HOSP IP/OBS MODERATE 35: CPT | Performed by: HOSPITALIST

## 2022-09-08 PROCEDURE — 97535 SELF CARE MNGMENT TRAINING: CPT

## 2022-09-08 PROCEDURE — 92526 ORAL FUNCTION THERAPY: CPT

## 2022-09-08 PROCEDURE — 93005 ELECTROCARDIOGRAM TRACING: CPT | Performed by: HOSPITALIST

## 2022-09-08 PROCEDURE — 85025 COMPLETE CBC W/AUTO DIFF WBC: CPT

## 2022-09-08 PROCEDURE — 97530 THERAPEUTIC ACTIVITIES: CPT

## 2022-09-08 PROCEDURE — 80053 COMPREHEN METABOLIC PANEL: CPT

## 2022-09-08 PROCEDURE — A9270 NON-COVERED ITEM OR SERVICE: HCPCS | Performed by: HOSPITALIST

## 2022-09-08 PROCEDURE — 700111 HCHG RX REV CODE 636 W/ 250 OVERRIDE (IP): Performed by: HOSPITALIST

## 2022-09-08 PROCEDURE — 97112 NEUROMUSCULAR REEDUCATION: CPT

## 2022-09-08 PROCEDURE — 700102 HCHG RX REV CODE 250 W/ 637 OVERRIDE(OP): Performed by: SURGERY

## 2022-09-08 PROCEDURE — A9270 NON-COVERED ITEM OR SERVICE: HCPCS | Performed by: SURGERY

## 2022-09-08 PROCEDURE — 93010 ELECTROCARDIOGRAM REPORT: CPT | Performed by: INTERNAL MEDICINE

## 2022-09-08 PROCEDURE — 770020 HCHG ROOM/CARE - TELE (206)

## 2022-09-08 PROCEDURE — 700102 HCHG RX REV CODE 250 W/ 637 OVERRIDE(OP): Performed by: HOSPITALIST

## 2022-09-08 RX ADMIN — ASPIRIN 81 MG 81 MG: 81 TABLET ORAL at 06:24

## 2022-09-08 RX ADMIN — OXYCODONE 5 MG: 5 TABLET ORAL at 06:27

## 2022-09-08 RX ADMIN — CLOPIDOGREL BISULFATE 75 MG: 75 TABLET ORAL at 06:24

## 2022-09-08 RX ADMIN — ACETAMINOPHEN 650 MG: 325 TABLET ORAL at 09:27

## 2022-09-08 RX ADMIN — EZETIMIBE 10 MG: 10 TABLET ORAL at 06:24

## 2022-09-08 RX ADMIN — PRAMIPEXOLE DIHYDROCHLORIDE 0.12 MG: 0.12 TABLET ORAL at 12:05

## 2022-09-08 RX ADMIN — HEPARIN SODIUM 5000 UNITS: 5000 INJECTION, SOLUTION INTRAVENOUS; SUBCUTANEOUS at 14:59

## 2022-09-08 RX ADMIN — ATORVASTATIN CALCIUM 80 MG: 80 TABLET, FILM COATED ORAL at 21:09

## 2022-09-08 RX ADMIN — OXYCODONE 5 MG: 5 TABLET ORAL at 17:27

## 2022-09-08 RX ADMIN — OXYCODONE 5 MG: 5 TABLET ORAL at 12:09

## 2022-09-08 RX ADMIN — HEPARIN SODIUM 5000 UNITS: 5000 INJECTION, SOLUTION INTRAVENOUS; SUBCUTANEOUS at 21:09

## 2022-09-08 RX ADMIN — HEPARIN SODIUM 5000 UNITS: 5000 INJECTION, SOLUTION INTRAVENOUS; SUBCUTANEOUS at 06:24

## 2022-09-08 RX ADMIN — METOPROLOL TARTRATE 25 MG: 25 TABLET, FILM COATED ORAL at 06:24

## 2022-09-08 RX ADMIN — POLYETHYLENE GLYCOL 3350 1 PACKET: 17 POWDER, FOR SOLUTION ORAL at 17:28

## 2022-09-08 RX ADMIN — DOCUSATE SODIUM 100 MG: 100 CAPSULE, LIQUID FILLED ORAL at 17:27

## 2022-09-08 RX ADMIN — PRAMIPEXOLE DIHYDROCHLORIDE 0.12 MG: 0.12 TABLET ORAL at 17:27

## 2022-09-08 RX ADMIN — PRAMIPEXOLE DIHYDROCHLORIDE 0.12 MG: 0.12 TABLET ORAL at 06:44

## 2022-09-08 RX ADMIN — DOCUSATE SODIUM 100 MG: 100 CAPSULE, LIQUID FILLED ORAL at 06:24

## 2022-09-08 ASSESSMENT — ENCOUNTER SYMPTOMS
NAUSEA: 0
BLURRED VISION: 0
NECK PAIN: 0
SINUS PAIN: 0
SORE THROAT: 0
DIZZINESS: 0
WHEEZING: 0
BLOOD IN STOOL: 0
COUGH: 0
CONSTIPATION: 0
BRUISES/BLEEDS EASILY: 0
TREMORS: 0
STRIDOR: 0
HEADACHES: 0
VOMITING: 0
CHILLS: 0
SHORTNESS OF BREATH: 0
HALLUCINATIONS: 0
POLYDIPSIA: 0
PND: 0
SPUTUM PRODUCTION: 0
BACK PAIN: 0
MYALGIAS: 0
DEPRESSION: 0
TINGLING: 0
HEARTBURN: 0
FALLS: 0
CLAUDICATION: 0
EYE PAIN: 0
PALPITATIONS: 0
PHOTOPHOBIA: 0
WEAKNESS: 0
HEMOPTYSIS: 0
ORTHOPNEA: 0
FLANK PAIN: 0
DIARRHEA: 0
DOUBLE VISION: 0
DIAPHORESIS: 0
FEVER: 0
ABDOMINAL PAIN: 0

## 2022-09-08 ASSESSMENT — COGNITIVE AND FUNCTIONAL STATUS - GENERAL
HELP NEEDED FOR BATHING: A LOT
WALKING IN HOSPITAL ROOM: TOTAL
TURNING FROM BACK TO SIDE WHILE IN FLAT BAD: UNABLE
MOVING FROM LYING ON BACK TO SITTING ON SIDE OF FLAT BED: UNABLE
MOVING TO AND FROM BED TO CHAIR: UNABLE
DAILY ACTIVITIY SCORE: 12
DRESSING REGULAR LOWER BODY CLOTHING: A LOT
STANDING UP FROM CHAIR USING ARMS: TOTAL
EATING MEALS: A LOT
DRESSING REGULAR UPPER BODY CLOTHING: A LOT
TOILETING: A LOT
MOBILITY SCORE: 6
CLIMB 3 TO 5 STEPS WITH RAILING: TOTAL
SUGGESTED CMS G CODE MODIFIER MOBILITY: CN
SUGGESTED CMS G CODE MODIFIER DAILY ACTIVITY: CL
PERSONAL GROOMING: A LOT

## 2022-09-08 ASSESSMENT — LIFESTYLE VARIABLES: SUBSTANCE_ABUSE: 0

## 2022-09-08 ASSESSMENT — GAIT ASSESSMENTS: GAIT LEVEL OF ASSIST: UNABLE TO PARTICIPATE

## 2022-09-08 NOTE — THERAPY
Occupational Therapy  Daily Treatment     Patient Name: Fouzia Santamaria  Age:  61 y.o., Sex:  female  Medical Record #: [id number]  Today's Date: 9/8/2022     Precautions  Precautions: Fall Risk, Swallow Precautions ( See Comments)  Comments: abdominal binder on with mobilty    Assessment    Pt was seen for OT tx in collaboration w/PT given complexity of bx and mobility. Pt is inconsistent w/command following through out session. Question behavioral component/frustration vs confusion. Pt was frantic w/EOB sitting often pushing w/LUE and not following commands, observed increased tone in RUE was able to bring hand to face but could not relax into full elbow extension or prop w/LUE on to bed. Once BTB pt calmed and was better able to participate in supported supine feeding, grooming and dressing. OT will continue to follow pt may need further medical intervention to address mood and tone issues related to her CVA. (physiatry to assist?)     Plan  Continue current treatment plan.    DC Equipment Recommendations: Unable to determine at this time  Discharge Recommendations: Recommend post-acute placement for additional occupational therapy services prior to discharge home    Subjective    Yes-no responses      Objective     09/08/22 1416   Treatment Charges   Charges Yes   OT Self Care / ADL 1   OT Therapy Activity 1   Total Time Spent   OT Time Spent Yes   OT Self Care / ADL (Minutes) 15   OT Therapy Activity (Minutes) 16   OT Total Time Spent (Calculated) 31   Precautions   Precautions Fall Risk;Swallow Precautions ( See Comments)   Comments abdominal binder on with mobilty   Pain 0 - 10 Group   Therapist Pain Assessment During Activity;Nurse Notified  (appears uncomfortable to unable to verbalize pain location or severity d/t aphasia)   Cognition    Cognition / Consciousness X   Speech/ Communication Global Aphasia;Mouths Words;Nods Appropriately   Level of Consciousness Alert   Ability To Follow Commands 1 Step    Safety Awareness Impaired;Impulsive   New Learning Impaired   Attention Impaired   Sequencing Impaired   Initiation Impaired   Comments inconsistent w/command following and cues, verbalizes yes/no and appears to be at least 50% accurate, but at times will only say no to all questions or cues; question behvaioral vs confusion   Passive ROM Upper Body   Passive ROM Upper Body X   Comments RUE w/increased hypertonicity at elbow   Active ROM Upper Body   Active ROM Upper Body  X   Comments Using RUE distally to hold object and touch ace, but has limited elbow extention; fluctuating tone; LUE WFL   Strength Upper Body   Upper Body Strength  X   Comments RUE 2/5 LUE WFL   Sensation Upper Body   Upper Extremity Sensation  Not Tested   Upper Body Muscle Tone   Upper Body Muscle Tone  X   Rt Upper Extremity Muscle Tone Hypertonic;Fluctuating   Other Treatments   Other Treatments Provided Focused on sitting EOB in midline, attempting to have pt follow simple command w/extremity movement but appeared frantic and confused, once back in supine more agreeable particpated in grooming, gown change and self feeding   Balance   Sitting Balance (Static) Poor +   Sitting Balance (Dynamic) Poor   Weight Shift Sitting Poor   Skilled Intervention Compensatory Strategies;Facilitation;Tactile Cuing;Verbal Cuing   Bed Mobility    Supine to Sit Maximal Assist   Sit to Supine Maximal Assist   Skilled Intervention Verbal Cuing;Tactile Cuing;Facilitation;Compensatory Strategies   Comments able to use LUE but is inconsistent w/volitional movement/command following   Activities of Daily Living   Grooming Maximal Assist;Seated   Upper Body Dressing Moderate Assist   Lower Body Dressing Total Assist   Toileting   (NT)   Skilled Intervention Verbal Cuing;Tactile Cuing;Facilitation;Compensatory Strategies   Comments able to hold milk in R hand and drink w/L hand, assist w/lifting RUE into sleeve for gown change   How much help from another person  does the patient currently need...   6 Clicks Daily Activity Score 12   Modified Lajas (mRS)   Modified Lajas Score 4   Functional Mobility   Sit to Stand Unable to Participate   Bed, Chair, Wheelchair Transfer Unable to Participate   Mobility EOB   Skilled Intervention Verbal Cuing;Facilitation;Compensatory Strategies   Visual Perception   Visual Perception  Not Tested   Activity Tolerance   Comments limited by bx   Patient / Family Goals   Patient / Family Goal #1 none stated   Short Term Goals   Short Term Goal # 1 Pt will initate purposeful movement with LUE   Goal Outcome # 1 Progressing as expected   Short Term Goal # 2 Pt will follow 1 step commands with 100% accuracy   Goal Outcome # 2 Progressing slower than expected   Short Term Goal # 3 Pt will demo fair- sitting balance in prep for seated ADLs   Goal Outcome # 3 Progressing slower than expected   Education Group   Role of Occupational Therapist Patient Response Patient;Explanation;Verbal Demonstration;Acceptance   ADL Patient Response Patient;Acceptance;Explanation;Demonstration;Verbal Demonstration;Action Demonstration;Reinforcement Needed

## 2022-09-08 NOTE — PROGRESS NOTES
Hospital Medicine Daily Progress Note    Date of Service  9/8/2022    Chief Complaint  Fouzia Santamaria is a 61 y.o. female admitted 8/23/2022 with leg pain    Hospital Course  60yo CAD with stetn to LAD, PAD with chronic aortic occlusion, HLD, HTN, DM, ongoing tobacco abuse, COPD.  In [facility] for AMI and had stent to LAD in July this year.  At that time found to have infra-renal aortic occlusion which was not addressed as it was felt to be too high risk.  DC'd on Rivaroxaban.  Presented here with increasing claudication pain.  Creat 2.6 up from 1.3.  on 8/26 went for Aortobifem bypass and renal artery eversion endarterectomies with Dr Siu with subsequent return to the OR on 8/28 for washout and closure.  While still on the vent 8/28 noted to have R side weakness.  CT  imaging showing L frontal/parietal infarcts.  MRA showing JOSAFAT occlusion.  Evaluated by Neuro and Neuro Rad and felt not to be an interventional candidate.  Extubated 9/2    Interval Problem Update  9/6: Patient was upset on bedside. Rehab has denied the patient due to low function.  I have ordered LTAC since she also has a Rachna.  I have ordered blood work for tomorrow since her hemoglobin was 7.9.  9/7: Hemoglobin has improved to 8.  WBC has down trended to 12.8.  She was less tearful on examination today.  SNF placement pending.  9/8: Patient was found to be bradycardic.  EKG found sinus bradycardia with T wave inversions in the anterior leads.  I reviewed her EKGs in the past and T wave inversions are old.  There is no significant change from a prior EKG.  Troponin is elevated secondary to kidney failure  I will continue to trend this.    I have discussed this patient's plan of car placement in. short e and discharge plan at IDT rounds today with Case Management, Nursing, Nursing leadership, and other members of the IDT team.    Consultants/Specialty  nephrology and neurology    Code Status  Full Code    Disposition  Patient is medically  cleared for discharge.   Anticipate discharge to to an inpatient rehabilitation hospital.  I have placed the appropriate orders for post-discharge needs.    Review of Systems  Review of Systems   Unable to perform ROS: Acuity of condition   Constitutional:  Negative for chills, diaphoresis, fever and malaise/fatigue.   HENT:  Negative for congestion, ear discharge, ear pain, hearing loss, nosebleeds, sinus pain, sore throat and tinnitus.    Eyes:  Negative for blurred vision, double vision, photophobia and pain.   Respiratory:  Negative for cough, hemoptysis, sputum production, shortness of breath, wheezing and stridor.    Cardiovascular:  Negative for chest pain, palpitations, orthopnea, claudication, leg swelling and PND.   Gastrointestinal:  Negative for abdominal pain, blood in stool, constipation, diarrhea, heartburn, melena, nausea and vomiting.   Genitourinary:  Negative for dysuria, flank pain, frequency, hematuria and urgency.   Musculoskeletal:  Negative for back pain, falls, joint pain, myalgias and neck pain.   Skin:  Negative for itching and rash.   Neurological:  Negative for dizziness, tingling, tremors, weakness and headaches.   Endo/Heme/Allergies:  Negative for environmental allergies and polydipsia. Does not bruise/bleed easily.   Psychiatric/Behavioral:  Negative for depression, hallucinations, substance abuse and suicidal ideas.       Physical Exam  Temp:  [36.2 °C (97.1 °F)-36.8 °C (98.2 °F)] 36.6 °C (97.8 °F)  Pulse:  [56-92] 56  Resp:  [17-18] 17  BP: (107-132)/(59-83) 107/64  SpO2:  [92 %-99 %] 92 %    Physical Exam  Vitals reviewed.   Constitutional:       General: She is not in acute distress.     Appearance: Normal appearance. She is well-developed. She is not diaphoretic.   HENT:      Head: Normocephalic and atraumatic.   Neck:      Vascular: No JVD.   Cardiovascular:      Rate and Rhythm: Normal rate and regular rhythm.      Heart sounds: Murmur heard.   Pulmonary:      Effort:  Pulmonary effort is normal. No respiratory distress.      Breath sounds: No stridor. No wheezing or rales.   Abdominal:      Palpations: Abdomen is soft.      Tenderness: There is no abdominal tenderness. There is no guarding or rebound.   Musculoskeletal:         General: No tenderness.      Cervical back: Normal range of motion.      Right lower leg: No edema.      Left lower leg: No edema.   Skin:     General: Skin is warm and dry.      Capillary Refill: Capillary refill takes less than 2 seconds.      Findings: No rash.   Neurological:      Mental Status: She is alert.      Comments: Alert and attentive  Follows visual commands fairly consistently  Does not follow verbal commands  Speech is word salad  Puropseful with B upper extremities; unable to follow to test R leg       Fluids    Intake/Output Summary (Last 24 hours) at 9/8/2022 1309  Last data filed at 9/8/2022 0305  Gross per 24 hour   Intake 0 ml   Output 0 ml   Net 0 ml       Laboratory  Recent Labs     09/07/22  1027   WBC 12.8*   RBC 2.55*   HEMOGLOBIN 8.0*   HEMATOCRIT 24.7*   MCV 96.9   MCH 31.4   MCHC 32.4*   RDW 59.1*   PLATELETCT 446   MPV 10.4     Recent Labs     09/06/22  1019 09/07/22  0310   SODIUM 138 139   POTASSIUM 5.2 4.8   CHLORIDE 107 108   CO2 19* 20   GLUCOSE 94 83   BUN 40* 39*   CREATININE 2.33* 2.30*   CALCIUM 8.5 8.3*                   Imaging  IR-US GUIDED PIV   Final Result    Ultrasound-guided PERIPHERAL IV INSERTION performed by    qualified nursing staff as above.      DX-CHEST-PORTABLE (1 VIEW)   Final Result      1.  Mild worsening hypoinflation.   2.  Supportive tubing as described above.   3.  No other significant change from prior exam.            DX-CHEST-PORTABLE (1 VIEW)   Final Result      1.  Interval removal of endotracheal tube and enteric catheter      2.  No other change      DX-CHEST-PORTABLE (1 VIEW)   Final Result      No significant interval change.      DX-CHEST-PORTABLE (1 VIEW)   Final Result         1.   Hazy retrocardiac and left lung base atelectasis or infiltrate, somewhat increased since prior study.   2.  Trace left pleural effusion   3.  Atherosclerosis      DX-CHEST-PORTABLE (1 VIEW)   Final Result         1.  Hazy retrocardiac and left lung base atelectasis or infiltrate.   2.  Trace left pleural effusion   3.  Atherosclerosis      DX-CHEST-PORTABLE (1 VIEW)   Final Result         1.  No acute cardiopulmonary disease.      DX-CHEST-PORTABLE (1 VIEW)   Final Result         1.  No acute cardiopulmonary disease.      MR-MRA HEAD-W/O   Final Result      1.  A2/A3 segment left anterior cerebral artery occlusion.   2.  Occlusion of the intradural left vertebral artery.   3.  Asymmetrically small caliber of the distal left internal carotid artery in the left middle cerebral artery.   4.  Focal stenosis of the distal M1 right middle cerebral artery.      MR-BRAIN-W/O   Final Result      1.  At least moderate sized acute/subacute left anterior cerebral artery infarct.   2.  Additional small recent right thalamic infarct.   3.  Multiple chronic bilateral cerebral and cerebellar infarcts.   4.  Cerebral atrophy and chronic microvascular ischemic type changes.   5.  No acute intracranial hemorrhage.      DX-ABDOMEN FOR TUBE PLACEMENT   Final Result         Feeding tube with tip projecting over the expected area of the stomach.      CT-HEAD W/O   Final Result         Patchy areas of ill-defined hypodensity in the left frontal and parietal lobes, concerning for acute multifocal infarcts. Further evaluation with MRI is recommended.      No acute intracranial hemorrhage.                  XG-[id number]-1 VIEW   Final Result         No radiopaque foreign body identified. Previous sponge was removed.      UW-[id number]-3 VIEW   Final Result      1.  There is a curvilinear density in the right upper abdomen consistent with a retained surgical lap sponge. This was subsequently removed as there has already been a subsequent x-ray and  this was no longer identified.   2.  There are new cutaneous skin staples in midline of the abdomen and pelvis.   3.  Bowel gas pattern is nonobstructive.      DX-CHEST-PORTABLE (1 VIEW)   Final Result      No acute cardiopulmonary abnormality.      DX-ABDOMEN FOR TUBE PLACEMENT   Final Result      1.  Enteric tube overlies the proximal stomach.      DX-CHEST-PORTABLE (1 VIEW)   Final Result         1.  No acute cardiopulmonary disease.   2.  Nasogastric tube is coiled back the side port with tip projecting cephalad terminating in the distal esophagus, recommend advancement.      EC-KLEBER W/O CONT   Final Result      CT-RENAL COLIC EVALUATION(A/P W/O)   Final Result         1. No hydronephrosis. Nonobstructive bilateral renal calculi seen on previous exam cannot be evaluated due to contrast in the collecting system.      2. Retained contrast in the kidney from prior CTA, likely due to nephropathy/renal failure.      US-RENAL   Final Result         1.  Mild right hydronephrosis   2.  Punctate left renal calculi without visualized obstructive changes.      CT-CTA AORTA-RO WITH & W/O-POST PROCESS   Final Result      1.  Occlusion of the abdominal aorta just below level of the renal arteries with occlusion of the common and external iliac arteries with reconstitution of flow via epigastric collaterals.   2.  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   3.  Diffusely small caliber bilateral lower extremity arteries with significant atherosclerosis of the bilateral superficial femoral arteries without high-grade stenosis.   4.  Likely single vessel runoff on the right and 2 vessel runoff on the left.   5.  Focal severe stenoses at the bilateral renal artery origins.      These findings were discussed with ESPERANZA DO on 8/23/2022 5:29 PM.           Assessment/Plan  * Peripheral arterial occlusive disease (HCC)- (present on  admission)  Assessment & Plan  S/p operative vascular intervention,    Oropharyngeal dysphagia- (present on admission)  Assessment & Plan  Patient passed speech evaluation, monitor    Hypomagnesemia  Assessment & Plan  Monitor, replace as indicated    Stroke ([facility])- (present on admission)  Assessment & Plan  Acute possibly intraoperative CVA  S/p evaluation from neurology, no intervention at the time  Antiplatelet therapy  Statin  Consult rehab  PT OT SLP  Plan Rehab DC if appropriate per  Physiatry    Acute postoperative respiratory failure ([facility])  Assessment & Plan  Improved, currently extubated, monitor, respiratory protocol, incentive spirometry as tolerated    Coagulopathy ([facility])  Assessment & Plan  Monitor,    Chronic distal aortic occlusion ([facility])- (present on admission)  Assessment & Plan  S/p aortobifem bypass  Vascular surgery managing    Chronic systolic congestive heart failure ([facility])- (present on admission)  Assessment & Plan  Ejection fraction around 30%.   Hold metoprolol due to bradycardia  Add ARB as renal function and pressures allow  Monitor volume status    Occluded aorta ([facility])- (present on admission)  Assessment & Plan  S/p aortobifemoral bypass, endovascular repair    Aortic thrombus ([facility])- (present on admission)  Assessment & Plan  S/p vascular repair    COPD (chronic obstructive pulmonary disease) ([facility])- (present on admission)  Assessment & Plan  Respiratory protocol    IVON (acute kidney injury) ([facility])- (present on admission)  Assessment & Plan  CT with focal severe stenosis of bilateral renal artery origins, patient has occlusive aortic vascular disease  S/p surgical intervention on bilateral renal arteries  Creatinine slowly improving, good urine output  Monitor closely  Avoid nephrotoxins    Hypertension- (present on admission)  Assessment & Plan  History of, monitor, as needed medication available  Will reinstitute beta-blockade    QT prolongation- (present on admission)  Assessment &  Plan  Avoid QTC prolonging medications    Acute blood loss anemia  Assessment & Plan  Secondary to blood loss from surgery, currently recovering, monitor, transfuse as indicated    CAD (coronary artery disease)- (present on admission)  Assessment & Plan  stent to LAD in July: Needs to continue on DAPT  Cont BB and statin       VTE prophylaxis: heparin ppx    I have performed a physical exam and reviewed and updated ROS and Plan today (9/8/2022). In review of yesterday's note (9/7/2022), there are no changes except as documented above.

## 2022-09-08 NOTE — THERAPY
"Physical Therapy Education Note    Patient Name: Fouzia Santamaria  Age:  61 y.o., Sex:  female  Medical Record #: [id number]  Today's Date: 2022    PT session attempted. Pt repeatedly stating \"no\" and \" I can't\" with multiple attempts at OOB mobility and supine therex despite education regarding negative effects of prolonged bed rest. Pt appropriately responding yes/no to name and . Pt with difficulty elaborating on why she is refusing mobility, responding no to pain and fatigue, asked if 2/2 frustration x2 as pt declined mobility for that reason yesterday, pt ultimately responding \"yes\". Pt did reply \"yes\" when asked if willing to participate tomorrow. Will re-attempt as appropriate.     Michele Martinez  "

## 2022-09-08 NOTE — THERAPY
"Speech Language Pathology  Daily Treatment     Patient Name: Fouzia Santamaria  Age:  61 y.o., Sex:  female  Medical Record #: [id number]  Today's Date: 9/8/2022     Precautions  Precautions: (P) Swallow Precautions ( See Comments)  Comments: abdominal binder on with mobilty    Assessment    The patient was seen on this date for a dysphagia treatment session. The patient was agreeable to sips of soda and jamil crackers. Patient is edentulous and appreciated to have prolonged and effortful mastication of solid textures. When asked if she would like to continue her minced moist diet she was agreeable.  The patient consumed sequential sips of thin liquids without any overt s/sx of aspiration appreciated.     Recommendations  1.  Minced moist w/ thin liquids  2.  Instrumentation: None indicated  3.  Swallowing Instructions & Precautions:   Supervision: Encourage self-feeding  Positioning: Fully upright and midline during oral intake  Medication: Whole with puree, Crush with applesauce or puree, as appropriate  Strategies: Small bites/sips, Slow rate of intake  Oral Care: Q4h     Plan    Continue current treatment plan.    Discharge Recommendations: Recommend home health for continued speech therapy services     Objective       09/08/22 1650   Precautions   Precautions Swallow Precautions ( See Comments)   Vitals   O2 Delivery Device None - Room Air   Pain 0 - 10 Group   Therapist Pain Assessment Post Activity Pain Same as Prior to Activity;Nurse Notified;0   Patient / Family Goals   Patient / Family Goal #1 \"Yes\" when asked if she wanted more to drink   Goal #1 Outcome Progressing as expected   Short Term Goals   Short Term Goal # 1 Pt will consume diet of MM5/TN0 given swallow precautions w/ no overt s/sx of aspiration and no worsening of respiratory status.   Goal Outcome # 1 Progressing as expected   Education Group   Education Provided Dysphagia   Dysphagia Patient Response Patient;Acceptance;Explanation;Verbal " Demonstration;Reinforcement Needed   Anticipated Discharge Needs   Discharge Recommendations Recommend home health for continued speech therapy services   Therapy Recommendations Upon DC Dysphagia Training;Community Re-Integration;Patient / Family / Caregiver Education

## 2022-09-08 NOTE — PROGRESS NOTES
Received report from previous shift RN  Assessment complete.  Patient is nonverbal, only able to nod yes or no. Unable to assess orientation.  Patient x2 assist. Bed alarm on.   Patient crying and restless in bed, medicated per MAR.  Patient tolerating level 5 minced and moist diet with 1:1 assist.  Patient has surgical incision to midline and transverse groin, wound vac in place, CDI.  + void, - flatus, BM 9/6.  Patient denies SOB. Spo2>95% on room air.  SCD's on.  Patient repositioned Q2h.  Reviewed plan of care with patient. Call light and personal belongings with in reach. Hourly rounding in place. All needs met at this time.

## 2022-09-08 NOTE — CARE PLAN
Problem: Knowledge Deficit - Standard  Goal: Patient and family/care givers will demonstrate understanding of plan of care, disease process/condition, diagnostic tests and medications  Outcome: Not Progressing     Problem: Skin Integrity  Goal: Skin integrity is maintained or improved  Outcome: Progressing     Problem: Fall Risk  Goal: Patient will remain free from falls  Outcome: Progressing     The patient is Watcher - Medium risk of patient condition declining or worsening    Shift Goals  Clinical Goals: skin integrity, comfort, pain managment, wv management  Patient Goals: rest, comfort  Family Goals: no family present at this time    Progress made toward(s) clinical / shift goals:      Patient is not progressing towards the following goals:      Problem: Knowledge Deficit - Standard  Goal: Patient and family/care givers will demonstrate understanding of plan of care, disease process/condition, diagnostic tests and medications  Outcome: Not Progressing

## 2022-09-08 NOTE — CARE PLAN
Problem: Fall Risk  Goal: Patient will remain free from falls  Outcome: Progressing     Problem: Skin Integrity  Goal: Skin integrity is maintained or improved  Outcome: Progressing     Problem: Knowledge Deficit - Standard  Goal: Patient and family/care givers will demonstrate understanding of plan of care, disease process/condition, diagnostic tests and medications  Outcome: Not Progressing     The patient is Stable - Low risk of patient condition declining or worsening    Shift Goals  Clinical Goals: cardiac monitoring  Patient Goals: comfort  Family Goals: no family present at this time    Progress made toward(s) clinical / shift goals:  Tele-monitoring in place. Non-verbal pain assessment in place, medicated per MAR    Patient is not progressing towards the following goals:

## 2022-09-08 NOTE — THERAPY
"Physical Therapy   Daily Treatment     Patient Name: Fouzia Santamaria  Age:  61 y.o., Sex:  female  Medical Record #: [id number]  Today's Date: 9/8/2022     Precautions  Precautions: Fall Risk;Swallow Precautions ( See Comments)  Comments: abdominal binder on with mobilty    Assessment    Returned for PT session as pt agreeable to participate. Pt with improved bed mobility this session, increased initiation and use of L hemibody. Pt very emotionally labile throughout session, frantic and thrashing about at the EOB, however, noted volitional use of RUE. Pt with inconsistent command following, appears to be more global aphasia and follows more volitional tasks naturally such as grabbing a drink or touching her index finger. However, when presented with pure verbal commands pt mostly unable to follow such as \"touch your nose\" or \"kick your leg\". Pt with significantly improved mood after drinking chocolate milk, suspect some behavioral aspect superimposed onto L frontal CVA deficits. Pt mobilized as detailed below. Will continue to find improved ways of communication with pt as she is frustrated she is unable to express her needs clearly.     Plan    Continue current treatment plan.    DC Equipment Recommendations: Unable to determine at this time  Discharge Recommendations: Recommend post-acute placement for additional physical therapy services prior to discharge home      Subjective    \"NO!\"      Objective     09/08/22 1357   Vitals   O2 Delivery Device None - Room Air   Pain 0 - 10 Group   Therapist Pain Assessment   (appears in pain but difficulty communicating 2/2 aphasia)   Cognition    Cognition / Consciousness X   Speech/ Communication Global Aphasia;Slurred   Level of Consciousness Alert   Ability To Follow Commands 1 Step   Safety Awareness Impulsive;Impaired   New Learning Impaired   Attention Impaired   Sequencing Impaired   Initiation Impaired   Comments Inconsistent verbal responses to yes/no during " session, approx 50% of time appropriate. At times pt only replying no to all questions when noted to be more irritated, unsure if behavioral at times. Pt also appears to better follow commands that are more automatic in nature, less success with pure verbal commands   Passive ROM Lower Body   Passive ROM Lower Body X   Comments R ankle DF to neutral, otherwise WDL   Active ROM Lower Body    Active ROM Lower Body  X   Comments no active movement noted RLE, LLE able to lift against gravity   Strength Lower Body   Lower Body Strength  X   Comments Flaccid RLE. Able to lift RLE against gravity, unable to do MMT due to impaired command following.   Sensation Lower Body   Lower Extremity Sensation   X   Comments impaired pain and LT to RUE/LE compared to Left   Neuro-Muscular Treatments   Neuro-Muscular Treatments Anterior weight shift;Tactile Cuing;Weight Shift Right;Weight Shift Left;Verbal Cuing;Reciprocal Inhibition;Postural Facilitation;Joint Approximation;Facilitation   Comments Worked on seated EOB balance 5-10 min. Pt requiring Min/ModA mainly, with LUE support could maintain midline with CGA for 3 seconds 2/2 cognition. Pt irritated and thrashing about at EOB, attempted to return to supine multiple times. Would not participate in RLE therex at EOB despite Max VC/TC, suspect behavioral superimposed on neglect/weakness   Balance   Sitting Balance (Static) Poor +   Sitting Balance (Dynamic) Poor   Weight Shift Sitting Poor   Skilled Intervention Verbal Cuing;Tactile Cuing;Sequencing;Compensatory Strategies;Postural Facilitation   Gait Analysis   Gait Level Of Assist Unable to Participate   Bed Mobility    Supine to Sit Maximal Assist   Sit to Supine Maximal Assist   Skilled Intervention Verbal Cuing;Tactile Cuing;Sequencing;Compensatory Strategies   Comments improved use of LUE/RUE for bed mobility   Functional Mobility   Sit to Stand Unable to Participate   Bed, Chair, Wheelchair Transfer Unable to Participate    Mobility EOB only   Comments poor balance at EOB, frantic and thrashing about, deferred STS   How much difficulty does the patient currently have...   Turning over in bed (including adjusting bedclothes, sheets and blankets)? 1   Sitting down on and standing up from a chair with arms (e.g., wheelchair, bedside commode, etc.) 1   Moving from lying on back to sitting on the side of the bed? 1   How much help from another person does the patient currently need...   Moving to and from a bed to a chair (including a wheelchair)? 1   Need to walk in a hospital room? 1   Climbing 3-5 steps with a railing? 1   6 clicks Mobility Score 6   Activity Tolerance   Sitting in Chair TN   Sitting Edge of Bed 10 min   Standing NT   Comments limited 2/2 cognition, weakness   Short Term Goals    Short Term Goal # 1 pt will be able to complete supine<>Sitting with mod assist in 6tx in order to progress   Goal Outcome # 1 goal not met   Short Term Goal # 2 pt will be able to sit EOB with fair- balance in 6tx in order to decrease fall risk   Goal Outcome # 2 Goal not met   Short Term Goal # 3 pt will be able to complete bed<>chair transfer with max assist in 6tx in order to progress   Goal Outcome # 3 Goal not met   Education Group   Education Provided Role of Physical Therapist   Role of Physical Therapist Patient Response Patient;Acceptance;Verbal Demonstration;Reinforcement Needed   Anticipated Discharge Equipment and Recommendations   DC Equipment Recommendations Unable to determine at this time   Discharge Recommendations Recommend post-acute placement for additional physical therapy services prior to discharge home   Interdisciplinary Plan of Care Collaboration   IDT Collaboration with  Nursing;Occupational Therapist  (Co-treatment required 2/2 high complexity of pt. This therapist focused on EOB balance and bed mobility while OT provided treatment within OT POC.)   Patient Position at End of Therapy In Bed;Bed Alarm On;Call Light  within Reach;Tray Table within Reach;Phone within Reach   Collaboration Comments RN updated   Session Information   Date / Session Number  9/8- 5 (2/3, 9/12)

## 2022-09-08 NOTE — CARE PLAN
The patient is Watcher - Medium risk of patient condition declining or worsening    Shift Goals  Clinical Goals: safety,cardiac monitoring, skin integrity  Patient Goals: comfort  Family Goals: no family present at this time    Progress made toward(s) clinical / shift goals:  Patient on Q2H turns. Patient wearing heel float boots.     Patient is not progressing towards the following goals:

## 2022-09-08 NOTE — DISCHARGE PLANNING
Agency/Facility Name: [facility]   Spoke To: Juan Antonio  Outcome: Per Juan Antonio referral is currently under review at this time. DPA will get a call back for updates.

## 2022-09-09 ENCOUNTER — APPOINTMENT (OUTPATIENT)
Dept: RADIOLOGY | Facility: MEDICAL CENTER | Age: 62
DRG: 270 | End: 2022-09-09
Attending: HOSPITALIST
Payer: MEDICAID

## 2022-09-09 LAB
ALBUMIN SERPL BCP-MCNC: 2 G/DL (ref 3.2–4.9)
ALBUMIN/GLOB SERPL: 0.5 G/DL
ALP SERPL-CCNC: 120 U/L (ref 30–99)
ALT SERPL-CCNC: 6 U/L (ref 2–50)
ANION GAP SERPL CALC-SCNC: 9 MMOL/L (ref 7–16)
ANISOCYTOSIS BLD QL SMEAR: ABNORMAL
AST SERPL-CCNC: 29 U/L (ref 12–45)
BASOPHILS # BLD AUTO: 0.9 % (ref 0–1.8)
BASOPHILS # BLD: 0.12 K/UL (ref 0–0.12)
BILIRUB SERPL-MCNC: 0.8 MG/DL (ref 0.1–1.5)
BUN SERPL-MCNC: 36 MG/DL (ref 8–22)
CALCIUM SERPL-MCNC: 8.2 MG/DL (ref 8.5–10.5)
CHLORIDE SERPL-SCNC: 112 MMOL/L (ref 96–112)
CO2 SERPL-SCNC: 17 MMOL/L (ref 20–33)
CREAT SERPL-MCNC: 2.1 MG/DL (ref 0.5–1.4)
EOSINOPHIL # BLD AUTO: 0 K/UL (ref 0–0.51)
EOSINOPHIL NFR BLD: 0 % (ref 0–6.9)
ERYTHROCYTE [DISTWIDTH] IN BLOOD BY AUTOMATED COUNT: 67.8 FL (ref 35.9–50)
GFR SERPLBLD CREATININE-BSD FMLA CKD-EPI: 26 ML/MIN/1.73 M 2
GLOBULIN SER CALC-MCNC: 4.3 G/DL (ref 1.9–3.5)
GLUCOSE SERPL-MCNC: 117 MG/DL (ref 65–99)
HCT VFR BLD AUTO: 26.6 % (ref 37–47)
HGB BLD-MCNC: 8.1 G/DL (ref 12–16)
LYMPHOCYTES # BLD AUTO: 0.82 K/UL (ref 1–4.8)
LYMPHOCYTES NFR BLD: 6.1 % (ref 22–41)
MACROCYTES BLD QL SMEAR: ABNORMAL
MANUAL DIFF BLD: NORMAL
MCH RBC QN AUTO: 31 PG (ref 27–33)
MCHC RBC AUTO-ENTMCNC: 30.5 G/DL (ref 33.6–35)
MCV RBC AUTO: 101.9 FL (ref 81.4–97.8)
MONOCYTES # BLD AUTO: 0.7 K/UL (ref 0–0.85)
MONOCYTES NFR BLD AUTO: 5.2 % (ref 0–13.4)
MORPHOLOGY BLD-IMP: NORMAL
NEUTROPHILS # BLD AUTO: 11.77 K/UL (ref 2–7.15)
NEUTROPHILS NFR BLD: 87.8 % (ref 44–72)
NRBC # BLD AUTO: 0 K/UL
NRBC BLD-RTO: 0 /100 WBC
PLATELET # BLD AUTO: 566 K/UL (ref 164–446)
PLATELET BLD QL SMEAR: NORMAL
PMV BLD AUTO: 10.1 FL (ref 9–12.9)
POLYCHROMASIA BLD QL SMEAR: NORMAL
POTASSIUM SERPL-SCNC: 5.2 MMOL/L (ref 3.6–5.5)
PROCALCITONIN SERPL-MCNC: 0.34 NG/ML
PROT SERPL-MCNC: 6.3 G/DL (ref 6–8.2)
RBC # BLD AUTO: 2.61 M/UL (ref 4.2–5.4)
RBC BLD AUTO: PRESENT
SODIUM SERPL-SCNC: 138 MMOL/L (ref 135–145)
WBC # BLD AUTO: 13.4 K/UL (ref 4.8–10.8)

## 2022-09-09 PROCEDURE — 700111 HCHG RX REV CODE 636 W/ 250 OVERRIDE (IP): Performed by: HOSPITALIST

## 2022-09-09 PROCEDURE — A9270 NON-COVERED ITEM OR SERVICE: HCPCS | Performed by: SURGERY

## 2022-09-09 PROCEDURE — 80053 COMPREHEN METABOLIC PANEL: CPT

## 2022-09-09 PROCEDURE — 85007 BL SMEAR W/DIFF WBC COUNT: CPT

## 2022-09-09 PROCEDURE — 71045 X-RAY EXAM CHEST 1 VIEW: CPT

## 2022-09-09 PROCEDURE — 770020 HCHG ROOM/CARE - TELE (206)

## 2022-09-09 PROCEDURE — 700102 HCHG RX REV CODE 250 W/ 637 OVERRIDE(OP): Performed by: SURGERY

## 2022-09-09 PROCEDURE — 700111 HCHG RX REV CODE 636 W/ 250 OVERRIDE (IP): Performed by: STUDENT IN AN ORGANIZED HEALTH CARE EDUCATION/TRAINING PROGRAM

## 2022-09-09 PROCEDURE — 85025 COMPLETE CBC W/AUTO DIFF WBC: CPT

## 2022-09-09 PROCEDURE — 99232 SBSQ HOSP IP/OBS MODERATE 35: CPT | Performed by: HOSPITALIST

## 2022-09-09 PROCEDURE — 36415 COLL VENOUS BLD VENIPUNCTURE: CPT

## 2022-09-09 PROCEDURE — 302146: Performed by: HOSPITALIST

## 2022-09-09 PROCEDURE — 84145 PROCALCITONIN (PCT): CPT

## 2022-09-09 RX ADMIN — OXYCODONE 5 MG: 5 TABLET ORAL at 11:48

## 2022-09-09 RX ADMIN — PRAMIPEXOLE DIHYDROCHLORIDE 0.12 MG: 0.12 TABLET ORAL at 11:45

## 2022-09-09 RX ADMIN — DOCUSATE SODIUM 100 MG: 100 CAPSULE, LIQUID FILLED ORAL at 06:07

## 2022-09-09 RX ADMIN — PRAMIPEXOLE DIHYDROCHLORIDE 0.12 MG: 0.12 TABLET ORAL at 16:39

## 2022-09-09 RX ADMIN — OXYCODONE 5 MG: 5 TABLET ORAL at 16:39

## 2022-09-09 RX ADMIN — ATORVASTATIN CALCIUM 80 MG: 80 TABLET, FILM COATED ORAL at 21:00

## 2022-09-09 RX ADMIN — OXYCODONE 5 MG: 5 TABLET ORAL at 23:25

## 2022-09-09 RX ADMIN — HEPARIN SODIUM 5000 UNITS: 5000 INJECTION, SOLUTION INTRAVENOUS; SUBCUTANEOUS at 06:07

## 2022-09-09 RX ADMIN — OXYCODONE 5 MG: 5 TABLET ORAL at 03:12

## 2022-09-09 RX ADMIN — DOCUSATE SODIUM 50 MG AND SENNOSIDES 8.6 MG 1 TABLET: 8.6; 5 TABLET, FILM COATED ORAL at 21:00

## 2022-09-09 RX ADMIN — HEPARIN SODIUM 5000 UNITS: 5000 INJECTION, SOLUTION INTRAVENOUS; SUBCUTANEOUS at 13:31

## 2022-09-09 RX ADMIN — HEPARIN SODIUM 5000 UNITS: 5000 INJECTION, SOLUTION INTRAVENOUS; SUBCUTANEOUS at 21:00

## 2022-09-09 RX ADMIN — ASPIRIN 81 MG 81 MG: 81 TABLET ORAL at 06:06

## 2022-09-09 RX ADMIN — EZETIMIBE 10 MG: 10 TABLET ORAL at 06:06

## 2022-09-09 RX ADMIN — CLOPIDOGREL BISULFATE 75 MG: 75 TABLET ORAL at 06:06

## 2022-09-09 RX ADMIN — PROCHLORPERAZINE EDISYLATE 10 MG: 5 INJECTION INTRAMUSCULAR; INTRAVENOUS at 03:15

## 2022-09-09 RX ADMIN — PRAMIPEXOLE DIHYDROCHLORIDE 0.12 MG: 0.12 TABLET ORAL at 06:05

## 2022-09-09 ASSESSMENT — ENCOUNTER SYMPTOMS
DOUBLE VISION: 0
SPUTUM PRODUCTION: 0
BACK PAIN: 0
DIZZINESS: 0
SHORTNESS OF BREATH: 0
FLANK PAIN: 0
FEVER: 0
NAUSEA: 0
DEPRESSION: 0
BLURRED VISION: 0
BRUISES/BLEEDS EASILY: 0
HEADACHES: 0
TINGLING: 0
STRIDOR: 0
DIAPHORESIS: 0
HALLUCINATIONS: 0
CHILLS: 0
COUGH: 0
SORE THROAT: 0
ABDOMINAL PAIN: 0
TREMORS: 0
MYALGIAS: 0
PHOTOPHOBIA: 0
WEAKNESS: 0
VOMITING: 0
WHEEZING: 0
PND: 0
CONSTIPATION: 0
SINUS PAIN: 0
EYE PAIN: 0
FALLS: 0
DIARRHEA: 0
HEARTBURN: 0
CLAUDICATION: 0
NECK PAIN: 0
POLYDIPSIA: 0
HEMOPTYSIS: 0
PALPITATIONS: 0
ORTHOPNEA: 0
BLOOD IN STOOL: 0

## 2022-09-09 ASSESSMENT — LIFESTYLE VARIABLES: SUBSTANCE_ABUSE: 0

## 2022-09-09 NOTE — PROGRESS NOTES
Monitor summary:     SR 62-95  Down to 32; 3.1 second pause; (R) PVC  .12/.09/.44

## 2022-09-09 NOTE — PROGRESS NOTES
Hospital Medicine Daily Progress Note    Date of Service  9/9/2022    Chief Complaint  Fouzia Santamaria is a 61 y.o. female admitted 8/23/2022 with leg pain    Hospital Course  60yo CAD with stetn to LAD, PAD with chronic aortic occlusion, HLD, HTN, DM, ongoing tobacco abuse, COPD.  In [facility] for AMI and had stent to LAD in July this year.  At that time found to have infra-renal aortic occlusion which was not addressed as it was felt to be too high risk.  DC'd on Rivaroxaban.  Presented here with increasing claudication pain.  Creat 2.6 up from 1.3.  on 8/26 went for Aortobifem bypass and renal artery eversion endarterectomies with Dr Siu with subsequent return to the OR on 8/28 for washout and closure.  While still on the vent 8/28 noted to have R side weakness.  CT  imaging showing L frontal/parietal infarcts.  MRA showing JOSAFAT occlusion.  Evaluated by Neuro and Neuro Rad and felt not to be an interventional candidate.  Extubated 9/2    Interval Problem Update  9/6: Patient was upset on bedside. Rehab has denied the patient due to low function.  I have ordered LTAC since she also has a Rachna.  I have ordered blood work for tomorrow since her hemoglobin was 7.9.  9/7: Hemoglobin has improved to 8.  WBC has down trended to 12.8.  She was less tearful on examination today.  SNF placement pending.  9/8: Patient was found to be bradycardic.  EKG found sinus bradycardia with T wave inversions in the anterior leads.  I reviewed her EKGs in the past and T wave inversions are old.  There is no significant change from a prior EKG.  Troponin is elevated secondary to kidney failure  I will continue to trend this.  9/9: Patient's symptoms are improving after stopping metoprolol.  Her speech had improved and her right arm swelling improved.  SNF placement is pending.  I have discussed this patient's plan of car placement in. short e and discharge plan at IDT rounds today with Case Management, Nursing, Nursing leadership,  and other members of the IDT team.    Consultants/Specialty  nephrology and neurology    Code Status  Full Code    Disposition  Patient is medically cleared for discharge.   Anticipate discharge to to an inpatient rehabilitation hospital.  I have placed the appropriate orders for post-discharge needs.    Review of Systems  Review of Systems   Unable to perform ROS: Acuity of condition   Constitutional:  Negative for chills, diaphoresis, fever and malaise/fatigue.   HENT:  Negative for congestion, ear discharge, ear pain, hearing loss, nosebleeds, sinus pain, sore throat and tinnitus.    Eyes:  Negative for blurred vision, double vision, photophobia and pain.   Respiratory:  Negative for cough, hemoptysis, sputum production, shortness of breath, wheezing and stridor.    Cardiovascular:  Negative for chest pain, palpitations, orthopnea, claudication, leg swelling and PND.   Gastrointestinal:  Negative for abdominal pain, blood in stool, constipation, diarrhea, heartburn, melena, nausea and vomiting.   Genitourinary:  Negative for dysuria, flank pain, frequency, hematuria and urgency.   Musculoskeletal:  Negative for back pain, falls, joint pain, myalgias and neck pain.   Skin:  Negative for itching and rash.   Neurological:  Negative for dizziness, tingling, tremors, weakness and headaches.   Endo/Heme/Allergies:  Negative for environmental allergies and polydipsia. Does not bruise/bleed easily.   Psychiatric/Behavioral:  Negative for depression, hallucinations, substance abuse and suicidal ideas.       Physical Exam  Temp:  [35.9 °C (96.7 °F)-36.3 °C (97.3 °F)] 36 °C (96.8 °F)  Pulse:  [60-83] 78  Resp:  [15-18] 15  BP: ()/(62-84) 135/84  SpO2:  [92 %-99 %] 99 %    Physical Exam  Vitals reviewed.   Constitutional:       General: She is not in acute distress.     Appearance: Normal appearance. She is well-developed. She is not diaphoretic.   HENT:      Head: Normocephalic and atraumatic.   Neck:      Vascular:  No JVD.   Cardiovascular:      Rate and Rhythm: Normal rate and regular rhythm.      Heart sounds: Murmur heard.   Pulmonary:      Effort: Pulmonary effort is normal. No respiratory distress.      Breath sounds: No stridor. No wheezing or rales.   Abdominal:      Palpations: Abdomen is soft.      Tenderness: There is no abdominal tenderness. There is no guarding or rebound.   Musculoskeletal:         General: No tenderness.      Cervical back: Normal range of motion.      Right lower leg: No edema.      Left lower leg: No edema.   Skin:     General: Skin is warm and dry.      Capillary Refill: Capillary refill takes less than 2 seconds.      Findings: No rash.   Neurological:      Mental Status: She is alert.      Comments: Alert and attentive  Follows visual commands fairly consistently  Does not follow verbal commands  Speech is word salad  Puropseful with B upper extremities; unable to follow to test R leg       Fluids    Intake/Output Summary (Last 24 hours) at 9/9/2022 1312  Last data filed at 9/9/2022 0438  Gross per 24 hour   Intake 180 ml   Output 0 ml   Net 180 ml       Laboratory  Recent Labs     09/07/22  1027 09/08/22  1424 09/09/22  1111   WBC 12.8* 12.2* 13.4*   RBC 2.55* 2.33* 2.61*   HEMOGLOBIN 8.0* 7.3* 8.1*   HEMATOCRIT 24.7* 23.4* 26.6*   MCV 96.9 100.4* 101.9*   MCH 31.4 31.3 31.0   MCHC 32.4* 31.2* 30.5*   RDW 59.1* 62.6* 67.8*   PLATELETCT 446 526* 566*   MPV 10.4 10.1 10.1     Recent Labs     09/07/22  0310 09/08/22  1424 09/09/22  1111   SODIUM 139 140 138   POTASSIUM 4.8 5.3 5.2   CHLORIDE 108 110 112   CO2 20 21 17*   GLUCOSE 83 115* 117*   BUN 39* 38* 36*   CREATININE 2.30* 2.40* 2.10*   CALCIUM 8.3* 8.2* 8.2*                   Imaging  IR-US GUIDED PIV   Final Result    Ultrasound-guided PERIPHERAL IV INSERTION performed by    qualified nursing staff as above.      DX-CHEST-PORTABLE (1 VIEW)   Final Result      1.  Mild worsening hypoinflation.   2.  Supportive tubing as described above.    3.  No other significant change from prior exam.            DX-CHEST-PORTABLE (1 VIEW)   Final Result      1.  Interval removal of endotracheal tube and enteric catheter      2.  No other change      DX-CHEST-PORTABLE (1 VIEW)   Final Result      No significant interval change.      DX-CHEST-PORTABLE (1 VIEW)   Final Result         1.  Hazy retrocardiac and left lung base atelectasis or infiltrate, somewhat increased since prior study.   2.  Trace left pleural effusion   3.  Atherosclerosis      DX-CHEST-PORTABLE (1 VIEW)   Final Result         1.  Hazy retrocardiac and left lung base atelectasis or infiltrate.   2.  Trace left pleural effusion   3.  Atherosclerosis      DX-CHEST-PORTABLE (1 VIEW)   Final Result         1.  No acute cardiopulmonary disease.      DX-CHEST-PORTABLE (1 VIEW)   Final Result         1.  No acute cardiopulmonary disease.      MR-MRA HEAD-W/O   Final Result      1.  A2/A3 segment left anterior cerebral artery occlusion.   2.  Occlusion of the intradural left vertebral artery.   3.  Asymmetrically small caliber of the distal left internal carotid artery in the left middle cerebral artery.   4.  Focal stenosis of the distal M1 right middle cerebral artery.      MR-BRAIN-W/O   Final Result      1.  At least moderate sized acute/subacute left anterior cerebral artery infarct.   2.  Additional small recent right thalamic infarct.   3.  Multiple chronic bilateral cerebral and cerebellar infarcts.   4.  Cerebral atrophy and chronic microvascular ischemic type changes.   5.  No acute intracranial hemorrhage.      DX-ABDOMEN FOR TUBE PLACEMENT   Final Result         Feeding tube with tip projecting over the expected area of the stomach.      CT-HEAD W/O   Final Result         Patchy areas of ill-defined hypodensity in the left frontal and parietal lobes, concerning for acute multifocal infarcts. Further evaluation with MRI is recommended.      No acute intracranial hemorrhage.                   [id number] VIEW   Final Result         No radiopaque foreign body identified. Previous sponge was removed.      [id number] VIEW   Final Result      1.  There is a curvilinear density in the right upper abdomen consistent with a retained surgical lap sponge. This was subsequently removed as there has already been a subsequent x-ray and this was no longer identified.   2.  There are new cutaneous skin staples in midline of the abdomen and pelvis.   3.  Bowel gas pattern is nonobstructive.      DX-CHEST-PORTABLE (1 VIEW)   Final Result      No acute cardiopulmonary abnormality.      DX-ABDOMEN FOR TUBE PLACEMENT   Final Result      1.  Enteric tube overlies the proximal stomach.      DX-CHEST-PORTABLE (1 VIEW)   Final Result         1.  No acute cardiopulmonary disease.   2.  Nasogastric tube is coiled back the side port with tip projecting cephalad terminating in the distal esophagus, recommend advancement.      EC-KLEBER W/O CONT   Final Result      CT-RENAL COLIC EVALUATION(A/P W/O)   Final Result         1. No hydronephrosis. Nonobstructive bilateral renal calculi seen on previous exam cannot be evaluated due to contrast in the collecting system.      2. Retained contrast in the kidney from prior CTA, likely due to nephropathy/renal failure.      US-RENAL   Final Result         1.  Mild right hydronephrosis   2.  Punctate left renal calculi without visualized obstructive changes.      CT-CTA AORTA-RO WITH & W/O-POST PROCESS   Final Result      1.  Occlusion of the abdominal aorta just below level of the renal arteries with occlusion of the common and external iliac arteries with reconstitution of flow via epigastric collaterals.   2.  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   3.  Diffusely small caliber bilateral lower extremity arteries with significant atherosclerosis of the bilateral superficial femoral arteries  without high-grade stenosis.   4.  Likely single vessel runoff on the right and 2 vessel runoff on the left.   5.  Focal severe stenoses at the bilateral renal artery origins.      These findings were discussed with ESPERANZA DO on 8/23/2022 5:29 PM.           Assessment/Plan  * Peripheral arterial occlusive disease (HCC)- (present on admission)  Assessment & Plan  S/p operative vascular intervention,    Oropharyngeal dysphagia- (present on admission)  Assessment & Plan  Patient passed speech evaluation, monitor    Hypomagnesemia  Assessment & Plan  Monitor, replace as indicated    Stroke ([facility])- (present on admission)  Assessment & Plan  Acute possibly intraoperative CVA  S/p evaluation from neurology, no intervention at the time  Antiplatelet therapy  Statin  Consult rehab  PT OT SLP  Plan Rehab DC if appropriate per  Physiatry    Acute postoperative respiratory failure ([facility])  Assessment & Plan  Improved, currently extubated, monitor, respiratory protocol, incentive spirometry as tolerated    Coagulopathy ([facility])  Assessment & Plan  Monitor,    Chronic distal aortic occlusion ([facility])- (present on admission)  Assessment & Plan  S/p aortobifem bypass  Vascular surgery managing    Chronic systolic congestive heart failure ([facility])- (present on admission)  Assessment & Plan  Ejection fraction around 30%.   Hold metoprolol due to bradycardia  Add ARB as renal function and pressures allow  Monitor volume status    Occluded aorta ([facility])- (present on admission)  Assessment & Plan  S/p aortobifemoral bypass, endovascular repair    Aortic thrombus ([facility])- (present on admission)  Assessment & Plan  S/p vascular repair    COPD (chronic obstructive pulmonary disease) ([facility])- (present on admission)  Assessment & Plan  Respiratory protocol    IVON (acute kidney injury) ([facility])- (present on admission)  Assessment & Plan  CT with focal severe stenosis of bilateral renal artery origins, patient has occlusive aortic vascular disease  S/p surgical  intervention on bilateral renal arteries  Creatinine slowly improving, good urine output  Monitor closely  Avoid nephrotoxins    Hypertension- (present on admission)  Assessment & Plan  History of, monitor, as needed medication available  Will reinstitute beta-blockade    QT prolongation- (present on admission)  Assessment & Plan  Avoid QTC prolonging medications    Acute blood loss anemia  Assessment & Plan  Secondary to blood loss from surgery, currently recovering, monitor, transfuse as indicated    CAD (coronary artery disease)- (present on admission)  Assessment & Plan  stent to LAD in July: Needs to continue on DAPT  Cont BB and statin       VTE prophylaxis: heparin ppx    I have performed a physical exam and reviewed and updated ROS and Plan today (9/9/2022). In review of yesterday's note (9/8/2022), there are no changes except as documented above.

## 2022-09-09 NOTE — DIETARY
Nutrition Services Brief Update:    Day 17 of admit.  Fouzai Santmaaria is a 61 y.o. female with admitting DX of Peripheral arterial occlusive disease (HCC) [I77.9]  Chronic distal aortic occlusion (HCC) [I74.09]  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HCC) [I70.0]    Current Diet: Level 5- minced and moist with Level 0 - thin liquids     Recorded PO intake poor at 0 to < 25% of most meals. Pt had difficulty with conversation when RD visited. Family helped with conversation. Pt does not like diet texture. RD encouraged pt to work with SLP to strengthen her swallow. She agreed to chocolate Boost Plus TID for additional nutrition    Problem: Nutritional:  Goal: Achieve adequate nutritional intake  Description: Patient will consume 50% of meals  Outcome: not progressing.    RD will follow

## 2022-09-09 NOTE — DISCHARGE PLANNING
Agency/Facility Name: [facility]  Spoke To: Juan Antonio  Outcome: Per Juan Antonio will get back to this DPA for updates.    1529  Agency/Facility Name: [facility]   Spoke To: Juan Antonio   Outcome: Per Juan Antonio referral is under review at this time.

## 2022-09-09 NOTE — CARE PLAN
The patient is Stable - Low risk of patient condition declining or worsening    Shift Goals  Clinical Goals: cardiac monitoring; safety;rest  Patient Goals: comfort  Family Goals: no family present    Progress made toward(s) clinical / shift goals:  Bed alarm on. Reinforced use of call light to prevent falls. Q 2 turns in place.    Problem: Skin Integrity  Goal: Skin integrity is maintained or improved  Outcome: Progressing     Problem: Fall Risk  Goal: Patient will remain free from falls  Outcome: Progressing       Patient is not progressing towards the following goals:

## 2022-09-09 NOTE — DISCHARGE PLANNING
Case Management Discharge Planning    Admission Date: 8/23/2022  GMLOS: 6.6  ALOS: 17    6-Clicks ADL Score: 12  6-Clicks Mobility Score: 6  PT and/or OT Eval ordered: Yes  Post-acute Referrals Ordered: Yes  Post-acute Choice Obtained: Yes  Has referral(s) been sent to post-acute provider:  Yes      Anticipated Discharge Dispo: Discharge Disposition: D/T to SNF with Medicare cert in anticipation of skilled care (03)    DME Needed: No    Action(s) Taken: OTHER. Discussed this case during IDT Rounds. Per MD, patient is medically clear for transfer to SNF.    LSW reported, [facility] requested / received updated PTOT Notes yesterday and is reviewing the referral.    LSW met with patient and patient's  at bedside, update regarding discharge disposition provided.    Escalations Completed: None    Medically Clear: Yes    Next Steps: Awaiting acceptance from [facility].     Barriers to Discharge: Pending Placement    Is the patient up for discharge tomorrow: TBD

## 2022-09-09 NOTE — PROGRESS NOTES
Sinus arrythmia/Junctional rhythm  47-93  Rare pvc   0.13/0.11/0.37    6.35 second arrest at (0700)

## 2022-09-10 ENCOUNTER — APPOINTMENT (OUTPATIENT)
Dept: RADIOLOGY | Facility: MEDICAL CENTER | Age: 62
DRG: 270 | End: 2022-09-10
Attending: HOSPITALIST
Payer: MEDICAID

## 2022-09-10 LAB
ALBUMIN SERPL BCP-MCNC: 2.8 G/DL (ref 3.2–4.9)
ALBUMIN/GLOB SERPL: 0.7 G/DL
ALP SERPL-CCNC: 128 U/L (ref 30–99)
ALT SERPL-CCNC: 6 U/L (ref 2–50)
ANION GAP SERPL CALC-SCNC: 7 MMOL/L (ref 7–16)
AST SERPL-CCNC: 28 U/L (ref 12–45)
BASOPHILS # BLD AUTO: 0.4 % (ref 0–1.8)
BASOPHILS # BLD: 0.05 K/UL (ref 0–0.12)
BILIRUB SERPL-MCNC: 0.6 MG/DL (ref 0.1–1.5)
BUN SERPL-MCNC: 33 MG/DL (ref 8–22)
CALCIUM SERPL-MCNC: 8.5 MG/DL (ref 8.5–10.5)
CHLORIDE SERPL-SCNC: 111 MMOL/L (ref 96–112)
CO2 SERPL-SCNC: 20 MMOL/L (ref 20–33)
CREAT SERPL-MCNC: 2.01 MG/DL (ref 0.5–1.4)
EOSINOPHIL # BLD AUTO: 0.17 K/UL (ref 0–0.51)
EOSINOPHIL NFR BLD: 1.5 % (ref 0–6.9)
ERYTHROCYTE [DISTWIDTH] IN BLOOD BY AUTOMATED COUNT: 71.5 FL (ref 35.9–50)
GFR SERPLBLD CREATININE-BSD FMLA CKD-EPI: 28 ML/MIN/1.73 M 2
GLOBULIN SER CALC-MCNC: 4.3 G/DL (ref 1.9–3.5)
GLUCOSE SERPL-MCNC: 92 MG/DL (ref 65–99)
HCT VFR BLD AUTO: 26.4 % (ref 37–47)
HGB BLD-MCNC: 8.2 G/DL (ref 12–16)
IMM GRANULOCYTES # BLD AUTO: 0.12 K/UL (ref 0–0.11)
IMM GRANULOCYTES NFR BLD AUTO: 1.1 % (ref 0–0.9)
LYMPHOCYTES # BLD AUTO: 1.59 K/UL (ref 1–4.8)
LYMPHOCYTES NFR BLD: 14.1 % (ref 22–41)
MAGNESIUM SERPL-MCNC: 1.7 MG/DL (ref 1.5–2.5)
MCH RBC QN AUTO: 31.7 PG (ref 27–33)
MCHC RBC AUTO-ENTMCNC: 31.1 G/DL (ref 33.6–35)
MCV RBC AUTO: 101.9 FL (ref 81.4–97.8)
MONOCYTES # BLD AUTO: 1.05 K/UL (ref 0–0.85)
MONOCYTES NFR BLD AUTO: 9.3 % (ref 0–13.4)
NEUTROPHILS # BLD AUTO: 8.27 K/UL (ref 2–7.15)
NEUTROPHILS NFR BLD: 73.6 % (ref 44–72)
NRBC # BLD AUTO: 0 K/UL
NRBC BLD-RTO: 0 /100 WBC
PHOSPHATE SERPL-MCNC: 3.8 MG/DL (ref 2.5–4.5)
PLATELET # BLD AUTO: 664 K/UL (ref 164–446)
PMV BLD AUTO: 9.7 FL (ref 9–12.9)
POTASSIUM SERPL-SCNC: 5.6 MMOL/L (ref 3.6–5.5)
PROCALCITONIN SERPL-MCNC: 0.35 NG/ML
PROT SERPL-MCNC: 7.1 G/DL (ref 6–8.2)
RBC # BLD AUTO: 2.59 M/UL (ref 4.2–5.4)
SODIUM SERPL-SCNC: 138 MMOL/L (ref 135–145)
WBC # BLD AUTO: 11.3 K/UL (ref 4.8–10.8)

## 2022-09-10 PROCEDURE — 74018 RADEX ABDOMEN 1 VIEW: CPT

## 2022-09-10 PROCEDURE — 36415 COLL VENOUS BLD VENIPUNCTURE: CPT

## 2022-09-10 PROCEDURE — 85025 COMPLETE CBC W/AUTO DIFF WBC: CPT

## 2022-09-10 PROCEDURE — 83735 ASSAY OF MAGNESIUM: CPT

## 2022-09-10 PROCEDURE — 700111 HCHG RX REV CODE 636 W/ 250 OVERRIDE (IP): Performed by: HOSPITALIST

## 2022-09-10 PROCEDURE — 80053 COMPREHEN METABOLIC PANEL: CPT

## 2022-09-10 PROCEDURE — 700102 HCHG RX REV CODE 250 W/ 637 OVERRIDE(OP): Performed by: SURGERY

## 2022-09-10 PROCEDURE — 770020 HCHG ROOM/CARE - TELE (206)

## 2022-09-10 PROCEDURE — 84145 PROCALCITONIN (PCT): CPT

## 2022-09-10 PROCEDURE — 84100 ASSAY OF PHOSPHORUS: CPT

## 2022-09-10 PROCEDURE — 700105 HCHG RX REV CODE 258: Performed by: HOSPITALIST

## 2022-09-10 PROCEDURE — 99232 SBSQ HOSP IP/OBS MODERATE 35: CPT | Performed by: HOSPITALIST

## 2022-09-10 PROCEDURE — A9270 NON-COVERED ITEM OR SERVICE: HCPCS | Performed by: SURGERY

## 2022-09-10 RX ORDER — SODIUM CHLORIDE 9 MG/ML
INJECTION, SOLUTION INTRAVENOUS CONTINUOUS
Status: DISCONTINUED | OUTPATIENT
Start: 2022-09-10 | End: 2022-09-11

## 2022-09-10 RX ADMIN — PRAMIPEXOLE DIHYDROCHLORIDE 0.12 MG: 0.12 TABLET ORAL at 12:33

## 2022-09-10 RX ADMIN — SODIUM CHLORIDE: 9 INJECTION, SOLUTION INTRAVENOUS at 14:29

## 2022-09-10 RX ADMIN — ATORVASTATIN CALCIUM 80 MG: 80 TABLET, FILM COATED ORAL at 20:28

## 2022-09-10 RX ADMIN — DOCUSATE SODIUM 100 MG: 100 CAPSULE, LIQUID FILLED ORAL at 18:11

## 2022-09-10 RX ADMIN — DOCUSATE SODIUM 100 MG: 100 CAPSULE, LIQUID FILLED ORAL at 05:15

## 2022-09-10 RX ADMIN — OXYCODONE 5 MG: 5 TABLET ORAL at 23:10

## 2022-09-10 RX ADMIN — ACETAMINOPHEN 650 MG: 325 TABLET ORAL at 20:28

## 2022-09-10 RX ADMIN — ASPIRIN 81 MG 81 MG: 81 TABLET ORAL at 05:15

## 2022-09-10 RX ADMIN — OXYCODONE 5 MG: 5 TABLET ORAL at 12:33

## 2022-09-10 RX ADMIN — DOCUSATE SODIUM 50 MG AND SENNOSIDES 8.6 MG 1 TABLET: 8.6; 5 TABLET, FILM COATED ORAL at 20:28

## 2022-09-10 RX ADMIN — CLOPIDOGREL BISULFATE 75 MG: 75 TABLET ORAL at 05:15

## 2022-09-10 RX ADMIN — PRAMIPEXOLE DIHYDROCHLORIDE 0.12 MG: 0.12 TABLET ORAL at 18:11

## 2022-09-10 RX ADMIN — OXYCODONE 5 MG: 5 TABLET ORAL at 18:11

## 2022-09-10 RX ADMIN — HEPARIN SODIUM 5000 UNITS: 5000 INJECTION, SOLUTION INTRAVENOUS; SUBCUTANEOUS at 20:28

## 2022-09-10 RX ADMIN — HEPARIN SODIUM 5000 UNITS: 5000 INJECTION, SOLUTION INTRAVENOUS; SUBCUTANEOUS at 14:34

## 2022-09-10 RX ADMIN — HEPARIN SODIUM 5000 UNITS: 5000 INJECTION, SOLUTION INTRAVENOUS; SUBCUTANEOUS at 05:15

## 2022-09-10 RX ADMIN — EZETIMIBE 10 MG: 10 TABLET ORAL at 05:15

## 2022-09-10 RX ADMIN — OXYCODONE 5 MG: 5 TABLET ORAL at 07:59

## 2022-09-10 ASSESSMENT — ENCOUNTER SYMPTOMS
CLAUDICATION: 0
TREMORS: 0
WEAKNESS: 0
SHORTNESS OF BREATH: 0
PHOTOPHOBIA: 0
BRUISES/BLEEDS EASILY: 0
CONSTIPATION: 0
POLYDIPSIA: 0
PALPITATIONS: 0
STRIDOR: 0
SINUS PAIN: 0
CHILLS: 0
DEPRESSION: 0
ABDOMINAL PAIN: 0
ORTHOPNEA: 0
HEADACHES: 0
WHEEZING: 0
PND: 0
DOUBLE VISION: 0
DIARRHEA: 0
TINGLING: 0
BLOOD IN STOOL: 0
HEMOPTYSIS: 0
DIAPHORESIS: 0
SORE THROAT: 0
MYALGIAS: 0
FALLS: 0
BLURRED VISION: 0
FLANK PAIN: 0
NECK PAIN: 0
HALLUCINATIONS: 0
FEVER: 0
NAUSEA: 0
SPUTUM PRODUCTION: 0
DIZZINESS: 0
EYE PAIN: 0
COUGH: 0
VOMITING: 0
HEARTBURN: 0
BACK PAIN: 0

## 2022-09-10 ASSESSMENT — LIFESTYLE VARIABLES: SUBSTANCE_ABUSE: 0

## 2022-09-10 NOTE — PROGRESS NOTES
4 Eyes Skin Assessment Completed by Siara Freyer, RN and Jaquelin Velarde, KATIA.    Head WDL  Ears WDL  Nose WDL  Mouth WDL  Neck WDL  Breast/Chest WDL  Shoulder Blades WDL  Spine WDL  (R) Arm/Elbow/Hand WDL  (L) Arm/Elbow/Hand WDL  Abdomen Incision with wound vac. L flank skin tear with mepitel.  Groin Incision with wound vac  Scrotum/Coccyx/Buttocks WDL  (R) Leg Bruising inner thigh  (L) Leg Bruising inner thigh  (R) Heel/Foot/Toe WDL  (L) Heel/Foot/Toe WDL          Devices In Places Tele Box and Pulse Ox      Interventions In Place TAP System, Pillows, Q2 Turns, Barrier Cream, Dri-Milad Pads, and Pressure Redistribution Mattress    Possible Skin Injury Yes    Pictures Uploaded Into Epic N/A  Wound Consult Placed N/A  RN Wound Prevention Protocol Ordered Yes

## 2022-09-10 NOTE — CARE PLAN
The patient is Stable - Low risk of patient condition declining or worsening    Shift Goals  Clinical Goals: Pain managment; rest  Patient Goals: rest  Family Goals: no family present    Progress made toward(s) clinical / shift goals:  Pain frequently assessed. Medicated per MAR. Pt nods when she understands plan of care. Skin frequently assessed. Q2 turns in place.    Problem: Knowledge Deficit - Standard  Goal: Patient and family/care givers will demonstrate understanding of plan of care, disease process/condition, diagnostic tests and medications  Outcome: Progressing     Problem: Skin Integrity  Goal: Skin integrity is maintained or improved  Outcome: Progressing     Problem: Fall Risk  Goal: Patient will remain free from falls  Outcome: Progressing       Patient is not progressing towards the following goals:

## 2022-09-10 NOTE — PROCEDURES
Assumed care of pt at 0700. Pt A/O x4 and can make needs known. Call light within reach, bed locked in lowest position and hourly rounding initiated. Labs/ orders reviewed and communication board updated.

## 2022-09-10 NOTE — PROGRESS NOTES
Hospital Medicine Daily Progress Note    Date of Service  9/10/2022    Chief Complaint  Fouzia Santamaria is a 61 y.o. female admitted 8/23/2022 with leg pain    Hospital Course  60yo CAD with stetn to LAD, PAD with chronic aortic occlusion, HLD, HTN, DM, ongoing tobacco abuse, COPD.  In [facility] for AMI and had stent to LAD in July this year.  At that time found to have infra-renal aortic occlusion which was not addressed as it was felt to be too high risk.  DC'd on Rivaroxaban.  Presented here with increasing claudication pain.  Creat 2.6 up from 1.3.  on 8/26 went for Aortobifem bypass and renal artery eversion endarterectomies with Dr Siu with subsequent return to the OR on 8/28 for washout and closure.  While still on the vent 8/28 noted to have R side weakness.  CT  imaging showing L frontal/parietal infarcts.  MRA showing JOSAFAT occlusion.  Evaluated by Neuro and Neuro Rad and felt not to be an interventional candidate.  Extubated 9/2    Interval Problem Update  9/6: Patient was upset on bedside. Rehab has denied the patient due to low function.  I have ordered LTAC since she also has a Rachna.  I have ordered blood work for tomorrow since her hemoglobin was 7.9.  9/7: Hemoglobin has improved to 8.  WBC has down trended to 12.8.  She was less tearful on examination today.  SNF placement pending.  9/8: Patient was found to be bradycardic.  EKG found sinus bradycardia with T wave inversions in the anterior leads.  I reviewed her EKGs in the past and T wave inversions are old.  There is no significant change from a prior EKG.  Troponin is elevated secondary to kidney failure  I will continue to trend this.  9/9: Patient's symptoms are improving after stopping metoprolol.  Her speech had improved and her right arm swelling improved.  SNF placement is pending.  9/10: Patient was complaining of abdominal pain.  Abdominal x-ray did not show any evidence of obstruction.  Patient states that she has not had any bowel  movement.  She was dehydrated on examination and I will start IV fluids.  Placement is pending.  I have discussed this patient's plan of car placement in. short e and discharge plan at IDT rounds today with Case Management, Nursing, Nursing leadership, and other members of the IDT team.    Consultants/Specialty  nephrology and neurology    Code Status  Full Code    Disposition  Patient is medically cleared for discharge.   Anticipate discharge to to an inpatient rehabilitation hospital.  I have placed the appropriate orders for post-discharge needs.    Review of Systems  Review of Systems   Unable to perform ROS: Acuity of condition   Constitutional:  Negative for chills, diaphoresis, fever and malaise/fatigue.   HENT:  Negative for congestion, ear discharge, ear pain, hearing loss, nosebleeds, sinus pain, sore throat and tinnitus.    Eyes:  Negative for blurred vision, double vision, photophobia and pain.   Respiratory:  Negative for cough, hemoptysis, sputum production, shortness of breath, wheezing and stridor.    Cardiovascular:  Negative for chest pain, palpitations, orthopnea, claudication, leg swelling and PND.   Gastrointestinal:  Negative for abdominal pain, blood in stool, constipation, diarrhea, heartburn, melena, nausea and vomiting.   Genitourinary:  Negative for dysuria, flank pain, frequency, hematuria and urgency.   Musculoskeletal:  Negative for back pain, falls, joint pain, myalgias and neck pain.   Skin:  Negative for itching and rash.   Neurological:  Negative for dizziness, tingling, tremors, weakness and headaches.   Endo/Heme/Allergies:  Negative for environmental allergies and polydipsia. Does not bruise/bleed easily.   Psychiatric/Behavioral:  Negative for depression, hallucinations, substance abuse and suicidal ideas.       Physical Exam  Temp:  [36.1 °C (96.9 °F)-37.2 °C (98.9 °F)] 37.2 °C (98.9 °F)  Pulse:  [62-87] 72  Resp:  [15-18] 18  BP: (103-142)/(70-88) 117/75  SpO2:  [98 %-100 %]  98 %    Physical Exam  Vitals reviewed.   Constitutional:       General: She is not in acute distress.     Appearance: Normal appearance. She is well-developed. She is not diaphoretic.   HENT:      Head: Normocephalic and atraumatic.   Neck:      Vascular: No JVD.   Cardiovascular:      Rate and Rhythm: Normal rate and regular rhythm.      Heart sounds: Murmur heard.   Pulmonary:      Effort: Pulmonary effort is normal. No respiratory distress.      Breath sounds: No stridor. No wheezing or rales.   Abdominal:      Palpations: Abdomen is soft.      Tenderness: There is no abdominal tenderness. There is no guarding or rebound.   Musculoskeletal:         General: No tenderness.      Cervical back: Normal range of motion.      Right lower leg: No edema.      Left lower leg: No edema.   Skin:     General: Skin is warm and dry.      Capillary Refill: Capillary refill takes less than 2 seconds.      Findings: No rash.   Neurological:      Mental Status: She is alert.      Comments: Alert and attentive  Follows visual commands fairly consistently  Does not follow verbal commands  Speech is word salad  Puropseful with B upper extremities; unable to follow to test R leg       Fluids    Intake/Output Summary (Last 24 hours) at 9/10/2022 1421  Last data filed at 9/10/2022 0749  Gross per 24 hour   Intake 320 ml   Output 350 ml   Net -30 ml         Laboratory  Recent Labs     09/08/22  1424 09/09/22  1111   WBC 12.2* 13.4*   RBC 2.33* 2.61*   HEMOGLOBIN 7.3* 8.1*   HEMATOCRIT 23.4* 26.6*   .4* 101.9*   MCH 31.3 31.0   MCHC 31.2* 30.5*   RDW 62.6* 67.8*   PLATELETCT 526* 566*   MPV 10.1 10.1       Recent Labs     09/08/22  1424 09/09/22  1111   SODIUM 140 138   POTASSIUM 5.3 5.2   CHLORIDE 110 112   CO2 21 17*   GLUCOSE 115* 117*   BUN 38* 36*   CREATININE 2.40* 2.10*   CALCIUM 8.2* 8.2*                     Imaging  [id number] VIEW   Final Result      No evidence of bowel obstruction.      DX-CHEST-LIMITED (1 VIEW)    Final Result         Minimal left basilar opacities, likely atelectasis.      IR-US GUIDED PIV   Final Result    Ultrasound-guided PERIPHERAL IV INSERTION performed by    qualified nursing staff as above.      DX-CHEST-PORTABLE (1 VIEW)   Final Result      1.  Mild worsening hypoinflation.   2.  Supportive tubing as described above.   3.  No other significant change from prior exam.            DX-CHEST-PORTABLE (1 VIEW)   Final Result      1.  Interval removal of endotracheal tube and enteric catheter      2.  No other change      DX-CHEST-PORTABLE (1 VIEW)   Final Result      No significant interval change.      DX-CHEST-PORTABLE (1 VIEW)   Final Result         1.  Hazy retrocardiac and left lung base atelectasis or infiltrate, somewhat increased since prior study.   2.  Trace left pleural effusion   3.  Atherosclerosis      DX-CHEST-PORTABLE (1 VIEW)   Final Result         1.  Hazy retrocardiac and left lung base atelectasis or infiltrate.   2.  Trace left pleural effusion   3.  Atherosclerosis      DX-CHEST-PORTABLE (1 VIEW)   Final Result         1.  No acute cardiopulmonary disease.      DX-CHEST-PORTABLE (1 VIEW)   Final Result         1.  No acute cardiopulmonary disease.      MR-MRA HEAD-W/O   Final Result      1.  A2/A3 segment left anterior cerebral artery occlusion.   2.  Occlusion of the intradural left vertebral artery.   3.  Asymmetrically small caliber of the distal left internal carotid artery in the left middle cerebral artery.   4.  Focal stenosis of the distal M1 right middle cerebral artery.      MR-BRAIN-W/O   Final Result      1.  At least moderate sized acute/subacute left anterior cerebral artery infarct.   2.  Additional small recent right thalamic infarct.   3.  Multiple chronic bilateral cerebral and cerebellar infarcts.   4.  Cerebral atrophy and chronic microvascular ischemic type changes.   5.  No acute intracranial hemorrhage.      DX-ABDOMEN FOR TUBE PLACEMENT   Final Result          Feeding tube with tip projecting over the expected area of the stomach.      CT-HEAD W/O   Final Result         Patchy areas of ill-defined hypodensity in the left frontal and parietal lobes, concerning for acute multifocal infarcts. Further evaluation with MRI is recommended.      No acute intracranial hemorrhage.                  AH-ICAVEAP-0 VIEW   Final Result         No radiopaque foreign body identified. Previous sponge was removed.      [id number] VIEW   Final Result      1.  There is a curvilinear density in the right upper abdomen consistent with a retained surgical lap sponge. This was subsequently removed as there has already been a subsequent x-ray and this was no longer identified.   2.  There are new cutaneous skin staples in midline of the abdomen and pelvis.   3.  Bowel gas pattern is nonobstructive.      DX-CHEST-PORTABLE (1 VIEW)   Final Result      No acute cardiopulmonary abnormality.      DX-ABDOMEN FOR TUBE PLACEMENT   Final Result      1.  Enteric tube overlies the proximal stomach.      DX-CHEST-PORTABLE (1 VIEW)   Final Result         1.  No acute cardiopulmonary disease.   2.  Nasogastric tube is coiled back the side port with tip projecting cephalad terminating in the distal esophagus, recommend advancement.      EC-KLEBER W/O CONT   Final Result      CT-RENAL COLIC EVALUATION(A/P W/O)   Final Result         1. No hydronephrosis. Nonobstructive bilateral renal calculi seen on previous exam cannot be evaluated due to contrast in the collecting system.      2. Retained contrast in the kidney from prior CTA, likely due to nephropathy/renal failure.      US-RENAL   Final Result         1.  Mild right hydronephrosis   2.  Punctate left renal calculi without visualized obstructive changes.      CT-CTA AORTA-RO WITH & W/O-POST PROCESS   Final Result      1.  Occlusion of the abdominal aorta just below level of the renal arteries with occlusion of the common and external iliac arteries with  reconstitution of flow via epigastric collaterals.   2.  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   3.  Diffusely small caliber bilateral lower extremity arteries with significant atherosclerosis of the bilateral superficial femoral arteries without high-grade stenosis.   4.  Likely single vessel runoff on the right and 2 vessel runoff on the left.   5.  Focal severe stenoses at the bilateral renal artery origins.      These findings were discussed with ESPERANZA DO on 8/23/2022 5:29 PM.             Assessment/Plan  * Peripheral arterial occlusive disease (HCC)- (present on admission)  Assessment & Plan  S/p operative vascular intervention,    Oropharyngeal dysphagia- (present on admission)  Assessment & Plan  Patient passed speech evaluation, monitor    Hypomagnesemia  Assessment & Plan  Monitor, replace as indicated    Stroke (HCC)- (present on admission)  Assessment & Plan  Acute possibly intraoperative CVA  S/p evaluation from neurology, no intervention at the time  Antiplatelet therapy  Statin  Consult rehab  PT OT SLP  Plan Rehab DC if appropriate per  Physiatry    Acute postoperative respiratory failure (HCC)  Assessment & Plan  Improved, currently extubated, monitor, respiratory protocol, incentive spirometry as tolerated    Coagulopathy (HCC)  Assessment & Plan  Monitor,    Chronic distal aortic occlusion (HCC)- (present on admission)  Assessment & Plan  S/p aortobifem bypass  Vascular surgery managing    Chronic systolic congestive heart failure (HCC)- (present on admission)  Assessment & Plan  Ejection fraction around 30%.   Hold metoprolol due to bradycardia  Add ARB as renal function and pressures allow  Monitor volume status    Occluded aorta (HCC)- (present on admission)  Assessment & Plan  S/p aortobifemoral bypass, endovascular repair    Aortic thrombus (HCC)- (present on admission)  Assessment & Plan  S/p  vascular repair    COPD (chronic obstructive pulmonary disease) ([facility])- (present on admission)  Assessment & Plan  Respiratory protocol    IVON (acute kidney injury) ([facility])- (present on admission)  Assessment & Plan  CT with focal severe stenosis of bilateral renal artery origins, patient has occlusive aortic vascular disease  S/p surgical intervention on bilateral renal arteries  Creatinine slowly improving, good urine output  Monitor closely  Avoid nephrotoxins    Hypertension- (present on admission)  Assessment & Plan  History of, monitor, as needed medication available  Will reinstitute beta-blockade    QT prolongation- (present on admission)  Assessment & Plan  Avoid QTC prolonging medications    Acute blood loss anemia  Assessment & Plan  Secondary to blood loss from surgery, currently recovering, monitor, transfuse as indicated    CAD (coronary artery disease)- (present on admission)  Assessment & Plan  stent to LAD in July: Needs to continue on DAPT  Cont BB and statin         VTE prophylaxis: heparin ppx    I have performed a physical exam and reviewed and updated ROS and Plan today (9/10/2022). In review of yesterday's note (9/9/2022), there are no changes except as documented above.

## 2022-09-10 NOTE — PROGRESS NOTES
Received report from previous shift RN  Assessment complete.  Pt nonverbal and only able to nod yes and no. Unable to assess orientation. Patient calls appropriately.  Patient ambulates with x2 assist. Bed alarm on.   Patient has 6/10 pain. Pain managed with prescribed medications.  Denies N&V. Tolerating level 5 minced and moist diet.  Surgical incision to midline and transverse groin, wound vac in place, CDI.  + void, + flatus, - BM.  Patient denies SOB.  SCD's on.  Patient repositioned Q2.  Review plan with of care with patient. Call light and personal belongings with in reach. Hourly rounding in place. All needs met at this time.

## 2022-09-11 LAB
ALBUMIN SERPL BCP-MCNC: 2.6 G/DL (ref 3.2–4.9)
ALBUMIN/GLOB SERPL: 0.6 G/DL
ALP SERPL-CCNC: 123 U/L (ref 30–99)
ALT SERPL-CCNC: 9 U/L (ref 2–50)
ANION GAP SERPL CALC-SCNC: 9 MMOL/L (ref 7–16)
AST SERPL-CCNC: 36 U/L (ref 12–45)
BASOPHILS # BLD AUTO: 0.4 % (ref 0–1.8)
BASOPHILS # BLD: 0.04 K/UL (ref 0–0.12)
BILIRUB SERPL-MCNC: 0.5 MG/DL (ref 0.1–1.5)
BUN SERPL-MCNC: 29 MG/DL (ref 8–22)
CALCIUM SERPL-MCNC: 8.1 MG/DL (ref 8.5–10.5)
CHLORIDE SERPL-SCNC: 110 MMOL/L (ref 96–112)
CO2 SERPL-SCNC: 18 MMOL/L (ref 20–33)
CREAT SERPL-MCNC: 1.83 MG/DL (ref 0.5–1.4)
EOSINOPHIL # BLD AUTO: 0.13 K/UL (ref 0–0.51)
EOSINOPHIL NFR BLD: 1.3 % (ref 0–6.9)
ERYTHROCYTE [DISTWIDTH] IN BLOOD BY AUTOMATED COUNT: 73 FL (ref 35.9–50)
GFR SERPLBLD CREATININE-BSD FMLA CKD-EPI: 31 ML/MIN/1.73 M 2
GLOBULIN SER CALC-MCNC: 4.2 G/DL (ref 1.9–3.5)
GLUCOSE SERPL-MCNC: 105 MG/DL (ref 65–99)
HCT VFR BLD AUTO: 26.1 % (ref 37–47)
HGB BLD-MCNC: 8.1 G/DL (ref 12–16)
IMM GRANULOCYTES # BLD AUTO: 0.1 K/UL (ref 0–0.11)
IMM GRANULOCYTES NFR BLD AUTO: 1 % (ref 0–0.9)
LYMPHOCYTES # BLD AUTO: 1.35 K/UL (ref 1–4.8)
LYMPHOCYTES NFR BLD: 13.1 % (ref 22–41)
MCH RBC QN AUTO: 31.5 PG (ref 27–33)
MCHC RBC AUTO-ENTMCNC: 31 G/DL (ref 33.6–35)
MCV RBC AUTO: 101.6 FL (ref 81.4–97.8)
MONOCYTES # BLD AUTO: 0.88 K/UL (ref 0–0.85)
MONOCYTES NFR BLD AUTO: 8.5 % (ref 0–13.4)
NEUTROPHILS # BLD AUTO: 7.84 K/UL (ref 2–7.15)
NEUTROPHILS NFR BLD: 75.7 % (ref 44–72)
NRBC # BLD AUTO: 0 K/UL
NRBC BLD-RTO: 0 /100 WBC
PLATELET # BLD AUTO: 659 K/UL (ref 164–446)
PMV BLD AUTO: 9.6 FL (ref 9–12.9)
POTASSIUM SERPL-SCNC: 4.9 MMOL/L (ref 3.6–5.5)
PROT SERPL-MCNC: 6.8 G/DL (ref 6–8.2)
RBC # BLD AUTO: 2.57 M/UL (ref 4.2–5.4)
SODIUM SERPL-SCNC: 137 MMOL/L (ref 135–145)
WBC # BLD AUTO: 10.3 K/UL (ref 4.8–10.8)

## 2022-09-11 PROCEDURE — 85025 COMPLETE CBC W/AUTO DIFF WBC: CPT

## 2022-09-11 PROCEDURE — 700111 HCHG RX REV CODE 636 W/ 250 OVERRIDE (IP): Performed by: HOSPITALIST

## 2022-09-11 PROCEDURE — 770020 HCHG ROOM/CARE - TELE (206)

## 2022-09-11 PROCEDURE — A9270 NON-COVERED ITEM OR SERVICE: HCPCS | Performed by: SURGERY

## 2022-09-11 PROCEDURE — 700102 HCHG RX REV CODE 250 W/ 637 OVERRIDE(OP): Performed by: HOSPITALIST

## 2022-09-11 PROCEDURE — A9270 NON-COVERED ITEM OR SERVICE: HCPCS | Performed by: HOSPITALIST

## 2022-09-11 PROCEDURE — 700111 HCHG RX REV CODE 636 W/ 250 OVERRIDE (IP): Performed by: STUDENT IN AN ORGANIZED HEALTH CARE EDUCATION/TRAINING PROGRAM

## 2022-09-11 PROCEDURE — 700105 HCHG RX REV CODE 258: Performed by: HOSPITALIST

## 2022-09-11 PROCEDURE — 36415 COLL VENOUS BLD VENIPUNCTURE: CPT

## 2022-09-11 PROCEDURE — 700102 HCHG RX REV CODE 250 W/ 637 OVERRIDE(OP): Performed by: SURGERY

## 2022-09-11 PROCEDURE — 80053 COMPREHEN METABOLIC PANEL: CPT

## 2022-09-11 PROCEDURE — 99232 SBSQ HOSP IP/OBS MODERATE 35: CPT | Performed by: HOSPITALIST

## 2022-09-11 RX ADMIN — ASPIRIN 81 MG 81 MG: 81 TABLET ORAL at 04:20

## 2022-09-11 RX ADMIN — OXYCODONE 5 MG: 5 TABLET ORAL at 04:20

## 2022-09-11 RX ADMIN — EZETIMIBE 10 MG: 10 TABLET ORAL at 04:20

## 2022-09-11 RX ADMIN — ACETAMINOPHEN 650 MG: 325 TABLET ORAL at 02:40

## 2022-09-11 RX ADMIN — HEPARIN SODIUM 5000 UNITS: 5000 INJECTION, SOLUTION INTRAVENOUS; SUBCUTANEOUS at 23:36

## 2022-09-11 RX ADMIN — CLOPIDOGREL BISULFATE 75 MG: 75 TABLET ORAL at 04:20

## 2022-09-11 RX ADMIN — ATORVASTATIN CALCIUM 80 MG: 80 TABLET, FILM COATED ORAL at 19:55

## 2022-09-11 RX ADMIN — DOCUSATE SODIUM 50 MG AND SENNOSIDES 8.6 MG 1 TABLET: 8.6; 5 TABLET, FILM COATED ORAL at 19:55

## 2022-09-11 RX ADMIN — METOPROLOL TARTRATE 12.5 MG: 25 TABLET, FILM COATED ORAL at 17:53

## 2022-09-11 RX ADMIN — DOCUSATE SODIUM 100 MG: 100 CAPSULE, LIQUID FILLED ORAL at 04:20

## 2022-09-11 RX ADMIN — PRAMIPEXOLE DIHYDROCHLORIDE 0.12 MG: 0.12 TABLET ORAL at 04:28

## 2022-09-11 RX ADMIN — PROCHLORPERAZINE EDISYLATE 10 MG: 5 INJECTION INTRAMUSCULAR; INTRAVENOUS at 02:47

## 2022-09-11 RX ADMIN — PRAMIPEXOLE DIHYDROCHLORIDE 0.12 MG: 0.12 TABLET ORAL at 12:10

## 2022-09-11 RX ADMIN — DOCUSATE SODIUM 100 MG: 100 CAPSULE, LIQUID FILLED ORAL at 17:53

## 2022-09-11 RX ADMIN — HEPARIN SODIUM 5000 UNITS: 5000 INJECTION, SOLUTION INTRAVENOUS; SUBCUTANEOUS at 04:20

## 2022-09-11 RX ADMIN — SODIUM CHLORIDE: 9 INJECTION, SOLUTION INTRAVENOUS at 02:47

## 2022-09-11 RX ADMIN — OXYCODONE 5 MG: 5 TABLET ORAL at 17:54

## 2022-09-11 RX ADMIN — OXYCODONE 5 MG: 5 TABLET ORAL at 12:09

## 2022-09-11 RX ADMIN — PRAMIPEXOLE DIHYDROCHLORIDE 0.12 MG: 0.12 TABLET ORAL at 17:53

## 2022-09-11 ASSESSMENT — ENCOUNTER SYMPTOMS
NAUSEA: 0
DIARRHEA: 0
DIAPHORESIS: 0
FEVER: 0
HALLUCINATIONS: 0
TINGLING: 0
HEMOPTYSIS: 0
SORE THROAT: 0
BRUISES/BLEEDS EASILY: 0
HEARTBURN: 0
BLOOD IN STOOL: 0
CHILLS: 0
WEAKNESS: 0
FALLS: 0
CONSTIPATION: 0
SPUTUM PRODUCTION: 0
HEADACHES: 0
ABDOMINAL PAIN: 0
PHOTOPHOBIA: 0
EYE PAIN: 0
MYALGIAS: 0
SHORTNESS OF BREATH: 0
CLAUDICATION: 0
DOUBLE VISION: 0
PALPITATIONS: 0
BACK PAIN: 0
DIZZINESS: 0
DEPRESSION: 0
ORTHOPNEA: 0
FLANK PAIN: 0
VOMITING: 0
PND: 0
WHEEZING: 0
POLYDIPSIA: 0
STRIDOR: 0
NECK PAIN: 0
BLURRED VISION: 0
TREMORS: 0
COUGH: 0
SINUS PAIN: 0

## 2022-09-11 ASSESSMENT — LIFESTYLE VARIABLES: SUBSTANCE_ABUSE: 0

## 2022-09-11 NOTE — CARE PLAN
The patient is Stable - Low risk of patient condition declining or worsening    Shift Goals  Clinical Goals: Pain management, rest, await bed placement  Patient Goals: Pain management, rest  Family Goals: No family present at this time

## 2022-09-11 NOTE — PROGRESS NOTES
Bedside report received from previous shift RN and assumed full care of patient.  Assessments complete as per flowsheets.    Alert & Oriented x4.   Patient denies chest pain/shortness of breath.  Patient reports 6/10 pain.     Medication given per MAR.     Patient tolerating diet, currently denies nausea and vomiting.    Patient currently resting in bed, refusing ambulation at this time.    All patient needs have been met at this time, call light within reach.  Reinforced falls program rationale with patient.   Verified bed is in low and locked position, non-skid socks in place.    Hourly rounding in place.

## 2022-09-11 NOTE — CARE PLAN
The patient is Stable - Low risk of patient condition declining or worsening    Shift Goals  Clinical Goals: pain management  Patient Goals: pain management  Family Goals: No family present at this time    Progress made toward(s) clinical / shift goals:  Pain frequently assessed. Medicated per MAR. Taps system and waffle in place. Pt educated on plan of care.    Problem: Knowledge Deficit - Standard  Goal: Patient and family/care givers will demonstrate understanding of plan of care, disease process/condition, diagnostic tests and medications  Outcome: Progressing     Problem: Skin Integrity  Goal: Skin integrity is maintained or improved  Outcome: Progressing     Problem: Fall Risk  Goal: Patient will remain free from falls  Outcome: Progressing       Patient is not progressing towards the following goals:

## 2022-09-11 NOTE — PROGRESS NOTES
Educated pt on high fall risk score per Em Dickens and necessary interventions. Pt verbalized understanding but refusing bed alarm at this time. Pt alert and oriented x4, pt calls appropriately, and does not try to ambulate without assistance. Fall risk arm band on, nonslip socks in place, fall risk sign on door. Call light and belongings within reach. Charge RN notified.

## 2022-09-11 NOTE — PROGRESS NOTES
Hospital Medicine Daily Progress Note    Date of Service  9/11/2022    Chief Complaint  Fouzia Santamaria is a 61 y.o. female admitted 8/23/2022 with leg pain    Hospital Course  62yo CAD with stetn to LAD, PAD with chronic aortic occlusion, HLD, HTN, DM, ongoing tobacco abuse, COPD.  In [facility] for AMI and had stent to LAD in July this year.  At that time found to have infra-renal aortic occlusion which was not addressed as it was felt to be too high risk.  DC'd on Rivaroxaban.  Presented here with increasing claudication pain.  Creat 2.6 up from 1.3.  on 8/26 went for Aortobifem bypass and renal artery eversion endarterectomies with Dr Siu with subsequent return to the OR on 8/28 for washout and closure.  While still on the vent 8/28 noted to have R side weakness.  CT  imaging showing L frontal/parietal infarcts.  MRA showing JOSAFAT occlusion.  Evaluated by Neuro and Neuro Rad and felt not to be an interventional candidate.  Extubated 9/2    Interval Problem Update  9/6: Patient was upset on bedside. Rehab has denied the patient due to low function.  I have ordered LTAC since she also has a Rachna.  I have ordered blood work for tomorrow since her hemoglobin was 7.9.  9/7: Hemoglobin has improved to 8.  WBC has down trended to 12.8.  She was less tearful on examination today.  SNF placement pending.  9/8: Patient was found to be bradycardic.  EKG found sinus bradycardia with T wave inversions in the anterior leads.  I reviewed her EKGs in the past and T wave inversions are old.  There is no significant change from a prior EKG.  Troponin is elevated secondary to kidney failure  I will continue to trend this.  9/9: Patient's symptoms are improving after stopping metoprolol.  Her speech had improved and her right arm swelling improved.  SNF placement is pending.  9/10: Patient was complaining of abdominal pain.  Abdominal x-ray did not show any evidence of obstruction.  Patient states that she has not had any bowel  movement.  She was dehydrated on examination and I will start IV fluids.  Placement is pending.  9/11: Patient's blood pressure improved after IV hydration.  Patient was tachycardic and I will restart metoprolol 12.5 twice daily.        I have discussed this patient's plan of car placement in. short e and discharge plan at IDT rounds today with Case Management, Nursing, Nursing leadership, and other members of the IDT team.    Consultants/Specialty  nephrology and neurology    Code Status  Full Code    Disposition  Patient is medically cleared for discharge.   Anticipate discharge to to an inpatient rehabilitation hospital.  I have placed the appropriate orders for post-discharge needs.    Review of Systems  Review of Systems   Unable to perform ROS: Acuity of condition   Constitutional:  Negative for chills, diaphoresis, fever and malaise/fatigue.   HENT:  Negative for congestion, ear discharge, ear pain, hearing loss, nosebleeds, sinus pain, sore throat and tinnitus.    Eyes:  Negative for blurred vision, double vision, photophobia and pain.   Respiratory:  Negative for cough, hemoptysis, sputum production, shortness of breath, wheezing and stridor.    Cardiovascular:  Negative for chest pain, palpitations, orthopnea, claudication, leg swelling and PND.   Gastrointestinal:  Negative for abdominal pain, blood in stool, constipation, diarrhea, heartburn, melena, nausea and vomiting.   Genitourinary:  Negative for dysuria, flank pain, frequency, hematuria and urgency.   Musculoskeletal:  Negative for back pain, falls, joint pain, myalgias and neck pain.   Skin:  Negative for itching and rash.   Neurological:  Negative for dizziness, tingling, tremors, weakness and headaches.   Endo/Heme/Allergies:  Negative for environmental allergies and polydipsia. Does not bruise/bleed easily.   Psychiatric/Behavioral:  Negative for depression, hallucinations, substance abuse and suicidal ideas.       Physical Exam  Temp:  [36 °C  (96.8 °F)-36.7 °C (98 °F)] 36.2 °C (97.1 °F)  Pulse:  [] 100  Resp:  [16-20] 18  BP: ()/(68-90) 156/82  SpO2:  [91 %-100 %] 100 %    Physical Exam  Vitals reviewed.   Constitutional:       General: She is not in acute distress.     Appearance: Normal appearance. She is well-developed. She is not diaphoretic.   HENT:      Head: Normocephalic and atraumatic.   Neck:      Vascular: No JVD.   Cardiovascular:      Rate and Rhythm: Normal rate and regular rhythm.      Heart sounds: Murmur heard.   Pulmonary:      Effort: Pulmonary effort is normal. No respiratory distress.      Breath sounds: No stridor. No wheezing or rales.   Abdominal:      Palpations: Abdomen is soft.      Tenderness: There is no abdominal tenderness. There is no guarding or rebound.   Musculoskeletal:         General: No tenderness.      Cervical back: Normal range of motion.      Right lower leg: No edema.      Left lower leg: No edema.   Skin:     General: Skin is warm and dry.      Capillary Refill: Capillary refill takes less than 2 seconds.      Findings: No rash.   Neurological:      Mental Status: She is alert.      Comments: Alert and attentive  Follows visual commands fairly consistently  Does not follow verbal commands  Speech is word salad  Puropseful with B upper extremities; unable to follow to test R leg       Fluids    Intake/Output Summary (Last 24 hours) at 9/11/2022 1453  Last data filed at 9/11/2022 1200  Gross per 24 hour   Intake 517.1 ml   Output 600 ml   Net -82.9 ml       Laboratory  Recent Labs     09/09/22  1111 09/10/22  1532 09/11/22  1131   WBC 13.4* 11.3* 10.3   RBC 2.61* 2.59* 2.57*   HEMOGLOBIN 8.1* 8.2* 8.1*   HEMATOCRIT 26.6* 26.4* 26.1*   .9* 101.9* 101.6*   MCH 31.0 31.7 31.5   MCHC 30.5* 31.1* 31.0*   RDW 67.8* 71.5* 73.0*   PLATELETCT 566* 664* 659*   MPV 10.1 9.7 9.6     Recent Labs     09/09/22  1111 09/10/22  1532 09/11/22  1131   SODIUM 138 138 137   POTASSIUM 5.2 5.6* 4.9   CHLORIDE 112  111 110   CO2 17* 20 18*   GLUCOSE 117* 92 105*   BUN 36* 33* 29*   CREATININE 2.10* 2.01* 1.83*   CALCIUM 8.2* 8.5 8.1*                   Imaging  [id number] VIEW   Final Result      No evidence of bowel obstruction.      DX-CHEST-LIMITED (1 VIEW)   Final Result         Minimal left basilar opacities, likely atelectasis.      IR-US GUIDED PIV   Final Result    Ultrasound-guided PERIPHERAL IV INSERTION performed by    qualified nursing staff as above.      DX-CHEST-PORTABLE (1 VIEW)   Final Result      1.  Mild worsening hypoinflation.   2.  Supportive tubing as described above.   3.  No other significant change from prior exam.            DX-CHEST-PORTABLE (1 VIEW)   Final Result      1.  Interval removal of endotracheal tube and enteric catheter      2.  No other change      DX-CHEST-PORTABLE (1 VIEW)   Final Result      No significant interval change.      DX-CHEST-PORTABLE (1 VIEW)   Final Result         1.  Hazy retrocardiac and left lung base atelectasis or infiltrate, somewhat increased since prior study.   2.  Trace left pleural effusion   3.  Atherosclerosis      DX-CHEST-PORTABLE (1 VIEW)   Final Result         1.  Hazy retrocardiac and left lung base atelectasis or infiltrate.   2.  Trace left pleural effusion   3.  Atherosclerosis      DX-CHEST-PORTABLE (1 VIEW)   Final Result         1.  No acute cardiopulmonary disease.      DX-CHEST-PORTABLE (1 VIEW)   Final Result         1.  No acute cardiopulmonary disease.      MR-MRA HEAD-W/O   Final Result      1.  A2/A3 segment left anterior cerebral artery occlusion.   2.  Occlusion of the intradural left vertebral artery.   3.  Asymmetrically small caliber of the distal left internal carotid artery in the left middle cerebral artery.   4.  Focal stenosis of the distal M1 right middle cerebral artery.      MR-BRAIN-W/O   Final Result      1.  At least moderate sized acute/subacute left anterior cerebral artery infarct.   2.  Additional small recent right  thalamic infarct.   3.  Multiple chronic bilateral cerebral and cerebellar infarcts.   4.  Cerebral atrophy and chronic microvascular ischemic type changes.   5.  No acute intracranial hemorrhage.      DX-ABDOMEN FOR TUBE PLACEMENT   Final Result         Feeding tube with tip projecting over the expected area of the stomach.      CT-HEAD W/O   Final Result         Patchy areas of ill-defined hypodensity in the left frontal and parietal lobes, concerning for acute multifocal infarcts. Further evaluation with MRI is recommended.      No acute intracranial hemorrhage.                  [id number] VIEW   Final Result         No radiopaque foreign body identified. Previous sponge was removed.      [id number] VIEW   Final Result      1.  There is a curvilinear density in the right upper abdomen consistent with a retained surgical lap sponge. This was subsequently removed as there has already been a subsequent x-ray and this was no longer identified.   2.  There are new cutaneous skin staples in midline of the abdomen and pelvis.   3.  Bowel gas pattern is nonobstructive.      DX-CHEST-PORTABLE (1 VIEW)   Final Result      No acute cardiopulmonary abnormality.      DX-ABDOMEN FOR TUBE PLACEMENT   Final Result      1.  Enteric tube overlies the proximal stomach.      DX-CHEST-PORTABLE (1 VIEW)   Final Result         1.  No acute cardiopulmonary disease.   2.  Nasogastric tube is coiled back the side port with tip projecting cephalad terminating in the distal esophagus, recommend advancement.      EC-KLEBER W/O CONT   Final Result      CT-RENAL COLIC EVALUATION(A/P W/O)   Final Result         1. No hydronephrosis. Nonobstructive bilateral renal calculi seen on previous exam cannot be evaluated due to contrast in the collecting system.      2. Retained contrast in the kidney from prior CTA, likely due to nephropathy/renal failure.      US-RENAL   Final Result         1.  Mild right hydronephrosis   2.  Punctate left renal  calculi without visualized obstructive changes.      CT-CTA AORTA-RO WITH & W/O-POST PROCESS   Final Result      1.  Occlusion of the abdominal aorta just below level of the renal arteries with occlusion of the common and external iliac arteries with reconstitution of flow via epigastric collaterals.   2.  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   3.  Diffusely small caliber bilateral lower extremity arteries with significant atherosclerosis of the bilateral superficial femoral arteries without high-grade stenosis.   4.  Likely single vessel runoff on the right and 2 vessel runoff on the left.   5.  Focal severe stenoses at the bilateral renal artery origins.      These findings were discussed with ESPERANZA DO on 8/23/2022 5:29 PM.             Assessment/Plan  * Peripheral arterial occlusive disease (HCC)- (present on admission)  Assessment & Plan  S/p operative vascular intervention,    Oropharyngeal dysphagia- (present on admission)  Assessment & Plan  Patient passed speech evaluation, monitor    Hypomagnesemia  Assessment & Plan  Monitor, replace as indicated    Stroke (HCC)- (present on admission)  Assessment & Plan  Acute possibly intraoperative CVA  S/p evaluation from neurology, no intervention at the time  Antiplatelet therapy  Statin  Consult rehab  PT OT SLP  Plan Rehab DC if appropriate per  Physiatry    Acute postoperative respiratory failure (HCC)  Assessment & Plan  Improved, currently extubated, monitor, respiratory protocol, incentive spirometry as tolerated    Coagulopathy (HCC)  Assessment & Plan  Monitor,    Chronic distal aortic occlusion (HCC)- (present on admission)  Assessment & Plan  S/p aortobifem bypass  Vascular surgery managing    Chronic systolic congestive heart failure (HCC)- (present on admission)  Assessment & Plan  Ejection fraction around 30%.   Hold metoprolol due to bradycardia  Add  ARB as renal function and pressures allow  Monitor volume status    Occluded aorta (HCC)- (present on admission)  Assessment & Plan  S/p aortobifemoral bypass, endovascular repair    Aortic thrombus (HCC)- (present on admission)  Assessment & Plan  S/p vascular repair    COPD (chronic obstructive pulmonary disease) (HCC)- (present on admission)  Assessment & Plan  Respiratory protocol    IVON (acute kidney injury) ([facility])- (present on admission)  Assessment & Plan  CT with focal severe stenosis of bilateral renal artery origins, patient has occlusive aortic vascular disease  S/p surgical intervention on bilateral renal arteries  Creatinine slowly improving, good urine output  Monitor closely  Avoid nephrotoxins    Hypertension- (present on admission)  Assessment & Plan  History of, monitor, as needed medication available  Will reinstitute beta-blockade    QT prolongation- (present on admission)  Assessment & Plan  Avoid QTC prolonging medications    Acute blood loss anemia  Assessment & Plan  Secondary to blood loss from surgery, currently recovering, monitor, transfuse as indicated    CAD (coronary artery disease)- (present on admission)  Assessment & Plan  stent to LAD in July: Needs to continue on DAPT  Cont BB and statin       VTE prophylaxis: heparin ppx    I have performed a physical exam and reviewed and updated ROS and Plan today (9/11/2022). In review of yesterday's note (9/10/2022), there are no changes except as documented above.

## 2022-09-11 NOTE — PROGRESS NOTES
Bedside report received from previous shift RN and assumed full care of patient.  Assessments complete as per flowsheets.    Alert & Oriented x4, with expressive aphasia.  Patient denies chest pain/shortness of breath.  Patient reports 4/10 pain.     Medication given per MAR.     Patient tolerating diet, currently denies nausea and vomiting.    Patient currently resting in bed, refusing ambulation.    All patient needs have been met at this time, call light within reach.  Reinforced falls program rationale with patient.   Verified bed is in low and locked position, non-skid socks in place.    Hourly rounding in place.

## 2022-09-11 NOTE — PROGRESS NOTES
Received report from previous shift RN  Assessment complete.  A&O x 4. Patient calls appropriately.  Patient mobilizes with x2 assist. Bed alarm refused.   Patient has 6/10 pain. Pain managed with prescribed medications.  Denies N&V. Tolerating level 5 minced and moist diet.  Surgical incision to midline and transverse groin, wound vac in place, CDI.  + void via purewick, + flatus, + BM.  Patient denies SOB.  SCD's refused.  Patient pleasant and cooperative with plan of care.  Review plan with of care with patient. Call light and personal belongings with in reach. Hourly rounding in place. All needs met at this time.

## 2022-09-11 NOTE — PROGRESS NOTES
Patient c/o 9/10 pain and current PRNs not touching the pain.    This RN contacted Hospitalist MD Trujillo to advise via VOALTE.   - This RN requested multimodal pain regimen in addition to current PRN   - No new orders at this time, MD will review case and issue orders if appropriate.

## 2022-09-11 NOTE — CARE PLAN
The patient is Stable - Low risk of patient condition declining or worsening    Shift Goals  Clinical Goals: Pain managment, await SNF bed placement  Patient Goals: Pain management, go home  Family Goals: No family present

## 2022-09-11 NOTE — PROGRESS NOTES
This RN received a call from Monitor [employer] - patient HR in the high 30s to 40s.  This RN responded bedside to find bedside HR monitor at 41 with patient playing games on her mobile phone.  Immediately after This RN started to converse with the patient, her HR went up to 118 and sustained while this RN was bedside.      This RN contacted Monitor Tech Peyman to verify if patient is in Afib, per Peyman patient is SR with clear P-wave and stated that he noticed that immediately after talking to This RN that patients HR raised.    This RN contacted Hospitalist MD Trujillo via VOALTE to notify.   - Message received and read at 0757am   - No new orders received

## 2022-09-12 PROCEDURE — A9270 NON-COVERED ITEM OR SERVICE: HCPCS | Performed by: HOSPITALIST

## 2022-09-12 PROCEDURE — 92526 ORAL FUNCTION THERAPY: CPT

## 2022-09-12 PROCEDURE — 700102 HCHG RX REV CODE 250 W/ 637 OVERRIDE(OP): Performed by: SURGERY

## 2022-09-12 PROCEDURE — 700111 HCHG RX REV CODE 636 W/ 250 OVERRIDE (IP): Performed by: HOSPITALIST

## 2022-09-12 PROCEDURE — 770020 HCHG ROOM/CARE - TELE (206)

## 2022-09-12 PROCEDURE — 99232 SBSQ HOSP IP/OBS MODERATE 35: CPT | Performed by: HOSPITALIST

## 2022-09-12 PROCEDURE — 700102 HCHG RX REV CODE 250 W/ 637 OVERRIDE(OP): Performed by: HOSPITALIST

## 2022-09-12 PROCEDURE — A9270 NON-COVERED ITEM OR SERVICE: HCPCS | Performed by: SURGERY

## 2022-09-12 RX ADMIN — PRAMIPEXOLE DIHYDROCHLORIDE 0.12 MG: 0.12 TABLET ORAL at 11:57

## 2022-09-12 RX ADMIN — DOCUSATE SODIUM 100 MG: 100 CAPSULE, LIQUID FILLED ORAL at 05:00

## 2022-09-12 RX ADMIN — METOPROLOL TARTRATE 12.5 MG: 25 TABLET, FILM COATED ORAL at 17:51

## 2022-09-12 RX ADMIN — EZETIMIBE 10 MG: 10 TABLET ORAL at 05:00

## 2022-09-12 RX ADMIN — PRAMIPEXOLE DIHYDROCHLORIDE 0.12 MG: 0.12 TABLET ORAL at 05:00

## 2022-09-12 RX ADMIN — ATORVASTATIN CALCIUM 80 MG: 80 TABLET, FILM COATED ORAL at 20:36

## 2022-09-12 RX ADMIN — PRAMIPEXOLE DIHYDROCHLORIDE 0.12 MG: 0.12 TABLET ORAL at 17:51

## 2022-09-12 RX ADMIN — ASPIRIN 81 MG 81 MG: 81 TABLET ORAL at 05:00

## 2022-09-12 RX ADMIN — METOPROLOL TARTRATE 12.5 MG: 25 TABLET, FILM COATED ORAL at 05:00

## 2022-09-12 RX ADMIN — DOCUSATE SODIUM 100 MG: 100 CAPSULE, LIQUID FILLED ORAL at 17:51

## 2022-09-12 RX ADMIN — HEPARIN SODIUM 5000 UNITS: 5000 INJECTION, SOLUTION INTRAVENOUS; SUBCUTANEOUS at 13:58

## 2022-09-12 RX ADMIN — CLOPIDOGREL BISULFATE 75 MG: 75 TABLET ORAL at 05:00

## 2022-09-12 RX ADMIN — DOCUSATE SODIUM 50 MG AND SENNOSIDES 8.6 MG 1 TABLET: 8.6; 5 TABLET, FILM COATED ORAL at 20:36

## 2022-09-12 RX ADMIN — OXYCODONE 5 MG: 5 TABLET ORAL at 13:58

## 2022-09-12 RX ADMIN — HEPARIN SODIUM 5000 UNITS: 5000 INJECTION, SOLUTION INTRAVENOUS; SUBCUTANEOUS at 05:00

## 2022-09-12 RX ADMIN — HEPARIN SODIUM 5000 UNITS: 5000 INJECTION, SOLUTION INTRAVENOUS; SUBCUTANEOUS at 21:27

## 2022-09-12 RX ADMIN — OXYCODONE 5 MG: 5 TABLET ORAL at 20:35

## 2022-09-12 ASSESSMENT — ENCOUNTER SYMPTOMS
SORE THROAT: 0
FEVER: 0
BLOOD IN STOOL: 0
HEMOPTYSIS: 0
CONSTIPATION: 0
BLURRED VISION: 0
VOMITING: 0
STRIDOR: 0
NECK PAIN: 0
HEARTBURN: 0
DIAPHORESIS: 0
DEPRESSION: 0
ORTHOPNEA: 0
HEADACHES: 0
PALPITATIONS: 0
SINUS PAIN: 0
PND: 0
MYALGIAS: 0
SHORTNESS OF BREATH: 0
TREMORS: 0
COUGH: 0
POLYDIPSIA: 0
DOUBLE VISION: 0
NAUSEA: 0
ABDOMINAL PAIN: 0
DIARRHEA: 0
BACK PAIN: 0
DIZZINESS: 0
SPUTUM PRODUCTION: 0
FALLS: 0
WHEEZING: 0
TINGLING: 0
CHILLS: 0
EYE PAIN: 0
PHOTOPHOBIA: 0
CLAUDICATION: 0
WEAKNESS: 0
BRUISES/BLEEDS EASILY: 0
FLANK PAIN: 0
HALLUCINATIONS: 0

## 2022-09-12 ASSESSMENT — PAIN SCALES - WONG BAKER: WONGBAKER_NUMERICALRESPONSE: HURTS JUST A LITTLE BIT

## 2022-09-12 ASSESSMENT — LIFESTYLE VARIABLES: SUBSTANCE_ABUSE: 0

## 2022-09-12 NOTE — DISCHARGE PLANNING
Agency/Facility Name: [facility]  Spoke To: Juan Antonio   Outcome: DPA will get a call back for updates regarding referral.    [id number]  Agency/Facility Name: [facility]   Spoke To: Juan Antonio   Outcome: Per Juan Antonio Pt was declined due to care exceeds capacity      RN CM notified    1521  Agency/Facility Name: [facility]   Spoke To: Pablo   Outcome: Pt referral is currently under review. DPA waiting on a call back for updates.    1536  Agency/Facility Name: [facility]   Outcome: [facility] declined via Epic due to care exceeds capacity.

## 2022-09-12 NOTE — PROGRESS NOTES
Hospital Medicine Daily Progress Note    Date of Service  9/12/2022    Chief Complaint  Fouzia Santamaria is a 61 y.o. female admitted 8/23/2022 with leg pain    Hospital Course  60yo CAD with stetn to LAD, PAD with chronic aortic occlusion, HLD, HTN, DM, ongoing tobacco abuse, COPD.  In [facility] for AMI and had stent to LAD in July this year.  At that time found to have infra-renal aortic occlusion which was not addressed as it was felt to be too high risk.  DC'd on Rivaroxaban.  Presented here with increasing claudication pain.  Creat 2.6 up from 1.3.  on 8/26 went for Aortobifem bypass and renal artery eversion endarterectomies with Dr Siu with subsequent return to the OR on 8/28 for washout and closure.  While still on the vent 8/28 noted to have R side weakness.  CT  imaging showing L frontal/parietal infarcts.  MRA showing JOSAFAT occlusion.  Evaluated by Neuro and Neuro Rad and felt not to be an interventional candidate.  Extubated 9/2    Interval Problem Update  9/6: Patient was upset on bedside. Rehab has denied the patient due to low function.  I have ordered LTAC since she also has a Rachna.  I have ordered blood work for tomorrow since her hemoglobin was 7.9.  9/7: Hemoglobin has improved to 8.  WBC has down trended to 12.8.  She was less tearful on examination today.  SNF placement pending.  9/8: Patient was found to be bradycardic.  EKG found sinus bradycardia with T wave inversions in the anterior leads.  I reviewed her EKGs in the past and T wave inversions are old.  There is no significant change from a prior EKG.  Troponin is elevated secondary to kidney failure  I will continue to trend this.  9/9: Patient's symptoms are improving after stopping metoprolol.  Her speech had improved and her right arm swelling improved.  SNF placement is pending.  9/10: Patient was complaining of abdominal pain.  Abdominal x-ray did not show any evidence of obstruction.  Patient states that she has not had any bowel  movement.  She was dehydrated on examination and I will start IV fluids.  Placement is pending.  9/11: Patient's blood pressure improved after IV hydration.  Patient was tachycardic and I will restart metoprolol 12.5 twice daily.  9/12: Patient's heart rate and blood pressure are stable today.  WBC and platelets count has trended downwards.  Creatinine has also improved.  Patient is a difficult placement since she's been denied by SNF due to medicaid FFS.         I have discussed this patient's plan of car placement in. short e and discharge plan at IDT rounds today with Case Management, Nursing, Nursing leadership, and other members of the IDT team.    Consultants/Specialty  nephrology and neurology    Code Status  Full Code    Disposition  Patient is medically cleared for discharge.   Anticipate discharge to to an inpatient rehabilitation hospital.  I have placed the appropriate orders for post-discharge needs.    Review of Systems  Review of Systems   Unable to perform ROS: Acuity of condition   Constitutional:  Negative for chills, diaphoresis, fever and malaise/fatigue.   HENT:  Negative for congestion, ear discharge, ear pain, hearing loss, nosebleeds, sinus pain, sore throat and tinnitus.    Eyes:  Negative for blurred vision, double vision, photophobia and pain.   Respiratory:  Negative for cough, hemoptysis, sputum production, shortness of breath, wheezing and stridor.    Cardiovascular:  Negative for chest pain, palpitations, orthopnea, claudication, leg swelling and PND.   Gastrointestinal:  Negative for abdominal pain, blood in stool, constipation, diarrhea, heartburn, melena, nausea and vomiting.   Genitourinary:  Negative for dysuria, flank pain, frequency, hematuria and urgency.   Musculoskeletal:  Negative for back pain, falls, joint pain, myalgias and neck pain.   Skin:  Negative for itching and rash.   Neurological:  Negative for dizziness, tingling, tremors, weakness and headaches.    Endo/Heme/Allergies:  Negative for environmental allergies and polydipsia. Does not bruise/bleed easily.   Psychiatric/Behavioral:  Negative for depression, hallucinations, substance abuse and suicidal ideas.       Physical Exam  Temp:  [36 °C (96.8 °F)-37.2 °C (98.9 °F)] 37.2 °C (98.9 °F)  Pulse:  [77-90] 77  Resp:  [16-18] 17  BP: (120-148)/(65-84) 124/81  SpO2:  [97 %-99 %] 98 %    Physical Exam  Vitals reviewed.   Constitutional:       General: She is not in acute distress.     Appearance: Normal appearance. She is well-developed. She is not diaphoretic.   HENT:      Head: Normocephalic and atraumatic.   Neck:      Vascular: No JVD.   Cardiovascular:      Rate and Rhythm: Normal rate and regular rhythm.      Heart sounds: Murmur heard.   Pulmonary:      Effort: Pulmonary effort is normal. No respiratory distress.      Breath sounds: No stridor. No wheezing or rales.   Abdominal:      Palpations: Abdomen is soft.      Tenderness: There is no abdominal tenderness. There is no guarding or rebound.   Musculoskeletal:         General: No tenderness.      Cervical back: Normal range of motion.      Right lower leg: No edema.      Left lower leg: No edema.   Skin:     General: Skin is warm and dry.      Capillary Refill: Capillary refill takes less than 2 seconds.      Findings: No rash.   Neurological:      Mental Status: She is alert.      Comments: Alert and attentive  Follows visual commands fairly consistently  Does not follow verbal commands  Speech is word salad  Puropseful with B upper extremities; unable to follow to test R leg       Fluids    Intake/Output Summary (Last 24 hours) at 9/12/2022 1406  Last data filed at 9/12/2022 0300  Gross per 24 hour   Intake 996 ml   Output 500 ml   Net 496 ml       Laboratory  Recent Labs     09/10/22  1532 09/11/22  1131   WBC 11.3* 10.3   RBC 2.59* 2.57*   HEMOGLOBIN 8.2* 8.1*   HEMATOCRIT 26.4* 26.1*   .9* 101.6*   MCH 31.7 31.5   MCHC 31.1* 31.0*   RDW 71.5*  73.0*   PLATELETCT 664* 659*   MPV 9.7 9.6     Recent Labs     09/10/22  1532 09/11/22  1131   SODIUM 138 137   POTASSIUM 5.6* 4.9   CHLORIDE 111 110   CO2 20 18*   GLUCOSE 92 105*   BUN 33* 29*   CREATININE 2.01* 1.83*   CALCIUM 8.5 8.1*                   Imaging  [id number] VIEW   Final Result      No evidence of bowel obstruction.      DX-CHEST-LIMITED (1 VIEW)   Final Result         Minimal left basilar opacities, likely atelectasis.      IR-US GUIDED PIV   Final Result    Ultrasound-guided PERIPHERAL IV INSERTION performed by    qualified nursing staff as above.      DX-CHEST-PORTABLE (1 VIEW)   Final Result      1.  Mild worsening hypoinflation.   2.  Supportive tubing as described above.   3.  No other significant change from prior exam.            DX-CHEST-PORTABLE (1 VIEW)   Final Result      1.  Interval removal of endotracheal tube and enteric catheter      2.  No other change      DX-CHEST-PORTABLE (1 VIEW)   Final Result      No significant interval change.      DX-CHEST-PORTABLE (1 VIEW)   Final Result         1.  Hazy retrocardiac and left lung base atelectasis or infiltrate, somewhat increased since prior study.   2.  Trace left pleural effusion   3.  Atherosclerosis      DX-CHEST-PORTABLE (1 VIEW)   Final Result         1.  Hazy retrocardiac and left lung base atelectasis or infiltrate.   2.  Trace left pleural effusion   3.  Atherosclerosis      DX-CHEST-PORTABLE (1 VIEW)   Final Result         1.  No acute cardiopulmonary disease.      DX-CHEST-PORTABLE (1 VIEW)   Final Result         1.  No acute cardiopulmonary disease.      MR-MRA HEAD-W/O   Final Result      1.  A2/A3 segment left anterior cerebral artery occlusion.   2.  Occlusion of the intradural left vertebral artery.   3.  Asymmetrically small caliber of the distal left internal carotid artery in the left middle cerebral artery.   4.  Focal stenosis of the distal M1 right middle cerebral artery.      MR-BRAIN-W/O   Final Result      1.   At least moderate sized acute/subacute left anterior cerebral artery infarct.   2.  Additional small recent right thalamic infarct.   3.  Multiple chronic bilateral cerebral and cerebellar infarcts.   4.  Cerebral atrophy and chronic microvascular ischemic type changes.   5.  No acute intracranial hemorrhage.      DX-ABDOMEN FOR TUBE PLACEMENT   Final Result         Feeding tube with tip projecting over the expected area of the stomach.      CT-HEAD W/O   Final Result         Patchy areas of ill-defined hypodensity in the left frontal and parietal lobes, concerning for acute multifocal infarcts. Further evaluation with MRI is recommended.      No acute intracranial hemorrhage.                  [id number] VIEW   Final Result         No radiopaque foreign body identified. Previous sponge was removed.      [id number] VIEW   Final Result      1.  There is a curvilinear density in the right upper abdomen consistent with a retained surgical lap sponge. This was subsequently removed as there has already been a subsequent x-ray and this was no longer identified.   2.  There are new cutaneous skin staples in midline of the abdomen and pelvis.   3.  Bowel gas pattern is nonobstructive.      DX-CHEST-PORTABLE (1 VIEW)   Final Result      No acute cardiopulmonary abnormality.      DX-ABDOMEN FOR TUBE PLACEMENT   Final Result      1.  Enteric tube overlies the proximal stomach.      DX-CHEST-PORTABLE (1 VIEW)   Final Result         1.  No acute cardiopulmonary disease.   2.  Nasogastric tube is coiled back the side port with tip projecting cephalad terminating in the distal esophagus, recommend advancement.      EC-KLEBER W/O CONT   Final Result      CT-RENAL COLIC EVALUATION(A/P W/O)   Final Result         1. No hydronephrosis. Nonobstructive bilateral renal calculi seen on previous exam cannot be evaluated due to contrast in the collecting system.      2. Retained contrast in the kidney from prior CTA, likely due to  nephropathy/renal failure.      US-RENAL   Final Result         1.  Mild right hydronephrosis   2.  Punctate left renal calculi without visualized obstructive changes.      CT-CTA AORTA-RO WITH & W/O-POST PROCESS   Final Result      1.  Occlusion of the abdominal aorta just below level of the renal arteries with occlusion of the common and external iliac arteries with reconstitution of flow via epigastric collaterals.   2.  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   3.  Diffusely small caliber bilateral lower extremity arteries with significant atherosclerosis of the bilateral superficial femoral arteries without high-grade stenosis.   4.  Likely single vessel runoff on the right and 2 vessel runoff on the left.   5.  Focal severe stenoses at the bilateral renal artery origins.      These findings were discussed with ESPERANZA DO on 8/23/2022 5:29 PM.             Assessment/Plan  * Peripheral arterial occlusive disease (HCC)- (present on admission)  Assessment & Plan  S/p operative vascular intervention,    Oropharyngeal dysphagia- (present on admission)  Assessment & Plan  Patient passed speech evaluation, monitor    Hypomagnesemia  Assessment & Plan  Monitor, replace as indicated    Stroke (HCC)- (present on admission)  Assessment & Plan  Acute possibly intraoperative CVA  S/p evaluation from neurology, no intervention at the time  Antiplatelet therapy  Statin  Consult rehab  PT OT SLP  Plan Rehab DC if appropriate per  Physiatry    Acute postoperative respiratory failure (HCC)  Assessment & Plan  Improved, currently extubated, monitor, respiratory protocol, incentive spirometry as tolerated    Coagulopathy (HCC)  Assessment & Plan  Monitor,    Chronic distal aortic occlusion (HCC)- (present on admission)  Assessment & Plan  S/p aortobifem bypass  Vascular surgery managing    Chronic systolic congestive heart failure  (HCC)- (present on admission)  Assessment & Plan  Ejection fraction around 30%.   Hold metoprolol due to bradycardia  Add ARB as renal function and pressures allow  Monitor volume status    Occluded aorta ([facility])- (present on admission)  Assessment & Plan  S/p aortobifemoral bypass, endovascular repair    Aortic thrombus ([facility])- (present on admission)  Assessment & Plan  S/p vascular repair    COPD (chronic obstructive pulmonary disease) ([facility])- (present on admission)  Assessment & Plan  Respiratory protocol    IVON (acute kidney injury) ([facility])- (present on admission)  Assessment & Plan  CT with focal severe stenosis of bilateral renal artery origins, patient has occlusive aortic vascular disease  S/p surgical intervention on bilateral renal arteries  Creatinine slowly improving, good urine output  Monitor closely  Avoid nephrotoxins    Hypertension- (present on admission)  Assessment & Plan  History of, monitor, as needed medication available  Will reinstitute beta-blockade    QT prolongation- (present on admission)  Assessment & Plan  Avoid QTC prolonging medications    Acute blood loss anemia  Assessment & Plan  Secondary to blood loss from surgery, currently recovering, monitor, transfuse as indicated    CAD (coronary artery disease)- (present on admission)  Assessment & Plan  stent to LAD in July: Needs to continue on DAPT  Cont BB and statin       VTE prophylaxis: heparin ppx    I have performed a physical exam and reviewed and updated ROS and Plan today (9/12/2022). In review of yesterday's note (9/11/2022), there are no changes except as documented above.

## 2022-09-12 NOTE — THERAPY
Speech Language Pathology  Daily Treatment     Patient Name: Fouzia Santamaria  Age:  61 y.o., Sex:  female  Medical Record #: [id number]  Today's Date: 9/12/2022     Precautions  Precautions: (P) Swallow Precautions ( See Comments), Fall Risk  Comments: abdominal binder on with mobilty    Assessment    Pt seen this date for dysphagia intervention. Per RN, NOC RN reporting pt having difficulty with pills overnight. PO trials of LQ3, MM5, SB6, reg and thins by straw assessed. Timely swallow initiation and clear vocal quality appreciated. Prolonged but functional mastication of SB6 appreciated. Prolonged mastication >90 seconds appreciated with regular. No other s/sx of aspiration appreciated with any consistencies consumed. Of note, pt presenting with significant dysarthria and possible word finding deficits. Pt reports some dysarthria at baseline but current presentation is worse than normal.      Recommend diet upgrade to soft and bite size/thins with adherence to the following: upright at 90* for PO, slow rate, small bites/sips, straws okay.   Meds crushed or floated in applesauce.   Pt would benefit from cognitive-linguistic (and dysarthria) evaluation    SLP following.     Plan    Continue current treatment plan.    Discharge Recommendations: (P) Recommend home health for continued speech therapy services    Subjective    Pt alert, followed directions and required min encouragement for participation.      Objective       09/12/22 1127   Charge Group   SLP Swallowing Dysfunction Treatment Swallowing Dysfunction Treatment   Total Treatment Time   SLP Time Spent Yes   SLP Swallowing Dysfunction Treatment (Mins) 11   Precautions   Precautions Swallow Precautions ( See Comments);Fall Risk   Vitals   O2 Delivery Device None - Room Air   Pain 0 - 10 Group   Location Abdomen   Therapist Pain Assessment Post Activity Pain Same as Prior to Activity;Nurse Notified;7   Dysphagia    Dysphagia X   Other Treatments PO trials  "  Diet / Liquid Recommendation Thin (0);Soft & Bite-Sized (6) - (Dysphagia III)   Nutritional Liquid Intake Rating Scale Non thickened beverages   Nutritional Food Intake Rating Scale Total oral diet with multiple consistencies but requiring special preparation or compensations   Skilled Intervention Compensatory Strategies;Verbal Cueing   Recommended Route of Medication Administration   Medication Administration  Float Whole with Puree   Patient / Family Goals   Patient / Family Goal #1 \"Yes\" when asked if she wanted more to drink   Goal #1 Outcome Progressing as expected   Short Term Goals   Short Term Goal # 1 Pt will consume diet of MM5/TN0 given swallow precautions w/ no overt s/sx of aspiration and no worsening of respiratory status.   Goal Outcome # 1 Goal met, new goal added   Short Term Goal # 1 B  Pt will consume a diet of SB/TN with no s/sx of aspiration and min cues.   Education Group   Education Provided Dysphagia   Dysphagia Patient Response Patient;Acceptance;Demonstration;Explanation;Verbal Demonstration;Action Demonstration;Reinforcement Needed   Anticipated Discharge Needs   Discharge Recommendations Recommend home health for continued speech therapy services   Therapy Recommendations Upon DC Dysphagia Training   Interdisciplinary Plan of Care Collaboration   IDT Collaboration with  Nursing   Patient Position at End of Therapy Seated;In Bed;Bed Alarm On;Call Light within Reach;Tray Table within Reach   Collaboration Comments RN aware of results/recs     "

## 2022-09-12 NOTE — CARE PLAN
Problem: Knowledge Deficit - Standard  Goal: Patient and family/care givers will demonstrate understanding of plan of care, disease process/condition, diagnostic tests and medications  Outcome: Progressing     Problem: Skin Integrity  Goal: Skin integrity is maintained or improved  Outcome: Progressing     Problem: Fall Risk  Goal: Patient will remain free from falls  Outcome: Progressing   The patient is Stable - Low risk of patient condition declining or worsening    Shift Goals  Clinical Goals: placement  Patient Goals: advance diet  Family Goals: No family present    Progress made toward(s) clinical / shift goals:       Patient is not progressing towards the following goals:

## 2022-09-12 NOTE — PROGRESS NOTES
Assumed care at 1845. Pt resting in bed. A&ox 4  Expressive aphasia. Rt side weakness noted  Denies pain  O2 on RA  Abd and b/l groin WV in place  Tolerating minced and moist diet. Pills crushed with applesauce  Incontinent of urine. Purewick in place. Adequate output  No bm overnight  Q2 turn in place.   Bed alarm in place  Call light within reach. Hourly rounding in place

## 2022-09-12 NOTE — DISCHARGE PLANNING
Case Management Discharge Planning    Admission Date: 8/23/2022  GMLOS: 6.6  ALOS: 20    6-Clicks ADL Score: 12  6-Clicks Mobility Score: 6  PT and/or OT Eval ordered: Yes  Post-acute Referrals Ordered: Yes  Post-acute Choice Obtained: Yes  Has referral(s) been sent to post-acute provider:  Yes      Anticipated Discharge Dispo: Discharge Disposition: D/T to SNF with Medicare cert in anticipation of skilled care (03)    DME Needed: No    Action(s) Taken: Patient has been declined to all local SNFs due to behaviors. Will speak to leadership about other options for patient.    Asked [facility] to re-review and they stated they need recent PT/OT notes. Will follow.    Escalations Completed: Leadership    Medically Clear: Yes    Next Steps: will follow    Barriers to Discharge: Pending Placement    Is the patient up for discharge tomorrow: No

## 2022-09-12 NOTE — THERAPY
"   09/12/22 1027   Interdisciplinary Plan of Care Collaboration   Collaboration Comments Attempted OT eval. Pt politely declined, but stated \"maybe tomorrow\". Provided multiple options for therapeutic activity and educated on pathology of bedrest, however pt continued to decline. Will attempt again.     "

## 2022-09-12 NOTE — DIETARY
Nutrition Services Brief Update:    Day 20 of admit.  Fouzia Santamaria is a 61 y.o. female with admitting DX of Peripheral arterial occlusive disease (HCC) [I77.9]  Chronic distal aortic occlusion (HCC) [I74.09]  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HCC) [I70.0]    Current Diet: L5/L0 (minced and moist solids with thin liquids)    Problem: Nutritional:  Goal: Achieve adequate nutritional intake  Description: Patient will consume 50% of meals  Outcome: Met  PO avg 50% of meals.

## 2022-09-12 NOTE — CARE PLAN
The patient is Stable - Low risk of patient condition declining or worsening    Shift Goals  Clinical Goals: pain mgmt  Patient Goals: Pain management, go home  Family Goals: No family present    Progress made toward(s) clinical / shift goals:  pain controlled. Q2 hours checks. Bed alarm on     Patient is not progressing towards the following goals:    Problem: Skin Integrity  Goal: Skin integrity is maintained or improved  Outcome: Progressing     Problem: Fall Risk  Goal: Patient will remain free from falls  Outcome: Progressing

## 2022-09-13 PROBLEM — I70.1 RENAL ARTERY STENOSIS (HCC): Status: ACTIVE | Noted: 2022-09-13

## 2022-09-13 PROBLEM — E87.5 HYPERKALEMIA: Status: ACTIVE | Noted: 2022-09-13

## 2022-09-13 PROBLEM — I42.9 CARDIOMYOPATHY (HCC): Status: ACTIVE | Noted: 2022-09-13

## 2022-09-13 PROBLEM — N18.30 CKD (CHRONIC KIDNEY DISEASE) STAGE 3, GFR 30-59 ML/MIN: Status: ACTIVE | Noted: 2022-09-13

## 2022-09-13 PROBLEM — Z91.89 AT RISK FOR DELIRIUM: Status: ACTIVE | Noted: 2022-09-13

## 2022-09-13 PROBLEM — I97.618 POSTOPERATIVE HEMORRHAGE INVOLVING CIRCULATORY SYSTEM FOLLOWING CIRCULATORY SYSTEM PROCEDURE: Status: ACTIVE | Noted: 2022-09-13

## 2022-09-13 LAB
ANION GAP SERPL CALC-SCNC: 12 MMOL/L (ref 7–16)
BUN SERPL-MCNC: 26 MG/DL (ref 8–22)
CALCIUM SERPL-MCNC: 8.5 MG/DL (ref 8.5–10.5)
CHLORIDE SERPL-SCNC: 108 MMOL/L (ref 96–112)
CO2 SERPL-SCNC: 19 MMOL/L (ref 20–33)
CREAT SERPL-MCNC: 1.81 MG/DL (ref 0.5–1.4)
ERYTHROCYTE [DISTWIDTH] IN BLOOD BY AUTOMATED COUNT: 74.4 FL (ref 35.9–50)
GFR SERPLBLD CREATININE-BSD FMLA CKD-EPI: 32 ML/MIN/1.73 M 2
GLUCOSE SERPL-MCNC: 114 MG/DL (ref 65–99)
HCT VFR BLD AUTO: 27 % (ref 37–47)
HGB BLD-MCNC: 8.2 G/DL (ref 12–16)
MAGNESIUM SERPL-MCNC: 1.6 MG/DL (ref 1.5–2.5)
MCH RBC QN AUTO: 31.1 PG (ref 27–33)
MCHC RBC AUTO-ENTMCNC: 30.4 G/DL (ref 33.6–35)
MCV RBC AUTO: 102.3 FL (ref 81.4–97.8)
PHOSPHATE SERPL-MCNC: 3.8 MG/DL (ref 2.5–4.5)
PLATELET # BLD AUTO: 695 K/UL (ref 164–446)
PMV BLD AUTO: 9.5 FL (ref 9–12.9)
POTASSIUM SERPL-SCNC: 4.6 MMOL/L (ref 3.6–5.5)
PTH-INTACT SERPL-MCNC: 105 PG/ML (ref 14–72)
RBC # BLD AUTO: 2.64 M/UL (ref 4.2–5.4)
SODIUM SERPL-SCNC: 139 MMOL/L (ref 135–145)
TSH SERPL DL<=0.005 MIU/L-ACNC: 3.43 UIU/ML (ref 0.38–5.33)
WBC # BLD AUTO: 9 K/UL (ref 4.8–10.8)

## 2022-09-13 PROCEDURE — 97535 SELF CARE MNGMENT TRAINING: CPT

## 2022-09-13 PROCEDURE — 36415 COLL VENOUS BLD VENIPUNCTURE: CPT

## 2022-09-13 PROCEDURE — A9270 NON-COVERED ITEM OR SERVICE: HCPCS | Performed by: HOSPITALIST

## 2022-09-13 PROCEDURE — 80048 BASIC METABOLIC PNL TOTAL CA: CPT

## 2022-09-13 PROCEDURE — 700111 HCHG RX REV CODE 636 W/ 250 OVERRIDE (IP): Performed by: HOSPITALIST

## 2022-09-13 PROCEDURE — 700102 HCHG RX REV CODE 250 W/ 637 OVERRIDE(OP): Performed by: FAMILY MEDICINE

## 2022-09-13 PROCEDURE — 84443 ASSAY THYROID STIM HORMONE: CPT

## 2022-09-13 PROCEDURE — 700102 HCHG RX REV CODE 250 W/ 637 OVERRIDE(OP): Performed by: SURGERY

## 2022-09-13 PROCEDURE — 700102 HCHG RX REV CODE 250 W/ 637 OVERRIDE(OP): Performed by: HOSPITALIST

## 2022-09-13 PROCEDURE — A9270 NON-COVERED ITEM OR SERVICE: HCPCS | Performed by: SURGERY

## 2022-09-13 PROCEDURE — A9270 NON-COVERED ITEM OR SERVICE: HCPCS | Performed by: FAMILY MEDICINE

## 2022-09-13 PROCEDURE — 770020 HCHG ROOM/CARE - TELE (206)

## 2022-09-13 PROCEDURE — 85027 COMPLETE CBC AUTOMATED: CPT

## 2022-09-13 PROCEDURE — 83735 ASSAY OF MAGNESIUM: CPT

## 2022-09-13 PROCEDURE — 99233 SBSQ HOSP IP/OBS HIGH 50: CPT | Performed by: FAMILY MEDICINE

## 2022-09-13 PROCEDURE — 97530 THERAPEUTIC ACTIVITIES: CPT

## 2022-09-13 PROCEDURE — 84100 ASSAY OF PHOSPHORUS: CPT

## 2022-09-13 PROCEDURE — 83970 ASSAY OF PARATHORMONE: CPT

## 2022-09-13 PROCEDURE — 700111 HCHG RX REV CODE 636 W/ 250 OVERRIDE (IP): Performed by: FAMILY MEDICINE

## 2022-09-13 RX ORDER — MAGNESIUM SULFATE HEPTAHYDRATE 40 MG/ML
2 INJECTION, SOLUTION INTRAVENOUS ONCE
Status: COMPLETED | OUTPATIENT
Start: 2022-09-13 | End: 2022-09-13

## 2022-09-13 RX ORDER — ACETAMINOPHEN 500 MG
1000 TABLET ORAL 3 TIMES DAILY
Status: DISCONTINUED | OUTPATIENT
Start: 2022-09-13 | End: 2022-09-17 | Stop reason: HOSPADM

## 2022-09-13 RX ADMIN — OXYCODONE 5 MG: 5 TABLET ORAL at 12:31

## 2022-09-13 RX ADMIN — HEPARIN SODIUM 5000 UNITS: 5000 INJECTION, SOLUTION INTRAVENOUS; SUBCUTANEOUS at 14:51

## 2022-09-13 RX ADMIN — OXYCODONE 5 MG: 5 TABLET ORAL at 17:11

## 2022-09-13 RX ADMIN — POLYETHYLENE GLYCOL 3350 1 PACKET: 17 POWDER, FOR SOLUTION ORAL at 17:10

## 2022-09-13 RX ADMIN — PRAMIPEXOLE DIHYDROCHLORIDE 0.12 MG: 0.12 TABLET ORAL at 04:46

## 2022-09-13 RX ADMIN — HEPARIN SODIUM 5000 UNITS: 5000 INJECTION, SOLUTION INTRAVENOUS; SUBCUTANEOUS at 22:44

## 2022-09-13 RX ADMIN — METOPROLOL TARTRATE 12.5 MG: 25 TABLET, FILM COATED ORAL at 04:43

## 2022-09-13 RX ADMIN — EZETIMIBE 10 MG: 10 TABLET ORAL at 04:43

## 2022-09-13 RX ADMIN — ATORVASTATIN CALCIUM 80 MG: 80 TABLET, FILM COATED ORAL at 22:41

## 2022-09-13 RX ADMIN — ACETAMINOPHEN 1000 MG: 500 TABLET ORAL at 17:11

## 2022-09-13 RX ADMIN — OXYCODONE 5 MG: 5 TABLET ORAL at 04:43

## 2022-09-13 RX ADMIN — OXYCODONE 5 MG: 5 TABLET ORAL at 22:50

## 2022-09-13 RX ADMIN — CLOPIDOGREL BISULFATE 75 MG: 75 TABLET ORAL at 04:43

## 2022-09-13 RX ADMIN — DOCUSATE SODIUM 100 MG: 100 CAPSULE, LIQUID FILLED ORAL at 17:10

## 2022-09-13 RX ADMIN — PRAMIPEXOLE DIHYDROCHLORIDE 0.12 MG: 0.12 TABLET ORAL at 17:12

## 2022-09-13 RX ADMIN — MAGNESIUM SULFATE HEPTAHYDRATE 2 G: 40 INJECTION, SOLUTION INTRAVENOUS at 17:10

## 2022-09-13 RX ADMIN — ASPIRIN 81 MG 81 MG: 81 TABLET ORAL at 04:43

## 2022-09-13 RX ADMIN — PRAMIPEXOLE DIHYDROCHLORIDE 0.12 MG: 0.12 TABLET ORAL at 12:31

## 2022-09-13 RX ADMIN — HEPARIN SODIUM 5000 UNITS: 5000 INJECTION, SOLUTION INTRAVENOUS; SUBCUTANEOUS at 06:40

## 2022-09-13 ASSESSMENT — ENCOUNTER SYMPTOMS
SHORTNESS OF BREATH: 0
DIZZINESS: 0
NAUSEA: 0
FOCAL WEAKNESS: 1
DIAPHORESIS: 0
FLANK PAIN: 0
WHEEZING: 0
PALPITATIONS: 0
SORE THROAT: 0
WEAKNESS: 1
ABDOMINAL PAIN: 1
NERVOUS/ANXIOUS: 1
BLURRED VISION: 0
DIARRHEA: 0
COUGH: 0
SENSORY CHANGE: 0
SPEECH CHANGE: 1
MYALGIAS: 0
NECK PAIN: 0
CHILLS: 0
FEVER: 0
BACK PAIN: 0
HEADACHES: 0
VOMITING: 0
HEARTBURN: 0

## 2022-09-13 ASSESSMENT — COGNITIVE AND FUNCTIONAL STATUS - GENERAL
DRESSING REGULAR LOWER BODY CLOTHING: TOTAL
TOILETING: TOTAL
EATING MEALS: A LITTLE
SUGGESTED CMS G CODE MODIFIER DAILY ACTIVITY: CL
DAILY ACTIVITIY SCORE: 11
DRESSING REGULAR UPPER BODY CLOTHING: A LOT
HELP NEEDED FOR BATHING: A LOT
PERSONAL GROOMING: A LOT

## 2022-09-13 NOTE — PROGRESS NOTES
Received report of patient at start of shift. Unable to assess orientation, patient has expressive aphasia. Family previously at bedside visiting. Assessment complete, patient on room air. Prevena wound vac to abdomen, dressing CD&I. Purewick in use, Q2h turns completed. Patient updated on plan of care, encouraged to use call light for any needs/assistance. Bed alarm on, safety education provided.

## 2022-09-13 NOTE — CARE PLAN
The patient is Watcher - Medium risk of patient condition declining or worsening    Shift Goals  Clinical Goals: Skin integrity, rest, pain control  Patient Goals: rest, pain control  Family Goals: No family present    Progress made toward(s) clinical / shift goals:  Patient reported pain to be managed with PRN and scheduled medications. Educated on pharmacological and non pharmacological modalities. Skin integrity was maintained with Q2 turns and frequent repositioning. Patient was able to rest intermittently overnight.    Patient is not progressing towards the following goals:

## 2022-09-13 NOTE — THERAPY
Occupational Therapy  Daily Treatment     Patient Name: Fouzia Santamaria  Age:  62 y.o., Sex:  female  Medical Record #: [id number]  Today's Date: 9/13/2022     Precautions: Fall Risk, Swallow Precautions ( See Comments)  Comments: abdominal binder on with mobilty    Assessment    Pt seen for OT tx. Agreeable to OOB and commode use. Pt required mod-max A for functional mobility, but able to transfer to BSC and bedside chair. Dep for toilet hygiene and LB dressing. Able to participate in hair brushing using L hand. Pt has profound expressive aphasia, does best with yes/no questions, however unclear accuracy. Via yed/no questions, determined PTA pt mobilized without AD in home, used FWW beyond home, was independent with BADL (using shower chair for bathing). Need to clarify pt's report of PLOF with spouse. Pt's RUE is limited by mild weakness and moderate incoordination. Able to use for stabilizing during ADL and functional mobility (pushing from armrests). LUE presents with mild incoordination. Abdominal pain negatively impacts all activity. Pt demos improved participation and command following this session. Overall improved function from OT standpoint. Will benefit from ongoing acute OT.     Plan    Continue current treatment plan.    DC Equipment Recommendations: Unable to determine at this time  Discharge Recommendations: Recommend post-acute placement for additional occupational therapy services prior to discharge home     Objective       09/13/22 1121   Cognition    Speech/ Communication Expressive Aphasia   Level of Consciousness Alert   Ability To Follow Commands 1 Step   New Learning Impaired   Attention Impaired   Comments Pt following 1-step commands consistently this session; agreeable with encouragement   Active ROM Upper Body   Dominant Hand Right   Comments AROM RUE limited by coordination deficit; LUE with mild ROM limitations, able to grasp brush with L hand and use to brush hair; pt also using B  hands to play game on cell phone   Balance   Sitting Balance (Static) Fair -   Sitting Balance (Dynamic) Poor +   Standing Balance (Static) Poor -   Standing Balance (Dynamic) Poor -   Weight Shift Sitting Fair   Weight Shift Standing Poor   Comments therapist assist for stand; no AD   Bed Mobility    Supine to Sit Maximal Assist   Sit to Supine   (up to chair post)   Scooting Moderate Assist  (seated)   Comments abdominal pain negatively impacts   Activities of Daily Living   Grooming Moderate Assist  (hair brushing using L hand; difficulty reaching R side of head)   Lower Body Dressing Total Assist  (sock management)   Toileting Total Assist  (BM and urination on BSC)   Functional Mobility   Sit to Stand Moderate Assist   Bed, Chair, Wheelchair Transfer Moderate Assist   Toilet Transfers Moderate Assist   Transfer Method Stand Step  (no AD, therapist assist)   Mobility Supine > EOB, pivot transfers   Short Term Goals   Short Term Goal # 1 Pt will initate purposeful movement with LUE   Goal Outcome # 1 Goal met, new goal added   Short Term Goal # 1 B  Pt will complete ADL transfers with CGA   Goal Outcome  # 1 B Progressing as expected   Short Term Goal # 2 Pt will follow 1 step commands with 100% accuracy   Goal Outcome # 2 Progressing as expected   Short Term Goal # 3 Pt will demo fair- sitting balance in prep for seated ADLs   Goal Outcome # 3 Goal met, new goal added   Short Term Goal # 3 B Pt will complete gown change with min A using sung technique

## 2022-09-13 NOTE — CARE PLAN
The patient is Stable - Low risk of patient condition declining or worsening    Shift Goals  Clinical Goals: Improved communication, pain control    Progress made toward(s) clinical / shift goals:  Patient has expressive aphasia, appears frustrated at times when attempting to communicate. Patient able to respond to yes or no questions. PRN pain medications administered per MAR. Patient reports relief post medications administration.

## 2022-09-13 NOTE — ASSESSMENT & PLAN NOTE
Avoid Benzodiazepines and Anticholinergics  Frequent orientation  Open window blinds during the day  Avoid naps during the day  Family at bedside whenever possible  Ensure adequate sleep at night - use Melatonin preferably  Avoid early morning labs  Avoid vital signs during sleep  Ambulate if possible  Provide adequate pain control  Monitor for urinary retention  Prevent and manage constipation  Discontinue IVs, Catheters, Tubes, Lines, Cardiac monitors, SCDs if possible

## 2022-09-13 NOTE — THERAPY
"Missed Therapy     Patient Name: Fouzia Santamaria  Age:  62 y.o., Sex:  female  Medical Record #: [id number]  Today's Date: 9/13/2022    PT session attempted x2. Pt initially moaning in significant pain refusing mobility despite pre-medication. Upon 2nd attempt, pt refusing again 2/2 pain despite improvement. Spoke with family about home set up, PLOF, and d/c planning moving forward as difficulty with acceptance to rehab/SNF/HH (see details below). Pt stating she is willing to participate upon next attempt. Will re-attempt as appropriate.     Subjective:  \"No, please, no!\" Regarding mobility.      09/13/22 1427   Treatment Variance   Reason For Missed Therapy Non-Medical - Patient Refused   Charge Group   PT Self Care / Home Evaluation  1  (in room 0599-2173 and [id number])   Total Time Spent   PT Self Care/Home Evaluation Time Spent (Mins) 12   PT Total Time Spent (Calculated) 12   Education Group   Additional Comments Spoke with pt and family regarding home set up and PLOF. Reported pt was able to ambulate around home with no AD. They live in an RV with 2 JAMES, and they do not think a w/c will fit well inside if pt were to d/c at w/c level. Pt's  appears willing to take pt home once medically cleared. States he has helped her with mobility in the past. Pt will need to negotiate 2 steps via w/c or ambulation to d/c home, and be able to negotiate around the RV, again via w/c or ambulation. Discussed possible use of 4WW as a pseudo w/c as they are more likely to fit, however, pt would need to have improved seated balance.   Interdisciplinary Plan of Care Collaboration   IDT Collaboration with  Nursing;Family / Caregiver;   Patient Position at End of Therapy In Bed;Bed Alarm On;Tray Table within Reach;Phone within Reach;Call Light within Reach   Session Information   Date / Session Number  9/13- 6 (1/3, 9/19) Edu only 9/13     "

## 2022-09-13 NOTE — ASSESSMENT & PLAN NOTE
Aortobifemoral bypass using 14 x 7 bifurcated dacryon  8/26/2022  Vascular following   BP control   Supportive care with pain control

## 2022-09-13 NOTE — PROGRESS NOTES
Hospital Medicine Daily Progress Note    Date of Service  9/13/2022    Chief Complaint  Fouzia Santamaria is a 62 y.o. female admitted 8/23/2022 with aortic occlusion    Hospital Course  Admitted with chronic infrarenal aortic occlusion and renal artery stenosis, with bilateral lower extremity ischemia.  Vascular surgery was consulted on the case.  Patient underwent Aortobifemoral bypass using 14 x 7 bifurcated dacryon and Bilateral renal artery eversion endarterectomies on 8/26/2022.  Postoperatively she was noted to have bleeding, and anemia with blood loss requiring transfusion.  Patient underwent Exploratory laparotomy, Control of postoperative bleeding, Abdominal packing and ABThera placement on 8/26/2022 and Abdominal washout with removal of laparotomy pads, Delayed closure abdomen on 8/28/2022.  Postoperatively she remained intubated, and was extubated on 9/2/2022.  She was also noted to have acute kidney injury CKD stage III, and creatinine improved with fluid hydration.  While intubated in the ICU, patient was noted that she was not moving her right lower extremity.  A CT scan and MRI were done which did show acute infarcts.  Neurology was consulted on the case.  Patient appears to have right-sided deficits and expressive aphasia.  Patient also with known history of CVA.    Interval Problem Update  Aortic occlusion -pain controlled  IVON - crea 1.8, PTH elevated  Hypertension - sbp 115-124  CVA -right-sided weakness and expressive aphasia persistent  CHF -appears euvolemic    I have discussed this patient's plan of care and discharge plan at IDT rounds today with Case Management, Nursing, Nursing leadership, and other members of the IDT team.    Consultants/Specialty  neurology and vascular surgery    Code Status  Full Code    Disposition  Patient is medically cleared for discharge.   Anticipate discharge to to skilled nursing facility.  I have placed the appropriate orders for post-discharge  needs.    Review of Systems  Review of Systems   Constitutional:  Positive for malaise/fatigue. Negative for chills, diaphoresis and fever.   HENT:  Negative for congestion, hearing loss and sore throat.    Eyes:  Negative for blurred vision.   Respiratory:  Negative for cough, shortness of breath and wheezing.    Cardiovascular:  Negative for chest pain, palpitations and leg swelling.   Gastrointestinal:  Positive for abdominal pain. Negative for diarrhea, heartburn, nausea and vomiting.   Genitourinary:  Negative for dysuria, flank pain and hematuria.   Musculoskeletal:  Negative for back pain, joint pain, myalgias and neck pain.   Skin:  Negative for rash.   Neurological:  Positive for speech change, focal weakness and weakness. Negative for dizziness, sensory change and headaches.   Psychiatric/Behavioral:  The patient is nervous/anxious.       Physical Exam  Temp:  [36.1 °C (96.9 °F)-36.7 °C (98 °F)] 36.1 °C (96.9 °F)  Pulse:  [75-90] 90  Resp:  [17-19] 19  BP: (115-124)/(80-84) 118/84  SpO2:  [99 %-100 %] 100 %    Physical Exam  Vitals and nursing note reviewed.   HENT:      Head: Normocephalic and atraumatic.      Nose: No congestion.      Mouth/Throat:      Mouth: Mucous membranes are moist.   Eyes:      Extraocular Movements: Extraocular movements intact.      Conjunctiva/sclera: Conjunctivae normal.   Cardiovascular:      Rate and Rhythm: Normal rate and regular rhythm.   Pulmonary:      Effort: Pulmonary effort is normal.      Breath sounds: Decreased air movement present.   Abdominal:      General: There is no distension.      Tenderness: There is abdominal tenderness. There is no guarding or rebound.      Comments: Midline wound VAC   Musculoskeletal:      Cervical back: No tenderness.      Right lower leg: No edema.      Left lower leg: No edema.   Skin:     General: Skin is warm and dry.   Neurological:      Mental Status: She is alert and oriented to person, place, and time.      Cranial Nerves: No  cranial nerve deficit.      Motor: Weakness (MMT RUE 4/5, LUE 4+/5, RLE 0/5, LLE 3/5) present.      Comments: Expressive aphasia       Fluids    Intake/Output Summary (Last 24 hours) at 9/13/2022 1540  Last data filed at 9/13/2022 1300  Gross per 24 hour   Intake 540 ml   Output 200 ml   Net 340 ml       Laboratory  Recent Labs     09/11/22  1131 09/13/22  1244   WBC 10.3 9.0   RBC 2.57* 2.64*   HEMOGLOBIN 8.1* 8.2*   HEMATOCRIT 26.1* 27.0*   .6* 102.3*   MCH 31.5 31.1   MCHC 31.0* 30.4*   RDW 73.0* 74.4*   PLATELETCT 659* 695*   MPV 9.6 9.5     Recent Labs     09/11/22  1131 09/13/22  1244   SODIUM 137 139   POTASSIUM 4.9 4.6   CHLORIDE 110 108   CO2 18* 19*   GLUCOSE 105* 114*   BUN 29* 26*   CREATININE 1.83* 1.81*   CALCIUM 8.1* 8.5                   Imaging  OR-UHGECLG-0 VIEW   Final Result      No evidence of bowel obstruction.      DX-CHEST-LIMITED (1 VIEW)   Final Result         Minimal left basilar opacities, likely atelectasis.      IR-US GUIDED PIV   Final Result    Ultrasound-guided PERIPHERAL IV INSERTION performed by    qualified nursing staff as above.      DX-CHEST-PORTABLE (1 VIEW)   Final Result      1.  Mild worsening hypoinflation.   2.  Supportive tubing as described above.   3.  No other significant change from prior exam.            DX-CHEST-PORTABLE (1 VIEW)   Final Result      1.  Interval removal of endotracheal tube and enteric catheter      2.  No other change      DX-CHEST-PORTABLE (1 VIEW)   Final Result      No significant interval change.      DX-CHEST-PORTABLE (1 VIEW)   Final Result         1.  Hazy retrocardiac and left lung base atelectasis or infiltrate, somewhat increased since prior study.   2.  Trace left pleural effusion   3.  Atherosclerosis      DX-CHEST-PORTABLE (1 VIEW)   Final Result         1.  Hazy retrocardiac and left lung base atelectasis or infiltrate.   2.  Trace left pleural effusion   3.  Atherosclerosis      DX-CHEST-PORTABLE (1 VIEW)   Final Result          1.  No acute cardiopulmonary disease.      DX-CHEST-PORTABLE (1 VIEW)   Final Result         1.  No acute cardiopulmonary disease.      MR-MRA HEAD-W/O   Final Result      1.  A2/A3 segment left anterior cerebral artery occlusion.   2.  Occlusion of the intradural left vertebral artery.   3.  Asymmetrically small caliber of the distal left internal carotid artery in the left middle cerebral artery.   4.  Focal stenosis of the distal M1 right middle cerebral artery.      MR-BRAIN-W/O   Final Result      1.  At least moderate sized acute/subacute left anterior cerebral artery infarct.   2.  Additional small recent right thalamic infarct.   3.  Multiple chronic bilateral cerebral and cerebellar infarcts.   4.  Cerebral atrophy and chronic microvascular ischemic type changes.   5.  No acute intracranial hemorrhage.      DX-ABDOMEN FOR TUBE PLACEMENT   Final Result         Feeding tube with tip projecting over the expected area of the stomach.      CT-HEAD W/O   Final Result         Patchy areas of ill-defined hypodensity in the left frontal and parietal lobes, concerning for acute multifocal infarcts. Further evaluation with MRI is recommended.      No acute intracranial hemorrhage.                  [id number] VIEW   Final Result         No radiopaque foreign body identified. Previous sponge was removed.      YN-[id number]-9 VIEW   Final Result      1.  There is a curvilinear density in the right upper abdomen consistent with a retained surgical lap sponge. This was subsequently removed as there has already been a subsequent x-ray and this was no longer identified.   2.  There are new cutaneous skin staples in midline of the abdomen and pelvis.   3.  Bowel gas pattern is nonobstructive.      DX-CHEST-PORTABLE (1 VIEW)   Final Result      No acute cardiopulmonary abnormality.      DX-ABDOMEN FOR TUBE PLACEMENT   Final Result      1.  Enteric tube overlies the proximal stomach.      DX-CHEST-PORTABLE (1 VIEW)   Final  Result         1.  No acute cardiopulmonary disease.   2.  Nasogastric tube is coiled back the side port with tip projecting cephalad terminating in the distal esophagus, recommend advancement.      EC-KLEBER W/O CONT   Final Result      CT-RENAL COLIC EVALUATION(A/P W/O)   Final Result         1. No hydronephrosis. Nonobstructive bilateral renal calculi seen on previous exam cannot be evaluated due to contrast in the collecting system.      2. Retained contrast in the kidney from prior CTA, likely due to nephropathy/renal failure.      US-RENAL   Final Result         1.  Mild right hydronephrosis   2.  Punctate left renal calculi without visualized obstructive changes.      CT-CTA AORTA-RO WITH & W/O-POST PROCESS   Final Result      1.  Occlusion of the abdominal aorta just below level of the renal arteries with occlusion of the common and external iliac arteries with reconstitution of flow via epigastric collaterals.   2.  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   3.  Diffusely small caliber bilateral lower extremity arteries with significant atherosclerosis of the bilateral superficial femoral arteries without high-grade stenosis.   4.  Likely single vessel runoff on the right and 2 vessel runoff on the left.   5.  Focal severe stenoses at the bilateral renal artery origins.      These findings were discussed with ESPERANZA DO on 8/23/2022 5:29 PM.           Assessment/Plan  * 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HCC)- (present on admission)  Assessment & Plan  Aortobifemoral bypass using 14 x 7 bifurcated dacryon  8/26/2022    Postoperative hemorrhage involving circulatory system following circulatory system procedure  Assessment & Plan  Exploratory laparotomy, Control of postoperative bleeding, Abdominal packing and ABThera placement   8/26/2022  Abdominal washout with removal of laparotomy pads, Delayed closure abdomen    8/28/2022    Renal artery stenosis (HCC)- (present on admission)  Assessment & Plan  Bilateral renal artery eversion endarterectomies   8/26/2022    Stroke (HCC)- (present on admission)  Assessment & Plan  Acute   with history of CVA  ASA, Plavix, Lipitor  PT and OT  Check B12 and TSH    Acute postoperative respiratory failure (HCC)  Assessment & Plan  Intubated 8/26, Extubated 9/2/2022  RT protocol, encourage I-S    Cardiomyopathy (HCC)- (present on admission)  Assessment & Plan  Metoprolol  Add ACEI/ARB when creatinine noted to be stable  Monitor volume status    At risk for delirium- (present on admission)  Assessment & Plan  Avoid Benzodiazepines and Anticholinergics  Frequent orientation  Open window blinds during the day  Avoid naps during the day  Family at bedside whenever possible  Ensure adequate sleep at night - use Melatonin preferably  Avoid early morning labs  Avoid vital signs during sleep  Ambulate if possible  Provide adequate pain control  Monitor for urinary retention  Prevent and manage constipation  Discontinue IVs, Catheters, Tubes, Lines, Cardiac monitors, SCDs if possible    Hyperkalemia- (present on admission)  Assessment & Plan  Follow bmp    CKD (chronic kidney disease) stage 3, GFR 30-59 ml/min ([facility])- (present on admission)  Assessment & Plan  Follow bmp    Oropharyngeal dysphagia- (present on admission)  Assessment & Plan  Level 6, Liquid level 0  Aspiration precautions  SLP to follow    Hypomagnesemia  Assessment & Plan  IV Mg 2 g  Follow level    Chronic systolic congestive heart failure (HCC)- (present on admission)  Assessment & Plan  Metoprolol  Add ACEI/ARB when creatinine noted to be stable  Monitor volume status    Aortic thrombus (HCC)- (present on admission)  Assessment & Plan  ASA, Plavix, Lipitor    COPD (chronic obstructive pulmonary disease) (HCC)- (present on admission)  Assessment & Plan  RT protocol    Hydronephrosis- (present on admission)  Assessment &  Plan  monitor    IVON (acute kidney injury) (HCC)- (present on admission)  Assessment & Plan  Follow bmp    Hypertension- (present on admission)  Assessment & Plan  Metoprolol    Peripheral arterial occlusive disease (HCC)- (present on admission)  Assessment & Plan  ASA, Plavix, Lipitor    QT prolongation- (present on admission)  Assessment & Plan  monitor    Acute blood loss anemia  Assessment & Plan  Transfused PRBC  Follow cbc    CAD (coronary artery disease)- (present on admission)  Assessment & Plan  History of LAD stents x3  Aspirin, Plavix, Lipitor, Zetia, Metoprolol         VTE prophylaxis: heparin ppx    I have performed a physical exam and reviewed and updated ROS and Plan today (9/13/2022). In review of yesterday's note (9/12/2022), there are no changes except as documented above.

## 2022-09-13 NOTE — ASSESSMENT & PLAN NOTE
Exploratory laparotomy, Control of postoperative bleeding, Abdominal packing and ABThera placement   8/26/2022  Abdominal washout with removal of laparotomy pads, Delayed closure abdomen   8/28/2022  Abdominal wound vac removed, incisions appear very well-healed

## 2022-09-13 NOTE — PROGRESS NOTES
"Assumed care of patient at 1845. Bedside report received. Assessment complete.  AA&Ox3. Disoriented to time.   Expressive aphasia noted.  R sided weakness noted. See flowsheets.  Denies CP/SOB.  Reporting \"a lot of pain,\" per non verbal descriptors, pt is in pain. Medicated per MAR.  Educated patient regarding pharmacologic and non pharmacologic modalities for pain management.  Skin per flowsheet.  Tolerating level 6 diet. Thin liquids okay.   Denies N/V at this time.  + void via purewick. + BM. Last BM 9/12. Incontinent of both.   Pt transfers x1 assist. Q2 turns in place  All needs met at this time. Call light within reach. Bed alarm on and audible. Pt calls appropriately. Bed low and locked, non skid socks in place. Hourly rounding in place.   "

## 2022-09-13 NOTE — DISCHARGE PLANNING
Case Management Discharge Planning     Admission Date: 8/23/2022  GMLOS: 6.6  ALOS: 20     6-Clicks ADL Score: 12  6-Clicks Mobility Score: 6  PT and/or OT Eval ordered: Yes  Post-acute Referrals Ordered: Yes  Post-acute Choice Obtained: Yes  Has referral(s) been sent to post-acute provider:  Yes        Anticipated Discharge Dispo: Discharge Disposition: home self care   DME Needed: No     Action(s) Taken: Patient has been declined to all local SNFs due to behaviors. Will speak to leadership about other options for patient.     Asked [facility] to re-review and they stated they need recent PT/OT notes. Will follow.    Of note, this is not her first stroke. Per  Basilio, patient already has had problems communicating for years. Both Nicolas and this CM spoke with patient and asked her to work with PT and OT since all SNFs declined her and can't have home health. Spouse Basilio states that prior to admission, patient was able to walk around the house independently until her leg would hurt and then she had to sit down. Again, encouraged PT and OT.     Escalations Completed: PT and OT     Medically Clear: Yes     Next Steps: will follow     Barriers to Discharge: Pending improvement     Is the patient up for discharge tomorrow: No

## 2022-09-14 LAB
ANION GAP SERPL CALC-SCNC: 14 MMOL/L (ref 7–16)
BUN SERPL-MCNC: 24 MG/DL (ref 8–22)
CALCIUM SERPL-MCNC: 8.2 MG/DL (ref 8.5–10.5)
CHLORIDE SERPL-SCNC: 108 MMOL/L (ref 96–112)
CO2 SERPL-SCNC: 16 MMOL/L (ref 20–33)
CREAT SERPL-MCNC: 1.49 MG/DL (ref 0.5–1.4)
ERYTHROCYTE [DISTWIDTH] IN BLOOD BY AUTOMATED COUNT: 75.7 FL (ref 35.9–50)
GFR SERPLBLD CREATININE-BSD FMLA CKD-EPI: 40 ML/MIN/1.73 M 2
GLUCOSE SERPL-MCNC: 95 MG/DL (ref 65–99)
HCT VFR BLD AUTO: 28.6 % (ref 37–47)
HGB BLD-MCNC: 8.8 G/DL (ref 12–16)
MAGNESIUM SERPL-MCNC: 2.1 MG/DL (ref 1.5–2.5)
MCH RBC QN AUTO: 31.5 PG (ref 27–33)
MCHC RBC AUTO-ENTMCNC: 30.8 G/DL (ref 33.6–35)
MCV RBC AUTO: 102.5 FL (ref 81.4–97.8)
PLATELET # BLD AUTO: 604 K/UL (ref 164–446)
PMV BLD AUTO: 9.3 FL (ref 9–12.9)
POTASSIUM SERPL-SCNC: 4.4 MMOL/L (ref 3.6–5.5)
RBC # BLD AUTO: 2.79 M/UL (ref 4.2–5.4)
SODIUM SERPL-SCNC: 138 MMOL/L (ref 135–145)
VIT B12 SERPL-MCNC: 1103 PG/ML (ref 211–911)
WBC # BLD AUTO: 10.3 K/UL (ref 4.8–10.8)

## 2022-09-14 PROCEDURE — 700102 HCHG RX REV CODE 250 W/ 637 OVERRIDE(OP): Performed by: FAMILY MEDICINE

## 2022-09-14 PROCEDURE — 80048 BASIC METABOLIC PNL TOTAL CA: CPT

## 2022-09-14 PROCEDURE — 700102 HCHG RX REV CODE 250 W/ 637 OVERRIDE(OP): Performed by: HOSPITALIST

## 2022-09-14 PROCEDURE — 700102 HCHG RX REV CODE 250 W/ 637 OVERRIDE(OP): Performed by: SURGERY

## 2022-09-14 PROCEDURE — A9270 NON-COVERED ITEM OR SERVICE: HCPCS | Performed by: FAMILY MEDICINE

## 2022-09-14 PROCEDURE — 770020 HCHG ROOM/CARE - TELE (206)

## 2022-09-14 PROCEDURE — 85027 COMPLETE CBC AUTOMATED: CPT

## 2022-09-14 PROCEDURE — A9270 NON-COVERED ITEM OR SERVICE: HCPCS | Performed by: SURGERY

## 2022-09-14 PROCEDURE — 83735 ASSAY OF MAGNESIUM: CPT

## 2022-09-14 PROCEDURE — A9270 NON-COVERED ITEM OR SERVICE: HCPCS | Performed by: HOSPITALIST

## 2022-09-14 PROCEDURE — 82607 VITAMIN B-12: CPT

## 2022-09-14 PROCEDURE — 99232 SBSQ HOSP IP/OBS MODERATE 35: CPT | Performed by: FAMILY MEDICINE

## 2022-09-14 PROCEDURE — 36415 COLL VENOUS BLD VENIPUNCTURE: CPT

## 2022-09-14 PROCEDURE — 700111 HCHG RX REV CODE 636 W/ 250 OVERRIDE (IP): Performed by: HOSPITALIST

## 2022-09-14 RX ADMIN — METOPROLOL TARTRATE 12.5 MG: 25 TABLET, FILM COATED ORAL at 21:18

## 2022-09-14 RX ADMIN — PRAMIPEXOLE DIHYDROCHLORIDE 0.12 MG: 0.12 TABLET ORAL at 05:59

## 2022-09-14 RX ADMIN — EZETIMIBE 10 MG: 10 TABLET ORAL at 05:56

## 2022-09-14 RX ADMIN — PRAMIPEXOLE DIHYDROCHLORIDE 0.12 MG: 0.12 TABLET ORAL at 11:40

## 2022-09-14 RX ADMIN — HEPARIN SODIUM 5000 UNITS: 5000 INJECTION, SOLUTION INTRAVENOUS; SUBCUTANEOUS at 16:23

## 2022-09-14 RX ADMIN — OXYCODONE 5 MG: 5 TABLET ORAL at 06:04

## 2022-09-14 RX ADMIN — OXYCODONE 5 MG: 5 TABLET ORAL at 21:18

## 2022-09-14 RX ADMIN — PRAMIPEXOLE DIHYDROCHLORIDE 0.12 MG: 0.12 TABLET ORAL at 18:35

## 2022-09-14 RX ADMIN — ASPIRIN 81 MG 81 MG: 81 TABLET ORAL at 05:56

## 2022-09-14 RX ADMIN — ACETAMINOPHEN 1000 MG: 500 TABLET ORAL at 16:23

## 2022-09-14 RX ADMIN — DOCUSATE SODIUM 50 MG AND SENNOSIDES 8.6 MG 1 TABLET: 8.6; 5 TABLET, FILM COATED ORAL at 21:00

## 2022-09-14 RX ADMIN — HEPARIN SODIUM 5000 UNITS: 5000 INJECTION, SOLUTION INTRAVENOUS; SUBCUTANEOUS at 21:21

## 2022-09-14 RX ADMIN — CLOPIDOGREL BISULFATE 75 MG: 75 TABLET ORAL at 05:56

## 2022-09-14 RX ADMIN — METOPROLOL TARTRATE 12.5 MG: 25 TABLET, FILM COATED ORAL at 06:07

## 2022-09-14 RX ADMIN — HEPARIN SODIUM 5000 UNITS: 5000 INJECTION, SOLUTION INTRAVENOUS; SUBCUTANEOUS at 06:09

## 2022-09-14 ASSESSMENT — ENCOUNTER SYMPTOMS
ABDOMINAL PAIN: 1
HEARTBURN: 0
HEADACHES: 0
CHILLS: 0
FEVER: 0
WHEEZING: 0
BLURRED VISION: 0
DIZZINESS: 0
NERVOUS/ANXIOUS: 1
MYALGIAS: 0
FOCAL WEAKNESS: 1
SPEECH CHANGE: 1
SORE THROAT: 0
NAUSEA: 0
FLANK PAIN: 0
VOMITING: 0
DIARRHEA: 0
COUGH: 0
WEAKNESS: 1
NECK PAIN: 0
SENSORY CHANGE: 0
DIAPHORESIS: 0
SHORTNESS OF BREATH: 0
PALPITATIONS: 0
BACK PAIN: 0

## 2022-09-14 NOTE — DISCHARGE PLANNING
Case Management Discharge Planning    Admission Date: 8/23/2022  GMLOS: 6.6  ALOS: 22    6-Clicks ADL Score: 11  6-Clicks Mobility Score: 6  PT and/or OT Eval ordered: Yes  Post-acute Referrals Ordered: Yes  Post-acute Choice Obtained: Yes  Has referral(s) been sent to post-acute provider:  Yes      Anticipated Discharge Dispo: Discharge Disposition: D/T to SNF with Medicare cert in anticipation of skilled care (03)    DME Needed: No    Action(s) Taken: OTHER. Discussed this case during IDT Rounds. Per MD, the anticipated discharge disposition is Home with Family once patient's need can be managed at home.    Per MD, patient is exhibiting lack of motivation to participate in PTOT. Per MD, patient will be highly encouraged to participate to faciliate the discharge home.    Escalations Completed: None    Medically Clear: No    Next Steps: Continue to monitor patient's progress with PTOT.    Barriers to Discharge: Patient's lack of motivation to participate in PTOT. Lack of accepting SNF.    Is the patient up for discharge tomorrow: No

## 2022-09-14 NOTE — PROGRESS NOTES
Report received from RN, assumed care at 1845  Unable to assess orientation due to expressive aphasia  Patient visibly upset and crying during assessment   Pt declines any SOB, chest pain, new onset of numbness/ tingiling, patient on room air  Pt rates pain at 6/10, on a scale of 1-10, pt medicated per MAR  Patient crying and complaining of arm pain  Pt is voiding, patient incontinent, pure wick in place  Pt has + flatus, + bowel sounds, + BM on 9/13, patient incontinent  Pt not able to ambulate, right sided weakness noted  Prevena wound vac to abdomen, dressing CDI  Pt is on a level 6 diet, medications to be crushed in pudding, pt denies any nausea/vomiting at this time  Q2 turns in place  Waffle overlay and TAPS system in place  X2 assist for turns  Plan of care discussed, all questions answered. Explained importance of calling before getting OOB and pt verbalizes understanding. Explained importance of oral care. Call light is within reach, treaded slipper socks on, bed in lowest/ locked position, hourly rounding in place, all needs met at this time

## 2022-09-14 NOTE — PROGRESS NOTES
[facility] Medicine Daily Progress Note    Date of Service  9/14/2022    Chief Complaint  Fouzia Santamaria is a 62 y.o. female admitted 8/23/2022 with aortic occlusion    Hospital Course  Admitted with chronic infrarenal aortic occlusion and renal artery stenosis, with bilateral lower extremity ischemia.  Vascular surgery was consulted on the case.  Patient underwent Aortobifemoral bypass using 14 x 7 bifurcated dacryon and Bilateral renal artery eversion endarterectomies on 8/26/2022.  Postoperatively she was noted to have bleeding, and anemia with blood loss requiring transfusion.  Patient underwent Exploratory laparotomy, Control of postoperative bleeding, Abdominal packing and ABThera placement on 8/26/2022 and Abdominal washout with removal of laparotomy pads, Delayed closure abdomen on 8/28/2022.  Postoperatively she remained intubated, and was extubated on 9/2/2022.  She was also noted to have acute kidney injury CKD stage III, and creatinine improved with fluid hydration.  While intubated in the ICU, patient was noted that she was not moving her right lower extremity.  A CT scan and MRI were done which did show acute infarcts.  Neurology was consulted on the case.  Patient appears to have right-sided deficits and expressive aphasia.  Patient also with known history of CVA.    Interval Problem Update  Aortic occlusion - pain controlled  IVON - crea trended down to 1.4  Hypertension - sbp   CVA - right-sided weakness and expressive aphasia persistent  CHF - appears euvolemic    Updates given and plan of care discussed with patient's family who were at bedside.    I have discussed this patient's plan of care and discharge plan at IDT rounds today with Case Management, Nursing, Nursing leadership, and other members of the IDT team.    Consultants/Specialty  neurology and vascular surgery    Code Status  Full Code    Disposition  Patient is medically cleared for discharge.   Anticipate discharge to to  skilled nursing facility.  I have placed the appropriate orders for post-discharge needs.    Review of Systems  Review of Systems   Constitutional:  Positive for malaise/fatigue. Negative for chills, diaphoresis and fever.   HENT:  Negative for congestion, hearing loss and sore throat.    Eyes:  Negative for blurred vision.   Respiratory:  Negative for cough, shortness of breath and wheezing.    Cardiovascular:  Negative for chest pain, palpitations and leg swelling.   Gastrointestinal:  Positive for abdominal pain. Negative for diarrhea, heartburn, nausea and vomiting.   Genitourinary:  Negative for dysuria, flank pain and hematuria.   Musculoskeletal:  Negative for back pain, joint pain, myalgias and neck pain.   Skin:  Negative for rash.   Neurological:  Positive for speech change, focal weakness and weakness. Negative for dizziness, sensory change and headaches.   Psychiatric/Behavioral:  The patient is nervous/anxious.       Physical Exam  Temp:  [36.2 °C (97.1 °F)-36.3 °C (97.4 °F)] 36.2 °C (97.1 °F)  Pulse:  [77-89] 80  Resp:  [18] 18  BP: ()/(63-78) 117/78  SpO2:  [96 %-100 %] 99 %    Physical Exam  Vitals and nursing note reviewed.   HENT:      Head: Normocephalic and atraumatic.      Nose: No congestion.      Mouth/Throat:      Mouth: Mucous membranes are moist.   Eyes:      Extraocular Movements: Extraocular movements intact.      Conjunctiva/sclera: Conjunctivae normal.   Cardiovascular:      Rate and Rhythm: Normal rate and regular rhythm.   Pulmonary:      Effort: Pulmonary effort is normal.      Breath sounds: Decreased air movement present.   Abdominal:      General: There is no distension.      Tenderness: There is abdominal tenderness. There is no guarding or rebound.      Comments: Midline wound VAC   Musculoskeletal:      Cervical back: No tenderness.      Right lower leg: No edema.      Left lower leg: No edema.   Skin:     General: Skin is warm and dry.   Neurological:      Mental Status:  She is alert and oriented to person, place, and time.      Cranial Nerves: No cranial nerve deficit.      Motor: Weakness (MMT RUE 4/5, LUE 4+/5, RLE 0/5, LLE 3/5) present.      Comments: Expressive aphasia   Psychiatric:         Mood and Affect: Mood is anxious.       Fluids    Intake/Output Summary (Last 24 hours) at 9/14/2022 1211  Last data filed at 9/14/2022 0800  Gross per 24 hour   Intake 490 ml   Output 0 ml   Net 490 ml       Laboratory  Recent Labs     09/13/22  1244 09/14/22  0953   WBC 9.0 10.3   RBC 2.64* 2.79*   HEMOGLOBIN 8.2* 8.8*   HEMATOCRIT 27.0* 28.6*   .3* 102.5*   MCH 31.1 31.5   MCHC 30.4* 30.8*   RDW 74.4* 75.7*   PLATELETCT 695* 604*   MPV 9.5 9.3     Recent Labs     09/13/22  1244 09/14/22  0953   SODIUM 139 138   POTASSIUM 4.6 4.4   CHLORIDE 108 108   CO2 19* 16*   GLUCOSE 114* 95   BUN 26* 24*   CREATININE 1.81* 1.49*   CALCIUM 8.5 8.2*                   Imaging  [id number] VIEW   Final Result      No evidence of bowel obstruction.      DX-CHEST-LIMITED (1 VIEW)   Final Result         Minimal left basilar opacities, likely atelectasis.      IR-US GUIDED PIV   Final Result    Ultrasound-guided PERIPHERAL IV INSERTION performed by    qualified nursing staff as above.      DX-CHEST-PORTABLE (1 VIEW)   Final Result      1.  Mild worsening hypoinflation.   2.  Supportive tubing as described above.   3.  No other significant change from prior exam.            DX-CHEST-PORTABLE (1 VIEW)   Final Result      1.  Interval removal of endotracheal tube and enteric catheter      2.  No other change      DX-CHEST-PORTABLE (1 VIEW)   Final Result      No significant interval change.      DX-CHEST-PORTABLE (1 VIEW)   Final Result         1.  Hazy retrocardiac and left lung base atelectasis or infiltrate, somewhat increased since prior study.   2.  Trace left pleural effusion   3.  Atherosclerosis      DX-CHEST-PORTABLE (1 VIEW)   Final Result         1.  Hazy retrocardiac and left lung base  atelectasis or infiltrate.   2.  Trace left pleural effusion   3.  Atherosclerosis      DX-CHEST-PORTABLE (1 VIEW)   Final Result         1.  No acute cardiopulmonary disease.      DX-CHEST-PORTABLE (1 VIEW)   Final Result         1.  No acute cardiopulmonary disease.      MR-MRA HEAD-W/O   Final Result      1.  A2/A3 segment left anterior cerebral artery occlusion.   2.  Occlusion of the intradural left vertebral artery.   3.  Asymmetrically small caliber of the distal left internal carotid artery in the left middle cerebral artery.   4.  Focal stenosis of the distal M1 right middle cerebral artery.      MR-BRAIN-W/O   Final Result      1.  At least moderate sized acute/subacute left anterior cerebral artery infarct.   2.  Additional small recent right thalamic infarct.   3.  Multiple chronic bilateral cerebral and cerebellar infarcts.   4.  Cerebral atrophy and chronic microvascular ischemic type changes.   5.  No acute intracranial hemorrhage.      DX-ABDOMEN FOR TUBE PLACEMENT   Final Result         Feeding tube with tip projecting over the expected area of the stomach.      CT-HEAD W/O   Final Result         Patchy areas of ill-defined hypodensity in the left frontal and parietal lobes, concerning for acute multifocal infarcts. Further evaluation with MRI is recommended.      No acute intracranial hemorrhage.                  [id number] VIEW   Final Result         No radiopaque foreign body identified. Previous sponge was removed.      [id number]-2 VIEW   Final Result      1.  There is a curvilinear density in the right upper abdomen consistent with a retained surgical lap sponge. This was subsequently removed as there has already been a subsequent x-ray and this was no longer identified.   2.  There are new cutaneous skin staples in midline of the abdomen and pelvis.   3.  Bowel gas pattern is nonobstructive.      DX-CHEST-PORTABLE (1 VIEW)   Final Result      No acute cardiopulmonary abnormality.       DX-ABDOMEN FOR TUBE PLACEMENT   Final Result      1.  Enteric tube overlies the proximal stomach.      DX-CHEST-PORTABLE (1 VIEW)   Final Result         1.  No acute cardiopulmonary disease.   2.  Nasogastric tube is coiled back the side port with tip projecting cephalad terminating in the distal esophagus, recommend advancement.      EC-KLEBER W/O CONT   Final Result      CT-RENAL COLIC EVALUATION(A/P W/O)   Final Result         1. No hydronephrosis. Nonobstructive bilateral renal calculi seen on previous exam cannot be evaluated due to contrast in the collecting system.      2. Retained contrast in the kidney from prior CTA, likely due to nephropathy/renal failure.      US-RENAL   Final Result         1.  Mild right hydronephrosis   2.  Punctate left renal calculi without visualized obstructive changes.      CT-CTA AORTA-RO WITH & W/O-POST PROCESS   Final Result      1.  Occlusion of the abdominal aorta just below level of the renal arteries with occlusion of the common and external iliac arteries with reconstitution of flow via epigastric collaterals.   2.  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   3.  Diffusely small caliber bilateral lower extremity arteries with significant atherosclerosis of the bilateral superficial femoral arteries without high-grade stenosis.   4.  Likely single vessel runoff on the right and 2 vessel runoff on the left.   5.  Focal severe stenoses at the bilateral renal artery origins.      These findings were discussed with ESPERANZA DO on 8/23/2022 5:29 PM.           Assessment/Plan  * 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HCC)- (present on admission)  Assessment & Plan  Aortobifemoral bypass using 14 x 7 bifurcated dacryon  8/26/2022    Postoperative hemorrhage involving circulatory system following circulatory system procedure  Assessment & Plan  Exploratory laparotomy, Control of postoperative bleeding,  Abdominal packing and ABThera placement   8/26/2022  Abdominal washout with removal of laparotomy pads, Delayed closure abdomen   8/28/2022    Renal artery stenosis (HCC)- (present on admission)  Assessment & Plan  Bilateral renal artery eversion endarterectomies   8/26/2022    Stroke (HCC)- (present on admission)  Assessment & Plan  Acute   with history of CVA  ASA, Plavix, Lipitor  PT and OT -encouraged to participate      Acute postoperative respiratory failure (HCC)  Assessment & Plan  Intubated 8/26, Extubated 9/2/2022  RT protocol, encourage I-S    Cardiomyopathy (HCC)- (present on admission)  Assessment & Plan  Metoprolol  Add ACEI/ARB and Aldactone when creatinine noted to be stable and BP able to tolerate  Monitor volume status    At risk for delirium- (present on admission)  Assessment & Plan  Avoid Benzodiazepines and Anticholinergics  Frequent orientation  Open window blinds during the day  Avoid naps during the day  Family at bedside whenever possible  Ensure adequate sleep at night - use Melatonin preferably  Avoid early morning labs  Avoid vital signs during sleep  Ambulate if possible  Provide adequate pain control  Monitor for urinary retention  Prevent and manage constipation  Discontinue IVs, Catheters, Tubes, Lines, Cardiac monitors, SCDs if possible    Hyperkalemia- (present on admission)  Assessment & Plan  Follow bmp    CKD (chronic kidney disease) stage 3, GFR 30-59 ml/min ([facility])- (present on admission)  Assessment & Plan  Follow bmp    Oropharyngeal dysphagia- (present on admission)  Assessment & Plan  Level 6, Liquid level 0  Aspiration precautions  SLP to follow    Hypomagnesemia  Assessment & Plan  IV Mg given  Follow level    Chronic systolic congestive heart failure (HCC)- (present on admission)  Assessment & Plan  Metoprolol  Add ACEI/ARB and Aldactone when creatinine noted to be stable and BP able to tolerate  Monitor volume status    Aortic thrombus (HCC)- (present on  admission)  Assessment & Plan  ASA, Plavix, Lipitor    COPD (chronic obstructive pulmonary disease) (HCC)- (present on admission)  Assessment & Plan  RT protocol    Hydronephrosis- (present on admission)  Assessment & Plan  monitor    IVON (acute kidney injury) (HCC)- (present on admission)  Assessment & Plan  Follow bmp    Hypertension- (present on admission)  Assessment & Plan  Metoprolol    Peripheral arterial occlusive disease (HCC)- (present on admission)  Assessment & Plan  ASA, Plavix, Lipitor    QT prolongation- (present on admission)  Assessment & Plan  monitor    Acute blood loss anemia  Assessment & Plan  Transfused PRBC  Follow cbc    CAD (coronary artery disease)- (present on admission)  Assessment & Plan  History of LAD stents x 3  Aspirin, Plavix, Lipitor, Zetia, Metoprolol       VTE prophylaxis: heparin ppx    I have performed a physical exam and reviewed and updated ROS and Plan today (9/14/2022). In review of yesterday's note (9/13/2022), there are no changes except as documented above.

## 2022-09-14 NOTE — CARE PLAN
The patient is Stable - Low risk of patient condition declining or worsening    Shift Goals  Clinical Goals: pain control, communication, sleep, maintain skin integrity  Patient Goals: pain control  Family Goals: No family present    Progress made toward(s) clinical / shift goals:    Problem: Skin Integrity  Goal: Skin integrity is maintained or improved  Outcome: Progressing     Problem: Fall Risk  Goal: Patient will remain free from falls  Outcome: Progressing       Patient on q2 turns and has waffle mattress and TAPS system in place for skin integrity. Patient educated to not get up without calling. Bed alarm in place.

## 2022-09-14 NOTE — PROGRESS NOTES
"Bedside report received.  Assessment complete.  A&O x 4, however patient has severe expressive aphagia. This RN primarily used \"yes\" \"no\" questions and patient answered all appropriately. Patient calls appropriately.  Patient has right sided deficits.Q2hr turns and waffle mattress in place. Bed alarm on.   Patient has 0/10 pain. Pain managed with prescribed medications.  Denies N&V. Tolerating diet.  Surgical incision to abdomen and bilateral groin, is dressed with prevena, CDI no leaks noted at this time.  + void, patient is incontinent, purewick and frequent checks in place, + flatus, + BM, incontinent.  Patient denies SOB.  Patient pleasant with staff and sitting up in bed.  Review plan with of care with patient. Call light and personal belongings within reach. Hourly rounding in place. All needs met at this time.   "

## 2022-09-14 NOTE — CARE PLAN
The patient is Stable - Low risk of patient condition declining or worsening    Shift Goals  Clinical Goals: Pain management, Maintain skin integrity, monitor incisional wound  Patient Goals: Rest  Family Goals: No family present    Progress made toward(s) clinical / shift goals:  Medicated per MAR, Q2hr turns, skin checks, and waffle. Incisional wound checked.

## 2022-09-15 PROCEDURE — 97530 THERAPEUTIC ACTIVITIES: CPT

## 2022-09-15 PROCEDURE — 700111 HCHG RX REV CODE 636 W/ 250 OVERRIDE (IP): Performed by: HOSPITALIST

## 2022-09-15 PROCEDURE — A9270 NON-COVERED ITEM OR SERVICE: HCPCS | Performed by: FAMILY MEDICINE

## 2022-09-15 PROCEDURE — 700102 HCHG RX REV CODE 250 W/ 637 OVERRIDE(OP): Performed by: HOSPITALIST

## 2022-09-15 PROCEDURE — 700102 HCHG RX REV CODE 250 W/ 637 OVERRIDE(OP): Performed by: FAMILY MEDICINE

## 2022-09-15 PROCEDURE — 99233 SBSQ HOSP IP/OBS HIGH 50: CPT | Performed by: STUDENT IN AN ORGANIZED HEALTH CARE EDUCATION/TRAINING PROGRAM

## 2022-09-15 PROCEDURE — 96105 ASSESSMENT OF APHASIA: CPT

## 2022-09-15 PROCEDURE — A9270 NON-COVERED ITEM OR SERVICE: HCPCS | Performed by: HOSPITALIST

## 2022-09-15 PROCEDURE — 700102 HCHG RX REV CODE 250 W/ 637 OVERRIDE(OP): Performed by: SURGERY

## 2022-09-15 PROCEDURE — A9270 NON-COVERED ITEM OR SERVICE: HCPCS | Performed by: SURGERY

## 2022-09-15 PROCEDURE — 770020 HCHG ROOM/CARE - TELE (206)

## 2022-09-15 RX ADMIN — METOPROLOL TARTRATE 12.5 MG: 25 TABLET, FILM COATED ORAL at 05:40

## 2022-09-15 RX ADMIN — HEPARIN SODIUM 5000 UNITS: 5000 INJECTION, SOLUTION INTRAVENOUS; SUBCUTANEOUS at 23:30

## 2022-09-15 RX ADMIN — OXYCODONE 5 MG: 5 TABLET ORAL at 05:39

## 2022-09-15 RX ADMIN — PRAMIPEXOLE DIHYDROCHLORIDE 0.12 MG: 0.12 TABLET ORAL at 12:21

## 2022-09-15 RX ADMIN — CLOPIDOGREL BISULFATE 75 MG: 75 TABLET ORAL at 05:40

## 2022-09-15 RX ADMIN — ATORVASTATIN CALCIUM 80 MG: 80 TABLET, FILM COATED ORAL at 23:32

## 2022-09-15 RX ADMIN — EZETIMIBE 10 MG: 10 TABLET ORAL at 05:39

## 2022-09-15 RX ADMIN — HEPARIN SODIUM 5000 UNITS: 5000 INJECTION, SOLUTION INTRAVENOUS; SUBCUTANEOUS at 15:25

## 2022-09-15 RX ADMIN — DOCUSATE SODIUM 100 MG: 100 CAPSULE, LIQUID FILLED ORAL at 17:16

## 2022-09-15 RX ADMIN — ACETAMINOPHEN 1000 MG: 500 TABLET ORAL at 15:25

## 2022-09-15 RX ADMIN — PRAMIPEXOLE DIHYDROCHLORIDE 0.12 MG: 0.12 TABLET ORAL at 17:13

## 2022-09-15 RX ADMIN — ASPIRIN 81 MG 81 MG: 81 TABLET ORAL at 05:40

## 2022-09-15 RX ADMIN — HEPARIN SODIUM 5000 UNITS: 5000 INJECTION, SOLUTION INTRAVENOUS; SUBCUTANEOUS at 05:40

## 2022-09-15 RX ADMIN — PRAMIPEXOLE DIHYDROCHLORIDE 0.12 MG: 0.12 TABLET ORAL at 09:22

## 2022-09-15 RX ADMIN — ACETAMINOPHEN 1000 MG: 500 TABLET ORAL at 09:21

## 2022-09-15 RX ADMIN — DOCUSATE SODIUM 50 MG AND SENNOSIDES 8.6 MG 1 TABLET: 8.6; 5 TABLET, FILM COATED ORAL at 23:32

## 2022-09-15 RX ADMIN — METOPROLOL TARTRATE 12.5 MG: 25 TABLET, FILM COATED ORAL at 17:15

## 2022-09-15 RX ADMIN — ACETAMINOPHEN 1000 MG: 500 TABLET ORAL at 23:32

## 2022-09-15 RX ADMIN — POLYETHYLENE GLYCOL 3350 1 PACKET: 17 POWDER, FOR SOLUTION ORAL at 17:15

## 2022-09-15 ASSESSMENT — COGNITIVE AND FUNCTIONAL STATUS - GENERAL
TURNING FROM BACK TO SIDE WHILE IN FLAT BAD: A LOT
STANDING UP FROM CHAIR USING ARMS: TOTAL
WALKING IN HOSPITAL ROOM: TOTAL
CLIMB 3 TO 5 STEPS WITH RAILING: TOTAL
DRESSING REGULAR LOWER BODY CLOTHING: TOTAL
SUGGESTED CMS G CODE MODIFIER MOBILITY: CM
MOVING FROM LYING ON BACK TO SITTING ON SIDE OF FLAT BED: UNABLE
HELP NEEDED FOR BATHING: A LOT
MOVING TO AND FROM BED TO CHAIR: A LOT
MOBILITY SCORE: 8

## 2022-09-15 ASSESSMENT — ENCOUNTER SYMPTOMS
MYALGIAS: 0
VOMITING: 0
ABDOMINAL PAIN: 1
SHORTNESS OF BREATH: 0
FLANK PAIN: 0
SORE THROAT: 0
DIZZINESS: 0
SPEECH CHANGE: 1
SENSORY CHANGE: 0
NAUSEA: 0
COUGH: 0
HEADACHES: 0
WHEEZING: 0
FOCAL WEAKNESS: 1
HEARTBURN: 0
NECK PAIN: 0
PALPITATIONS: 0
BACK PAIN: 0
DIAPHORESIS: 0
NERVOUS/ANXIOUS: 1
DIARRHEA: 0
WEAKNESS: 1
FEVER: 0
BLURRED VISION: 0
CHILLS: 0

## 2022-09-15 NOTE — THERAPY
Physical Therapy   Daily Treatment     Patient Name: Fouzia Santamaria  Age:  62 y.o., Sex:  female  Medical Record #: [id number]  Today's Date: 9/15/2022     Precautions  Precautions: Fall Risk;Swallow Precautions ( See Comments)  Comments: abdominal binder on with activity (present in room by window)    See details below, will attempt again tomorrow when pt is premedicated.     Plan    Continue current treatment plan.    DC Equipment Recommendations: Unable to determine at this time  Discharge Recommendations: Recommend post-acute placement for additional physical therapy services prior to discharge home           Objective       09/15/22 1447   Interdisciplinary Plan of Care Collaboration   IDT Collaboration with  Nursing;Family / Caregiver   Collaboration Comments Family in room. Met with pt and family. Pt agreeable to therex, good active participation, but when asked if she would try sitting EOB, pt refused. Pt reports pain is an issue with sitting up. Made a plan with pt to have her premedication tomorrow, then therapy will follow. Pt agreeable. Nsg updated.

## 2022-09-15 NOTE — PROGRESS NOTES
Patient arrived to the floor, A&O x4 using prompting with yes/no questions. Given patient visual communication papers. Patient experiencing pain bilaterally to LE. Oriented patient to room. Patient resting in bed.

## 2022-09-15 NOTE — PROGRESS NOTES
Report given to nurse on SMT. Pt transported in bed with transporter. All belonging with patient. Called tele monitoring room prior to disconnecting pt from monitor.

## 2022-09-15 NOTE — PROGRESS NOTES
4 Eyes Skin Assessment Completed by KATIA Doyle and KATIA Rabago.    Head WDL  Ears WDL  Nose WDL  Mouth WDL  Neck WDL  Breast/Chest WDL  Shoulder Blades WDL  Spine WDL  (R) Arm/Elbow/Hand Bruising  (L) Arm/Elbow/Hand Bruising  Abdomen - wound vac dressings in place   Groin WDL  Scrotum/Coccyx/Buttocks- pinpoint sheared area to coccyx  (R) Leg WDL  (L) Leg WDL  (R) Heel/Foot/Toe WDL  (L) Heel/Foot/Toe WDL          Devices In Places Tele box, wound vac      Interventions In Place Pressure redistribution mattress, waffle overlay, pillows for positioning    Possible Skin Injury Yes    Pictures Uploaded Into Epic Yes- per wound team  Wound Consult Placed N/A- already placed, wound care following  RN Wound Prevention Protocol Ordered No

## 2022-09-15 NOTE — PROGRESS NOTES
[facility] Medicine Daily Progress Note    Date of Service  9/15/2022    Chief Complaint  Fouzia Santamaria is a 62 y.o. female admitted 8/23/2022 with aortic occlusion    Hospital Course  Admitted with chronic infrarenal aortic occlusion and renal artery stenosis, with bilateral lower extremity ischemia.  Vascular surgery was consulted on the case.  Patient underwent Aortobifemoral bypass using 14 x 7 bifurcated dacryon and Bilateral renal artery eversion endarterectomies on 8/26/2022.  Postoperatively she was noted to have bleeding, and anemia with blood loss requiring transfusion.  Patient underwent Exploratory laparotomy, Control of postoperative bleeding, Abdominal packing and ABThera placement on 8/26/2022 and Abdominal washout with removal of laparotomy pads, Delayed closure abdomen on 8/28/2022.  Postoperatively she remained intubated, and was extubated on 9/2/2022.  She was also noted to have acute kidney injury CKD stage III, and creatinine improved with fluid hydration.  While intubated in the ICU, patient was noted that she was not moving her right lower extremity.  A CT scan and MRI were done which did show acute infarcts.  Neurology was consulted on the case.  Patient appears to have right-sided deficits and expressive aphasia.  Patient also with known history of CVA.  She did have IVON which is slowly improving. She continues to have wound vac to her abdomen. Placement has been difficult to to patients needs and level of participation.     Interval Problem Update  Pt seen and examined, she denies any acute complaints, abdominal pain when attempting to sit up but comfortable at rest   Vitals are stable, afebrile   Renal function slowly improving daily, slightly worse acidosis, monitor     Discussed importance of compliance with therapy in order to improve and get home. She verbalized understanding. She did work with PT with bed exercises, but declined to sit up due to pain. They will reattempt tomorrow,  recommend pre-medication      I have discussed this patient's plan of care and discharge plan at IDT rounds today with Case Management, Nursing, Nursing leadership, and other members of the IDT team.    Consultants/Specialty  neurology and vascular surgery    Code Status  Full Code    Disposition  Patient is medically cleared for discharge.   Anticipate discharge to to skilled nursing facility.  I have placed the appropriate orders for post-discharge needs.    Review of Systems  Review of Systems   Constitutional:  Positive for malaise/fatigue. Negative for chills, diaphoresis and fever.   HENT:  Negative for congestion, hearing loss and sore throat.    Eyes:  Negative for blurred vision.   Respiratory:  Negative for cough, shortness of breath and wheezing.    Cardiovascular:  Negative for chest pain, palpitations and leg swelling.   Gastrointestinal:  Positive for abdominal pain. Negative for diarrhea, heartburn, nausea and vomiting.   Genitourinary:  Negative for dysuria, flank pain and hematuria.   Musculoskeletal:  Negative for back pain, joint pain, myalgias and neck pain.   Skin:  Negative for rash.   Neurological:  Positive for speech change, focal weakness and weakness. Negative for dizziness, sensory change and headaches.   Psychiatric/Behavioral:  The patient is nervous/anxious.       Physical Exam  Temp:  [35.9 °C (96.7 °F)-36.8 °C (98.3 °F)] 35.9 °C (96.7 °F)  Pulse:  [70-87] 76  Resp:  [16-18] 16  BP: (100-138)/(64-81) 128/77  SpO2:  [93 %-100 %] 98 %    Physical Exam  Vitals and nursing note reviewed.   HENT:      Head: Normocephalic and atraumatic.      Nose: No congestion.      Mouth/Throat:      Mouth: Mucous membranes are moist.   Eyes:      Extraocular Movements: Extraocular movements intact.      Conjunctiva/sclera: Conjunctivae normal.   Cardiovascular:      Rate and Rhythm: Normal rate and regular rhythm.   Pulmonary:      Effort: Pulmonary effort is normal.      Breath sounds: Decreased air  movement present.   Abdominal:      General: There is no distension.      Tenderness: There is abdominal tenderness. There is no guarding or rebound.      Comments: Midline wound VAC   Musculoskeletal:      Cervical back: No tenderness.      Right lower leg: No edema.      Left lower leg: No edema.   Skin:     General: Skin is warm and dry.   Neurological:      Mental Status: She is alert and oriented to person, place, and time.      Cranial Nerves: No cranial nerve deficit.      Motor: Weakness (MMT RUE 4/5, LUE 4+/5, RLE 0/5, LLE 3/5) present.      Comments: Expressive aphasia   Psychiatric:         Mood and Affect: Mood is anxious.       Fluids    Intake/Output Summary (Last 24 hours) at 9/15/2022 1514  Last data filed at 9/15/2022 1300  Gross per 24 hour   Intake 890 ml   Output 300 ml   Net 590 ml       Laboratory  Recent Labs     09/13/22  1244 09/14/22  0953   WBC 9.0 10.3   RBC 2.64* 2.79*   HEMOGLOBIN 8.2* 8.8*   HEMATOCRIT 27.0* 28.6*   .3* 102.5*   MCH 31.1 31.5   MCHC 30.4* 30.8*   RDW 74.4* 75.7*   PLATELETCT 695* 604*   MPV 9.5 9.3     Recent Labs     09/13/22  1244 09/14/22  0953   SODIUM 139 138   POTASSIUM 4.6 4.4   CHLORIDE 108 108   CO2 19* 16*   GLUCOSE 114* 95   BUN 26* 24*   CREATININE 1.81* 1.49*   CALCIUM 8.5 8.2*                   Imaging  [id number] VIEW   Final Result      No evidence of bowel obstruction.      DX-CHEST-LIMITED (1 VIEW)   Final Result         Minimal left basilar opacities, likely atelectasis.      IR-US GUIDED PIV   Final Result    Ultrasound-guided PERIPHERAL IV INSERTION performed by    qualified nursing staff as above.      DX-CHEST-PORTABLE (1 VIEW)   Final Result      1.  Mild worsening hypoinflation.   2.  Supportive tubing as described above.   3.  No other significant change from prior exam.            DX-CHEST-PORTABLE (1 VIEW)   Final Result      1.  Interval removal of endotracheal tube and enteric catheter      2.  No other change       DX-CHEST-PORTABLE (1 VIEW)   Final Result      No significant interval change.      DX-CHEST-PORTABLE (1 VIEW)   Final Result         1.  Hazy retrocardiac and left lung base atelectasis or infiltrate, somewhat increased since prior study.   2.  Trace left pleural effusion   3.  Atherosclerosis      DX-CHEST-PORTABLE (1 VIEW)   Final Result         1.  Hazy retrocardiac and left lung base atelectasis or infiltrate.   2.  Trace left pleural effusion   3.  Atherosclerosis      DX-CHEST-PORTABLE (1 VIEW)   Final Result         1.  No acute cardiopulmonary disease.      DX-CHEST-PORTABLE (1 VIEW)   Final Result         1.  No acute cardiopulmonary disease.      MR-MRA HEAD-W/O   Final Result      1.  A2/A3 segment left anterior cerebral artery occlusion.   2.  Occlusion of the intradural left vertebral artery.   3.  Asymmetrically small caliber of the distal left internal carotid artery in the left middle cerebral artery.   4.  Focal stenosis of the distal M1 right middle cerebral artery.      MR-BRAIN-W/O   Final Result      1.  At least moderate sized acute/subacute left anterior cerebral artery infarct.   2.  Additional small recent right thalamic infarct.   3.  Multiple chronic bilateral cerebral and cerebellar infarcts.   4.  Cerebral atrophy and chronic microvascular ischemic type changes.   5.  No acute intracranial hemorrhage.      DX-ABDOMEN FOR TUBE PLACEMENT   Final Result         Feeding tube with tip projecting over the expected area of the stomach.      CT-HEAD W/O   Final Result         Patchy areas of ill-defined hypodensity in the left frontal and parietal lobes, concerning for acute multifocal infarcts. Further evaluation with MRI is recommended.      No acute intracranial hemorrhage.                  [id number] VIEW   Final Result         No radiopaque foreign body identified. Previous sponge was removed.      TP-[id number]-1 VIEW   Final Result      1.  There is a curvilinear density in the right  upper abdomen consistent with a retained surgical lap sponge. This was subsequently removed as there has already been a subsequent x-ray and this was no longer identified.   2.  There are new cutaneous skin staples in midline of the abdomen and pelvis.   3.  Bowel gas pattern is nonobstructive.      DX-CHEST-PORTABLE (1 VIEW)   Final Result      No acute cardiopulmonary abnormality.      DX-ABDOMEN FOR TUBE PLACEMENT   Final Result      1.  Enteric tube overlies the proximal stomach.      DX-CHEST-PORTABLE (1 VIEW)   Final Result         1.  No acute cardiopulmonary disease.   2.  Nasogastric tube is coiled back the side port with tip projecting cephalad terminating in the distal esophagus, recommend advancement.      EC-KLEBER W/O CONT   Final Result      CT-RENAL COLIC EVALUATION(A/P W/O)   Final Result         1. No hydronephrosis. Nonobstructive bilateral renal calculi seen on previous exam cannot be evaluated due to contrast in the collecting system.      2. Retained contrast in the kidney from prior CTA, likely due to nephropathy/renal failure.      US-RENAL   Final Result         1.  Mild right hydronephrosis   2.  Punctate left renal calculi without visualized obstructive changes.      CT-CTA AORTA-RO WITH & W/O-POST PROCESS   Final Result      1.  Occlusion of the abdominal aorta just below level of the renal arteries with occlusion of the common and external iliac arteries with reconstitution of flow via epigastric collaterals.   2.  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   3.  Diffusely small caliber bilateral lower extremity arteries with significant atherosclerosis of the bilateral superficial femoral arteries without high-grade stenosis.   4.  Likely single vessel runoff on the right and 2 vessel runoff on the left.   5.  Focal severe stenoses at the bilateral renal artery origins.      These findings were  discussed with ESPERANZA DO on 8/23/2022 5:29 PM.           Assessment/Plan  * 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HCC)- (present on admission)  Assessment & Plan  Aortobifemoral bypass using 14 x 7 bifurcated dacryon  8/26/2022  Vascular following   BP control   Supportive care with pain control     Cardiomyopathy (HCC)- (present on admission)  Assessment & Plan  Metoprolol  Add ACEI/ARB and Aldactone when creatinine noted to be stable and BP able to tolerate  Monitor volume status    At risk for delirium- (present on admission)  Assessment & Plan  Avoid Benzodiazepines and Anticholinergics  Frequent orientation  Open window blinds during the day  Avoid naps during the day  Family at bedside whenever possible  Ensure adequate sleep at night - use Melatonin preferably  Avoid early morning labs  Avoid vital signs during sleep  Ambulate if possible  Provide adequate pain control  Monitor for urinary retention  Prevent and manage constipation  Discontinue IVs, Catheters, Tubes, Lines, Cardiac monitors, SCDs if possible    Postoperative hemorrhage involving circulatory system following circulatory system procedure  Assessment & Plan  Exploratory laparotomy, Control of postoperative bleeding, Abdominal packing and ABThera placement   8/26/2022  Abdominal washout with removal of laparotomy pads, Delayed closure abdomen   8/28/2022  Abdominal wound vac in place     Renal artery stenosis (HCC)- (present on admission)  Assessment & Plan  Bilateral renal artery eversion endarterectomies   8/26/2022    Hyperkalemia- (present on admission)  Assessment & Plan  Follow bmp    CKD (chronic kidney disease) stage 3, GFR 30-59 ml/min (HCC)- (present on admission)  Assessment & Plan  Follow bmp    Oropharyngeal dysphagia- (present on admission)  Assessment & Plan  Level 6, Liquid level 0  Aspiration precautions  SLP to follow    Hypomagnesemia  Assessment & Plan  IV Mg given  Follow level    Stroke (HCC)- (present on admission)  Assessment & Plan  Acute    with history of CVA  ASA, Plavix, Lipitor  PT and OT -encouraged to participate      Acute postoperative respiratory failure (HCC)  Assessment & Plan  Intubated 8/26, Extubated 9/2/2022  RT protocol, encourage I-S  Currently on RA   Resolved     Chronic systolic congestive heart failure (HCC)- (present on admission)  Assessment & Plan  Metoprolol  Add ACEI/ARB and Aldactone when creatinine noted to be stable and BP able to tolerate  Monitor volume status    Aortic thrombus (HCC)- (present on admission)  Assessment & Plan  ASA, Plavix, Lipitor    COPD (chronic obstructive pulmonary disease) (HCC)- (present on admission)  Assessment & Plan  RT protocol    Hydronephrosis- (present on admission)  Assessment & Plan  monitor    IVON (acute kidney injury) (HCC)- (present on admission)  Assessment & Plan  Likely due to complex aortic/renal artery surgery, blood loss etc   Slowly improving   Avoid renal toxins, dose adjust as needed   Monitor closely     Hypertension- (present on admission)  Assessment & Plan  Metoprolol    Peripheral arterial occlusive disease (HCC)- (present on admission)  Assessment & Plan  ASA, Plavix, Lipitor    QT prolongation- (present on admission)  Assessment & Plan  monitor    Acute blood loss anemia  Assessment & Plan  Transfused PRBC  Has since been stable   Follow H/H     CAD (coronary artery disease)- (present on admission)  Assessment & Plan  History of LAD stents x 3  Aspirin, Plavix, Lipitor, Zetia, Metoprolol       VTE prophylaxis: heparin ppx    I have performed a physical exam and reviewed and updated ROS and Plan today (9/15/2022). In review of yesterday's note (9/14/2022), there are no changes except as documented above.

## 2022-09-15 NOTE — THERAPY
"Speech Language Pathology   Aphasia Evaluation     Patient Name: Fouzia Santamaria  AGE:  62 y.o., SEX:  female  Medical Record #: [id number]  Today's Date: 9/15/2022     Precautions  Precautions: Fall Risk, Swallow Precautions ( See Comments)  Comments: abdominal binder on with mobilty    Assessment    HPI: Pt is 61 y.o. female admitted with aortoiliac occlusive disease s/p vascular repair for occluded aorta and had subsequent multifocal strokes following repair and respiratory failure requiring intubation. Intubated -. No Hx SLP in [facility], however aphasia at baseline. S/p Bilateral renal artery eversion endarterectomies .    MRI Brain : 1.  At least moderate sized acute/subacute left anterior cerebral artery infarct.  2.  Additional small recent right thalamic infarct.  3.  Multiple chronic bilateral cerebral and cerebellar infarcts.  4.  Cerebral atrophy and chronic microvascular ischemic type changes.  5.  No acute intracranial hemorrhage.     CMHx: Peripheral arterial occlusive disease, IVON, acute post-op respiratory failure  PMHx: L CVA, HTN, CAD, COPD, hx smoking 30 years     Prior Level of Function/Living Situation:  Pt w/ expressive aphasia at baseline. \"She knows what people are talking about, but she has a hard time getting things out\" per spouse at bedside. (Taken from SLP jennifer )     Subjective:  When asked if pt's ability to talk is worse now than it was before she was hospitalized, she nodded and said \"Yes\" emphatically. She lives w/ her  and three kids.      WAB-Bedside Revised (WAB-R)  Bedside Aphasia Score = 41.67  Spontaneous Speech Content: 1/10  Spontaneous Speech Fluency: 10  Auditory Verbal Comprehension: Y/N Questions: 10/10  Sequential Commands: 1/10  Repetition: /10  Object Namin/10     Impressions:  Patient presents with a nonfluent aphasia most consistent with Broca's Aphasia, based on WAB Bedside classification criteria. Aphasia is likely acute on chronic as " pt has a prior hx of stroke with some difficulty communicating, but not to this extent.   Verbal expression is moderate-severely impaired, characterized by impaired speech content with effortful hesitant attempts to produce words. She is able to produce meaningful phrases and sentences in routine social exchanges and express some wants/needs in context spontaneously. However, when prompted to describe a picture or name objects, pt struggles to produce appropriate words. She benefits from cloze procedures and phonemic cues to facilitate word retrieval.   Auditory comprehension is mild-moderately impaired with pt able to answer Y/N questions accurately (personal, environmental, abstract) given extra time to self-correct if needed. However, command following is impaired with pt following ~50% of 1-step commands.  Reading comprehension is intact at the salient word level, though pt has difficulty with reading as amount of text increases, such as on one of her AAC boards containing many written phrases and words.   Written expression is impaired. Pt is able to write her name but not her address.      Recommendations:  Patient would benefit from skilled SLP intervention in the acute and post-acute levels of care.   Supervision with communication is recommended at home and in the community.  Use picture boards in pt's room, Y/N questions to assist w/ communication.      Plan    Recommend Speech Therapy 3 times per week until therapy goals are met for the following treatments:  Dysphagia Training, Comprehension Training, Expression Training, Reading Training, Writing Training, AAC Training / Development, and Patient / Family / Caregiver Education.    Discharge Recommendations: Recommend post acute care for continued speech therapy services. Ok for home health if family is able to provide supervision w/ communication and IADLs at home and in the community.        Objective       09/15/22 0944   Patient / Family Goals  "  Patient / Family Goal #1 \"Yes\" when asked if she wanted more to drink   Goal #1 Outcome Progressing as expected   Short Term Goals   Short Term Goal # 2 Patient will follow 1-step commands with >80% accuracy given mod cues.   Short Term Goal # 3 Patient will name objects with >80% accuracy given mod cues.     "

## 2022-09-15 NOTE — CARE PLAN
The patient is Stable - Low risk of patient condition declining or worsening    Shift Goals  Clinical Goals: safety, pain management, maintain skin integrity, q 2 turns, improvevd communcation  Patient Goals: rest, pain management  Family Goals: AFIA    Progress made toward(s) clinical / shift goals:    Problem: Fall Risk  Goal: Patient will remain free from falls  Outcome: Progressing  Note: PRN pain medications in use for pain control.       Problem: Pain - Standard  Goal: Alleviation of pain or a reduction in pain to the patient’s comfort goal  Outcome: Progressing  Note: PRN pain medications in use for pain control.         Patient is not progressing towards the following goals:

## 2022-09-16 LAB
ANION GAP SERPL CALC-SCNC: 13 MMOL/L (ref 7–16)
ANISOCYTOSIS BLD QL SMEAR: ABNORMAL
BASOPHILS # BLD AUTO: 0.7 % (ref 0–1.8)
BASOPHILS # BLD: 0.06 K/UL (ref 0–0.12)
BUN SERPL-MCNC: 25 MG/DL (ref 8–22)
BURR CELLS BLD QL SMEAR: NORMAL
CALCIUM SERPL-MCNC: 8.4 MG/DL (ref 8.5–10.5)
CHLORIDE SERPL-SCNC: 107 MMOL/L (ref 96–112)
CO2 SERPL-SCNC: 19 MMOL/L (ref 20–33)
COMMENT 1642: NORMAL
CREAT SERPL-MCNC: 1.52 MG/DL (ref 0.5–1.4)
EOSINOPHIL # BLD AUTO: 0.26 K/UL (ref 0–0.51)
EOSINOPHIL NFR BLD: 3.1 % (ref 0–6.9)
ERYTHROCYTE [DISTWIDTH] IN BLOOD BY AUTOMATED COUNT: 75.7 FL (ref 35.9–50)
GFR SERPLBLD CREATININE-BSD FMLA CKD-EPI: 39 ML/MIN/1.73 M 2
GLUCOSE SERPL-MCNC: 88 MG/DL (ref 65–99)
HCT VFR BLD AUTO: 25.7 % (ref 37–47)
HGB BLD-MCNC: 7.9 G/DL (ref 12–16)
HYPOCHROMIA BLD QL SMEAR: ABNORMAL
IMM GRANULOCYTES # BLD AUTO: 0.05 K/UL (ref 0–0.11)
IMM GRANULOCYTES NFR BLD AUTO: 0.6 % (ref 0–0.9)
LYMPHOCYTES # BLD AUTO: 1.93 K/UL (ref 1–4.8)
LYMPHOCYTES NFR BLD: 22.8 % (ref 22–41)
MACROCYTES BLD QL SMEAR: ABNORMAL
MCH RBC QN AUTO: 31.6 PG (ref 27–33)
MCHC RBC AUTO-ENTMCNC: 30.7 G/DL (ref 33.6–35)
MCV RBC AUTO: 102.8 FL (ref 81.4–97.8)
MONOCYTES # BLD AUTO: 1.1 K/UL (ref 0–0.85)
MONOCYTES NFR BLD AUTO: 13 % (ref 0–13.4)
MORPHOLOGY BLD-IMP: NORMAL
NEUTROPHILS # BLD AUTO: 5.06 K/UL (ref 2–7.15)
NEUTROPHILS NFR BLD: 59.8 % (ref 44–72)
NRBC # BLD AUTO: 0 K/UL
NRBC BLD-RTO: 0 /100 WBC
PLATELET # BLD AUTO: 611 K/UL (ref 164–446)
PLATELET BLD QL SMEAR: NORMAL
PMV BLD AUTO: 9.9 FL (ref 9–12.9)
POIKILOCYTOSIS BLD QL SMEAR: NORMAL
POLYCHROMASIA BLD QL SMEAR: NORMAL
POTASSIUM SERPL-SCNC: 3.9 MMOL/L (ref 3.6–5.5)
RBC # BLD AUTO: 2.5 M/UL (ref 4.2–5.4)
RBC BLD AUTO: PRESENT
SODIUM SERPL-SCNC: 139 MMOL/L (ref 135–145)
WBC # BLD AUTO: 8.5 K/UL (ref 4.8–10.8)

## 2022-09-16 PROCEDURE — 700102 HCHG RX REV CODE 250 W/ 637 OVERRIDE(OP): Performed by: FAMILY MEDICINE

## 2022-09-16 PROCEDURE — A9270 NON-COVERED ITEM OR SERVICE: HCPCS | Performed by: SURGERY

## 2022-09-16 PROCEDURE — A9270 NON-COVERED ITEM OR SERVICE: HCPCS | Performed by: FAMILY MEDICINE

## 2022-09-16 PROCEDURE — 85025 COMPLETE CBC W/AUTO DIFF WBC: CPT

## 2022-09-16 PROCEDURE — 36415 COLL VENOUS BLD VENIPUNCTURE: CPT

## 2022-09-16 PROCEDURE — 80048 BASIC METABOLIC PNL TOTAL CA: CPT

## 2022-09-16 PROCEDURE — 700102 HCHG RX REV CODE 250 W/ 637 OVERRIDE(OP): Performed by: HOSPITALIST

## 2022-09-16 PROCEDURE — A9270 NON-COVERED ITEM OR SERVICE: HCPCS | Performed by: HOSPITALIST

## 2022-09-16 PROCEDURE — 700111 HCHG RX REV CODE 636 W/ 250 OVERRIDE (IP): Performed by: HOSPITALIST

## 2022-09-16 PROCEDURE — 770020 HCHG ROOM/CARE - TELE (206)

## 2022-09-16 PROCEDURE — 97530 THERAPEUTIC ACTIVITIES: CPT

## 2022-09-16 PROCEDURE — 700102 HCHG RX REV CODE 250 W/ 637 OVERRIDE(OP): Performed by: SURGERY

## 2022-09-16 PROCEDURE — 99232 SBSQ HOSP IP/OBS MODERATE 35: CPT | Performed by: STUDENT IN AN ORGANIZED HEALTH CARE EDUCATION/TRAINING PROGRAM

## 2022-09-16 RX ADMIN — POLYETHYLENE GLYCOL 3350 1 PACKET: 17 POWDER, FOR SOLUTION ORAL at 05:52

## 2022-09-16 RX ADMIN — OXYCODONE 5 MG: 5 TABLET ORAL at 09:27

## 2022-09-16 RX ADMIN — HEPARIN SODIUM 5000 UNITS: 5000 INJECTION, SOLUTION INTRAVENOUS; SUBCUTANEOUS at 13:33

## 2022-09-16 RX ADMIN — METOPROLOL TARTRATE 12.5 MG: 25 TABLET, FILM COATED ORAL at 17:12

## 2022-09-16 RX ADMIN — ACETAMINOPHEN 1000 MG: 500 TABLET ORAL at 17:12

## 2022-09-16 RX ADMIN — OXYCODONE 5 MG: 5 TABLET ORAL at 19:51

## 2022-09-16 RX ADMIN — ASPIRIN 81 MG 81 MG: 81 TABLET ORAL at 05:49

## 2022-09-16 RX ADMIN — CLOPIDOGREL BISULFATE 75 MG: 75 TABLET ORAL at 05:49

## 2022-09-16 RX ADMIN — ACETAMINOPHEN 1000 MG: 500 TABLET ORAL at 08:43

## 2022-09-16 RX ADMIN — PRAMIPEXOLE DIHYDROCHLORIDE 0.12 MG: 0.12 TABLET ORAL at 12:16

## 2022-09-16 RX ADMIN — PRAMIPEXOLE DIHYDROCHLORIDE 0.12 MG: 0.12 TABLET ORAL at 17:13

## 2022-09-16 RX ADMIN — HEPARIN SODIUM 5000 UNITS: 5000 INJECTION, SOLUTION INTRAVENOUS; SUBCUTANEOUS at 05:52

## 2022-09-16 RX ADMIN — ATORVASTATIN CALCIUM 80 MG: 80 TABLET, FILM COATED ORAL at 21:09

## 2022-09-16 RX ADMIN — HEPARIN SODIUM 5000 UNITS: 5000 INJECTION, SOLUTION INTRAVENOUS; SUBCUTANEOUS at 21:08

## 2022-09-16 RX ADMIN — METOPROLOL TARTRATE 12.5 MG: 25 TABLET, FILM COATED ORAL at 05:49

## 2022-09-16 RX ADMIN — ACETAMINOPHEN 1000 MG: 500 TABLET ORAL at 23:21

## 2022-09-16 RX ADMIN — DOCUSATE SODIUM 50 MG AND SENNOSIDES 8.6 MG 1 TABLET: 8.6; 5 TABLET, FILM COATED ORAL at 21:09

## 2022-09-16 RX ADMIN — PRAMIPEXOLE DIHYDROCHLORIDE 0.12 MG: 0.12 TABLET ORAL at 05:52

## 2022-09-16 RX ADMIN — EZETIMIBE 10 MG: 10 TABLET ORAL at 05:49

## 2022-09-16 ASSESSMENT — ENCOUNTER SYMPTOMS
PALPITATIONS: 0
DIZZINESS: 0
ABDOMINAL PAIN: 1
VOMITING: 0
NERVOUS/ANXIOUS: 1
WEAKNESS: 1
FEVER: 0
MYALGIAS: 0
HEARTBURN: 0
FLANK PAIN: 0
DIARRHEA: 0
DIAPHORESIS: 0
SPEECH CHANGE: 1
SENSORY CHANGE: 0
BACK PAIN: 0
NAUSEA: 0
NECK PAIN: 0
BLURRED VISION: 0
CHILLS: 0
HEADACHES: 0
SORE THROAT: 0
COUGH: 0
FOCAL WEAKNESS: 1
SHORTNESS OF BREATH: 0
WHEEZING: 0

## 2022-09-16 ASSESSMENT — COGNITIVE AND FUNCTIONAL STATUS - GENERAL
TURNING FROM BACK TO SIDE WHILE IN FLAT BAD: A LOT
CLIMB 3 TO 5 STEPS WITH RAILING: TOTAL
WALKING IN HOSPITAL ROOM: TOTAL
MOVING FROM LYING ON BACK TO SITTING ON SIDE OF FLAT BED: A LOT
STANDING UP FROM CHAIR USING ARMS: A LOT

## 2022-09-16 ASSESSMENT — GAIT ASSESSMENTS: GAIT LEVEL OF ASSIST: UNABLE TO PARTICIPATE

## 2022-09-16 NOTE — FACE TO FACE
Face to Face Note  -  Durable Medical Equipment    Nora Reyna M.D. - NPI: [id number]  I certify that this patient is under my care and that they had a durable medical equipment(DME)face to face encounter by myself that meets the physician DME face-to-face encounter requirements with this patient on:    Date of encounter:   Patient:                    MRN:                       YOB: 2020  Fouzia Santamaria  [id number]  1960     The encounter with the patient was in whole, or in part, for the following medical condition, which is the primary reason for durable medical equipment:  Post-Op Surgery and CVA    I certify that, based on my findings, the following durable medical equipment is medically necessary:    Wheelchair   Patient needs manual wheelchair for use inside the home based on the above diagnosis. Per guidelines patient meets criteria in the following ways:   A.  Patient has significant impairment in the following Toileting, Feeding, Dressing, Grooming, and Bathing and is is unable to complete these tasks in a reasonable timeframe and places the patient at a heightened risk of morbidity.   B.  The patient's mobility limitations cannot be sufficiently resolved by use of  fitted cane or walker.   C.  The patient reports his home provides adequate access between rooms,  maneuvering space, and surfaces for use of the manual wheelchair that is  provided.   D.  The use of the manual wheelchair will significantly improve the patient's  ability to participate in MRADLs and the patient will use it on a regular basis in  the home.   E.  The patient has not expressed an unwillingness to use the manual  wheelchair.   F. The patient has limitations of strength, endurance, range of motion, or coordination per OT notes:     and 3 in 1 Bedside [place].    My Clinical findings support the need for the above equipment due to:  Abnormal Gait, Dysphagia, Wound/Incision

## 2022-09-16 NOTE — PROGRESS NOTES
Hospital Medicine Daily Progress Note    Date of Service  9/16/2022    Chief Complaint  Fouzia Santamaria is a 62 y.o. female admitted 8/23/2022 with aortic occlusion    Hospital Course  Admitted with chronic infrarenal aortic occlusion and renal artery stenosis, with bilateral lower extremity ischemia.  Vascular surgery was consulted on the case.  Patient underwent Aortobifemoral bypass using 14 x 7 bifurcated dacryon and Bilateral renal artery eversion endarterectomies on 8/26/2022.  Postoperatively she was noted to have bleeding, and anemia with blood loss requiring transfusion.  Patient underwent Exploratory laparotomy, Control of postoperative bleeding, Abdominal packing and ABThera placement on 8/26/2022 and Abdominal washout with removal of laparotomy pads, Delayed closure abdomen on 8/28/2022.  Postoperatively she remained intubated, and was extubated on 9/2/2022.  She was also noted to have acute kidney injury CKD stage III, and creatinine improved with fluid hydration.  While intubated in the ICU, patient was noted that she was not moving her right lower extremity.  A CT scan and MRI were done which did show acute infarcts.  Neurology was consulted on the case.  Patient appears to have right-sided deficits and expressive aphasia.  Patient also with known history of CVA.  She did have IVON which is slowly improving. She continues to have wound vac to her abdomen. Placement has been difficult to to patients needs and level of participation.     Interval Problem Update  Pt seen and examined, she denies any acute complaints, states abdominal pain is well controlled   Family at bedside  Vitals are stable, afebrile   Renal function stable suspect this may be close to her baseline  Patient's family at bedside plan is to discharge home with them tomorrow given lack of discharge options patient has been feels comfortable rehabbing her at home as he states he has done this before  Discussed with vascular regarding  patient's wound VAC this was removed today her abdominal incisions appear very well-healed staples were removed, no further wound care needs  DME ordered, CM aware    Plan for discharge home tomorrow once DME is delivered    I have discussed this patient's plan of care and discharge plan at IDT rounds today with Case Management, Nursing, Nursing leadership, and other members of the IDT team.    Consultants/Specialty  neurology and vascular surgery    Code Status  Full Code    Disposition  Patient is medically cleared for discharge.   Anticipate discharge to to skilled nursing facility.  I have placed the appropriate orders for post-discharge needs.    Review of Systems  Review of Systems   Constitutional:  Positive for malaise/fatigue. Negative for chills, diaphoresis and fever.   HENT:  Negative for congestion, hearing loss and sore throat.    Eyes:  Negative for blurred vision.   Respiratory:  Negative for cough, shortness of breath and wheezing.    Cardiovascular:  Negative for chest pain, palpitations and leg swelling.   Gastrointestinal:  Positive for abdominal pain. Negative for diarrhea, heartburn, nausea and vomiting.   Genitourinary:  Negative for dysuria, flank pain and hematuria.   Musculoskeletal:  Negative for back pain, joint pain, myalgias and neck pain.   Skin:  Negative for rash.   Neurological:  Positive for speech change, focal weakness and weakness. Negative for dizziness, sensory change and headaches.   Psychiatric/Behavioral:  The patient is nervous/anxious.       Physical Exam  Temp:  [35.9 °C (96.6 °F)-36.9 °C (98.5 °F)] 36.4 °C (97.6 °F)  Pulse:  [65-82] 65  Resp:  [16-18] 16  BP: (118-133)/(56-84) 121/56  SpO2:  [97 %-99 %] 98 %    Physical Exam  Vitals and nursing note reviewed.   HENT:      Head: Normocephalic and atraumatic.      Nose: No congestion.      Mouth/Throat:      Mouth: Mucous membranes are moist.   Eyes:      Extraocular Movements: Extraocular movements intact.       Conjunctiva/sclera: Conjunctivae normal.   Cardiovascular:      Rate and Rhythm: Normal rate and regular rhythm.   Pulmonary:      Effort: Pulmonary effort is normal.      Breath sounds: Decreased air movement present.   Abdominal:      General: There is no distension.      Tenderness: There is abdominal tenderness. There is no guarding or rebound.      Comments: Very well-appearing midline incisions wound VAC off and staples out   Musculoskeletal:      Cervical back: No tenderness.      Right lower leg: No edema.      Left lower leg: No edema.   Skin:     General: Skin is warm and dry.   Neurological:      Mental Status: She is alert and oriented to person, place, and time.      Cranial Nerves: No cranial nerve deficit.      Motor: Weakness (MMT RUE 4/5, LUE 4+/5, RLE 0/5, LLE 3/5) present.      Comments: Expressive aphasia   Psychiatric:         Mood and Affect: Mood is anxious.       Fluids  No intake or output data in the 24 hours ending 09/16/22 1603      Laboratory  Recent Labs     09/14/22  0953 09/16/22  0213   WBC 10.3 8.5   RBC 2.79* 2.50*   HEMOGLOBIN 8.8* 7.9*   HEMATOCRIT 28.6* 25.7*   .5* 102.8*   MCH 31.5 31.6   MCHC 30.8* 30.7*   RDW 75.7* 75.7*   PLATELETCT 604* 611*   MPV 9.3 9.9     Recent Labs     09/14/22  0953 09/16/22  0213   SODIUM 138 139   POTASSIUM 4.4 3.9   CHLORIDE 108 107   CO2 16* 19*   GLUCOSE 95 88   BUN 24* 25*   CREATININE 1.49* 1.52*   CALCIUM 8.2* 8.4*                   Imaging  [id number] VIEW   Final Result      No evidence of bowel obstruction.      DX-CHEST-LIMITED (1 VIEW)   Final Result         Minimal left basilar opacities, likely atelectasis.      IR-US GUIDED PIV   Final Result    Ultrasound-guided PERIPHERAL IV INSERTION performed by    qualified nursing staff as above.      DX-CHEST-PORTABLE (1 VIEW)   Final Result      1.  Mild worsening hypoinflation.   2.  Supportive tubing as described above.   3.  No other significant change from prior exam.             DX-CHEST-PORTABLE (1 VIEW)   Final Result      1.  Interval removal of endotracheal tube and enteric catheter      2.  No other change      DX-CHEST-PORTABLE (1 VIEW)   Final Result      No significant interval change.      DX-CHEST-PORTABLE (1 VIEW)   Final Result         1.  Hazy retrocardiac and left lung base atelectasis or infiltrate, somewhat increased since prior study.   2.  Trace left pleural effusion   3.  Atherosclerosis      DX-CHEST-PORTABLE (1 VIEW)   Final Result         1.  Hazy retrocardiac and left lung base atelectasis or infiltrate.   2.  Trace left pleural effusion   3.  Atherosclerosis      DX-CHEST-PORTABLE (1 VIEW)   Final Result         1.  No acute cardiopulmonary disease.      DX-CHEST-PORTABLE (1 VIEW)   Final Result         1.  No acute cardiopulmonary disease.      MR-MRA HEAD-W/O   Final Result      1.  A2/A3 segment left anterior cerebral artery occlusion.   2.  Occlusion of the intradural left vertebral artery.   3.  Asymmetrically small caliber of the distal left internal carotid artery in the left middle cerebral artery.   4.  Focal stenosis of the distal M1 right middle cerebral artery.      MR-BRAIN-W/O   Final Result      1.  At least moderate sized acute/subacute left anterior cerebral artery infarct.   2.  Additional small recent right thalamic infarct.   3.  Multiple chronic bilateral cerebral and cerebellar infarcts.   4.  Cerebral atrophy and chronic microvascular ischemic type changes.   5.  No acute intracranial hemorrhage.      DX-ABDOMEN FOR TUBE PLACEMENT   Final Result         Feeding tube with tip projecting over the expected area of the stomach.      CT-HEAD W/O   Final Result         Patchy areas of ill-defined hypodensity in the left frontal and parietal lobes, concerning for acute multifocal infarcts. Further evaluation with MRI is recommended.      No acute intracranial hemorrhage.                  [id number] VIEW   Final Result         No radiopaque foreign  body identified. Previous sponge was removed.      [id number]-8 VIEW   Final Result      1.  There is a curvilinear density in the right upper abdomen consistent with a retained surgical lap sponge. This was subsequently removed as there has already been a subsequent x-ray and this was no longer identified.   2.  There are new cutaneous skin staples in midline of the abdomen and pelvis.   3.  Bowel gas pattern is nonobstructive.      DX-CHEST-PORTABLE (1 VIEW)   Final Result      No acute cardiopulmonary abnormality.      DX-ABDOMEN FOR TUBE PLACEMENT   Final Result      1.  Enteric tube overlies the proximal stomach.      DX-CHEST-PORTABLE (1 VIEW)   Final Result         1.  No acute cardiopulmonary disease.   2.  Nasogastric tube is coiled back the side port with tip projecting cephalad terminating in the distal esophagus, recommend advancement.      EC-KLEBER W/O CONT   Final Result      CT-RENAL COLIC EVALUATION(A/P W/O)   Final Result         1. No hydronephrosis. Nonobstructive bilateral renal calculi seen on previous exam cannot be evaluated due to contrast in the collecting system.      2. Retained contrast in the kidney from prior CTA, likely due to nephropathy/renal failure.      US-RENAL   Final Result         1.  Mild right hydronephrosis   2.  Punctate left renal calculi without visualized obstructive changes.      CT-CTA AORTA-RO WITH & W/O-POST PROCESS   Final Result      1.  Occlusion of the abdominal aorta just below level of the renal arteries with occlusion of the common and external iliac arteries with reconstitution of flow via epigastric collaterals.   2.  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   3.  Diffusely small caliber bilateral lower extremity arteries with significant atherosclerosis of the bilateral superficial femoral arteries without high-grade stenosis.   4.  Likely single vessel runoff on  the right and 2 vessel runoff on the left.   5.  Focal severe stenoses at the bilateral renal artery origins.      These findings were discussed with ESPERANZA DO on 8/23/2022 5:29 PM.           Assessment/Plan  * 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HCC)- (present on admission)  Assessment & Plan  Aortobifemoral bypass using 14 x 7 bifurcated dacryon  8/26/2022  Vascular following   BP control   Supportive care with pain control     Cardiomyopathy (HCC)- (present on admission)  Assessment & Plan  Metoprolol  Add ACEI/ARB and Aldactone when creatinine noted to be stable and BP able to tolerate  Monitor volume status    At risk for delirium- (present on admission)  Assessment & Plan  Avoid Benzodiazepines and Anticholinergics  Frequent orientation  Open window blinds during the day  Avoid naps during the day  Family at bedside whenever possible  Ensure adequate sleep at night - use Melatonin preferably  Avoid early morning labs  Avoid vital signs during sleep  Ambulate if possible  Provide adequate pain control  Monitor for urinary retention  Prevent and manage constipation  Discontinue IVs, Catheters, Tubes, Lines, Cardiac monitors, SCDs if possible    Postoperative hemorrhage involving circulatory system following circulatory system procedure  Assessment & Plan  Exploratory laparotomy, Control of postoperative bleeding, Abdominal packing and ABThera placement   8/26/2022  Abdominal washout with removal of laparotomy pads, Delayed closure abdomen   8/28/2022  Abdominal wound vac removed, incisions appear very well-healed    Renal artery stenosis (HCC)- (present on admission)  Assessment & Plan  Bilateral renal artery eversion endarterectomies   8/26/2022    Hyperkalemia- (present on admission)  Assessment & Plan  Follow bmp    CKD (chronic kidney disease) stage 3, GFR 30-59 ml/min (HCC)- (present on admission)  Assessment & Plan  Follow bmp    Oropharyngeal dysphagia- (present on admission)  Assessment & Plan  Level 6, Liquid level  0  Aspiration precautions  SLP to follow    Hypomagnesemia  Assessment & Plan  IV Mg given  Follow level    Stroke (HCC)- (present on admission)  Assessment & Plan  Acute   with history of CVA  ASA, Plavix, Lipitor  PT and OT -encouraged to participate      Acute postoperative respiratory failure (HCC)  Assessment & Plan  Intubated 8/26, Extubated 9/2/2022  RT protocol, encourage I-S  Currently on RA   Resolved     Chronic systolic congestive heart failure (HCC)- (present on admission)  Assessment & Plan  Metoprolol  Add ACEI/ARB and Aldactone when creatinine noted to be stable and BP able to tolerate  Monitor volume status    Aortic thrombus (HCC)- (present on admission)  Assessment & Plan  ASA, Plavix, Lipitor    COPD (chronic obstructive pulmonary disease) (HCC)- (present on admission)  Assessment & Plan  RT protocol    Hydronephrosis- (present on admission)  Assessment & Plan  monitor    IVON (acute kidney injury) (HCC)- (present on admission)  Assessment & Plan  Likely due to complex aortic/renal artery surgery, blood loss etc   Slowly improving   Avoid renal toxins, dose adjust as needed   Stable encourage oral hydration    Hypertension- (present on admission)  Assessment & Plan  Metoprolol    Peripheral arterial occlusive disease (HCC)- (present on admission)  Assessment & Plan  ASA, Plavix, Lipitor    QT prolongation- (present on admission)  Assessment & Plan  monitor    Acute blood loss anemia  Assessment & Plan  Transfused PRBC  Has since been stable   Follow H/H     CAD (coronary artery disease)- (present on admission)  Assessment & Plan  History of LAD stents x 3  Aspirin, Plavix, Lipitor, Zetia, Metoprolol       VTE prophylaxis: heparin ppx    I have performed a physical exam and reviewed and updated ROS and Plan today (9/16/2022). In review of yesterday's note (9/15/2022), there are no changes except as documented above.

## 2022-09-16 NOTE — PROGRESS NOTES
Nurse rec'd BSR from night shift nurse, updated on POC. Patient denies any pain or any s/s of distress/discomfort. Nurse to resume care, call light in reach. VAIBHAV

## 2022-09-16 NOTE — PROGRESS NOTES
Patient resting in bed comfortably. Patient endorsed to dayshift RN. Patient had no acute changes overnight. Patient unable to take PO colace due to swallowing and medication can't be crushed. Wound Vac putting out no drainage. Safety measures in place. Call bell within reach.

## 2022-09-16 NOTE — DISCHARGE PLANNING
Received Choice form at 1440  Agency/Facility Name: [facility]  Referral sent per Choice form @ 1500     1535:  Agency[facility]Facility Name: [facility]   Spoke To: Mary  Outcome: DPA was notified Mary has not yet received referral for DME orders. RAMIN gave Pt information and Mary to look out for Pt. DPA to follow up at a later time.     1628:  Agency[facility]Facility Name: [facility]  Outcome: RAMIN left a voicemail for Mary regarding Pt referral status. DPA requesting a call back.

## 2022-09-16 NOTE — PROGRESS NOTES
Report received from [facility] shift RN, assumed patient care. Patient is calmly resting in bed, no signs of distress, even and unlabored breathing noted. Pt on room air. Tele box on and in place. Patient has call light within reach, fall precautions in place. Will continue to monitor.

## 2022-09-16 NOTE — CARE PLAN
The patient is Stable - Low risk of patient condition declining or worsening    Shift Goals  Clinical Goals: patient will have no falls during shift  Patient Goals: comfort  Family Goals: alan    Progress made toward(s) clinical / shift goals:  Patient utilizing call bell for assitance and not attempting to get out of bed alone.    Patient is not progressing towards the following goals:N/A

## 2022-09-16 NOTE — CARE PLAN
The patient is Stable - Low risk of patient condition declining or worsening    Shift Goals  Clinical Goals: Safety, pain mgnt  Patient Goals: comfort  Family Goals: alan    Progress made toward(s) clinical / shift goals:  Patient is being updated on POC by nursing staff, patient skin --wound vac in place. Patient is on fall precautions in place.     Patient is not progressing towards the following goals:

## 2022-09-16 NOTE — PROGRESS NOTES
Monitor Summary    Sinus Rhythm (Lowest 69- Highest 87). Heart rate did drop to 49 but not sustained.  Rare PVCs, Rare Couplet PACs, Rare PACs.     MT .151  QRS .101  QT .40

## 2022-09-16 NOTE — THERAPY
Physical Therapy   Daily Treatment     Patient Name: Fouzia Santamaria  Age:  62 y.o., Sex:  female  Medical Record #: [id number]  Today's Date: 9/16/2022          Assessment    Pt is alert, premedicated, agreeable. Binder place in supine, pt able to pivot to commode for BM/clean up, and pivot back to bed. Less emotional today, no crying, and good, active participation. See details below.     Plan    Continue current treatment plan.    DC Equipment Recommendations: Unable to determine at this time  Discharge Recommendations: Recommend post-acute placement for additional physical therapy services prior to discharge home         Objective       09/16/22 1155   Charge Group   Charges  Yes   PT Therapeutic Activities 2   Total Time Spent   PT Total Time Yes   PT Therapeutic Activities Time Spent (Mins) 30   PT Total Time Spent (Calculated) 30   Vitals   O2 Delivery Device None - Room Air   Pain 0 - 10 Group   Therapist Pain Assessment Nurse Notified;0   Cognition    Cognition / Consciousness X   Speech/ Communication Expressive Aphasia   Level of Consciousness Alert   Ability To Follow Commands 1 Step   Initiation Impaired   Comments pt getting a few words out at a time.   Active ROM Lower Body    Active ROM Lower Body  X   Comments no active mvmt R LE   Strength Lower Body   Lower Body Strength  X   Comments L LE functional for stand pivot.Not tolerating wt bearing R LE, mariya.   Neuro-Muscular Treatments   Neuro-Muscular Treatments Weight Shift Right;Weight Shift Left   Balance   Sitting Balance (Static) Fair -   Sitting Balance (Dynamic) Poor +   Standing Balance (Static) Poor -   Standing Balance (Dynamic) Poor -   Weight Shift Sitting Fair   Weight Shift Standing Poor   Skilled Intervention Verbal Cuing;Sequencing   Gait Analysis   Gait Level Of Assist Unable to Participate   Bed Mobility    Supine to Sit Moderate Assist   Sit to Supine Moderate Assist   Scooting Minimal Assist   Rolling Minimum Assist to Lt.    Skilled Intervention Verbal Cuing;Sequencing   Comments abdominal binder placed in bed, doffed in bed, VAC removed earlier by surgeon.   Functional Mobility   Sit to Stand Moderate Assist   Toilet Transfers Moderate Assist   Transfer Method Stand Pivot   Skilled Intervention Verbal Cuing;Sequencing   Comments pt had BM in bed, finished on commode. pt was cleaned up and able to pivot back to bed.   How much difficulty does the patient currently have...   Turning over in bed (including adjusting bedclothes, sheets and blankets)? 2   Sitting down on and standing up from a chair with arms (e.g., wheelchair, bedside commode, etc.) 2   How much help from another person does the patient currently need...   Moving to and from a bed to a chair (including a wheelchair)? 2   Need to walk in a hospital room? 1   Climbing 3-5 steps with a railing? 1   Activity Tolerance   Standing 2   Short Term Goals    Short Term Goal # 1 pt will be able to complete supine<>Sitting with mod assist in 6tx in order to progress   Goal Outcome # 1 Goal met, new goal added   Short Term Goal # 1 B  supine to sit with cg assist in 6 visits to improve functional indep.   Goal Outcome  # 1 B Goal not met   Short Term Goal # 2 pt will be able to sit EOB with fair- balance in 6tx in order to decrease fall risk   Goal Outcome # 2 Goal not met   Short Term Goal # 3 pt will be able to complete bed<>chair transfer with max assist in 6tx in order to progress   Goal Outcome # 3 Goal not met   Education Group   Role of Physical Therapist Patient Response Patient;Acceptance;Explanation;Verbal Demonstration   Anticipated Discharge Equipment and Recommendations   DC Equipment Recommendations Unable to determine at this time   Discharge Recommendations Recommend post-acute placement for additional physical therapy services prior to discharge home   Interdisciplinary Plan of Care Collaboration   IDT Collaboration with  Nursing   Patient Position at End of Therapy  In Bed;Call Light within Reach;Tray Table within Reach;Phone within Reach   Collaboration Comments nsg updated   Session Information   Date / Session Number  9/16-8 (3/3, 9/19)

## 2022-09-16 NOTE — PROGRESS NOTES
VASCULAR SURGERY PROGRESS NOTE       Awake, no complaints.  AF, VSS.    General: Thin female in none apparent distress.  Abdomen: Soft, nondistended, nontender.  Incision is well-healed without erythema, drainage, or fluctuation.  Lower extremities: Incisions are well healed without erythema, drainage, or fluctuation.  Feet are warm, well-perfused.    Doing well.  I removed the Prevena as well as staples.  Okay to be discharged from vascular standpoint.    Follow-up with Dr. Siu in 2 weeks.    Discussed with Dr. Reyna.    Discussed with patient.  All questions were answered.

## 2022-09-16 NOTE — PROGRESS NOTES
Nurse gave BSR to night shift nurse, updated on POC, patient s/s of distress/discomfort. Call light in reach, nurse relinquished care.     08-May-2018 08:41

## 2022-09-16 NOTE — DISCHARGE PLANNING
Case Management Discharge Planning    Admission Date: 8/23/2022  GMLOS: 6.6  ALOS: 24    6-Clicks ADL Score: 11  6-Clicks Mobility Score: 8  PT and/or OT Eval ordered: Yes  Post-acute Referrals Ordered: Yes  Post-acute Choice Obtained: Yes  Has referral(s) been sent to post-acute provider:  Yes      Anticipated Discharge Dispo: Discharge Disposition: D/T to SNF with Medicare cert in anticipation of skilled care (03)    DME Needed: Yes    DME Ordered: Yes    Action(s) Taken: Choice obtained, Referral(s) sent, and DME Face to Face     Escalations Completed: None    Medically Clear: No    Next Steps: Order received for W/C and bedside commode. Discussed with patient and her . Choice form completed and referrals sent.     Barriers to Discharge: DME    Is the patient up for discharge tomorrow: Yes    Is transport arranged for discharge disposition: No

## 2022-09-17 ENCOUNTER — PHARMACY VISIT (OUTPATIENT)
Dept: PHARMACY | Facility: MEDICAL CENTER | Age: 62
End: 2022-09-17
Payer: COMMERCIAL

## 2022-09-17 VITALS
SYSTOLIC BLOOD PRESSURE: 132 MMHG | RESPIRATION RATE: 18 BRPM | BODY MASS INDEX: 22.99 KG/M2 | HEART RATE: 80 BPM | DIASTOLIC BLOOD PRESSURE: 72 MMHG | HEIGHT: 65 IN | OXYGEN SATURATION: 100 % | TEMPERATURE: 96.6 F | WEIGHT: 138.01 LBS

## 2022-09-17 PROBLEM — N17.9 AKI (ACUTE KIDNEY INJURY) (HCC): Status: RESOLVED | Noted: 2022-08-23 | Resolved: 2022-09-17

## 2022-09-17 PROBLEM — E87.5 HYPERKALEMIA: Status: RESOLVED | Noted: 2022-09-13 | Resolved: 2022-09-17

## 2022-09-17 PROBLEM — J95.821 ACUTE POSTOPERATIVE RESPIRATORY FAILURE (HCC): Status: RESOLVED | Noted: 2022-08-26 | Resolved: 2022-09-17

## 2022-09-17 PROCEDURE — A9270 NON-COVERED ITEM OR SERVICE: HCPCS | Performed by: FAMILY MEDICINE

## 2022-09-17 PROCEDURE — A9270 NON-COVERED ITEM OR SERVICE: HCPCS | Performed by: HOSPITALIST

## 2022-09-17 PROCEDURE — 700111 HCHG RX REV CODE 636 W/ 250 OVERRIDE (IP): Performed by: HOSPITALIST

## 2022-09-17 PROCEDURE — 99239 HOSP IP/OBS DSCHRG MGMT >30: CPT | Performed by: STUDENT IN AN ORGANIZED HEALTH CARE EDUCATION/TRAINING PROGRAM

## 2022-09-17 PROCEDURE — 700102 HCHG RX REV CODE 250 W/ 637 OVERRIDE(OP): Performed by: FAMILY MEDICINE

## 2022-09-17 PROCEDURE — 700102 HCHG RX REV CODE 250 W/ 637 OVERRIDE(OP): Performed by: HOSPITALIST

## 2022-09-17 PROCEDURE — A9270 NON-COVERED ITEM OR SERVICE: HCPCS | Performed by: SURGERY

## 2022-09-17 PROCEDURE — 700102 HCHG RX REV CODE 250 W/ 637 OVERRIDE(OP): Performed by: SURGERY

## 2022-09-17 PROCEDURE — RXMED WILLOW AMBULATORY MEDICATION CHARGE: Performed by: STUDENT IN AN ORGANIZED HEALTH CARE EDUCATION/TRAINING PROGRAM

## 2022-09-17 RX ORDER — LISINOPRIL 10 MG/1
10 TABLET ORAL DAILY
Qty: 30 TABLET | Refills: 1 | Status: SHIPPED | OUTPATIENT
Start: 2022-09-17 | End: 2022-10-18 | Stop reason: SDUPTHER

## 2022-09-17 RX ORDER — PSEUDOEPHEDRINE HCL 30 MG
100 TABLET ORAL 2 TIMES DAILY
Qty: 60 CAPSULE | Refills: 0 | Status: SHIPPED | OUTPATIENT
Start: 2022-09-17 | End: 2022-10-18 | Stop reason: SDUPTHER

## 2022-09-17 RX ORDER — ACETAMINOPHEN 500 MG
1000 TABLET ORAL 3 TIMES DAILY
Qty: 30 TABLET | Refills: 0 | Status: SHIPPED | OUTPATIENT
Start: 2022-09-17

## 2022-09-17 RX ORDER — OXYCODONE HYDROCHLORIDE 5 MG/1
5 TABLET ORAL EVERY 8 HOURS PRN
Qty: 15 TABLET | Refills: 0 | Status: SHIPPED | OUTPATIENT
Start: 2022-09-17 | End: 2022-09-22

## 2022-09-17 RX ORDER — POLYETHYLENE GLYCOL 3350 17 G/17G
17 POWDER, FOR SOLUTION ORAL DAILY
Qty: 30 EACH | Refills: 0 | Status: SHIPPED | OUTPATIENT
Start: 2022-09-17 | End: 2023-10-09

## 2022-09-17 RX ADMIN — CLOPIDOGREL BISULFATE 75 MG: 75 TABLET ORAL at 04:52

## 2022-09-17 RX ADMIN — ACETAMINOPHEN 1000 MG: 500 TABLET ORAL at 07:39

## 2022-09-17 RX ADMIN — EZETIMIBE 10 MG: 10 TABLET ORAL at 04:53

## 2022-09-17 RX ADMIN — OXYCODONE 5 MG: 5 TABLET ORAL at 04:52

## 2022-09-17 RX ADMIN — PRAMIPEXOLE DIHYDROCHLORIDE 0.12 MG: 0.12 TABLET ORAL at 04:52

## 2022-09-17 RX ADMIN — PRAMIPEXOLE DIHYDROCHLORIDE 0.12 MG: 0.12 TABLET ORAL at 12:54

## 2022-09-17 RX ADMIN — HEPARIN SODIUM 5000 UNITS: 5000 INJECTION, SOLUTION INTRAVENOUS; SUBCUTANEOUS at 04:53

## 2022-09-17 RX ADMIN — DOCUSATE SODIUM 100 MG: 100 CAPSULE, LIQUID FILLED ORAL at 04:52

## 2022-09-17 RX ADMIN — ASPIRIN 81 MG 81 MG: 81 TABLET ORAL at 04:52

## 2022-09-17 RX ADMIN — POLYETHYLENE GLYCOL 3350 1 PACKET: 17 POWDER, FOR SOLUTION ORAL at 04:52

## 2022-09-17 RX ADMIN — METOPROLOL TARTRATE 12.5 MG: 25 TABLET, FILM COATED ORAL at 04:52

## 2022-09-17 RX ADMIN — HEPARIN SODIUM 5000 UNITS: 5000 INJECTION, SOLUTION INTRAVENOUS; SUBCUTANEOUS at 12:54

## 2022-09-17 NOTE — CARE PLAN
Problem: Knowledge Deficit - Standard  Goal: Patient and family/care givers will demonstrate understanding of plan of care, disease process/condition, diagnostic tests and medications  Outcome: Met     Problem: Skin Integrity  Goal: Skin integrity is maintained or improved  Outcome: Met     Problem: Fall Risk  Goal: Patient will remain free from falls  Outcome: Met     Problem: Psychosocial  Goal: Patient's level of anxiety will decrease  Outcome: Met  Goal: Patient's ability to verbalize feelings about condition will improve  Outcome: Met  Goal: Patient's ability to re-evaluate and adapt role responsibilities will improve  Outcome: Met  Goal: Patient and family will demonstrate ability to cope with life altering diagnosis and/or procedure  Outcome: Met  Goal: Spiritual and cultural needs incorporated into hospitalization  Outcome: Met     Problem: Communication  Goal: The ability to communicate needs accurately and effectively will improve  Outcome: Met     Problem: Discharge Barriers/Planning  Goal: Patient's continuum of care needs are met  Outcome: Met     Problem: Hemodynamics  Goal: Patient's hemodynamics, fluid balance and neurologic status will be stable or improve  Outcome: Met     Problem: Respiratory  Goal: Patient will achieve/maintain optimum respiratory ventilation and gas exchange  Outcome: Met     Problem: Fluid Volume  Goal: Fluid volume balance will be maintained  Outcome: Met     Problem: Nutrition  Goal: Patient's nutritional and fluid intake will be adequate or improve  Outcome: Met  Goal: Enteral nutrition will be maintained or improve  Outcome: Met  Goal: Enteral nutrition will be maintained or improve  Outcome: Met     Problem: Urinary Elimination  Goal: Establish and maintain regular urinary output  Outcome: Met     Problem: Bowel Elimination  Goal: Establish and maintain regular bowel function  Outcome: Met     Problem: Mobility  Goal: Patient's capacity to carry out activities will  improve  Outcome: Met     Problem: Self Care  Goal: Patient will have the ability to perform ADLs independently or with assistance (bathe, groom, dress, toilet and feed)  Outcome: Met     Problem: Infection - Standard  Goal: Patient will remain free from infection  Outcome: Met     Problem: Pain - Standard  Goal: Alleviation of pain or a reduction in pain to the patient’s comfort goal  Outcome: Met     Problem: Safety - Medical Restraint  Goal: Remains free of injury from restraints (Restraint for Interference with Medical Device)  Outcome: Met  Goal: Free from restraint(s) (Restraint for Interference with Medical Device)  Outcome: Met   The patient is Stable - Low risk of patient condition declining or worsening    Shift Goals  Clinical Goals: pain control  Patient Goals: comfort  Family Goals: AFIA    Progress made toward(s) clinical / shift goals:  YES      Patient is not progressing towards the following goals:

## 2022-09-17 NOTE — DISCHARGE PLANNING
Case Management Discharge Planning    Admission Date: 8/23/2022  GMLOS: 6.6  ALOS: 25    6-Clicks ADL Score: 11  6-Clicks Mobility Score: 8  PT and/or OT Eval ordered: Yes  Post-acute Referrals Ordered: Yes  Post-acute Choice Obtained: Yes  Has referral(s) been sent to post-acute provider:  Yes      Anticipated Discharge Dispo: Discharge Disposition: D/T to SNF with Medicare cert in anticipation of skilled care (03)    DME Needed: Yes    DME Ordered: Yes    Action(s) Taken: Choice obtained, Referral(s) sent, and DME Face to Face     Escalations Completed: None    Medically Clear: Yes    Next Steps: Call placed to [facility] regarding equipment delivery, DME scheduled to be delivered between 10-12am.   I spoke to the family yesterday and they will be able to provide the patient a ride home.    Barriers to Discharge: DME

## 2022-09-17 NOTE — CARE PLAN
The patient is Stable - Low risk of patient condition declining or worsening    Shift Goals  Clinical Goals: pain control  Patient Goals: comfort  Family Goals: AFIA    Progress made toward(s) clinical / shift goals:    Problem: Fall Risk  Goal: Patient will remain free from falls  Outcome: Progressing  Note: Bed locked and in lowest position. Treaded socks on. Pt educated to use call light when she wishes to get up.     Problem: Communication  Goal: The ability to communicate needs accurately and effectively will improve  Outcome: Progressing  Note: Pt has expressive aphasia. RN offers y/n choices to allow pt to gather her words. Alternatives to speaking, like writing have been utilized.        Patient is not progressing towards the following goals:

## 2022-09-17 NOTE — DISCHARGE SUMMARY
"Discharge Summary    CHIEF COMPLAINT ON ADMISSION  Chief Complaint   Patient presents with    Leg Pain     Patient reports bilateral leg pain \"for a while\". Patient poor historian, unable to answer most questions       Reason for Admission  Leg Pain      Admission Date  8/23/2022    CODE STATUS  Full Code    HPI & HOSPITAL COURSE  A 62-year-old woman with history of hypertension, hyperlipidemia, coronary artery disease status post stent to the LAD, history of CVA with mild residual deficits, peripheral arterial disease with chronic aortic occlusion and mural thrombus presented to the hospital on 8/23/2022 with lower extremity pain.    Patient was recently admitted to [facility] on July 3, 2022 and had 2 stents placed to LAD, subsequently to that she was in the hospital from 7/21-7/24 when she was sent by vascular surgeon due to escalating claudication symptoms. During that admission it was felt that vascular bypassing/repair of aorta was too high risk and she was discharged on Plavix, Xarelto and aspirin.    She represented here with ischemic leg pain. Labs at that time showed bicarb 14, BUN 73, serum creatinine 2.62 (1.31 a month prior), troponin T 66, INR 1.73.   CTA was done and showed occlusion of the abdominal aorta just below the level of renal arteries with occlusion of common and external iliac arteries with reconstitution of flow via epigastric collaterals as well as diffuse ectasia/aneurysm of descending thoracic aorta and upper abdominal aorta with prominent mural thrombus and atherosclerosis, diffusely small caliber bilateral lower extremity arteries with significant atherosclerosis of bilateral superficial femoral arteries without high-grade stenosis, likely single-vessel runoff of the right and two-vessel runoff on the left and focal severe stenosis of the bilateral renal artery origins.    EKG showed sinus rhythm and biatrial enlargement with an incomplete right bundle branch block and left " ventricular hypertrophy, as well as a prolonged QTC.    Vascular surgery was consulted and after discussion with patient and medical team she elected to undergo aorto-bifemoral bypass and bilateral left renal artery eversion endarterectomies.  Postoperative course was complicated by hypotension and evidence of hemorrhage that she was taken back to the operating room emergently for exploration where she underwent abdominal packing and ABThera placement, her abdominal wound was left open.  Postoperatively she was managed in the surgical ICU on 8/28 she was noted to not be moving her right side, for that she underwent head CT which showed patchy areas of ill-defined hypodensity in the left frontal and parietal lobe concerning for multifocal infarction.  Neurology felt that this was likely secondary to to artery to artery emboli from her recent aortic surgery and she was not a candidate for any stroke intervention, supportive care with therapy was recommended.  Post intubation (9/3) patient was noted to have right-sided deficits as well as expressive aphasia.  Ultimately she was restarted on aspirin and Plavix once she was cleared from surgical standpoint.   Echocardiogram was done as well which showed heart failure with reduced ejection fraction of 30% and anterior wall motion abnormalities.  She was treated with medical management.  Patient was evaluated by physical, occupational and speech therapy services who did recommend postacute care however patient was declined by skilled facilities secondary to her level of care as well as her lack of participation with therapy.  Patient improved from a medical standpoint in terms of her vitals and her labs.  Her abdominal incision was closed on 8/28/2022 and has healed quite well.  She has no ongoing wound care needs.  She does have ongoing therapy needs secondary to right-sided deficits and expressive aphasia after discussion with the patient and her family they have opted  to take her home and do therapy there.  Patient's  states that on her previous stroke he rehabbed her at home and is comfortable doing it again.  He was given referrals to outpatient therapy as well as provided with durable medical equipment in order to provide her safe care at home.  At the time of discharge patient is hemodynamically stable and her labs are stable she is awake alert and is comfortable with discharge plan.      Therefore, she is discharged in fair and stable condition to home with close outpatient follow-up.    The patient met 2-midnight criteria for an inpatient stay at the time of discharge.    Discharge Date  9/17/2022     FOLLOW UP ITEMS POST DISCHARGE  Patient is on aspirin and Plavix she was previously on anticoagulation given her severe vascular disease however this has been held during her admission.  He will need to be discussed with vascular surgery whether this should be resumed  -Blood pressure follow-up  -Follow-up on anemia to ensure stability and improvement  -Follow-up thrombocytosis  -Monitoring of renal function  -  therapy needs    DISCHARGE DIAGNOSES  Principal Problem:    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HCC) POA: Yes  Active Problems:    CAD (coronary artery disease) POA: Yes    Acute blood loss anemia POA: No    QT prolongation POA: Yes    Peripheral arterial occlusive disease (HCC) POA: Yes    Hypertension POA: Yes    Hydronephrosis POA: Yes    COPD (chronic obstructive pulmonary disease) (HCC) POA: Yes    Aortic thrombus (HCC) POA: Yes    Chronic systolic congestive heart failure (HCC) POA: Yes    Stroke (HCC) POA: Yes    Hypomagnesemia POA: No    Oropharyngeal dysphagia POA: Yes    CKD (chronic kidney disease) stage 3, GFR 30-59 ml/min (HCC) POA: Yes    Renal artery stenosis (HCC) POA: Yes    Postoperative hemorrhage involving circulatory system following circulatory system procedure POA: No    At risk for delirium POA: Yes    Cardiomyopathy (HCC) POA: Yes  Resolved Problems:     IVON (acute kidney injury) (HCC) POA: Yes    Lactic acidosis POA: No    Hypotension due to hypovolemia POA: No    Acute postoperative respiratory failure (HCC) POA: No    Hyperkalemia POA: Yes      FOLLOW UP  Future Appointments   Date Time Provider Department Center   9/21/2022  8:45 AM [facility] EXAM 6 [facility]     Brandyn Siu M.D.  [address]  [place] [id number]  [phone]    Schedule an appointment as soon as possible for a visit in 2 week(s)  call to schedule, follow up in 2 weeks    [facility]  [address] [place] [address] [address]  [place] [id number] [phone]  Call  referral to follow up with stroke bridge clinic, [facility] if you do not hear from them in the next 1 week, call to schedule      MEDICATIONS ON DISCHARGE     Medication List        START taking these medications        Instructions   acetaminophen 500 MG Tabs  Commonly known as: TYLENOL   Take 2 Tablets by mouth 3 times a day.  Dose: 1,000 mg     docusate sodium 100 MG Caps   Take 1 capsule by mouth 2 times a day.  Dose: 100 mg     metoprolol tartrate 25 MG Tabs  Commonly known as: LOPRESSOR   Take 0.5 Tablets by mouth 2 times a day.  Dose: 12.5 mg     oxyCODONE immediate-release 5 MG Tabs  Commonly known as: ROXICODONE   Take 1 Tablet by mouth every 8 hours as needed for Severe Pain for up to 5 days. Indications: Acute Pain  Dose: 5 mg     polyethylene glycol/lytes 17 g Pack  Commonly known as: MIRALAX   Take 1 Packet by mouth every day.  Dose: 17 g            CHANGE how you take these medications        Instructions   lisinopril 20 MG Tabs  What changed: how much to take  Commonly known as: PRINIVIL   Take 0.5 Tablets by mouth every day.  Dose: 10 mg            CONTINUE taking these medications        Instructions   aspirin EC 81 MG Tbec  Commonly known as: ECOTRIN   Take 81 mg by mouth every day.  Dose: 81 mg     atorvastatin 80 MG tablet  Commonly known as: LIPITOR   Take 80 mg by mouth  every evening.  Dose: 80 mg     clopidogrel 75 MG Tabs  Commonly known as: PLAVIX   Take 1 Tablet by mouth every day.  Dose: 75 mg     ezetimibe 10 MG Tabs  Commonly known as: ZETIA   Take 10 mg by mouth every day.  Dose: 10 mg     pramipexole 0.125 MG Tabs  Commonly known as: MIRAPEX   Take 0.125 mg by mouth 3 times a day.  Dose: 0.125 mg            STOP taking these medications      rivaroxaban 20 MG Tabs tablet  Commonly known as: XARELTO     Xarelto Starter Pack 15 & 20 MG Tbpk  Generic drug: Rivaroxaban              Allergies  Allergies   Allergen Reactions    Pcn [Penicillins] Vomiting and Nausea    Toradol Vomiting and Nausea       DIET  Orders Placed This Encounter   Procedures    Diet Order Diet: Level 6 - Soft and Bite Sized (FLOAT MEDS IN PUREE); Liquid level: Level 0 - Thin     Standing Status:   Standing     Number of Occurrences:   1     Order Specific Question:   Diet:     Answer:   Level 6 - Soft and Bite Sized [23]     Comments:   FLOAT MEDS IN PUREE     Order Specific Question:   Liquid level     Answer:   Level 0 - Thin       ACTIVITY  As tolerated.  Weight bearing as tolerated    CONSULTATIONS  Vascular surgery  Neurology  Physiatry  Critical care    PROCEDURES    PROCEDURE PERFORMED  8/26/2022  Bilateral renal artery eversion endarterectomies  Aortobifemoral bypass using 14 x 7 bifurcated dacryon    PROCEDURE PERFORMED -  8/26/2022  Emergent return to surgery for postoperative bleeding  Exploratory laparotomy  Control of postoperative bleeding  Abdominal packing and ABThera placement    PROCEDURE PERFORMED - 8/28/2022  Abdominal washout with removal of laparotomy pads  Delayed closure abdomen    LABORATORY  Lab Results   Component Value Date    SODIUM 139 09/16/2022    POTASSIUM 3.9 09/16/2022    CHLORIDE 107 09/16/2022    CO2 19 (L) 09/16/2022    GLUCOSE 88 09/16/2022    BUN 25 (H) 09/16/2022    CREATININE 1.52 (H) 09/16/2022        Lab Results   Component Value Date    WBC 8.5 09/16/2022     HEMOGLOBIN 7.9 (L) 09/16/2022    HEMATOCRIT 25.7 (L) 09/16/2022    PLATELETCT 611 (H) 09/16/2022        Total time of the discharge process exceeds 45 minutes.

## 2022-09-17 NOTE — PROGRESS NOTES
Sinus rhythm with prolonged QT interval. Rhythm strip 3 of 5 is showing bradycardia with HR of 46. Rhythm strip 4 of 5 is showing multifocal couplet. Strip 5 of 5 is showing PACs and one PVC.   HR 62, regular  LA 0.16  QRS 0.10  QT 0.47

## 2022-09-17 NOTE — DISCHARGE INSTRUCTIONS
Referral to outpatient physical therapy, Occupational Therapy and speech therapy have been made they will call to to discuss locations and scheduling  Referral to establish with primary care physician has been made difficult to set up scheduling  Referral to neurology stroke [facility] clinic has been made they will call to schedule if not their information is been provided below, please call if you do not hear anything in the next 1 week  Need to follow-up with vascular surgery in approximately 2 weeks, information has been provided below please call to schedule  Take medications as directed  Please follow speech's recommendations in terms of diet and consistency to avoid aspiration  If there are any concerns please seek medical attention  Please review your medication list as there have been changes

## 2022-09-20 ENCOUNTER — PATIENT OUTREACH (OUTPATIENT)
Dept: HEALTH INFORMATION MANAGEMENT | Facility: OTHER | Age: 62
End: 2022-09-20

## 2022-09-20 NOTE — PROGRESS NOTES
CHW Vinson called the patient to follow up. Pt's spouse Basilio answered the phone stating that the patient was unavailable.     Basilio reports that he has to go to [facility] today to pickup medication for himself and daughter. He reports that he is going to ask them to schedule the patient a HF while he is there. Per Basilio, the patient was previously established at this clinic.   Pt also has a new patient appointment on 10/18/2022 with Mavis Del Rio. Pt is aware of appt but there is no sooner appointments with [facility].     Pt has no reported barriers to housing, transportation, or food.   Pt is aware of M[employer] and receives SNAP benefits. Per Basilio, they receive $544/ month from [employer]. Basilio reports that at the end of the month, they run out of money. CHW introduced the [employer] and sent a text message with the information.     Basilio is waiting for a call back from the office of Dr Brandyn Siu. He states that he will try calling them today.     Patient has no questions at this time.     CHW will remove the patient due to all goals met.

## 2022-09-21 ENCOUNTER — TELEPHONE (OUTPATIENT)
Dept: SURGERY | Facility: MEDICAL CENTER | Age: 62
End: 2022-09-21
Payer: MEDICAID

## 2022-09-21 ENCOUNTER — HOSPITAL ENCOUNTER (OUTPATIENT)
Dept: RADIOLOGY | Facility: MEDICAL CENTER | Age: 62
End: 2022-09-21
Attending: SURGERY
Payer: MEDICAID

## 2022-10-05 ENCOUNTER — SPEECH THERAPY (OUTPATIENT)
Dept: SPEECH THERAPY | Facility: REHABILITATION | Age: 62
End: 2022-10-05
Attending: STUDENT IN AN ORGANIZED HEALTH CARE EDUCATION/TRAINING PROGRAM
Payer: MEDICAID

## 2022-10-05 DIAGNOSIS — I63.9 APHASIA DUE TO ACUTE CEREBROVASCULAR ACCIDENT (CVA) (HCC): ICD-10-CM

## 2022-10-05 DIAGNOSIS — R47.01 APHASIA DUE TO ACUTE CEREBROVASCULAR ACCIDENT (CVA) (HCC): ICD-10-CM

## 2022-10-05 PROCEDURE — 92523 SPEECH SOUND LANG COMPREHEN: CPT

## 2022-10-05 NOTE — OP THERAPY EVALUATION
"  Outpatient Speech Therapy  INITIAL EVALUATION    [facility] Speech [place] [address] [address]  [address]  [place] [address]  Phone:  [phone]  Fax:  [phone]    Date of Evaluation: 10/05/2022    Patient: Fouzia Santamaria  YOB: 1960  MRN: [id number]     Referring Provider: Nora Reyna M.D.  [address] [facility] [address]  [place] [id number]   Referring Diagnosis Cerebrovascular accident (CVA) due to thrombosis of precerebral artery (HCC) [I63.00]     Time Calculation    Start time: 1330  Stop time: 1426 Time Calculation (min): 56 minutes           Chief Complaint: Aphasia    Visit Diagnoses     ICD-10-CM   1. Aphasia due to acute cerebrovascular accident (CVA) (HCC)  I63.9    R47.01     Subjective:   Reason for Therapy:     Reason For Evaluation:  Aphasia and CVA    Onset Date:  8/28/2022  Social Support:     Accompanied By:  Spouse    Patient Mental Status:  Alert and Responsive  Additional Subjective Comments:      Patient is a 62 y.o. female with extensive medical history:     \"history of hypertension, hyperlipidemia, coronary artery disease status post stent to the LAD, history of CVA with mild residual deficits, peripheral arterial disease with chronic aortic occlusion and mural thrombus presented to the hospital on 8/23/2022 with lower extremity pain.    Patient was recently admitted to [facility] on July 3, 2022 and had 2 stents placed to LAD, subsequently to that she was in the hospital from 7/21-7/24 when she was sent by vascular surgeon due to escalating claudication symptoms. During that admission it was felt that vascular bypassing/repair of aorta was too high risk and she was discharged on Plavix, Xarelto and aspirin.     Postoperatively she was managed in the surgical ICU on 8/28 she was noted to not be moving her right side, for that she underwent head CT which showed patchy areas of ill-defined hypodensity in the left frontal and parietal lobe concerning for " "multifocal infarction.  Neurology felt that this was likely secondary to to artery to artery emboli from her recent aortic surgery and she was not a candidate for any stroke intervention, supportive care with therapy was recommended.  Post intubation (9/3) patient was noted to have right-sided deficits as well as expressive aphasia.  Ultimately she was restarted on aspirin and Plavix once she was cleared from surgical standpoint.\"    Patient presents to speech therapy today with significant difficulty speaking.  Her spouse provided most history, and reported he has been doing therapy with her at home.  He reported he has been helping her spell and speak.  Patient has been referred to outpatient speech therapy for a speech and language evaluation.  Past Medical History:   Diagnosis Date    Chronic obstructive pulmonary disease (HCC)     Diabetes (HCC)     Heart attack (HCC)     Hypertension     Stroke (HCC)      Past Surgical History:   Procedure Laterality Date    GA EXPLORATORY OF ABDOMEN  8/28/2022    Procedure: LAPAROTOMY, EXPLORATORY;  Surgeon: Brandyn Siu M.D.;  Location: [facility];  Service: General    AORTOBIFEM BYPASS Bilateral 8/26/2022    Procedure: CREATION, BYPASS, ARTERIAL, AORTA TO FEMORAL, BILATERAL;  Surgeon: Brandyn Siu M.D.;  Location: SURGERY [facility];  Service: General    ENDARTERECTOMY Bilateral 8/26/2022    Procedure: RENAL ARTERY ENDARTERECTOMY;  Surgeon: Brandyn Siu M.D.;  Location: [facility];  Service: General    GA EXPLORATORY OF ABDOMEN  8/26/2022    Procedure: LAPAROTOMY, EXPLORATORY; CONTROL OF POST-OPERATIVE BLEEDING;  Surgeon: Brandyn Siu M.D.;  Location: [facility];  Service: General    APPLICATION OR REPLACEMENT, WOUND VAC  8/26/2022    Procedure: APPLICATION OR REPLACEMENT, WOUND VAC;  Surgeon: Brandyn Siu M.D.;  Location: [facility];  Service: General    STENT PLACEMENT      THYROIDECTOMY       Objective:   Objective Details:  " The Western Aphasia Battery (WAB) was administered.  See results below:      1. SpontaniousSpeech 10/20    -Information Content 8/10  -Fluency, Grammatical Competence, Paraphasias  2/10    2. Auditory Verbal Comprehension 6.95/10  - Yes/No Questions 54/60   - Auditory Word Recognition 60/60   - Sequential Commands 25/80      3. Repetition 48/100  4.8/10     4.  Naming and Word Finding 2.9/10  - Object Naming 22/60   - Word Fluency 0/20   - Sentence Completion 4/10   - Responsive Speech 3/10      Aphasia Quotient:  49.3  Severe  The Aphasia Quotient is the essential summary value of the individuals aphasic deficits, and it is equal to the severity of aphasia regardless of the etiology or type.     Speech Therapy Assessment:     Expressive Language Assessment:     Communicates using gestures: Severe.    Ability to exhibit appropriate naming: Moderate.    Repeats speech accurately Moderate.    Patient's ability to use automatic language appropriately is Moderate.    Patient's ability to verbalize wants and needs is Moderate.    Patient's ability to sustain dialogues within a given topic is Severe.    Patient's ability to formulate complex/abstract language is Severe.    Expressive language comments: Expressive language is significantly impaired, with mostly single word utterances further impaired by hesitations and dysfluencies.  Repetition is also significantly impaired by dysfluencies.      Receptive Language Assessment:     Patient ability to identify body parts: Minimal    Patient ability to identify objects: Minimal (delayed response time, but accurate)    Patient ability to discriminate right and left: Minimal    Personal information yes/no questions: Minimal    Complex yes/no questions: Moderate    Patient follows 1 step simple commands: Minimal    Patient follows 1 step complex commands: Moderate    Patient follows 2 step complex commands: Severe    Patient understands simple conversation: Minimal    Patient  understands complex conversation: Moderate    Receptive language comments: Patient appears to follow simple conversation with appropriate responses of yes/no or single word answers.  Comprehension of more complex information such as multi-step directives is more impaired.  Speech Therapy Plan :   Prognosis & Recommendations  Impression Summary:  Patient presents with an Aphasia Quotient of 49.3 on the WAB, indicating Severe Aphasia.  Patient demonstrates significant word finding difficulties and fluency deficits with expressive language, as well as impaired comprehension of more complex auditory auditory information.  Patient would benefit from  skilled speech therapy to provide strategies for word finding and exercises for increased expressive and receptive language skills for greater independence within the home and community.  Patient verbalized understanding and agreement with current plan of care.    Prognosis:  Good  Goals  Short Term Goals:  1.  Patient will generate written message at the word/phrase level at 80% accuracy with mod cues.  2.  Patient will repeat syllable and word level stimuli with 80% accuracy mod cues.  3.  Patient will complete 1-2 step auditory comprehension and receptive identification tasks with 80% accuracy and mod assistance.    4.  Patient will label objects/picture with 70% accuracy with mod modeling and cuing.    Short Term Goal Duration (Weeks):  2-4 weeks  Long Term Goals:  Patient will improve speech-language skills and utilize compensatory strategies to communicate wants and needs effectively with different conversational partners, maintain safety and participate socially in functional living environment  with 90% accuracy.  Long Term Goal Duration (Weeks):  4-6 months  Potential barriers to Goal Achievement:  Transportation issue  Therapy Recommendations  Recommendation:  Individual Speech Therapy,  Planned Therapy Interventions:  Patient/Caregiver Education, Home Program and  Speech/Language training,   Frequency:  2x week  Duration (in visits):  8 (8 X [address]2507)  Duration (in weeks):  4      Referring provider co-signature:  I have reviewed this plan of care and my co-signature certifies the need for services.    Certification Period: 10/05/2022 to  11/02/22    Physician Signature: ________________________________ Date: ______________

## 2022-10-06 ENCOUNTER — PHYSICAL THERAPY (OUTPATIENT)
Dept: PHYSICAL THERAPY | Facility: REHABILITATION | Age: 62
End: 2022-10-06
Attending: STUDENT IN AN ORGANIZED HEALTH CARE EDUCATION/TRAINING PROGRAM
Payer: MEDICAID

## 2022-10-06 DIAGNOSIS — I69.30 LATE EFFECT OF STROKE: ICD-10-CM

## 2022-10-06 DIAGNOSIS — I63.00 CEREBROVASCULAR ACCIDENT (CVA) DUE TO THROMBOSIS OF PRECEREBRAL ARTERY (HCC): ICD-10-CM

## 2022-10-06 DIAGNOSIS — R53.1 WEAKNESS: ICD-10-CM

## 2022-10-06 PROCEDURE — 97162 PT EVAL MOD COMPLEX 30 MIN: CPT

## 2022-10-06 SDOH — ECONOMIC STABILITY: GENERAL: QUALITY OF LIFE: FAIR

## 2022-10-06 NOTE — OP THERAPY EVALUATION
Outpatient Physical Therapy  INITIAL NEUROLOGICAL EVALUATION    [facility] Physical Therapy [facility]  [address]  [address]  [place] [address]  Phone:  [phone]  Fax:  [phone]    Date of Evaluation: 10/06/2022    Patient: Fouzia Santamaria  YOB: 1960  MRN: [id number]     Referring Provider: Nora Reyna M.D.  [address]   [place] [id number]   Referring Diagnosis Cerebrovascular accident (CVA) due to thrombosis of precerebral artery (HCC) [I63.00]     Time Calculation    Start time: 1330  Stop time: 1412 Time Calculation (min): 42 minutes             Chief Complaint: Weakness and Loss Of Balance    Visit Diagnoses     ICD-10-CM   1. Cerebrovascular accident (CVA) due to thrombosis of precerebral artery (HCC)  I63.00   2. Late effect of stroke  I69.30   3. Weakness  R53.1       Subjective:   History of Present Illness:     Mechanism of injury:  Pt report history muliple MIs x 3 months ago, stents placed. Had further surgical intervention last month in which she had a stroke. Pt  also reports previous hx of strokes most recent x 7 years ago. Pt was using FWW for ambulation for long distance in the home pt would not use AD. With aute DC was unable to find SNF for placement. Since that time  has been primary caregiver.     Reports currently pt unable to stand without significant physical assistance. Currently spends 75% of the day in bed. Typically gets out of bed 2-3 times. States her legs feel better when shes laying down. Spends some time sitting on wc. States he did not receive any instruction in hospital in safe transfers, positioning, or HEP.    Lives in an RV with 2 tall steps to enter.  currently has to carry her in/out.  Also lives with 3 adult children in RV who help out when he is not around.     Requires max/total A for all mobility including propelling wc, toileting, dressing etc.  reports they typically utilize depends vs  "toileting and that he performs hygiene often.      Pt visibly uncomfortable during evaluation. Frequently grimacing, and leaning forward. Pt reports it was abdominal pain but states is somewhat normal. PT did encourage pt to seek urgent care over concerns of pt visibly discomfort.  however pt and  declined. Pt also reports Rt LE pain described as ache in the anterior thigh.    \"history of hypertension, hyperlipidemia, coronary artery disease status post stent to the LAD, history of CVA with mild residual deficits, peripheral arterial disease with chronic aortic occlusion and mural thrombus presented to the hospital on 2022 with lower extremity pain.    Patient was recently admitted to [facility] on July 3, 2022 and had 2 stents placed to LAD, subsequently to that she was in the hospital from - when she was sent by vascular surgeon due to escalating claudication symptoms. During that admission it was felt that vascular bypassing/repair of aorta was too high risk and she was discharged on Plavix, Xarelto and aspirin.     Postoperatively she was managed in the surgical ICU on  she was noted to not be moving her right side, for that she underwent head CT which showed patchy areas of ill-defined hypodensity in the left frontal and parietal lobe concerning for multifocal infarction.  Neurology felt that this was likely secondary to to artery to artery emboli from her recent aortic surgery and she was not a candidate for any stroke intervention, supportive care with therapy was recommended.  Post intubation (9/3) patient was noted to have right-sided deficits as well as expressive aphasia.  Ultimately she was restarted on aspirin and Plavix once she was cleared from surgical standpoint.\"  Quality of life:  Fair  Prior level of function:  FWW for distances, no AD in home  Pain:     Current pain ratin  Patient Goals:     Patient goals for therapy:  Increased strength and increased motion    Other " patient goals:  Improve mobility    Past Medical History:   Diagnosis Date    Chronic obstructive pulmonary disease (HCC)     Diabetes (HCC)     Heart attack (HCC)     Hypertension     Stroke (HCC)      Past Surgical History:   Procedure Laterality Date    VT EXPLORATORY OF ABDOMEN  8/28/2022    Procedure: LAPAROTOMY, EXPLORATORY;  Surgeon: Brandyn Siu M.D.;  Location: SURGERY [facility];  Service: General    AORTOBIFEM BYPASS Bilateral 8/26/2022    Procedure: CREATION, BYPASS, ARTERIAL, AORTA TO FEMORAL, BILATERAL;  Surgeon: Brandyn Siu M.D.;  Location: SURGERY [facility];  Service: General    ENDARTERECTOMY Bilateral 8/26/2022    Procedure: RENAL ARTERY ENDARTERECTOMY;  Surgeon: Brandyn Siu M.D.;  Location: SURGERY [facility];  Service: General    VT EXPLORATORY OF ABDOMEN  8/26/2022    Procedure: LAPAROTOMY, EXPLORATORY; CONTROL OF POST-OPERATIVE BLEEDING;  Surgeon: Brandyn Siu M.D.;  Location: SUR[facility]ERY [facility];  Service: General    APPLICATION OR REPLACEMENT, WOUND VAC  8/26/2022    Procedure: APPLICATION OR REPLACEMENT, WOUND VAC;  Surgeon: Brandyn Siu M.D.;  Location: SUR[facility]ERY [facility];  Service: General    STENT PLACEMENT      THYROIDECTOMY       Social History     Tobacco Use    Smoking status: Every Day     Packs/day: 1.50     Types: Cigarettes    Smokeless tobacco: Never   Substance Use Topics    Alcohol use: No     Family and Occupational History     Socioeconomic History    Marital status:      Spouse name: Not on file    Number of children: Not on file    Years of education: Not on file    Highest education level: Not on file   Occupational History    Not on file       Objective:   Active Range of Motion:   Lower extremity (left):     All left lower extremity active range of motion: All within functional limits  Lower extremity (right):     All right lower extremity active range of motion: All below functional limits      Passive Range of Motion:     Passive Range of Motion  Comments:  Pt lacks approx 10 deg full ext on Rt knee and lacks 5 deg from neutral ankle DF. Complained of pain with stretching     Strength:   Lower extremity (left):     Hip flexion: 4-    Hip abduction: 4-    Knee flexion: 3+    Knee extension: 4    Ankle dorsiflexion: 4+    Ankle plantar flexion: 4-  Lower extremity (right):     Hip flexion: 2-    Knee flexion: 1    Knee extension: 1    Ankle dorsiflexion: 1    Ankle plantar flexion: 0    Strength Comments:  Assessed abdominal incision well healed with no signs of infection.    Pt arrives in wc sitting on a pillow. She is not wearing shoes showing significant over growth of her toenails as well as visible dirt on hands/feet/nails. Pt skin on her feet has a purple hue but is blanchable with capillary refill at both big toes 2 sec. Her face skin has a yellow hue to it and she is visibly uncomfortable.    Activities of Daily Living:   Transfers/Mobility:     Bed/chair transfers: maximum assist (per subjective)    Sit to stand: maximum assist (per subjective)    Sit to supine: maximum assist (per subjective)    Transfer mobility comments:  Unable to assess pt tranfers for pt safety due to lack of footwear.  verbalized understanding at need for footwear.       Therapeutic Exercises (CPT 29549):       Therapeutic Exercise Summary: HEP: B HS stretching 5 x 30 sec, B gastroc stretching 5 x 30 sec. Hip PROM    Education for bed sore assessment including nonblanchable skin and to contact MD immediately. Encouraged appropriate fluid intake. Cont to encourage urgent care for pt abdominal pain    Time-based treatments/modalities:           Assessment, Response and Plan:   Impairments: abnormal or restricted ROM, activity intolerance, impaired functional mobility, impaired balance, impaired physical strength, lacks appropriate home exercise program and pain with function    Assessment details:  Fouzia presents for PT evaluation due to severe physical limitations  secondary to CVA x approx 8 weeks ago. During evaluation objective limited by pt lack of footwear for safety therefore mobility assessment to be completed next visit. Pt has trace movement on Rt LE and is currently dependent on  for all mobility resulting in prolonged periods of sitting in wc or laying in bed. Pt living situation further complicates her access to opportunities for mobility with  currently relying on briefs for toileting due to restrictions of the RV. Pt social situation will likely greatly impact the POC and may impact pt follow ups due to lack of reliable transportation. POC to emphasize family training and attempt to improve strength and ability to transfer to dec reliance on caregiver, maximize function, and minimize skin breakdown. Will benefit from 2 x 10 weeks.   Barriers to therapy:  Comorbidities, transportation and family circumstances  Prognosis: fair    Prognosis details:  See above/assessment   Goals:   Short Term Goals:   1. Caregiver will demonstrate independence with HEP for improving strength and dec risk of contracture.  2. Caregiver will demonstrate good body mechanics without cueing while performing appropriate wc<>bed transfer and bed mobility.  3. Pt will perform appropriate wc<>bed transfer mod A.   4. Pt will perform sit<>supine and rolling bed mobility mod A to dec risk of skin break down.       Long Term Goals:    1. Pt will demonstrate ability to roll in bed with SPV to dec skin breakdown.  2. Pt will complete appropriate wc<> bed transfer with min A to dec caregiver burden.  3. Pt will demonstrate fair standing balance with ability to stand x 1 min CGA to assist caregiver during ADLs.    Plan:   Therapy options:  Physical therapy treatment to continue  Planned therapy interventions:  Therapeutic Activities (CPT 69174) and Neuromuscular Re-education (CPT 65185)  Frequency:  2x week  Duration in weeks:  10  Discussed with:  Patient  Plan details:  [facility] [address] x 2  units, TherAct 97530 x 2 units    Functional Assessment Used          Referring provider co-signature:  I have reviewed this plan of care and my co-signature certifies the need for services.    Certification Period: 10/06/2022 to  12/16/22    Physician Signature: ________________________________ Date: ______________

## 2022-10-07 ASSESSMENT — ACTIVITIES OF DAILY LIVING (ADL): POOR_BALANCE: 1

## 2022-10-07 ASSESSMENT — ENCOUNTER SYMPTOMS: PAIN SCALE: 5

## 2022-10-18 ENCOUNTER — OFFICE VISIT (OUTPATIENT)
Dept: MEDICAL GROUP | Facility: MEDICAL CENTER | Age: 62
End: 2022-10-18
Attending: STUDENT IN AN ORGANIZED HEALTH CARE EDUCATION/TRAINING PROGRAM
Payer: MEDICAID

## 2022-10-18 VITALS
TEMPERATURE: 97.5 F | BODY MASS INDEX: 16.66 KG/M2 | DIASTOLIC BLOOD PRESSURE: 70 MMHG | OXYGEN SATURATION: 98 % | RESPIRATION RATE: 14 BRPM | HEART RATE: 83 BPM | HEIGHT: 65 IN | SYSTOLIC BLOOD PRESSURE: 96 MMHG | WEIGHT: 100 LBS

## 2022-10-18 DIAGNOSIS — I50.22 CHRONIC SYSTOLIC CONGESTIVE HEART FAILURE (HCC): ICD-10-CM

## 2022-10-18 DIAGNOSIS — Z76.89 ENCOUNTER TO ESTABLISH CARE: ICD-10-CM

## 2022-10-18 DIAGNOSIS — G89.18 POST-OPERATIVE PAIN: ICD-10-CM

## 2022-10-18 DIAGNOSIS — N18.32 STAGE 3B CHRONIC KIDNEY DISEASE: ICD-10-CM

## 2022-10-18 DIAGNOSIS — F32.9 REACTIVE DEPRESSION: ICD-10-CM

## 2022-10-18 DIAGNOSIS — I63.9 CEREBROVASCULAR ACCIDENT (CVA), UNSPECIFIED MECHANISM (HCC): ICD-10-CM

## 2022-10-18 DIAGNOSIS — I42.9 CARDIOMYOPATHY, UNSPECIFIED TYPE (HCC): ICD-10-CM

## 2022-10-18 DIAGNOSIS — I25.10 CORONARY ARTERY DISEASE INVOLVING NATIVE CORONARY ARTERY OF NATIVE HEART WITHOUT ANGINA PECTORIS: ICD-10-CM

## 2022-10-18 PROCEDURE — 99204 OFFICE O/P NEW MOD 45 MIN: CPT | Performed by: NURSE PRACTITIONER

## 2022-10-18 PROCEDURE — 99213 OFFICE O/P EST LOW 20 MIN: CPT | Performed by: NURSE PRACTITIONER

## 2022-10-18 PROCEDURE — 99203 OFFICE O/P NEW LOW 30 MIN: CPT | Performed by: NURSE PRACTITIONER

## 2022-10-18 RX ORDER — PSEUDOEPHEDRINE HCL 30 MG
100 TABLET ORAL 2 TIMES DAILY
Qty: 60 CAPSULE | Refills: 0 | Status: SHIPPED | OUTPATIENT
Start: 2022-10-18 | End: 2023-03-14

## 2022-10-18 RX ORDER — EZETIMIBE 10 MG/1
10 TABLET ORAL DAILY
Qty: 30 TABLET | Refills: 1 | Status: SHIPPED | OUTPATIENT
Start: 2022-10-18 | End: 2023-01-10

## 2022-10-18 RX ORDER — LISINOPRIL 10 MG/1
10 TABLET ORAL DAILY
Qty: 30 TABLET | Refills: 1 | Status: SHIPPED | OUTPATIENT
Start: 2022-10-18 | End: 2022-10-31

## 2022-10-18 RX ORDER — CLOPIDOGREL BISULFATE 75 MG/1
75 TABLET ORAL DAILY
Qty: 30 TABLET | Refills: 3 | Status: SHIPPED | OUTPATIENT
Start: 2022-10-18 | End: 2023-11-21

## 2022-10-18 ASSESSMENT — FIBROSIS 4 INDEX: FIB4 SCORE: 1.18

## 2022-10-19 PROBLEM — F32.9 REACTIVE DEPRESSION: Status: ACTIVE | Noted: 2022-10-19

## 2022-10-19 PROBLEM — Z76.89 ENCOUNTER TO ESTABLISH CARE: Status: ACTIVE | Noted: 2022-10-19

## 2022-10-19 NOTE — PROGRESS NOTES
"Chief Complaint   Patient presents with    Establish Care       Subjective:     HPI:   Fouzia Santamaria is a 62 y.o. female here to discuss the evaluation and management of:        Problem   Encounter to Establish Care    Patient here to establish care.  Patient was recently in the hospital for over a month secondary to several blood clots in need of multiple surgeries including: aorto-bifemoral bypass and bilateral left renal artery eversion endarterectomies.  Patient surgery was complicated \"by hypotension and evidence of hemorrhage that she was taken back to the operating room emergently for exploration where she underwent abdominal packing and ABThera placement, her abdominal wound was left open.\"  Patient spent over a month hospital and became very debilitated. Patient recovery was further complicated by: \"head CT which showed patchy areas of ill-defined hypodensity in the left frontal and parietal lobe concerning for multifocal infarction.  Neurology felt that this was likely secondary to to artery to artery emboli from her recent aortic surgery\".  Patient was recommended to transition to skilled nursing facility for continued rehab secondary to her CVA after recovery from her surgical interventions.  Unfortunately insurance was not covering this and her family elected to take her home and try to rehab her at home.  Patient now presents today for establishing primary care and medication refills.  Patient states she has seen cardiology and is established with them.  Patient is not established with nephrology, but was noted to have chronic kidney disease on her problem list and elevated creatinine in the hospital with decreased GFR.      Reactive Depression    Patient very emotional in clinic today.  Patient has been through a lot significantly medically wise with recent hospitalization and CVA.  Patient noted to cry sporadically throughout the appointment and states she is very depressed secondary to " everything going on.  Would like to talk to psychiatrist.          ROS  See HPI       Allergies   Allergen Reactions    Pcn [Penicillins] Vomiting and Nausea    Toradol Vomiting and Nausea       Current medicines (including changes today)  Current Outpatient Medications   Medication Sig Dispense Refill    aspirin EC (ECOTRIN) 81 MG Tablet Delayed Response Take 1 Tablet by mouth every day. 30 Tablet 1    clopidogrel (PLAVIX) 75 MG Tab Take 1 Tablet by mouth every day. 30 Tablet 3    docusate sodium 100 MG Cap Take 1 capsule by mouth 2 times a day. 60 Capsule 0    ezetimibe (ZETIA) 10 MG Tab Take 1 Tablet by mouth every day. 30 Tablet 1    lisinopril (PRINIVIL) 10 MG Tab Take 1 Tablet by mouth every day. 30 Tablet 1    acetaminophen (TYLENOL) 500 MG Tab Take 2 Tablets by mouth 3 times a day. 30 Tablet 0    metoprolol tartrate (LOPRESSOR) 25 MG Tab Take 0.5 Tablets by mouth 2 times a day. 60 Tablet 1    polyethylene glycol/lytes (MIRALAX) 17 g Pack Mix and drink 1 Packet by mouth every day. 30 Each 0    atorvastatin (LIPITOR) 80 MG tablet Take 80 mg by mouth every evening.      pramipexole (MIRAPEX) 0.125 MG Tab Take 0.125 mg by mouth 3 times a day.       No current facility-administered medications for this visit.       Social History     Tobacco Use    Smoking status: Every Day     Packs/day: 2.00     Years: 48.00     Pack years: 96.00     Types: Cigarettes    Smokeless tobacco: Never   Vaping Use    Vaping Use: Never used   Substance Use Topics    Alcohol use: No    Drug use: Yes     Types: Marijuana, Inhaled       Patient Active Problem List    Diagnosis Date Noted    Encounter to establish care 10/19/2022    Reactive depression 10/19/2022    CKD (chronic kidney disease) stage 3, GFR 30-59 ml/min (HCC) 09/13/2022    Renal artery stenosis (HCC) 09/13/2022    Postoperative hemorrhage involving circulatory system following circulatory system procedure 09/13/2022    At risk for delirium 09/13/2022    Cardiomyopathy  "([facility]) 09/13/2022    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HCC) 09/04/2022    Oropharyngeal dysphagia 09/01/2022    Hypomagnesemia 08/29/2022    Stroke ([facility])     Chronic systolic congestive heart failure ([facility]) 08/25/2022    Hydronephrosis 08/24/2022    COPD (chronic obstructive pulmonary disease) ([facility]) 08/24/2022    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 No pertinent family history.       Objective:     BP (!) 96/70 (BP Location: Left arm, Patient Position: Sitting, BP Cuff Size: Adult)   Pulse 83   Temp 36.4 °C (97.5 °F) (Skin)   Resp 14   Ht 1.651 m (5' 5\")   Wt 45.4 kg (100 lb)   SpO2 98%  Body mass index is 16.64 kg/m².    Physical Exam:  Physical Exam  Vitals reviewed.   Constitutional:       General: She is awake.      Appearance: She is well-developed. She is cachectic. She is ill-appearing.      Comments: In wheelchair    HENT:      Head: Normocephalic.   Eyes:      Conjunctiva/sclera: Conjunctivae normal.   Cardiovascular:      Rate and Rhythm: Normal rate.      Comments: Lower extremities discolored   Pulmonary:      Effort: Pulmonary effort is normal. No respiratory distress.   Musculoskeletal:      Cervical back: Neck supple.   Skin:     General: Skin is warm and dry.   Neurological:      Mental Status: She is alert and oriented to person, place, and time.      Cranial Nerves: Dysarthria present.   Psychiatric:         Mood and Affect: Mood is depressed. Affect is labile and tearful.         Behavior: Behavior normal. Behavior is cooperative.       Assessment and Plan:     The following treatment plan was discussed:    Problem List Items Addressed This Visit       CAD (coronary artery disease)    Relevant Medications    aspirin EC (ECOTRIN) 81 MG " Tablet Delayed Response    clopidogrel (PLAVIX) 75 MG Tab    ezetimibe (ZETIA) 10 MG Tab    lisinopril (PRINIVIL) 10 MG Tab    Chronic systolic congestive heart failure (HCC)    Relevant Medications    ezetimibe (ZETIA) 10 MG Tab    lisinopril (PRINIVIL) 10 MG Tab    Stroke (HCC)    Relevant Medications    ezetimibe (ZETIA) 10 MG Tab    lisinopril (PRINIVIL) 10 MG Tab    CKD (chronic kidney disease) stage 3, GFR 30-59 ml/min (HCC)    Relevant Orders    Referral to Nephrology    Cardiomyopathy (HCC)    Relevant Medications    docusate sodium 100 MG Cap    ezetimibe (ZETIA) 10 MG Tab    lisinopril (PRINIVIL) 10 MG Tab    Encounter to establish care     Discussed health history and maintenance   Flu vaccine - Declined   Colon Ca screening - Referral to GI for Colonoscopy-we will place referral at next appointment as patient states she is not ready for this just yet  Mammogram- Ordered   Preventative screening labs to be ordered at next visit as patient is just freshly out of the hospital.  Refill medications from hospital and went over their usage  Referral to nephrology placed given patient's CKD noted in chart and previous labs showing decreased GFR with increased creatinine             Reactive depression     Ongoing-  Referral to psychiatry to help with medication for possible situational depression         Relevant Orders    Referral to Psychiatry     Other Visit Diagnoses       Post-operative pain        Relevant Medications    docusate sodium 100 MG Cap            Any change or worsening of signs or symptoms, patient encouraged to follow-up or report to emergency room for further evaluation. Patient verbalizes understanding and agrees.    Follow-Up: Return in about 1 month (around 11/18/2022).      PLEASE NOTE: This dictation was created using voice recognition software. I have made every reasonable attempt to correct obvious errors, but I expect that there are errors of grammar and possibly content that I did  not discover before finalizing the note.

## 2022-10-19 NOTE — ASSESSMENT & PLAN NOTE
Discussed health history and maintenance   Flu vaccine - Declined   Colon Ca screening - Referral to GI for Colonoscopy-we will place referral at next appointment as patient states she is not ready for this just yet  Mammogram- Ordered   Preventative screening labs to be ordered at next visit as patient is just freshly out of the hospital.  Refill medications from hospital and went over their usage  Referral to nephrology placed given patient's CKD noted in chart and previous labs showing decreased GFR with increased creatinine

## 2022-10-26 DIAGNOSIS — I50.22 CHRONIC SYSTOLIC CONGESTIVE HEART FAILURE (HCC): ICD-10-CM

## 2022-10-27 ENCOUNTER — APPOINTMENT (OUTPATIENT)
Dept: OCCUPATIONAL THERAPY | Facility: REHABILITATION | Age: 62
End: 2022-10-27
Payer: MEDICAID

## 2022-10-27 NOTE — TELEPHONE ENCOUNTER
Received request via: Pharmacy    Was the patient seen in the last year in this department? Yes    Does the patient have an active prescription (recently filled or refills available) for medication(s) requested? No    Insurance is asking for 90 day rx to show change

## 2022-10-31 ENCOUNTER — APPOINTMENT (OUTPATIENT)
Dept: OCCUPATIONAL THERAPY | Facility: REHABILITATION | Age: 62
End: 2022-10-31
Attending: STUDENT IN AN ORGANIZED HEALTH CARE EDUCATION/TRAINING PROGRAM
Payer: MEDICAID

## 2022-10-31 ENCOUNTER — APPOINTMENT (OUTPATIENT)
Dept: OCCUPATIONAL THERAPY | Facility: REHABILITATION | Age: 62
End: 2022-10-31
Payer: MEDICAID

## 2022-10-31 RX ORDER — LISINOPRIL 10 MG/1
10 TABLET ORAL DAILY
Qty: 90 TABLET | Refills: 0 | Status: SHIPPED | OUTPATIENT
Start: 2022-10-31 | End: 2023-02-07

## 2022-11-02 ENCOUNTER — APPOINTMENT (OUTPATIENT)
Dept: OCCUPATIONAL THERAPY | Facility: REHABILITATION | Age: 62
End: 2022-11-02
Payer: MEDICAID

## 2022-11-02 ENCOUNTER — APPOINTMENT (OUTPATIENT)
Dept: OCCUPATIONAL THERAPY | Facility: REHABILITATION | Age: 62
End: 2022-11-02
Attending: STUDENT IN AN ORGANIZED HEALTH CARE EDUCATION/TRAINING PROGRAM
Payer: MEDICAID

## 2022-11-03 ENCOUNTER — TELEPHONE (OUTPATIENT)
Dept: SPEECH THERAPY | Facility: REHABILITATION | Age: 62
End: 2022-11-03
Payer: MEDICAID

## 2022-11-03 NOTE — OP THERAPY DISCHARGE SUMMARY
Outpatient Speech Therapy  DISCHARGE SUMMARY NOTE      [facility] Speech Therapy [facility]  [address]  [address]  [place] [address]  Phone:  [phone]  Fax:  [phone]    Date of Visit: 11/03/2022    Patient: Fouzia Santamaria  YOB: 1960  MRN: [id number]     Referring Provider: Nora Reyna M.D.  [facility] [place] [address]   Referring Diagnosis Cerebrovascular accident (CVA) due to thrombosis of precerebral artery (HCC) [I63.00]       Patient was seen for speech therapy for initial evaluation only on 10/5/22.  Patient has been no-show for two appointments since that date, and is therefore being discharged at this time.  If Patient wishes to resume therapy, she may obtain a new referral from her physician.

## 2022-11-07 ENCOUNTER — APPOINTMENT (OUTPATIENT)
Dept: OCCUPATIONAL THERAPY | Facility: REHABILITATION | Age: 62
End: 2022-11-07
Payer: MEDICAID

## 2022-11-07 ENCOUNTER — APPOINTMENT (OUTPATIENT)
Dept: OCCUPATIONAL THERAPY | Facility: REHABILITATION | Age: 62
End: 2022-11-07
Attending: STUDENT IN AN ORGANIZED HEALTH CARE EDUCATION/TRAINING PROGRAM
Payer: MEDICAID

## 2022-11-07 ENCOUNTER — APPOINTMENT (OUTPATIENT)
Dept: PHYSICAL THERAPY | Facility: REHABILITATION | Age: 62
End: 2022-11-07
Attending: STUDENT IN AN ORGANIZED HEALTH CARE EDUCATION/TRAINING PROGRAM
Payer: MEDICAID

## 2022-11-08 ENCOUNTER — APPOINTMENT (OUTPATIENT)
Dept: NEPHROLOGY | Facility: MEDICAL CENTER | Age: 62
End: 2022-11-08
Payer: MEDICAID

## 2022-11-08 ENCOUNTER — APPOINTMENT (OUTPATIENT)
Dept: SPEECH THERAPY | Facility: REHABILITATION | Age: 62
End: 2022-11-08
Attending: STUDENT IN AN ORGANIZED HEALTH CARE EDUCATION/TRAINING PROGRAM
Payer: MEDICAID

## 2022-11-08 RX ORDER — RIVAROXABAN 15 MG-20MG
KIT ORAL
COMMUNITY
End: 2023-10-09

## 2022-11-08 RX ORDER — POLYETHYLENE GLYCOL 3350 17 G/17G
POWDER, FOR SOLUTION ORAL
COMMUNITY
End: 2023-10-09

## 2022-11-08 RX ORDER — LISINOPRIL 20 MG/1
TABLET ORAL
COMMUNITY
End: 2023-01-04

## 2022-11-08 RX ORDER — OXYCODONE HYDROCHLORIDE 5 MG/1
TABLET ORAL
COMMUNITY
End: 2023-01-04

## 2022-11-08 RX ORDER — ASPIRIN 81 MG/1
81 TABLET ORAL DAILY
COMMUNITY

## 2022-11-09 ENCOUNTER — APPOINTMENT (OUTPATIENT)
Dept: OCCUPATIONAL THERAPY | Facility: REHABILITATION | Age: 62
End: 2022-11-09
Payer: MEDICAID

## 2022-11-09 ENCOUNTER — APPOINTMENT (OUTPATIENT)
Dept: OCCUPATIONAL THERAPY | Facility: REHABILITATION | Age: 62
End: 2022-11-09
Attending: STUDENT IN AN ORGANIZED HEALTH CARE EDUCATION/TRAINING PROGRAM
Payer: MEDICAID

## 2022-11-10 ENCOUNTER — APPOINTMENT (OUTPATIENT)
Dept: SPEECH THERAPY | Facility: REHABILITATION | Age: 62
End: 2022-11-10
Attending: STUDENT IN AN ORGANIZED HEALTH CARE EDUCATION/TRAINING PROGRAM
Payer: MEDICAID

## 2022-11-14 ENCOUNTER — APPOINTMENT (OUTPATIENT)
Dept: OCCUPATIONAL THERAPY | Facility: REHABILITATION | Age: 62
End: 2022-11-14
Payer: MEDICAID

## 2022-11-15 ENCOUNTER — APPOINTMENT (OUTPATIENT)
Dept: SPEECH THERAPY | Facility: REHABILITATION | Age: 62
End: 2022-11-15
Attending: STUDENT IN AN ORGANIZED HEALTH CARE EDUCATION/TRAINING PROGRAM
Payer: MEDICAID

## 2022-11-15 ENCOUNTER — APPOINTMENT (OUTPATIENT)
Dept: OCCUPATIONAL THERAPY | Facility: REHABILITATION | Age: 62
End: 2022-11-15
Attending: STUDENT IN AN ORGANIZED HEALTH CARE EDUCATION/TRAINING PROGRAM
Payer: MEDICAID

## 2022-11-16 ENCOUNTER — APPOINTMENT (OUTPATIENT)
Dept: OCCUPATIONAL THERAPY | Facility: REHABILITATION | Age: 62
End: 2022-11-16
Payer: MEDICAID

## 2022-11-17 ENCOUNTER — APPOINTMENT (OUTPATIENT)
Dept: PHYSICAL THERAPY | Facility: REHABILITATION | Age: 62
End: 2022-11-17
Attending: STUDENT IN AN ORGANIZED HEALTH CARE EDUCATION/TRAINING PROGRAM
Payer: MEDICAID

## 2022-11-17 ENCOUNTER — APPOINTMENT (OUTPATIENT)
Dept: OCCUPATIONAL THERAPY | Facility: REHABILITATION | Age: 62
End: 2022-11-17
Attending: STUDENT IN AN ORGANIZED HEALTH CARE EDUCATION/TRAINING PROGRAM
Payer: MEDICAID

## 2022-11-17 ENCOUNTER — APPOINTMENT (OUTPATIENT)
Dept: SPEECH THERAPY | Facility: REHABILITATION | Age: 62
End: 2022-11-17
Attending: STUDENT IN AN ORGANIZED HEALTH CARE EDUCATION/TRAINING PROGRAM
Payer: MEDICAID

## 2022-11-21 ENCOUNTER — APPOINTMENT (OUTPATIENT)
Dept: OCCUPATIONAL THERAPY | Facility: REHABILITATION | Age: 62
End: 2022-11-21
Payer: MEDICAID

## 2022-11-22 ENCOUNTER — APPOINTMENT (OUTPATIENT)
Dept: SPEECH THERAPY | Facility: REHABILITATION | Age: 62
End: 2022-11-22
Attending: STUDENT IN AN ORGANIZED HEALTH CARE EDUCATION/TRAINING PROGRAM
Payer: MEDICAID

## 2022-11-22 ENCOUNTER — APPOINTMENT (OUTPATIENT)
Dept: OCCUPATIONAL THERAPY | Facility: REHABILITATION | Age: 62
End: 2022-11-22
Attending: STUDENT IN AN ORGANIZED HEALTH CARE EDUCATION/TRAINING PROGRAM
Payer: MEDICAID

## 2022-11-23 ENCOUNTER — APPOINTMENT (OUTPATIENT)
Dept: OCCUPATIONAL THERAPY | Facility: REHABILITATION | Age: 62
End: 2022-11-23
Payer: MEDICAID

## 2022-11-28 ENCOUNTER — APPOINTMENT (OUTPATIENT)
Dept: SPEECH THERAPY | Facility: REHABILITATION | Age: 62
End: 2022-11-28
Attending: STUDENT IN AN ORGANIZED HEALTH CARE EDUCATION/TRAINING PROGRAM
Payer: MEDICAID

## 2022-11-28 ENCOUNTER — APPOINTMENT (OUTPATIENT)
Dept: OCCUPATIONAL THERAPY | Facility: REHABILITATION | Age: 62
End: 2022-11-28
Attending: STUDENT IN AN ORGANIZED HEALTH CARE EDUCATION/TRAINING PROGRAM
Payer: MEDICAID

## 2022-11-28 ENCOUNTER — APPOINTMENT (OUTPATIENT)
Dept: PHYSICAL THERAPY | Facility: REHABILITATION | Age: 62
End: 2022-11-28
Attending: STUDENT IN AN ORGANIZED HEALTH CARE EDUCATION/TRAINING PROGRAM
Payer: MEDICAID

## 2022-11-30 ENCOUNTER — TELEPHONE (OUTPATIENT)
Dept: NEUROLOGY | Facility: MEDICAL CENTER | Age: 62
End: 2022-11-30
Payer: MEDICAID

## 2022-11-30 NOTE — TELEPHONE ENCOUNTER
[facility]? Yes    Is visit type and length correct?  Yes    Is referral attached to visit? Yes    Were records received from referring provider? Yes, referring provider is a [facility] so [facility] in [facility].     Patient was not contacted to have someone accompany them to visit?    Is this appointment scheduled as a Hospital Follow-Up?  Yes    Does the patient require any pre procedure or post procedure follow up? No    If any orders were placed at last visit or intended to be done for this visit do we have Results/Consult Notes? Yes  Labs -  Recent labs are in [facility].   Imaging - Recent images are in Epic.   Referrals - No referrals were ordered at last office visit.        10.  If patient appointment is for Botox - is order pended for provider? No

## 2022-12-05 ENCOUNTER — APPOINTMENT (OUTPATIENT)
Dept: PHYSICAL THERAPY | Facility: REHABILITATION | Age: 62
End: 2022-12-05
Attending: STUDENT IN AN ORGANIZED HEALTH CARE EDUCATION/TRAINING PROGRAM
Payer: MEDICAID

## 2022-12-07 ENCOUNTER — APPOINTMENT (OUTPATIENT)
Dept: PHYSICAL THERAPY | Facility: REHABILITATION | Age: 62
End: 2022-12-07
Attending: STUDENT IN AN ORGANIZED HEALTH CARE EDUCATION/TRAINING PROGRAM
Payer: MEDICAID

## 2022-12-12 ENCOUNTER — APPOINTMENT (OUTPATIENT)
Dept: PHYSICAL THERAPY | Facility: REHABILITATION | Age: 62
End: 2022-12-12
Attending: STUDENT IN AN ORGANIZED HEALTH CARE EDUCATION/TRAINING PROGRAM
Payer: MEDICAID

## 2022-12-14 ENCOUNTER — APPOINTMENT (OUTPATIENT)
Dept: PHYSICAL THERAPY | Facility: REHABILITATION | Age: 62
End: 2022-12-14
Attending: STUDENT IN AN ORGANIZED HEALTH CARE EDUCATION/TRAINING PROGRAM
Payer: MEDICAID

## 2023-01-03 ENCOUNTER — TELEPHONE (OUTPATIENT)
Dept: PHYSICAL THERAPY | Facility: REHABILITATION | Age: 63
End: 2023-01-03
Payer: MEDICAID

## 2023-01-03 ENCOUNTER — OFFICE VISIT (OUTPATIENT)
Dept: MEDICAL GROUP | Facility: MEDICAL CENTER | Age: 63
End: 2023-01-03
Attending: NURSE PRACTITIONER
Payer: MEDICAID

## 2023-01-03 VITALS
WEIGHT: 100 LBS | DIASTOLIC BLOOD PRESSURE: 92 MMHG | TEMPERATURE: 97.2 F | SYSTOLIC BLOOD PRESSURE: 124 MMHG | HEART RATE: 72 BPM | BODY MASS INDEX: 16.66 KG/M2 | RESPIRATION RATE: 15 BRPM | OXYGEN SATURATION: 100 % | HEIGHT: 65 IN

## 2023-01-03 DIAGNOSIS — I10 HYPERTENSION, UNSPECIFIED TYPE: ICD-10-CM

## 2023-01-03 DIAGNOSIS — Z11.59 NEED FOR HEPATITIS C SCREENING TEST: ICD-10-CM

## 2023-01-03 DIAGNOSIS — Z13.0 SCREENING FOR ENDOCRINE, NUTRITIONAL, METABOLIC AND IMMUNITY DISORDER: ICD-10-CM

## 2023-01-03 DIAGNOSIS — M79.10 MUSCLE PAIN: ICD-10-CM

## 2023-01-03 DIAGNOSIS — N18.32 STAGE 3B CHRONIC KIDNEY DISEASE: ICD-10-CM

## 2023-01-03 DIAGNOSIS — Z13.228 SCREENING FOR ENDOCRINE, NUTRITIONAL, METABOLIC AND IMMUNITY DISORDER: ICD-10-CM

## 2023-01-03 DIAGNOSIS — I63.9 CEREBROVASCULAR ACCIDENT (CVA), UNSPECIFIED MECHANISM (HCC): ICD-10-CM

## 2023-01-03 DIAGNOSIS — Z13.21 SCREENING FOR ENDOCRINE, NUTRITIONAL, METABOLIC AND IMMUNITY DISORDER: ICD-10-CM

## 2023-01-03 DIAGNOSIS — I25.10 CORONARY ARTERY DISEASE INVOLVING NATIVE CORONARY ARTERY OF NATIVE HEART WITHOUT ANGINA PECTORIS: ICD-10-CM

## 2023-01-03 DIAGNOSIS — Z13.29 SCREENING FOR ENDOCRINE, NUTRITIONAL, METABOLIC AND IMMUNITY DISORDER: ICD-10-CM

## 2023-01-03 DIAGNOSIS — I50.22 CHRONIC SYSTOLIC CONGESTIVE HEART FAILURE (HCC): ICD-10-CM

## 2023-01-03 DIAGNOSIS — G25.81 RESTLESS LEG: ICD-10-CM

## 2023-01-03 DIAGNOSIS — Z12.31 ENCOUNTER FOR SCREENING MAMMOGRAM FOR MALIGNANT NEOPLASM OF BREAST: ICD-10-CM

## 2023-01-03 DIAGNOSIS — Z76.0 ENCOUNTER FOR MEDICATION REFILL: ICD-10-CM

## 2023-01-03 PROCEDURE — 99214 OFFICE O/P EST MOD 30 MIN: CPT | Performed by: NURSE PRACTITIONER

## 2023-01-03 PROCEDURE — 99213 OFFICE O/P EST LOW 20 MIN: CPT | Performed by: NURSE PRACTITIONER

## 2023-01-03 RX ORDER — PRAMIPEXOLE DIHYDROCHLORIDE 0.12 MG/1
0.12 TABLET ORAL
Qty: 30 TABLET | Refills: 1 | Status: SHIPPED | OUTPATIENT
Start: 2023-01-03 | End: 2023-01-17 | Stop reason: SDUPTHER

## 2023-01-03 RX ORDER — CYCLOBENZAPRINE HCL 5 MG
5 TABLET ORAL 2 TIMES DAILY PRN
Qty: 30 TABLET | Refills: 1 | Status: SHIPPED | OUTPATIENT
Start: 2023-01-03 | End: 2023-10-09

## 2023-01-03 RX ORDER — MIRTAZAPINE 15 MG/1
15 TABLET, FILM COATED ORAL NIGHTLY
COMMUNITY

## 2023-01-03 ASSESSMENT — PATIENT HEALTH QUESTIONNAIRE - PHQ9
SUM OF ALL RESPONSES TO PHQ9 QUESTIONS 1 AND 2: 6
4. FEELING TIRED OR HAVING LITTLE ENERGY: NEARLY EVERY DAY
5. POOR APPETITE OR OVEREATING: NOT AT ALL
SUM OF ALL RESPONSES TO PHQ QUESTIONS 1-9: 9
2. FEELING DOWN, DEPRESSED, IRRITABLE, OR HOPELESS: NEARLY EVERY DAY
8. MOVING OR SPEAKING SO SLOWLY THAT OTHER PEOPLE COULD HAVE NOTICED. OR THE OPPOSITE, BEING SO FIGETY OR RESTLESS THAT YOU HAVE BEEN MOVING AROUND A LOT MORE THAN USUAL: NOT AT ALL
1. LITTLE INTEREST OR PLEASURE IN DOING THINGS: NEARLY EVERY DAY
3. TROUBLE FALLING OR STAYING ASLEEP OR SLEEPING TOO MUCH: NOT AT ALL
9. THOUGHTS THAT YOU WOULD BE BETTER OFF DEAD, OR OF HURTING YOURSELF: NOT AT ALL
7. TROUBLE CONCENTRATING ON THINGS, SUCH AS READING THE NEWSPAPER OR WATCHING TELEVISION: NOT AT ALL
6. FEELING BAD ABOUT YOURSELF - OR THAT YOU ARE A FAILURE OR HAVE LET YOURSELF OR YOUR FAMILY DOWN: NOT AL ALL

## 2023-01-03 ASSESSMENT — FIBROSIS 4 INDEX: FIB4 SCORE: 1.18

## 2023-01-03 NOTE — OP THERAPY DISCHARGE SUMMARY
Outpatient Physical Therapy  DISCHARGE SUMMARY NOTE      [facility] Physical Therapy [place] [address]  [address]  [facility] [address]  Phone:  [phone]  Fax:  [phone]    Date of Visit: 01/03/2023    Patient: Fouzia Santamaria  YOB: 1960  MRN: [id number]      Referring Provider: Nora Reyna M.D.  [facility] [place] [address]   Referring Diagnosis Cerebrovascular accident (CVA) due to thrombosis of precerebral artery (HCC) [I63.00]       Functional Assessment Used        Your patient is being discharged from Physical Therapy with the following comments:   Patient has failed to schedule or reschedule follow-up visits    Comments:  Pt did not attend follow ups after eval on 10/6/2022. SANDRA Nath, PT, DPT    Date: 1/3/2023

## 2023-01-04 PROBLEM — Z76.0 ENCOUNTER FOR MEDICATION REFILL: Status: ACTIVE | Noted: 2023-01-04

## 2023-01-04 PROBLEM — M79.10 MUSCLE PAIN: Status: ACTIVE | Noted: 2023-01-04

## 2023-01-04 PROBLEM — N13.30 HYDRONEPHROSIS: Status: RESOLVED | Noted: 2022-08-24 | Resolved: 2023-01-04

## 2023-01-04 PROBLEM — Z91.89 AT RISK FOR DELIRIUM: Status: RESOLVED | Noted: 2022-09-13 | Resolved: 2023-01-04

## 2023-01-04 PROBLEM — E83.42 HYPOMAGNESEMIA: Status: RESOLVED | Noted: 2022-08-29 | Resolved: 2023-01-04

## 2023-01-04 PROBLEM — Z76.89 ENCOUNTER TO ESTABLISH CARE: Status: RESOLVED | Noted: 2022-10-19 | Resolved: 2023-01-04

## 2023-01-04 NOTE — ASSESSMENT & PLAN NOTE
Ongoing-  Patient currently not on any pain medication or muscle relaxers.  Will order low-dose muscle relaxer to help with her right leg cramping  Encourage patient to get back in with physical therapy as this will be significant help

## 2023-01-04 NOTE — ASSESSMENT & PLAN NOTE
Ongoing-  Went over patient's current medications and refilled medications as appropriate.  Patient is continuing with speech and physical therapy, however patient's significant other states they missed a few appointments secondary to the weather and currently living in their RV so he is trying to rearrange their visits.  Patient is also been following up with neurology and vascular medicine

## 2023-01-04 NOTE — PROGRESS NOTES
Chief Complaint   Patient presents with    Other     Medication refills       Subjective:     HPI:   Fouzia Santamaria is a 62 y.o. female here to discuss the evaluation and management of:      Problem   Encounter for Medication Refill    Patient here today for medication refill.  Patient's  states that she is out of her restless leg medication and that she has been having more muscle cramps especially in her right leg.  Patient is still severely aphasic from her recent CVA and has trouble articulating her needs.  Patient significant other is her current caregiver and has been managing all her medications for her.     Muscle Pain    Patient's significant other states she has been having a lot of pain in her right leg.  He feels it is mostly muscle cramping as she has been out of her restless leg medication for a few days.  Patient is aphasic but has been grabbing her right leg during her appointment and nodding in agreement.  Patient's significant other states they have been doing her physical therapy exercises in the RV and have been trying to get back in with physical therapy in person.         ROS  See HPI     Allergies   Allergen Reactions    Pcn [Penicillins] Vomiting and Nausea    Toradol Vomiting and Nausea       Current medicines (including changes today)  Current Outpatient Medications   Medication Sig Dispense Refill    mirtazapine (REMERON) 15 MG Tab Take 15 mg by mouth every evening.      pramipexole (MIRAPEX) 0.125 MG Tab Take 1 Tablet by mouth 1 time a day as needed (restless leg). 30 Tablet 1    cyclobenzaprine (FLEXERIL) 5 mg tablet Take 1 Tablet by mouth 2 times a day as needed for Muscle Spasms. 30 Tablet 1    metoprolol tartrate (LOPRESSOR) 25 MG Tab Take 0.5 Tablets by mouth 2 times a day. 60 Tablet 1    polyethylene glycol 3350 (MIRALAX) 17 GM/SCOOP Powder polyethylene glycol 3350 17 gram oral powder packet      aspirin 81 MG EC tablet aspirin 81 mg tablet,delayed release   Take 1  tablet every day by oral route.      Rivaroxaban (XARELTO STARTER PACK) 15 & 20 MG Tablet Therapy Pack Xarelto DVT-PE Treatment 30-Day Starter 15 mg(42)-20 mg(9) tablet pack   Take by oral route.      lisinopril (PRINIVIL) 10 MG Tab TAKE 1 TABLET BY MOUTH EVERY DAY 90 Tablet 0    aspirin EC (ECOTRIN) 81 MG Tablet Delayed Response Take 1 Tablet by mouth every day. 30 Tablet 1    clopidogrel (PLAVIX) 75 MG Tab Take 1 Tablet by mouth every day. 30 Tablet 3    docusate sodium 100 MG Cap Take 1 capsule by mouth 2 times a day. 60 Capsule 0    ezetimibe (ZETIA) 10 MG Tab Take 1 Tablet by mouth every day. 30 Tablet 1    acetaminophen (TYLENOL) 500 MG Tab Take 2 Tablets by mouth 3 times a day. 30 Tablet 0    polyethylene glycol/lytes (MIRALAX) 17 g Pack Mix and drink 1 Packet by mouth every day. 30 Each 0    atorvastatin (LIPITOR) 80 MG tablet Take 80 mg by mouth every evening.       No current facility-administered medications for this visit.       Social History     Tobacco Use    Smoking status: Every Day     Packs/day: 2.00     Years: 48.00     Pack years: 96.00     Types: Cigarettes    Smokeless tobacco: Never   Vaping Use    Vaping Use: Never used   Substance Use Topics    Alcohol use: No    Drug use: Yes     Types: Marijuana, Inhaled       Patient Active Problem List    Diagnosis Date Noted    Encounter for medication refill 01/04/2023    Muscle pain 01/04/2023    Reactive depression 10/19/2022    CKD (chronic kidney disease) stage 3, GFR 30-59 ml/min ([facility]) 09/13/2022    Renal artery stenosis ([facility]) 09/13/2022    Postoperative hemorrhage involving circulatory system following circulatory system procedure 09/13/2022    Cardiomyopathy ([facility]) 09/13/2022    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[facility]) 09/04/2022    Oropharyngeal dysphagia 09/01/2022    Stroke ([facility])     Chronic systolic congestive heart failure ([facility]) 08/25/2022    COPD (chronic obstructive pulmonary disease) ([facility]) 08/24/2022    Aortic thrombus ([facility]) 08/24/2022    Peripheral  "arterial occlusive disease (HCC) 08/23/2022    Hypertension 08/23/2022    Critical ischemia of lower extremity (HCC) 07/21/2022    CAD (coronary artery disease) 07/21/2022    Acute blood loss anemia 07/21/2022    QT prolongation 07/21/2022       No family history on file.       Objective:     BP (!) 124/92 (BP Location: Left arm, Patient Position: Sitting, BP Cuff Size: Adult long)   Pulse 72   Temp 36.2 °C (97.2 °F) (Temporal)   Resp 15   Ht 1.651 m (5' 5\")   Wt 45.4 kg (100 lb)   SpO2 100%  Body mass index is 16.64 kg/m².    Physical Exam:  Physical Exam  Vitals reviewed.   Constitutional:       General: She is awake.      Appearance: She is well-developed and underweight. She is ill-appearing (chronic).      Comments: Here in wheelchair, disheveled,    HENT:      Head: Normocephalic.   Eyes:      Conjunctiva/sclera: Conjunctivae normal.   Cardiovascular:      Rate and Rhythm: Normal rate and regular rhythm.      Heart sounds: Normal heart sounds.   Pulmonary:      Effort: Pulmonary effort is normal. No respiratory distress.      Breath sounds: Normal breath sounds. No wheezing.   Musculoskeletal:      Cervical back: Neck supple.   Skin:     General: Skin is warm and dry.      Comments: Dirt noticeable on exposed skin - currently living in an RV    Neurological:      Mental Status: She is alert and oriented to person, place, and time.   Psychiatric:         Mood and Affect: Mood normal.         Behavior: Behavior normal. Behavior is cooperative.            Assessment and Plan:     The following treatment plan was discussed:    Problem List Items Addressed This Visit       CAD (coronary artery disease)    Relevant Medications    metoprolol tartrate (LOPRESSOR) 25 MG Tab    Hypertension    Relevant Medications    metoprolol tartrate (LOPRESSOR) 25 MG Tab    Chronic systolic congestive heart failure (HCC)    Relevant Medications    metoprolol tartrate (LOPRESSOR) 25 MG Tab    Stroke (HCC)    Relevant " Medications    metoprolol tartrate (LOPRESSOR) 25 MG Tab    Other Relevant Orders    Lipid Profile    CKD (chronic kidney disease) stage 3, GFR 30-59 ml/min ([facility])    Relevant Orders    Comp Metabolic Panel    Encounter for medication refill     Ongoing-  Went over patient's current medications and refilled medications as appropriate.  Patient is continuing with speech and physical therapy, however patient's significant other states they missed a few appointments secondary to the weather and currently living in their RV so he is trying to rearrange their visits.  Patient is also been following up with neurology and vascular medicine           Muscle pain     Ongoing-  Patient currently not on any pain medication or muscle relaxers.  Will order low-dose muscle relaxer to help with her right leg cramping  Encourage patient to get back in with physical therapy as this will be significant help         Relevant Medications    cyclobenzaprine (FLEXERIL) 5 mg tablet     Other Visit Diagnoses       Restless leg        Relevant Medications    pramipexole (MIRAPEX) 0.125 MG Tab    Screening for endocrine, nutritional, metabolic and immunity disorder        Relevant Orders    HEMOGLOBIN A1C    FREE THYROXINE    TSH    VITAMIN D,25 HYDROXY (DEFICIENCY)    Need for hepatitis C screening test        Relevant Orders    HEP C VIRUS ANTIBODY    Encounter for screening mammogram for malignant neoplasm of breast        Relevant Orders    MA-SCREENING MAMMO BILAT W/TOMOSYNTHESIS W/CAD            Any change or worsening of signs or symptoms, patient encouraged to follow-up or report to emergency room for further evaluation. Patient verbalizes understanding and agrees.    Follow-Up: 3 months       PLEASE NOTE: This dictation was created using voice recognition software. I have made every reasonable attempt to correct obvious errors, but I expect that there are errors of grammar and possibly content that I did not discover before  finalizing the note.

## 2023-01-05 ENCOUNTER — APPOINTMENT (OUTPATIENT)
Dept: RADIOLOGY | Facility: MEDICAL CENTER | Age: 63
End: 2023-01-05
Attending: NURSE PRACTITIONER
Payer: MEDICAID

## 2023-01-06 DIAGNOSIS — I25.10 CORONARY ARTERY DISEASE INVOLVING NATIVE CORONARY ARTERY OF NATIVE HEART WITHOUT ANGINA PECTORIS: ICD-10-CM

## 2023-01-06 DIAGNOSIS — I63.9 CEREBROVASCULAR ACCIDENT (CVA), UNSPECIFIED MECHANISM (HCC): ICD-10-CM

## 2023-01-10 RX ORDER — EZETIMIBE 10 MG/1
10 TABLET ORAL DAILY
Qty: 30 TABLET | Refills: 1 | Status: SHIPPED | OUTPATIENT
Start: 2023-01-10 | End: 2023-02-15

## 2023-01-11 ENCOUNTER — TELEPHONE (OUTPATIENT)
Dept: MEDICAL GROUP | Facility: MEDICAL CENTER | Age: 63
End: 2023-01-11

## 2023-01-11 NOTE — TELEPHONE ENCOUNTER
Patient's  called in regards to a prescription. He stated the wrong dosage was sent on the prescription of pramipexole (MIRAPEX) tablet 0.125 mg  The one patient was taking before was 0.125 mg tabs 3 times a day and the one that pcp sent was just to be taken 1 time a day. Patient's  stated it is not enough and he would like it fixed. Please call patient when this is done.

## 2023-01-13 ENCOUNTER — TELEPHONE (OUTPATIENT)
Dept: MEDICAL GROUP | Facility: MEDICAL CENTER | Age: 63
End: 2023-01-13

## 2023-01-13 NOTE — TELEPHONE ENCOUNTER
Phone Number Called: [phone] (home)      Call outcome: Spoke to patient regarding message below.    Message:  left voice message he mention if is possible to get wife Rx fixed to what regularly she been taking which is 1 tablet by mouth 3 times da day.pramipexole (MIRAPEX) 0.125 MG Tab

## 2023-01-17 DIAGNOSIS — G25.81 RESTLESS LEG: ICD-10-CM

## 2023-01-17 RX ORDER — PRAMIPEXOLE DIHYDROCHLORIDE 0.12 MG/1
0.12 TABLET ORAL 3 TIMES DAILY
Qty: 90 TABLET | Refills: 1 | Status: SHIPPED | OUTPATIENT
Start: 2023-01-17 | End: 2023-03-14

## 2023-02-06 DIAGNOSIS — I50.22 CHRONIC SYSTOLIC CONGESTIVE HEART FAILURE (HCC): ICD-10-CM

## 2023-02-07 RX ORDER — LISINOPRIL 10 MG/1
10 TABLET ORAL DAILY
Qty: 90 TABLET | Refills: 0 | Status: SHIPPED | OUTPATIENT
Start: 2023-02-07

## 2023-02-14 DIAGNOSIS — I63.9 CEREBROVASCULAR ACCIDENT (CVA), UNSPECIFIED MECHANISM (HCC): ICD-10-CM

## 2023-02-14 DIAGNOSIS — I25.10 CORONARY ARTERY DISEASE INVOLVING NATIVE CORONARY ARTERY OF NATIVE HEART WITHOUT ANGINA PECTORIS: ICD-10-CM

## 2023-02-15 RX ORDER — EZETIMIBE 10 MG/1
10 TABLET ORAL DAILY
Qty: 30 TABLET | Refills: 1 | Status: SHIPPED | OUTPATIENT
Start: 2023-02-15 | End: 2023-04-10

## 2023-03-10 DIAGNOSIS — I42.9 CARDIOMYOPATHY, UNSPECIFIED TYPE (HCC): ICD-10-CM

## 2023-03-10 DIAGNOSIS — G89.18 POST-OPERATIVE PAIN: ICD-10-CM

## 2023-03-10 NOTE — TELEPHONE ENCOUNTER
Received request via: Pharmacy    Was the patient seen in the last year in this department? Yes    Does the patient have an active prescription (recently filled or refills available) for medication(s) requested? No    Does the patient have retirement Plus and need 100 day supply (blood pressure, diabetes and cholesterol meds only)? N/a

## 2023-03-13 DIAGNOSIS — G25.81 RESTLESS LEG: ICD-10-CM

## 2023-03-14 RX ORDER — PRAMIPEXOLE DIHYDROCHLORIDE 0.12 MG/1
TABLET ORAL
Qty: 90 TABLET | Refills: 1 | Status: SHIPPED | OUTPATIENT
Start: 2023-03-14 | End: 2023-05-09

## 2023-03-14 RX ORDER — DOCUSATE SODIUM 100 MG/1
CAPSULE, LIQUID FILLED ORAL
Qty: 60 CAPSULE | Refills: 0 | Status: SHIPPED | OUTPATIENT
Start: 2023-03-14 | End: 2023-10-09

## 2023-03-30 DIAGNOSIS — I50.22 CHRONIC SYSTOLIC CONGESTIVE HEART FAILURE (HCC): ICD-10-CM

## 2023-03-30 DIAGNOSIS — I25.10 CORONARY ARTERY DISEASE INVOLVING NATIVE CORONARY ARTERY OF NATIVE HEART WITHOUT ANGINA PECTORIS: ICD-10-CM

## 2023-04-09 DIAGNOSIS — I25.10 CORONARY ARTERY DISEASE INVOLVING NATIVE CORONARY ARTERY OF NATIVE HEART WITHOUT ANGINA PECTORIS: ICD-10-CM

## 2023-04-09 DIAGNOSIS — I63.9 CEREBROVASCULAR ACCIDENT (CVA), UNSPECIFIED MECHANISM (HCC): ICD-10-CM

## 2023-04-10 RX ORDER — EZETIMIBE 10 MG/1
10 TABLET ORAL DAILY
Qty: 30 TABLET | Refills: 0 | Status: SHIPPED | OUTPATIENT
Start: 2023-04-10 | End: 2023-05-10

## 2023-04-10 NOTE — TELEPHONE ENCOUNTER
Received request via: Pharmacy    Was the patient seen in the last year in this department? Yes    Does the patient have an active prescription (recently filled or refills available) for medication(s) requested? No    Does the patient have jail Plus and need 100 day supply (blood pressure, diabetes and cholesterol meds only)? N/a

## 2023-05-09 DIAGNOSIS — G25.81 RESTLESS LEG: ICD-10-CM

## 2023-05-09 RX ORDER — PRAMIPEXOLE DIHYDROCHLORIDE 0.12 MG/1
TABLET ORAL
Qty: 90 TABLET | Refills: 1 | Status: SHIPPED | OUTPATIENT
Start: 2023-05-09 | End: 2023-07-11

## 2023-05-10 DIAGNOSIS — I25.10 CORONARY ARTERY DISEASE INVOLVING NATIVE CORONARY ARTERY OF NATIVE HEART WITHOUT ANGINA PECTORIS: ICD-10-CM

## 2023-05-10 DIAGNOSIS — I63.9 CEREBROVASCULAR ACCIDENT (CVA), UNSPECIFIED MECHANISM (HCC): ICD-10-CM

## 2023-05-10 RX ORDER — EZETIMIBE 10 MG/1
10 TABLET ORAL DAILY
Qty: 30 TABLET | Refills: 0 | Status: SHIPPED | OUTPATIENT
Start: 2023-05-10 | End: 2023-06-12

## 2023-06-10 DIAGNOSIS — I25.10 CORONARY ARTERY DISEASE INVOLVING NATIVE CORONARY ARTERY OF NATIVE HEART WITHOUT ANGINA PECTORIS: ICD-10-CM

## 2023-06-10 DIAGNOSIS — I63.9 CEREBROVASCULAR ACCIDENT (CVA), UNSPECIFIED MECHANISM (HCC): ICD-10-CM

## 2023-06-12 RX ORDER — EZETIMIBE 10 MG/1
10 TABLET ORAL DAILY
Qty: 30 TABLET | Refills: 0 | Status: SHIPPED | OUTPATIENT
Start: 2023-06-12 | End: 2023-07-12

## 2023-07-08 DIAGNOSIS — G25.81 RESTLESS LEG: ICD-10-CM

## 2023-07-09 DIAGNOSIS — I25.10 CORONARY ARTERY DISEASE INVOLVING NATIVE CORONARY ARTERY OF NATIVE HEART WITHOUT ANGINA PECTORIS: ICD-10-CM

## 2023-07-09 DIAGNOSIS — I63.9 CEREBROVASCULAR ACCIDENT (CVA), UNSPECIFIED MECHANISM (HCC): ICD-10-CM

## 2023-07-10 NOTE — TELEPHONE ENCOUNTER
Received request via: Pharmacy    Was the patient seen in the last year in this department? Yes    Does the patient have an active prescription (recently filled or refills available) for medication(s) requested? No    Does the patient have M[facility] Plus and need 100 day supply (blood pressure, diabetes and cholesterol meds only)? Patient does not have SCP

## 2023-07-10 NOTE — TELEPHONE ENCOUNTER
Received request via: Pharmacy    Was the patient seen in the last year in this department? Yes    Does the patient have an active prescription (recently filled or refills available) for medication(s) requested? No    Does the patient have snf Plus and need 100 day supply (blood pressure, diabetes and cholesterol meds only)? Patient does not have SCP   No appt scheduled at this time

## 2023-07-11 RX ORDER — PRAMIPEXOLE DIHYDROCHLORIDE 0.12 MG/1
TABLET ORAL
Qty: 90 TABLET | Refills: 1 | Status: SHIPPED | OUTPATIENT
Start: 2023-07-11 | End: 2023-09-13

## 2023-07-12 RX ORDER — EZETIMIBE 10 MG/1
10 TABLET ORAL DAILY
Qty: 30 TABLET | Refills: 0 | Status: SHIPPED | OUTPATIENT
Start: 2023-07-12 | End: 2023-08-18

## 2023-08-12 DIAGNOSIS — I25.10 CORONARY ARTERY DISEASE INVOLVING NATIVE CORONARY ARTERY OF NATIVE HEART WITHOUT ANGINA PECTORIS: ICD-10-CM

## 2023-08-12 DIAGNOSIS — I63.9 CEREBROVASCULAR ACCIDENT (CVA), UNSPECIFIED MECHANISM (HCC): ICD-10-CM

## 2023-08-18 RX ORDER — EZETIMIBE 10 MG/1
10 TABLET ORAL DAILY
Qty: 30 TABLET | Refills: 0 | Status: SHIPPED | OUTPATIENT
Start: 2023-08-18 | End: 2023-09-19

## 2023-08-30 ENCOUNTER — OFFICE VISIT (OUTPATIENT)
Dept: MEDICAL GROUP | Facility: MEDICAL CENTER | Age: 63
End: 2023-08-30
Attending: FAMILY MEDICINE
Payer: MEDICAID

## 2023-08-30 VITALS
BODY MASS INDEX: 21.66 KG/M2 | WEIGHT: 130 LBS | HEIGHT: 65 IN | SYSTOLIC BLOOD PRESSURE: 130 MMHG | HEART RATE: 62 BPM | TEMPERATURE: 96.8 F | OXYGEN SATURATION: 98 % | RESPIRATION RATE: 16 BRPM | DIASTOLIC BLOOD PRESSURE: 86 MMHG

## 2023-08-30 DIAGNOSIS — I70.229 CRITICAL ISCHEMIA OF LOWER EXTREMITY (HCC): ICD-10-CM

## 2023-08-30 DIAGNOSIS — R26.81 GAIT INSTABILITY: ICD-10-CM

## 2023-08-30 DIAGNOSIS — I63.9 CEREBROVASCULAR ACCIDENT (CVA), UNSPECIFIED MECHANISM (HCC): ICD-10-CM

## 2023-08-30 DIAGNOSIS — I50.20 HFREF (HEART FAILURE WITH REDUCED EJECTION FRACTION) (HCC): ICD-10-CM

## 2023-08-30 PROCEDURE — 99213 OFFICE O/P EST LOW 20 MIN: CPT | Performed by: FAMILY MEDICINE

## 2023-08-30 PROCEDURE — 99214 OFFICE O/P EST MOD 30 MIN: CPT | Performed by: FAMILY MEDICINE

## 2023-08-30 PROCEDURE — 3075F SYST BP GE 130 - 139MM HG: CPT | Performed by: FAMILY MEDICINE

## 2023-08-30 PROCEDURE — 3079F DIAST BP 80-89 MM HG: CPT | Performed by: FAMILY MEDICINE

## 2023-08-30 RX ORDER — FUROSEMIDE 20 MG/1
20 TABLET ORAL 2 TIMES DAILY
Qty: 60 TABLET | Refills: 0 | Status: SHIPPED | OUTPATIENT
Start: 2023-08-30 | End: 2023-10-02

## 2023-08-30 RX ORDER — ASPIRIN 81 MG/1
1 TABLET ORAL
COMMUNITY
End: 2023-10-09

## 2023-08-30 ASSESSMENT — ENCOUNTER SYMPTOMS
NUMBNESS: 0
FEVER: 0
COUGH: 0
WEAKNESS: 0
VOMITING: 0
ANOREXIA: 0
ABDOMINAL PAIN: 0
VISUAL CHANGE: 0

## 2023-08-30 ASSESSMENT — FIBROSIS 4 INDEX: FIB4 SCORE: 1.18

## 2023-08-30 NOTE — PROGRESS NOTES
"Subjective   Chief Complaint   Patient presents with    Foot Swelling     Feet and ankle swollen for a week         HPI:   Fouzia presents today with her , Harris, with acute onset of leg swelling    Foot Swelling  This is a new problem. The current episode started in the past 7 days. The problem has been waxing and waning. Pertinent negatives include no abdominal pain, anorexia, chest pain, congestion, coughing, fever, numbness, visual change, vomiting or weakness. The symptoms are aggravated by standing. She has tried lying down for the symptoms.   Recently moved from an  to apartment and so her  reports that it was difficult to do home exercises due to space limitations. Now that they are in the apt she is able to transfer from wheel chair and is trying to do rehab at home. Patient is primarily wheel chair bound following stroke last year. She was referred to PT but was unable to make appointment do to logistics/car trouble and so was discharged from PT. She has not seen cardiologist or vascular surgery as previously recommended from last hospitalization due to similar barriers. She denies orthopnea, CLAROS, weight has been up from increased PO intake.    Objective   Social History     Tobacco Use    Smoking status: Every Day     Current packs/day: 2.00     Average packs/day: 2.0 packs/day for 48.0 years (96.0 ttl pk-yrs)     Types: Cigarettes    Smokeless tobacco: Never   Vaping Use    Vaping Use: Never used   Substance Use Topics    Alcohol use: No    Drug use: Yes     Types: Marijuana, Inhaled       Exam:  /86 (BP Location: Right arm, Patient Position: Sitting, BP Cuff Size: Adult)   Pulse 62   Temp 36 °C (96.8 °F) (Temporal)   Resp 16   Ht 1.651 m (5' 5\")   Wt 59 kg (130 lb)   SpO2 98%   BMI 21.63 kg/m²     Physical Exam  Constitutional: Alert, no distress  Skin: No rashes in visible areas  Eye: Conjunctiva clear, lids normal  CV: RRR, lungs clear to auscultation bilaterally "   Respiratory: Unlabored respiratory effort, no cough  MSK: wheel chair, +1 pedal swelling trace edema to knees, pale and cool limbs bilateral   Psych: Alert and oriented x3, normal affect and mood    Allergies   Allergen Reactions    Pcn [Penicillins] Vomiting and Nausea    Toradol Vomiting and Nausea       [id number] - [place], [place] - [address]  [address] [facility] [address]  Phone: [phone] Fax: [phone]    [facility] - [address]  [facility] [address]  Phone: [phone] Fax: [phone]    Current Outpatient Medications   Medication Sig Dispense Refill    furosemide (LASIX) 20 MG Tab Take 1 Tablet by mouth 2 times a day. 60 Tablet 0    pramipexole (MIRAPEX) 0.125 MG Tab TAKE 1 TABLET BY MOUTH THREE TIMES A DAY 90 Tablet 1    acetaminophen (TYLENOL) 500 MG Tab Take 2 Tablets by mouth 3 times a day. 30 Tablet 0    aspirin 81 MG EC tablet Take 1 Tablet by mouth every day.      ezetimibe (ZETIA) 10 MG Tab TAKE 1 TABLET BY MOUTH EVERY DAY 30 Tablet 0    metoprolol tartrate (LOPRESSOR) 25 MG Tab TAKE 1/2 TABLET BY MOUTH 2 TIMES A DAY 90 Tablet 1    docusate sodium (COLACE) 100 MG Cap TAKE 1 CAPSULE BY MOUTH TWICE A DAY 60 Capsule 0    lisinopril (PRINIVIL) 10 MG Tab TAKE 1 TABLET BY MOUTH EVERY DAY 90 Tablet 0    mirtazapine (REMERON) 15 MG Tab Take 15 mg by mouth every evening.      cyclobenzaprine (FLEXERIL) 5 mg tablet Take 1 Tablet by mouth 2 times a day as needed for Muscle Spasms. 30 Tablet 1    polyethylene glycol 3350 (MIRALAX) 17 GM/SCOOP Powder polyethylene glycol 3350 17 gram oral powder packet (Patient not taking: Reported on 8/30/2023)      aspirin 81 MG EC tablet aspirin 81 mg tablet,delayed release   Take 1 tablet every day by oral route.      Rivaroxaban (XARELTO STARTER PACK) 15 & 20 MG Tablet Therapy Pack Xarelto DVT-PE Treatment 30-Day Starter 15 mg(42)-20 mg(9) tablet pack   Take by oral route. (Patient not taking: Reported on 8/30/2023)       aspirin EC (ECOTRIN) 81 MG Tablet Delayed Response Take 1 Tablet by mouth every day. 30 Tablet 1    clopidogrel (PLAVIX) 75 MG Tab Take 1 Tablet by mouth every day. 30 Tablet 3    polyethylene glycol/lytes (MIRALAX) 17 g Pack Mix and drink 1 Packet by mouth every day. (Patient not taking: Reported on 8/30/2023) 30 Each 0    atorvastatin (LIPITOR) 80 MG tablet Take 80 mg by mouth every evening. (Patient not taking: Reported on 8/30/2023)       No current facility-administered medications for this visit.       Assessment & Plan    62 y.o. female with the following -     1. HFrEF (heart failure with reduced ejection fraction) ([facility])  - Chronic issue; clinically unstable as evident by LE swelling; however patient also with significant history of limb ischemia  - Given pronounced leg swelling recommend initiation of diuretics  - REFERRAL TO CARDIOLOGY to establish care and for updated evaluation and management considerations  - furosemide (LASIX) 20 MG Tab; Take 1 Tablet by mouth 2 times a day.  Dispense: 60 Tablet; Refill: 0  - Patient to get previously ordered labs drawn to assess electrolyte level while being on chronic diuretic  - Encouraged leg elevation and compression stockings to help relieve leg swelling  - Close follow up with PCP to reassess and for lab review    2. Critical ischemia of lower extremity (HCC)  - Referral to Vascular Surgery to re-establish care and for updated assessment and management    3. Cerebrovascular accident (CVA), unspecified mechanism ([facility])  4. Gait instability  - Referral to Physical Therapy     Other orders  - aspirin 81 MG EC tablet; Take 1 Tablet by mouth every day.    ER/Call/Return precautions discussed. She and  verbalized understanding and agreed with plan.    Return if symptoms worsen or fail to improve.    Please note that this dictation was created using voice recognition software. I have made every reasonable attempt to correct obvious errors, but I expect that there  are errors of grammar and possibly content that I did not discover before finalizing the note.

## 2023-09-06 ENCOUNTER — HOSPITAL ENCOUNTER (EMERGENCY)
Facility: MEDICAL CENTER | Age: 63
End: 2023-09-06
Payer: MEDICAID

## 2023-09-06 VITALS
BODY MASS INDEX: 21.66 KG/M2 | HEIGHT: 65 IN | SYSTOLIC BLOOD PRESSURE: 127 MMHG | WEIGHT: 130 LBS | RESPIRATION RATE: 20 BRPM | OXYGEN SATURATION: 95 % | HEART RATE: 88 BPM | DIASTOLIC BLOOD PRESSURE: 74 MMHG | TEMPERATURE: 98.9 F

## 2023-09-06 LAB
ALBUMIN SERPL BCP-MCNC: 4 G/DL (ref 3.2–4.9)
ALBUMIN/GLOB SERPL: 0.9 G/DL
ALP SERPL-CCNC: 99 U/L (ref 30–99)
ALT SERPL-CCNC: 11 U/L (ref 2–50)
ANION GAP SERPL CALC-SCNC: 16 MMOL/L (ref 7–16)
AST SERPL-CCNC: 14 U/L (ref 12–45)
BASOPHILS # BLD AUTO: 0.3 % (ref 0–1.8)
BASOPHILS # BLD: 0.03 K/UL (ref 0–0.12)
BILIRUB SERPL-MCNC: 0.3 MG/DL (ref 0.1–1.5)
BUN SERPL-MCNC: 47 MG/DL (ref 8–22)
CALCIUM ALBUM COR SERPL-MCNC: 9.5 MG/DL (ref 8.5–10.5)
CALCIUM SERPL-MCNC: 9.5 MG/DL (ref 8.5–10.5)
CHLORIDE SERPL-SCNC: 99 MMOL/L (ref 96–112)
CO2 SERPL-SCNC: 23 MMOL/L (ref 20–33)
CREAT SERPL-MCNC: 1.82 MG/DL (ref 0.5–1.4)
EOSINOPHIL # BLD AUTO: 0.05 K/UL (ref 0–0.51)
EOSINOPHIL NFR BLD: 0.5 % (ref 0–6.9)
ERYTHROCYTE [DISTWIDTH] IN BLOOD BY AUTOMATED COUNT: 51.3 FL (ref 35.9–50)
GFR SERPLBLD CREATININE-BSD FMLA CKD-EPI: 31 ML/MIN/1.73 M 2
GLOBULIN SER CALC-MCNC: 4.6 G/DL (ref 1.9–3.5)
GLUCOSE SERPL-MCNC: 113 MG/DL (ref 65–99)
HCT VFR BLD AUTO: 40 % (ref 37–47)
HGB BLD-MCNC: 12.6 G/DL (ref 12–16)
IMM GRANULOCYTES # BLD AUTO: 0.03 K/UL (ref 0–0.11)
IMM GRANULOCYTES NFR BLD AUTO: 0.3 % (ref 0–0.9)
LYMPHOCYTES # BLD AUTO: 1.24 K/UL (ref 1–4.8)
LYMPHOCYTES NFR BLD: 11.9 % (ref 22–41)
MCH RBC QN AUTO: 29 PG (ref 27–33)
MCHC RBC AUTO-ENTMCNC: 31.5 G/DL (ref 32.2–35.5)
MCV RBC AUTO: 92.2 FL (ref 81.4–97.8)
MONOCYTES # BLD AUTO: 0.98 K/UL (ref 0–0.85)
MONOCYTES NFR BLD AUTO: 9.4 % (ref 0–13.4)
NEUTROPHILS # BLD AUTO: 8.08 K/UL (ref 1.82–7.42)
NEUTROPHILS NFR BLD: 77.6 % (ref 44–72)
NRBC # BLD AUTO: 0 K/UL
NRBC BLD-RTO: 0 /100 WBC (ref 0–0.2)
PLATELET # BLD AUTO: 351 K/UL (ref 164–446)
PMV BLD AUTO: 10.1 FL (ref 9–12.9)
POTASSIUM SERPL-SCNC: 3.9 MMOL/L (ref 3.6–5.5)
PROT SERPL-MCNC: 8.6 G/DL (ref 6–8.2)
RBC # BLD AUTO: 4.34 M/UL (ref 4.2–5.4)
SODIUM SERPL-SCNC: 138 MMOL/L (ref 135–145)
TROPONIN T SERPL-MCNC: 21 NG/L (ref 6–19)
WBC # BLD AUTO: 10.4 K/UL (ref 4.8–10.8)

## 2023-09-06 PROCEDURE — 85025 COMPLETE CBC W/AUTO DIFF WBC: CPT

## 2023-09-06 PROCEDURE — 80053 COMPREHEN METABOLIC PANEL: CPT

## 2023-09-06 PROCEDURE — 84484 ASSAY OF TROPONIN QUANT: CPT

## 2023-09-06 PROCEDURE — 302449 STATCHG TRIAGE ONLY (STATISTIC)

## 2023-09-06 ASSESSMENT — FIBROSIS 4 INDEX: FIB4 SCORE: 1.18

## 2023-09-07 NOTE — ED TRIAGE NOTES
"Chief Complaint   Patient presents with    Abdominal Pain     PATIENT WITH ABD PAIN FOR THE PAST HOUR. PT ALSO ENDORSES RIGHT ARM PAIN. PT DENIES ANY NAUSEA AND VOMITING. PATIENT ALSO \" FEELING ILL FOR THE PAST FEW DAYS\". PT WITH HX OF AAA AND MULTIPLE STENTS PLACED.     PT ALSO WITH STROKE HX.            PT WHEELED TO TRIAGE. Pt AA&O X 3, SEE ABOVE. EKG DONE FOR PRECAUTION DUE TO HX, SYMPTOMS, AND WAIT TIME.     PT TO EKG/LAB/LOBBY . PT EDUCATED ON ALERTING STAFF TO CHANGES IN CONDITION. PT VERBALIZED AN UNDERSTANDING.     /59   Pulse 86   Temp 37.2 °C (98.9 °F) (Temporal)   Resp 18   Ht 1.651 m (5' 5\")   Wt 59 kg (130 lb)   SpO2 95%   BMI 21.63 kg/m²     "

## 2023-09-10 DIAGNOSIS — G25.81 RESTLESS LEG: ICD-10-CM

## 2023-09-13 ENCOUNTER — TELEPHONE (OUTPATIENT)
Dept: HEALTH INFORMATION MANAGEMENT | Facility: OTHER | Age: 63
End: 2023-09-13
Payer: MEDICAID

## 2023-09-13 RX ORDER — PRAMIPEXOLE DIHYDROCHLORIDE 0.12 MG/1
TABLET ORAL
Qty: 90 TABLET | Refills: 1 | Status: SHIPPED | OUTPATIENT
Start: 2023-09-13 | End: 2023-11-10

## 2023-09-14 ENCOUNTER — TELEPHONE (OUTPATIENT)
Dept: CARDIOLOGY | Facility: MEDICAL CENTER | Age: 63
End: 2023-09-14

## 2023-09-14 NOTE — TELEPHONE ENCOUNTER
Spoke to patient in regards to records for NP appointment with [facility].     Patient has seen a cardiologist before?  Yes   If yes, where?:   [facility]  Any recent cardiac testing outside of [facility]?  Yes   What testing:STENTS PUT IN AT [facility]   Where was it completed?:    Were any records requested?  No   Fax:

## 2023-09-15 ENCOUNTER — TELEPHONE (OUTPATIENT)
Dept: CARDIOLOGY | Facility: MEDICAL CENTER | Age: 63
End: 2023-09-15
Payer: MEDICAID

## 2023-09-15 NOTE — TELEPHONE ENCOUNTER
Patient stated that she has been seen @ [facility] requested medical records to :     [facility]   Fax Number : [phone]  Phone Number : [phone]    Fax confirmation received  and scan in to [facility].

## 2023-09-19 DIAGNOSIS — I25.10 CORONARY ARTERY DISEASE INVOLVING NATIVE CORONARY ARTERY OF NATIVE HEART WITHOUT ANGINA PECTORIS: ICD-10-CM

## 2023-09-19 DIAGNOSIS — I63.9 CEREBROVASCULAR ACCIDENT (CVA), UNSPECIFIED MECHANISM (HCC): ICD-10-CM

## 2023-09-19 RX ORDER — EZETIMIBE 10 MG/1
10 TABLET ORAL DAILY
Qty: 30 TABLET | Refills: 0 | Status: SHIPPED | OUTPATIENT
Start: 2023-09-19

## 2023-09-22 NOTE — TELEPHONE ENCOUNTER
09/22/2023    Records had bee received from [facility] and had bishnu scan into [facility] in media.

## 2023-09-30 DIAGNOSIS — I50.20 HFREF (HEART FAILURE WITH REDUCED EJECTION FRACTION) (HCC): ICD-10-CM

## 2023-10-02 RX ORDER — FUROSEMIDE 20 MG/1
20 TABLET ORAL 2 TIMES DAILY
Qty: 60 TABLET | Refills: 0 | Status: SHIPPED | OUTPATIENT
Start: 2023-10-02

## 2023-10-09 ENCOUNTER — HOSPITAL ENCOUNTER (INPATIENT)
Facility: MEDICAL CENTER | Age: 63
LOS: 3 days | DRG: 871 | End: 2023-10-12
Attending: EMERGENCY MEDICINE | Admitting: STUDENT IN AN ORGANIZED HEALTH CARE EDUCATION/TRAINING PROGRAM
Payer: MEDICAID

## 2023-10-09 ENCOUNTER — OFFICE VISIT (OUTPATIENT)
Dept: MEDICAL GROUP | Facility: MEDICAL CENTER | Age: 63
DRG: 871 | End: 2023-10-09
Attending: NURSE PRACTITIONER
Payer: MEDICAID

## 2023-10-09 ENCOUNTER — APPOINTMENT (OUTPATIENT)
Dept: RADIOLOGY | Facility: MEDICAL CENTER | Age: 63
DRG: 871 | End: 2023-10-09
Attending: EMERGENCY MEDICINE
Payer: MEDICAID

## 2023-10-09 VITALS
TEMPERATURE: 96 F | WEIGHT: 125 LBS | BODY MASS INDEX: 20.83 KG/M2 | RESPIRATION RATE: 17 BRPM | HEART RATE: 90 BPM | SYSTOLIC BLOOD PRESSURE: 110 MMHG | DIASTOLIC BLOOD PRESSURE: 80 MMHG | HEIGHT: 65 IN | OXYGEN SATURATION: 96 %

## 2023-10-09 DIAGNOSIS — J18.9 PNEUMONIA OF RIGHT LUNG DUE TO INFECTIOUS ORGANISM, UNSPECIFIED PART OF LUNG: ICD-10-CM

## 2023-10-09 DIAGNOSIS — R13.12 OROPHARYNGEAL DYSPHAGIA: ICD-10-CM

## 2023-10-09 DIAGNOSIS — R05.9 COUGH, UNSPECIFIED TYPE: ICD-10-CM

## 2023-10-09 DIAGNOSIS — R50.9 FEVER, UNSPECIFIED FEVER CAUSE: ICD-10-CM

## 2023-10-09 DIAGNOSIS — E87.6 HYPOKALEMIA: ICD-10-CM

## 2023-10-09 DIAGNOSIS — A41.9 SEPSIS WITH ACUTE RENAL FAILURE WITHOUT SEPTIC SHOCK, DUE TO UNSPECIFIED ORGANISM, UNSPECIFIED ACUTE RENAL FAILURE TYPE (HCC): ICD-10-CM

## 2023-10-09 DIAGNOSIS — R11.10 VOMITING, UNSPECIFIED VOMITING TYPE, UNSPECIFIED WHETHER NAUSEA PRESENT: ICD-10-CM

## 2023-10-09 DIAGNOSIS — I63.9 CEREBROVASCULAR ACCIDENT (CVA), UNSPECIFIED MECHANISM (HCC): ICD-10-CM

## 2023-10-09 DIAGNOSIS — N17.9 SEPSIS WITH ACUTE RENAL FAILURE WITHOUT SEPTIC SHOCK, DUE TO UNSPECIFIED ORGANISM, UNSPECIFIED ACUTE RENAL FAILURE TYPE (HCC): ICD-10-CM

## 2023-10-09 DIAGNOSIS — R65.20 SEPSIS WITH ACUTE RENAL FAILURE WITHOUT SEPTIC SHOCK, DUE TO UNSPECIFIED ORGANISM, UNSPECIFIED ACUTE RENAL FAILURE TYPE (HCC): ICD-10-CM

## 2023-10-09 LAB
ALBUMIN SERPL BCP-MCNC: 3 G/DL (ref 3.2–4.9)
ALBUMIN/GLOB SERPL: 0.7 G/DL
ALP SERPL-CCNC: 133 U/L (ref 30–99)
ALT SERPL-CCNC: 9 U/L (ref 2–50)
ANION GAP SERPL CALC-SCNC: 16 MMOL/L (ref 7–16)
APPEARANCE UR: ABNORMAL
AST SERPL-CCNC: 16 U/L (ref 12–45)
BACTERIA #/AREA URNS HPF: ABNORMAL /HPF
BASOPHILS # BLD AUTO: 0 % (ref 0–1.8)
BASOPHILS # BLD: 0 K/UL (ref 0–0.12)
BILIRUB SERPL-MCNC: 0.8 MG/DL (ref 0.1–1.5)
BILIRUB UR QL STRIP.AUTO: NEGATIVE
BUN SERPL-MCNC: 71 MG/DL (ref 8–22)
BURR CELLS BLD QL SMEAR: NORMAL
CALCIUM ALBUM COR SERPL-MCNC: 9.8 MG/DL (ref 8.5–10.5)
CALCIUM SERPL-MCNC: 9 MG/DL (ref 8.5–10.5)
CHLORIDE SERPL-SCNC: 96 MMOL/L (ref 96–112)
CO2 SERPL-SCNC: 23 MMOL/L (ref 20–33)
COLOR UR: ABNORMAL
CORTIS SERPL-MCNC: 15.9 UG/DL (ref 0–23)
CREAT SERPL-MCNC: 2.4 MG/DL (ref 0.5–1.4)
EKG IMPRESSION: NORMAL
EOSINOPHIL # BLD AUTO: 0 K/UL (ref 0–0.51)
EOSINOPHIL NFR BLD: 0 % (ref 0–6.9)
EPI CELLS #/AREA URNS HPF: ABNORMAL /HPF
ERYTHROCYTE [DISTWIDTH] IN BLOOD BY AUTOMATED COUNT: 49.4 FL (ref 35.9–50)
FLUAV RNA SPEC QL NAA+PROBE: NEGATIVE
FLUAV RNA SPEC QL NAA+PROBE: NEGATIVE
FLUBV RNA SPEC QL NAA+PROBE: NEGATIVE
FLUBV RNA SPEC QL NAA+PROBE: NEGATIVE
GFR SERPLBLD CREATININE-BSD FMLA CKD-EPI: 22 ML/MIN/1.73 M 2
GLOBULIN SER CALC-MCNC: 4.6 G/DL (ref 1.9–3.5)
GLUCOSE SERPL-MCNC: 113 MG/DL (ref 65–99)
GLUCOSE UR STRIP.AUTO-MCNC: NEGATIVE MG/DL
HCT VFR BLD AUTO: 34.3 % (ref 37–47)
HGB BLD-MCNC: 11 G/DL (ref 12–16)
HYALINE CASTS #/AREA URNS LPF: ABNORMAL /LPF
KETONES UR STRIP.AUTO-MCNC: NEGATIVE MG/DL
LACTATE SERPL-SCNC: 1.3 MMOL/L (ref 0.5–2)
LACTATE SERPL-SCNC: 1.3 MMOL/L (ref 0.5–2)
LEUKOCYTE ESTERASE UR QL STRIP.AUTO: NEGATIVE
LIPASE SERPL-CCNC: 25 U/L (ref 11–82)
LYMPHOCYTES # BLD AUTO: 1.49 K/UL (ref 1–4.8)
LYMPHOCYTES NFR BLD: 7.6 % (ref 22–41)
MANUAL DIFF BLD: NORMAL
MCH RBC QN AUTO: 28.7 PG (ref 27–33)
MCHC RBC AUTO-ENTMCNC: 32.1 G/DL (ref 32.2–35.5)
MCV RBC AUTO: 89.6 FL (ref 81.4–97.8)
MICRO URNS: ABNORMAL
MONOCYTES # BLD AUTO: 0.84 K/UL (ref 0–0.85)
MONOCYTES NFR BLD AUTO: 4.3 % (ref 0–13.4)
MORPHOLOGY BLD-IMP: NORMAL
NEUTROPHILS # BLD AUTO: 17.27 K/UL (ref 1.82–7.42)
NEUTROPHILS NFR BLD: 88.1 % (ref 44–72)
NITRITE UR QL STRIP.AUTO: NEGATIVE
NRBC # BLD AUTO: 0 K/UL
NRBC BLD-RTO: 0 /100 WBC (ref 0–0.2)
PH UR STRIP.AUTO: 5 [PH] (ref 5–8)
PLATELET # BLD AUTO: 293 K/UL (ref 164–446)
PLATELET BLD QL SMEAR: NORMAL
PMV BLD AUTO: 12.3 FL (ref 9–12.9)
POIKILOCYTOSIS BLD QL SMEAR: NORMAL
POTASSIUM SERPL-SCNC: 3 MMOL/L (ref 3.6–5.5)
PROT SERPL-MCNC: 7.6 G/DL (ref 6–8.2)
PROT UR QL STRIP: 30 MG/DL
RBC # BLD AUTO: 3.83 M/UL (ref 4.2–5.4)
RBC # URNS HPF: ABNORMAL /HPF
RBC BLD AUTO: PRESENT
RBC UR QL AUTO: NEGATIVE
RSV RNA SPEC QL NAA+PROBE: NEGATIVE
RSV RNA SPEC QL NAA+PROBE: NEGATIVE
SARS-COV-2 RNA RESP QL NAA+PROBE: NEGATIVE
SARS-COV-2 RNA RESP QL NAA+PROBE: NOTDETECTED
SODIUM SERPL-SCNC: 135 MMOL/L (ref 135–145)
SP GR UR STRIP.AUTO: 1.01
SPECIMEN SOURCE: NORMAL
UROBILINOGEN UR STRIP.AUTO-MCNC: 1 MG/DL
WBC # BLD AUTO: 19.6 K/UL (ref 4.8–10.8)
WBC #/AREA URNS HPF: ABNORMAL /HPF

## 2023-10-09 PROCEDURE — 86140 C-REACTIVE PROTEIN: CPT

## 2023-10-09 PROCEDURE — C9803 HOPD COVID-19 SPEC COLLECT: HCPCS | Performed by: EMERGENCY MEDICINE

## 2023-10-09 PROCEDURE — 87086 URINE CULTURE/COLONY COUNT: CPT

## 2023-10-09 PROCEDURE — 3079F DIAST BP 80-89 MM HG: CPT | Performed by: NURSE PRACTITIONER

## 2023-10-09 PROCEDURE — 36415 COLL VENOUS BLD VENIPUNCTURE: CPT

## 2023-10-09 PROCEDURE — 93005 ELECTROCARDIOGRAM TRACING: CPT | Performed by: EMERGENCY MEDICINE

## 2023-10-09 PROCEDURE — 80053 COMPREHEN METABOLIC PANEL: CPT

## 2023-10-09 PROCEDURE — 83615 LACTATE (LD) (LDH) ENZYME: CPT

## 2023-10-09 PROCEDURE — 700111 HCHG RX REV CODE 636 W/ 250 OVERRIDE (IP): Mod: JZ,UD | Performed by: EMERGENCY MEDICINE

## 2023-10-09 PROCEDURE — 83605 ASSAY OF LACTIC ACID: CPT

## 2023-10-09 PROCEDURE — 99285 EMERGENCY DEPT VISIT HI MDM: CPT

## 2023-10-09 PROCEDURE — 96375 TX/PRO/DX INJ NEW DRUG ADDON: CPT

## 2023-10-09 PROCEDURE — 96374 THER/PROPH/DIAG INJ IV PUSH: CPT

## 2023-10-09 PROCEDURE — 93005 ELECTROCARDIOGRAM TRACING: CPT

## 2023-10-09 PROCEDURE — 700102 HCHG RX REV CODE 250 W/ 637 OVERRIDE(OP): Mod: UD | Performed by: EMERGENCY MEDICINE

## 2023-10-09 PROCEDURE — 83735 ASSAY OF MAGNESIUM: CPT

## 2023-10-09 PROCEDURE — 770020 HCHG ROOM/CARE - TELE (206)

## 2023-10-09 PROCEDURE — 3074F SYST BP LT 130 MM HG: CPT | Performed by: NURSE PRACTITIONER

## 2023-10-09 PROCEDURE — 81001 URINALYSIS AUTO W/SCOPE: CPT

## 2023-10-09 PROCEDURE — 85025 COMPLETE CBC W/AUTO DIFF WBC: CPT

## 2023-10-09 PROCEDURE — 0241U POCT CEPHEID COV-2, FLU A/B, RSV - PCR: CPT | Performed by: NURSE PRACTITIONER

## 2023-10-09 PROCEDURE — 83690 ASSAY OF LIPASE: CPT

## 2023-10-09 PROCEDURE — 99222 1ST HOSP IP/OBS MODERATE 55: CPT | Performed by: STUDENT IN AN ORGANIZED HEALTH CARE EDUCATION/TRAINING PROGRAM

## 2023-10-09 PROCEDURE — A9270 NON-COVERED ITEM OR SERVICE: HCPCS | Mod: UD | Performed by: EMERGENCY MEDICINE

## 2023-10-09 PROCEDURE — 84145 PROCALCITONIN (PCT): CPT

## 2023-10-09 PROCEDURE — 85652 RBC SED RATE AUTOMATED: CPT

## 2023-10-09 PROCEDURE — 85007 BL SMEAR W/DIFF WBC COUNT: CPT

## 2023-10-09 PROCEDURE — 82533 TOTAL CORTISOL: CPT

## 2023-10-09 PROCEDURE — 87040 BLOOD CULTURE FOR BACTERIA: CPT | Mod: 91

## 2023-10-09 PROCEDURE — 99215 OFFICE O/P EST HI 40 MIN: CPT | Performed by: NURSE PRACTITIONER

## 2023-10-09 PROCEDURE — 0241U HCHG SARS-COV-2 COVID-19 NFCT DS RESP RNA 4 TRGT MIC: CPT

## 2023-10-09 PROCEDURE — 74176 CT ABD & PELVIS W/O CONTRAST: CPT

## 2023-10-09 PROCEDURE — 71045 X-RAY EXAM CHEST 1 VIEW: CPT

## 2023-10-09 PROCEDURE — 700105 HCHG RX REV CODE 258: Mod: UD | Performed by: EMERGENCY MEDICINE

## 2023-10-09 RX ORDER — PROMETHAZINE HYDROCHLORIDE 25 MG/1
12.5-25 TABLET ORAL EVERY 4 HOURS PRN
Status: DISCONTINUED | OUTPATIENT
Start: 2023-10-09 | End: 2023-10-12 | Stop reason: HOSPADM

## 2023-10-09 RX ORDER — LABETALOL HYDROCHLORIDE 5 MG/ML
10 INJECTION, SOLUTION INTRAVENOUS EVERY 4 HOURS PRN
Status: DISCONTINUED | OUTPATIENT
Start: 2023-10-09 | End: 2023-10-12 | Stop reason: HOSPADM

## 2023-10-09 RX ORDER — ACETAMINOPHEN 325 MG/1
650 TABLET ORAL EVERY 6 HOURS PRN
Status: DISCONTINUED | OUTPATIENT
Start: 2023-10-09 | End: 2023-10-12 | Stop reason: HOSPADM

## 2023-10-09 RX ORDER — ONDANSETRON 2 MG/ML
4 INJECTION INTRAMUSCULAR; INTRAVENOUS EVERY 4 HOURS PRN
Status: DISCONTINUED | OUTPATIENT
Start: 2023-10-09 | End: 2023-10-09

## 2023-10-09 RX ORDER — GUAIFENESIN 200 MG/10ML
10 LIQUID ORAL EVERY 4 HOURS PRN
COMMUNITY

## 2023-10-09 RX ORDER — DOCUSATE SODIUM 100 MG/1
100 CAPSULE, LIQUID FILLED ORAL 2 TIMES DAILY PRN
COMMUNITY

## 2023-10-09 RX ORDER — POLYETHYLENE GLYCOL 3350 17 G/17G
1 POWDER, FOR SOLUTION ORAL
Status: DISCONTINUED | OUTPATIENT
Start: 2023-10-09 | End: 2023-10-12 | Stop reason: HOSPADM

## 2023-10-09 RX ORDER — ONDANSETRON 4 MG/1
4 TABLET, ORALLY DISINTEGRATING ORAL EVERY 4 HOURS PRN
Status: DISCONTINUED | OUTPATIENT
Start: 2023-10-09 | End: 2023-10-09

## 2023-10-09 RX ORDER — PROCHLORPERAZINE EDISYLATE 5 MG/ML
5-10 INJECTION INTRAMUSCULAR; INTRAVENOUS EVERY 4 HOURS PRN
Status: DISCONTINUED | OUTPATIENT
Start: 2023-10-09 | End: 2023-10-12 | Stop reason: HOSPADM

## 2023-10-09 RX ORDER — CEFTRIAXONE 2 G/1
2000 INJECTION, POWDER, FOR SOLUTION INTRAMUSCULAR; INTRAVENOUS ONCE
Status: COMPLETED | OUTPATIENT
Start: 2023-10-09 | End: 2023-10-09

## 2023-10-09 RX ORDER — SODIUM CHLORIDE, SODIUM LACTATE, POTASSIUM CHLORIDE, AND CALCIUM CHLORIDE .6; .31; .03; .02 G/100ML; G/100ML; G/100ML; G/100ML
500 INJECTION, SOLUTION INTRAVENOUS
Status: DISCONTINUED | OUTPATIENT
Start: 2023-10-09 | End: 2023-10-12 | Stop reason: HOSPADM

## 2023-10-09 RX ORDER — DOXYCYCLINE 100 MG/1
100 TABLET ORAL EVERY 12 HOURS
Status: DISCONTINUED | OUTPATIENT
Start: 2023-10-10 | End: 2023-10-12 | Stop reason: HOSPADM

## 2023-10-09 RX ORDER — SODIUM CHLORIDE AND POTASSIUM CHLORIDE 300; 900 MG/100ML; MG/100ML
INJECTION, SOLUTION INTRAVENOUS CONTINUOUS
Status: DISCONTINUED | OUTPATIENT
Start: 2023-10-09 | End: 2023-10-11

## 2023-10-09 RX ORDER — MORPHINE SULFATE 4 MG/ML
2 INJECTION INTRAVENOUS ONCE
Status: COMPLETED | OUTPATIENT
Start: 2023-10-09 | End: 2023-10-09

## 2023-10-09 RX ORDER — PROMETHAZINE HYDROCHLORIDE 25 MG/1
12.5-25 SUPPOSITORY RECTAL EVERY 4 HOURS PRN
Status: DISCONTINUED | OUTPATIENT
Start: 2023-10-09 | End: 2023-10-12 | Stop reason: HOSPADM

## 2023-10-09 RX ORDER — SODIUM CHLORIDE, SODIUM LACTATE, POTASSIUM CHLORIDE, AND CALCIUM CHLORIDE .6; .31; .03; .02 G/100ML; G/100ML; G/100ML; G/100ML
1000 INJECTION, SOLUTION INTRAVENOUS ONCE
Status: COMPLETED | OUTPATIENT
Start: 2023-10-09 | End: 2023-10-09

## 2023-10-09 RX ORDER — AZITHROMYCIN 250 MG/1
500 TABLET, FILM COATED ORAL ONCE
Status: COMPLETED | OUTPATIENT
Start: 2023-10-09 | End: 2023-10-09

## 2023-10-09 RX ORDER — BISACODYL 10 MG
10 SUPPOSITORY, RECTAL RECTAL
Status: DISCONTINUED | OUTPATIENT
Start: 2023-10-09 | End: 2023-10-12 | Stop reason: HOSPADM

## 2023-10-09 RX ORDER — MAGNESIUM SULFATE 1 G/100ML
1 INJECTION INTRAVENOUS ONCE
Status: COMPLETED | OUTPATIENT
Start: 2023-10-09 | End: 2023-10-10

## 2023-10-09 RX ORDER — HEPARIN SODIUM 5000 [USP'U]/ML
5000 INJECTION, SOLUTION INTRAVENOUS; SUBCUTANEOUS EVERY 8 HOURS
Status: DISCONTINUED | OUTPATIENT
Start: 2023-10-09 | End: 2023-10-12 | Stop reason: HOSPADM

## 2023-10-09 RX ORDER — MORPHINE SULFATE 4 MG/ML
2 INJECTION INTRAVENOUS ONCE
Status: DISCONTINUED | OUTPATIENT
Start: 2023-10-09 | End: 2023-10-09

## 2023-10-09 RX ORDER — AMOXICILLIN 250 MG
2 CAPSULE ORAL 2 TIMES DAILY
Status: DISCONTINUED | OUTPATIENT
Start: 2023-10-10 | End: 2023-10-12 | Stop reason: HOSPADM

## 2023-10-09 RX ADMIN — MORPHINE SULFATE 2 MG: 4 INJECTION, SOLUTION INTRAMUSCULAR; INTRAVENOUS at 19:32

## 2023-10-09 RX ADMIN — AZITHROMYCIN DIHYDRATE 500 MG: 250 TABLET, FILM COATED ORAL at 20:07

## 2023-10-09 RX ADMIN — SODIUM CHLORIDE, POTASSIUM CHLORIDE, SODIUM LACTATE AND CALCIUM CHLORIDE 1000 ML: 600; 310; 30; 20 INJECTION, SOLUTION INTRAVENOUS at 19:32

## 2023-10-09 RX ADMIN — CEFTRIAXONE SODIUM 2000 MG: 2 INJECTION, POWDER, FOR SOLUTION INTRAMUSCULAR; INTRAVENOUS at 20:07

## 2023-10-09 ASSESSMENT — LIFESTYLE VARIABLES: DO YOU DRINK ALCOHOL: NO

## 2023-10-09 ASSESSMENT — FIBROSIS 4 INDEX
FIB4 SCORE: 0.73
FIB4 SCORE: 0.73

## 2023-10-09 NOTE — ASSESSMENT & PLAN NOTE
Ongoing- Here in clinic was noted to be holding her oxygen saturations but has significant crackles in the right base of lung fields. Is diminished in alertness and off from her baseline post CVA.

## 2023-10-09 NOTE — ASSESSMENT & PLAN NOTE
Ongoing- Acute  Concern for further Acute process- Has not been able to hold fluids down. Continues to be guarded on exam with her lower right abdomen.   Sending to ER given that she has become less alert and continues to be in significant pain.

## 2023-10-09 NOTE — PROGRESS NOTES
Chief Complaint   Patient presents with    Abdominal Pain    Emesis    Cough    Fever       Subjective:     HPI:   Fouzia Santamaria is a 63 y.o. female here to discuss the evaluation and management of:      Problem   Vomiting    Started over a week ago with respiratory symptoms. But has progressed to lower right sided abdominal pain. Last BM was yesterday and was normal. She continues to hold her side and moan in pain. Family is at appointment and very concerned      Cough    Patient started having illness related to GI and respiratory several days ago. Had known sick contacts in the home. They seemed to have recovered but hers does not seem to be improving. Unclear if it was covid as they did not take tests. She started coughing up a significant amount of greenish mucous. Was running fever up to 100.9 for a few days.  She has been clammy and had chills.          ROS  See HPI     Allergies   Allergen Reactions    Pcn [Penicillins] Vomiting and Nausea    Toradol Vomiting and Nausea       Current medicines (including changes today)  Current Outpatient Medications   Medication Sig Dispense Refill    furosemide (LASIX) 20 MG Tab TAKE 1 TABLET BY MOUTH TWICE A DAY 60 Tablet 0    ezetimibe (ZETIA) 10 MG Tab TAKE 1 TABLET BY MOUTH EVERY DAY 30 Tablet 0    pramipexole (MIRAPEX) 0.125 MG Tab TAKE 1 TABLET BY MOUTH THREE TIMES A DAY 90 Tablet 1    aspirin 81 MG EC tablet Take 1 Tablet by mouth every day.      metoprolol tartrate (LOPRESSOR) 25 MG Tab TAKE 1/2 TABLET BY MOUTH 2 TIMES A DAY 90 Tablet 1    docusate sodium (COLACE) 100 MG Cap TAKE 1 CAPSULE BY MOUTH TWICE A DAY 60 Capsule 0    lisinopril (PRINIVIL) 10 MG Tab TAKE 1 TABLET BY MOUTH EVERY DAY 90 Tablet 0    mirtazapine (REMERON) 15 MG Tab Take 15 mg by mouth every evening.      cyclobenzaprine (FLEXERIL) 5 mg tablet Take 1 Tablet by mouth 2 times a day as needed for Muscle Spasms. 30 Tablet 1    polyethylene glycol 3350 (MIRALAX) 17 GM/SCOOP Powder polyethylene  glycol 3350 17 gram oral powder packet (Patient not taking: Reported on 8/30/2023)      aspirin 81 MG EC tablet aspirin 81 mg tablet,delayed release   Take 1 tablet every day by oral route.      Rivaroxaban (XARELTO STARTER PACK) 15 & 20 MG Tablet Therapy Pack Xarelto DVT-PE Treatment 30-Day Starter 15 mg(42)-20 mg(9) tablet pack   Take by oral route. (Patient not taking: Reported on 8/30/2023)      aspirin EC (ECOTRIN) 81 MG Tablet Delayed Response Take 1 Tablet by mouth every day. 30 Tablet 1    clopidogrel (PLAVIX) 75 MG Tab Take 1 Tablet by mouth every day. 30 Tablet 3    acetaminophen (TYLENOL) 500 MG Tab Take 2 Tablets by mouth 3 times a day. 30 Tablet 0    polyethylene glycol/lytes (MIRALAX) 17 g Pack Mix and drink 1 Packet by mouth every day. (Patient not taking: Reported on 8/30/2023) 30 Each 0    atorvastatin (LIPITOR) 80 MG tablet Take 80 mg by mouth every evening. (Patient not taking: Reported on 8/30/2023)       No current facility-administered medications for this visit.       Social History     Tobacco Use    Smoking status: Every Day     Current packs/day: 2.00     Average packs/day: 2.0 packs/day for 48.0 years (96.0 ttl pk-yrs)     Types: Cigarettes    Smokeless tobacco: Never   Vaping Use    Vaping Use: Never used   Substance Use Topics    Alcohol use: No    Drug use: Yes     Types: Marijuana, Inhaled       Patient Active Problem List    Diagnosis Date Noted    Vomiting 10/09/2023    Cough 10/09/2023    Encounter for medication refill 01/04/2023    Muscle pain 01/04/2023    Reactive depression 10/19/2022    CKD (chronic kidney disease) stage 3, GFR 30-59 ml/min ([facility]) 09/13/2022    Renal artery stenosis ([facility]) 09/13/2022    Postoperative hemorrhage involving circulatory system following circulatory system procedure 09/13/2022    Cardiomyopathy ([facility]) 09/13/2022    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[facility]) 09/04/2022    Oropharyngeal dysphagia 09/01/2022    Stroke ([facility])     Chronic systolic congestive heart failure  "([facility]) 08/25/2022    COPD (chronic obstructive pulmonary disease) ([facility]) 08/24/2022    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       Objective:     /80 (BP Location: Right arm, Patient Position: Sitting, BP Cuff Size: Adult)   Pulse 90   Temp (!) 35.6 °C (96 °F) (Temporal)   Resp 17   Ht 1.651 m (5' 5\")   Wt 56.7 kg (125 lb)   SpO2 96%  Body mass index is 20.8 kg/m².    Physical Exam:  Physical Exam  Vitals reviewed.   Constitutional:       Appearance: She is underweight. She is ill-appearing.      Comments: Here in wheel chair- less alert than baseline    HENT:      Head: Normocephalic.      Nose: Nose normal.      Mouth/Throat:      Mouth: Mucous membranes are moist.      Pharynx: Oropharynx is clear. No oropharyngeal exudate.   Eyes:      Conjunctiva/sclera: Conjunctivae normal.      Pupils: Pupils are equal, round, and reactive to light.   Cardiovascular:      Rate and Rhythm: Normal rate and regular rhythm.      Heart sounds: Normal heart sounds.   Pulmonary:      Effort: Pulmonary effort is normal. No respiratory distress.      Breath sounds: Examination of the right-middle field reveals rales. Examination of the right-lower field reveals rales. Decreased breath sounds and rales present. No wheezing.   Abdominal:      Tenderness: There is generalized abdominal tenderness and tenderness in the right lower quadrant, periumbilical area and suprapubic area. There is guarding.      Comments: Significantly tender to right lower quadrant with guarding.    Musculoskeletal:      Cervical back: Neck supple. No tenderness.   Lymphadenopathy:      Cervical: No cervical adenopathy.   Skin:     General: Skin is warm and dry. "   Neurological:      Mental Status: She is lethargic and disoriented.      Comments: Off from baseline with CVA    Psychiatric:         Mood and Affect: Mood normal.         Behavior: Behavior normal. Behavior is cooperative.              Assessment and Plan:     The following treatment plan was discussed:    Problem List Items Addressed This Visit       Vomiting     Ongoing- Acute  Concern for further Acute process- Has not been able to hold fluids down. Continues to be guarded on exam with her lower right abdomen.   Sending to ER given that she has become less alert and continues to be in significant pain.          Relevant Orders    POCT Cepheid CoV-2, Flu A/B, RSV - PCR (Completed)    Cough     Ongoing- Here in clinic was noted to be holding her oxygen saturations but has significant crackles in the right base of lung fields. Is diminished in alertness and off from her baseline post CVA.          Relevant Orders    POCT Cepheid CoV-2, Flu A/B, RSV - PCR (Completed)     Other Visit Diagnoses       Fever, unspecified fever cause        Relevant Orders    POCT Cepheid CoV-2, Flu A/B, RSV - PCR (Completed)            Any change or worsening of signs or symptoms, patient encouraged to follow-up or report to emergency room for further evaluation. Patient verbalizes understanding and agrees.    Follow-Up: Follow up after ER      PLEASE NOTE: This dictation was created using voice recognition software. I have made every reasonable attempt to correct obvious errors, but I expect that there are errors of grammar and possibly content that I did not discover before finalizing the note.

## 2023-10-10 ENCOUNTER — APPOINTMENT (OUTPATIENT)
Dept: RADIOLOGY | Facility: MEDICAL CENTER | Age: 63
DRG: 871 | End: 2023-10-10
Attending: STUDENT IN AN ORGANIZED HEALTH CARE EDUCATION/TRAINING PROGRAM
Payer: MEDICAID

## 2023-10-10 PROBLEM — D72.829 LEUKOCYTOSIS: Status: ACTIVE | Noted: 2023-10-10

## 2023-10-10 PROBLEM — E44.0 MODERATE PROTEIN-CALORIE MALNUTRITION (HCC): Status: ACTIVE | Noted: 2023-10-10

## 2023-10-10 PROBLEM — R65.10 SIRS (SYSTEMIC INFLAMMATORY RESPONSE SYNDROME) (HCC): Status: RESOLVED | Noted: 2023-10-10 | Resolved: 2023-10-10

## 2023-10-10 PROBLEM — A41.9 SEPSIS (HCC): Status: ACTIVE | Noted: 2023-10-10

## 2023-10-10 PROBLEM — R65.10 SIRS (SYSTEMIC INFLAMMATORY RESPONSE SYNDROME) (HCC): Status: ACTIVE | Noted: 2023-10-10

## 2023-10-10 PROBLEM — N18.9 ACUTE KIDNEY INJURY SUPERIMPOSED ON CKD (HCC): Status: ACTIVE | Noted: 2022-08-23

## 2023-10-10 LAB
ALBUMIN SERPL BCP-MCNC: 2.4 G/DL (ref 3.2–4.9)
ALBUMIN/GLOB SERPL: 0.6 G/DL
ALP SERPL-CCNC: 119 U/L (ref 30–99)
ALT SERPL-CCNC: 7 U/L (ref 2–50)
ANION GAP SERPL CALC-SCNC: 14 MMOL/L (ref 7–16)
AST SERPL-CCNC: 13 U/L (ref 12–45)
BASOPHILS # BLD AUTO: 0 % (ref 0–1.8)
BASOPHILS # BLD: 0 K/UL (ref 0–0.12)
BILIRUB SERPL-MCNC: 0.4 MG/DL (ref 0.1–1.5)
BUN SERPL-MCNC: 65 MG/DL (ref 8–22)
CALCIUM ALBUM COR SERPL-MCNC: 9.7 MG/DL (ref 8.5–10.5)
CALCIUM SERPL-MCNC: 8.4 MG/DL (ref 8.5–10.5)
CHLORIDE SERPL-SCNC: 97 MMOL/L (ref 96–112)
CO2 SERPL-SCNC: 22 MMOL/L (ref 20–33)
CREAT SERPL-MCNC: 2.01 MG/DL (ref 0.5–1.4)
CRP SERPL HS-MCNC: 30.98 MG/DL (ref 0–0.75)
EOSINOPHIL # BLD AUTO: 0 K/UL (ref 0–0.51)
EOSINOPHIL NFR BLD: 0 % (ref 0–6.9)
ERYTHROCYTE [DISTWIDTH] IN BLOOD BY AUTOMATED COUNT: 51.5 FL (ref 35.9–50)
ERYTHROCYTE [SEDIMENTATION RATE] IN BLOOD BY WESTERGREN METHOD: 10 MM/HOUR (ref 0–25)
GFR SERPLBLD CREATININE-BSD FMLA CKD-EPI: 28 ML/MIN/1.73 M 2
GLOBULIN SER CALC-MCNC: 4 G/DL (ref 1.9–3.5)
GLUCOSE SERPL-MCNC: 99 MG/DL (ref 65–99)
HCT VFR BLD AUTO: 28.6 % (ref 37–47)
HGB BLD-MCNC: 9 G/DL (ref 12–16)
LDH SERPL L TO P-CCNC: 143 U/L (ref 107–266)
LYMPHOCYTES # BLD AUTO: 1.2 K/UL (ref 1–4.8)
LYMPHOCYTES NFR BLD: 6.8 % (ref 22–41)
MAGNESIUM SERPL-MCNC: 1.8 MG/DL (ref 1.5–2.5)
MAGNESIUM SERPL-MCNC: 2 MG/DL (ref 1.5–2.5)
MANUAL DIFF BLD: NORMAL
MCH RBC QN AUTO: 28.8 PG (ref 27–33)
MCHC RBC AUTO-ENTMCNC: 31.5 G/DL (ref 32.2–35.5)
MCV RBC AUTO: 91.7 FL (ref 81.4–97.8)
MONOCYTES # BLD AUTO: 0.3 K/UL (ref 0–0.85)
MONOCYTES NFR BLD AUTO: 1.7 % (ref 0–13.4)
MORPHOLOGY BLD-IMP: NORMAL
MYELOCYTES NFR BLD MANUAL: 1.7 %
NEUTROPHILS # BLD AUTO: 15.89 K/UL (ref 1.82–7.42)
NEUTROPHILS NFR BLD: 89.8 % (ref 44–72)
NRBC # BLD AUTO: 0 K/UL
NRBC BLD-RTO: 0 /100 WBC (ref 0–0.2)
PHOSPHATE SERPL-MCNC: 3.7 MG/DL (ref 2.5–4.5)
PLATELET # BLD AUTO: 310 K/UL (ref 164–446)
PLATELET BLD QL SMEAR: NORMAL
PMV BLD AUTO: 10.9 FL (ref 9–12.9)
POTASSIUM SERPL-SCNC: 2.9 MMOL/L (ref 3.6–5.5)
PROCALCITONIN SERPL-MCNC: 6.72 NG/ML
PROT SERPL-MCNC: 6.4 G/DL (ref 6–8.2)
RBC # BLD AUTO: 3.12 M/UL (ref 4.2–5.4)
RBC BLD AUTO: NORMAL
SODIUM SERPL-SCNC: 133 MMOL/L (ref 135–145)
WBC # BLD AUTO: 17.7 K/UL (ref 4.8–10.8)

## 2023-10-10 PROCEDURE — 700102 HCHG RX REV CODE 250 W/ 637 OVERRIDE(OP): Performed by: STUDENT IN AN ORGANIZED HEALTH CARE EDUCATION/TRAINING PROGRAM

## 2023-10-10 PROCEDURE — 36415 COLL VENOUS BLD VENIPUNCTURE: CPT

## 2023-10-10 PROCEDURE — 76775 US EXAM ABDO BACK WALL LIM: CPT

## 2023-10-10 PROCEDURE — 700111 HCHG RX REV CODE 636 W/ 250 OVERRIDE (IP): Performed by: STUDENT IN AN ORGANIZED HEALTH CARE EDUCATION/TRAINING PROGRAM

## 2023-10-10 PROCEDURE — 770020 HCHG ROOM/CARE - TELE (206)

## 2023-10-10 PROCEDURE — 84100 ASSAY OF PHOSPHORUS: CPT

## 2023-10-10 PROCEDURE — A9270 NON-COVERED ITEM OR SERVICE: HCPCS | Performed by: STUDENT IN AN ORGANIZED HEALTH CARE EDUCATION/TRAINING PROGRAM

## 2023-10-10 PROCEDURE — 85025 COMPLETE CBC W/AUTO DIFF WBC: CPT

## 2023-10-10 PROCEDURE — 700111 HCHG RX REV CODE 636 W/ 250 OVERRIDE (IP)

## 2023-10-10 PROCEDURE — 700101 HCHG RX REV CODE 250: Performed by: STUDENT IN AN ORGANIZED HEALTH CARE EDUCATION/TRAINING PROGRAM

## 2023-10-10 PROCEDURE — 700101 HCHG RX REV CODE 250

## 2023-10-10 PROCEDURE — 99232 SBSQ HOSP IP/OBS MODERATE 35: CPT | Mod: GC | Performed by: INTERNAL MEDICINE

## 2023-10-10 PROCEDURE — 83735 ASSAY OF MAGNESIUM: CPT

## 2023-10-10 PROCEDURE — 80053 COMPREHEN METABOLIC PANEL: CPT

## 2023-10-10 PROCEDURE — 85007 BL SMEAR W/DIFF WBC COUNT: CPT

## 2023-10-10 RX ORDER — CLOPIDOGREL BISULFATE 75 MG/1
75 TABLET ORAL DAILY
Status: DISCONTINUED | OUTPATIENT
Start: 2023-10-10 | End: 2023-10-12 | Stop reason: HOSPADM

## 2023-10-10 RX ORDER — GUAIFENESIN 200 MG/10ML
10 LIQUID ORAL EVERY 4 HOURS PRN
Status: DISCONTINUED | OUTPATIENT
Start: 2023-10-10 | End: 2023-10-12 | Stop reason: HOSPADM

## 2023-10-10 RX ORDER — MAGNESIUM SULFATE 1 G/100ML
1 INJECTION INTRAVENOUS ONCE
Status: COMPLETED | OUTPATIENT
Start: 2023-10-10 | End: 2023-10-10

## 2023-10-10 RX ORDER — POTASSIUM CHLORIDE 20 MEQ/1
40 TABLET, EXTENDED RELEASE ORAL ONCE
Status: DISCONTINUED | OUTPATIENT
Start: 2023-10-10 | End: 2023-10-10

## 2023-10-10 RX ORDER — PRAMIPEXOLE DIHYDROCHLORIDE 0.12 MG/1
0.12 TABLET ORAL 3 TIMES DAILY
Status: DISCONTINUED | OUTPATIENT
Start: 2023-10-10 | End: 2023-10-12 | Stop reason: HOSPADM

## 2023-10-10 RX ORDER — EZETIMIBE 10 MG/1
10 TABLET ORAL DAILY
Status: DISCONTINUED | OUTPATIENT
Start: 2023-10-10 | End: 2023-10-12 | Stop reason: HOSPADM

## 2023-10-10 RX ORDER — ASPIRIN 81 MG/1
81 TABLET ORAL DAILY
Status: DISCONTINUED | OUTPATIENT
Start: 2023-10-10 | End: 2023-10-12 | Stop reason: HOSPADM

## 2023-10-10 RX ORDER — LISINOPRIL 10 MG/1
10 TABLET ORAL DAILY
Status: DISCONTINUED | OUTPATIENT
Start: 2023-10-10 | End: 2023-10-10

## 2023-10-10 RX ORDER — TAMSULOSIN HYDROCHLORIDE 0.4 MG/1
0.4 CAPSULE ORAL 2 TIMES DAILY
Status: DISCONTINUED | OUTPATIENT
Start: 2023-10-10 | End: 2023-10-12 | Stop reason: HOSPADM

## 2023-10-10 RX ORDER — MIRTAZAPINE 15 MG/1
15 TABLET, FILM COATED ORAL NIGHTLY
Status: DISCONTINUED | OUTPATIENT
Start: 2023-10-10 | End: 2023-10-12 | Stop reason: HOSPADM

## 2023-10-10 RX ADMIN — HEPARIN SODIUM 5000 UNITS: 5000 INJECTION, SOLUTION INTRAVENOUS; SUBCUTANEOUS at 06:26

## 2023-10-10 RX ADMIN — POTASSIUM CHLORIDE AND SODIUM CHLORIDE: 900; 300 INJECTION, SOLUTION INTRAVENOUS at 01:59

## 2023-10-10 RX ADMIN — CEFTRIAXONE SODIUM 2000 MG: 10 INJECTION, POWDER, FOR SOLUTION INTRAVENOUS at 21:57

## 2023-10-10 RX ADMIN — HEPARIN SODIUM 5000 UNITS: 5000 INJECTION, SOLUTION INTRAVENOUS; SUBCUTANEOUS at 21:58

## 2023-10-10 RX ADMIN — POTASSIUM CHLORIDE AND SODIUM CHLORIDE: 900; 300 INJECTION, SOLUTION INTRAVENOUS at 15:48

## 2023-10-10 RX ADMIN — DOCUSATE SODIUM 50 MG AND SENNOSIDES 8.6 MG 2 TABLET: 8.6; 5 TABLET, FILM COATED ORAL at 17:04

## 2023-10-10 RX ADMIN — MIRTAZAPINE 15 MG: 15 TABLET, FILM COATED ORAL at 23:01

## 2023-10-10 RX ADMIN — CLOPIDOGREL BISULFATE 75 MG: 75 TABLET ORAL at 06:27

## 2023-10-10 RX ADMIN — MAGNESIUM SULFATE IN DEXTROSE 1 G: 10 INJECTION, SOLUTION INTRAVENOUS at 04:41

## 2023-10-10 RX ADMIN — DOCUSATE SODIUM 50 MG AND SENNOSIDES 8.6 MG 2 TABLET: 8.6; 5 TABLET, FILM COATED ORAL at 06:26

## 2023-10-10 RX ADMIN — HEPARIN SODIUM 5000 UNITS: 5000 INJECTION, SOLUTION INTRAVENOUS; SUBCUTANEOUS at 02:12

## 2023-10-10 RX ADMIN — ASPIRIN 81 MG: 81 TABLET, COATED ORAL at 06:26

## 2023-10-10 RX ADMIN — TAMSULOSIN HYDROCHLORIDE 0.4 MG: 0.4 CAPSULE ORAL at 17:05

## 2023-10-10 RX ADMIN — TAMSULOSIN HYDROCHLORIDE 0.4 MG: 0.4 CAPSULE ORAL at 06:26

## 2023-10-10 RX ADMIN — METOPROLOL TARTRATE 12.5 MG: 25 TABLET, FILM COATED ORAL at 17:05

## 2023-10-10 RX ADMIN — POTASSIUM CHLORIDE AND SODIUM CHLORIDE: 900; 300 INJECTION, SOLUTION INTRAVENOUS at 08:01

## 2023-10-10 RX ADMIN — METOPROLOL TARTRATE 12.5 MG: 25 TABLET, FILM COATED ORAL at 06:26

## 2023-10-10 RX ADMIN — MAGNESIUM SULFATE IN DEXTROSE 1 G: 10 INJECTION, SOLUTION INTRAVENOUS at 14:02

## 2023-10-10 RX ADMIN — EZETIMIBE 10 MG: 10 TABLET ORAL at 06:25

## 2023-10-10 RX ADMIN — DOXYCYCLINE 100 MG: 100 TABLET, FILM COATED ORAL at 17:05

## 2023-10-10 RX ADMIN — PRAMIPEXOLE DIHYDROCHLORIDE 0.12 MG: 0.12 TABLET ORAL at 17:05

## 2023-10-10 RX ADMIN — HEPARIN SODIUM 5000 UNITS: 5000 INJECTION, SOLUTION INTRAVENOUS; SUBCUTANEOUS at 13:39

## 2023-10-10 RX ADMIN — PRAMIPEXOLE DIHYDROCHLORIDE 0.12 MG: 0.12 TABLET ORAL at 06:27

## 2023-10-10 RX ADMIN — PRAMIPEXOLE DIHYDROCHLORIDE 0.12 MG: 0.12 TABLET ORAL at 11:24

## 2023-10-10 RX ADMIN — DOXYCYCLINE 100 MG: 100 TABLET, FILM COATED ORAL at 06:25

## 2023-10-10 RX ADMIN — MIRTAZAPINE 15 MG: 15 TABLET, FILM COATED ORAL at 02:30

## 2023-10-10 RX ADMIN — ACETAMINOPHEN 650 MG: 325 TABLET, FILM COATED ORAL at 02:11

## 2023-10-10 ASSESSMENT — LIFESTYLE VARIABLES
DOES PATIENT WANT TO STOP DRINKING: NO
TOTAL SCORE: 0
ALCOHOL_USE: NO
ON A TYPICAL DAY WHEN YOU DRINK ALCOHOL HOW MANY DRINKS DO YOU HAVE: 0
CONSUMPTION TOTAL: INCOMPLETE
TOTAL SCORE: 0
TOTAL SCORE: 0
EVER FELT BAD OR GUILTY ABOUT YOUR DRINKING: NO
EVER FELT BAD OR GUILTY ABOUT YOUR DRINKING: NO
EVER HAD A DRINK FIRST THING IN THE MORNING TO STEADY YOUR NERVES TO GET RID OF A HANGOVER: NO
HOW MANY TIMES IN THE PAST YEAR HAVE YOU HAD 5 OR MORE DRINKS IN A DAY: 0
TOTAL SCORE: 0
SUBSTANCE_ABUSE: 0
DOES PATIENT WANT TO STOP DRINKING: NO
CONSUMPTION TOTAL: NEGATIVE
HAVE YOU EVER FELT YOU SHOULD CUT DOWN ON YOUR DRINKING: NO
HAVE PEOPLE ANNOYED YOU BY CRITICIZING YOUR DRINKING: NO
AVERAGE NUMBER OF DAYS PER WEEK YOU HAVE A DRINK CONTAINING ALCOHOL: 0
EVER HAD A DRINK FIRST THING IN THE MORNING TO STEADY YOUR NERVES TO GET RID OF A HANGOVER: NO
HAVE YOU EVER FELT YOU SHOULD CUT DOWN ON YOUR DRINKING: NO
ALCOHOL_USE: NO
TOTAL SCORE: 0
HAVE PEOPLE ANNOYED YOU BY CRITICIZING YOUR DRINKING: NO
TOTAL SCORE: 0

## 2023-10-10 ASSESSMENT — ENCOUNTER SYMPTOMS
SPEECH CHANGE: 1
DIARRHEA: 0
BLURRED VISION: 0
BRUISES/BLEEDS EASILY: 0
MYALGIAS: 0
DOUBLE VISION: 0
HEADACHES: 0
SHORTNESS OF BREATH: 1
ABDOMINAL PAIN: 1
FALLS: 0
PALPITATIONS: 0
DEPRESSION: 0
VOMITING: 0
FEVER: 1
NAUSEA: 0
FLANK PAIN: 0
FEVER: 0
COUGH: 1
CHILLS: 0
CHILLS: 1
HEARTBURN: 0
HEMOPTYSIS: 0
DIZZINESS: 0

## 2023-10-10 ASSESSMENT — COGNITIVE AND FUNCTIONAL STATUS - GENERAL
DAILY ACTIVITIY SCORE: 13
PERSONAL GROOMING: A LOT
MOBILITY SCORE: 10
SUGGESTED CMS G CODE MODIFIER MOBILITY: CL
DRESSING REGULAR LOWER BODY CLOTHING: A LOT
MOVING TO AND FROM BED TO CHAIR: A LOT
SUGGESTED CMS G CODE MODIFIER DAILY ACTIVITY: CL
TOILETING: A LOT
WALKING IN HOSPITAL ROOM: TOTAL
HELP NEEDED FOR BATHING: A LOT
STANDING UP FROM CHAIR USING ARMS: A LOT
DRESSING REGULAR UPPER BODY CLOTHING: A LOT
TURNING FROM BACK TO SIDE WHILE IN FLAT BAD: A LITTLE
MOVING FROM LYING ON BACK TO SITTING ON SIDE OF FLAT BED: UNABLE
CLIMB 3 TO 5 STEPS WITH RAILING: TOTAL
EATING MEALS: A LITTLE

## 2023-10-10 ASSESSMENT — FIBROSIS 4 INDEX: FIB4 SCORE: 1.11

## 2023-10-10 NOTE — CARE PLAN
The patient is Stable - Low risk of patient condition declining or worsening    Shift Goals  Clinical Goals: Fluids, ABX  Patient Goals: Rest  Family Goals: Recovery    Progress made toward(s) clinical / shift goals:    Problem: Pain - Standard  Goal: Alleviation of pain or a reduction in pain to the patient’s comfort goal  Outcome: Progressing  Note: Patient medicated per Mar, encouraged to utilize non pharmological means of pain relief.      Problem: Knowledge Deficit - Standard  Goal: Patient and family/care givers will demonstrate understanding of plan of care, disease process/condition, diagnostic tests and medications  Outcome: Progressing  Note: Discussed plan of care with spouse at bedside, reviewed tests to be performed. No questions at this time.     Problem: Hemodynamics  Goal: Patient's hemodynamics, fluid balance and neurologic status will be stable or improve  Outcome: Progressing  Note: Pt remained hemodynamically stable evidenced by stable vital sings, proper urine output and adequate fluid intake.

## 2023-10-10 NOTE — PROGRESS NOTES
Transported patient from ED to floor at 0115. Patient Aox1, tolerating room air, VSS, explained plan of care to spouse at bedside. Oriented patient to call light and safety precautions in room. Patient placed on tele monitoring and medicated per MAR.

## 2023-10-10 NOTE — ASSESSMENT & PLAN NOTE
Possible aspiration with risk factors including dysphagia after previous stroke and COPD, also consistent with CXR findings of R lobe infiltrates vs CAP  Pt with decreased R lung sounds, productive cough, and R chest pain on admission  Leukocytosis on admission of 17.7, improving  Admission CXR with R multifocal consolidations.   - Continue IVF but rate slowed due to concern for worsening hydronephrosis  - Continue Ceftriaxone and Doxycycline  - Pain regimen Tylenol

## 2023-10-10 NOTE — H&P
Hospital Medicine History & Physical Note    Date of Service  10/9/2023    Primary Care Physician  MARY Reyes.    Consultants  None    Code Status  Full Code    Chief Complaint  Chief Complaint   Patient presents with    Shortness of Breath     Sent by  for right sided crackles in the lungs. Pt c/o cough    Abdominal Pain     RLQ abdominal pain, N/V. Denies any blood in emesis.       History of Presenting Illness  Fouzia Santamaria is a 63 y.o. female with history of multiple comorbidities who presented 10/9/2023 with evaluation for shortness of breath, abdominal pain.  Patient noted to have multifocal pneumonia on CXR.  CTAP noted left hydronephrosis, no obstruction seen, pneumonia again seen.  Admitted to medicine service for further evaluation treatment.    I discussed the plan of care with patient, family, bedside RN, and pharmacy.    Review of Systems  Review of Systems   Constitutional:  Positive for chills, fever and malaise/fatigue.   HENT:  Negative for hearing loss and tinnitus.    Eyes:  Negative for blurred vision and double vision.   Respiratory:  Positive for cough and shortness of breath.    Cardiovascular:  Negative for chest pain and palpitations.   Gastrointestinal:  Positive for abdominal pain. Negative for heartburn, nausea and vomiting.   Genitourinary:  Positive for frequency and urgency. Negative for dysuria, flank pain and hematuria.   Musculoskeletal:  Negative for falls, joint pain and myalgias.   Skin:  Negative for itching and rash.   Neurological:  Negative for dizziness and headaches.   Endo/Heme/Allergies:  Negative for environmental allergies. Does not bruise/bleed easily.   Psychiatric/Behavioral:  Negative for depression and substance abuse.    All other systems reviewed and are negative.      Past Medical History   has a past medical history of At risk for delirium (9/13/2022), Chronic obstructive pulmonary disease (HCC), Diabetes (HCC), Encounter to establish  care (10/19/2022), Heart attack (HCC), Hydronephrosis (8/24/2022), Hypertension, Hypomagnesemia (8/29/2022), and Stroke (HCC).    Surgical History   has a past surgical history that includes thyroidectomy; stent placement; aortobifem bypass (Bilateral, 8/26/2022); endarterectomy (Bilateral, 8/26/2022); pr exploratory of abdomen (8/26/2022); application or replacement, wound vac (8/26/2022); and pr exploratory of abdomen (8/28/2022).     Family History  family history is not on file.   Family history reviewed with patient. There is family history that is pertinent to the chief complaint.     Social History   reports that she has been smoking cigarettes. She has a 96.0 pack-year smoking history. She has never used smokeless tobacco. She reports current drug use. Drugs: Marijuana and Inhaled. She reports that she does not drink alcohol.    Allergies  Allergies   Allergen Reactions    Pcn [Penicillins] Vomiting and Nausea    Toradol Vomiting and Nausea       Medications  Prior to Admission Medications   Prescriptions Last Dose Informant Patient Reported? Taking?   acetaminophen (TYLENOL) 500 MG Tab 10/9/2023 at AM Patient No No   Sig: Take 2 Tablets by mouth 3 times a day.   aspirin 81 MG EC tablet 10/9/2023 at AM Patient Yes No   Sig: Take 81 mg by mouth every day.   clopidogrel (PLAVIX) 75 MG Tab 10/9/2023 at AM Patient No No   Sig: Take 1 Tablet by mouth every day.   docusate sodium (COLACE) 100 MG Cap > 1 WEEK at PRN Patient Yes Yes   Sig: Take 100 mg by mouth 2 times a day as needed for Constipation.   ezetimibe (ZETIA) 10 MG Tab 10/9/2023 at AM Patient No No   Sig: TAKE 1 TABLET BY MOUTH EVERY DAY   furosemide (LASIX) 20 MG Tab 10/9/2023 at AM Patient No No   Sig: TAKE 1 TABLET BY MOUTH TWICE A DAY   guaiFENesin (ROBITUSSIN) 100 MG/5ML liquid FEW DAYS AGO at PRN Patient Yes Yes   Sig: Take 10 mL by mouth every four hours as needed.   lisinopril (PRINIVIL) 10 MG Tab 10/9/2023 at AM Patient No No   Sig: TAKE 1  TABLET BY MOUTH EVERY DAY   metoprolol tartrate (LOPRESSOR) 25 MG Tab 10/9/2023 at AM Patient No No   Sig: TAKE 1/2 TABLET BY MOUTH 2 TIMES A DAY   Patient taking differently: Take 12.5 mg by mouth 2 times a day.   mirtazapine (REMERON) 15 MG Tab 10/8/2023 at PM Patient Yes No   Sig: Take 15 mg by mouth every evening.   pramipexole (MIRAPEX) 0.125 MG Tab 10/9/2023 at AM Patient No No   Sig: TAKE 1 TABLET BY MOUTH THREE TIMES A DAY   Patient taking differently: Take 0.125 mg by mouth 3 times a day.      Facility-Administered Medications: None       Physical Exam  Temp:  [35.6 °C (96 °F)-36.1 °C (96.9 °F)] 36.1 °C (96.9 °F)  Pulse:  [74-92] 89  Resp:  [17-18] 18  BP: (110-139)/(64-82) 138/68  SpO2:  [94 %-97 %] 94 %  Blood Pressure: 138/68   Temperature: 36.1 °C (96.9 °F)   Pulse: 74   Respiration: 18   Pulse Oximetry: 95 %       Physical Exam  Vitals and nursing note reviewed.   Constitutional:       Appearance: She is ill-appearing.   HENT:      Head: Normocephalic and atraumatic.      Nose: Nose normal.      Mouth/Throat:      Mouth: Mucous membranes are dry.      Pharynx: Oropharynx is clear.   Eyes:      General: No scleral icterus.     Extraocular Movements: Extraocular movements intact.   Cardiovascular:      Rate and Rhythm: Normal rate and regular rhythm.      Pulses: Normal pulses.      Heart sounds:      No friction rub.   Pulmonary:      Effort: No respiratory distress.      Breath sounds: No stridor. Wheezing and rales present.   Chest:      Chest wall: No tenderness.   Abdominal:      General: There is distension.      Tenderness: There is no abdominal tenderness. There is no guarding or rebound.   Genitourinary:     Comments: No CVA tenderness  Musculoskeletal:      Cervical back: Neck supple. No tenderness.   Skin:     General: Skin is dry.      Capillary Refill: Capillary refill takes less than 2 seconds.      Coloration: Skin is pale.   Neurological:      General: No focal deficit present.       "Mental Status: She is alert and oriented to person, place, and time.   Psychiatric:         Mood and Affect: Mood normal.         Laboratory:  Recent Labs     10/09/23  1736   WBC 19.6*   RBC 3.83*   HEMOGLOBIN 11.0*   HEMATOCRIT 34.3*   MCV 89.6   MCH 28.7   MCHC 32.1*   RDW 49.4   PLATELETCT 293   MPV 12.3     Recent Labs     10/09/23  1938   SODIUM 135   POTASSIUM 3.0*   CHLORIDE 96   CO2 23   GLUCOSE 113*   BUN 71*   CREATININE 2.40*   CALCIUM 9.0     Recent Labs     10/09/23  1736 10/09/23  1938   ALTSGPT  --  9   ASTSGOT  --  16   ALKPHOSPHAT  --  133*   TBILIRUBIN  --  0.8   LIPASE 25  --    GLUCOSE  --  113*         No results for input(s): \"NTPROBNP\" in the last 72 hours.      No results for input(s): \"TROPONINT\" in the last 72 hours.    Imaging:  CT-ABDOMEN-PELVIS W/O   Final Result      1.  New mild to moderate LEFT hydronephrosis without visualized obstructing lesion   2.  Interval aortobifemoral bypass   3.  Hyperdense BILATERAL renal lesions, likely hemorrhagic cysts but incompletely characterized   4.  RIGHT middle lobe pneumonia   5.  Gallbladder sludge or stones   6.  Appendix not clearly visualized      DX-CHEST-PORTABLE (1 VIEW)   Final Result      Multiple consolidations in the right upper and lower lungs, consistent with multifocal pneumonia.      US-RENAL    (Results Pending)       X-Ray:  I have personally reviewed the images and compared with prior images.  EKG:  I have personally reviewed the images and compared with prior images.    Assessment/Plan:  Justification for Admission Status  I anticipate this patient will require at least 2 midnights hospitalization, therefore appropriate for inpatient status.        * Multifocal pneumonia  Assessment & Plan  Noted on CXR  IVF  Follow cultures  Antibiotic: Ceftriaxone, doxycycline    Leukocytosis  Assessment & Plan  IVF, abx  trend    SIRS (systemic inflammatory response syndrome) (HCC)  Assessment & Plan  SIRS criteria identified on my " evaluation include:  Fever, with temperature greater than 100.9 deg F, Leukocytosis, with WBC greater than 12,000 and Bandemia, greater than 10% bands  SIRS is non-infectious, the patient does not have sepsis      Hydronephrosis  Assessment & Plan  Left sided, non-obstruted seen on CT AP  Possibly passed stone  IVF  flomax  Consider urology consult pending clinical course    Acute kidney injury superimposed on CKD (HCC)  Assessment & Plan  IVON on CKD 3  IVF  Minimize nephrotoxic agents  Check FeNa, renal US    Hypertension- (present on admission)  Assessment & Plan  Metoprolol    Hold lisinopril, lasix until renal function improved    CAD (coronary artery disease)- (present on admission)  Assessment & Plan  Plavix/Zetia    Moderate protein-calorie malnutrition (HCC)  Assessment & Plan  Ensure  PO intake as tolerated  Body mass index is 20.8 kg/m².          VTE prophylaxis: heparin ppx

## 2023-10-10 NOTE — PROGRESS NOTES
[facility] Internal Medicine Daily Progress Note    Date of Service  10/10/2023    R Team: R IM Blue Team   Attending: Elliot Miller M.d.  Senior Resident: Dr. Bryant  Intern:  Dr. Hickman  Contact Number: [phone]    Chief Complaint  Fouzia Santamaria is a 63 y.o. female admitted 10/9/2023 with shortness of breath and abdominal pain.     Hospital Course  Fouzia Santamaria is a 63 y.o. female with history of stroke, dysphasia, HFeEF, 96-pack years, hydronephrosis, and COPD who presented 10/9 with evaluation for shortness of breath, right-sided multifocal pneumonia, and abdominal pain.     According to records, patient was seen by family medicine 10/9 for vomiting, abdominal pain and cough. Vomiting and respiratory symptoms x1 week and progressed to RLQ pain. Patient had known sick contacts at home. According to note, patient has been coughing up significant amount of greenish mucous, febrile, and chills. Significant crackles in R base of lung field notes on exam. Diminished alertness from baseline. Patient unable to hold down fluids and in significant pain, so sent to ED from clinic. COVID, flu, and RSV tests negative in office.     In ED, patient noted to have multifocal pneumonia on CXR. CTAP noted left hydronephrosis, no obstruction seen, pneumonia also seen on CXR, as well as gallstone/sludge. IV fluids and IV antibiotics were administered in the ED. Patient was found fit for admission on 10/9/23. Upon admission, patient was administered Ceftriaxone and Doxycycline for concern for aspiration pneumonia, pain regimen and Flomax for abdominal pain and nephrotoxic medications were held for IVON on CKD. Renal US was found with bilateral hydronephrosis, worse on L, and additional kidney workup was ordered. IV fluids were continued and speech language pathology was consulted due to concern for aspiration secondary to dysphagia from previous stroke.     Interval Problem Update  NAEO. Today, patient has significant  word-finding difficulty in context of history of stroke with deficits. She indicates right-sided chest pain, shortness of breath, productive cough and some discomfort over her suprapubic area. She has difficulty describing her pain and is unable to fully orient during our conversation but shows some understanding of her condition.     I have discussed this patient's plan of care and discharge plan at IDT rounds today with Case Management, Nursing, Nursing leadership, and other members of the IDT team.    Consultants/Specialty  none    Code Status  Full Code    Disposition  The patient is not medically cleared for discharge to home or a post-acute facility.      I have placed the appropriate orders for post-discharge needs.    Review of Systems  Review of Systems   Constitutional:  Negative for chills and fever.   Respiratory:  Positive for cough and shortness of breath. Negative for hemoptysis.    Cardiovascular:  Positive for chest pain. Negative for palpitations.   Gastrointestinal:  Positive for abdominal pain. Negative for diarrhea.   Genitourinary:  Negative for dysuria and flank pain.   Musculoskeletal:  Negative for falls.   Neurological:  Positive for speech change (chronic).        Physical Exam  Temp:  [35.6 °C (96 °F)-36.8 °C (98.2 °F)] 36.8 °C (98.2 °F)  Pulse:  [74-94] 94  Resp:  [14-18] 14  BP: (109-163)/(64-93) 125/93  SpO2:  [92 %-100 %] 100 %    Physical Exam  Vitals reviewed.   Constitutional:       General: She is not in acute distress.     Appearance: She is not ill-appearing.   HENT:      Head: Normocephalic and atraumatic.      Nose: Nose normal.      Mouth/Throat:      Mouth: Mucous membranes are dry.      Pharynx: Oropharynx is clear.   Eyes:      General: No scleral icterus.     Extraocular Movements: Extraocular movements intact.   Cardiovascular:      Rate and Rhythm: Normal rate and regular rhythm.      Heart sounds: Murmur (systolic murmur with possible bilateral carotid bruits noted)  heard.      No friction rub.   Pulmonary:      Effort: No respiratory distress.      Breath sounds: No stridor. Rales (with decreased breath sounds on the right) present. No wheezing.   Chest:      Chest wall: No tenderness.   Abdominal:      General: There is no distension.      Tenderness: There is abdominal tenderness (suprapubic). There is no right CVA tenderness, left CVA tenderness, guarding or rebound.      Comments: Mild L femoral artery bruit   Genitourinary:     Comments: No CVA tenderness  Musculoskeletal:      Cervical back: Neck supple. No tenderness.   Skin:     General: Skin is dry.      Capillary Refill: Capillary refill takes less than 2 seconds.      Coloration: Skin is not pale.   Neurological:      General: No focal deficit present.      Mental Status: She is alert and oriented to person, place, and time.   Psychiatric:         Mood and Affect: Mood normal.         Fluids    Intake/Output Summary (Last 24 hours) at 10/10/2023 1436  Last data filed at 10/10/2023 1122  Gross per 24 hour   Intake 1320 ml   Output 0 ml   Net 1320 ml       Laboratory  Recent Labs     10/09/23  1736 10/10/23  0303   WBC 19.6* 17.7*   RBC 3.83* 3.12*   HEMOGLOBIN 11.0* 9.0*   HEMATOCRIT 34.3* 28.6*   MCV 89.6 91.7   MCH 28.7 28.8   MCHC 32.1* 31.5*   RDW 49.4 51.5*   PLATELETCT 293 310   MPV 12.3 10.9     Recent Labs     10/09/23  1938 10/10/23  0303   SODIUM 135 133*   POTASSIUM 3.0* 2.9*   CHLORIDE 96 97   CO2 23 22   GLUCOSE 113* 99   BUN 71* 65*   CREATININE 2.40* 2.01*   CALCIUM 9.0 8.4*                   Imaging  US-RENAL   Final Result      Mild bilateral hydronephrosis, worse on the LEFT.         CT-ABDOMEN-PELVIS W/O   Final Result      1.  New mild to moderate LEFT hydronephrosis without visualized obstructing lesion   2.  Interval aortobifemoral bypass   3.  Hyperdense BILATERAL renal lesions, likely hemorrhagic cysts but incompletely characterized   4.  RIGHT middle lobe pneumonia   5.  Gallbladder sludge or  stones   6.  Appendix not clearly visualized      DX-CHEST-PORTABLE (1 VIEW)   Final Result      Multiple consolidations in the right upper and lower lungs, consistent with multifocal pneumonia.           Assessment/Plan  Problem Representation:    * Multifocal pneumonia  Assessment & Plan  Suspected cause of aspiration with risk factors including dysphagia after previous stroke and COPD, also consistent with CXR findings of R lobe infiltrates, vs CAP  Pt with decreased R lung sounds, productive cough, and R chest pain  Leukocytosis on admission of 17.7.  Admission CXR with R multifocal consolidations.   - Continue IVF but rate slowed due to concern for worsening hydronephrosis  - Continue Ceftriaxone and Doxycycline, and will consider change to Unasyn considering likelihood of aspiration pneumonia  - Pain regimen Tylenol    Hydronephrosis  Assessment & Plan  Possibly passed stone  Left sided, non-obstruted seen on CT AP  Renal US with L>R bilateral hydronephrosis 10/10/23  - Continue IV fluids as above  - Continue Flomax  - Consider Urology if worsening  - Post-Void Bladder Scan pending    Acute kidney injury superimposed on CKD (HCC)  Assessment & Plan  IVON on CKD 3  Renal US as above  - Continue IVF as above  - Minimize nephrotoxic agents (holding Lisinopril and Furosemide)  - Follow up with urine urea, urine sodium, urine creatinine    Leukocytosis  Assessment & Plan  IVF, abx  trend    Moderate protein-calorie malnutrition (HCC)  Assessment & Plan  Ensure  PO intake as tolerated  Body mass index is 20.8 kg/m².      Stroke (HCC)- (present on admission)  Assessment & Plan  History of stroke with deficits from 2022  Dysphagia present at admission  - Speech therapy consulted given concern for aspiration, appreciate recommendations    Hypertension- (present on admission)  Assessment & Plan  - Continue Metoprolol  - Hold lisinopril, lasix until renal function improved    CAD (coronary artery disease)- (present on  admission)  Assessment & Plan  With stents  - Continue home Plavix/Zetia  - Continue heparin         VTE prophylaxis: heparin ppx    I have performed a physical exam and reviewed and updated ROS and Plan today (10/10/2023). In review of yesterday's note (10/9/2023), there are no changes except as documented above.

## 2023-10-10 NOTE — DIETARY
"Nutrition services: Day 1 of admit.  Fouzia Santamaria is a 63 y.o. female with admitting DX of pneumonia due to infectious organism.    Consult received for moderate protein calorie malnutrition per hospital problems list. Admit screen is negative for poor PO and wt loss. Met with pt at bedside. Pt was crying, she appeared adequately nourished. Discussed appetite with pt. She stated her appetite was \"fine\". Supplement order in place, though declined Boost oral nutrition supplements. Pt crying throughout interview. Asked pt if she would like to discontinue interview, to which she stated \"yes\". Pt with hx of diabetes per chart. Blood sugars have been well controlled. Continuation of regular diet remains appropriate to optimize intake. Last A1c run was 5.6 8/26/22.    Assessment:  Height: 165.1 cm (5' 5\")  Weight:  (Pt bed scale doesn’t work)  Body mass index is 20.95 kg/m²., BMI classification: normal  Diet/Intake: Regular; PO % for one meal thus far    Evaluation:   Admitted with shortness of breath and abdominal pain.   PMH: stroke, COPD, Diabetes, heart attack, hypertension, hypomagnesemia.  Labs and meds reviewed.     Malnutrition Risk: Does not meet criteria per ASPEN guidelines at this time.    Recommendations/Plan:  Continue Regular diet.   Adjust supplements to chobani yogurt smoothie at breakfast, Boost Plus with lunch and Magic Cup with dinner.  Encourage intake of meals.  Document intake of all meals as % taken in ADLs to provide interdisciplinary communication across all shifts.   Monitor weight.  Nutrition rep will continue to see patient for ongoing meal and snack preferences.     RD will follow per dept guidelines.      "

## 2023-10-10 NOTE — ASSESSMENT & PLAN NOTE
IVON on CKD 3,  Renal US as above  Now improved with BUN/Cr elevated but downtrending, at baseline  - IVF held as patient tolerating PO liquids  - Minimize nephrotoxic agents (holding Furosemide)  - Follow up with urine urea, urine sodium, urine creatinine

## 2023-10-10 NOTE — ED TRIAGE NOTES
Pt to triage via wheelchair with following complaint.   Chief Complaint   Patient presents with    Shortness of Breath     Sent by UC for right sided crackles in the lungs. Pt c/o cough    Abdominal Pain     RLQ abdominal pain, N/V. Denies any blood in emesis.     Pt hx of stroke, right sided deficits and slurred speech which  states is baseline for pt. Pt to get EKG and blood work, PIV in place 20# right forearm, attempted to draw blood but did not draw, flushes well. 4mg Zofran given by EMS PTA.  Pt instructed to notify staff of worsening symptoms.

## 2023-10-10 NOTE — PROGRESS NOTES
4 Eyes Skin Assessment Completed by Dandy RN and Raysa RN.    Head WDL  Ears WDL  Nose WDL  Mouth WDL  Neck WDL  Breast/Chest WDL  Shoulder Blades WDL  Spine WDL  (R) Arm/Elbow/Hand WDL  (L) Arm/Elbow/Hand WDL  Abdomen Scar  Groin WDL  Scrotum/Coccyx/Buttocks Redness and Blanching  (R) Leg WDL  (L) Leg WDL  (R) Heel/Foot/Toe Blanching and Boggy  (L) Heel/Foot/Toe Blanching and Boggy          Devices In Places Tele Box      Interventions In Place Waffle Overlay, Pillows, and Barrier Cream    Possible Skin Injury No    Pictures Uploaded Into Epic N/A  Wound Consult Placed N/A  RN Wound Prevention Protocol Ordered No

## 2023-10-10 NOTE — ASSESSMENT & PLAN NOTE
History of stroke with deficits from 2022  Dysphagia present at admission  - Speech therapy consulted given concern for aspiration, appreciate recommendations including soft bite diet

## 2023-10-10 NOTE — ASSESSMENT & PLAN NOTE
Possibly passed stone  Left sided, non-obstruted seen on CT AP  Renal US with L>R bilateral hydronephrosis 10/10/23  PV bladder scan negative  - Continue Flomax

## 2023-10-10 NOTE — HOSPITAL COURSE
Fouzia Santamaria is a 63 y.o. female with history of stroke, dysphasia, HFeEF, 96-pack years, hydronephrosis, and COPD who presented 10/9 with evaluation for shortness of breath, right-sided multifocal pneumonia, and abdominal pain.     According to records, patient was seen by family medicine 10/9 for vomiting, abdominal pain and cough. Vomiting and respiratory symptoms x1 week and progressed to RLQ pain. Patient had known sick contacts at home. According to note, patient has been coughing up significant amount of greenish mucous, febrile, and chills. Significant crackles in R base of lung field notes on exam. Diminished alertness from baseline. Patient unable to hold down fluids and in significant pain, so sent to ED from clinic. COVID, flu, and RSV tests negative in office.     In ED, patient noted to have multifocal pneumonia on CXR. CTAP noted left hydronephrosis, no obstruction seen, pneumonia also seen on CXR, as well as gallstone/sludge. IV fluids and IV antibiotics were administered in the ED. Patient was found fit for admission on 10/9/23. Upon admission, patient was administered Ceftriaxone and Doxycycline for concern for aspiration pneumonia, pain regimen and Flomax for abdominal pain and nephrotoxic medications were held for IVON on CKD. Renal US was found with bilateral hydronephrosis, worse on L, and additional kidney workup was ordered. IV fluids were continued and speech language pathology was consulted due to concern for aspiration secondary to dysphagia from previous stroke.

## 2023-10-10 NOTE — HOSPITAL COURSE
Fouzia Santamaria is a 63 y.o. female with history of stroke, dysphasia, HFeEF, 96-pack years, hydronephrosis, and COPD who presented 10/9 with evaluation for shortness of breath, right-sided multifocal pneumonia, and abdominal pain.     According to records, patient was seen by family medicine 10/9 for vomiting, abdominal pain and cough. Vomiting and respiratory symptoms x1 week and progressed to RLQ pain. Patient had known sick contacts at home. According to note, patient has been coughing up significant amount of greenish mucous, febrile, and chills. Significant crackles in R base of lung field notes on exam. Diminished alertness from baseline. Patient unable to hold down fluids and in significant pain, so sent to ED from clinic. COVID, flu, and RSV tests negative in office.     In ED, patient noted to have multifocal pneumonia on CXR. CTAP noted left hydronephrosis, no obstruction seen, pneumonia also seen on CXR, as well as gallstone/sludge. IV fluids and IV antibiotics were administered in the ED. Patient was found fit for admission on 10/9/23.     Upon admission, patient was administered Ceftriaxone and Doxycycline for concern for aspiration pneumonia, pain regimen and Flomax for abdominal pain and nephrotoxic medications were held for IVON on CKD. Renal US was found with bilateral hydronephrosis, worse on L, and additional kidney workup was ordered. IV fluids were continued and speech language pathology was consulted due to concern for aspiration secondary to dysphagia from previous stroke. Post-void bladder scan was found to be negative. Patient was evaluated by Speech Language Pathology, who recommended soft-bites diet.  Patient's leukocytosis trended down, her lung exam findings improved, but she continued to have some intermittent abdominal pain, with workup pending. PT/OT evaluated patient with recommendations for home with HH vs rehab facility upon potential discharge on 10/12/23 pending symptomatic  improvement and labwork.

## 2023-10-10 NOTE — ASSESSMENT & PLAN NOTE
SIRS criteria identified on my evaluation include:  Fever, with temperature greater than 100.9 deg F, Leukocytosis, with WBC greater than 12,000 and Bandemia, greater than 10% bands  SIRS is non-infectious, the patient does not have sepsis

## 2023-10-10 NOTE — ED NOTES
RN present to transfer patient. Patient remains AOX4 and aware of POC including admission. All belongings with patient for transfer.

## 2023-10-10 NOTE — CARE PLAN
The patient is Stable - Low risk of patient condition declining or worsening    Shift Goals  Clinical Goals: Renal ultrasound, Fluids, antibiotics  Patient Goals: rest  Family Goals: not present, AFIA    Progress made toward(s) clinical / shift goals:        Problem: Fall Risk  Goal: Patient will remain free from falls  Outcome: Progressing  Note: Fall risk factors assessed, all fall precautions implemented.      Problem: Knowledge Deficit - Standard  Goal: Patient and family/care givers will demonstrate understanding of plan of care, disease process/condition, diagnostic tests and medications  Outcome: Progressing  Note: Patient involved in point of care, had questions on medication and treatment plan, all questions answered, patient uses call light approprietly

## 2023-10-10 NOTE — ED PROVIDER NOTES
ED Provider Note    Scribed for Yuan Green M.D. by Kelsi Baires. 10/9/2023  6:49 PM    Primary care provider: SHANKAR Reyes  Means of arrival: EMS    CHIEF COMPLAINT  Chief Complaint   Patient presents with    Shortness of Breath     Sent by  for right sided crackles in the lungs. Pt c/o cough    Abdominal Pain     RLQ abdominal pain, N/V. Denies any blood in emesis.       LIMITATION TO HISTORY   None.    HPI    Fouzia Santamaria is a 63 y.o. female who has history of several heart attacks and strokes, and peripheral artery disease presents to the Emergency Department via EMS for evaluation of cold like symptoms onset 1 week ago. The patient's  describes that the patient has been sick for about 1 week. The patient states she also has intermittent abdominal pain, vomiting, cough and fever, but denies any diarrhea or chest pain. The patient's  notes that the patient's abdominal pain increased in severity yesterday and has been fairly constant since then. The  notes that the patient was vomiting about 4 times a day a few days ago, but states that this has decreased since then. The  mentions that the patient is wheelchair bound due to having decreased mobility of her lower extremities secondary to her strokes and heart attacks.  The patient is on blood thinners. She has history of having 3 stents in her heart, which her  states were put in last year. She has no history of a blood clot in her leg, but she did have one in her lung and in her brain, which led to a stroke.     OUTSIDE HISTORIAN(S):   at bedside to confirm sequence of events and collateral information provided.     EXTERNAL RECORDS REVIEWED  Reviewed Urgent Care note from today for abdominal pain, vomiting, cough and fever. The patient has history of chronic kidney disease, cardiomyopathy, stroke, COPD, peripheral vascular disease, hypertension. Urgent Care referred her to us.  "    REVIEW OF SYSTEMS  Pertinent positives include: intermittent abdominal pain, vomiting, cough and fever.  Pertinent negatives include: diarrhea or chest pain.      PAST MEDICAL HISTORY  Past Medical History:   Diagnosis Date    At risk for delirium 9/13/2022    Chronic obstructive pulmonary disease (HCC)     Diabetes (HCC)     Encounter to establish care 10/19/2022    Patient here to establish care.  Patient was recently in the hospital for over a month secondary to several blood clots in need of multiple surgeries including: aorto-bifemoral bypass and bilateral left renal artery eversion endarterectomies.  Patient surgery was complicated \"by hypotension and evidence of hemorrhage that she was taken back to the operating room emergently for exploration where sh    Heart attack (HCC)     Hydronephrosis 8/24/2022    Hypertension     Hypomagnesemia 8/29/2022    Stroke (HCC)        FAMILY HISTORY  History reviewed. No pertinent family history.    SOCIAL HISTORY  Social History     Tobacco Use    Smoking status: Every Day     Current packs/day: 2.00     Average packs/day: 2.0 packs/day for 48.0 years (96.0 ttl pk-yrs)     Types: Cigarettes    Smokeless tobacco: Never   Vaping Use    Vaping Use: Never used   Substance Use Topics    Alcohol use: No    Drug use: Yes     Types: Marijuana, Inhaled     Social History     Substance and Sexual Activity   Drug Use Yes    Types: Marijuana, Inhaled       SURGICAL HISTORY  Past Surgical History:   Procedure Laterality Date    CA EXPLORATORY OF ABDOMEN  8/28/2022    Procedure: LAPAROTOMY, EXPLORATORY;  Surgeon: Brandyn Siu M.D.;  Location: SURGERY [facility];  Service: General    AORTOBIFEM BYPASS Bilateral 8/26/2022    Procedure: CREATION, BYPASS, ARTERIAL, AORTA TO FEMORAL, BILATERAL;  Surgeon: Brandyn Siu M.D.;  Location: SURGERY [facility];  Service: General    ENDARTERECTOMY Bilateral 8/26/2022    Procedure: RENAL ARTERY ENDARTERECTOMY;  Surgeon: Brandyn Siu M.D.;  " Location: SURGERY [facility];  Service: General    HI EXPLORATORY OF ABDOMEN  8/26/2022    Procedure: LAPAROTOMY, EXPLORATORY; CONTROL OF POST-OPERATIVE BLEEDING;  Surgeon: Brandyn Siu M.D.;  Location: SURGERY [facility];  Service: General    APPLICATION OR REPLACEMENT, WOUND VAC  8/26/2022    Procedure: APPLICATION OR REPLACEMENT, WOUND VAC;  Surgeon: Brandyn Siu M.D.;  Location: SURGERY [facility];  Service: General    STENT PLACEMENT      THYROIDECTOMY         CURRENT MEDICATIONS    Current Facility-Administered Medications:     lactated ringers infusion (BOLUS), 1,000 mL, Intravenous, Once, Yuan Green M.D.    cefTRIAXone (Rocephin) injection 2,000 mg, 2,000 mg, Intravenous, Once, Yuan Green M.D.    azithromycin (Zithromax) tablet 500 mg, 500 mg, Oral, Once, Yuan Green M.D.    Current Outpatient Medications:     furosemide (LASIX) 20 MG Tab, TAKE 1 TABLET BY MOUTH TWICE A DAY, Disp: 60 Tablet, Rfl: 0    ezetimibe (ZETIA) 10 MG Tab, TAKE 1 TABLET BY MOUTH EVERY DAY, Disp: 30 Tablet, Rfl: 0    pramipexole (MIRAPEX) 0.125 MG Tab, TAKE 1 TABLET BY MOUTH THREE TIMES A DAY, Disp: 90 Tablet, Rfl: 1    aspirin 81 MG EC tablet, Take 1 Tablet by mouth every day., Disp: , Rfl:     metoprolol tartrate (LOPRESSOR) 25 MG Tab, TAKE 1/2 TABLET BY MOUTH 2 TIMES A DAY, Disp: 90 Tablet, Rfl: 1    docusate sodium (COLACE) 100 MG Cap, TAKE 1 CAPSULE BY MOUTH TWICE A DAY, Disp: 60 Capsule, Rfl: 0    lisinopril (PRINIVIL) 10 MG Tab, TAKE 1 TABLET BY MOUTH EVERY DAY, Disp: 90 Tablet, Rfl: 0    mirtazapine (REMERON) 15 MG Tab, Take 15 mg by mouth every evening., Disp: , Rfl:     cyclobenzaprine (FLEXERIL) 5 mg tablet, Take 1 Tablet by mouth 2 times a day as needed for Muscle Spasms., Disp: 30 Tablet, Rfl: 1    polyethylene glycol 3350 (MIRALAX) 17 GM/SCOOP Powder, polyethylene glycol 3350 17 gram oral powder packet (Patient not taking: Reported on 8/30/2023), Disp: , Rfl:     aspirin 81 MG EC tablet, aspirin 81 mg  "tablet,delayed release  Take 1 tablet every day by oral route., Disp: , Rfl:     Rivaroxaban (XARELTO STARTER PACK) 15 & 20 MG Tablet Therapy Pack, Xarelto DVT-PE Treatment 30-Day Starter 15 mg(42)-20 mg(9) tablet pack  Take by oral route. (Patient not taking: Reported on 8/30/2023), Disp: , Rfl:     aspirin EC (ECOTRIN) 81 MG Tablet Delayed Response, Take 1 Tablet by mouth every day., Disp: 30 Tablet, Rfl: 1    clopidogrel (PLAVIX) 75 MG Tab, Take 1 Tablet by mouth every day., Disp: 30 Tablet, Rfl: 3    acetaminophen (TYLENOL) 500 MG Tab, Take 2 Tablets by mouth 3 times a day., Disp: 30 Tablet, Rfl: 0    polyethylene glycol/lytes (MIRALAX) 17 g Pack, Mix and drink 1 Packet by mouth every day. (Patient not taking: Reported on 8/30/2023), Disp: 30 Each, Rfl: 0    atorvastatin (LIPITOR) 80 MG tablet, Take 80 mg by mouth every evening. (Patient not taking: Reported on 8/30/2023), Disp: , Rfl:     ALLERGIES  Allergies   Allergen Reactions    Pcn [Penicillins] Vomiting and Nausea    Toradol Vomiting and Nausea       PHYSICAL EXAM  VITAL SIGNS: /82   Pulse 92   Temp 36.1 °C (96.9 °F) (Temporal)   Resp 18   Ht 1.651 m (5' 5\")   Wt 56.7 kg (125 lb)   SpO2 97%   BMI 20.80 kg/m²   Reviewed and high normal blood pressure, afebrile, no hypoxia room air  Constitutional: Thin.  HENT: Normocephalic, atraumatic, bilateral external ears normal, No intraoral erythema, edema, exudate  Eyes: PERRLA, conjunctiva pink, no scleral icterus.   Cardiovascular: Regular rate and rhythm. No murmurs, rubs or gallops.  No dependent edema or calf tenderness  Respiratory: A few rattles to the right side. No wheezes or edema.   Abdominal:  Abdomen soft, non distended. Mild diffuse abdominal tenderness. No rebound, or guarding.    Skin: No erythema, no rash. No wounds or bruising.  Genitourinary: No costovertebral angle tenderness.   Musculoskeletal: no deformities.   Neurologic: Alert & oriented x 3, cranial nerves 2-12 intact by " passive exam.  No focal deficit noted. Aphasic speech.   Psychiatric: Affect normal, Judgment normal, Mood normal.       LABS Ordered and Reviewed by Me:  Results for orders placed or performed during the hospital encounter of 10/09/23   CBC with Differential   Result Value Ref Range    WBC 19.6 (H) 4.8 - 10.8 K/uL    RBC 3.83 (L) 4.20 - 5.40 M/uL    Hemoglobin 11.0 (L) 12.0 - 16.0 g/dL    Hematocrit 34.3 (L) 37.0 - 47.0 %    MCV 89.6 81.4 - 97.8 fL    MCH 28.7 27.0 - 33.0 pg    MCHC 32.1 (L) 32.2 - 35.5 g/dL    RDW 49.4 35.9 - 50.0 fL    Platelet Count 293 164 - 446 K/uL    MPV 12.3 9.0 - 12.9 fL    Neutrophils-Polys 88.10 (H) 44.00 - 72.00 %    Lymphocytes 7.60 (L) 22.00 - 41.00 %    Monocytes 4.30 0.00 - 13.40 %    Eosinophils 0.00 0.00 - 6.90 %    Basophils 0.00 0.00 - 1.80 %    Nucleated RBC 0.00 0.00 - 0.20 /100 WBC    Neutrophils (Absolute) 17.27 (H) 1.82 - 7.42 K/uL    Lymphs (Absolute) 1.49 1.00 - 4.80 K/uL    Monos (Absolute) 0.84 0.00 - 0.85 K/uL    Eos (Absolute) 0.00 0.00 - 0.51 K/uL    Baso (Absolute) 0.00 0.00 - 0.12 K/uL    NRBC (Absolute) 0.00 K/uL   Lipase   Result Value Ref Range    Lipase 25 11 - 82 U/L   Urinalysis    Specimen: Urine   Result Value Ref Range    Color DK Yellow     Character Cloudy (A)     Specific Gravity 1.015 <1.035    Ph 5.0 5.0 - 8.0    Glucose Negative Negative mg/dL    Ketones Negative Negative mg/dL    Protein 30 (A) Negative mg/dL    Bilirubin Negative Negative    Urobilinogen, Urine 1.0 Negative    Nitrite Negative Negative    Leukocyte Esterase Negative Negative    Occult Blood Negative Negative    Micro Urine Req Microscopic    Lactic Acid   Result Value Ref Range    Lactic Acid 1.3 0.5 - 2.0 mmol/L   Lactic Acid   Result Value Ref Range    Lactic Acid 1.3 0.5 - 2.0 mmol/L   CoV-2, FLU A/B, and RSV by PCR (2-4 Hours CEPHEID) : Collect NP swab in VTM    Specimen: Respirate   Result Value Ref Range    Influenza virus A RNA Negative Negative    Influenza virus B, PCR  Negative Negative    RSV, PCR Negative Negative    SARS-CoV-2 by PCR NotDetected     SARS-CoV-2 Source NP Swab    Comp Metabolic Panel   Result Value Ref Range    Sodium 135 135 - 145 mmol/L    Potassium 3.0 (L) 3.6 - 5.5 mmol/L    Chloride 96 96 - 112 mmol/L    Co2 23 20 - 33 mmol/L    Anion Gap 16.0 7.0 - 16.0    Glucose 113 (H) 65 - 99 mg/dL    Bun 71 (H) 8 - 22 mg/dL    Creatinine 2.40 (H) 0.50 - 1.40 mg/dL    Calcium 9.0 8.5 - 10.5 mg/dL    Correct Calcium 9.8 8.5 - 10.5 mg/dL    AST(SGOT) 16 12 - 45 U/L    ALT(SGPT) 9 2 - 50 U/L    Alkaline Phosphatase 133 (H) 30 - 99 U/L    Total Bilirubin 0.8 0.1 - 1.5 mg/dL    Albumin 3.0 (L) 3.2 - 4.9 g/dL    Total Protein 7.6 6.0 - 8.2 g/dL    Globulin 4.6 (H) 1.9 - 3.5 g/dL    A-G Ratio 0.7 g/dL    GFR (CKD-EPI) 22 (A) >60 mL/min/1.73 m 2   URINE MICROSCOPIC (W/UA)   Result Value Ref Range    WBC 2-5 /hpf    RBC 0-2 /hpf    Bacteria Many (A) None /hpf    Epithelial Cells Many (A) /hpf    Hyaline Cast 0-2 /lpf       EKG Interpretation by me    Indication: shortness of breath    Rhythm: normal sinus   Rate: Normal at 91  Axis: normal  Ectopy: none  Conduction: normal  ST/T Waves: no acute change  Q Waves: none  R Wave progression: normal  Hypertrophy changes: Absent    Comparison: Septal q waves compared to prior.     Clinical Impression: anterior septal ischemia.     RADIOLOGY  I have independently interpreted the chest x-ray associated with this visit demonstrating no free fluid, lung based crackles and left sided pneumonia.  I am awaiting the final reading from the radiologist.     Final Radiology Report  CT-ABDOMEN-PELVIS W/O   Final Result      1.  New mild to moderate LEFT hydronephrosis without visualized obstructing lesion   2.  Interval aortobifemoral bypass   3.  Hyperdense BILATERAL renal lesions, likely hemorrhagic cysts but incompletely characterized   4.  RIGHT middle lobe pneumonia   5.  Gallbladder sludge or stones   6.  Appendix not clearly visualized       DX-CHEST-PORTABLE (1 VIEW)   Final Result      Multiple consolidations in the right upper and lower lungs, consistent with multifocal pneumonia.        Radiologist interpretation have been reviewed by me.       ED COURSE:  6:49 PM - Patient seen and examined at bedside. Based on the patient's chest x-ray, sepsis workup will be ordered now.     INTERVENTIONS BY ME:  Medications   lactated ringers infusion (BOLUS) (has no administration in time range)   cefTRIAXone (Rocephin) injection 2,000 mg (has no administration in time range)   azithromycin (Zithromax) tablet 500 mg (has no administration in time range)     9:27 PM - Will update the patient that she will require admission.     I have discussed management of the patient with the following physicians and sources:    9:51 PM - Consulted with Dr. Portillo about the patient's condition.     MEDICAL DECISION MAKING:  PROBLEMS EVALUATED THIS VISIT:    This patient presents with an acute pneumonia with severe leukocytosis and underlying kidney disease which greatly increases her pellety risk from sepsis.  She will require admission for IV antibiotics.  She is not hypoxic.  There is no evidence of influenza or COVID.  The patient also had abdominal pain of unclear etiology.  This may be related to the pneumonia.  There is no evidence of appendicitis CT imaging.  The patient also has acute on chronic kidney injury likely due to sepsis.  She will be rehydrated.  The patient also has mild to moderate hyperkalemia and can be replaced as an inpatient.    RISK:  High given need for escalation of care to include admission    PLAN:  IV fluids, IV antibiotics serial creatinine oxygen and pressors if needed    CONDITION: Stable.     FINAL IMPRESSION  1. Pneumonia of right lung due to infectious organism, unspecified part of lung    2. Sepsis with acute renal failure without septic shock, due to unspecified organism, unspecified acute renal failure type (HCC)    3. Hypokalemia        I, Kelsi Baires (Scribe), am scribing for, and in the presence of, Yuan Green M.D..    Electronically signed by: Kelsi Baires (Scribe), 10/9/2023    IYuan M.D. personally performed the services described in this documentation, as scribed by Kelsi Baires in my presence, and it is both accurate and complete.    The note accurately reflects work and decisions made by me.  Yuan Green M.D.  10/9/2023  10:50 PM

## 2023-10-10 NOTE — PROGRESS NOTES
This note is intended for the purposes of medical student education and feedback only.  Please refer to the documentation by this patient's assigned medical practioner for details of care and plans.    Daily Progress Note    Date of Service: 10/10/2023  Primary Team: [facility] IM Blue Team   Attending: Elliot Miller M.D.   Senior Resident: Dr. Gabriel Bryant  Intern: Dr. Gabriel Hickman  Consultants: Speech Pathology  Medical Student: Adrianna [facility] Day # Hospital Day: 2  Code Status: Full code    ID  Fouzia Santamaria is a 63 y.o. female with history of stroke, dysphasia, hydronephrosis, HFeEF, 96-pack years, and COPD who presented 10/9 with SOB, right-sided multifocal pneumonia and abdominal pain.    SUBJECTIVE:   Hospital Course:  Fouzia Santamaria is a 63 y.o. female with history of stroke, dysphasia, HFeEF, 96-pack years, hydronephrosis, and COPD who presented 10/9 with evaluation for shortness of breath, right-sided multifocal pneumonia, and abdominal pain.    According to records, patient was seen by family medicine 10/9 for vomiting, abdominal pain and cough. Vomiting and respiratory symptoms x1 week and progressed to RLQ pain. Patient had known sick contacts at home. According to note, patient has been coughing up significant amount of greenish mucous, febrile, and chills. Significant crackles in R base of lung field notes on exam. Diminished alertness from baseline. Patient unable to hold down fluids and in significant pain, so sent to ED from clinic. COVID, flu, and RSV tests negative in office.    In ED, patient noted to have multifocal pneumonia on CXR. CTAP noted left hydronephrosis, no obstruction seen, pneumonia also seen on CXR.     Past medical history: At risk for delirium (9/13/2022), Chronic obstructive pulmonary disease, Diabetes, Heart attack, Hydronephrosis (8/24/2022), Hypertension, Hypomagnesemia (8/29/2022), and Stroke.    Past surgical history: thyroidectomy; stent placement;  aortobifem bypass (Bilateral, 8/26/2022); endarterectomy (Bilateral, 8/26/2022); pr exploratory of abdomen (8/26/2022); application or replacement, wound vac (8/26/2022); and pr exploratory of abdomen (8/28/2022).     Social history: has been smoking cigarettes, 96.0 pack-years. She reports current marijuana use. She reports that she does not drink alcohol.    Allergies: penicillins (N/V), Toradol (N/V)    Interval Change:  Patient was crying during our conversation morning. She reports abdominal pain in her right side. She denies headache, SOB, chest pain, dysuria, or hematuria. It was difficult to obtain more information from patient due to her mental state.    Night-time nurse informed me that patient has been complaining of RLQ pain, has baseline deficits on the R-side post-stroke, and has expressive aphasia at baseline. She reports that patient's , Basilio, is her caregiver and answer's most of her questions for her.    Review of Systems:  Review of Systems   Respiratory:  Positive for cough and shortness of breath.    Cardiovascular:  Negative for chest pain.   Gastrointestinal:  Positive for abdominal pain.        Right-sided abdominal pain, unclear if RUQ or RLQ   Genitourinary:  Positive for frequency and urgency. Negative for dysuria, flank pain and hematuria.   Neurological:  Negative for headaches.        Expressive aphasia at baseline post stroke   Psychiatric/Behavioral:          Patient was crying during my exam this morning.     OBJECTIVE:   Vitals:   Temp:  [35.6 °C (96 °F)-36.8 °C (98.2 °F)] 36.8 °C (98.2 °F)  Pulse:  [74-94] 94  Resp:  [14-18] 14  BP: (109-163)/(64-93) 125/93  SpO2:  [92 %-100 %] 100 %     Physical Exam:  Physical Exam  Constitutional:       Appearance: She is ill-appearing.   HENT:      Head: Normocephalic and atraumatic.      Mouth/Throat:      Mouth: Mucous membranes are dry.      Comments: No teeth present  Eyes:      Conjunctiva/sclera: Conjunctivae normal.       Pupils: Pupils are equal, round, and reactive to light.   Cardiovascular:      Rate and Rhythm: Normal rate and regular rhythm.      Pulses: Normal pulses.      Comments: Systolic murmur heard loudest at RSB  Bruit heard at R femoral  No renal or carotid bruits appreciated  Pulmonary:      Comments: Course breath sounds noted on exam, mildly decreased breath sounds on RLL  Abdominal:      General: Bowel sounds are normal. There is distension.      Palpations: There is no mass.      Tenderness: There is abdominal tenderness. There is guarding and rebound. There is no right CVA tenderness or left CVA tenderness.      Comments: Tenderness to palpation on R side of abdomen with minor guarding and rebounding   Genitourinary:     Comments: No CVA tenderness  Musculoskeletal:      Cervical back: Neck supple.      Right lower leg: No edema.      Left lower leg: No edema.   Lymphadenopathy:      Cervical: No cervical adenopathy.   Skin:     Coloration: Skin is pale.   Neurological:      Mental Status: Mental status is at baseline.      Comments: Expressive aphasia at baseline  Difficulty answering questions during exam (unclear if this is baseline or not)   Psychiatric:      Comments: Crying during exam       Labs:  Lab Results   Component Value Date/Time    SODIUM 133 (L) 10/10/2023 03:03 AM    POTASSIUM 2.9 (L) 10/10/2023 03:03 AM    CHLORIDE 97 10/10/2023 03:03 AM    CO2 22 10/10/2023 03:03 AM    GLUCOSE 99 10/10/2023 03:03 AM    BUN 65 (H) 10/10/2023 03:03 AM    CREATININE 2.01 (H) 10/10/2023 03:03 AM    BUNCREATRAT 25.0 07/10/2022 04:21 AM       Lab Results   Component Value Date/Time    WBC 17.7 (H) 10/10/2023 03:03 AM    RBC 3.12 (L) 10/10/2023 03:03 AM    HEMOGLOBIN 9.0 (L) 10/10/2023 03:03 AM    HEMATOCRIT 28.6 (L) 10/10/2023 03:03 AM    MCV 91.7 10/10/2023 03:03 AM    MCH 28.8 10/10/2023 03:03 AM    MCHC 31.5 (L) 10/10/2023 03:03 AM    MPV 10.9 10/10/2023 03:03 AM    NEUTSPOLYS 89.80 (H) 10/10/2023 03:03 AM     LYMPHOCYTES 6.80 (L) 10/10/2023 03:03 AM    MONOCYTES 1.70 10/10/2023 03:03 AM    EOSINOPHILS 0.00 10/10/2023 03:03 AM    BASOPHILS 0.00 10/10/2023 03:03 AM    HYPOCHROMIA 1+ 09/16/2022 02:13 AM    ANISOCYTOSIS 1+ 09/16/2022 02:13 AM         Latest Reference Range & Units 09/06/23 18:16 10/09/23 19:38 10/10/23 03:03   Alkaline Phosphatase 30 - 99 U/L 99 133 (H) 119 (H)   (H): Data is abnormally high     Latest Reference Range & Units 10/09/23 19:38 10/09/23 20:23   Lactic Acid 0.5 - 2.0 mmol/L 1.3 1.3      Latest Reference Range & Units 10/09/23 23:06   Procalcitonin <0.25 ng/mL 6.72 (H)   (H): Data is abnormally high     Latest Reference Range & Units 10/09/23 23:06   Stat C-Reactive Protein 0.00 - 0.75 mg/dL 30.98 (H)   (H): Data is abnormally high     Latest Reference Range & Units 10/09/23 20:20   Color  DK Yellow   Character  Cloudy !   Specific Gravity <1.035  1.015   Ph 5.0 - 8.0  5.0   Glucose Negative mg/dL Negative   Ketones Negative mg/dL Negative   Bilirubin Negative  Negative   Occult Blood Negative  Negative   Protein Negative mg/dL 30 !   Nitrite Negative  Negative   Leukocyte Esterase Negative  Negative   Urobilinogen, Urine Negative  1.0   Micro Urine Req  Microscopic   WBC /hpf 2-5   RBC /hpf 0-2   Epithelial Cells /hpf Many !   Bacteria None /hpf Many !   Hyaline Cast /lpf 0-2   !: Data is abnormal    Imaging:  US-RENAL   Final Result      Mild bilateral hydronephrosis, worse on the LEFT.         CT-ABDOMEN-PELVIS W/O   Final Result      1.  New mild to moderate LEFT hydronephrosis without visualized obstructing lesion   2.  Interval aortobifemoral bypass   3.  Hyperdense BILATERAL renal lesions, likely hemorrhagic cysts but incompletely characterized   4.  RIGHT middle lobe pneumonia   5.  Gallbladder sludge or stones   6.  Appendix not clearly visualized      DX-CHEST-PORTABLE (1 VIEW)   Final Result      Multiple consolidations in the right upper and lower lungs, consistent with multifocal  pneumonia.        I/O:    Intake/Output Summary (Last 24 hours) at 10/10/2023 0605  Last data filed at 10/10/2023 0426  Gross per 24 hour   Intake 1100 ml   Output 0 ml   Net 1100 ml      Current Medications:  Current Facility-Administered Medications   Medication Dose    aspirin EC tablet 81 mg  81 mg    clopidogrel (Plavix) tablet 75 mg  75 mg    ezetimibe (Zetia) tablet 10 mg  10 mg    guaiFENesin (Robitussin) 100 MG/5ML liquid 200 mg  10 mL    metoprolol tartrate (Lopressor) tablet 12.5 mg  12.5 mg    pramipexole (Mirapex) tablet 0.125 mg  0.125 mg    mirtazapine (Remeron) tablet 15 mg  15 mg    tamsulosin (Flomax) capsule 0.4 mg  0.4 mg    senna-docusate (Pericolace Or Senokot S) 8.6-50 MG per tablet 2 Tablet  2 Tablet    And    polyethylene glycol/lytes (Miralax) PACKET 1 Packet  1 Packet    And    bisacodyl (Dulcolax) suppository 10 mg  10 mg    lactated ringers infusion (BOLUS)  500 mL    0.9 % NaCl with KCl 40 mEq 1,000 mL      heparin injection 5,000 Units  5,000 Units    acetaminophen (Tylenol) tablet 650 mg  650 mg    labetalol (Normodyne/Trandate) injection 10 mg  10 mg    promethazine (Phenergan) tablet 12.5-25 mg  12.5-25 mg    promethazine (Phenergan) suppository 12.5-25 mg  12.5-25 mg    prochlorperazine (Compazine) injection 5-10 mg  5-10 mg    cefTRIAXone (Rocephin) syringe 2,000 mg  2,000 mg    doxycycline monohydrate (Adoxa) tablet 100 mg  100 mg       ASSESSMENT/PLAN:   Fouzia Santamaria is a 63 y.o. female with history of stroke, dysphasia, hydronephrosis, HFeEF, 96-pack years, and COPD who presented 10/9 with SOB, right-sided multifocal pneumonia and abdominal pain.    Right multifocal pneumonia  -CXR 10/9: multiple consolidations found on right upper and lower lobes  -Likely cause is aspiration due to R lobe infiltration and history of stroke and dysphagia  -Lower risk of bacterial aspiration pneumonia due to lack of dentation present on physical exam  --blood cultures continue to have  negative growth, will continue to monitor  -Continue antibiotics: cefriaxone 2,000mg & doxycycline 100mg    Systemic inflammatory response syndrome (SIRS)  -Fever >100.9 F, leukocytosis >12k with bandemia  -BUN 65 and Cr 2.01, suggestive of IVON, and end-organ damage  -Meets SIRS criteria  -Elevated Alk. Phos., procalcitonin, and CRP  -10/9 Lactic acid WNL  -Continue antibiotics: cefriaxone 2,000mg & doxycycline 100mg    Acute-on-Chronic Hydronephrosis, bilateral  -10/10 renal US: mild bilateral hydronephrosis, worse of L-side  -10/9 CT AP: L-sided, non-obstructed  -previous history from 2022 of hydronephrosis, R sided  -history of renal artery stenosis, no renal bruits heard on exam, no CVA tenderness of exam  -possibly passed stone, Ca 8.4  -Continue IVF  -Continue Flomax to promote ureteral relaxation  -Order post-void bladder scan to assess for possible neurogenic origin of hydronephrosis    IVON superimposed on CKDIII  -10/10 BUN 65, Cr 2.01, GFR 28  -10/9 BUN 71, Cr 2.40, GFR 22  -Continue IVF  -Minimize nephrotoxic agents during hospital stay  -Continue monitoring renal function with daily CMP  -BUN/Cr ratio is 32.5, indicating likely pre-renal IVON, however, origin is unknown  -Order FENa or FEUrea to further assess pre-renal vs. intrinsic IVON    Stroke, 2022  Dysphagia  -Expressive aphasia present on exam  -, Basilio, is current care taker. Will plan to speak with patient's  this afternoon to better under patient's baseline.  -Will consult speech therapy to determine aspiration risk    HTN  -10/10 127/70  -Continue Metoprolol  -Will hold Lisinopril and Lasix until renal function improved, due to nephrotoxicity    CAD  -Continue Clopidogrel (Plavix)  -Continue Ezetimibe (Zetia)    FEN/GI  -PO intake as tolerated  -Ensure    DVT ppx: heparin     Disposition: Inpatient for further workup of abdominal pain, pneumonia, sepsis, and hydronephrosis.    Adrianna Clark  Medical Student, MS-3  [facility]  Zachariah Sosa    Plan has been discussed with Attending Physician.

## 2023-10-10 NOTE — DISCHARGE PLANNING
Case Management Discharge Planning    Admission Date: 10/9/2023  GMLOS: 5  ALOS: 1    6-Clicks ADL Score: 13  6-Clicks Mobility Score: 10  PT and/or OT Eval ordered: Yes  Post-acute Referrals Ordered: Yes  Post-acute Choice Obtained: No  Has referral(s) been sent to post-acute provider:  Yes      Anticipated Discharge Dispo: Discharge Disposition: Discharged to home/self care (01)    DME Needed: No    Action(s) Taken: Updated Provider/Nurse on Discharge Plan and DC Assessment Complete (See below)    Escalations Completed: None    Medically Clear: No    Barriers to Discharge: Medical clearance and Pending PT Evaluation    **0804  LSW attempted meeting with patient at bedside. Attempted confirming facesheet information. Patient became tearful and was unable to answer questions. LSW asked patient if she would like me to stop by another time. Patient nodded her head yes.    **1307  Call placed to patient's , Basilio, as patient's communication is impaired due to past stroke. Per Basilio, patient uses a wheelchair at baseline and needs assistance with ADLs. Basilio assists with ADLs. Patient does not use oxygen at home and lives with Basilio and children. LSW following.          Care Transition Team Assessment    Information Source  Orientation Level: Oriented to person  Information Given By: Spouse (Basilio)  Informant's Name: Basilio      Elopement Risk  Legal Hold: No  Ambulatory or Self Mobile in Wheelchair: No-Not an Elopement Risk    Interdisciplinary Discharge Planning  Lives with - Patient's Self Care Capacity: Spouse  Patient or legal guardian wants to designate a caregiver: No  Support Systems: Spouse / Significant Other  Housing / Facility: 1 Story Apartment / Condo  Durable Medical Equipment: Not Applicable, Other - Specify (wheelchair)    Discharge Preparedness  What is your plan after discharge?: Home with help  What are your discharge supports?: Child, Spouse  Prior Functional Level: Uses  Wheelchair, Needs Assist with Medication Management, Needs Assist with Activities of Daily Living    Functional Assesment  Prior Functional Level: Uses Wheelchair, Needs Assist with Medication Management, Needs Assist with Activities of Daily Living    Finances  Financial Barriers to Discharge: No  Prescription Coverage: Yes    Vision / Hearing Impairment  Vision Impairment : No  Hearing Impairment : No     Domestic Abuse  Have you ever been the victim of abuse or violence?: No  Physical Abuse or Sexual Abuse: No  Verbal Abuse or Emotional Abuse: No  Possible Abuse/Neglect Reported to:: Not Applicable    Anticipated Discharge Information  Discharge Disposition: Discharged to home/self care (01)

## 2023-10-10 NOTE — ED NOTES
Med rec complete per patients spouse at bedside  Allergies reviewed.   Patient denies any outpatient antibiotics in the last 30 days.   Patient takes Clopidogrel 75 mg, last dose 10/9/23 AM    Preferred pharmacy for this visit - [facility]     Christine Araiza CPhT

## 2023-10-11 PROBLEM — R10.9 ABDOMINAL PAIN: Status: ACTIVE | Noted: 2023-10-11

## 2023-10-11 LAB
ALBUMIN SERPL BCP-MCNC: 2.5 G/DL (ref 3.2–4.9)
ALBUMIN/GLOB SERPL: 0.6 G/DL
ALP SERPL-CCNC: 127 U/L (ref 30–99)
ALT SERPL-CCNC: <5 U/L (ref 2–50)
ANION GAP SERPL CALC-SCNC: 13 MMOL/L (ref 7–16)
AST SERPL-CCNC: 12 U/L (ref 12–45)
BILIRUB SERPL-MCNC: 0.2 MG/DL (ref 0.1–1.5)
BUN SERPL-MCNC: 45 MG/DL (ref 8–22)
CALCIUM ALBUM COR SERPL-MCNC: 10 MG/DL (ref 8.5–10.5)
CALCIUM SERPL-MCNC: 8.8 MG/DL (ref 8.5–10.5)
CHLORIDE SERPL-SCNC: 106 MMOL/L (ref 96–112)
CO2 SERPL-SCNC: 17 MMOL/L (ref 20–33)
CREAT SERPL-MCNC: 1.51 MG/DL (ref 0.5–1.4)
ERYTHROCYTE [DISTWIDTH] IN BLOOD BY AUTOMATED COUNT: 52.1 FL (ref 35.9–50)
GFR SERPLBLD CREATININE-BSD FMLA CKD-EPI: 39 ML/MIN/1.73 M 2
GGT SERPL-CCNC: 61 U/L (ref 7–34)
GLOBULIN SER CALC-MCNC: 3.9 G/DL (ref 1.9–3.5)
GLUCOSE SERPL-MCNC: 92 MG/DL (ref 65–99)
HCT VFR BLD AUTO: 31 % (ref 37–47)
HGB BLD-MCNC: 9.7 G/DL (ref 12–16)
MAGNESIUM SERPL-MCNC: 1.8 MG/DL (ref 1.5–2.5)
MCH RBC QN AUTO: 29 PG (ref 27–33)
MCHC RBC AUTO-ENTMCNC: 31.3 G/DL (ref 32.2–35.5)
MCV RBC AUTO: 92.5 FL (ref 81.4–97.8)
NT-PROBNP SERPL IA-MCNC: ABNORMAL PG/ML (ref 0–125)
PHOSPHATE SERPL-MCNC: 2.4 MG/DL (ref 2.5–4.5)
PLATELET # BLD AUTO: 366 K/UL (ref 164–446)
PMV BLD AUTO: 10.7 FL (ref 9–12.9)
POTASSIUM SERPL-SCNC: 4.8 MMOL/L (ref 3.6–5.5)
PROCALCITONIN SERPL-MCNC: 2.7 NG/ML
PROT SERPL-MCNC: 6.4 G/DL (ref 6–8.2)
RBC # BLD AUTO: 3.35 M/UL (ref 4.2–5.4)
SODIUM SERPL-SCNC: 136 MMOL/L (ref 135–145)
WBC # BLD AUTO: 14.7 K/UL (ref 4.8–10.8)

## 2023-10-11 PROCEDURE — 92610 EVALUATE SWALLOWING FUNCTION: CPT

## 2023-10-11 PROCEDURE — 84145 PROCALCITONIN (PCT): CPT

## 2023-10-11 PROCEDURE — 83735 ASSAY OF MAGNESIUM: CPT

## 2023-10-11 PROCEDURE — 700111 HCHG RX REV CODE 636 W/ 250 OVERRIDE (IP)

## 2023-10-11 PROCEDURE — 82977 ASSAY OF GGT: CPT

## 2023-10-11 PROCEDURE — 700101 HCHG RX REV CODE 250

## 2023-10-11 PROCEDURE — 83880 ASSAY OF NATRIURETIC PEPTIDE: CPT

## 2023-10-11 PROCEDURE — 700102 HCHG RX REV CODE 250 W/ 637 OVERRIDE(OP): Performed by: STUDENT IN AN ORGANIZED HEALTH CARE EDUCATION/TRAINING PROGRAM

## 2023-10-11 PROCEDURE — 85027 COMPLETE CBC AUTOMATED: CPT

## 2023-10-11 PROCEDURE — 700102 HCHG RX REV CODE 250 W/ 637 OVERRIDE(OP)

## 2023-10-11 PROCEDURE — 700111 HCHG RX REV CODE 636 W/ 250 OVERRIDE (IP): Performed by: STUDENT IN AN ORGANIZED HEALTH CARE EDUCATION/TRAINING PROGRAM

## 2023-10-11 PROCEDURE — A9270 NON-COVERED ITEM OR SERVICE: HCPCS | Performed by: STUDENT IN AN ORGANIZED HEALTH CARE EDUCATION/TRAINING PROGRAM

## 2023-10-11 PROCEDURE — 84100 ASSAY OF PHOSPHORUS: CPT

## 2023-10-11 PROCEDURE — 97162 PT EVAL MOD COMPLEX 30 MIN: CPT

## 2023-10-11 PROCEDURE — A9270 NON-COVERED ITEM OR SERVICE: HCPCS

## 2023-10-11 PROCEDURE — 770001 HCHG ROOM/CARE - MED/SURG/GYN PRIV*

## 2023-10-11 PROCEDURE — 51798 US URINE CAPACITY MEASURE: CPT

## 2023-10-11 PROCEDURE — 99232 SBSQ HOSP IP/OBS MODERATE 35: CPT | Mod: GC | Performed by: INTERNAL MEDICINE

## 2023-10-11 PROCEDURE — 80053 COMPREHEN METABOLIC PANEL: CPT

## 2023-10-11 RX ORDER — ATORVASTATIN CALCIUM 80 MG/1
80 TABLET, FILM COATED ORAL EVERY EVENING
Status: DISCONTINUED | OUTPATIENT
Start: 2023-10-11 | End: 2023-10-12 | Stop reason: HOSPADM

## 2023-10-11 RX ORDER — MAGNESIUM SULFATE 1 G/100ML
1 INJECTION INTRAVENOUS ONCE
Status: COMPLETED | OUTPATIENT
Start: 2023-10-11 | End: 2023-10-11

## 2023-10-11 RX ORDER — LISINOPRIL 10 MG/1
10 TABLET ORAL DAILY
Status: DISCONTINUED | OUTPATIENT
Start: 2023-10-11 | End: 2023-10-12 | Stop reason: HOSPADM

## 2023-10-11 RX ADMIN — DOXYCYCLINE 100 MG: 100 TABLET, FILM COATED ORAL at 17:04

## 2023-10-11 RX ADMIN — CLOPIDOGREL BISULFATE 75 MG: 75 TABLET ORAL at 04:33

## 2023-10-11 RX ADMIN — POTASSIUM CHLORIDE AND SODIUM CHLORIDE: 900; 300 INJECTION, SOLUTION INTRAVENOUS at 10:24

## 2023-10-11 RX ADMIN — ATORVASTATIN CALCIUM 80 MG: 80 TABLET, FILM COATED ORAL at 17:18

## 2023-10-11 RX ADMIN — DOXYCYCLINE 100 MG: 100 TABLET, FILM COATED ORAL at 04:33

## 2023-10-11 RX ADMIN — PRAMIPEXOLE DIHYDROCHLORIDE 0.12 MG: 0.12 TABLET ORAL at 17:06

## 2023-10-11 RX ADMIN — POTASSIUM CHLORIDE AND SODIUM CHLORIDE: 900; 300 INJECTION, SOLUTION INTRAVENOUS at 04:31

## 2023-10-11 RX ADMIN — METOPROLOL TARTRATE 12.5 MG: 25 TABLET, FILM COATED ORAL at 04:33

## 2023-10-11 RX ADMIN — LISINOPRIL 10 MG: 10 TABLET ORAL at 13:42

## 2023-10-11 RX ADMIN — MAGNESIUM SULFATE IN DEXTROSE 1 G: 10 INJECTION, SOLUTION INTRAVENOUS at 09:23

## 2023-10-11 RX ADMIN — HEPARIN SODIUM 5000 UNITS: 5000 INJECTION, SOLUTION INTRAVENOUS; SUBCUTANEOUS at 21:24

## 2023-10-11 RX ADMIN — TAMSULOSIN HYDROCHLORIDE 0.4 MG: 0.4 CAPSULE ORAL at 17:04

## 2023-10-11 RX ADMIN — CEFTRIAXONE SODIUM 2000 MG: 10 INJECTION, POWDER, FOR SOLUTION INTRAVENOUS at 17:04

## 2023-10-11 RX ADMIN — MIRTAZAPINE 15 MG: 15 TABLET, FILM COATED ORAL at 21:23

## 2023-10-11 RX ADMIN — METOPROLOL TARTRATE 12.5 MG: 25 TABLET, FILM COATED ORAL at 17:04

## 2023-10-11 RX ADMIN — PRAMIPEXOLE DIHYDROCHLORIDE 0.12 MG: 0.12 TABLET ORAL at 13:42

## 2023-10-11 RX ADMIN — DIBASIC SODIUM PHOSPHATE, MONOBASIC POTASSIUM PHOSPHATE AND MONOBASIC SODIUM PHOSPHATE 250 MG: 852; 155; 130 TABLET ORAL at 17:03

## 2023-10-11 RX ADMIN — DOCUSATE SODIUM 50 MG AND SENNOSIDES 8.6 MG 2 TABLET: 8.6; 5 TABLET, FILM COATED ORAL at 04:33

## 2023-10-11 RX ADMIN — HEPARIN SODIUM 5000 UNITS: 5000 INJECTION, SOLUTION INTRAVENOUS; SUBCUTANEOUS at 13:42

## 2023-10-11 RX ADMIN — ASPIRIN 81 MG: 81 TABLET, COATED ORAL at 04:32

## 2023-10-11 RX ADMIN — HEPARIN SODIUM 5000 UNITS: 5000 INJECTION, SOLUTION INTRAVENOUS; SUBCUTANEOUS at 04:36

## 2023-10-11 RX ADMIN — ACETAMINOPHEN 650 MG: 325 TABLET, FILM COATED ORAL at 21:24

## 2023-10-11 RX ADMIN — TAMSULOSIN HYDROCHLORIDE 0.4 MG: 0.4 CAPSULE ORAL at 04:34

## 2023-10-11 RX ADMIN — DIBASIC SODIUM PHOSPHATE, MONOBASIC POTASSIUM PHOSPHATE AND MONOBASIC SODIUM PHOSPHATE 250 MG: 852; 155; 130 TABLET ORAL at 13:42

## 2023-10-11 RX ADMIN — DOCUSATE SODIUM 50 MG AND SENNOSIDES 8.6 MG 2 TABLET: 8.6; 5 TABLET, FILM COATED ORAL at 17:04

## 2023-10-11 RX ADMIN — EZETIMIBE 10 MG: 10 TABLET ORAL at 04:37

## 2023-10-11 RX ADMIN — PRAMIPEXOLE DIHYDROCHLORIDE 0.12 MG: 0.12 TABLET ORAL at 04:33

## 2023-10-11 ASSESSMENT — ENCOUNTER SYMPTOMS
SPEECH CHANGE: 1
CHILLS: 0
COUGH: 0
PALPITATIONS: 0
HEMOPTYSIS: 0
FLANK PAIN: 0
SHORTNESS OF BREATH: 0
FALLS: 0
DIARRHEA: 0
ABDOMINAL PAIN: 1
FEVER: 0

## 2023-10-11 ASSESSMENT — FIBROSIS 4 INDEX
FIB4 SCORE: .942809041582063366
FIB4 SCORE: .942809041582063366

## 2023-10-11 ASSESSMENT — COGNITIVE AND FUNCTIONAL STATUS - GENERAL
TURNING FROM BACK TO SIDE WHILE IN FLAT BAD: A LOT
SUGGESTED CMS G CODE MODIFIER MOBILITY: CM
WALKING IN HOSPITAL ROOM: TOTAL
MOVING TO AND FROM BED TO CHAIR: A LOT
CLIMB 3 TO 5 STEPS WITH RAILING: TOTAL
MOVING FROM LYING ON BACK TO SITTING ON SIDE OF FLAT BED: UNABLE
STANDING UP FROM CHAIR USING ARMS: A LOT
MOBILITY SCORE: 9

## 2023-10-11 ASSESSMENT — PAIN DESCRIPTION - PAIN TYPE: TYPE: ACUTE PAIN

## 2023-10-11 ASSESSMENT — GAIT ASSESSMENTS: GAIT LEVEL OF ASSIST: UNABLE TO PARTICIPATE

## 2023-10-11 NOTE — CARE PLAN
The patient is Stable - Low risk of patient condition declining or worsening    Shift Goals  Clinical Goals: Magnesium, antibiotics, patient will remain free from falls by end of shift  Patient Goals: go home  Family Goals: not present, AFIA    Progress made toward(s) clinical / shift goals:      Problem: Nutrition  Goal: Patient's nutritional and fluid intake will be adequate or improve  Outcome: Not Progressing  Flowsheets (Taken 10/11/2023 0930)  P.O.: 400 mL  Oral Nutrition Supplement: 0% Consumed  Oral Nutrition:   Breakfast   0% Consumed  Note: Patient refuses to eat, was educated on the importance of eating meals for adequate nutrition, patient voiced having poor appetite, MD made aware, patient encouraged to eat meals and supplement, voiced understanding       Patient is not progressing towards the following goals:      Problem: Nutrition  Goal: Patient's nutritional and fluid intake will be adequate or improve  Outcome: Not Progressing  Flowsheets (Taken 10/11/2023 0930)  P.O.: 400 mL  Oral Nutrition Supplement: 0% Consumed  Oral Nutrition:   Breakfast   0% Consumed  Note: Patient refuses to eat, was educated on the importance of eating meals for adequate nutrition, patient voiced having poor appetite, MD made aware, patient encouraged to eat meals and supplement, voiced understanding

## 2023-10-11 NOTE — PROGRESS NOTES
[facility] Internal Medicine Daily Progress Note    Date of Service  10/11/2023    R Team: R IM Blue Team   Attending: Elliot Miller M.d.  Senior Resident: Dr. Mcbride  Intern:  Dr. Hickman  Contact Number: [phone]    Chief Complaint  Fouzia Santamaria is a 63 y.o. female admitted 10/9/2023 with shortness of breath and abdominal pain.     Hospital Course  Fouzia Santamaria is a 63 y.o. female with history of stroke, dysphasia, HFeEF, 96-pack years, hydronephrosis, and COPD who presented 10/9 with evaluation for shortness of breath, right-sided multifocal pneumonia, and abdominal pain.     According to records, patient was seen by family medicine 10/9 for vomiting, abdominal pain and cough. Vomiting and respiratory symptoms x1 week and progressed to RLQ pain. Patient had known sick contacts at home. According to note, patient has been coughing up significant amount of greenish mucous, febrile, and chills. Significant crackles in R base of lung field notes on exam. Diminished alertness from baseline. Patient unable to hold down fluids and in significant pain, so sent to ED from clinic. COVID, flu, and RSV tests negative in office.     In ED, patient noted to have multifocal pneumonia on CXR. CTAP noted left hydronephrosis, no obstruction seen, pneumonia also seen on CXR, as well as gallstone/sludge. IV fluids and IV antibiotics were administered in the ED. Patient was found fit for admission on 10/9/23.     Upon admission, patient was administered Ceftriaxone and Doxycycline for concern for aspiration pneumonia, pain regimen and Flomax for abdominal pain and nephrotoxic medications were held for IVON on CKD. Renal US was found with bilateral hydronephrosis, worse on L, and additional kidney workup was ordered. IV fluids were continued and speech language pathology was consulted due to concern for aspiration secondary to dysphagia from previous stroke. Post-void bladder scan was found to be negative. Patient was  evaluated by Speech Language Pathology, who recommended soft-bites diet.  Patient's leukocytosis trended down, her lung exam findings improved, but she continued to have some intermittent abdominal pain, with workup pending. PT/OT evaluated patient with recommendations for home with HH vs rehab facility upon potential discharge on 10/12/23 pending symptomatic improvement and labwork.     Interval Problem Update  NAEO. This morning, the patient is tearful and in fetal position in bed. She shakes her head yes/no to some questions but is unable to verbalize her thoughts. She indicates some continued abdominal pain and continued anxiety. She has urinary incontinence.     I have discussed this patient's plan of care and discharge plan at IDT rounds today with Case Management, Nursing, Nursing leadership, and other members of the IDT team.    Consultants/Specialty  Speech Language Pathology    Code Status  Full Code    Disposition  The patient is not medically cleared for discharge to home or a post-acute facility.  Anticipate discharge to: home with organized home healthcare and close outpatient follow-up    Consider discharge home with HH  per PT/OT recommendations and discussion with  Basilio who acts as caretaker, as soon as tomorrow pending clinical improvement/    I have placed the appropriate orders for post-discharge needs.    Review of Systems  Review of Systems   Constitutional:  Negative for chills and fever.   Respiratory:  Negative for cough, hemoptysis and shortness of breath.    Cardiovascular:  Negative for chest pain and palpitations.   Gastrointestinal:  Positive for abdominal pain. Negative for diarrhea.   Genitourinary:  Negative for dysuria and flank pain.   Musculoskeletal:  Negative for falls.   Neurological:  Positive for speech change (chronic).        Physical Exam  Temp:  [36.1 °C (96.9 °F)-36.7 °C (98.1 °F)] 36.1 °C (96.9 °F)  Pulse:  [71-93] 93  Resp:  [14-20] 18  BP: (134-162)/()  160/104  SpO2:  [93 %-100 %] 94 %    Physical Exam  Vitals reviewed.   Constitutional:       General: She is not in acute distress.     Appearance: She is not ill-appearing.   HENT:      Head: Normocephalic and atraumatic.      Nose: Nose normal.      Mouth/Throat:      Mouth: Mucous membranes are dry.      Pharynx: Oropharynx is clear.   Eyes:      General: No scleral icterus.     Extraocular Movements: Extraocular movements intact.   Cardiovascular:      Rate and Rhythm: Normal rate and regular rhythm.      Heart sounds: Murmur (systolic murmur with possible bilateral carotid bruits noted) heard.      No friction rub.   Pulmonary:      Effort: No respiratory distress.      Breath sounds: No stridor. No wheezing or rales (with decreased breath sounds on the right).      Comments: Decreased breath sounds on R  Chest:      Chest wall: No tenderness.   Abdominal:      General: There is no distension.      Tenderness: There is abdominal tenderness (mild RLQ and intermittent LLQ). There is no right CVA tenderness, left CVA tenderness, guarding or rebound.      Comments: Mild L femoral artery bruit   Genitourinary:     Comments: No CVA tenderness  Musculoskeletal:      Cervical back: Neck supple. No tenderness.   Skin:     General: Skin is dry.      Capillary Refill: Capillary refill takes less than 2 seconds.      Coloration: Skin is not pale.   Neurological:      General: No focal deficit present.      Mental Status: She is alert and oriented to person, place, and time.   Psychiatric:         Mood and Affect: Mood normal.         Fluids    Intake/Output Summary (Last 24 hours) at 10/11/2023 1406  Last data filed at 10/11/2023 0930  Gross per 24 hour   Intake 800 ml   Output 350 ml   Net 450 ml       Laboratory  Recent Labs     10/09/23  1736 10/10/23  0303 10/11/23  0043   WBC 19.6* 17.7* 14.7*   RBC 3.83* 3.12* 3.35*   HEMOGLOBIN 11.0* 9.0* 9.7*   HEMATOCRIT 34.3* 28.6* 31.0*   MCV 89.6 91.7 92.5   MCH 28.7 28.8 29.0    MCHC 32.1* 31.5* 31.3*   RDW 49.4 51.5* 52.1*   PLATELETCT 293 310 366   MPV 12.3 10.9 10.7     Recent Labs     10/09/23  1938 10/10/23  0303 10/11/23  0043   SODIUM 135 133* 136   POTASSIUM 3.0* 2.9* 4.8   CHLORIDE 96 97 106   CO2 23 22 17*   GLUCOSE 113* 99 92   BUN 71* 65* 45*   CREATININE 2.40* 2.01* 1.51*   CALCIUM 9.0 8.4* 8.8                   Imaging  US-RENAL   Final Result      Mild bilateral hydronephrosis, worse on the LEFT.         CT-ABDOMEN-PELVIS W/O   Final Result      1.  New mild to moderate LEFT hydronephrosis without visualized obstructing lesion   2.  Interval aortobifemoral bypass   3.  Hyperdense BILATERAL renal lesions, likely hemorrhagic cysts but incompletely characterized   4.  RIGHT middle lobe pneumonia   5.  Gallbladder sludge or stones   6.  Appendix not clearly visualized      DX-CHEST-PORTABLE (1 VIEW)   Final Result      Multiple consolidations in the right upper and lower lungs, consistent with multifocal pneumonia.      DX-ESOPHAGUS - [id number]    (Results Pending)        Assessment/Plan  Problem Representation:    * Multifocal pneumonia  Assessment & Plan  Possible aspiration with risk factors including dysphagia after previous stroke and COPD, also consistent with CXR findings of R lobe infiltrates vs CAP  Pt with decreased R lung sounds, productive cough, and R chest pain on admission  Leukocytosis on admission of 17.7, improving  Admission CXR with R multifocal consolidations.   - Continue IVF but rate slowed due to concern for worsening hydronephrosis  - Continue Ceftriaxone and Doxycycline  - Pain regimen Tylenol    Hydronephrosis  Assessment & Plan  Possibly passed stone  Left sided, non-obstruted seen on CT AP  Renal US with L>R bilateral hydronephrosis 10/10/23  PV bladder scan negative  - Continue Flomax      Acute kidney injury superimposed on CKD (HCC)  Assessment & Plan  IVON on CKD 3,  Renal US as above  Now improved with BUN/Cr elevated but downtrending, at  baseline  - IVF held as patient tolerating PO liquids  - Minimize nephrotoxic agents (holding Furosemide)  - Follow up with urine urea, urine sodium, urine creatinine    Abdominal pain  Assessment & Plan  PMH of ischemic bowel  Exam findings of intermittent TTP over suprapubic, L and R abdomen  - Follow lactate  - Monitor exam findings closely for improvement    Leukocytosis  Assessment & Plan  Improved  - Plan as above    Moderate protein-calorie malnutrition (HCC)  Assessment & Plan  Ensure  PO intake as tolerated  Body mass index is 20.8 kg/m².      Stroke (HCC)- (present on admission)  Assessment & Plan  History of stroke with deficits from 2022  Dysphagia present at admission  - Speech therapy consulted given concern for aspiration, appreciate recommendations including soft bite diet    Hypertension- (present on admission)  Assessment & Plan  - Continue Metoprolol, Lisinopril  - Hold  lasix until renal function improved    CAD (coronary artery disease)- (present on admission)  Assessment & Plan  With stents  - Continue home Plavix/Zetia  - Continue heparin  - Restarted home statin         VTE prophylaxis: heparin ppx    I have performed a physical exam and reviewed and updated ROS and Plan today (10/11/2023). In review of yesterday's note (10/10/2023), there are no changes except as documented above.

## 2023-10-11 NOTE — ASSESSMENT & PLAN NOTE
PMH of ischemic bowel  Exam findings of intermittent TTP over suprapubic, L and R abdomen  - Follow lactate  - Monitor exam findings closely for improvement

## 2023-10-11 NOTE — CARE PLAN
The patient is Stable - Low risk of patient condition declining or worsening    Shift Goals  Clinical Goals: Fluids ABX  Patient Goals: Sleep, comfort  Family Goals: Recovery    Progress made toward(s) clinical / shift goals:      Problem: Fluid Volume  Goal: Fluid volume balance will be maintained  Outcome: Progressing   Patient's urine output adequate, encouraged to drink fluids.    Problem: Physical Regulation  Goal: Diagnostic test results will improve  Outcome: Progressing  Note: Patient's white count trending down, sodium WDL, patient able to rest comfortably throughout shift, IV ABX administered per MAR.      Problem: Pain - Standard  Goal: Alleviation of pain or a reduction in pain to the patient’s comfort goal  Note: No complaints of pain this shift. Pt educated to call RN if pain occurs.

## 2023-10-11 NOTE — THERAPY
"Speech Language Pathology   Clinical Swallow Evaluation     Patient Name: Fouzia Santamaria  AGE:  63 y.o., SEX:  female  Medical Record #: [id number]  Date of Service: 10/11/2023      History of Present Illness  63 y.o. female admitted 10/9/2023 with shortness of breath and abdominal pain.     PMHx CVA, aphasia, HFeEF, COPD    Speech Therapy:  OP Aphasia eval 10/5/2022 res severe aphasia..\"Patient demonstrates significant word finding difficulties and fluency deficits with expressive language, as well as impaired comprehension of more complex auditory auditory information.\"    Dysphagia tx 9/12/2022 SB/TN    CMHx Multifolcal PNA, hydronephrosis, IVON, h/o stroke (2022), HTN, CAD    CXR 10/9/2023  Multiple consolidations in the right upper and lower lungs, consistent with multifocal pneumonia.      General Information:  Vitals  O2 Delivery Device: None - Room Air  Level of Consciousness: Alert, Awake  Patient Behaviors: Tearful         Prior Living Situation & Level of Function:  Housing / Facility: 1 Story Apartment / Condo  Lives with - Patient's Self Care Capacity: Spouse  Communication: Expressive aphasia s/p stroke in 2022. Pt had initial speech-language evaluation in OP setting; however, did not follow up w/ therapy and was d/c from services.  Swallowing: Pt last seen by service in Sept of 2022 w/ rec SB/TN diet. Pt endorsed poor appetite and intermittent globus sensation.       Oral Mechanism Evaluation:  Dentition: Edentulous   Facial Symmetry: Equal  Facial Sensation: Equal     Labial Observations: WFL   Lingual Observations: Midline  Motor Speech: WFL            Laryngeal Function:  Secretion Management: Adequate  Voice Quality: WFL  Cough: Perceptually WNL         Subjective  Patient cleared by RN for evaluation. Pt received awake, breakfast tray at bedside. Pt declining PO from tray, reporting poor appetite. Pt agreeable to PO trials for evaluation. Pt has expressive language deficits s/p stroke, Y/N " reliable.      Assessment  Current Method of Nutrition: Oral diet (RG/TN)  Positioning: Solorio's (60-90 degrees)  Bolus Administration: Patient    O2 Delivery Device: None - Room Air  Factor(s) Affecting Performance:  (Poor appetite)  Tracheostomy : No          Swallowing Trials:  Swallowing Trials  Thin Liquid (TN0): WFL  Liquidised (LQ3): WFL  Regular (RG7): WFL      Comments: Patient able to self-feed without difficulty; however, needing encouragement for PO intake. Adequate oral bolus acceptance/containment. Pharyngeal swallow response appeared timely. Prolonged mastication with trials of dry solids w/ mild lingual residue noted post swallow. Residue cleared with thin liquid rinse. Vocal quality remained clear, no increased WOB noted. Pt intermittently c/o globus sensation with trials of liquidized and dry solids, concerning for ascending aspiration. Pt would benefit from diagnostic swallow study (MBSS) to objectively assess swallow function and informed POC.        Clinical Impressions  Patient presents with a functional swallow; however, given c/o globus sensation, PNA and h/o stroke, pt would benefit from diagnostic swallow study (MBSS) to objectively assess swallow function and informed POC. Education provided re: SLP rec, pt verbalized understanding/agreement.        Recommendations  Diet Consistency: Soft/bite sized solids (SB6), thin liquids (TN0)  Instrumentation: VFSS (MBSS)  Medication: As tolerated  Supervision: Distant supervision - check on patient 2-3 times per meal  Positioning: Fully upright and midline during oral intake, Meals sitting upright in a chair, as tolerated  Risk Management : Slow rate of intake  Oral Care: BID  HOLD PO with any difficulty or change in respiratory status         SLP Treatment Plan  Treatment Plan: Dysphagia Treatment, Patient/Family/Caregiver Training  SLP Frequency: 3x Per Week  Estimated Duration: Until Therapy Goals Met      Anticipated Discharge Needs  Discharge  "Recommendations: Other (TBD pending dx swallow study)   Therapy Recommendations Upon DC: Dysphagia Training, Patient / Family / Caregiver Education        Patient / Family Goals  Patient / Family Goal #1: \"not hungry\"  Short Term Goals  Short Term Goal # 1: Patient will consume a SB/TN diet with no overt s/sx of aspiration.  Short Term Goal # 2: Patient will participate in dx swallow study to objectively assess swallow function and informed POC.      Maisha Washington MS,CCC-SLP    "

## 2023-10-11 NOTE — THERAPY
Physical Therapy   Initial Evaluation     Patient Name: Fouzia Santamaria  Age:  63 y.o., Sex:  female  Medical Record #: [id number]  Today's Date: 10/11/2023     Precautions  Precautions: Fall Risk;Swallow Precautions  Comments: expressive aphasia s/p stroke (2022)    Assessment  Patient is 63 y.o. female who presented 10/9/2023 with evaluation for shortness of breath, abdominal pain.   Found to have PNA, SIRS, IVON, protein-calorie malnutrition, hydronephrosis.   PMH: COPD, DM, HTN, CVA (2022), CAD, CKD    Patient seen for PT evaluation, was in bed, agreeable to participate. Patient currently requiring assistance with all mobility tasks as detailed below. Was able to perform stand pivot transfer to the chair. Will continue to benefit from PT to address mobility. Recommend  PT at this time.     If family have concerns about assisting patient at home, she may benefit from placement.     Plan    Physical Therapy Initial Treatment Plan   Treatment Plan : Bed Mobility, Neuro Re-Education / Balance, Therapeutic Activities, Therapeutic Exercise  Treatment Frequency: 3 Times per Week  Duration: Until Therapy Goals Met    DC Equipment Recommendations: Unable to determine at this time (Patient may benefit from hospital bed)  Discharge Recommendations: Recommend home health for continued physical therapy services      Objective       10/11/23 0843   Charge Group   PT Evaluation PT Evaluation Mod   Total Time Spent   PT Evaluation Time Spent (Mins) 24   PT Total Time Spent (Calculated) 24   Initial Contact Note    Initial Contact Note Order Received and Verified, Physical Therapy Evaluation in Progress with Full Report to Follow.   Precautions   Precautions Fall Risk;Swallow Precautions;Other (See Comments)   Comments Seizure precautions   Vitals   Pulse 85   Patient BP Position Sitting  (In the chair, post activity)   Pulse Oximetry 93 %   O2 Delivery Device None - Room Air   Pain   Pain Scales 0 to 10 Scale    Intervention  Rest;Repositioned   Pain 0 - 10 Group   Location Leg   Location Orientation Right   Therapist Pain Assessment Prior to Activity;During Activity;Post Activity  (Patient grimacing while attempting to move her RLE)   Prior Living Situation   Prior Services Unable To Determine At This Time   Housing / Facility 1 Story Apartment / Condo   Lives with - Patient's Self Care Capacity Spouse   Comments Patient unable to provide the above information at this time due to expressive aphasia   Prior Level of Functional Mobility   Bed Mobility Required Assist   Transfer Status Required Assist   Ambulation Other (Comments)  (Non-ambulatory)   Wheelchair Required Assist   Comments Patient was able to confirm that she does not ambulate at home and requires assistance with mobility   Cognition    Speech/ Communication Delayed Responses;Expressive Aphasia;Gestures   Level of Consciousness Alert   Ability To Follow Commands 1 Step   Passive ROM Upper Body   Comments Please refer to OT notes for upper body assessment   Passive ROM Lower Body   Passive ROM Lower Body X   Comments RLE-limited due to pain   Active ROM Lower Body    Active ROM Lower Body  X   Comments RLE-able to perform slight hip flexion, hip abduction/adduction and knee extension, no movements in the ankle; LLE-WFL   Strength Lower Body   Lower Body Strength  X   Comments RLE Hip 2-/5, Knee 2-/5, Ankle 1/5; LLE Grossly 3/5   Other Treatments   Other Treatments Provided Patient educated about importance of mobility & OOB to chair for meals. Patient was assisted with set up of his breakfast tray.   Balance Assessment   Sitting Balance (Static) Fair -   Sitting Balance (Dynamic) Poor +   Standing Balance (Static) Poor   Standing Balance (Dynamic) Poor   Comments Hand held assist and/or bed support in standing   Bed Mobility    Supine to Sit Moderate Assist  (x2 times; From L side & R side)   Scooting Minimal Assist   Rolling Minimal Assist to Rt.;Minimum Assist to Lt.    Comments HOB raised, cues for sequencing   Gait Analysis   Gait Level Of Assist Unable to Participate   Comments Patient is non ambulatory at baseline   Functional Mobility   Sit to Stand Moderate Assist   Bed, Chair, Wheelchair Transfer Moderate Assist   Transfer Method Stand Pivot   Mobility Bed-chair   Comments Patient was provided support anteriorly, to assist with transfers. Cues for sequencing, hand placement   How much difficulty does the patient currently have...   Turning over in bed (including adjusting bedclothes, sheets and blankets)? 2   Sitting down on and standing up from a chair with arms (e.g., wheelchair, bedside commode, etc.) 1   Moving from lying on back to sitting on the side of the bed? 2   How much help from another person does the patient currently need...   Moving to and from a bed to a chair (including a wheelchair)? 2   Need to walk in a hospital room? 1   Climbing 3-5 steps with a railing? 1   6 clicks Mobility Score 9   Activity Tolerance   Sitting in Chair Post session   Patient / Family Goals    Patient / Family Goal #1 Patient unable to state a goal   Short Term Goals    Short Term Goal # 1 Patient will perform supine-sit with CGA with HOB flat   Short Term Goal # 2 Patient will perform stand pivot transfers to chair with Min A   Education Group   Education Provided Role of Physical Therapist   Role of Physical Therapist Patient Response Patient;Acceptance;Demonstration;Action Demonstration;Reinforcement Needed   Physical Therapy Initial Treatment Plan    Treatment Plan  Bed Mobility;Neuro Re-Education / Balance;Therapeutic Activities;Therapeutic Exercise   Treatment Frequency 3 Times per Week   Duration Until Therapy Goals Met   Problem List    Problems Impaired Bed Mobility;Impaired Transfers;Functional ROM Deficit;Functional Strength Deficit;Impaired Balance;Decreased Activity Tolerance   Anticipated Discharge Equipment and Recommendations   DC Equipment Recommendations Unable  to determine at this time  (Patient may benefit from hospital bed)   Discharge Recommendations Recommend home health for continued physical therapy services   Interdisciplinary Plan of Care Collaboration   IDT Collaboration with  Nursing   Patient Position at End of Therapy Seated;Chair Alarm On;Call Light within Reach;Tray Table within Reach  (CNA at bedside)   Session Information   Date / Session Number  10/11-1(1/3, 10/17)

## 2023-10-12 ENCOUNTER — APPOINTMENT (OUTPATIENT)
Dept: RADIOLOGY | Facility: MEDICAL CENTER | Age: 63
DRG: 871 | End: 2023-10-12
Payer: MEDICAID

## 2023-10-12 ENCOUNTER — PHARMACY VISIT (OUTPATIENT)
Dept: PHARMACY | Facility: MEDICAL CENTER | Age: 63
End: 2023-10-12
Payer: COMMERCIAL

## 2023-10-12 VITALS
RESPIRATION RATE: 17 BRPM | SYSTOLIC BLOOD PRESSURE: 107 MMHG | DIASTOLIC BLOOD PRESSURE: 73 MMHG | WEIGHT: 111.55 LBS | OXYGEN SATURATION: 96 % | TEMPERATURE: 97.4 F | HEIGHT: 65 IN | BODY MASS INDEX: 18.59 KG/M2 | HEART RATE: 83 BPM

## 2023-10-12 LAB
ANION GAP SERPL CALC-SCNC: 13 MMOL/L (ref 7–16)
BACTERIA UR CULT: NORMAL
BUN SERPL-MCNC: 34 MG/DL (ref 8–22)
CALCIUM SERPL-MCNC: 9 MG/DL (ref 8.5–10.5)
CHLORIDE SERPL-SCNC: 104 MMOL/L (ref 96–112)
CO2 SERPL-SCNC: 21 MMOL/L (ref 20–33)
CREAT SERPL-MCNC: 1.27 MG/DL (ref 0.5–1.4)
ERYTHROCYTE [DISTWIDTH] IN BLOOD BY AUTOMATED COUNT: 49.5 FL (ref 35.9–50)
GFR SERPLBLD CREATININE-BSD FMLA CKD-EPI: 48 ML/MIN/1.73 M 2
GLUCOSE SERPL-MCNC: 106 MG/DL (ref 65–99)
HCT VFR BLD AUTO: 33.8 % (ref 37–47)
HGB BLD-MCNC: 10.6 G/DL (ref 12–16)
LACTATE SERPL-SCNC: 0.8 MMOL/L (ref 0.5–2)
MAGNESIUM SERPL-MCNC: 1.5 MG/DL (ref 1.5–2.5)
MCH RBC QN AUTO: 28.3 PG (ref 27–33)
MCHC RBC AUTO-ENTMCNC: 31.4 G/DL (ref 32.2–35.5)
MCV RBC AUTO: 90.1 FL (ref 81.4–97.8)
PHOSPHATE SERPL-MCNC: 4.8 MG/DL (ref 2.5–4.5)
PLATELET # BLD AUTO: 475 K/UL (ref 164–446)
PMV BLD AUTO: 10.1 FL (ref 9–12.9)
POTASSIUM SERPL-SCNC: 4.5 MMOL/L (ref 3.6–5.5)
RBC # BLD AUTO: 3.75 M/UL (ref 4.2–5.4)
SIGNIFICANT IND 70042: NORMAL
SITE SITE: NORMAL
SODIUM SERPL-SCNC: 138 MMOL/L (ref 135–145)
SOURCE SOURCE: NORMAL
WBC # BLD AUTO: 16.6 K/UL (ref 4.8–10.8)

## 2023-10-12 PROCEDURE — 700102 HCHG RX REV CODE 250 W/ 637 OVERRIDE(OP): Performed by: STUDENT IN AN ORGANIZED HEALTH CARE EDUCATION/TRAINING PROGRAM

## 2023-10-12 PROCEDURE — 83735 ASSAY OF MAGNESIUM: CPT

## 2023-10-12 PROCEDURE — 84100 ASSAY OF PHOSPHORUS: CPT

## 2023-10-12 PROCEDURE — A9270 NON-COVERED ITEM OR SERVICE: HCPCS | Performed by: STUDENT IN AN ORGANIZED HEALTH CARE EDUCATION/TRAINING PROGRAM

## 2023-10-12 PROCEDURE — 80048 BASIC METABOLIC PNL TOTAL CA: CPT

## 2023-10-12 PROCEDURE — 85027 COMPLETE CBC AUTOMATED: CPT

## 2023-10-12 PROCEDURE — 700102 HCHG RX REV CODE 250 W/ 637 OVERRIDE(OP)

## 2023-10-12 PROCEDURE — 99239 HOSP IP/OBS DSCHRG MGMT >30: CPT | Mod: GC | Performed by: INTERNAL MEDICINE

## 2023-10-12 PROCEDURE — 83605 ASSAY OF LACTIC ACID: CPT

## 2023-10-12 PROCEDURE — RXMED WILLOW AMBULATORY MEDICATION CHARGE

## 2023-10-12 PROCEDURE — 700111 HCHG RX REV CODE 636 W/ 250 OVERRIDE (IP): Performed by: STUDENT IN AN ORGANIZED HEALTH CARE EDUCATION/TRAINING PROGRAM

## 2023-10-12 PROCEDURE — A9270 NON-COVERED ITEM OR SERVICE: HCPCS

## 2023-10-12 RX ORDER — ATORVASTATIN CALCIUM 80 MG/1
80 TABLET, FILM COATED ORAL EVERY EVENING
Qty: 30 TABLET | Refills: 2 | Status: SHIPPED | OUTPATIENT
Start: 2023-10-12

## 2023-10-12 RX ORDER — AMOXICILLIN AND CLAVULANATE POTASSIUM 875; 125 MG/1; MG/1
1 TABLET, FILM COATED ORAL 2 TIMES DAILY
Qty: 6 TABLET | Refills: 0 | Status: ACTIVE | OUTPATIENT
Start: 2023-10-12 | End: 2023-10-15

## 2023-10-12 RX ORDER — DOXYCYCLINE 100 MG/1
100 TABLET ORAL EVERY 12 HOURS
Qty: 8 TABLET | Refills: 0 | Status: ACTIVE | OUTPATIENT
Start: 2023-10-12 | End: 2023-10-16

## 2023-10-12 RX ADMIN — DIBASIC SODIUM PHOSPHATE, MONOBASIC POTASSIUM PHOSPHATE AND MONOBASIC SODIUM PHOSPHATE 250 MG: 852; 155; 130 TABLET ORAL at 04:19

## 2023-10-12 RX ADMIN — PRAMIPEXOLE DIHYDROCHLORIDE 0.12 MG: 0.12 TABLET ORAL at 04:38

## 2023-10-12 RX ADMIN — DOXYCYCLINE 100 MG: 100 TABLET, FILM COATED ORAL at 04:40

## 2023-10-12 RX ADMIN — ASPIRIN 81 MG: 81 TABLET, COATED ORAL at 04:38

## 2023-10-12 RX ADMIN — DOCUSATE SODIUM 50 MG AND SENNOSIDES 8.6 MG 2 TABLET: 8.6; 5 TABLET, FILM COATED ORAL at 04:39

## 2023-10-12 RX ADMIN — DIBASIC SODIUM PHOSPHATE, MONOBASIC POTASSIUM PHOSPHATE AND MONOBASIC SODIUM PHOSPHATE 250 MG: 852; 155; 130 TABLET ORAL at 00:33

## 2023-10-12 RX ADMIN — CLOPIDOGREL BISULFATE 75 MG: 75 TABLET ORAL at 04:40

## 2023-10-12 RX ADMIN — TAMSULOSIN HYDROCHLORIDE 0.4 MG: 0.4 CAPSULE ORAL at 04:40

## 2023-10-12 RX ADMIN — METOPROLOL TARTRATE 12.5 MG: 25 TABLET, FILM COATED ORAL at 04:18

## 2023-10-12 RX ADMIN — EZETIMIBE 10 MG: 10 TABLET ORAL at 04:38

## 2023-10-12 RX ADMIN — LISINOPRIL 10 MG: 10 TABLET ORAL at 04:39

## 2023-10-12 ASSESSMENT — PATIENT HEALTH QUESTIONNAIRE - PHQ9
SUM OF ALL RESPONSES TO PHQ9 QUESTIONS 1 AND 2: 0
SUM OF ALL RESPONSES TO PHQ9 QUESTIONS 1 AND 2: 0
1. LITTLE INTEREST OR PLEASURE IN DOING THINGS: NOT AT ALL
2. FEELING DOWN, DEPRESSED, IRRITABLE, OR HOPELESS: NOT AT ALL
2. FEELING DOWN, DEPRESSED, IRRITABLE, OR HOPELESS: NOT AT ALL
1. LITTLE INTEREST OR PLEASURE IN DOING THINGS: NOT AT ALL

## 2023-10-12 NOTE — CARE PLAN
The patient is Stable - Low risk of patient condition declining or worsening    Shift Goals  Clinical Goals: Reestablish IV access, trend labs  Patient Goals: sleep  Family Goals: AFIA    Progress made toward(s) clinical / shift goals:    Problem: Pain - Standard  Goal: Alleviation of pain or a reduction in pain to the patient’s comfort goal  Outcome: Progressing     Problem: Knowledge Deficit - Standard  Goal: Patient and family/care givers will demonstrate understanding of plan of care, disease process/condition, diagnostic tests and medications  Outcome: Progressing  Note: Discussed plan of care, reinforced rationale for IV access, re-established access with help of rapid team, reinforced disease process and proper diet choices.      Problem: Hemodynamics  Goal: Patient's hemodynamics, fluid balance and neurologic status will be stable or improve  Outcome: Progressing  Note: Pt remained hemodynamically stable evidenced by stable vital sings, proper urine output and adequate fluid intake.       Problem: Skin Integrity  Goal: Skin integrity is maintained or improved  Outcome: Progressing  Note: Skin assessed at beginning of shift, waffle in place, barrier cream in use, patient changed when incontinent to prevent moisture associated breakdown.

## 2023-10-12 NOTE — DISCHARGE INSTRUCTIONS
You were admitted to the hospital for an infection of your lungs (pneumonia) and management of your longterm problems after stroke.    PLAN:  - Pickup new medications including antibiotics (Augmentin and Doxycycline)  - Schedule your visit with Speech Therapy and for Barium Swallow Study  - Continue other medications as prescribed  - Return to ED for any return of concerning symptoms

## 2023-10-12 NOTE — DOCUMENTATION QUERY
"                                                                         [facility]                                                                       Query Response Note      PATIENT:               CHRISTINA BLOUNT  ACCT #:                  [id number]  MRN:                     [id number]  :                      1960  ADMIT DATE:       10/9/2023 6:12 PM  DISCH DATE:          RESPONDING  PROVIDER #:        [id number]           QUERY TEXT:    Please provide additional clinical indicators supportive of your documented diagnosis of malnutrition.    The patient's Clinical Indicators include:  64yo with dx of HTN, sepsis, cardiomyopathy, COPD    10/09 H&P \"Moderate protein-calorie malnutrition\"  10/10 RD note \"Malnutrition Risk: Does not meet criteria per ASPEN guidelines at this time.\"    Risk factors: age, cardiomyopathy, COPD, IVON, HTN, CKD 3  Treatments: RD consult, supplements, monitoring    Contact me with questions.     Thank you,  BRIAN Cavazos, CDI  [email or web address]  Options provided:   -- Moderate malnutrition exists, please document additional clinical indicators   -- Moderate malnutrition does not exist and amended documentation provided in the medical record   -- Other explanation: other explanation-, please specify   -- Unable to determine      Query created by: Deandra Eric on 10/11/2023 11:09 AM    RESPONSE TEXT:    Moderate malnutrition does not exist and amended documentation provided in the medical record       QUERY TEXT:    The diagnosis of sepsis has been documented in the ED note.  SIRS leukocytosis.     Please clarify the status of sepsis by providing SIRS criteria and sepsis-related organ dysfunction.      Sepsis - real or suspected infection plus 2 or more SIRS criteria + organ dysfunction related to sepsis    Temp <96.8 or >101  HR >90  RR >20  WBC <4,000 or > 12,000  >10% bandemia    The patient's Clinical Indicators include:  64yo with dx of HTN, sepsis, cardiomyopathy, " "COPD    10/09 ED note \"Sepsis with acute renal failure without septic shock, due to unspecified organism, unspecified acute renal failure type\"    Risk factors: age, cardiomyopathy, COPD, IVON, HTN, CKD 3  Treatments: RD consult, supplements, monitoring    Contact me with questions.     Thank you,  Deandra Eric, LARAP, CDI  [email or web address]  Options provided:   -- Sepsis exists, (provide SIRS criteria plus sepsis-related organ dysfunction)   -- Sepsis does not exist - amended documentation in the medical record and updated problem list   -- Other explanation, Please specify      Query created by: Deandra Eric on 10/11/2023 11:17 AM    RESPONSE TEXT:    Sepsis exists - see my progress note attestation from 10/11          Electronically signed by:  LEONARD OWUSU MD 10/12/2023 8:32 AM              "

## 2023-10-12 NOTE — PROGRESS NOTES
DC instructions reviewed with pt. Medications provided to pt. PIV removed. Pt agreeable to DC plan & all questions answered.

## 2023-10-12 NOTE — DISCHARGE SUMMARY
[facility] Discharge Summary    Attending: Elliot Miller M.d.  Senior Resident: Dr. Mcbride  Intern:  Dr. Hickman  Contact Number: [phone]    CHIEF COMPLAINT ON ADMISSION  Chief Complaint   Patient presents with    Shortness of Breath     Sent by  for right sided crackles in the lungs. Pt c/o cough    Abdominal Pain     RLQ abdominal pain, N/V. Denies any blood in emesis.     Reason for Admission  EMS; Vomiting; Sent by MD     Admission Date  10/9/2023    CODE STATUS  Full Code    HPI & HOSPITAL COURSE  Fouzia Santamaria is a 63 y.o. 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     According to records, patient was seen by family medicine 10/9 for vomiting, abdominal pain and cough. Vomiting and respiratory symptoms x1 week and progressed to RLQ pain. Patient had known sick contacts at home. According to note, patient has been coughing up significant amount of greenish mucous, febrile, and chills. Significant crackles in R base of lung field notes on exam. Diminished alertness from baseline. Patient unable to hold down fluids and in significant pain, so sent to ED from clinic. COVID, flu, and RSV tests negative in office.     In ED, patient noted to have multifocal pneumonia on CXR. CTAP noted left hydronephrosis, no obstruction seen, pneumonia also seen on CXR, as well as gallstone/sludge. IV fluids and IV antibiotics were administered in the ED. Patient was found fit for admission on 10/9/23.     #SIRS- resolved  #Multifocal PNA  Treated with ceftriaxone 10/10 and doxy 10/10-10/14. On  room air at day of discharge. Leukocytosis ongoing at time of discharge-- will require follow up labs with PCP. Patient to complete Augmentin till 10/15 + doxycycline till 10/16.     #Acute kidney injury - Scr max 2.4   #Hydronephrosis, left  #Chronic kidney disease, stage 3b - BL Scr 1.4-1.8  Renal US was found with bilateral hydronephrosis, worse on L. IV fluids were continued with improvement in Scr back to baseline. Possible hydronephrosis is from passed stone vs neurogenic bladder vs urinary retention.     #Dysphagia 2/2 CVA  Possible contributing to pt risk of aspiration/ silent aspiration. Speech recommended barium swallow evaluation this admission. Upon discussion with pt and pt caregiver () they were insistent to get this done outpatient as they did want this to delay discharge-- orders for outpt barium study and follow up speech evaluation were placed.     #RLQ abd pain- resolved  #Increased alk phos  TTP without acute abdominal symptoms. GGT elevated -- possible liver etiology. Possible this is due to MSK given vomiting on admission vs chronic ischemia although her lactic is normal on day of discharge. Will require outpt PCP followup.      #Hyperphos   Patient does have hyperphos at discharge to 4.8 (mild) 2/2 repletion for hypophos day prior to discharge. Will require follow up of this outpatient.    #Prolonged Qtc-- notable to be 526 on EKG 10/9    Of note, pt evaluated by PT/OT recommending SNF-- patient and caregiver  elected for home with Home Health.      Therefore, she is discharged in fair and stable condition to home with organized home healthcare and close outpatient follow-up.    The patient met 2-midnight criteria for an inpatient stay at the time of discharge.    Discharge Date  10/12/23    Physical Exam on Day of Discharge  Physical Exam  Vitals reviewed.   Constitutional:       General: She is not in acute distress.     Appearance: She is not ill-appearing.   HENT:      Head:  Normocephalic and atraumatic.      Nose: Nose normal.      Mouth/Throat:      Mouth: Mucous membranes are dry.      Pharynx: Oropharynx is clear.   Eyes:      General: No scleral icterus.     Extraocular Movements: Extraocular movements intact.   Cardiovascular:      Rate and Rhythm: Normal rate and regular rhythm.      Heart sounds: Murmur (systolic murmur with possible bilateral carotid bruits noted) heard.      No friction rub.   Pulmonary:      Effort: No respiratory distress.      Breath sounds: No stridor. No wheezing or rales (with decreased breath sounds on the right).      Comments: Decreased breath sounds on R  Chest:      Chest wall: No tenderness.   Abdominal:      General: There is no distension.      Tenderness: There is no abdominal tenderness. There is no right CVA tenderness, left CVA tenderness, guarding or rebound.   Genitourinary:     Comments: No CVA tenderness  Musculoskeletal:      Cervical back: Neck supple. No tenderness.   Skin:     General: Skin is dry.      Capillary Refill: Capillary refill takes less than 2 seconds.      Coloration: Skin is not pale.   Neurological:      General: No focal deficit present.      Mental Status: She is alert and oriented to person, place, and time.   Psychiatric:         Mood and Affect: Mood normal.     FOLLOW UP ITEMS POST DISCHARGE  Follow up with PCP in 7-10 days  Repeat lab work (follow up on WBC and phos levels)  Outpatient barium swallow study   Outpatient follow up with SLP     DISCHARGE DIAGNOSES  Principal Problem:    Multifocal pneumonia (POA: Unknown)  Active Problems:    CAD (coronary artery disease) (POA: Yes)    Hypertension (POA: Yes)    Acute kidney injury superimposed on CKD (HCC) (POA: Unknown)    Hydronephrosis (POA: Unknown)    Stroke (HCC) (POA: Yes)    Moderate protein-calorie malnutrition (HCC) (POA: Unknown)    Leukocytosis (POA: Unknown)    Sepsis (HCC) (POA: Unknown)    Abdominal pain (POA: Unknown)  Resolved Problems:    SIRS  (systemic inflammatory response syndrome) (HCC) (POA: Unknown)    FOLLOW UP  Future Appointments   Date Time Provider Department Center   10/31/2023  1:15 PM JOSE Santana None     No follow-up provider specified.    MEDICATIONS ON DISCHARGE     Medication List        START taking these medications        Instructions   amoxicillin-clavulanate 875-125 MG Tabs  Commonly known as: Augmentin   Take 1 Tablet by mouth 2 times a day for 3 days.  Dose: 1 Tablet     atorvastatin 80 MG tablet  Commonly known as: Lipitor   Take 1 Tablet by mouth every evening.  Dose: 80 mg     doxycycline monohydrate 100 MG tablet  Commonly known as: Adoxa   Take 1 Tablet by mouth every 12 hours for 4 days.  Dose: 100 mg            CHANGE how you take these medications        Instructions   metoprolol tartrate 25 MG Tabs  What changed: when to take this  Commonly known as: Lopressor   TAKE 1/2 TABLET BY MOUTH 2 TIMES A DAY     pramipexole 0.125 MG Tabs  What changed: when to take this  Commonly known as: Mirapex   TAKE 1 TABLET BY MOUTH THREE TIMES A DAY            CONTINUE taking these medications        Instructions   Acetaminophen Extra Strength 500 MG Tabs   Take 2 Tablets by mouth 3 times a day.  Dose: 1,000 mg     aspirin 81 MG EC tablet   Take 81 mg by mouth every day.  Dose: 81 mg     clopidogrel 75 MG Tabs  Commonly known as: Plavix   Take 1 Tablet by mouth every day.  Dose: 75 mg     docusate sodium 100 MG Caps  Commonly known as: Colace   Take 100 mg by mouth 2 times a day as needed for Constipation.  Dose: 100 mg     ezetimibe 10 MG Tabs  Commonly known as: Zetia   TAKE 1 TABLET BY MOUTH EVERY DAY  Dose: 10 mg     furosemide 20 MG Tabs  Commonly known as: Lasix   TAKE 1 TABLET BY MOUTH TWICE A DAY  Dose: 20 mg     guaiFENesin 100 MG/5ML liquid  Commonly known as: Robitussin   Take 10 mL by mouth every four hours as needed.  Dose: 10 mL     lisinopril 10 MG Tabs  Commonly known as: Prinivil   TAKE 1 TABLET BY MOUTH  EVERY DAY  Dose: 10 mg     mirtazapine 15 MG Tabs  Commonly known as: Remeron   Take 15 mg by mouth every evening.  Dose: 15 mg              Allergies  Allergies   Allergen Reactions    Pcn [Penicillins] Vomiting and Nausea    Toradol Vomiting and Nausea       DIET  Orders Placed This Encounter   Procedures    Diet Order Diet: Level 6 - Soft and Bite Sized; Liquid level: Level 0 - Thin     Standing Status:   Standing     Number of Occurrences:   1     Order Specific Question:   Diet:     Answer:   Level 6 - Soft and Bite Sized [23]     Order Specific Question:   Liquid level     Answer:   Level 0 - Thin       ACTIVITY  As tolerated.  Weight bearing as tolerated    CONSULTATIONS  SLP    PROCEDURES  None    LABORATORY  Lab Results   Component Value Date    SODIUM 138 10/12/2023    POTASSIUM 4.5 10/12/2023    CHLORIDE 104 10/12/2023    CO2 21 10/12/2023    GLUCOSE 106 (H) 10/12/2023    BUN 34 (H) 10/12/2023    CREATININE 1.27 10/12/2023      Lab Results   Component Value Date    WBC 16.6 (H) 10/12/2023    HEMOGLOBIN 10.6 (L) 10/12/2023    HEMATOCRIT 33.8 (L) 10/12/2023    PLATELETCT 475 (H) 10/12/2023      Total time of the discharge process exceeds 50 minutes.    DO AQUILINO ChowdhuryR IM PGY-3

## 2023-10-12 NOTE — FACE TO FACE
Face to Face Supporting Documentation - Home Health    The encounter with this patient was in whole or in part the primary reason for home health admission.    Date of encounter:   Patient:                    MRN:                       YOB: 2021  Fouzia Santamaria  [id number]  1960     Home health to see patient for:  Skilled Nursing care for assessment, interventions & education, Home health aide, Physical Therapy evaluation and treatment, Occupational therapy evaluation and treatment, and Speech Language Pathology evaluation and treatment    Skilled need for:  Exacerbation of Chronic Disease State stroke with chronic deficits and Recent Deterioration of Health Status recent pneumonia and weakness    Skilled nursing interventions to include:  Comment:      Homebound status evidenced by:  Need the aid of supportive devices such as crutches, canes, wheelchairs or walkers, Require the use of special transportation, or Needs the assistance of another person in order to leave the home. Leaving home requires a considerable and taxing effort. There is a normal inability to leave the home.    Community Physician to provide follow up care: Mavis Del Rio A.P.R.NTed     Optional Interventions? No      I certify the face to face encounter for this home health care referral meets the CMS requirements and the encounter/clinical assessment with the patient was, in whole, or in part, for the medical condition(s) listed above, which is the primary reason for home health care. Based on my clinical findings: the service(s) are medically necessary, support the need for home health care, and the homebound criteria are met.  I certify that this patient has had a face to face encounter by myself.  Gabriel Hickman M.D. - LYLEI: [phone]

## 2023-10-12 NOTE — THERAPY
Speech Language Therapy Contact Note    Patient Name: Fouzia Santamaria  Age:  63 y.o., Sex:  female  Medical Record #: [id number]  Today's Date: 10/12/2023    Discussed missed therapy with RN, Resident       10/12/23 0755   Treatment Variance   Reason For Missed Therapy Non-Medical - Other (Please Comment)   Interdisciplinary Plan of Care Collaboration   IDT Collaboration with  Nursing;Physician   Collaboration Comments This clinician attemtped to complete MBSS this am. Per medical team, pt is being d/c at 10am. Radiology's first available is 11am, medical team notified. Okay to complete study in OP setting.      Of note: Medical team requesting MBS to be completed pending discharge; however, d/t scheduling conflicts with radiology study to be held until 10/13/2023. Medical team aware.

## 2023-10-12 NOTE — DISCHARGE PLANNING
Case Management Discharge Planning    Admission Date: 10/9/2023  GMLOS: 5.1  ALOS: 3    6-Clicks ADL Score: 13  6-Clicks Mobility Score: 9  PT and/or OT Eval ordered: Yes  Post-acute Referrals Ordered: Yes  Post-acute Choice Obtained: Yes  Has referral(s) been sent to post-acute provider:  Yes      Anticipated Discharge Dispo: Discharge Disposition: Discharged to home/self care (01)    DME Needed: No    Action(s) Taken:     Spoke w/ pt's spouse, Basilio to discuss HH. Discussed barriers to HH acceptance due to insurance. Spouse is agreeable to blanket referral to see if any agency is able to accept.     Choice form completed and faxed to [facility]    Escalations Completed: None    Medically Clear: Yes    Next Steps: F/U with HH    Barriers to Discharge: None    Is the patient up for discharge tomorrow: No

## 2023-10-14 LAB
BACTERIA BLD CULT: NORMAL
BACTERIA BLD CULT: NORMAL
SIGNIFICANT IND 70042: NORMAL
SIGNIFICANT IND 70042: NORMAL
SITE SITE: NORMAL
SITE SITE: NORMAL
SOURCE SOURCE: NORMAL
SOURCE SOURCE: NORMAL

## 2023-11-06 ENCOUNTER — APPOINTMENT (OUTPATIENT)
Dept: SPEECH THERAPY | Facility: REHABILITATION | Age: 63
End: 2023-11-06
Payer: MEDICAID

## 2023-11-10 DIAGNOSIS — G25.81 RESTLESS LEG: ICD-10-CM

## 2023-11-10 RX ORDER — PRAMIPEXOLE DIHYDROCHLORIDE 0.12 MG/1
TABLET ORAL
Qty: 90 TABLET | Refills: 1 | Status: SHIPPED | OUTPATIENT
Start: 2023-11-10 | End: 2023-12-22

## 2023-11-10 NOTE — TELEPHONE ENCOUNTER
Received request via: Pharmacy    Was the patient seen in the last year in this department? Yes    Does the patient have an active prescription (recently filled or refills available) for medication(s) requested? No    Does the patient have [facility] Plus and need 100 day supply (blood pressure, diabetes and cholesterol meds only)? Patient does not have SCP  Future Appointments         Provider Department Center    11/17/2023 3:00 PM (Arrive by 2:30 PM) RADIOLOGIST, NNR; [address] [facility] - [address]    11/20/2023 2:15 PM Aristeo Jain MS,CCC-SLP [facility] [address] [place] [address]    12/4/2023 2:15 PM Aristeo Jain MS,CCC-SLP [facility] [address] [place] [address]    12/11/2023 2:15 PM Aristeo Jain MS,CCC-SLP [facility] [address] [place] [address]    12/18/2023 2:15 PM Aristeo Jain MS,CCC-SLP [facility] [address] [place] [address]

## 2023-11-20 DIAGNOSIS — I25.10 CORONARY ARTERY DISEASE INVOLVING NATIVE CORONARY ARTERY OF NATIVE HEART WITHOUT ANGINA PECTORIS: ICD-10-CM

## 2023-11-21 RX ORDER — CLOPIDOGREL BISULFATE 75 MG/1
75 TABLET ORAL DAILY
Qty: 30 TABLET | Refills: 3 | Status: SHIPPED | OUTPATIENT
Start: 2023-11-21

## 2023-11-21 NOTE — TELEPHONE ENCOUNTER
Received request via: Pharmacy    Was the patient seen in the last year in this department? Yes    Does the patient have an active prescription (recently filled or refills available) for medication(s) requested? No    Does the patient have longterm Plus and need 100 day supply (blood pressure, diabetes and cholesterol meds only)? Patient does not have SCP    Future Appointments         Provider Department Center    12/4/2023 2:15 PM Aristeo Jain MS,CCC-SLP [facility] [address] [place] [address]    12/11/2023 2:15 PM Aristeo Jain MS,CCC-SLP [facility] [address] [place] [address]    12/18/2023 2:15 PM Aristeo Jain MS,CCC-SLP [facility] [address] [place] [address]    12/18/2023 3:00 PM Bryant Steve M.D. [facility] - [facility]  Arrive at: Cardiology [facility] - Arrival

## 2023-11-27 ENCOUNTER — APPOINTMENT (OUTPATIENT)
Dept: SPEECH THERAPY | Facility: REHABILITATION | Age: 63
End: 2023-11-27
Payer: MEDICAID

## 2023-12-04 ENCOUNTER — APPOINTMENT (OUTPATIENT)
Dept: SPEECH THERAPY | Facility: REHABILITATION | Age: 63
End: 2023-12-04
Payer: MEDICAID

## 2023-12-11 ENCOUNTER — APPOINTMENT (OUTPATIENT)
Dept: SPEECH THERAPY | Facility: REHABILITATION | Age: 63
End: 2023-12-11
Payer: MEDICAID

## 2023-12-16 DIAGNOSIS — G25.81 RESTLESS LEG: ICD-10-CM

## 2023-12-18 ENCOUNTER — APPOINTMENT (OUTPATIENT)
Dept: SPEECH THERAPY | Facility: REHABILITATION | Age: 63
End: 2023-12-18
Payer: MEDICAID

## 2023-12-22 RX ORDER — PRAMIPEXOLE DIHYDROCHLORIDE 0.12 MG/1
TABLET ORAL
Qty: 90 TABLET | Refills: 1 | Status: SHIPPED | OUTPATIENT
Start: 2023-12-22 | End: 2024-03-07

## 2023-12-27 ENCOUNTER — APPOINTMENT (OUTPATIENT)
Dept: RADIOLOGY | Facility: MEDICAL CENTER | Age: 63
End: 2023-12-27
Payer: MEDICAID

## 2024-01-09 ENCOUNTER — TELEPHONE (OUTPATIENT)
Dept: CARDIOLOGY | Facility: MEDICAL CENTER | Age: 64
End: 2024-01-09
Payer: MEDICAID

## 2024-01-18 ENCOUNTER — APPOINTMENT (OUTPATIENT)
Dept: RADIOLOGY | Facility: MEDICAL CENTER | Age: 64
End: 2024-01-18
Payer: MEDICAID

## 2024-03-06 DIAGNOSIS — G25.81 RESTLESS LEG: ICD-10-CM

## 2024-03-06 NOTE — TELEPHONE ENCOUNTER
Received request via: Pharmacy    Was the patient seen in the last year in this department? Yes    Does the patient have an active prescription (recently filled or refills available) for medication(s) requested? No    Pharmacy Name: [facility]    Does the patient have [facility] Plus and need 100 day supply (blood pressure, diabetes and cholesterol meds only)? Patient does not have SCP

## 2024-03-07 RX ORDER — PRAMIPEXOLE DIHYDROCHLORIDE 0.12 MG/1
TABLET ORAL
Qty: 90 TABLET | Refills: 1 | Status: SHIPPED | OUTPATIENT
Start: 2024-03-07

## 2024-04-16 ENCOUNTER — APPOINTMENT (OUTPATIENT)
Dept: RADIOLOGY | Facility: MEDICAL CENTER | Age: 64
End: 2024-04-16
Attending: EMERGENCY MEDICINE
Payer: MEDICAID

## 2024-04-16 ENCOUNTER — HOSPITAL ENCOUNTER (EMERGENCY)
Facility: MEDICAL CENTER | Age: 64
End: 2024-04-16
Attending: EMERGENCY MEDICINE
Payer: MEDICAID

## 2024-04-16 ENCOUNTER — OFFICE VISIT (OUTPATIENT)
Dept: URGENT CARE | Facility: CLINIC | Age: 64
End: 2024-04-16
Payer: MEDICAID

## 2024-04-16 VITALS
BODY MASS INDEX: 19.99 KG/M2 | RESPIRATION RATE: 12 BRPM | HEART RATE: 60 BPM | HEIGHT: 65 IN | TEMPERATURE: 97.9 F | SYSTOLIC BLOOD PRESSURE: 130 MMHG | DIASTOLIC BLOOD PRESSURE: 84 MMHG | WEIGHT: 120 LBS | OXYGEN SATURATION: 99 %

## 2024-04-16 VITALS
DIASTOLIC BLOOD PRESSURE: 97 MMHG | HEIGHT: 65 IN | RESPIRATION RATE: 17 BRPM | HEART RATE: 96 BPM | WEIGHT: 125 LBS | BODY MASS INDEX: 20.83 KG/M2 | SYSTOLIC BLOOD PRESSURE: 159 MMHG | OXYGEN SATURATION: 95 % | TEMPERATURE: 96.8 F

## 2024-04-16 DIAGNOSIS — R10.84 GENERALIZED ABDOMINAL PAIN: ICD-10-CM

## 2024-04-16 DIAGNOSIS — R10.31 ABDOMINAL PAIN, RLQ: ICD-10-CM

## 2024-04-16 LAB
ALBUMIN SERPL BCP-MCNC: 3.7 G/DL (ref 3.2–4.9)
ALBUMIN/GLOB SERPL: 0.8 G/DL
ALP SERPL-CCNC: 116 U/L (ref 30–99)
ALT SERPL-CCNC: <5 U/L (ref 2–50)
ANION GAP SERPL CALC-SCNC: 17 MMOL/L (ref 7–16)
AST SERPL-CCNC: 10 U/L (ref 12–45)
BASOPHILS # BLD AUTO: 0.6 % (ref 0–1.8)
BASOPHILS # BLD: 0.05 K/UL (ref 0–0.12)
BILIRUB SERPL-MCNC: 0.3 MG/DL (ref 0.1–1.5)
BUN SERPL-MCNC: 17 MG/DL (ref 8–22)
CALCIUM ALBUM COR SERPL-MCNC: 9.1 MG/DL (ref 8.5–10.5)
CALCIUM SERPL-MCNC: 8.9 MG/DL (ref 8.5–10.5)
CHLORIDE SERPL-SCNC: 103 MMOL/L (ref 96–112)
CO2 SERPL-SCNC: 17 MMOL/L (ref 20–33)
CREAT SERPL-MCNC: 1.27 MG/DL (ref 0.5–1.4)
EOSINOPHIL # BLD AUTO: 0.37 K/UL (ref 0–0.51)
EOSINOPHIL NFR BLD: 4.1 % (ref 0–6.9)
ERYTHROCYTE [DISTWIDTH] IN BLOOD BY AUTOMATED COUNT: 46.3 FL (ref 35.9–50)
GFR SERPLBLD CREATININE-BSD FMLA CKD-EPI: 48 ML/MIN/1.73 M 2
GLOBULIN SER CALC-MCNC: 4.6 G/DL (ref 1.9–3.5)
GLUCOSE SERPL-MCNC: 109 MG/DL (ref 65–99)
HCT VFR BLD AUTO: 37.2 % (ref 37–47)
HGB BLD-MCNC: 11.9 G/DL (ref 12–16)
IMM GRANULOCYTES # BLD AUTO: 0.02 K/UL (ref 0–0.11)
IMM GRANULOCYTES NFR BLD AUTO: 0.2 % (ref 0–0.9)
LIPASE SERPL-CCNC: 55 U/L (ref 11–82)
LYMPHOCYTES # BLD AUTO: 2.23 K/UL (ref 1–4.8)
LYMPHOCYTES NFR BLD: 24.5 % (ref 22–41)
MCH RBC QN AUTO: 26.7 PG (ref 27–33)
MCHC RBC AUTO-ENTMCNC: 32 G/DL (ref 32.2–35.5)
MCV RBC AUTO: 83.6 FL (ref 81.4–97.8)
MONOCYTES # BLD AUTO: 0.94 K/UL (ref 0–0.85)
MONOCYTES NFR BLD AUTO: 10.3 % (ref 0–13.4)
NEUTROPHILS # BLD AUTO: 5.48 K/UL (ref 1.82–7.42)
NEUTROPHILS NFR BLD: 60.3 % (ref 44–72)
NRBC # BLD AUTO: 0 K/UL
NRBC BLD-RTO: 0 /100 WBC (ref 0–0.2)
PLATELET # BLD AUTO: 439 K/UL (ref 164–446)
PMV BLD AUTO: 9.9 FL (ref 9–12.9)
POTASSIUM SERPL-SCNC: 3.1 MMOL/L (ref 3.6–5.5)
PROT SERPL-MCNC: 8.3 G/DL (ref 6–8.2)
RBC # BLD AUTO: 4.45 M/UL (ref 4.2–5.4)
SODIUM SERPL-SCNC: 137 MMOL/L (ref 135–145)
WBC # BLD AUTO: 9.1 K/UL (ref 4.8–10.8)

## 2024-04-16 PROCEDURE — 99285 EMERGENCY DEPT VISIT HI MDM: CPT

## 2024-04-16 PROCEDURE — 99215 OFFICE O/P EST HI 40 MIN: CPT | Performed by: NURSE PRACTITIONER

## 2024-04-16 PROCEDURE — 36415 COLL VENOUS BLD VENIPUNCTURE: CPT

## 2024-04-16 PROCEDURE — 3079F DIAST BP 80-89 MM HG: CPT | Performed by: NURSE PRACTITIONER

## 2024-04-16 PROCEDURE — 74177 CT ABD & PELVIS W/CONTRAST: CPT

## 2024-04-16 PROCEDURE — 3075F SYST BP GE 130 - 139MM HG: CPT | Performed by: NURSE PRACTITIONER

## 2024-04-16 PROCEDURE — 700117 HCHG RX CONTRAST REV CODE 255: Performed by: EMERGENCY MEDICINE

## 2024-04-16 PROCEDURE — 85025 COMPLETE CBC W/AUTO DIFF WBC: CPT

## 2024-04-16 PROCEDURE — 71045 X-RAY EXAM CHEST 1 VIEW: CPT

## 2024-04-16 PROCEDURE — 83690 ASSAY OF LIPASE: CPT

## 2024-04-16 PROCEDURE — 80053 COMPREHEN METABOLIC PANEL: CPT

## 2024-04-16 RX ORDER — HYDROXYZINE HYDROCHLORIDE 25 MG/1
25 TABLET, FILM COATED ORAL 3 TIMES DAILY PRN
Status: ON HOLD | COMMUNITY

## 2024-04-16 RX ORDER — ONDANSETRON 4 MG/1
4 TABLET, ORALLY DISINTEGRATING ORAL EVERY 6 HOURS PRN
Qty: 10 TABLET | Refills: 0 | Status: ON HOLD | OUTPATIENT
Start: 2024-04-16

## 2024-04-16 RX ORDER — VENLAFAXINE 37.5 MG/1
37.5 TABLET ORAL 3 TIMES DAILY
COMMUNITY
End: 2024-04-27

## 2024-04-16 RX ADMIN — IOHEXOL 80 ML: 350 INJECTION, SOLUTION INTRAVENOUS at 21:50

## 2024-04-16 ASSESSMENT — FIBROSIS 4 INDEX
FIB4 SCORE: 0.73
FIB4 SCORE: 0.73

## 2024-04-16 ASSESSMENT — ENCOUNTER SYMPTOMS
ABDOMINAL PAIN: 1
FEVER: 0
DIARRHEA: 0
NAUSEA: 1
CONSTIPATION: 0
VOMITING: 0

## 2024-04-17 PROCEDURE — RXMED WILLOW AMBULATORY MEDICATION CHARGE: Performed by: EMERGENCY MEDICINE

## 2024-04-17 NOTE — DISCHARGE INSTRUCTIONS
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  This does appear to be chronic.  Please follow-up with your primary care physician about this.

## 2024-04-17 NOTE — ED TRIAGE NOTES
Fouzia Ovalle [facility]  63 y.o. female  Chief Complaint   Patient presents with    Abdominal Pain     RLQ pain for a few months. Today pain has increased. Denies dysuria or fever.      On wheelchair to triage accompanied by . Aox4, aphasic due to CVA. Denies neurologic changes.     Pt is GCS 15, speaking in full sentences, follows commands and responds appropriately to questions. Resp are even and unlabored.    Abdominal pain protocol ordered. Pt placed in phlebotomy. Pt educated on triage process. Pt encouraged to alert staff for any changes.       Vitals:    04/16/24 1932   BP: (!) 159/107   Pulse: 99   Resp: 18   Temp: 35.8 °C (96.5 °F)   SpO2: 97%

## 2024-04-17 NOTE — PROGRESS NOTES
Subjective:   Fouzia Santamaria is a 63 y.o. female who presents for Abdominal Pain (Vomiting, abd pain)     provide HPI this patient has a history of stroke partially nonverbal  Abdominal Pain  This is a new problem. The current episode started today. The onset quality is undetermined. The problem has been gradually worsening. The pain is located in the RLQ. The pain is severe. Associated symptoms include nausea. Pertinent negatives include no constipation, diarrhea, dysuria, fever, frequency, hematuria or vomiting. She has tried nothing for the symptoms. The treatment provided no relief. Her past medical history is significant for abdominal surgery.       Review of Systems   Constitutional:  Negative for fever.   Gastrointestinal:  Positive for abdominal pain and nausea. Negative for constipation, diarrhea and vomiting.   Genitourinary:  Negative for dysuria, frequency, hematuria and urgency.       PMH:  has a past medical history of At risk for delirium (9/13/2022), Chronic obstructive pulmonary disease ([facility]), Diabetes ([facility]), Encounter to [facility] care (10/19/2022), Heart attack ([facility]), Hydronephrosis (8/24/2022), Hypertension, Hypomagnesemia (8/29/2022), and Stroke ([facility]).  MEDS:   Current Outpatient Medications:     venlafaxine (EFFEXOR) 37.5 MG Tab, Take 37.5 mg by mouth 3 times a day., Disp: , Rfl:     hydrOXYzine HCl (ATARAX) 25 MG Tab, Take 25 mg by mouth 3 times a day as needed for Itching., Disp: , Rfl:     pramipexole (MIRAPEX) 0.125 MG Tab, TAKE 1 TABLET BY MOUTH THREE TIMES A DAY, Disp: 90 Tablet, Rfl: 1    clopidogrel (PLAVIX) 75 MG Tab, TAKE 1 TABLET BY MOUTH EVERY DAY, Disp: 30 Tablet, Rfl: 3    atorvastatin (LIPITOR) 80 MG tablet, Take 1 Tablet by mouth every evening., Disp: 30 Tablet, Rfl: 2    furosemide (LASIX) 20 MG Tab, TAKE 1 TABLET BY MOUTH TWICE A DAY, Disp: 60 Tablet, Rfl: 0    ezetimibe (ZETIA) 10 MG Tab, TAKE 1 TABLET BY MOUTH EVERY DAY, Disp: 30 Tablet, Rfl: 0    metoprolol  tartrate (LOPRESSOR) 25 MG Tab, TAKE 1/2 TABLET BY MOUTH 2 TIMES A DAY (Patient taking differently: Take 12.5 mg by mouth 2 times a day.), Disp: 90 Tablet, Rfl: 1    lisinopril (PRINIVIL) 10 MG Tab, TAKE 1 TABLET BY MOUTH EVERY DAY, Disp: 90 Tablet, Rfl: 0    mirtazapine (REMERON) 15 MG Tab, Take 15 mg by mouth every evening., Disp: , Rfl:     acetaminophen (TYLENOL) 500 MG Tab, Take 2 Tablets by mouth 3 times a day., Disp: 30 Tablet, Rfl: 0    aspirin 81 MG EC tablet, Take 81 mg by mouth every day. (Patient not taking: Reported on 4/16/2024), Disp: , Rfl:   ALLERGIES:   Allergies   Allergen Reactions    Pcn [Penicillins] Vomiting and Nausea    Toradol Vomiting and Nausea     SURGHX:   Past Surgical History:   Procedure Laterality Date    ND EXPLORATORY OF ABDOMEN  8/28/2022    Procedure: LAPAROTOMY, EXPLORATORY;  Surgeon: Brandyn Siu M.D.;  Location: [facility];  Service: General    AORTOBIFEM BYPASS Bilateral 8/26/2022    Procedure: CREATION, BYPASS, ARTERIAL, AORTA TO FEMORAL, BILATERAL;  Surgeon: Brandyn Siu M.D.;  Location: [facility];  Service: General    ENDARTERECTOMY Bilateral 8/26/2022    Procedure: RENAL ARTERY ENDARTERECTOMY;  Surgeon: Brandyn Siu M.D.;  Location: [facility];  Service: General    ND EXPLORATORY OF ABDOMEN  8/26/2022    Procedure: LAPAROTOMY, EXPLORATORY; CONTROL OF POST-OPERATIVE BLEEDING;  Surgeon: Brandyn Siu M.D.;  Location: [facility];  Service: General    APPLICATION OR REPLACEMENT, WOUND VAC  8/26/2022    Procedure: APPLICATION OR REPLACEMENT, WOUND VAC;  Surgeon: Brandyn Siu M.D.;  Location: [facility];  Service: General    STENT PLACEMENT      THYROIDECTOMY       SOCHX:  reports that she has quit smoking. Her smoking use included cigarettes. She has a 96 pack-year smoking history. She has never used smokeless tobacco. She reports current drug use. Drugs: Marijuana and Inhaled. She reports that she does not drink  "alcohol.  FH: Family history was reviewed, no pertinent findings to report       Objective:     /84   Pulse 60   Temp 36.6 °C (97.9 °F) (Temporal)   Resp 12   Ht 1.651 m (5' 5\")   Wt 54.4 kg (120 lb)   SpO2 99%   BMI 19.97 kg/m²     Physical Exam  Vitals and nursing note reviewed.   Constitutional:       General: She is not in acute distress.     Appearance: She is well-developed. She is ill-appearing.      Comments: Wheel chair bound      HENT:      Head: Normocephalic and atraumatic.      Right Ear: External ear normal.      Left Ear: External ear normal.      Nose: Nose normal.      Mouth/Throat:      Mouth: Mucous membranes are moist.   Eyes:      Conjunctiva/sclera: Conjunctivae normal.   Cardiovascular:      Rate and Rhythm: Normal rate.   Pulmonary:      Effort: Pulmonary effort is normal. No respiratory distress.      Breath sounds: Normal breath sounds.   Abdominal:      General: Bowel sounds are normal. There is no distension.      Tenderness: There is abdominal tenderness in the right lower quadrant.   Musculoskeletal:         General: Normal range of motion.   Skin:     General: Skin is warm and dry.   Neurological:      General: No focal deficit present.      Mental Status: She is alert and oriented to person, place, and time. Mental status is at baseline.      Gait: Gait (gait at baseline) normal.   Psychiatric:         Judgment: Judgment normal.         Assessment/Plan:     Diagnosis and associated orders:     1. Abdominal pain, RLQ           Comments/MDM:     At this time, I feel the patient requires a higher level of care including closer monitoring, stat lab work and/or imaging for further evaluation This has been discussed with the patient and they state agreement and understanding.  I offered the patient an ambulance ride and  the patient is declining at this time. The patient is in no acute distress upon clinic departure and will go directly to ED without delay.            Please " note that this dictation was created using voice recognition software. I have made a reasonable attempt to correct obvious errors, but I expect that there are errors of grammar and possibly content that I did not discover before finalizing the note.    This note was electronically signed by Kolton ASHFORD.

## 2024-04-17 NOTE — ED PROVIDER NOTES
"ER Provider Note    Scribed for Dr. Librado Muller M.D. by Pearl Talbot. 4/16/2024  8:28 PM    Primary Care Provider: SHANKAR Reyes    CHIEF COMPLAINT  Chief Complaint   Patient presents with    Abdominal Pain     RLQ pain for a few months. Today pain has increased. Denies dysuria or fever.        EXTERNAL RECORDS REVIEWED  Inpatient Notes Patient was evaluated on 10/09/2024 for Multifocal Pneumonia.     HPI/ROS  LIMITATION TO HISTORY   Select: : None    OUTSIDE HISTORIAN(S):  Significant other was present at bedside to provide details.     Fouzia Santamaria is a 63 y.o. female who presents to the ED for right lower quadrant abdominal pain onset this morning. The patient's significant other explains the patient had similar symptoms 3 months ago and was told she had Pneumonia. She adds that soon after the abdominal pain resolved itself temporarily before becoming intermittent. However, today she notes the pain has worsened. The patient reports having runny \"light\" colored stool. The patient has history of constipation. Patient denies coughing, dysuria or fever. Patient is normally in a wheelchair, stating that it is hard for her to stand. She states she began using it due to her history of strokes and heart attacks. Patient has history of stents in her heart.     PAST MEDICAL HISTORY  Past Medical History:   Diagnosis Date    At risk for delirium 9/13/2022    Chronic obstructive pulmonary disease (HCC)     Diabetes (HCC)     Encounter to establish care 10/19/2022    Patient here to establish care.  Patient was recently in the hospital for over a month secondary to several blood clots in need of multiple surgeries including: aorto-bifemoral bypass and bilateral left renal artery eversion endarterectomies.  Patient surgery was complicated \"by hypotension and evidence of hemorrhage that she was taken back to the operating room emergently for exploration where sh    Heart attack (HCC)     Hydronephrosis " 8/24/2022    Hypertension     Hypomagnesemia 8/29/2022    Stroke (HCC)        SURGICAL HISTORY  Past Surgical History:   Procedure Laterality Date    MA EXPLORATORY OF ABDOMEN  8/28/2022    Procedure: LAPAROTOMY, EXPLORATORY;  Surgeon: Brandyn Siu M.D.;  Location: SURGERY [facility];  Service: General    AORTOBIFEM BYPASS Bilateral 8/26/2022    Procedure: CREATION, BYPASS, ARTERIAL, AORTA TO FEMORAL, BILATERAL;  Surgeon: Brandyn Siu M.D.;  Location: SURGERY [facility];  Service: General    ENDARTERECTOMY Bilateral 8/26/2022    Procedure: RENAL ARTERY ENDARTERECTOMY;  Surgeon: Brandyn Siu M.D.;  Location: SURGERY [facility];  Service: General    MA EXPLORATORY OF ABDOMEN  8/26/2022    Procedure: LAPAROTOMY, EXPLORATORY; CONTROL OF POST-OPERATIVE BLEEDING;  Surgeon: Brandyn Siu M.D.;  Location: SURGERY [facility];  Service: General    APPLICATION OR REPLACEMENT, WOUND VAC  8/26/2022    Procedure: APPLICATION OR REPLACEMENT, WOUND VAC;  Surgeon: Brandyn Siu M.D.;  Location: SURGERY [facility];  Service: General    STENT PLACEMENT      THYROIDECTOMY         FAMILY HISTORY  No family history noted    SOCIAL HISTORY   reports that she has quit smoking. Her smoking use included cigarettes. She has a 96 pack-year smoking history. She has never used smokeless tobacco. She reports current drug use. Drugs: Marijuana and Inhaled. She reports that she does not drink alcohol.    CURRENT MEDICATIONS  Previous Medications    ACETAMINOPHEN (TYLENOL) 500 MG TAB    Take 2 Tablets by mouth 3 times a day.    ASPIRIN 81 MG EC TABLET    Take 81 mg by mouth every day.    ATORVASTATIN (LIPITOR) 80 MG TABLET    Take 1 Tablet by mouth every evening.    CLOPIDOGREL (PLAVIX) 75 MG TAB    TAKE 1 TABLET BY MOUTH EVERY DAY    EZETIMIBE (ZETIA) 10 MG TAB    TAKE 1 TABLET BY MOUTH EVERY DAY    FUROSEMIDE (LASIX) 20 MG TAB    TAKE 1 TABLET BY MOUTH TWICE A DAY    HYDROXYZINE HCL (ATARAX) 25 MG TAB    Take 25 mg by mouth 3 times a  "day as needed for Itching.    LISINOPRIL (PRINIVIL) 10 MG TAB    TAKE 1 TABLET BY MOUTH EVERY DAY    METOPROLOL TARTRATE (LOPRESSOR) 25 MG TAB    TAKE 1/2 TABLET BY MOUTH 2 TIMES A DAY    MIRTAZAPINE (REMERON) 15 MG TAB    Take 15 mg by mouth every evening.    PRAMIPEXOLE (MIRAPEX) 0.125 MG TAB    TAKE 1 TABLET BY MOUTH THREE TIMES A DAY    VENLAFAXINE (EFFEXOR) 37.5 MG TAB    Take 37.5 mg by mouth 3 times a day.       ALLERGIES  Pcn [penicillins] and Toradol    PHYSICAL EXAM  BP (!) 159/107   Pulse 99   Temp 35.8 °C (96.5 °F) (Temporal)   Resp 18   Ht 1.651 m (5' 5\")   Wt 56.7 kg (125 lb)   SpO2 97%   BMI 20.80 kg/m²   Constitutional: Alert in no apparent distress.  HENT: No signs of trauma, Bilateral external ears normal, Nose normal.   Eyes: Pupils are equal and reactive, Conjunctiva normal, Non-icteric.   Neck: Normal range of motion, No tenderness, Supple,   Cardiovascular: Regular rate and rhythm, no murmurs.   Thorax & Lungs: Clear lungs, Normal breath sounds, No respiratory distress, No wheezing, No chest tenderness.   Abdomen: Bowel sounds normal, Soft, No tenderness, mid right and upper right abdominal pain  Skin: Warm, Dry, No erythema, No rash.   Back: No bony tenderness, No CVA tenderness.   Extremities: No edema, No tenderness, No cyanosis, no tenderness, pulses  Neurologic: Dysarthria at baseline, moving all extremities, slightly slower on right  Psychiatric: Affect normal     DIAGNOSTIC STUDIES & PROCEDURES    Labs:   Labs Reviewed   CBC WITH DIFFERENTIAL - Abnormal; Notable for the following components:       Result Value    Hemoglobin 11.9 (*)     MCH 26.7 (*)     MCHC 32.0 (*)     Monos (Absolute) 0.94 (*)     All other components within normal limits   COMP METABOLIC PANEL - Abnormal; Notable for the following components:    Potassium 3.1 (*)     Co2 17 (*)     Anion Gap 17.0 (*)     Glucose 109 (*)     AST(SGOT) 10 (*)     Alkaline Phosphatase 116 (*)     Total Protein 8.3 (*)     " Globulin 4.6 (*)     All other components within normal limits   ESTIMATED GFR - Abnormal; Notable for the following components:    GFR (CKD-EPI) 48 (*)     All other components within normal limits   LIPASE   URINALYSIS           All labs reviewed by me.    Radiology:   The attending Emergency Physician has independently interpreted the diagnostic imaging associated with this visit and is awaiting the final reading from the radiologist, which will be displayed below.    Preliminary interpretation is a follows: No obvious intra-abdominal abscess  Radiologist interpretation:      CT-ABDOMEN-PELVIS WITH   Final Result         1.  No acute intra-abdominal abnormality identified   2.  3.5 cm distal thoracic aortic aneurysm with mural thrombus.      DX-CHEST-PORTABLE (1 VIEW)   Final Result         1.  No acute cardiopulmonary disease.   2.  Atherosclerosis           COURSE & MEDICAL DECISION MAKING    ED Observation Status? No; Patient does not meet criteria for ED Observation.     INITIAL ASSESSMENT AND PLAN  Care Narrative:       8:28 PM - Patient seen and evaluated at bedside. They present to the ED for evaluation of abdominal pain onset earlier today. Patient reports having right lower quadrant pain since she was diagnosed with Pneumonia three months ago, but notes today the pain has increased. Discussed plan of care, including labs and imaging. Patient agrees to plan of care. Ordered CBC with diff, CMP, Lipase, UA, DX-Chest-Portable, and CT-Abdomen-pelvis to evaluate.    10:25 PM - Patient was reevaluated at bedside. Discussed lab and radiology results with the patient and informed them of plan of discharge. Patient reports feeling improved at this time. There are no urinary symptoms. Patient had the opportunity to ask any questions. The plan for discharge was discussed with them and they were told to return for any new or worsening symptoms. She was also informed of the plans for follow up. Patient is  understanding and agreeable to the plan for discharge.    ADDITIONAL PROBLEM LIST AND DISPOSITION     Patient with abdominal pain.  At this time the patient is no leukocytosis and mild anemia.  There is a mild anion gap.  There is no elevated creatinine but mildly decreased GFR.  There is no report of urinary symptoms therefore urinalysis was not obtained.  The patient has a mural thrombus in the thoracic aorta that is also stable and chronic per radiologist.  CT scan of the abdomen is negative.  Chest x-ray is negative.  There is no pneumonia.  There is no fever.  There is no hypoxia.  The patient is safe for discharge home.             DISPOSITION AND DISCUSSIONS  I have discussed management of the patient with the following physicians and EDMUNDO's: None    Discussion of management with other [facility] [place] or appropriate source(s): None     Escalation of care considered, and ultimately not performed: acute inpatient care management, however at this time, the patient is most appropriate for outpatient management.    Barriers to care at this time, including but not limited to:  None .     Decision tools and prescription drugs considered including, but not limited to:  Zofran ODT .     The patient will return for new or worsening symptoms and is stable at the time of discharge.    The patient is referred to a primary physician for blood pressure management, diabetic screening, and for all other preventative health concerns.    DISPOSITION:  Patient will be discharged home in stable condition.    FOLLOW UP:  Mavis Del Rio, A.P.R.N.  [address] [id number] [phone]    In 2 days      OUTPATIENT MEDICATIONS:  New Prescriptions    ONDANSETRON (ZOFRAN ODT) 4 MG TABLET DISPERSIBLE    Take 1 Tablet by mouth every 6 hours as needed for Nausea/Vomiting.       FINAL IMPRESSION   1. Generalized abdominal pain      IPearl (Scrjimmy), am scribing for, and in the presence of, Librado Muller M.D..    Electronically  signed by: Pearl Talbot (Scribe), 4/16/2024    ILibrado M.D. personally performed the services described in this documentation, as scribed by Pearl Talbot in my presence, and it is both accurate and complete.    The note accurately reflects work and decisions made by me.  Librado Muller M.D.  4/17/2024  3:04 AM

## 2024-04-17 NOTE — ED NOTES
Pt here for RLQ pain and N/V. Spouse sts it has been going on x's approx 1 week and pt did not want to come into hospital. Spouse took pt to UC today and was advised to come to ER.  Pt has hx of CVA and is nonambulatory. Has multiple deficits from prior CVA's. Per spouse, pt has had this pain in the past and was found to have PNA and other tests did not show anything in the abdomen. Pt is connected to bedside monitor and call light in reach. NAD noted. Spouse at bedside.

## 2024-04-23 ENCOUNTER — PHARMACY VISIT (OUTPATIENT)
Dept: PHARMACY | Facility: MEDICAL CENTER | Age: 64
End: 2024-04-23
Payer: COMMERCIAL

## 2024-04-27 ENCOUNTER — APPOINTMENT (OUTPATIENT)
Dept: RADIOLOGY | Facility: MEDICAL CENTER | Age: 64
DRG: 280 | End: 2024-04-27
Attending: EMERGENCY MEDICINE
Payer: MEDICAID

## 2024-04-27 ENCOUNTER — HOSPITAL ENCOUNTER (INPATIENT)
Facility: MEDICAL CENTER | Age: 64
DRG: 280 | End: 2024-04-27
Attending: EMERGENCY MEDICINE | Admitting: STUDENT IN AN ORGANIZED HEALTH CARE EDUCATION/TRAINING PROGRAM
Payer: MEDICAID

## 2024-04-27 DIAGNOSIS — G93.41 ACUTE METABOLIC ENCEPHALOPATHY: ICD-10-CM

## 2024-04-27 DIAGNOSIS — R06.02 SHORTNESS OF BREATH: ICD-10-CM

## 2024-04-27 DIAGNOSIS — J18.9 COMMUNITY ACQUIRED PNEUMONIA, UNSPECIFIED LATERALITY: ICD-10-CM

## 2024-04-27 DIAGNOSIS — R57.0 CARDIOGENIC SHOCK (HCC): ICD-10-CM

## 2024-04-27 DIAGNOSIS — D72.829 LEUKOCYTOSIS, UNSPECIFIED TYPE: ICD-10-CM

## 2024-04-27 DIAGNOSIS — M62.82 NON-TRAUMATIC RHABDOMYOLYSIS: ICD-10-CM

## 2024-04-27 DIAGNOSIS — D68.9 COAGULOPATHY (HCC): ICD-10-CM

## 2024-04-27 DIAGNOSIS — D76.1 HLH (HEMOPHAGOCYTIC LYMPHOHISTIOCYTOSIS) (HCC): ICD-10-CM

## 2024-04-27 DIAGNOSIS — N17.0 ACUTE RENAL FAILURE WITH TUBULAR NECROSIS (HCC): ICD-10-CM

## 2024-04-27 DIAGNOSIS — I50.41 CHF (CONGESTIVE HEART FAILURE), NYHA CLASS III, ACUTE, COMBINED (HCC): ICD-10-CM

## 2024-04-27 DIAGNOSIS — D69.6 THROMBOCYTOPENIA (HCC): ICD-10-CM

## 2024-04-27 DIAGNOSIS — N17.9 AKI (ACUTE KIDNEY INJURY) (HCC): ICD-10-CM

## 2024-04-27 PROBLEM — K59.00 CONSTIPATION: Status: ACTIVE | Noted: 2024-04-27

## 2024-04-27 PROBLEM — E87.6 HYPOKALEMIA: Status: ACTIVE | Noted: 2024-04-27

## 2024-04-27 LAB
ALBUMIN SERPL BCP-MCNC: 3.3 G/DL (ref 3.2–4.9)
ALBUMIN/GLOB SERPL: 0.8 G/DL
ALP SERPL-CCNC: 110 U/L (ref 30–99)
ALT SERPL-CCNC: <5 U/L (ref 2–50)
ANION GAP SERPL CALC-SCNC: 17 MMOL/L (ref 7–16)
AST SERPL-CCNC: 10 U/L (ref 12–45)
BASOPHILS # BLD AUTO: 0.2 % (ref 0–1.8)
BASOPHILS # BLD: 0.03 K/UL (ref 0–0.12)
BILIRUB SERPL-MCNC: 0.3 MG/DL (ref 0.1–1.5)
BUN SERPL-MCNC: 18 MG/DL (ref 8–22)
CALCIUM ALBUM COR SERPL-MCNC: 8.8 MG/DL (ref 8.5–10.5)
CALCIUM SERPL-MCNC: 8.2 MG/DL (ref 8.5–10.5)
CHLORIDE SERPL-SCNC: 99 MMOL/L (ref 96–112)
CO2 SERPL-SCNC: 18 MMOL/L (ref 20–33)
CREAT SERPL-MCNC: 1.23 MG/DL (ref 0.5–1.4)
EOSINOPHIL # BLD AUTO: 0.31 K/UL (ref 0–0.51)
EOSINOPHIL NFR BLD: 2.1 % (ref 0–6.9)
ERYTHROCYTE [DISTWIDTH] IN BLOOD BY AUTOMATED COUNT: 49 FL (ref 35.9–50)
FLUAV RNA SPEC QL NAA+PROBE: NEGATIVE
FLUBV RNA SPEC QL NAA+PROBE: NEGATIVE
GFR SERPLBLD CREATININE-BSD FMLA CKD-EPI: 50 ML/MIN/1.73 M 2
GLOBULIN SER CALC-MCNC: 4.4 G/DL (ref 1.9–3.5)
GLUCOSE SERPL-MCNC: 112 MG/DL (ref 65–99)
HCT VFR BLD AUTO: 31.2 % (ref 37–47)
HGB BLD-MCNC: 10 G/DL (ref 12–16)
IMM GRANULOCYTES # BLD AUTO: 0.06 K/UL (ref 0–0.11)
IMM GRANULOCYTES NFR BLD AUTO: 0.4 % (ref 0–0.9)
LIPASE SERPL-CCNC: 29 U/L (ref 11–82)
LYMPHOCYTES # BLD AUTO: 1.65 K/UL (ref 1–4.8)
LYMPHOCYTES NFR BLD: 11 % (ref 22–41)
MCH RBC QN AUTO: 27.3 PG (ref 27–33)
MCHC RBC AUTO-ENTMCNC: 32.1 G/DL (ref 32.2–35.5)
MCV RBC AUTO: 85.2 FL (ref 81.4–97.8)
MONOCYTES # BLD AUTO: 0.86 K/UL (ref 0–0.85)
MONOCYTES NFR BLD AUTO: 5.7 % (ref 0–13.4)
NEUTROPHILS # BLD AUTO: 12.15 K/UL (ref 1.82–7.42)
NEUTROPHILS NFR BLD: 80.6 % (ref 44–72)
NRBC # BLD AUTO: 0 K/UL
NRBC BLD-RTO: 0 /100 WBC (ref 0–0.2)
NT-PROBNP SERPL IA-MCNC: ABNORMAL PG/ML (ref 0–125)
PLATELET # BLD AUTO: 303 K/UL (ref 164–446)
PMV BLD AUTO: 9.8 FL (ref 9–12.9)
POTASSIUM SERPL-SCNC: 3.4 MMOL/L (ref 3.6–5.5)
PROT SERPL-MCNC: 7.7 G/DL (ref 6–8.2)
RBC # BLD AUTO: 3.66 M/UL (ref 4.2–5.4)
RSV RNA SPEC QL NAA+PROBE: NEGATIVE
SARS-COV-2 RNA RESP QL NAA+PROBE: NOTDETECTED
SODIUM SERPL-SCNC: 134 MMOL/L (ref 135–145)
WBC # BLD AUTO: 15.1 K/UL (ref 4.8–10.8)

## 2024-04-27 PROCEDURE — 700101 HCHG RX REV CODE 250: Performed by: STUDENT IN AN ORGANIZED HEALTH CARE EDUCATION/TRAINING PROGRAM

## 2024-04-27 PROCEDURE — 0241U HCHG SARS-COV-2 COVID-19 NFCT DS RESP RNA 4 TRGT ED POC: CPT

## 2024-04-27 PROCEDURE — 36415 COLL VENOUS BLD VENIPUNCTURE: CPT

## 2024-04-27 PROCEDURE — 700102 HCHG RX REV CODE 250 W/ 637 OVERRIDE(OP): Performed by: STUDENT IN AN ORGANIZED HEALTH CARE EDUCATION/TRAINING PROGRAM

## 2024-04-27 PROCEDURE — 700102 HCHG RX REV CODE 250 W/ 637 OVERRIDE(OP)

## 2024-04-27 PROCEDURE — A9270 NON-COVERED ITEM OR SERVICE: HCPCS | Performed by: STUDENT IN AN ORGANIZED HEALTH CARE EDUCATION/TRAINING PROGRAM

## 2024-04-27 PROCEDURE — 99222 1ST HOSP IP/OBS MODERATE 55: CPT | Performed by: STUDENT IN AN ORGANIZED HEALTH CARE EDUCATION/TRAINING PROGRAM

## 2024-04-27 PROCEDURE — 99285 EMERGENCY DEPT VISIT HI MDM: CPT

## 2024-04-27 PROCEDURE — 700105 HCHG RX REV CODE 258: Performed by: STUDENT IN AN ORGANIZED HEALTH CARE EDUCATION/TRAINING PROGRAM

## 2024-04-27 PROCEDURE — 700111 HCHG RX REV CODE 636 W/ 250 OVERRIDE (IP): Mod: JZ | Performed by: STUDENT IN AN ORGANIZED HEALTH CARE EDUCATION/TRAINING PROGRAM

## 2024-04-27 PROCEDURE — A9270 NON-COVERED ITEM OR SERVICE: HCPCS

## 2024-04-27 PROCEDURE — 83880 ASSAY OF NATRIURETIC PEPTIDE: CPT

## 2024-04-27 PROCEDURE — 85025 COMPLETE CBC W/AUTO DIFF WBC: CPT

## 2024-04-27 PROCEDURE — 80053 COMPREHEN METABOLIC PANEL: CPT

## 2024-04-27 PROCEDURE — 770006 HCHG ROOM/CARE - MED/SURG/GYN SEMI*

## 2024-04-27 PROCEDURE — 83690 ASSAY OF LIPASE: CPT

## 2024-04-27 RX ORDER — AZITHROMYCIN 500 MG/5ML
500 INJECTION, POWDER, LYOPHILIZED, FOR SOLUTION INTRAVENOUS EVERY 24 HOURS
Qty: 750 ML | Refills: 0 | Status: DISCONTINUED | OUTPATIENT
Start: 2024-04-27 | End: 2024-04-28

## 2024-04-27 RX ORDER — PROMETHAZINE HYDROCHLORIDE 25 MG/1
12.5-25 TABLET ORAL EVERY 4 HOURS PRN
Status: DISCONTINUED | OUTPATIENT
Start: 2024-04-27 | End: 2024-05-17

## 2024-04-27 RX ORDER — MIRTAZAPINE 30 MG/1
30 TABLET, FILM COATED ORAL
Status: ON HOLD | COMMUNITY
Start: 2024-03-13 | End: 2024-05-18

## 2024-04-27 RX ORDER — PROMETHAZINE HYDROCHLORIDE 25 MG/1
12.5-25 SUPPOSITORY RECTAL EVERY 4 HOURS PRN
Status: DISCONTINUED | OUTPATIENT
Start: 2024-04-27 | End: 2024-05-17

## 2024-04-27 RX ORDER — LISINOPRIL 10 MG/1
10 TABLET ORAL DAILY
Status: SHIPPED | COMMUNITY
End: 2024-04-27

## 2024-04-27 RX ORDER — BENZONATATE 100 MG/1
100 CAPSULE ORAL 3 TIMES DAILY PRN
Status: DISCONTINUED | OUTPATIENT
Start: 2024-04-27 | End: 2024-05-19 | Stop reason: HOSPADM

## 2024-04-27 RX ORDER — EZETIMIBE 10 MG/1
10 TABLET ORAL DAILY
Status: SHIPPED | COMMUNITY
End: 2024-04-27

## 2024-04-27 RX ORDER — ACETAMINOPHEN 325 MG/1
650 TABLET ORAL EVERY 6 HOURS PRN
Status: DISCONTINUED | OUTPATIENT
Start: 2024-04-27 | End: 2024-04-29

## 2024-04-27 RX ORDER — ONDANSETRON 4 MG/1
4 TABLET, ORALLY DISINTEGRATING ORAL EVERY 6 HOURS PRN
Status: DISCONTINUED | OUTPATIENT
Start: 2024-04-27 | End: 2024-04-27

## 2024-04-27 RX ORDER — ENOXAPARIN SODIUM 100 MG/ML
40 INJECTION SUBCUTANEOUS DAILY
Status: DISCONTINUED | OUTPATIENT
Start: 2024-04-28 | End: 2024-05-01

## 2024-04-27 RX ORDER — ONDANSETRON 4 MG/1
4 TABLET, ORALLY DISINTEGRATING ORAL EVERY 4 HOURS PRN
Status: DISCONTINUED | OUTPATIENT
Start: 2024-04-27 | End: 2024-05-19 | Stop reason: HOSPADM

## 2024-04-27 RX ORDER — ACETAMINOPHEN 500 MG
1000 TABLET ORAL 2 TIMES DAILY PRN
Status: ON HOLD | COMMUNITY
End: 2024-05-18

## 2024-04-27 RX ORDER — AMOXICILLIN 250 MG
2 CAPSULE ORAL EVERY EVENING
Status: DISCONTINUED | OUTPATIENT
Start: 2024-04-27 | End: 2024-05-04

## 2024-04-27 RX ORDER — ONDANSETRON 2 MG/ML
4 INJECTION INTRAMUSCULAR; INTRAVENOUS EVERY 4 HOURS PRN
Status: DISCONTINUED | OUTPATIENT
Start: 2024-04-27 | End: 2024-05-17

## 2024-04-27 RX ORDER — MIRTAZAPINE 30 MG/1
30 TABLET, FILM COATED ORAL
Status: DISCONTINUED | OUTPATIENT
Start: 2024-04-27 | End: 2024-05-02

## 2024-04-27 RX ORDER — OXYCODONE HYDROCHLORIDE 5 MG/1
5 TABLET ORAL EVERY 4 HOURS PRN
Status: DISCONTINUED | OUTPATIENT
Start: 2024-04-27 | End: 2024-05-17

## 2024-04-27 RX ORDER — PRAMIPEXOLE DIHYDROCHLORIDE 0.12 MG/1
0.12 TABLET ORAL 3 TIMES DAILY
Status: ON HOLD | COMMUNITY
End: 2024-05-18

## 2024-04-27 RX ORDER — PRAMIPEXOLE DIHYDROCHLORIDE 0.12 MG/1
0.12 TABLET ORAL 3 TIMES DAILY
Status: DISCONTINUED | OUTPATIENT
Start: 2024-04-27 | End: 2024-05-18

## 2024-04-27 RX ORDER — FUROSEMIDE 20 MG/1
20 TABLET ORAL 2 TIMES DAILY
Status: SHIPPED | COMMUNITY
End: 2024-04-27

## 2024-04-27 RX ORDER — VENLAFAXINE HYDROCHLORIDE 37.5 MG/1
37.5 CAPSULE, EXTENDED RELEASE ORAL EVERY MORNING
Status: DISCONTINUED | OUTPATIENT
Start: 2024-04-28 | End: 2024-05-18

## 2024-04-27 RX ORDER — CLOPIDOGREL BISULFATE 75 MG/1
75 TABLET ORAL DAILY
Status: SHIPPED | COMMUNITY
End: 2024-04-27

## 2024-04-27 RX ORDER — POLYETHYLENE GLYCOL 3350 17 G/17G
1 POWDER, FOR SOLUTION ORAL
Status: DISCONTINUED | OUTPATIENT
Start: 2024-04-27 | End: 2024-05-17

## 2024-04-27 RX ORDER — SODIUM CHLORIDE 9 MG/ML
INJECTION, SOLUTION INTRAVENOUS CONTINUOUS
Status: DISCONTINUED | OUTPATIENT
Start: 2024-04-27 | End: 2024-04-28

## 2024-04-27 RX ORDER — PROCHLORPERAZINE EDISYLATE 5 MG/ML
5-10 INJECTION INTRAMUSCULAR; INTRAVENOUS EVERY 4 HOURS PRN
Status: DISCONTINUED | OUTPATIENT
Start: 2024-04-27 | End: 2024-05-17

## 2024-04-27 RX ORDER — POTASSIUM CHLORIDE 20 MEQ/1
40 TABLET, EXTENDED RELEASE ORAL ONCE
Status: COMPLETED | OUTPATIENT
Start: 2024-04-27 | End: 2024-04-27

## 2024-04-27 RX ORDER — VENLAFAXINE HYDROCHLORIDE 37.5 MG/1
37.5 CAPSULE, EXTENDED RELEASE ORAL EVERY MORNING
Status: ON HOLD | COMMUNITY
Start: 2024-03-13 | End: 2024-05-18

## 2024-04-27 RX ADMIN — MIRTAZAPINE 30 MG: 30 TABLET, FILM COATED ORAL at 21:38

## 2024-04-27 RX ADMIN — PRAMIPEXOLE DIHYDROCHLORIDE 0.12 MG: 0.12 TABLET ORAL at 21:39

## 2024-04-27 RX ADMIN — SENNOSIDES AND DOCUSATE SODIUM 2 TABLET: 50; 8.6 TABLET ORAL at 21:39

## 2024-04-27 RX ADMIN — SODIUM CHLORIDE: 9 INJECTION, SOLUTION INTRAVENOUS at 21:37

## 2024-04-27 RX ADMIN — OXYCODONE 5 MG: 5 TABLET ORAL at 21:50

## 2024-04-27 RX ADMIN — CEFTRIAXONE SODIUM 2000 MG: 10 INJECTION, POWDER, FOR SOLUTION INTRAVENOUS at 21:38

## 2024-04-27 RX ADMIN — POTASSIUM CHLORIDE 40 MEQ: 1500 TABLET, EXTENDED RELEASE ORAL at 21:38

## 2024-04-27 RX ADMIN — AZITHROMYCIN 500 MG: 500 INJECTION, POWDER, LYOPHILIZED, FOR SOLUTION INTRAVENOUS at 22:10

## 2024-04-27 ASSESSMENT — COGNITIVE AND FUNCTIONAL STATUS - GENERAL
SUGGESTED CMS G CODE MODIFIER DAILY ACTIVITY: CJ
SUGGESTED CMS G CODE MODIFIER MOBILITY: CK
HELP NEEDED FOR BATHING: A LITTLE
STANDING UP FROM CHAIR USING ARMS: A LITTLE
DAILY ACTIVITIY SCORE: 22
WALKING IN HOSPITAL ROOM: A LOT
TOILETING: A LITTLE
CLIMB 3 TO 5 STEPS WITH RAILING: A LOT
MOVING TO AND FROM BED TO CHAIR: A LOT
MOBILITY SCORE: 17

## 2024-04-27 ASSESSMENT — LIFESTYLE VARIABLES
EVER HAD A DRINK FIRST THING IN THE MORNING TO STEADY YOUR NERVES TO GET RID OF A HANGOVER: NO
HOW MANY TIMES IN THE PAST YEAR HAVE YOU HAD 5 OR MORE DRINKS IN A DAY: 0
ALCOHOL_USE: NO
ON A TYPICAL DAY WHEN YOU DRINK ALCOHOL HOW MANY DRINKS DO YOU HAVE: 0
HAVE PEOPLE ANNOYED YOU BY CRITICIZING YOUR DRINKING: NO
TOTAL SCORE: 0
CONSUMPTION TOTAL: NEGATIVE
EVER FELT BAD OR GUILTY ABOUT YOUR DRINKING: NO
TOTAL SCORE: 0
HAVE YOU EVER FELT YOU SHOULD CUT DOWN ON YOUR DRINKING: NO
AVERAGE NUMBER OF DAYS PER WEEK YOU HAVE A DRINK CONTAINING ALCOHOL: 0
TOTAL SCORE: 0

## 2024-04-27 ASSESSMENT — PATIENT HEALTH QUESTIONNAIRE - PHQ9
2. FEELING DOWN, DEPRESSED, IRRITABLE, OR HOPELESS: NOT AT ALL
1. LITTLE INTEREST OR PLEASURE IN DOING THINGS: NOT AT ALL
SUM OF ALL RESPONSES TO PHQ9 QUESTIONS 1 AND 2: 0

## 2024-04-27 ASSESSMENT — PAIN DESCRIPTION - PAIN TYPE: TYPE: ACUTE PAIN

## 2024-04-27 ASSESSMENT — ENCOUNTER SYMPTOMS
NEUROLOGICAL NEGATIVE: 1
CHILLS: 1
SHORTNESS OF BREATH: 1
MUSCULOSKELETAL NEGATIVE: 1
NAUSEA: 1
PSYCHIATRIC NEGATIVE: 1
FEVER: 1
EYES NEGATIVE: 1

## 2024-04-27 ASSESSMENT — FIBROSIS 4 INDEX: FIB4 SCORE: 0.66

## 2024-04-28 ENCOUNTER — APPOINTMENT (OUTPATIENT)
Dept: CARDIOLOGY | Facility: MEDICAL CENTER | Age: 64
DRG: 280 | End: 2024-04-28
Attending: STUDENT IN AN ORGANIZED HEALTH CARE EDUCATION/TRAINING PROGRAM
Payer: MEDICAID

## 2024-04-28 PROBLEM — K56.41 IMPACTED STOOL IN INTESTINE (HCC): Status: ACTIVE | Noted: 2024-04-28

## 2024-04-28 PROBLEM — K52.9 COLITIS: Status: ACTIVE | Noted: 2024-04-28

## 2024-04-28 LAB
ALBUMIN SERPL BCP-MCNC: 3.1 G/DL (ref 3.2–4.9)
ALBUMIN/GLOB SERPL: 0.7 G/DL
ALP SERPL-CCNC: 115 U/L (ref 30–99)
ALT SERPL-CCNC: 5 U/L (ref 2–50)
ANION GAP SERPL CALC-SCNC: 16 MMOL/L (ref 7–16)
AST SERPL-CCNC: 7 U/L (ref 12–45)
BASOPHILS # BLD AUTO: 0.3 % (ref 0–1.8)
BASOPHILS # BLD: 0.05 K/UL (ref 0–0.12)
BILIRUB SERPL-MCNC: 0.2 MG/DL (ref 0.1–1.5)
BUN SERPL-MCNC: 17 MG/DL (ref 8–22)
CALCIUM ALBUM COR SERPL-MCNC: 9 MG/DL (ref 8.5–10.5)
CALCIUM SERPL-MCNC: 8.3 MG/DL (ref 8.5–10.5)
CHLORIDE SERPL-SCNC: 103 MMOL/L (ref 96–112)
CO2 SERPL-SCNC: 18 MMOL/L (ref 20–33)
CREAT SERPL-MCNC: 1.26 MG/DL (ref 0.5–1.4)
CRP SERPL HS-MCNC: 9.8 MG/DL (ref 0–0.75)
EOSINOPHIL # BLD AUTO: 0.3 K/UL (ref 0–0.51)
EOSINOPHIL NFR BLD: 1.9 % (ref 0–6.9)
ERYTHROCYTE [DISTWIDTH] IN BLOOD BY AUTOMATED COUNT: 49.9 FL (ref 35.9–50)
ERYTHROCYTE [SEDIMENTATION RATE] IN BLOOD BY WESTERGREN METHOD: 116 MM/HOUR (ref 0–25)
GFR SERPLBLD CREATININE-BSD FMLA CKD-EPI: 48 ML/MIN/1.73 M 2
GLOBULIN SER CALC-MCNC: 4.2 G/DL (ref 1.9–3.5)
GLUCOSE SERPL-MCNC: 133 MG/DL (ref 65–99)
HCT VFR BLD AUTO: 32.1 % (ref 37–47)
HGB BLD-MCNC: 10.1 G/DL (ref 12–16)
IMM GRANULOCYTES # BLD AUTO: 0.06 K/UL (ref 0–0.11)
IMM GRANULOCYTES NFR BLD AUTO: 0.4 % (ref 0–0.9)
LYMPHOCYTES # BLD AUTO: 1.48 K/UL (ref 1–4.8)
LYMPHOCYTES NFR BLD: 9.4 % (ref 22–41)
MAGNESIUM SERPL-MCNC: 1.7 MG/DL (ref 1.5–2.5)
MCH RBC QN AUTO: 27 PG (ref 27–33)
MCHC RBC AUTO-ENTMCNC: 31.5 G/DL (ref 32.2–35.5)
MCV RBC AUTO: 85.8 FL (ref 81.4–97.8)
MONOCYTES # BLD AUTO: 0.83 K/UL (ref 0–0.85)
MONOCYTES NFR BLD AUTO: 5.3 % (ref 0–13.4)
NEUTROPHILS # BLD AUTO: 13.06 K/UL (ref 1.82–7.42)
NEUTROPHILS NFR BLD: 82.7 % (ref 44–72)
NRBC # BLD AUTO: 0 K/UL
NRBC BLD-RTO: 0 /100 WBC (ref 0–0.2)
PHOSPHATE SERPL-MCNC: 3.1 MG/DL (ref 2.5–4.5)
PLATELET # BLD AUTO: 306 K/UL (ref 164–446)
PMV BLD AUTO: 10.3 FL (ref 9–12.9)
POTASSIUM SERPL-SCNC: 3.2 MMOL/L (ref 3.6–5.5)
PROCALCITONIN SERPL-MCNC: 0.06 NG/ML
PROT SERPL-MCNC: 7.3 G/DL (ref 6–8.2)
RBC # BLD AUTO: 3.74 M/UL (ref 4.2–5.4)
SODIUM SERPL-SCNC: 137 MMOL/L (ref 135–145)
WBC # BLD AUTO: 15.8 K/UL (ref 4.8–10.8)

## 2024-04-28 PROCEDURE — 99233 SBSQ HOSP IP/OBS HIGH 50: CPT | Performed by: STUDENT IN AN ORGANIZED HEALTH CARE EDUCATION/TRAINING PROGRAM

## 2024-04-28 PROCEDURE — 84100 ASSAY OF PHOSPHORUS: CPT

## 2024-04-28 PROCEDURE — 85652 RBC SED RATE AUTOMATED: CPT

## 2024-04-28 PROCEDURE — 36415 COLL VENOUS BLD VENIPUNCTURE: CPT

## 2024-04-28 PROCEDURE — 700111 HCHG RX REV CODE 636 W/ 250 OVERRIDE (IP): Mod: JZ | Performed by: STUDENT IN AN ORGANIZED HEALTH CARE EDUCATION/TRAINING PROGRAM

## 2024-04-28 PROCEDURE — 700102 HCHG RX REV CODE 250 W/ 637 OVERRIDE(OP): Performed by: STUDENT IN AN ORGANIZED HEALTH CARE EDUCATION/TRAINING PROGRAM

## 2024-04-28 PROCEDURE — 80053 COMPREHEN METABOLIC PANEL: CPT

## 2024-04-28 PROCEDURE — 84145 PROCALCITONIN (PCT): CPT

## 2024-04-28 PROCEDURE — 700102 HCHG RX REV CODE 250 W/ 637 OVERRIDE(OP): Mod: JZ | Performed by: STUDENT IN AN ORGANIZED HEALTH CARE EDUCATION/TRAINING PROGRAM

## 2024-04-28 PROCEDURE — A9270 NON-COVERED ITEM OR SERVICE: HCPCS | Mod: JZ | Performed by: STUDENT IN AN ORGANIZED HEALTH CARE EDUCATION/TRAINING PROGRAM

## 2024-04-28 PROCEDURE — 83735 ASSAY OF MAGNESIUM: CPT

## 2024-04-28 PROCEDURE — A9270 NON-COVERED ITEM OR SERVICE: HCPCS | Performed by: STUDENT IN AN ORGANIZED HEALTH CARE EDUCATION/TRAINING PROGRAM

## 2024-04-28 PROCEDURE — 770006 HCHG ROOM/CARE - MED/SURG/GYN SEMI*

## 2024-04-28 PROCEDURE — 700102 HCHG RX REV CODE 250 W/ 637 OVERRIDE(OP)

## 2024-04-28 PROCEDURE — 85025 COMPLETE CBC W/AUTO DIFF WBC: CPT

## 2024-04-28 PROCEDURE — 86140 C-REACTIVE PROTEIN: CPT

## 2024-04-28 PROCEDURE — A9270 NON-COVERED ITEM OR SERVICE: HCPCS

## 2024-04-28 PROCEDURE — 97602 WOUND(S) CARE NON-SELECTIVE: CPT

## 2024-04-28 RX ORDER — METRONIDAZOLE 500 MG/1
500 TABLET ORAL EVERY 12 HOURS
Status: DISCONTINUED | OUTPATIENT
Start: 2024-04-28 | End: 2024-04-29

## 2024-04-28 RX ORDER — POLYETHYLENE GLYCOL 3350 17 G/17G
1 POWDER, FOR SOLUTION ORAL 3 TIMES DAILY
Status: DISCONTINUED | OUTPATIENT
Start: 2024-04-28 | End: 2024-05-04

## 2024-04-28 RX ORDER — MAGNESIUM SULFATE HEPTAHYDRATE 40 MG/ML
4 INJECTION, SOLUTION INTRAVENOUS ONCE
Status: COMPLETED | OUTPATIENT
Start: 2024-04-28 | End: 2024-04-28

## 2024-04-28 RX ORDER — POLYETHYLENE GLYCOL 3350 17 G/17G
1 POWDER, FOR SOLUTION ORAL 2 TIMES DAILY
Status: DISCONTINUED | OUTPATIENT
Start: 2024-04-28 | End: 2024-04-28

## 2024-04-28 RX ORDER — METRONIDAZOLE 500 MG/100ML
500 INJECTION, SOLUTION INTRAVENOUS EVERY 12 HOURS
Status: DISCONTINUED | OUTPATIENT
Start: 2024-04-28 | End: 2024-04-28

## 2024-04-28 RX ORDER — DOCUSATE SODIUM 100 MG/1
100 CAPSULE, LIQUID FILLED ORAL 2 TIMES DAILY
Status: DISCONTINUED | OUTPATIENT
Start: 2024-04-28 | End: 2024-05-02

## 2024-04-28 RX ORDER — POTASSIUM CHLORIDE 20 MEQ/1
40 TABLET, EXTENDED RELEASE ORAL EVERY 6 HOURS
Status: COMPLETED | OUTPATIENT
Start: 2024-04-28 | End: 2024-04-28

## 2024-04-28 RX ADMIN — DOCUSATE SODIUM 100 MG: 100 CAPSULE, LIQUID FILLED ORAL at 17:03

## 2024-04-28 RX ADMIN — PRAMIPEXOLE DIHYDROCHLORIDE 0.12 MG: 0.12 TABLET ORAL at 08:24

## 2024-04-28 RX ADMIN — POLYETHYLENE GLYCOL 3350 1 PACKET: 17 POWDER, FOR SOLUTION ORAL at 10:05

## 2024-04-28 RX ADMIN — POTASSIUM CHLORIDE 40 MEQ: 1500 TABLET, EXTENDED RELEASE ORAL at 12:00

## 2024-04-28 RX ADMIN — POLYETHYLENE GLYCOL 3350 1 PACKET: 17 POWDER, FOR SOLUTION ORAL at 15:14

## 2024-04-28 RX ADMIN — POTASSIUM CHLORIDE 40 MEQ: 1500 TABLET, EXTENDED RELEASE ORAL at 17:04

## 2024-04-28 RX ADMIN — METRONIDAZOLE 500 MG: 500 TABLET ORAL at 17:03

## 2024-04-28 RX ADMIN — VENLAFAXINE HYDROCHLORIDE 37.5 MG: 37.5 CAPSULE, EXTENDED RELEASE ORAL at 04:08

## 2024-04-28 RX ADMIN — PRAMIPEXOLE DIHYDROCHLORIDE 0.12 MG: 0.12 TABLET ORAL at 21:23

## 2024-04-28 RX ADMIN — DOCUSATE SODIUM 100 MG: 100 CAPSULE, LIQUID FILLED ORAL at 10:05

## 2024-04-28 RX ADMIN — POLYETHYLENE GLYCOL 3350 1 PACKET: 17 POWDER, FOR SOLUTION ORAL at 15:21

## 2024-04-28 RX ADMIN — SENNOSIDES AND DOCUSATE SODIUM 2 TABLET: 50; 8.6 TABLET ORAL at 17:04

## 2024-04-28 RX ADMIN — PRAMIPEXOLE DIHYDROCHLORIDE 0.12 MG: 0.12 TABLET ORAL at 15:14

## 2024-04-28 RX ADMIN — METRONIDAZOLE 500 MG: 5 INJECTION, SOLUTION INTRAVENOUS at 10:06

## 2024-04-28 RX ADMIN — MAGNESIUM SULFATE HEPTAHYDRATE 4 G: 4 INJECTION, SOLUTION INTRAVENOUS at 11:19

## 2024-04-28 RX ADMIN — POLYETHYLENE GLYCOL 3350 1 PACKET: 17 POWDER, FOR SOLUTION ORAL at 21:23

## 2024-04-28 RX ADMIN — MIRTAZAPINE 30 MG: 30 TABLET, FILM COATED ORAL at 21:23

## 2024-04-28 RX ADMIN — ENOXAPARIN SODIUM 40 MG: 100 INJECTION SUBCUTANEOUS at 17:04

## 2024-04-28 RX ADMIN — OXYCODONE 5 MG: 5 TABLET ORAL at 08:24

## 2024-04-28 ASSESSMENT — ENCOUNTER SYMPTOMS
VOMITING: 1
CONSTIPATION: 1
SHORTNESS OF BREATH: 0
FEVER: 0
COUGH: 1
SPUTUM PRODUCTION: 0
CHILLS: 0
NAUSEA: 1
ABDOMINAL PAIN: 1

## 2024-04-28 ASSESSMENT — PAIN DESCRIPTION - PAIN TYPE: TYPE: ACUTE PAIN

## 2024-04-28 NOTE — WOUND TEAM
[facility]  Inpatient Services  Wound and Skin Care Brief Evaluation    Admission Date: 4/27/2024     Last order of IP CONSULT TO WOUND CARE was found on 4/28/2024 from Hospital Encounter on 4/27/2024     HPI, PMH, SH: Reviewed    Chief Complaint   Patient presents with    Abdominal Pain     X 10 days, seen and discharged at that time    Body Aches     X 2 days    Constipation     X 3 days    N/V     X 2 days     Diagnosis: Community acquired pneumonia, unspecified laterality [J18.9]    Unit where seen by Wound Team: S624/02     Wound consult placed regarding sacrum . Chart and images reviewed. This discussed with bedside RNTeja. This clinician in to assess patient. Patient pleasant and agreeable. Patient was able to turn self . Non-selectively debrided with Moist warm washcloth.     No pressure injuries or advanced wound care needs identified. Sacrum, bilateral heels, bilateral elbows, and knees are pink, intact and blanching. Wound consult completed. No further follow up unless indicated and consulted.          PREVENTATIVE INTERVENTIONS:    Q shift Manolo - performed per nursing policy  Q shift pressure point assessments - performed per nursing policy    Surface/Positioning  Standard/trauma mattress - Currently in Place    Offloading/Redistribution  Float Heels off Bed with Pillows - Currently in Place           Respiratory  N/A    Containment/Moisture Prevention    Dri-rin pad - Currently in Place  Barrier paste - Currently in Place    Mobilization      Unable to assess

## 2024-04-28 NOTE — ED TRIAGE NOTES
Fouzia Ovalle [facility]  63 y.o. female  Chief Complaint   Patient presents with    Abdominal Pain     X 10 days, seen and discharged at that time    Body Aches     X 2 days    Constipation     X 3 days    N/V     X 2 days       Pt to triage in wheelchair for above complaint. Hx stroke, difficulty speaking, at baseline. Son with patient.    Pt is GCS 15, speaking in full sentences, follows commands and responds appropriately to questions. Resp are even and unlabored.    Abdominal pain protocol ordered. Pt placed in draw room. Pt educated on triage process. Pt encouraged to alert staff for any changes.       Vitals:    04/27/24 1646   BP: 136/86   Pulse: 89   Resp: 18   Temp: 37 °C (98.6 °F)   SpO2: 98%

## 2024-04-28 NOTE — HOSPITAL COURSE
Fouzia Santamaria is a 63 y.o. female who presented 4/27/2024 with abdominal pain with constipation and nausea vomiting.  This is a pleasant woman with a history of HFrEF 35%, COPD, diabetes, hypertension, and previous strokes, who developed abdominal pain 10 days ago in the emergency room and was subsequent discharged home.  She presents with 2 days of bodyaches as well as worsening constipation nausea vomiting.  She had a leukocytosis with white count of 15.1 with a left shift.  Chest x-ray showed mild pulmonary edema.  Abdominal x-ray showed significant amount of stool burden.

## 2024-04-28 NOTE — ED PROVIDER NOTES
"ER Provider Note    Scribed for Jose Snell M.d. by Alley Kerns. 4/27/2024  5:46 PM    Primary Care Provider: SHANKAR Reyes    CHIEF COMPLAINT  Chief Complaint   Patient presents with    Abdominal Pain     X 10 days, seen and discharged at that time    Body Aches     X 2 days    Constipation     X 3 days    N/V     X 2 days     EXTERNAL RECORDS REVIEWED  Records or recent history of pneumonia.    HPI/ROS  OUTSIDE HISTORIAN(S):  Significant other at bedside who provided history as seen below.     Fouzia Santamaria is a 63 y.o. female who presents to the ED complaining of abdominal pain onset ten days ago. Patient was seen in the ED for abdominal pain ten days ago and was found to have a negative work up.  states that the patient has not had a bowel movements in a few days. He adds that she has started to feel worse with associated body aches, coughing, vomiting and intermittent fever.  is concerned as he can \"hear the patient's chest rattle when she breathes\". The patient's son has been sick recently who lives with them.     PAST MEDICAL HISTORY  Past Medical History:   Diagnosis Date    At risk for delirium 9/13/2022    Chronic obstructive pulmonary disease (HCC)     Diabetes (HCC)     Encounter to establish care 10/19/2022    Patient here to establish care.  Patient was recently in the hospital for over a month secondary to several blood clots in need of multiple surgeries including: aorto-bifemoral bypass and bilateral left renal artery eversion endarterectomies.  Patient surgery was complicated \"by hypotension and evidence of hemorrhage that she was taken back to the operating room emergently for exploration where sh    Heart attack (HCC)     Hydronephrosis 8/24/2022    Hypertension     Hypomagnesemia 8/29/2022    Stroke (HCC)        SURGICAL HISTORY  Past Surgical History:   Procedure Laterality Date    [place] EXPLORATORY OF ABDOMEN  8/28/2022    Procedure: LAPAROTOMY, " EXPLORATORY;  Surgeon: Brandyn Siu M.D.;  Location: SURGERY [facility];  Service: General    AORTOBIFEM BYPASS Bilateral 8/26/2022    Procedure: CREATION, BYPASS, ARTERIAL, AORTA TO FEMORAL, BILATERAL;  Surgeon: Brandyn Siu M.D.;  Location: SURGERY [facility];  Service: General    ENDARTERECTOMY Bilateral 8/26/2022    Procedure: RENAL ARTERY ENDARTERECTOMY;  Surgeon: Brandyn Siu M.D.;  Location: SURGERY [facility];  Service: General    [place] EXPLORATORY OF ABDOMEN  8/26/2022    Procedure: LAPAROTOMY, EXPLORATORY; CONTROL OF POST-OPERATIVE BLEEDING;  Surgeon: Brandyn Siu M.D.;  Location: SURGERY [facility];  Service: General    APPLICATION OR REPLACEMENT, WOUND VAC  8/26/2022    Procedure: APPLICATION OR REPLACEMENT, WOUND VAC;  Surgeon: Brandyn Siu M.D.;  Location: SURGERY [facility];  Service: General    STENT PLACEMENT      THYROIDECTOMY         FAMILY HISTORY  History reviewed. No pertinent family history.    SOCIAL HISTORY   reports that she has quit smoking. Her smoking use included cigarettes. She has a 96 pack-year smoking history. She has never used smokeless tobacco. She reports current drug use. Drugs: Marijuana and Inhaled. She reports that she does not drink alcohol.    CURRENT MEDICATIONS  Previous Medications    ACETAMINOPHEN (TYLENOL) 500 MG TAB    Take 2 Tablets by mouth 3 times a day.    ASPIRIN 81 MG EC TABLET    Take 81 mg by mouth every day.    ATORVASTATIN (LIPITOR) 80 MG TABLET    Take 1 Tablet by mouth every evening.    CLOPIDOGREL (PLAVIX) 75 MG TAB    TAKE 1 TABLET BY MOUTH EVERY DAY    EZETIMIBE (ZETIA) 10 MG TAB    TAKE 1 TABLET BY MOUTH EVERY DAY    FUROSEMIDE (LASIX) 20 MG TAB    TAKE 1 TABLET BY MOUTH TWICE A DAY    HYDROXYZINE HCL (ATARAX) 25 MG TAB    Take 25 mg by mouth 3 times a day as needed for Itching.    LISINOPRIL (PRINIVIL) 10 MG TAB    TAKE 1 TABLET BY MOUTH EVERY DAY    METOPROLOL TARTRATE (LOPRESSOR) 25 MG TAB    TAKE 1/2 TABLET BY MOUTH 2 TIMES A DAY     "MIRTAZAPINE (REMERON) 15 MG TAB    Take 15 mg by mouth every evening.    ONDANSETRON (ZOFRAN ODT) 4 MG TABLET DISPERSIBLE    Take 1 Tablet by mouth every 6 hours as needed for Nausea/Vomiting.    PRAMIPEXOLE (MIRAPEX) 0.125 MG TAB    TAKE 1 TABLET BY MOUTH THREE TIMES A DAY    VENLAFAXINE (EFFEXOR) 37.5 MG TAB    Take 37.5 mg by mouth 3 times a day.       ALLERGIES  Pcn [penicillins] and Toradol    PHYSICAL EXAM  VITAL SIGNS: /86   Pulse 89   Temp 37 °C (98.6 °F) (Temporal)   Resp 18   Ht 1.651 m (5' 5\")   Wt 56.7 kg (125 lb)   SpO2 98%   BMI 20.80 kg/m²   Pulse ox interpretation: I interpret this pulse ox as normal.  Constitutional: Alert in no apparent distress.  HENT: No signs of trauma, Bilateral external ears normal, Nose normal.   Eyes: Conjunctiva normal, Non-icteric.   Neck: Normal range of motion, Supple, No stridor.   Lymphatic: No lymphadenopathy noted.   Cardiovascular: Regular rate and rhythm, no murmurs.   Thorax & Lungs: Normal breath sounds, No respiratory distress, No wheezing, Soft crackles initially but then cleared with deep inspiration.   Abdomen: Bowel sounds normal, Soft, Mild generalizing non localizing tenderness with no rebound or guarding, No masses, No pulsatile masses. No peritoneal signs.  Skin: Warm, Dry, No erythema, No rash.   Back: No midline bony tenderness.   Extremities: Intact distal pulses, No edema, No cyanosis.  Musculoskeletal: Good range of motion in all major joints. No or major deformities noted.   Neurologic: Alert , Normal motor function, Normal sensory function, No focal deficits noted.   Psychiatric: Affect normal, Judgment normal, Mood normal.     DIAGNOSTIC STUDIES    Labs:   Labs Reviewed   COMP METABOLIC PANEL - Abnormal; Notable for the following components:       Result Value    Sodium 134 (*)     Potassium 3.4 (*)     Co2 18 (*)     Anion Gap 17.0 (*)     Glucose 112 (*)     Calcium 8.2 (*)     AST(SGOT) 10 (*)     Alkaline Phosphatase 110 (*)     " Globulin 4.4 (*)     All other components within normal limits   CBC WITH DIFFERENTIAL - Abnormal; Notable for the following components:    WBC 15.1 (*)     RBC 3.66 (*)     Hemoglobin 10.0 (*)     Hematocrit 31.2 (*)     MCHC 32.1 (*)     Neutrophils-Polys 80.60 (*)     Lymphocytes 11.00 (*)     Neutrophils (Absolute) 12.15 (*)     Monos (Absolute) 0.86 (*)     All other components within normal limits   ESTIMATED GFR - Abnormal; Notable for the following components:    GFR (CKD-EPI) 50 (*)     All other components within normal limits   LIPASE   RESPIRATORY PANEL, PCR (W/SARS COV-2)   PROBRAIN NATRIURETIC PEPTIDE, NT   POCT COV-2, FLU A/B, RSV BY PCR   POC COV-2, FLU A/B, RSV BY PCR       EKG:   I have independently interpreted this EKG  Results for orders placed or performed during the hospital encounter of 10/09/23   EKG   Result Value Ref Range    Report       [facility] Emergency Dept.    Test Date:  2023-10-09  Pt Name:    CHRISTINA BLOUNT               Department: ER  MRN:        [id number]                      Room:  Gender:     Female                       Technician: [id number]  :        [id number]                   Requested By:ER TRIAGE PROTOCOL  Order #:    [id number]                    Reading MD:    Measurements  Intervals                                Axis  Rate:       91                           P:          54  OK:         159                          QRS:        13  QRSD:       108                          T:          65  QT:         427  QTc:        526    Interpretive Statements  Sinus rhythm  Biatrial enlargement  Probable anteroseptal infarct, recent  Prolonged QT interval  Compared to ECG 2023 17:45:09  Atrial abnormality now present  Prolonged QT interval now present  Intraventricular conduction delay no longer present  Myocardial infarct finding still present           Radiology:   The attending emergency physician has independently interpreted the diagnostic imaging  associated with this visit and am waiting the final reading from the radiologist.   Preliminary interpretation is a follows: Hazy lung parenchyma.    Radiologist interpretation:   DX-CHEST-LIMITED (1 VIEW)   Final Result      1.  Enlarged cardiac silhouette with changes of pulmonary edema.      WK-[id number]-2 VIEW   Final Result      1.  Nonobstructive bowel gas pattern with a large amount of colonic stool.           COURSE & MEDICAL DECISION MAKING     INITIAL ASSESSMENT, COURSE AND PLAN  Care Narrative:     Sepsis: Infection was suspected 7:00 PM (Time). Sepsis pathway was initiated. Fluids not needed (no hypotension or lactate greater than or equal to 4). Antibiotics were given per protocol.    5:46 PM Patient presents to the ED with abdominal pain with associated vomiting and body aches onset ten days ago. Patient evaluated at bedside and discussed plan of care, including a diagnostic work up with labs and imaging. Ordered for DX-Chest, DX-Abdomen, POCT CoV-2, Flu A/B, RSV by PCR, CMP, Lipase to evaluate her symptoms. Differential diagnoses include but not limited to: Viral Respiratory Infections vs Pneumonia vs Constipation vs Chronic Abdominal pain.     7:30 PM- I discussed the patient's case and the above findings with Dr. Valencia (Hospitalist) who agrees to evaluate the patient for hospitalization.  With the patient's significant leukocytosis, left shift, presentation with cough and increasing shortness of breath, I think the chest x-ray findings are as likely to represent infection as pulmonary edema.  This patient's complicated medical history and general frailty, recent pneumonia diagnosis, I think she should be considered to have community-acquired pneumonia, further evaluated and treated to ensure improvement before discharge home.      DISPOSITION AND DISCUSSIONS  I have discussed management of the patient with the following physicians and EDMUNDO's:  Dr. Valencia (Hospitalist)    DISPOSITION:  Patient will be  hospitalized by Dr. Valencia (Hospitalist) in guarded condition.     FINAL DIAGNOSIS  1. Community acquired pneumonia, unspecified laterality    2. Leukocytosis, unspecified type    3. Shortness of breath        Alley PHAM (Scribe), am scribing for, and in the presence of, Jose Snell M.D..    Electronically signed by: Alley Kerns (Scribe), 4/27/2024    IJose M.D. personally performed the services described in this documentation, as scribed by Alley Kerns in my presence, and it is both accurate and complete.

## 2024-04-28 NOTE — PROGRESS NOTES
4 Eyes Skin Assessment Completed by Bigg lópez, KATIA and KATIA Chaudhari.    Head WDL  Ears WDL  Nose WDL  Mouth WDL  Neck WDL  Breast/Chest WDL  Shoulder Blades WDL  Spine WDL  (R) Arm/Elbow/Hand WDL  (L) Arm/Elbow/Hand WDL  Abdomen Scar  Groin WDL  Scrotum/Coccyx/Buttocks Redness and Blanching,wound sacral area  (R) Leg Scab  (L) Leg Scab  (R) Heel/Foot/Toe WDL  (L) Heel/Foot/Toe WDL          Devices In Places Blood Pressure Cuff      Interventions In Place Pillows and Barrier Cream    Possible Skin Injury Yes    Pictures Uploaded Into Epic Yes  Wound Consult Placed Yes  RN Wound Prevention Protocol Ordered yes

## 2024-04-28 NOTE — CARE PLAN
The patient is Stable - Low risk of patient condition declining or worsening    Shift Goals  Clinical Goals: Pain management  Patient Goals: Rest  Family Goals: AFIA    Progress made toward(s) clinical / shift goals:  Patient had 1 BM today, on IV antibiotics, able to sleep comfortably. Call light in reach.     Patient is not progressing towards the following goals:

## 2024-04-28 NOTE — PROGRESS NOTES
Hospital Medicine Daily Progress Note    Date of Service  4/28/2024    Chief Complaint  Fouzia Santamaria is a 63 y.o. female admitted 4/27/2024 with abdominal pain with nausea and vomiting and constipation.    Hospital Course  Fouzia Santamaria is a 63 y.o. female who presented 4/27/2024 with abdominal pain with constipation and nausea vomiting.  This is a pleasant woman with a history of HFrEF 35%, COPD, diabetes, hypertension, and previous strokes, who developed abdominal pain 10 days ago in the emergency room and was subsequent discharged home.  She presents with 2 days of bodyaches as well as worsening constipation nausea vomiting.  She had a leukocytosis with white count of 15.1 with a left shift.  Chest x-ray showed mild pulmonary edema.  Abdominal x-ray showed significant amount of stool burden.    Interval Problem Update  4/28/2024  Abdominal x-ray per my read shows significant amount of stool burden.  I am concerned about stool impaction and have increased her MiraLAX to 3 times a day and docusate twice a day.  Given her elevated NT proBNP of 28,000 in setting of HFpEF 35%, have discontinued patient's IV fluids.  I discontinued erythromycin as I do not believe that the patient has commune acquired pneumonia.  I have added metronidazole for GI source of colitis in addition to her ceftriaxone.      Abdominal x-ray per my read shows significant amount of stool burden within the ascending colon concerning for impaction.    I have discussed this patient's plan of care and discharge plan at IDT rounds today with Case Management, Nursing, Nursing leadership, and other members of the IDT team.    Consultants/Specialty  none    Code Status  Full Code    Disposition  Medically Cleared  I have placed the appropriate orders for post-discharge needs.    Review of Systems  Review of Systems   Constitutional:  Negative for chills and fever.   Respiratory:  Positive for cough. Negative for sputum production and  shortness of breath.    Cardiovascular:  Negative for chest pain.   Gastrointestinal:  Positive for abdominal pain, constipation, nausea and vomiting.        Physical Exam  Temp:  [35.9 °C (96.7 °F)-36.8 °C (98.3 °F)] 36 °C (96.8 °F)  Pulse:  [] 102  Resp:  [16-20] 17  BP: (124-155)/() 154/109  SpO2:  [92 %-96 %] 92 %    Physical Exam  Vitals and nursing note reviewed.   Constitutional:       Appearance: Normal appearance. She is underweight. She is not ill-appearing or diaphoretic.   HENT:      Head: Normocephalic and atraumatic.      Mouth/Throat:      Mouth: Mucous membranes are moist.      Pharynx: Oropharynx is clear. No oropharyngeal exudate.   Eyes:      General:         Right eye: No discharge.         Left eye: No discharge.      Conjunctiva/sclera: Conjunctivae normal.      Pupils: Pupils are equal, round, and reactive to light.   Cardiovascular:      Rate and Rhythm: Normal rate and regular rhythm.      Pulses: Normal pulses.      Heart sounds: Normal heart sounds. No murmur heard.  Pulmonary:      Effort: Pulmonary effort is normal. No respiratory distress.      Breath sounds: Normal breath sounds.   Abdominal:      General: Abdomen is flat. Bowel sounds are normal. There is no distension.      Palpations: Abdomen is soft.      Tenderness: There is abdominal tenderness (Right lower quadrant).   Musculoskeletal:      Cervical back: Neck supple. No tenderness.      Right lower leg: No edema.      Left lower leg: No edema.   Neurological:      Mental Status: She is alert and oriented to person, place, and time.      Motor: No weakness.      Comments: Slurred and broken speech.  Is alert and orient x 3.  States that she has had multiple strokes in the past.   Psychiatric:         Thought Content: Thought content normal.         Judgment: Judgment normal.         Fluids    Intake/Output Summary (Last 24 hours) at 4/28/2024 1659  Last data filed at 4/27/2024 2210  Gross per 24 hour   Intake 240 ml    Output --   Net 240 ml       Laboratory  Recent Labs     04/27/24  1839 04/28/24  0038   WBC 15.1* 15.8*   RBC 3.66* 3.74*   HEMOGLOBIN 10.0* 10.1*   HEMATOCRIT 31.2* 32.1*   MCV 85.2 85.8   MCH 27.3 27.0   MCHC 32.1* 31.5*   RDW 49.0 49.9   PLATELETCT 303 306   MPV 9.8 10.3     Recent Labs     04/27/24  1717 04/28/24  0038   SODIUM 134* 137   POTASSIUM 3.4* 3.2*   CHLORIDE 99 103   CO2 18* 18*   GLUCOSE 112* 133*   BUN 18 17   CREATININE 1.23 1.26   CALCIUM 8.2* 8.3*                   Imaging  DX-CHEST-LIMITED (1 VIEW)   Final Result      1.  Enlarged cardiac silhouette with changes of pulmonary edema.      [id number] VIEW   Final Result      1.  Nonobstructive bowel gas pattern with a large amount of colonic stool.      EC-ECHOCARDIOGRAM COMPLETE W/O CONT    (Results Pending)        Assessment/Plan  * Impacted stool in intestine (HCC)- (present on admission)  Assessment & Plan  4/28/2024  As per abdominal x-ray with concern for acute colitis.  Possible translocation of bacteria.    Continue IV ceftriaxone  Start addition of IV metronidazole for GI coverage.  Scheduled MiraLAX 3 times a day and docusate twice a day    Colitis- (present on admission)  Assessment & Plan  4/28/2024  Elevated white count with abdominal pain is concerning for colitis.  Related to patient's impacted stool from the intestine.    Continue IV antibiotics as well as aggressive stool softeners and bowel regimen as per above.  I have discontinued patient's IV fluids given her history of HFrEF and also elevated NT proBNP.    Constipation- (present on admission)  Assessment & Plan  Bowel regimen    4/28/2024  I have increased patient's MiraLAX to 3 times a day and docusate twice a day.    Hypokalemia- (present on admission)  Assessment & Plan  Replace    Community acquired pneumonia, unspecified laterality- (present on admission)  Assessment & Plan  Reports having flulike symptoms for the last few days, her son was sick prior with similar  symptoms  COVID/flu/RSV negative  Rocephin/azithromycin  Fluids  Chest x-ray shows possible mild edema, no acute consolidation noted  BNP    4/28/2024  Community-acquired pneumonia has been ruled out.  I am discontinuing patient's azithromycin.           VTE prophylaxis:    enoxaparin ppx      I have performed a physical exam and reviewed and updated ROS and Plan today (4/28/2024). In review of yesterday's note (4/27/2024), there are no changes except as documented above.      Greater than 52 minutes spent prepping to see patient (e.g. review of tests) obtaining and/or reviewing separately obtained history. Performing a medically appropriate examination and evaluation.  Counseling and educating the patient/family/caregiver.  Ordering medications, tests, or procedures.  Referring and communicating with other health care professionals.  Documenting clinical information in EPIC.  Independently interpreting results and communicating results to patient/family/caregiver.  Care coordination.

## 2024-04-28 NOTE — ASSESSMENT & PLAN NOTE
Bowel regimen    4/28/2024  I have increased patient's MiraLAX to 3 times a day and docusate twice a day.

## 2024-04-28 NOTE — DIETARY
"Nutrition services: Day 1 of admit.  Fouzia Santamaria is a 63 y.o. female with admitting DX of community acquired pneumonia.     Consult received for unsure weight loss (MST 2). No reported changes in appetite.       Assessment:  Height: 165.1 cm (5' 5\")  Weight: 56.7 kg (125 lb) via other healthcare provider   Body mass index is 20.8 kg/m²., BMI classification: normal   Diet/Intake: Regular-50-75% x1 recorded snack    Evaluation:   Pt with unsure weight loss. Current weight via other healthcare provider. Recommend obtaining updated weight to better assess trends. If current weight accurate pts weight stable and no acute weight loss concerns at this time.   4/27/24: 125 lbs  10/11/23: 126 lbs  10/9/23: 125 lbs   8/30/23: 130 lbs   Current clinical picture and MD progress notes reviewed. Pt admitted with abdominal pain for 10 days, body pain, N/V and constipation.   Labs (4/28) K+ 3.2 (L), Glu 133 (H), Ca 8,3 (L)  Meds: oxycodone, senna, Kdur  Skin: wound noted to sacrum,   +BM 4/28    Malnutrition Risk: unable to meet criteria per ASPEN guidelines at this time    Recommendations/Plan:  Regular diet as tolerated   Encourage intake of all meals   Document intake of all meals as % taken in ADL's to provide interdisciplinary communication across all shifts.   Obtain updated weight.  Nutrition rep will continue to see patient for ongoing meal and snack preferences.       RD following     "

## 2024-04-28 NOTE — CARE PLAN
The patient is Stable - Low risk of patient condition declining or worsening      Problem: Pain - Standard  Goal: Alleviation of pain or a reduction in pain to the patient’s comfort goal  Outcome: Progressing     Shift Goals  Clinical Goals: Pain management, safety  Patient Goals: Pain control  Family Goals: AFIA    Progress made toward(s) clinical / shift goals:      Pt alert and oriented, slurred speech and late response, but ayo to answer questions. C/O abdominal pain during shift, pain medication given as ordered, pain improved after, reinforced education on pain management including non pharmacological interventions. Bed alarm on and call light within reach.  Family was at bedside updated to POC.

## 2024-04-28 NOTE — ED NOTES
Med rec completed per patient's spouse at bedside, and medication dispense history per [facility] (called [facility] on [address] [phone], patient fills at [facility] on [facility]) and [facility].    Allergies reviewed with patient and spouse.    Outpatient antibiotics within the last 30 days: none.    ANTICOAGULATION: none.    In addition to medications listed on med rec, patient was previously prescribed:  Clopidogrel 75 mg, 1 tablet every day, last dispensed via [facility] 11/2023 for #30 tablets (30 day supply)  Atorvastatin 80 mg, 1 tablet every evening, last dispensed via [facility] 10/2023 for #30 tablets (30 day supply)  Ezetimibe 10 mg, 1 tablet every day, last dispensed via [facility] 8/2023 for #30 tablets (30 day supply)  Metoprolol tartrate 25 mg, 0.5 tablet 2 times per day, last dispensed 7/2023 for #90 tablets (90 day supply)  Lisinopril 10 mg, 1 tablet every day, last dispensed 2/2023 for #90 tablets (90 day supply)  Patient's spouse states they do not fill at any other pharmacy. As last doses of these medications would have been more than 30 days ago if used as directed, these medications have been removed from the med rec at this time.

## 2024-04-29 ENCOUNTER — APPOINTMENT (OUTPATIENT)
Dept: CARDIOLOGY | Facility: MEDICAL CENTER | Age: 64
DRG: 280 | End: 2024-04-29
Attending: STUDENT IN AN ORGANIZED HEALTH CARE EDUCATION/TRAINING PROGRAM
Payer: MEDICAID

## 2024-04-29 PROBLEM — I25.5 ISCHEMIC CARDIOMYOPATHY: Status: ACTIVE | Noted: 2024-04-29

## 2024-04-29 LAB
ALBUMIN SERPL BCP-MCNC: 3.2 G/DL (ref 3.2–4.9)
ALBUMIN/GLOB SERPL: 0.7 G/DL
ALP SERPL-CCNC: 126 U/L (ref 30–99)
ALT SERPL-CCNC: 358 U/L (ref 2–50)
ANION GAP SERPL CALC-SCNC: 19 MMOL/L (ref 7–16)
AST SERPL-CCNC: 684 U/L (ref 12–45)
BASOPHILS # BLD AUTO: 0.2 % (ref 0–1.8)
BASOPHILS # BLD: 0.02 K/UL (ref 0–0.12)
BILIRUB SERPL-MCNC: 0.4 MG/DL (ref 0.1–1.5)
BUN SERPL-MCNC: 21 MG/DL (ref 8–22)
CALCIUM ALBUM COR SERPL-MCNC: 9.3 MG/DL (ref 8.5–10.5)
CALCIUM SERPL-MCNC: 8.7 MG/DL (ref 8.5–10.5)
CHLORIDE SERPL-SCNC: 104 MMOL/L (ref 96–112)
CO2 SERPL-SCNC: 15 MMOL/L (ref 20–33)
CREAT SERPL-MCNC: 1.33 MG/DL (ref 0.5–1.4)
EOSINOPHIL # BLD AUTO: 0.01 K/UL (ref 0–0.51)
EOSINOPHIL NFR BLD: 0.1 % (ref 0–6.9)
ERYTHROCYTE [DISTWIDTH] IN BLOOD BY AUTOMATED COUNT: 50.2 FL (ref 35.9–50)
GFR SERPLBLD CREATININE-BSD FMLA CKD-EPI: 45 ML/MIN/1.73 M 2
GLOBULIN SER CALC-MCNC: 4.4 G/DL (ref 1.9–3.5)
GLUCOSE SERPL-MCNC: 185 MG/DL (ref 65–99)
HAV IGM SERPL QL IA: NORMAL
HBV CORE IGM SER QL: NORMAL
HBV SURFACE AG SER QL: NORMAL
HCT VFR BLD AUTO: 32.7 % (ref 37–47)
HCV AB SER QL: NORMAL
HGB BLD-MCNC: 10.3 G/DL (ref 12–16)
IMM GRANULOCYTES # BLD AUTO: 0.12 K/UL (ref 0–0.11)
IMM GRANULOCYTES NFR BLD AUTO: 1.1 % (ref 0–0.9)
LV EJECT FRACT  99904: 15
LV EJECT FRACT MOD 2C 99903: 11.98
LV EJECT FRACT MOD 4C 99902: 12.33
LV EJECT FRACT MOD BP 99901: 8.29
LYMPHOCYTES # BLD AUTO: 1.11 K/UL (ref 1–4.8)
LYMPHOCYTES NFR BLD: 10.4 % (ref 22–41)
MAGNESIUM SERPL-MCNC: 2.9 MG/DL (ref 1.5–2.5)
MCH RBC QN AUTO: 27.2 PG (ref 27–33)
MCHC RBC AUTO-ENTMCNC: 31.5 G/DL (ref 32.2–35.5)
MCV RBC AUTO: 86.3 FL (ref 81.4–97.8)
MONOCYTES # BLD AUTO: 0.71 K/UL (ref 0–0.85)
MONOCYTES NFR BLD AUTO: 6.6 % (ref 0–13.4)
NEUTROPHILS # BLD AUTO: 8.72 K/UL (ref 1.82–7.42)
NEUTROPHILS NFR BLD: 81.6 % (ref 44–72)
NRBC # BLD AUTO: 0 K/UL
NRBC BLD-RTO: 0 /100 WBC (ref 0–0.2)
PLATELET # BLD AUTO: 323 K/UL (ref 164–446)
PMV BLD AUTO: 10.2 FL (ref 9–12.9)
POTASSIUM SERPL-SCNC: 5.8 MMOL/L (ref 3.6–5.5)
PROT SERPL-MCNC: 7.6 G/DL (ref 6–8.2)
QORDR QORDR: NORMAL
RBC # BLD AUTO: 3.79 M/UL (ref 4.2–5.4)
SODIUM SERPL-SCNC: 138 MMOL/L (ref 135–145)
WBC # BLD AUTO: 10.7 K/UL (ref 4.8–10.8)

## 2024-04-29 PROCEDURE — 86664 EPSTEIN-BARR NUCLEAR ANTIGEN: CPT

## 2024-04-29 PROCEDURE — 93306 TTE W/DOPPLER COMPLETE: CPT

## 2024-04-29 PROCEDURE — 80053 COMPREHEN METABOLIC PANEL: CPT

## 2024-04-29 PROCEDURE — 99254 IP/OBS CNSLTJ NEW/EST MOD 60: CPT | Performed by: INTERNAL MEDICINE

## 2024-04-29 PROCEDURE — 770006 HCHG ROOM/CARE - MED/SURG/GYN SEMI*

## 2024-04-29 PROCEDURE — 93306 TTE W/DOPPLER COMPLETE: CPT | Mod: 26 | Performed by: INTERNAL MEDICINE

## 2024-04-29 PROCEDURE — 700102 HCHG RX REV CODE 250 W/ 637 OVERRIDE(OP): Performed by: INTERNAL MEDICINE

## 2024-04-29 PROCEDURE — 86663 EPSTEIN-BARR ANTIBODY: CPT

## 2024-04-29 PROCEDURE — 94640 AIRWAY INHALATION TREATMENT: CPT

## 2024-04-29 PROCEDURE — A9270 NON-COVERED ITEM OR SERVICE: HCPCS | Performed by: INTERNAL MEDICINE

## 2024-04-29 PROCEDURE — 99233 SBSQ HOSP IP/OBS HIGH 50: CPT | Performed by: INTERNAL MEDICINE

## 2024-04-29 PROCEDURE — 97166 OT EVAL MOD COMPLEX 45 MIN: CPT

## 2024-04-29 PROCEDURE — 86665 EPSTEIN-BARR CAPSID VCA: CPT

## 2024-04-29 PROCEDURE — A9270 NON-COVERED ITEM OR SERVICE: HCPCS | Performed by: STUDENT IN AN ORGANIZED HEALTH CARE EDUCATION/TRAINING PROGRAM

## 2024-04-29 PROCEDURE — 94669 MECHANICAL CHEST WALL OSCILL: CPT

## 2024-04-29 PROCEDURE — 700101 HCHG RX REV CODE 250: Performed by: INTERNAL MEDICINE

## 2024-04-29 PROCEDURE — 700102 HCHG RX REV CODE 250 W/ 637 OVERRIDE(OP): Performed by: STUDENT IN AN ORGANIZED HEALTH CARE EDUCATION/TRAINING PROGRAM

## 2024-04-29 PROCEDURE — 80074 ACUTE HEPATITIS PANEL: CPT

## 2024-04-29 PROCEDURE — 36415 COLL VENOUS BLD VENIPUNCTURE: CPT

## 2024-04-29 PROCEDURE — 700101 HCHG RX REV CODE 250

## 2024-04-29 PROCEDURE — 700101 HCHG RX REV CODE 250: Performed by: STUDENT IN AN ORGANIZED HEALTH CARE EDUCATION/TRAINING PROGRAM

## 2024-04-29 PROCEDURE — 700111 HCHG RX REV CODE 636 W/ 250 OVERRIDE (IP): Mod: JZ | Performed by: STUDENT IN AN ORGANIZED HEALTH CARE EDUCATION/TRAINING PROGRAM

## 2024-04-29 PROCEDURE — 94760 N-INVAS EAR/PLS OXIMETRY 1: CPT

## 2024-04-29 PROCEDURE — 700111 HCHG RX REV CODE 636 W/ 250 OVERRIDE (IP): Mod: JZ | Performed by: INTERNAL MEDICINE

## 2024-04-29 PROCEDURE — 85025 COMPLETE CBC W/AUTO DIFF WBC: CPT

## 2024-04-29 PROCEDURE — 83735 ASSAY OF MAGNESIUM: CPT

## 2024-04-29 PROCEDURE — 99418 PROLNG IP/OBS E/M EA 15 MIN: CPT | Performed by: INTERNAL MEDICINE

## 2024-04-29 PROCEDURE — 700117 HCHG RX CONTRAST REV CODE 255: Performed by: STUDENT IN AN ORGANIZED HEALTH CARE EDUCATION/TRAINING PROGRAM

## 2024-04-29 RX ORDER — FUROSEMIDE 10 MG/ML
40 INJECTION INTRAMUSCULAR; INTRAVENOUS
Status: DISCONTINUED | OUTPATIENT
Start: 2024-04-29 | End: 2024-04-30

## 2024-04-29 RX ORDER — FUROSEMIDE 20 MG/1
20 TABLET ORAL 2 TIMES DAILY
Status: ON HOLD | COMMUNITY
End: 2024-05-18

## 2024-04-29 RX ORDER — ATORVASTATIN CALCIUM 10 MG/1
80 TABLET, FILM COATED ORAL NIGHTLY
Status: ON HOLD | COMMUNITY
End: 2024-05-18

## 2024-04-29 RX ORDER — EZETIMIBE 10 MG/1
10 TABLET ORAL DAILY
Status: ON HOLD | COMMUNITY
End: 2024-05-18

## 2024-04-29 RX ORDER — METOPROLOL SUCCINATE 25 MG/1
25 TABLET, EXTENDED RELEASE ORAL
Status: DISCONTINUED | OUTPATIENT
Start: 2024-04-29 | End: 2024-05-02

## 2024-04-29 RX ORDER — AMOXICILLIN AND CLAVULANATE POTASSIUM 875; 125 MG/1; MG/1
1 TABLET, FILM COATED ORAL EVERY 12 HOURS
Qty: 4 TABLET | Refills: 0 | Status: DISCONTINUED | OUTPATIENT
Start: 2024-04-30 | End: 2024-05-01

## 2024-04-29 RX ORDER — LISINOPRIL 10 MG/1
10 TABLET ORAL DAILY
Status: ON HOLD | COMMUNITY
End: 2024-05-18

## 2024-04-29 RX ORDER — CLOPIDOGREL BISULFATE 75 MG/1
75 TABLET ORAL DAILY
Status: ON HOLD | COMMUNITY
End: 2024-05-18

## 2024-04-29 RX ADMIN — ALBUTEROL SULFATE 2.5 MG: 2.5 SOLUTION RESPIRATORY (INHALATION) at 19:26

## 2024-04-29 RX ADMIN — MIRTAZAPINE 30 MG: 30 TABLET, FILM COATED ORAL at 20:27

## 2024-04-29 RX ADMIN — PRAMIPEXOLE DIHYDROCHLORIDE 0.12 MG: 0.12 TABLET ORAL at 14:01

## 2024-04-29 RX ADMIN — CEFTRIAXONE SODIUM 2000 MG: 10 INJECTION, POWDER, FOR SOLUTION INTRAVENOUS at 04:49

## 2024-04-29 RX ADMIN — HUMAN ALBUMIN MICROSPHERES AND PERFLUTREN 3 ML: 10; .22 INJECTION, SOLUTION INTRAVENOUS at 12:30

## 2024-04-29 RX ADMIN — METRONIDAZOLE 500 MG: 500 TABLET ORAL at 16:14

## 2024-04-29 RX ADMIN — METRONIDAZOLE 500 MG: 500 TABLET ORAL at 04:49

## 2024-04-29 RX ADMIN — ALBUTEROL SULFATE 2.5 MG: 2.5 SOLUTION RESPIRATORY (INHALATION) at 10:43

## 2024-04-29 RX ADMIN — METOPROLOL SUCCINATE 25 MG: 25 TABLET, FILM COATED, EXTENDED RELEASE ORAL at 14:00

## 2024-04-29 RX ADMIN — PRAMIPEXOLE DIHYDROCHLORIDE 0.12 MG: 0.12 TABLET ORAL at 20:28

## 2024-04-29 RX ADMIN — ALBUTEROL SULFATE 2.5 MG: 2.5 SOLUTION RESPIRATORY (INHALATION) at 14:40

## 2024-04-29 RX ADMIN — DOCUSATE SODIUM 100 MG: 100 CAPSULE, LIQUID FILLED ORAL at 04:49

## 2024-04-29 RX ADMIN — PRAMIPEXOLE DIHYDROCHLORIDE 0.12 MG: 0.12 TABLET ORAL at 08:18

## 2024-04-29 RX ADMIN — ENOXAPARIN SODIUM 40 MG: 100 INJECTION SUBCUTANEOUS at 16:13

## 2024-04-29 RX ADMIN — FUROSEMIDE 40 MG: 10 INJECTION INTRAMUSCULAR; INTRAVENOUS at 14:01

## 2024-04-29 RX ADMIN — VENLAFAXINE HYDROCHLORIDE 37.5 MG: 37.5 CAPSULE, EXTENDED RELEASE ORAL at 04:49

## 2024-04-29 ASSESSMENT — COGNITIVE AND FUNCTIONAL STATUS - GENERAL
TOILETING: A LOT
PERSONAL GROOMING: A LITTLE
HELP NEEDED FOR BATHING: A LOT
DAILY ACTIVITIY SCORE: 16
SUGGESTED CMS G CODE MODIFIER DAILY ACTIVITY: CK
DRESSING REGULAR UPPER BODY CLOTHING: A LITTLE
DRESSING REGULAR LOWER BODY CLOTHING: A LITTLE
EATING MEALS: A LITTLE

## 2024-04-29 ASSESSMENT — ENCOUNTER SYMPTOMS
SHORTNESS OF BREATH: 0
CONSTIPATION: 1
NAUSEA: 1
ABDOMINAL PAIN: 1
VOMITING: 1
COUGH: 1
SPUTUM PRODUCTION: 0
FEVER: 0
CHILLS: 0

## 2024-04-29 ASSESSMENT — COPD QUESTIONNAIRES
DURING THE PAST 4 WEEKS HOW MUCH DID YOU FEEL SHORT OF BREATH: SOME OF THE TIME
HAVE YOU SMOKED AT LEAST 100 CIGARETTES IN YOUR ENTIRE LIFE: YES
DO YOU EVER COUGH UP ANY MUCUS OR PHLEGM?: YES, A FEW DAYS A WEEK OR MONTH
COPD SCREENING SCORE: 7

## 2024-04-29 ASSESSMENT — ACTIVITIES OF DAILY LIVING (ADL): TOILETING: REQUIRES ASSIST

## 2024-04-29 NOTE — RESPIRATORY CARE
COPD EDUCATION by COPD CLINICAL EDUCATOR  4/29/2024 at 1:43 PM by Tamiko Nava RRT     Patient interviewed by COPD education team. Patient refused COPD program at this time, states she has never been told she has COPD, does not take COPD medications. Patient's respiratory status assessed, found to have faint expiratory wheezes, started on Albuterol nebulizer treatments and requested prescription for Albuterol inhaler as needed upon discharge.         COPD Screen  COPD Risk Screening  Do you have a history of COPD?: No  COPD Population Screener  During the past 4 weeks, how much did you feel short of breath?: Some of the time  Do you ever cough up any mucus or phlegm?: Yes, a few days a week or month  In the past 12 months, you do less than you used to because of your breathing problems: Agree  Have you smoked at least 100 cigarettes in your entire life?: Yes  How old are you?: 60+  COPD Screening Score: 7  COPD Coordinator Recommended: Yes    COPD Assessment  COPD Clinical Specialists ONLY  COPD Education Initiated: Yes--Short Intervention (No official Dx of COPD, no COPD medications, no home O2. Performed resp assessment, faint exp wheezes, started on treatments. Has chronic speach difficulty. Declined COPD literature.)  Is this a COPD exacerbation patient?: No  DME Company: none  DME Equipment Type: none  Physician Follow Up Appointment:  (declined apt assistance)  Physician Name: SHANKAR Reyes  Pulmonologist Name: declined local pulmonologist list  Referrals Initiated:  (none indicated at this time, quit smoking in 2022)  $ Demo/Eval of SVN's, MDI's and Aerosols:  (no COPD medications at this time, will request Albuterol MDI upon discharge)  $ Bedside PFT Screen:  (unable to perform at this time, pneumonia, actively wheezing, has speach difficulty (chronic issue))  (OP) Pulmonary Function Testing:  (none on file)  Interdisciplinary Rounds: Attendance at Rounds (30 Min)    PFT Results    No results  "found for: \"PFT\"    Meds to Beds  [facility] for all eligible patients at discharge.  Would you like to opt out of this program for any reason?: No - Stay Opted In     MY COPD ACTION PLAN     It is recommended that patients and physicians /healthcare providers complete this action plan together. This plan should be discussed at each physician visit and updated as needed.    The green, yellow and red zones show groups of symptoms of COPD. This list of symptoms is not comprehensive, and you may experience other symptoms. In the \"Actions\" column, your healthcare provider has recommended actions for you to take based on your symptoms.    Patient Name: Fouzia Santamaria   YOB: 1960   Last Updated on:     Green Zone:  I am doing well today Actions     Usual activitiy and exercise level   Take daily medications     Usual amounts of cough and phlegm/mucus   Use oxygen as prescribed     Sleep well at night   Continue regular exercise/diet plan     Appetite is good   At all times avoid cigarette smoke, inhaled irritants     Daily Medications (these medications are taken every day):                Yellow Zone:  I am having a bad day or a COPD flare Actions     More breathless than usual   Continue daily medications     I have less energy for my daily activities   Use quick relief inhaler as ordered     Increased or thicker phlegm/mucus   Use oxygen as prescribed     Using quick relief inhaler/nebulizer more often   Get plenty of rest     Swelling of ankles more than usual   Use pursed lip breathing     More coughing than usual   At all times avoid cigarette smoke, inhaled irritants     I feel like I have a \"chest cold\"     Poor sleep and my symptoms woke me up     My appetite is not good     My medicine is not helping      Call provider immediately if symptoms don’t improve     Continue daily medications, add rescue medications:   Albuterol 2 Puffs         Medications to be " used during a flare up, (as Discussed with Provider):              Red Zone:  I need urgent medical care Actions     Severe shortness of breath even at rest   Call 911 or seek medical care immediately     Not able to do any activity because of breathing      Fever or shaking chills      Feeling confused or very drowsy       Chest pains      Coughing up blood

## 2024-04-29 NOTE — CONSULTS
Cardiology Initial Consultation    Date of Service  4/29/2024    Referring Physician  Pipe Hernandez M.D.    Reason for Consultation  Heart failure with reduced ejection fraction    History of Presenting Illness  Fouzia Santamaria is a 63 y.o. 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    Currently, she notes that she is feeling fair and not having any active chest pain symptoms.  She repeatedly answered that her overall symptoms she was feeling well.  During this hospitalization she has been diagnosed with community-acquired pneumonia and subsequent reduction in her overall LV systolic function.    Prior cardiovascular workup consists of echocardiogram performed on 4/29/2024 which demonstrated newly reduced LV systolic function with estimated ejection fraction of approximately less than 15% without LV dilatation, severe mitral valve regurgitation, grade 2 diastolic dysfunction, moderate left atrial dilatation    Prior transesophageal echocardiogram back in August 31, 2022 demonstrated no significant valvular abnormalities moderately depressed left ventricular ejection fraction estimated at 30 to 35% visually with hypokinesis of the anterior lateral and inferior septal segments preserved RV systolic function with concern for hypovolemia large thrombus was visualized in the descending thoracic aorta    Cardiac catheterization back on 7/9/2022 demonstrated balloon angioplasty with subsequent stent placement into the proximal to  mid LAD consisting of a 2.5 x 12 mm Carson drug-eluting stent postdilated to 2.75 mm    Review of Systems  Review of Systems   All other systems reviewed and are negative.      Past Medical History   has a past medical history of At risk for delirium (9/13/2022), Chronic obstructive pulmonary disease (HCC), Diabetes (HCC), Encounter to establish care (10/19/2022), Heart attack ([facility]), Hydronephrosis (8/24/2022), Hypertension, Hypomagnesemia (8/29/2022), and Stroke ([facility]).    Surgical History   has a past surgical history that includes thyroidectomy; stent placement; aortobifem bypass (Bilateral, 8/26/2022); endarterectomy (Bilateral, 8/26/2022); pr exploratory of abdomen (8/26/2022); application or replacement, wound vac (8/26/2022); and pr exploratory of abdomen (8/28/2022).    Family History  family history is not on file.    Social History   reports that she has quit smoking. Her smoking use included cigarettes. She has a 96 pack-year smoking history. She has never used smokeless tobacco. She reports current drug use. Drugs: Marijuana and Inhaled. She reports that she does not drink alcohol.    Medications  Prior to Admission Medications   Prescriptions Last Dose Informant Patient Reported? Taking?   acetaminophen (TYLENOL) 500 MG Tab 4/26/2024 at PM Significant Other Yes Yes   Sig: Take 1,000 mg by mouth 2 times a day as needed for Mild Pain. 2 tablets = 1,000 mg.   hydrOXYzine HCl (ATARAX) 25 MG Tab 4/25/2024 at PRN Significant Other, Patient's Home Pharmacy Yes No   Sig: Take 25 mg by mouth 3 times a day as needed for Anxiety.   mirtazapine (REMERON) 30 MG Tab tablet 4/26/2024 at 2230 Significant Other, Patient's Home Pharmacy Yes Yes   Sig: Take 30 mg by mouth at bedtime.   ondansetron (ZOFRAN ODT) 4 MG TABLET DISPERSIBLE PRN at PRN Significant Other, Patient's Home Pharmacy No No   Sig: Take 1 Tablet by mouth every 6 hours as needed for Nausea/Vomiting.   pramipexole (MIRAPEX) 0.125 MG Tab 4/27/2024 at 0900  Significant Other, Patient's Home Pharmacy Yes Yes   Sig: Take 0.125 mg by mouth 3 times a day.   venlafaxine XR (EFFEXOR XR) 37.5 MG CAPSULE SR 24 HR 4/27/2024 at 0900 Significant Other, Patient's Home Pharmacy Yes Yes   Sig: Take 37.5 mg by mouth every morning.      Facility-Administered Medications: None       Allergies  Allergies   Allergen Reactions    Pcn [Penicillins] Vomiting and Nausea    Toradol Vomiting and Nausea       Vital signs in last 24 hours  Temp:  [36 °C (96.8 °F)-36.4 °C (97.5 °F)] 36.4 °C (97.5 °F)  Pulse:  [] 79  Resp:  [16-20] 17  BP: ()/() 99/51  SpO2:  [92 %-97 %] 96 %    Physical Exam  Physical Exam  Vitals and nursing note reviewed.   Constitutional:       Appearance: Normal appearance.   HENT:      Head: Normocephalic and atraumatic.      Nose: Nose normal.      Mouth/Throat:      Mouth: Mucous membranes are moist.      Pharynx: Oropharynx is clear.   Eyes:      Pupils: Pupils are equal, round, and reactive to light.   Cardiovascular:      Rate and Rhythm: Normal rate and regular rhythm.      Heart sounds: No murmur heard.     No friction rub. No gallop.   Pulmonary:      Effort: Pulmonary effort is normal.      Breath sounds: Rhonchi present. No wheezing or rales.   Abdominal:      General: Bowel sounds are normal.      Palpations: Abdomen is soft.   Musculoskeletal:         General: Normal range of motion.      Cervical back: Normal range of motion and neck supple.      Right lower leg: No edema.      Left lower leg: No edema.   Skin:     General: Skin is warm and dry.   Neurological:      General: No focal deficit present.      Mental Status: She is alert and oriented to person, place, and time. Mental status is at baseline.   Psychiatric:         Mood and Affect: Mood normal.         Behavior: Behavior normal.         Thought Content: Thought content normal.         Judgment: Judgment normal.         Lab Review  Lab Results   Component Value Date/Time    WBC 10.7  04/29/2024 05:23 AM    RBC 3.79 (L) 04/29/2024 05:23 AM    HEMOGLOBIN 10.3 (L) 04/29/2024 05:23 AM    HEMATOCRIT 32.7 (L) 04/29/2024 05:23 AM    MCV 86.3 04/29/2024 05:23 AM    MCH 27.2 04/29/2024 05:23 AM    MCHC 31.5 (L) 04/29/2024 05:23 AM    MPV 10.2 04/29/2024 05:23 AM      Lab Results   Component Value Date/Time    SODIUM 138 04/29/2024 05:23 AM    POTASSIUM 5.8 (H) 04/29/2024 05:23 AM    CHLORIDE 104 04/29/2024 05:23 AM    CO2 15 (L) 04/29/2024 05:23 AM    GLUCOSE 185 (H) 04/29/2024 05:23 AM    BUN 21 04/29/2024 05:23 AM    CREATININE 1.33 04/29/2024 05:23 AM    BUNCREATRAT 25.0 07/10/2022 04:21 AM      Lab Results   Component Value Date/Time    ASTSGOT 684 (H) 04/29/2024 05:23 AM    ALTSGPT 358 (H) 04/29/2024 05:23 AM     Lab Results   Component Value Date/Time    CHOLSTRLTOT 89 (L) 08/29/2022 05:15 AM    LDL 34 08/29/2022 05:15 AM    HDL 32 (A) 08/29/2022 05:15 AM    TRIGLYCERIDE 111 08/31/2022 04:25 AM    TROPONINT 21 (H) 09/06/2023 06:16 PM       Recent Labs     04/27/24 2022   NTPROBNP [id number]*       Cardiac Imaging and Procedures Review      Echocardiogram: As noted above    Cardiac Catheterization: Not applicable      Assessment/Plan  1.  Community-acquired pneumonia  2.  Heart failure with reduced ejection fraction with concern for severe mitral valve regurgitation  3.  Severe mitral valve regurgitation  4.  Peripheral vascular disease with prior aortic thrombus status post aortofemoral bypass  5.  Hypertension  6.  Prior CVA    Clinically, she is doing fair and has noted a decrease in her overall LV systolic function which likely is a result of her community-acquired pneumonia given her prior leukocytosis and x-ray findings.  At this time we will focus in on goal-directed medical therapy and further recommendations will follow pending further echocardiographic evaluation for noted severe mitral valve regurgitation.  I suspect that this is multifactorial however she is not having any acute LV  dilatation based on most recent echocardiogram and suspect that this may be related to worsening of her overall LV systolic function.  She will remain on the following medications Lasix 40 mg twice daily, Toprol 25 mg daily.  It would not be unreasonable to consider adding Entresto therapy to her regimen as well as consideration for spironolactone and SGLT2 inhibitor.  Will make further recommendations pending her overall clinical course.    Terms of her other medical conditions I will defer to the primary medicine service.          Thank you for allowing me to participate in the care of this patient.    I will continue to follow this patient    Please contact me with any questions.    Breezy Chau D.O.   Cardiologist, [facility]  [phone]

## 2024-04-29 NOTE — THERAPY
Occupational Therapy   Initial Evaluation     Patient Name: Fouzia Santamaria  Age:  63 y.o., Sex:  female  Medical Record #: [id number]  Today's Date: 4/29/2024     Precautions  Precautions: (P) Fall Risk    Assessment  Patient is a 63 y.o. female with a diagnosis of impacted stool in the intestine. Pt had recently previously presented to the ED with 10 days of abdominal pain, discharged home and then subsequently returned with bodyaches as well as worsening constipation, nausea, vomiting.  Additional factors influencing patient status / progress: PMHx includes FrEF 35%, COPD, diabetes, hypertension, and previous strokes. During OT eval, pt presented with impaired strength, activity tolerance, balance and functional cognition though pt's spouse at bedside reports this is her baseline. Pt's spouse reports she has assist for most ADLs and all transfers/functional mobility using a wheelchair or scooter. Pt's spouse confirmed pt appears to still be at her baseline level of function and that her family can continue to provide 24/7 assistance. Pt would benefit from home health OT to address home safety and any further DME needs to reduce caregiver burden.     Patient will not be actively followed for occupational therapy services at this time, however may be seen if requested by physician for 1 more visit within 30 days to address any discharge or equipment needs.      Plan    Occupational Therapy Initial Treatment Plan   Duration: (P) Discharge Needs Only    DC Equipment Recommendations: (P) Unable to determine at this time  Discharge Recommendations: (P) Recommend home health for continued occupational therapy services     Objective     04/29/24 1430   Initial Contact Note    Initial Contact Note Order Received and Verified, Evaluation Only - Patient Does Not Require Further Acute Occupational Therapy at this Time.  However, May Benefit from Post Acute Therapy for Higher Level Functional Deficits.   Prior Living  Situation   Prior Services Continuous (24 Hour) Care Giving Family   Housing / Facility 1 Story Apartment / Condo   Steps Into Home 0   Steps In Home 0   Bathroom Set up Bathtub / Shower Combination;Shower Chair   Equipment Owned Front-Wheel Walker;Wheelchair;Scooter;Tub / Shower Seat;Hand Held Shower   Lives with - Patient's Self Care Capacity Spouse;Adult Children   Comments Pt's spouse at bedside provided home setup and PLOF info. Reports pt has 24/7 care and physical assist at baseline from family   Prior Level of ADL Function   Self Feeding Independent   Grooming / Hygiene Independent   Bathing Requires Assist   Dressing Requires Assist   Toileting Requires Assist   Prior Level of IADL Function   Medication Management Dependent   Laundry Dependent   Kitchen Mobility Requires Assist   Finances Dependent   Home Management Dependent   Shopping Dependent   Prior Level Of Mobility Uses Wheel Chair for in Home Mobility   Driving / Transportation Relatives / Others Provide Transportation   Occupation (Pre-Hospital Vocational) Retired Due To Disability   Precautions   Precautions Fall Risk   Pain   Intervention Declines   Non Verbal Descriptors   Non Verbal Scale  Calm   Cognition    Cognition / Consciousness X   Speech/ Communication Delayed Responses   Level of Consciousness Alert   Ability To Follow Commands 1 Step   New Learning Impaired   Attention Impaired   Comments Pleasant and cooperative, delayed responses and spouse assists with PLOF   Active ROM Upper Body   Active ROM Upper Body  X   Comments impaired by weakness   Strength Upper Body   Upper Body Strength  X   Comments General weakness, RUE>LUE   Sensation Upper Body   Upper Extremity Sensation  Not Tested   Upper Body Muscle Tone   Upper Body Muscle Tone  WDL   Coordination Upper Body   Coordination WDL   Balance Assessment   Sitting Balance (Static) Fair -   Sitting Balance (Dynamic) Fair -   Standing Balance (Static) Poor +   Standing Balance (Dynamic)  Poor   Weight Shift Sitting Fair   Weight Shift Standing Poor   Comments HHA   Bed Mobility    Supine to Sit Minimal Assist   Scooting Minimal Assist   Comments HOB flat   ADL Assessment   Eating Supervision  (spouse at bedside while pt ate lunch on arrival)   Lower Body Dressing Supervision  (socks donned at the bed level)   Toileting   (van, pt's spouse reports baseline incontinence)   How much help from another person does the patient currently need...   Putting on and taking off regular lower body clothing? 3   Bathing (including washing, rinsing, and drying)? 2   Toileting, which includes using a toilet, bedpan, or urinal? 2   Putting on and taking off regular upper body clothing? 3   Taking care of personal grooming such as brushing teeth? 3   Eating meals? 3   6 Clicks Daily Activity Score 16   Functional Mobility   Sit to Stand Minimal Assist   Bed, Chair, Wheelchair Transfer Moderate Assist   Transfer Method Squat Pivot   Mobility EOB->chair   Activity Tolerance   Sitting in Chair post   Sitting Edge of Bed 5 min   Standing <20 seconds   Education Group   Education Provided Adaptive Equipment   Role of Occupational Therapist Patient Response Patient;Family;Acceptance;Explanation;Verbal Demonstration   Adaptive Equipment Patient Response Patient;Family;Acceptance;Explanation;Verbal Demonstration  (recommendation for a tub transfer bench instead of standard shower chair at home for increased ease and safety with shower transfer)   Occupational Therapy Initial Treatment Plan    Duration Discharge Needs Only   Problem List   Problem List Decreased Active Daily Living Skills;Decreased Homemaking Skills;Decreased Upper Extremity Strength Right;Decreased Upper Extremity Strength Left;Decreased Functional Mobility;Decreased Activity Tolerance;Impaired Cognitive Function;Impaired Postural Control / Balance   Anticipated Discharge Equipment and Recommendations   DC Equipment Recommendations Unable to determine  at this time   Discharge Recommendations Recommend home health for continued occupational therapy services   Interdisciplinary Plan of Care Collaboration   IDT Collaboration with  Nursing;Family / Caregiver   Patient Position at End of Therapy Seated;Chair Alarm On;Call Light within Reach;Tray Table within Reach;Family / Friend in Room   Collaboration Comments RN aware; pt's spouse present and supportive   Session Information   Date / Session Number  4/29 d/c needs

## 2024-04-29 NOTE — CARE PLAN
The patient is Stable - Low risk of patient condition declining or worsening    Shift Goals  Clinical Goals: Pain management/ bowel management  Patient Goals: Rest  Family Goals: AFIA    Progress made toward(s) clinical / shift goals:  Patient had BM today, Bowel regimen effective, able to rest comfortably , call light in reach.    Patient is not progressing towards the following goals:

## 2024-04-29 NOTE — CARE PLAN
The patient is Stable - Low risk of patient condition declining or worsening      Problem: Pain - Standard  Goal: Alleviation of pain or a reduction in pain to the patient’s comfort goal  Outcome: Progressing     Problem: Skin Integrity  Goal: Skin integrity is maintained or improved  Outcome: Progressing     Shift Goals  Clinical Goals: Safety, pain management  Patient Goals: Rest  Family Goals: AFIA    Progress made toward(s) clinical / shift goals:      Pt alert and oriented, slow response. BP hypertensive, hospitalist informed, no orders given. No c/o pain. Pt had a BM during shift, miralax was held. Reinforced education on fall precaution, call light within reach.

## 2024-04-30 VITALS
OXYGEN SATURATION: 92 % | DIASTOLIC BLOOD PRESSURE: 90 MMHG | BODY MASS INDEX: 20.83 KG/M2 | HEART RATE: 64 BPM | HEIGHT: 65 IN | TEMPERATURE: 97.2 F | WEIGHT: 125 LBS | SYSTOLIC BLOOD PRESSURE: 138 MMHG | RESPIRATION RATE: 18 BRPM

## 2024-04-30 PROBLEM — R74.01 TRANSAMINITIS: Status: ACTIVE | Noted: 2024-04-30

## 2024-04-30 PROBLEM — I34.0 SEVERE MITRAL REGURGITATION: Status: ACTIVE | Noted: 2024-04-30

## 2024-04-30 PROBLEM — I50.20 HEART FAILURE WITH REDUCED EJECTION FRACTION (HCC): Status: ACTIVE | Noted: 2024-04-29

## 2024-04-30 LAB
ALBUMIN SERPL BCP-MCNC: 3.4 G/DL (ref 3.2–4.9)
ALBUMIN/GLOB SERPL: 0.9 G/DL
ALP SERPL-CCNC: 135 U/L (ref 30–99)
ALT SERPL-CCNC: 1024 U/L (ref 2–50)
ANION GAP SERPL CALC-SCNC: 20 MMOL/L (ref 7–16)
AST SERPL-CCNC: 2739 U/L (ref 12–45)
B PARAP IS1001 DNA NPH QL NAA+NON-PROBE: NOT DETECTED
B PERT.PT PRMT NPH QL NAA+NON-PROBE: NOT DETECTED
BILIRUB SERPL-MCNC: 0.6 MG/DL (ref 0.1–1.5)
BUN SERPL-MCNC: 28 MG/DL (ref 8–22)
C PNEUM DNA NPH QL NAA+NON-PROBE: NOT DETECTED
CALCIUM ALBUM COR SERPL-MCNC: 9 MG/DL (ref 8.5–10.5)
CALCIUM SERPL-MCNC: 8.5 MG/DL (ref 8.5–10.5)
CHLORIDE SERPL-SCNC: 100 MMOL/L (ref 96–112)
CO2 SERPL-SCNC: 15 MMOL/L (ref 20–33)
CREAT SERPL-MCNC: 1.64 MG/DL (ref 0.5–1.4)
ERYTHROCYTE [DISTWIDTH] IN BLOOD BY AUTOMATED COUNT: 50.7 FL (ref 35.9–50)
FLUAV RNA NPH QL NAA+NON-PROBE: NOT DETECTED
FLUBV RNA NPH QL NAA+NON-PROBE: NOT DETECTED
GFR SERPLBLD CREATININE-BSD FMLA CKD-EPI: 35 ML/MIN/1.73 M 2
GLOBULIN SER CALC-MCNC: 3.8 G/DL (ref 1.9–3.5)
GLUCOSE SERPL-MCNC: 115 MG/DL (ref 65–99)
HADV DNA NPH QL NAA+NON-PROBE: NOT DETECTED
HCOV 229E RNA NPH QL NAA+NON-PROBE: NOT DETECTED
HCOV HKU1 RNA NPH QL NAA+NON-PROBE: NOT DETECTED
HCOV NL63 RNA NPH QL NAA+NON-PROBE: NOT DETECTED
HCOV OC43 RNA NPH QL NAA+NON-PROBE: NOT DETECTED
HCT VFR BLD AUTO: 31.7 % (ref 37–47)
HGB BLD-MCNC: 9.9 G/DL (ref 12–16)
HMPV RNA NPH QL NAA+NON-PROBE: NOT DETECTED
HPIV1 RNA NPH QL NAA+NON-PROBE: NOT DETECTED
HPIV2 RNA NPH QL NAA+NON-PROBE: NOT DETECTED
HPIV3 RNA NPH QL NAA+NON-PROBE: NOT DETECTED
HPIV4 RNA NPH QL NAA+NON-PROBE: NOT DETECTED
M PNEUMO DNA NPH QL NAA+NON-PROBE: NOT DETECTED
MCH RBC QN AUTO: 27 PG (ref 27–33)
MCHC RBC AUTO-ENTMCNC: 31.2 G/DL (ref 32.2–35.5)
MCV RBC AUTO: 86.4 FL (ref 81.4–97.8)
NT-PROBNP SERPL IA-MCNC: ABNORMAL PG/ML (ref 0–125)
PLATELET # BLD AUTO: 191 K/UL (ref 164–446)
PMV BLD AUTO: 10.5 FL (ref 9–12.9)
POTASSIUM SERPL-SCNC: 5.4 MMOL/L (ref 3.6–5.5)
PROT SERPL-MCNC: 7.2 G/DL (ref 6–8.2)
RBC # BLD AUTO: 3.67 M/UL (ref 4.2–5.4)
RSV RNA NPH QL NAA+NON-PROBE: NOT DETECTED
RV+EV RNA NPH QL NAA+NON-PROBE: DETECTED
SARS-COV-2 RNA NPH QL NAA+NON-PROBE: NOTDETECTED
SODIUM SERPL-SCNC: 135 MMOL/L (ref 135–145)
WBC # BLD AUTO: 12.4 K/UL (ref 4.8–10.8)

## 2024-04-30 PROCEDURE — 87798 DETECT AGENT NOS DNA AMP: CPT

## 2024-04-30 PROCEDURE — 87633 RESP VIRUS 12-25 TARGETS: CPT

## 2024-04-30 PROCEDURE — 97163 PT EVAL HIGH COMPLEX 45 MIN: CPT

## 2024-04-30 PROCEDURE — A9270 NON-COVERED ITEM OR SERVICE: HCPCS | Performed by: INTERNAL MEDICINE

## 2024-04-30 PROCEDURE — 700102 HCHG RX REV CODE 250 W/ 637 OVERRIDE(OP): Performed by: STUDENT IN AN ORGANIZED HEALTH CARE EDUCATION/TRAINING PROGRAM

## 2024-04-30 PROCEDURE — 700101 HCHG RX REV CODE 250: Performed by: INTERNAL MEDICINE

## 2024-04-30 PROCEDURE — 700111 HCHG RX REV CODE 636 W/ 250 OVERRIDE (IP): Mod: JZ | Performed by: STUDENT IN AN ORGANIZED HEALTH CARE EDUCATION/TRAINING PROGRAM

## 2024-04-30 PROCEDURE — 99232 SBSQ HOSP IP/OBS MODERATE 35: CPT | Performed by: INTERNAL MEDICINE

## 2024-04-30 PROCEDURE — 99233 SBSQ HOSP IP/OBS HIGH 50: CPT | Performed by: INTERNAL MEDICINE

## 2024-04-30 PROCEDURE — 700111 HCHG RX REV CODE 636 W/ 250 OVERRIDE (IP): Mod: JZ | Performed by: INTERNAL MEDICINE

## 2024-04-30 PROCEDURE — 700102 HCHG RX REV CODE 250 W/ 637 OVERRIDE(OP): Performed by: INTERNAL MEDICINE

## 2024-04-30 PROCEDURE — 87486 CHLMYD PNEUM DNA AMP PROBE: CPT

## 2024-04-30 PROCEDURE — A9270 NON-COVERED ITEM OR SERVICE: HCPCS

## 2024-04-30 PROCEDURE — 94640 AIRWAY INHALATION TREATMENT: CPT

## 2024-04-30 PROCEDURE — A9270 NON-COVERED ITEM OR SERVICE: HCPCS | Performed by: STUDENT IN AN ORGANIZED HEALTH CARE EDUCATION/TRAINING PROGRAM

## 2024-04-30 PROCEDURE — 770006 HCHG ROOM/CARE - MED/SURG/GYN SEMI*

## 2024-04-30 PROCEDURE — 700102 HCHG RX REV CODE 250 W/ 637 OVERRIDE(OP)

## 2024-04-30 PROCEDURE — 87581 M.PNEUMON DNA AMP PROBE: CPT

## 2024-04-30 RX ORDER — FUROSEMIDE 10 MG/ML
40 INJECTION INTRAMUSCULAR; INTRAVENOUS 3 TIMES DAILY
Status: DISCONTINUED | OUTPATIENT
Start: 2024-04-30 | End: 2024-05-01

## 2024-04-30 RX ADMIN — VENLAFAXINE HYDROCHLORIDE 37.5 MG: 37.5 CAPSULE, EXTENDED RELEASE ORAL at 05:21

## 2024-04-30 RX ADMIN — FUROSEMIDE 40 MG: 10 INJECTION INTRAMUSCULAR; INTRAVENOUS at 05:21

## 2024-04-30 RX ADMIN — FUROSEMIDE 40 MG: 10 INJECTION INTRAMUSCULAR; INTRAVENOUS at 17:02

## 2024-04-30 RX ADMIN — AMOXICILLIN AND CLAVULANATE POTASSIUM 1 TABLET: 875; 125 TABLET, FILM COATED ORAL at 05:21

## 2024-04-30 RX ADMIN — AMOXICILLIN AND CLAVULANATE POTASSIUM 1 TABLET: 875; 125 TABLET, FILM COATED ORAL at 16:14

## 2024-04-30 RX ADMIN — ENOXAPARIN SODIUM 40 MG: 100 INJECTION SUBCUTANEOUS at 16:13

## 2024-04-30 RX ADMIN — PRAMIPEXOLE DIHYDROCHLORIDE 0.12 MG: 0.12 TABLET ORAL at 07:55

## 2024-04-30 RX ADMIN — METOPROLOL SUCCINATE 25 MG: 25 TABLET, FILM COATED, EXTENDED RELEASE ORAL at 05:21

## 2024-04-30 RX ADMIN — PRAMIPEXOLE DIHYDROCHLORIDE 0.12 MG: 0.12 TABLET ORAL at 21:36

## 2024-04-30 RX ADMIN — ALBUTEROL SULFATE 2.5 MG: 2.5 SOLUTION RESPIRATORY (INHALATION) at 10:51

## 2024-04-30 RX ADMIN — OXYCODONE 5 MG: 5 TABLET ORAL at 19:48

## 2024-04-30 RX ADMIN — MIRTAZAPINE 30 MG: 30 TABLET, FILM COATED ORAL at 21:36

## 2024-04-30 RX ADMIN — ALBUTEROL SULFATE 2.5 MG: 2.5 SOLUTION RESPIRATORY (INHALATION) at 14:52

## 2024-04-30 RX ADMIN — ALBUTEROL SULFATE 2.5 MG: 2.5 SOLUTION RESPIRATORY (INHALATION) at 07:29

## 2024-04-30 RX ADMIN — PRAMIPEXOLE DIHYDROCHLORIDE 0.12 MG: 0.12 TABLET ORAL at 14:11

## 2024-04-30 ASSESSMENT — ENCOUNTER SYMPTOMS
CONSTIPATION: 1
FEVER: 0
ABDOMINAL PAIN: 1
SHORTNESS OF BREATH: 0
SPUTUM PRODUCTION: 0
CHILLS: 0
NAUSEA: 1
VOMITING: 1
COUGH: 1

## 2024-04-30 ASSESSMENT — COGNITIVE AND FUNCTIONAL STATUS - GENERAL
STANDING UP FROM CHAIR USING ARMS: A LOT
SUGGESTED CMS G CODE MODIFIER MOBILITY: CL
MOVING TO AND FROM BED TO CHAIR: A LOT
WALKING IN HOSPITAL ROOM: TOTAL
MOBILITY SCORE: 14
CLIMB 3 TO 5 STEPS WITH RAILING: TOTAL

## 2024-04-30 ASSESSMENT — PAIN DESCRIPTION - PAIN TYPE: TYPE: ACUTE PAIN

## 2024-04-30 NOTE — ASSESSMENT & PLAN NOTE
Pulmonary edema, ordered Lasix. Currently not hypoxic  Cardiology consult noted  BB and Lasix.   Trend Cr- before starting CCEI/ARB, spironolactone, Entresto and/or Farxiga

## 2024-04-30 NOTE — CARE PLAN
Problem: Pain - Standard  Goal: Alleviation of pain or a reduction in pain to the patient’s comfort goal  Outcome: Progressing     Problem: Fall Risk  Goal: Patient will remain free from falls  Outcome: Progressing       The patient is Stable - Low risk of patient condition declining or worsening    Shift Goals  Clinical Goals: Hemodynamic stability, critical lab value  Patient Goals: Rest  Family Goals: AFIA    Progress made toward(s) clinical / shift goals:      Pt alert and oriented. BP Diastolic elevated, On room air. No c/o pain. IV access patent. Reinforced education on fall precaution call light within reach. Family was at bedside updated to POC.

## 2024-04-30 NOTE — ASSESSMENT & PLAN NOTE
Congestive hepatopathy ? EF 15%  Elevated BNP   AST 2739  ALT 1024  Cr 1.64  BNP 25521  Hepatitis panel negative  EBG pending  IV diuresis

## 2024-04-30 NOTE — PROGRESS NOTES
Inform  from Lab called with critical result of ALT 1024 AST 2739 at 0606 am . Critical lab result read back to .   Dr. Cresencio Gill  notified of critical lab result at 0623.

## 2024-04-30 NOTE — THERAPY
"Physical Therapy   Initial Evaluation     Patient Name: Fouzia Santamaria  Age:  63 y.o., Sex:  female  Medical Record #: [id number]  Today's Date: 4/30/2024     Precautions  Precautions: (P) Fall Risk    Assessment  Patient is 63 y.o. female admitted with complaints of SOB,flu like symptoms, abdominal pain. Pt found to have pneumonia and impacted stool in intestine.  PMH includes HFrEF 35%, COPD, diabetes, hypertension, and previous strokes.     Pt able to perform bed mobility with SBA and required modA to perform squat pivot transfer to and from bedside chair to change bedding due to bowel movement in bed. Pt unable to provide full PLOF due to word finding impairment; based on chart review and OT's conversation with spouse pt appears to be at baseline. When asked if pt feels she is mobilizing at baseline, pt reports \"yes\". Anticipate pt can return home with HHPT and increased support from spouse and family. No further acute care PT needs.    Plan    Physical Therapy Initial Treatment Plan   Duration: (P) Evaluation only    DC Equipment Recommendations: (P) None  Discharge Recommendations: (P) Recommend home health for continued physical therapy services       Subjective    Patient received in bed and agreeable to PT session.      Objective       04/30/24 1446   Initial Contact Note    Initial Contact Note Order Received and Verified, Evaluation Only - Patient Does Not Require Further Acute Physical Therapy at this Time.  However, May Benefit from Post Acute Therapy for Higher Level Functional Deficits.   Precautions   Precautions Fall Risk   Vitals   O2 (LPM) 0   O2 Delivery Device None - Room Air   Pain 0 - 10 Group   Therapist Pain Assessment Post Activity Pain Same as Prior to Activity;Nurse Notified  (pain not rated)   Prior Living Situation   Prior Services Continuous (24 Hour) Care Giving Family   Housing / Facility 1 Story Apartment / Condo   Steps Into Home 0   Steps In Home 0   Equipment Owned Front-Wheel " Walker;Wheelchair;Scooter   Lives with - Patient's Self Care Capacity Spouse;Adult Children   Comments Pt unable to provide PLOF, obtained from chart review/OT's note   Prior Level of Functional Mobility   Bed Mobility Independent   Transfer Status Required Assist   Ambulation Other (Comments)  (pt not ambulatory)   Assistive Devices Used Wheelchair   Wheelchair Unable To Determine At This Time   Cognition    Cognition / Consciousness X   Speech/ Communication Delayed Responses;Word Finding Impairment   Level of Consciousness Alert   Ability To Follow Commands 1 Step   New Learning Impaired   Attention Impaired   Comments word find impairment, pt frustrated with speech, answers yes/no questions   Active ROM Lower Body    Active ROM Lower Body  WDL   Strength Lower Body   Lower Body Strength  X   Gross Strength Generalized Weakness, Equal Bilaterally   Comments grossly 3/5   Lower Body Muscle Tone   Lower Body Muscle Tone  WDL   Balance Assessment   Sitting Balance (Static) Fair   Sitting Balance (Dynamic) Fair   Standing Balance (Static) Poor +   Standing Balance (Dynamic) Poor   Weight Shift Sitting Fair   Weight Shift Standing Poor   Comments HHA   Bed Mobility    Supine to Sit Standby Assist   Sit to Supine Standby Assist   Scooting Standby Assist   Comments bed flat   Gait Analysis   Comments pt not ambulatory at baseline   Functional Mobility   Sit to Stand Moderate Assist   Bed, Chair, Wheelchair Transfer Moderate Assist   Transfer Method Squat Pivot   Mobility supine > EOB > squat pivot to chair > squat pivot back to bed   6 Clicks Assessment - How much HELP from from another person do you currently need... (If the patient hasn't done an activity recently, how much help from another person do you think he/she would need if he/she tried?)   Turning from your back to your side while in a flat bed without using bedrails? 4   Moving from lying on your back to sitting on the side of a flat bed without using  bedrails? 4   Moving to and from a bed to a chair (including a wheelchair)? 2   Standing up from a chair using your arms (e.g., wheelchair, or bedside chair)? 2   Walking in hospital room? 1   Climbing 3-5 steps with a railing? 1   6 clicks Mobility Score 14   Education Group   Education Provided Role of Physical Therapist   Role of Physical Therapist Patient Response Patient;Acceptance;Explanation;Verbal Demonstration   Physical Therapy Initial Treatment Plan    Duration Evaluation only   Problem List    Problems Impaired Transfers;Functional Strength Deficit;Decreased Activity Tolerance;Safety Awareness Deficits / Cognition   Anticipated Discharge Equipment and Recommendations   DC Equipment Recommendations None   Discharge Recommendations Recommend home health for continued physical therapy services   Interdisciplinary Plan of Care Collaboration   IDT Collaboration with  Nursing   Patient Position at End of Therapy In Bed;Bed Alarm On;Call Light within Reach;Tray Table within Reach;Phone within Reach   Collaboration Comments RN updated   Session Information   Date / Session Number  4/30 - eval only

## 2024-04-30 NOTE — PROGRESS NOTES
Hospital Medicine Daily Progress Note    Date of Service  4/29/2024    Chief Complaint  Fouzia Satnamaria is a 63 y.o. female admitted 4/27/2024 with abdominal pain with nausea and vomiting and constipation.    Hospital Course  Fouzia Santamaria is a 63 y.o. female who presented 4/27/2024 with abdominal pain with constipation and nausea vomiting.  This is a pleasant woman with a history of HFrEF 35%, COPD, diabetes, hypertension, and previous strokes, who developed abdominal pain 10 days ago in the emergency room and was subsequent discharged home.  She presents with 2 days of bodyaches as well as worsening constipation nausea vomiting.  She had a leukocytosis with white count of 15.1 with a left shift.  Chest x-ray showed mild pulmonary edema.  Abdominal x-ray showed significant amount of stool burden.    Interval Problem Update  4/28/2024  Abdominal x-ray per my read shows significant amount of stool burden.  I am concerned about stool impaction and have increased her MiraLAX to 3 times a day and docusate twice a day.  Given her elevated NT proBNP of 28,000 in setting of HFpEF 35%, have discontinued patient's IV fluids.  I discontinued erythromycin as I do not believe that the patient has commune acquired pneumonia.  I have added metronidazole for GI source of colitis in addition to her ceftriaxone.      Abdominal x-ray per my read shows significant amount of stool burden within the ascending colon concerning for impaction.    4/29.  Patient seen and examine at bedside  No acute events overnight.  Continue treating cough, wheezing, possible pneumonia (antibiotics, breathing trmts) and colitis  Acute transaminitis. Probably congestive hepatopathy but ordred EBV, viral hepatitis panel to start and stopped Tylenol  Echo came back EF 15%. Was 35% 2 years ago. CHF worsened, pul edema on CXR yesterday, no swelling. Cardiology consulted. started Lasix and BB. Cr- 1.3-1.4 will trend with diuretics, avoid starting  ACEI/ARB, spironolactone and Farxiga for now  Med rec to be reconciled as patient apparently taking ACEI.  Mild crackles on auscultation. She is older appearing.    I reviewed the chart along with vitals, labs, imaging, test (both pending and resulted) and recommendations from specialists and interdisciplinary team.    I have discussed this patient's plan of care and discharge plan at IDT rounds today with Case Management, Nursing, Nursing leadership, and other members of the IDT team.    Consultants/Specialty  none    Code Status  Full Code    Disposition  The patient is not medically cleared for discharge to home or a post-acute facility.      I have placed the appropriate orders for post-discharge needs.    Review of Systems  Review of Systems   Constitutional:  Negative for chills and fever.   Respiratory:  Positive for cough. Negative for sputum production and shortness of breath.    Cardiovascular:  Negative for chest pain.   Gastrointestinal:  Positive for abdominal pain, constipation, nausea and vomiting.        Physical Exam  Temp:  [36.2 °C (97.2 °F)-36.4 °C (97.5 °F)] 36.4 °C (97.5 °F)  Pulse:  [] 82  Resp:  [16-20] 17  BP: ()/() 138/100  SpO2:  [95 %-97 %] 96 %    Physical Exam  Vitals and nursing note reviewed.   Constitutional:       Appearance: Normal appearance. She is underweight. She is not ill-appearing or diaphoretic.   HENT:      Head: Normocephalic and atraumatic.      Mouth/Throat:      Mouth: Mucous membranes are moist.      Pharynx: Oropharynx is clear. No oropharyngeal exudate.   Eyes:      General:         Right eye: No discharge.         Left eye: No discharge.      Conjunctiva/sclera: Conjunctivae normal.      Pupils: Pupils are equal, round, and reactive to light.   Cardiovascular:      Rate and Rhythm: Normal rate and regular rhythm.      Pulses: Normal pulses.      Heart sounds: Normal heart sounds. No murmur heard.  Pulmonary:      Effort: Pulmonary effort is  normal. No respiratory distress.      Breath sounds: Normal breath sounds.   Abdominal:      General: Abdomen is flat. Bowel sounds are normal. There is no distension.      Palpations: Abdomen is soft.      Tenderness: There is abdominal tenderness (Right lower quadrant).   Musculoskeletal:      Cervical back: Neck supple. No tenderness.      Right lower leg: No edema.      Left lower leg: No edema.   Neurological:      Mental Status: She is alert and oriented to person, place, and time.      Motor: No weakness.      Comments: Slurred and broken speech.  Is alert and orient x 3.  States that she has had multiple strokes in the past.   Psychiatric:         Thought Content: Thought content normal.         Judgment: Judgment normal.         Fluids    Intake/Output Summary (Last 24 hours) at 4/29/2024 1748  Last data filed at 4/29/2024 1000  Gross per 24 hour   Intake 120 ml   Output --   Net 120 ml       Laboratory  Recent Labs     04/27/24  1839 04/28/24  0038 04/29/24  0523   WBC 15.1* 15.8* 10.7   RBC 3.66* 3.74* 3.79*   HEMOGLOBIN 10.0* 10.1* 10.3*   HEMATOCRIT 31.2* 32.1* 32.7*   MCV 85.2 85.8 86.3   MCH 27.3 27.0 27.2   MCHC 32.1* 31.5* 31.5*   RDW 49.0 49.9 50.2*   PLATELETCT 303 306 323   MPV 9.8 10.3 10.2     Recent Labs     04/27/24  1717 04/28/24  0038 04/29/24  0523   SODIUM 134* 137 138   POTASSIUM 3.4* 3.2* 5.8*   CHLORIDE 99 103 104   CO2 18* 18* 15*   GLUCOSE 112* 133* 185*   BUN 18 17 21   CREATININE 1.23 1.26 1.33   CALCIUM 8.2* 8.3* 8.7                   Imaging  EC-ECHOCARDIOGRAM COMPLETE W/ CONT   Final Result      DX-CHEST-LIMITED (1 VIEW)   Final Result      1.  Enlarged cardiac silhouette with changes of pulmonary edema.      [id number]-4 VIEW   Final Result      1.  Nonobstructive bowel gas pattern with a large amount of colonic stool.           Assessment/Plan  * Impacted stool in intestine (HCC)- (present on admission)  Assessment & Plan  4/28/2024  As per abdominal x-ray with concern for  acute colitis.  Possible translocation of bacteria.    Continue IV ceftriaxone  Start addition of IV metronidazole for GI coverage.  Scheduled MiraLAX 3 times a day and docusate twice a day    Ischemic cardiomyopathy  Assessment & Plan  Pulmonary edema, ordered Lasix. Currently not hypoxic  Cardiology consult for worsening EF  BB and Lasix. Trend Cr- before starting CCEI/ARB, spironolactone, Entresto and/or Farxiga    Colitis- (present on admission)  Assessment & Plan  4/28/2024  Elevated white count with abdominal pain is concerning for colitis.  Related to patient's impacted stool from the intestine.    Continue IV antibiotics as well as aggressive stool softeners and bowel regimen as per above.  I have discontinued patient's IV fluids given her history of HFrEF and also elevated NT proBNP.    Downgrade to PO antibiotics total course 5days  Outpatient GI follow up for colonsocopy to document resolution    Constipation- (present on admission)  Assessment & Plan  Bowel regimen    4/28/2024  I have increased patient's MiraLAX to 3 times a day and docusate twice a day.    Hypokalemia- (present on admission)  Assessment & Plan  Replace    Community acquired pneumonia, unspecified laterality- (present on admission)  Assessment & Plan  Reports having flulike symptoms for the last few days, her son was sick prior with similar symptoms  COVID/flu/RSV negative  Rocephin/azithromycin  Fluids  Chest x-ray shows possible mild edema, no acute consolidation noted  BNP    4/28/2024  Community-acquired pneumonia has been ruled out.  I am discontinuing patient's azithromycin.           VTE prophylaxis: Lovenox ppx    I have performed a physical exam and reviewed and updated ROS and Plan today (4/29/2024). In review of yesterday's note (4/28/2024), there are no changes except as documented above.      Greater than 65 minutes spent prepping to see patient (e.g. review of tests) obtaining and/or reviewing separately obtained history.  Performing a medically appropriate examination and evaluation.  Counseling and educating the patient/family/caregiver.  Ordering medications, tests, or procedures.  Referring and communicating with other health care professionals.  Documenting clinical information in EPIC.  Independently interpreting results and communicating results to patient/family/caregiver.  Care coordination.

## 2024-04-30 NOTE — PROGRESS NOTES
Hospital Medicine Daily Progress Note    Date of Service  4/30/2024    Chief Complaint  Fouzia Santamaria is a 63 y.o. female admitted 4/27/2024 with abdominal pain with nausea and vomiting and constipation.    Hospital Course  Fouzia Santamaria is a 63 y.o. female who presented 4/27/2024 with abdominal pain with constipation and nausea vomiting.  This is a pleasant woman with a history of HFrEF 35%, COPD, diabetes, hypertension, and previous strokes, who developed abdominal pain 10 days ago in the emergency room and was subsequent discharged home.  She presents with 2 days of bodyaches as well as worsening constipation nausea vomiting.  She had a leukocytosis with white count of 15.1 with a left shift.  Chest x-ray showed mild pulmonary edema.  Abdominal x-ray showed significant amount of stool burden.    Interval Problem Update  Patient was seen and examined at bedside.  No acute events overnight. Patient is resting comfortably in bed and in no acute distress. Family updated at bedside.    AST 2739  ALT 1024  Cr 1.64  BNP 38953  IV lasix TID    I reviewed the chart along with vitals, labs, imaging, test (both pending and resulted) and recommendations from specialists and interdisciplinary team.    I have discussed this patient's plan of care and discharge plan at IDT rounds today with Case Management, Nursing, Nursing leadership, and other members of the IDT team.    Consultants/Specialty  none    Code Status  Full Code    Disposition  The patient is not medically cleared for discharge to home or a post-acute facility.      I have placed the appropriate orders for post-discharge needs.    Review of Systems  Review of Systems   Constitutional:  Negative for chills and fever.   Respiratory:  Positive for cough. Negative for sputum production and shortness of breath.    Cardiovascular:  Negative for chest pain.   Gastrointestinal:  Positive for abdominal pain, constipation, nausea and vomiting.        Physical  Exam  Temp:  [36.2 °C (97.2 °F)-36.7 °C (98 °F)] 36.3 °C (97.4 °F)  Pulse:  [66-82] 68  Resp:  [16-19] 18  BP: ()/() 149/104  SpO2:  [96 %-100 %] 98 %    Physical Exam  Vitals and nursing note reviewed.   Constitutional:       General: She is not in acute distress.     Appearance: Normal appearance. She is underweight. She is not ill-appearing.   HENT:      Head: Normocephalic and atraumatic.      Right Ear: External ear normal.      Nose: Congestion present.      Mouth/Throat:      Pharynx: Oropharynx is clear. No oropharyngeal exudate.   Eyes:      General: No scleral icterus.     Conjunctiva/sclera: Conjunctivae normal.   Cardiovascular:      Rate and Rhythm: Normal rate and regular rhythm.      Pulses: Normal pulses.      Heart sounds: Normal heart sounds. No murmur heard.  Pulmonary:      Effort: Pulmonary effort is normal.      Breath sounds: No wheezing.      Comments: Crackles in lower lung base  Abdominal:      General: Abdomen is flat.      Palpations: Abdomen is soft.      Tenderness: There is no abdominal tenderness. There is no guarding or rebound.   Musculoskeletal:         General: No swelling or deformity.      Cervical back: Neck supple. No tenderness.   Skin:     Coloration: Skin is not jaundiced.      Findings: No bruising.   Neurological:      General: No focal deficit present.      Mental Status: She is alert and oriented to person, place, and time.      Motor: No weakness.      Comments: Slurred and broken speech.  Is alert and orient x 3.  States that she has had multiple strokes in the past.   Psychiatric:         Mood and Affect: Mood normal.         Behavior: Behavior normal.         Fluids  No intake or output data in the 24 hours ending 04/30/24 1351      Laboratory  Recent Labs     04/28/24  0038 04/29/24  0523 04/30/24  0357   WBC 15.8* 10.7 12.4*   RBC 3.74* 3.79* 3.67*   HEMOGLOBIN 10.1* 10.3* 9.9*   HEMATOCRIT 32.1* 32.7* 31.7*   MCV 85.8 86.3 86.4   MCH 27.0 27.2 27.0    MCHC 31.5* 31.5* 31.2*   RDW 49.9 50.2* 50.7*   PLATELETCT 306 323 191   MPV 10.3 10.2 10.5     Recent Labs     04/28/24  0038 04/29/24  0523 04/30/24  0357   SODIUM 137 138 135   POTASSIUM 3.2* 5.8* 5.4   CHLORIDE 103 104 100   CO2 18* 15* 15*   GLUCOSE 133* 185* 115*   BUN 17 21 28*   CREATININE 1.26 1.33 1.64*   CALCIUM 8.3* 8.7 8.5                   Imaging  EC-ECHOCARDIOGRAM COMPLETE W/ CONT   Final Result      DX-CHEST-LIMITED (1 VIEW)   Final Result      1.  Enlarged cardiac silhouette with changes of pulmonary edema.      NU-HAEDARP-5 VIEW   Final Result      1.  Nonobstructive bowel gas pattern with a large amount of colonic stool.           Assessment/Plan  * Heart failure with reduced ejection fraction (HCC)  Assessment & Plan  Pulmonary edema, ordered Lasix. Currently not hypoxic  Cardiology consult noted  BB and Lasix.   Trend Cr- before starting CCEI/ARB, spironolactone, Entresto and/or Farxiga    Transaminitis  Assessment & Plan  Congestive hepatopathy ? EF 15%  Elevated BNP   AST 2739  ALT 1024  Cr 1.64  BNP 34976  Hepatitis panel negative  EBG pending  IV diuresis    Severe mitral regurgitation  Assessment & Plan  Per echo    Impacted stool in intestine (HCC)- (present on admission)  Assessment & Plan  Bowel regimen  Benign abdominal exam    Constipation- (present on admission)  Assessment & Plan  Bowel regimen    4/28/2024  I have increased patient's MiraLAX to 3 times a day and docusate twice a day.    Hypokalemia- (present on admission)  Assessment & Plan  Replace    Colitis- (present on admission)  Assessment & Plan  Ruled out  CTAP reviewed    Community acquired pneumonia, unspecified laterality- (present on admission)  Assessment & Plan  Ruled out           VTE prophylaxis: Lovenox ppx    I have performed a physical exam and reviewed and updated ROS and Plan today (4/30/2024). In review of yesterday's note (4/29/2024), there are no changes except as documented above.    Greater than 51  minutes spent prepping to see patient (e.g. review of tests) obtaining and/or reviewing separately obtained history. Performing a medically appropriate examination and/ evaluation.  Counseling and educating the patient/family/caregiver.  Ordering medications, tests, or procedures.  Referring and communicating with other health care professionals.  Documenting clinical information in EPIC.  Independently interpreting results and communicating results to patient/family/caregiver.  Care coordination.

## 2024-04-30 NOTE — CARE PLAN
The patient is Stable - Low risk of patient condition declining or worsening    Shift Goals  Clinical Goals: Pain control  Patient Goals: Rest  Family Goals: AFIA    Progress made toward(s) clinical / shift goals:      Patient is not progressing towards the following goals:

## 2024-05-01 ENCOUNTER — APPOINTMENT (OUTPATIENT)
Dept: RADIOLOGY | Facility: MEDICAL CENTER | Age: 64
DRG: 280 | End: 2024-05-01
Payer: MEDICAID

## 2024-05-01 PROBLEM — E87.5 HYPERKALEMIA: Status: ACTIVE | Noted: 2024-04-27

## 2024-05-01 PROBLEM — N17.9 AKI (ACUTE KIDNEY INJURY) (HCC): Status: ACTIVE | Noted: 2024-05-01

## 2024-05-01 PROBLEM — D68.9 COAGULOPATHY (HCC): Status: ACTIVE | Noted: 2024-05-01

## 2024-05-01 PROBLEM — I50.41 CHF (CONGESTIVE HEART FAILURE), NYHA CLASS III, ACUTE, COMBINED (HCC): Status: ACTIVE | Noted: 2024-05-01

## 2024-05-01 LAB
ALBUMIN SERPL BCP-MCNC: 3.2 G/DL (ref 3.2–4.9)
ALBUMIN SERPL BCP-MCNC: 3.3 G/DL (ref 3.2–4.9)
ALBUMIN SERPL BCP-MCNC: 3.3 G/DL (ref 3.2–4.9)
ALBUMIN/GLOB SERPL: 0.8 G/DL
ALP SERPL-CCNC: 184 U/L (ref 30–99)
ALT SERPL-CCNC: 1377 U/L (ref 2–50)
AMMONIA PLAS-SCNC: 19 UMOL/L (ref 11–45)
ANION GAP SERPL CALC-SCNC: 26 MMOL/L (ref 7–16)
APPEARANCE UR: ABNORMAL
AST SERPL-CCNC: 2959 U/L (ref 12–45)
BACTERIA #/AREA URNS HPF: NEGATIVE /HPF
BASOPHILS # BLD AUTO: 0.5 % (ref 0–1.8)
BASOPHILS # BLD: 0.08 K/UL (ref 0–0.12)
BILIRUB SERPL-MCNC: 1.1 MG/DL (ref 0.1–1.5)
BILIRUB UR QL STRIP.AUTO: ABNORMAL
BUN SERPL-MCNC: 52 MG/DL (ref 8–22)
BUN SERPL-MCNC: 52 MG/DL (ref 8–22)
BUN SERPL-MCNC: 58 MG/DL (ref 8–22)
CA-I SERPL-SCNC: 1 MMOL/L (ref 1.1–1.3)
CALCIUM ALBUM COR SERPL-MCNC: 8.3 MG/DL (ref 8.5–10.5)
CALCIUM ALBUM COR SERPL-MCNC: 8.9 MG/DL (ref 8.5–10.5)
CALCIUM ALBUM COR SERPL-MCNC: 8.9 MG/DL (ref 8.5–10.5)
CALCIUM SERPL-MCNC: 7.7 MG/DL (ref 8.5–10.5)
CALCIUM SERPL-MCNC: 8.3 MG/DL (ref 8.5–10.5)
CALCIUM SERPL-MCNC: 8.3 MG/DL (ref 8.5–10.5)
CHLORIDE SERPL-SCNC: 96 MMOL/L (ref 96–112)
CHLORIDE SERPL-SCNC: 97 MMOL/L (ref 96–112)
CHLORIDE SERPL-SCNC: 97 MMOL/L (ref 96–112)
CK SERPL-CCNC: 630 U/L (ref 0–154)
CO2 SERPL-SCNC: 12 MMOL/L (ref 20–33)
CO2 SERPL-SCNC: 14 MMOL/L (ref 20–33)
CO2 SERPL-SCNC: 15 MMOL/L (ref 20–33)
COLOR UR: ABNORMAL
CREAT SERPL-MCNC: 3.19 MG/DL (ref 0.5–1.4)
CREAT SERPL-MCNC: 3.22 MG/DL (ref 0.5–1.4)
CREAT SERPL-MCNC: 3.99 MG/DL (ref 0.5–1.4)
CREAT UR-MCNC: 111.65 MG/DL
EOSINOPHIL # BLD AUTO: 0.04 K/UL (ref 0–0.51)
EOSINOPHIL NFR BLD: 0.2 % (ref 0–6.9)
EPI CELLS #/AREA URNS HPF: ABNORMAL /HPF
ERYTHROCYTE [DISTWIDTH] IN BLOOD BY AUTOMATED COUNT: 51.5 FL (ref 35.9–50)
GFR SERPLBLD CREATININE-BSD FMLA CKD-EPI: 12 ML/MIN/1.73 M 2
GFR SERPLBLD CREATININE-BSD FMLA CKD-EPI: 16 ML/MIN/1.73 M 2
GFR SERPLBLD CREATININE-BSD FMLA CKD-EPI: 16 ML/MIN/1.73 M 2
GLOBULIN SER CALC-MCNC: 4.2 G/DL (ref 1.9–3.5)
GLUCOSE SERPL-MCNC: 104 MG/DL (ref 65–99)
GLUCOSE SERPL-MCNC: 87 MG/DL (ref 65–99)
GLUCOSE SERPL-MCNC: 89 MG/DL (ref 65–99)
GLUCOSE UR STRIP.AUTO-MCNC: NEGATIVE MG/DL
HCT VFR BLD AUTO: 36 % (ref 37–47)
HGB BLD-MCNC: 11.2 G/DL (ref 12–16)
HYALINE CASTS #/AREA URNS LPF: ABNORMAL /LPF
IMM GRANULOCYTES # BLD AUTO: 0.41 K/UL (ref 0–0.11)
IMM GRANULOCYTES NFR BLD AUTO: 2.5 % (ref 0–0.9)
INR PPP: 3.14 (ref 0.87–1.13)
KETONES UR STRIP.AUTO-MCNC: ABNORMAL MG/DL
LACTATE SERPL-SCNC: 2.9 MMOL/L (ref 0.5–2)
LEUKOCYTE ESTERASE UR QL STRIP.AUTO: ABNORMAL
LYMPHOCYTES # BLD AUTO: 2.2 K/UL (ref 1–4.8)
LYMPHOCYTES NFR BLD: 13.5 % (ref 22–41)
MAGNESIUM SERPL-MCNC: 2.7 MG/DL (ref 1.5–2.5)
MCH RBC QN AUTO: 26.9 PG (ref 27–33)
MCHC RBC AUTO-ENTMCNC: 31.1 G/DL (ref 32.2–35.5)
MCV RBC AUTO: 86.3 FL (ref 81.4–97.8)
MICRO URNS: ABNORMAL
MONOCYTES # BLD AUTO: 1.16 K/UL (ref 0–0.85)
MONOCYTES NFR BLD AUTO: 7.1 % (ref 0–13.4)
NEUTROPHILS # BLD AUTO: 12.41 K/UL (ref 1.82–7.42)
NEUTROPHILS NFR BLD: 76.2 % (ref 44–72)
NITRITE UR QL STRIP.AUTO: POSITIVE
NRBC # BLD AUTO: 0.3 K/UL
NRBC BLD-RTO: 1.8 /100 WBC (ref 0–0.2)
PH UR STRIP.AUTO: 5 [PH] (ref 5–8)
PHOSPHATE SERPL-MCNC: 5.2 MG/DL (ref 2.5–4.5)
PHOSPHATE SERPL-MCNC: 6.4 MG/DL (ref 2.5–4.5)
PLATELET # BLD AUTO: 79 K/UL (ref 164–446)
PLATELET BLD QL SMEAR: NORMAL
PLATELETS.RETICULATED NFR BLD AUTO: 8 % (ref 0.6–13.1)
PMV BLD AUTO: 9.7 FL (ref 9–12.9)
POTASSIUM SERPL-SCNC: 5 MMOL/L (ref 3.6–5.5)
POTASSIUM SERPL-SCNC: 5.7 MMOL/L (ref 3.6–5.5)
POTASSIUM SERPL-SCNC: 5.8 MMOL/L (ref 3.6–5.5)
PROCALCITONIN SERPL-MCNC: 2.16 NG/ML
PROT SERPL-MCNC: 7.5 G/DL (ref 6–8.2)
PROT UR QL STRIP: 300 MG/DL
PROTHROMBIN TIME: 32.8 SEC (ref 12–14.6)
RBC # BLD AUTO: 4.17 M/UL (ref 4.2–5.4)
RBC # URNS HPF: ABNORMAL /HPF
RBC UR QL AUTO: ABNORMAL
RENAL EPI CELLS #/AREA URNS HPF: ABNORMAL /HPF
SODIUM SERPL-SCNC: 133 MMOL/L (ref 135–145)
SODIUM SERPL-SCNC: 134 MMOL/L (ref 135–145)
SODIUM SERPL-SCNC: 135 MMOL/L (ref 135–145)
SODIUM UR-SCNC: 52 MMOL/L
SP GR UR STRIP.AUTO: 1.02
UROBILINOGEN UR STRIP.AUTO-MCNC: 0.2 MG/DL
WBC # BLD AUTO: 16.3 K/UL (ref 4.8–10.8)
WBC #/AREA URNS HPF: ABNORMAL /HPF

## 2024-05-01 PROCEDURE — 99233 SBSQ HOSP IP/OBS HIGH 50: CPT | Performed by: INTERNAL MEDICINE

## 2024-05-01 PROCEDURE — 99292 CRITICAL CARE ADDL 30 MIN: CPT | Performed by: INTERNAL MEDICINE

## 2024-05-01 PROCEDURE — 99291 CRITICAL CARE FIRST HOUR: CPT | Performed by: INTERNAL MEDICINE

## 2024-05-01 RX ORDER — AMOXICILLIN AND CLAVULANATE POTASSIUM 500; 125 MG/1; MG/1
1 TABLET, FILM COATED ORAL EVERY 12 HOURS
Status: COMPLETED | OUTPATIENT
Start: 2024-05-01 | End: 2024-05-01

## 2024-05-01 RX ORDER — FUROSEMIDE 10 MG/ML
80 INJECTION INTRAMUSCULAR; INTRAVENOUS ONCE
Status: COMPLETED | OUTPATIENT
Start: 2024-05-02 | End: 2024-05-02

## 2024-05-01 RX ORDER — HEPARIN SODIUM 5000 [USP'U]/ML
5000 INJECTION, SOLUTION INTRAVENOUS; SUBCUTANEOUS EVERY 8 HOURS
Status: DISCONTINUED | OUTPATIENT
Start: 2024-05-01 | End: 2024-05-02

## 2024-05-01 RX ORDER — DOBUTAMINE HYDROCHLORIDE 100 MG/100ML
0-20 INJECTION INTRAVENOUS CONTINUOUS
Status: DISCONTINUED | OUTPATIENT
Start: 2024-05-01 | End: 2024-05-01

## 2024-05-01 RX ORDER — HEPARIN SODIUM 5000 [USP'U]/ML
5000 INJECTION, SOLUTION INTRAVENOUS; SUBCUTANEOUS EVERY 8 HOURS
Status: DISCONTINUED | OUTPATIENT
Start: 2024-05-01 | End: 2024-05-01

## 2024-05-01 RX ORDER — DOBUTAMINE HYDROCHLORIDE 100 MG/100ML
2.5 INJECTION INTRAVENOUS CONTINUOUS
Status: DISCONTINUED | OUTPATIENT
Start: 2024-05-01 | End: 2024-05-02

## 2024-05-01 RX ORDER — METRONIDAZOLE 500 MG/100ML
500 INJECTION, SOLUTION INTRAVENOUS EVERY 12 HOURS
Status: DISCONTINUED | OUTPATIENT
Start: 2024-05-02 | End: 2024-05-02

## 2024-05-01 RX ADMIN — PRAMIPEXOLE DIHYDROCHLORIDE 0.12 MG: 0.12 TABLET ORAL at 09:10

## 2024-05-01 RX ADMIN — DOCUSATE SODIUM 100 MG: 100 CAPSULE, LIQUID FILLED ORAL at 17:36

## 2024-05-01 RX ADMIN — DOBUTAMINE HYDROCHLORIDE 2.5 MCG/KG/MIN: 100 INJECTION INTRAVENOUS at 21:37

## 2024-05-01 RX ADMIN — DOCUSATE SODIUM 100 MG: 100 CAPSULE, LIQUID FILLED ORAL at 04:59

## 2024-05-01 RX ADMIN — SENNOSIDES AND DOCUSATE SODIUM 2 TABLET: 50; 8.6 TABLET ORAL at 17:35

## 2024-05-01 RX ADMIN — PRAMIPEXOLE DIHYDROCHLORIDE 0.12 MG: 0.12 TABLET ORAL at 15:56

## 2024-05-01 RX ADMIN — VENLAFAXINE HYDROCHLORIDE 37.5 MG: 37.5 CAPSULE, EXTENDED RELEASE ORAL at 04:59

## 2024-05-01 RX ADMIN — AMOXICILLIN AND CLAVULANATE POTASSIUM 1 TABLET: 875; 125 TABLET, FILM COATED ORAL at 04:58

## 2024-05-01 RX ADMIN — HEPARIN SODIUM 5000 UNITS: 5000 INJECTION, SOLUTION INTRAVENOUS; SUBCUTANEOUS at 22:15

## 2024-05-01 RX ADMIN — POLYETHYLENE GLYCOL 3350 1 PACKET: 17 POWDER, FOR SOLUTION ORAL at 09:00

## 2024-05-01 RX ADMIN — AMOXICILLIN AND CLAVULANATE POTASSIUM 1 TABLET: 500; 125 TABLET, FILM COATED ORAL at 17:35

## 2024-05-01 RX ADMIN — FUROSEMIDE 40 MG: 10 INJECTION INTRAMUSCULAR; INTRAVENOUS at 04:59

## 2024-05-01 RX ADMIN — DEXTROSE AND SODIUM CHLORIDE: 5; 450 INJECTION, SOLUTION INTRAVENOUS at 09:14

## 2024-05-01 RX ADMIN — METOPROLOL SUCCINATE 25 MG: 25 TABLET, FILM COATED, EXTENDED RELEASE ORAL at 04:59

## 2024-05-01 ASSESSMENT — ENCOUNTER SYMPTOMS
SPUTUM PRODUCTION: 0
PND: 0
PALPITATIONS: 0
BLOOD IN STOOL: 0
NAUSEA: 0
HEADACHES: 0
VOMITING: 0
SPUTUM PRODUCTION: 1
FEVER: 0
VOMITING: 1
ORTHOPNEA: 0
CHILLS: 0
CONSTIPATION: 1
COUGH: 1
NAUSEA: 1
ABDOMINAL PAIN: 1
SHORTNESS OF BREATH: 0

## 2024-05-01 ASSESSMENT — FIBROSIS 4 INDEX: FIB4 SCORE: 61.57

## 2024-05-01 ASSESSMENT — PAIN DESCRIPTION - PAIN TYPE: TYPE: ACUTE PAIN

## 2024-05-01 NOTE — DISCHARGE PLANNING
Received Choice form at 0718  Agency/Facility Name: [facility]   Referral sent per Choice form @ 1068     @2489  Per epic response, [facility] has accepted the home health referral.

## 2024-05-01 NOTE — DIETARY
Nutrition Update:    Day 4 of admit.  Fouzia Santamaria is a 63 y.o. female with admitting DX of Community acquired pneumonia, unspecified laterality [J18.9].  Patient being followed to optimize nutrition.    Current Diet: Regular    PO intake per ADLs since initial RD assessment on 4/28 has been 0% x 3 meals, <25% x 1 meal, and most recently % for dinner last night.    Pt with ongoing abdominal pain that decreases with medication.     Continue to encourage and record PO intake    Problem: Nutritional:  Goal: Achieve adequate nutritional intake  Description: Patient will consume >50% of meals  Outcome: not met      RD following

## 2024-05-01 NOTE — PROGRESS NOTES
Hospital Medicine Daily Progress Note    Date of Service  5/1/2024    Chief Complaint  Fouzia Santamaria is a 63 y.o. female admitted 4/27/2024 with abdominal pain with nausea and vomiting and constipation.    Hospital Course  Fouzia Santamaria is a 63 y.o. female who presented 4/27/2024 with abdominal pain with constipation and nausea vomiting.  This is a pleasant woman with a history of HFrEF 35%, COPD, diabetes, hypertension, and previous strokes, who developed abdominal pain 10 days ago in the emergency room and was subsequent discharged home.  She presents with 2 days of bodyaches as well as worsening constipation nausea vomiting.  She had a leukocytosis with white count of 15.1 with a left shift.  Chest x-ray showed mild pulmonary edema.  Abdominal x-ray showed significant amount of stool burden.    Interval Problem Update  Patient was seen and examined at bedside.  No acute events overnight. Patient is resting comfortably in bed and in no acute distress. Family updated at bedside.    AST 2959  ALT 1377  Cr 1.64 --> 3.22  IV fluids  Await EBV studies  Rhinovirus/enterovirus positive      I reviewed the chart along with vitals, labs, imaging, test (both pending and resulted) and recommendations from specialists and interdisciplinary team.    I have discussed this patient's plan of care and discharge plan at IDT rounds today with Case Management, Nursing, Nursing leadership, and other members of the IDT team.    Consultants/Specialty  none    Code Status  Full Code    Disposition  Medically Cleared  I have placed the appropriate orders for post-discharge needs.    Review of Systems  Review of Systems   Constitutional:  Negative for chills and fever.   Respiratory:  Positive for cough. Negative for sputum production and shortness of breath.    Cardiovascular:  Negative for chest pain.   Gastrointestinal:  Positive for abdominal pain, constipation, nausea and vomiting.        Physical Exam  Temp:  [36 °C (96.8  °F)-36.2 °C (97.2 °F)] 36.2 °C (97.2 °F)  Pulse:  [60-64] 60  Resp:  [17-19] 18  BP: (134-146)/() 146/80  SpO2:  [92 %-100 %] 100 %    Physical Exam  Vitals and nursing note reviewed.   Constitutional:       General: She is not in acute distress.     Appearance: Normal appearance. She is underweight. She is not ill-appearing.   HENT:      Head: Normocephalic and atraumatic.      Right Ear: External ear normal.      Nose: Congestion present.      Mouth/Throat:      Pharynx: Oropharynx is clear. No oropharyngeal exudate.   Eyes:      General: No scleral icterus.     Conjunctiva/sclera: Conjunctivae normal.   Cardiovascular:      Rate and Rhythm: Normal rate and regular rhythm.      Pulses: Normal pulses.      Heart sounds: Normal heart sounds. No murmur heard.  Pulmonary:      Effort: Pulmonary effort is normal.      Breath sounds: No wheezing.      Comments: Crackles in lower lung base  Abdominal:      General: Abdomen is flat.      Palpations: Abdomen is soft.      Tenderness: There is no abdominal tenderness. There is no guarding or rebound.   Musculoskeletal:         General: No swelling or deformity.      Cervical back: Neck supple. No tenderness.   Skin:     Coloration: Skin is not jaundiced.      Findings: No bruising.   Neurological:      Mental Status: She is alert.      Motor: No weakness.      Comments: Slurred and broken speech   Dysarthria, dysphasia   Psychiatric:         Mood and Affect: Mood normal.         Behavior: Behavior normal.         Fluids    Intake/Output Summary (Last 24 hours) at 5/1/2024 1456  Last data filed at 5/1/2024 0950  Gross per 24 hour   Intake 440 ml   Output --   Net 440 ml         Laboratory  Recent Labs     04/29/24  0523 04/30/24  0357 05/01/24  0736   WBC 10.7 12.4* 16.3*   RBC 3.79* 3.67* 4.17*   HEMOGLOBIN 10.3* 9.9* 11.2*   HEMATOCRIT 32.7* 31.7* 36.0*   MCV 86.3 86.4 86.3   MCH 27.2 27.0 26.9*   MCHC 31.5* 31.2* 31.1*   RDW 50.2* 50.7* 51.5*   PLATELETCT 323 191  79*   MPV 10.2 10.5 9.7     Recent Labs     04/29/24  0523 04/30/24  0357 05/01/24  0601   SODIUM 138 135 135  134*   POTASSIUM 5.8* 5.4 5.8*  5.7*   CHLORIDE 104 100 97  96   CO2 15* 15* 12*  14*   GLUCOSE 185* 115* 89  87   BUN 21 28* 52*  52*   CREATININE 1.33 1.64* 3.19*  3.22*   CALCIUM 8.7 8.5 8.3*  8.3*                   Imaging  EC-ECHOCARDIOGRAM COMPLETE W/ CONT   Final Result      DX-CHEST-LIMITED (1 VIEW)   Final Result      1.  Enlarged cardiac silhouette with changes of pulmonary edema.      [id number] VIEW   Final Result      1.  Nonobstructive bowel gas pattern with a large amount of colonic stool.           Assessment/Plan  * Heart failure with reduced ejection fraction (HCC)  Assessment & Plan  Pulmonary edema, ordered Lasix. Currently not hypoxic  Cardiology consult noted  BB and Lasix.   Trend Cr- before starting CCEI/ARB, spironolactone, Entresto and/or Farxiga    Transaminitis  Assessment & Plan  Congestive hepatopathy ? EF 15%   Possibly viral in nature given rhino/enterovirus positive  Elevated BNP   AST 2959  ALT 1377  Cr 1.64 --> 3.22  BNP 59150  Hepatitis panel negative  EBG pending  No obvious offending medications to cause DILI      Severe mitral regurgitation  Assessment & Plan  Per echo    Impacted stool in intestine (HCC)- (present on admission)  Assessment & Plan  Bowel regimen  Benign abdominal exam    IVNO (acute kidney injury) (HCC)  Assessment & Plan  Cr 1.64 --> 3.22  Likely due to diuresis  Hold lasix  Gentle IV hydration    Constipation- (present on admission)  Assessment & Plan  Bowel regimen    4/28/2024  I have increased patient's MiraLAX to 3 times a day and docusate twice a day.    Hypokalemia- (present on admission)  Assessment & Plan  Replace    Colitis- (present on admission)  Assessment & Plan  Ruled out  CTAP reviewed    Community acquired pneumonia, unspecified laterality- (present on admission)  Assessment & Plan  Ruled out           VTE prophylaxis:  Lovenox ppx    I have performed a physical exam and reviewed and updated ROS and Plan today (5/1/2024). In review of yesterday's note (4/30/2024), there are no changes except as documented above.    Greater than 53 minutes spent prepping to see patient (e.g. review of tests) obtaining and/or reviewing separately obtained history. Performing a medically appropriate examination and/ evaluation.  Counseling and educating the patient/family/caregiver.  Ordering medications, tests, or procedures.  Referring and communicating with other health care professionals.  Documenting clinical information in EPIC.  Independently interpreting results and communicating results to patient/family/caregiver.  Care coordination.  .

## 2024-05-01 NOTE — DISCHARGE PLANNING
LVM for  . Notified MD that CM saw referral for HH services. Pt has Medicaid FFS. Faxed to [facility].    Addendum: Call back received from  stating that we can try any HH service but  is the 24/7 caregive. Pt apparently uses a wheelchair/scooter due to a stroke a couple years ago.  said he is retired so he stays home with pt. Choice faxed to [facility].     Explained to MD and to  that because pt has Medicaid FFS then pt may not be accepted based on insurance. But we will try.     Care Transition Team Assessment    Information Source  Orientation Level: Oriented to place, Oriented to person, Oriented to situation, Disoriented to time  Information Given By: Patient  Informant's Name: Basilio Adam    Elopement Risk  Legal Hold: No  Ambulatory or Self Mobile in Wheelchair: No-Not an Elopement Risk  Elopement Risk: Not at Risk for Elopement    Interdisciplinary Discharge Planning  Lives with - Patient's Self Care Capacity: (P) Spouse, Sibling  Patient or legal guardian wants to designate a caregiver: No  Support Systems: (P) Children, Family Member(s), Spouse / Significant Other  Housing / Facility: (P) 3 Story Apartment / Condo  Prior Services: Continuous (24 Hour) Care Giving Family  Patient Prefers to be Discharged to:: (P) Home  Durable Medical Equipment: (P) Walker, Other - Specify  DME Provider / Phone: wheelchair, scooter    Finances  Financial Barriers to Discharge: No  Prescription Coverage: Yes    Vision / Hearing Impairment  Vision Impairment : No  Hearing Impairment : No    Domestic Abuse  Have you ever been the victim of abuse or violence?: No  Physical Abuse or Sexual Abuse: No  Verbal Abuse or Emotional Abuse: No  Possible Abuse/Neglect Reported to:: Not Applicable    Anticipated Discharge Information  Discharge Disposition: D/T to home under HHA care in anticipation of covered skilled care (06)  Discharge Address: [address] [place] [id number]

## 2024-05-01 NOTE — CARE PLAN
The patient is Stable - Low risk of patient condition declining or worsening    Shift Goals  Clinical Goals: Pt to rest throughout the night.  Patient Goals: Pt to rest throughout the night  Family Goals: AFIA    Progress made toward(s) clinical / shift goals:    Problem: Pain - Standard  Goal: Alleviation of pain or a reduction in pain to the patient’s comfort goal  Outcome: Progressing   Pt reported 10 out of 10 pain on abdomen, after prn medication pt reported decrease in pain.     Problem: Knowledge Deficit - Standard  Goal: Patient and family/care givers will demonstrate understanding of plan of care, disease process/condition, diagnostic tests and medications  Outcome: Progressing   Pt and family aware of labs and what is being watched with AST and ALT.     Problem: Skin Integrity  Goal: Skin integrity is maintained or improved  Outcome: Progressing  Pt turned self throughout the night while sleeping.      Patient is not progressing towards the following goals:

## 2024-05-01 NOTE — PROGRESS NOTES
Assumed care of pt. Pt is alert/oriented x 3.    Lab called with critical result for ALT 1377 & AST 2959 at 0826. Critical lab result read back to .   Dr. Swartz notified of critical lab result at 0829.  Critical lab result read back by Dr. Swartz.

## 2024-05-01 NOTE — PROGRESS NOTES
Pt A&O x3 to self, place and situation. Pt had difficulty finding words to questions but when asked yes/ no questions or asked to point she was able to communicate better. Pt appeared frustrated with not being able to find words. Pt reported 10 out of 10 abdomen pain that decreased to 3 out of 10 after prn pain medication. Pt placed on droplet isolation after enterovirus/rhinovirus diagnosis. All needs met at this time.

## 2024-05-01 NOTE — PROGRESS NOTES
Cardiology Follow Up Progress Note    Date of Service  4/30/2024    Attending Physician  Steve Swartz D.O.    Chief Complaint   Heart failure with reduced ejection fraction    HPI  Fouzia Santamaria is a 63 y.o. 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  Patient notes that she is breathing significantly better and feeling significantly improved.    Review of Systems  Review of Systems    Vital signs in last 24 hours  Temp:  [36 °C (96.8 °F)-36.7 °C (98 °F)] 36 °C (96.8 °F)  Pulse:  [64-71] 64  Resp:  [16-19] 19  BP: (134-149)/() 140/100  SpO2:  [97 %-100 %] 97 %    Physical Exam  Physical Exam  Vitals and nursing note reviewed.   Constitutional:       Appearance: Normal appearance.   HENT:      Head: Normocephalic and atraumatic.      Nose: Nose normal.      Mouth/Throat:      Mouth: Mucous membranes are moist.      Pharynx: Oropharynx is clear.   Eyes:      Pupils: Pupils are equal, round, and reactive to light.   Cardiovascular:      Rate and Rhythm: Normal rate and regular rhythm.      Heart sounds: No murmur heard.     No friction rub. No gallop.   Pulmonary:      Effort: Pulmonary effort is normal.      Breath sounds: Rales present. No wheezing or rhonchi.   Abdominal:      General: Bowel sounds are normal.      Palpations: Abdomen is soft.   Musculoskeletal:         General: Normal range of motion.       Cervical back: Normal range of motion and neck supple.      Right lower leg: No edema.      Left lower leg: No edema.   Skin:     General: Skin is warm and dry.   Neurological:      General: No focal deficit present.      Mental Status: She is alert and oriented to person, place, and time. Mental status is at baseline.   Psychiatric:         Mood and Affect: Mood normal.         Behavior: Behavior normal.         Thought Content: Thought content normal.         Judgment: Judgment normal.         Lab Review  Lab Results   Component Value Date/Time    WBC 12.4 (H) 04/30/2024 03:57 AM    RBC 3.67 (L) 04/30/2024 03:57 AM    HEMOGLOBIN 9.9 (L) 04/30/2024 03:57 AM    HEMATOCRIT 31.7 (L) 04/30/2024 03:57 AM    MCV 86.4 04/30/2024 03:57 AM    MCH 27.0 04/30/2024 03:57 AM    MCHC 31.2 (L) 04/30/2024 03:57 AM    MPV 10.5 04/30/2024 03:57 AM      Lab Results   Component Value Date/Time    SODIUM 135 04/30/2024 03:57 AM    POTASSIUM 5.4 04/30/2024 03:57 AM    CHLORIDE 100 04/30/2024 03:57 AM    CO2 15 (L) 04/30/2024 03:57 AM    GLUCOSE 115 (H) 04/30/2024 03:57 AM    BUN 28 (H) 04/30/2024 03:57 AM    CREATININE 1.64 (H) 04/30/2024 03:57 AM    BUNCREATRAT 25.0 07/10/2022 04:21 AM      Lab Results   Component Value Date/Time    ASTSGOT 2739 (HH) 04/30/2024 03:57 AM    ALTSGPT 1024 (HH) 04/30/2024 03:57 AM     Lab Results   Component Value Date/Time    CHOLSTRLTOT 89 (L) 08/29/2022 05:15 AM    LDL 34 08/29/2022 05:15 AM    HDL 32 (A) 08/29/2022 05:15 AM    TRIGLYCERIDE 111 08/31/2022 04:25 AM    TROPONINT 21 (H) 09/06/2023 06:16 PM       Recent Labs     04/27/24 2022 04/30/24  0834   NTPROBNP 81541* 59324*         Assessment/Plan  1.  Constipation  2.  Heart failure with reduced ejection fraction with concern for severe mitral valve regurgitation  3.  Severe mitral valve regurgitation  4.  Peripheral vascular disease with prior aortic thrombus status post aortofemoral bypass  5.  Hypertension  6.  Prior CVA    Clinically, she  notes that she is feeling significantly better.  She continues to diuresis..  There was concern for possible pulmonary edema/community-acquired pneumonia which is per primary medicine service ruled out.  At this time she will continue her current medications as currently prescribed.  Renal function is currently declined based on most recent electrolyte panel as well as worsening transaminitis.  This is being currently followed by the primary medicine service.  I have a lower clinical suspicion for hepatocardiac etiology.    In terms of her other medical conditions I will defer to the primary medicine service.    Thank you for allowing me to participate in the care of this patient.  I will continue to follow this patient    Please contact me with any questions.    Breezy Chau D.O.   Cardiologist, [facility]  [phone]

## 2024-05-02 ENCOUNTER — APPOINTMENT (OUTPATIENT)
Dept: RADIOLOGY | Facility: MEDICAL CENTER | Age: 64
DRG: 280 | End: 2024-05-02
Attending: INTERNAL MEDICINE
Payer: MEDICAID

## 2024-05-02 ENCOUNTER — APPOINTMENT (OUTPATIENT)
Dept: RADIOLOGY | Facility: MEDICAL CENTER | Age: 64
DRG: 280 | End: 2024-05-02
Payer: MEDICAID

## 2024-05-02 PROBLEM — K59.00 CONSTIPATION: Status: RESOLVED | Noted: 2024-04-27 | Resolved: 2024-05-02

## 2024-05-02 PROBLEM — J18.9 COMMUNITY ACQUIRED PNEUMONIA, UNSPECIFIED LATERALITY: Status: RESOLVED | Noted: 2024-04-27 | Resolved: 2024-05-02

## 2024-05-02 PROBLEM — M62.82 RHABDOMYOLYSIS: Status: ACTIVE | Noted: 2024-05-02

## 2024-05-02 PROBLEM — K56.41 IMPACTED STOOL IN INTESTINE (HCC): Status: RESOLVED | Noted: 2024-04-28 | Resolved: 2024-05-02

## 2024-05-02 PROBLEM — K52.9 COLITIS: Status: RESOLVED | Noted: 2024-04-28 | Resolved: 2024-05-02

## 2024-05-02 PROBLEM — D69.6 THROMBOCYTOPENIA (HCC): Status: ACTIVE | Noted: 2024-05-02

## 2024-05-02 LAB
ABO GROUP BLD: NORMAL
ALBUMIN SERPL BCP-MCNC: 2.9 G/DL (ref 3.2–4.9)
ALBUMIN/GLOB SERPL: 0.8 G/DL
ALP SERPL-CCNC: 156 U/L (ref 30–99)
ALT SERPL-CCNC: 908 U/L (ref 2–50)
ANION GAP SERPL CALC-SCNC: 16 MMOL/L (ref 7–16)
ANION GAP SERPL CALC-SCNC: 16 MMOL/L (ref 7–16)
ANISOCYTOSIS BLD QL SMEAR: ABNORMAL
AST SERPL-CCNC: 1237 U/L (ref 12–45)
BARCODED ABORH UBTYP: 5100
BARCODED ABORH UBTYP: 7300
BARCODED PRD CODE UBPRD: NORMAL
BARCODED PRD CODE UBPRD: NORMAL
BARCODED UNIT NUM UBUNT: NORMAL
BARCODED UNIT NUM UBUNT: NORMAL
BASOPHILS # BLD AUTO: 0.8 % (ref 0–1.8)
BASOPHILS # BLD AUTO: 0.9 % (ref 0–1.8)
BASOPHILS # BLD: 0.13 K/UL (ref 0–0.12)
BASOPHILS # BLD: 0.15 K/UL (ref 0–0.12)
BILIRUB SERPL-MCNC: 0.8 MG/DL (ref 0.1–1.5)
BLD GP AB SCN SERPL QL: NORMAL
BUN SERPL-MCNC: 62 MG/DL (ref 8–22)
BUN SERPL-MCNC: 72 MG/DL (ref 8–22)
BURR CELLS BLD QL SMEAR: NORMAL
BURR CELLS BLD QL SMEAR: NORMAL
CA-I SERPL-SCNC: 1.1 MMOL/L (ref 1.1–1.3)
CALCIUM ALBUM COR SERPL-MCNC: 8.3 MG/DL (ref 8.5–10.5)
CALCIUM SERPL-MCNC: 7.4 MG/DL (ref 8.5–10.5)
CALCIUM SERPL-MCNC: 7.9 MG/DL (ref 8.5–10.5)
CFT BLD TEG: 7.6 MIN (ref 4.6–9.1)
CFT P HPASE BLD TEG: 6.6 MIN (ref 4.3–8.3)
CHLORIDE SERPL-SCNC: 97 MMOL/L (ref 96–112)
CHLORIDE SERPL-SCNC: 99 MMOL/L (ref 96–112)
CK SERPL-CCNC: 1765 U/L (ref 0–154)
CLOT ANGLE BLD TEG: 53.7 DEGREES (ref 63–78)
CLOT LYSIS 30M P MA LENFR BLD TEG: 0 % (ref 0–2.6)
CO2 SERPL-SCNC: 15 MMOL/L (ref 20–33)
CO2 SERPL-SCNC: 18 MMOL/L (ref 20–33)
COMPONENT CT 8504CT: NORMAL
COMPONENT CT 8504CT: NORMAL
CREAT SERPL-MCNC: 4.45 MG/DL (ref 0.5–1.4)
CREAT SERPL-MCNC: 5.05 MG/DL (ref 0.5–1.4)
CT.EXTRINSIC BLD ROTEM: 4.3 MIN (ref 0.8–2.1)
D DIMER PPP IA.FEU-MCNC: >20 UG/ML (FEU) (ref 0–0.5)
EBV EA-D IGG SER-ACNC: <5 U/ML (ref 0–10.9)
EBV NA IGG SER IA-ACNC: >600 U/ML (ref 0–21.9)
EBV VCA IGG SER IA-ACNC: 42.7 U/ML (ref 0–21.9)
EOSINOPHIL # BLD AUTO: 0 K/UL (ref 0–0.51)
EOSINOPHIL # BLD AUTO: 0 K/UL (ref 0–0.51)
EOSINOPHIL NFR BLD: 0 % (ref 0–6.9)
EOSINOPHIL NFR BLD: 0 % (ref 0–6.9)
ERYTHROCYTE [DISTWIDTH] IN BLOOD BY AUTOMATED COUNT: 50.6 FL (ref 35.9–50)
ERYTHROCYTE [DISTWIDTH] IN BLOOD BY AUTOMATED COUNT: 51.5 FL (ref 35.9–50)
ERYTHROCYTE [DISTWIDTH] IN BLOOD BY AUTOMATED COUNT: 51.8 FL (ref 35.9–50)
FERRITIN SERPL-MCNC: ABNORMAL NG/ML (ref 10–291)
FIBRINOGEN PPP-MCNC: 204 MG/DL (ref 215–460)
FIBRINOGEN PPP-MCNC: 80 MG/DL (ref 215–460)
FIBRINOGEN PPP-MCNC: 91 MG/DL (ref 215–460)
GFR SERPLBLD CREATININE-BSD FMLA CKD-EPI: 11 ML/MIN/1.73 M 2
GFR SERPLBLD CREATININE-BSD FMLA CKD-EPI: 9 ML/MIN/1.73 M 2
GLOBULIN SER CALC-MCNC: 3.5 G/DL (ref 1.9–3.5)
GLUCOSE BLD STRIP.AUTO-MCNC: 90 MG/DL (ref 65–99)
GLUCOSE BLD STRIP.AUTO-MCNC: 94 MG/DL (ref 65–99)
GLUCOSE BLD STRIP.AUTO-MCNC: 95 MG/DL (ref 65–99)
GLUCOSE BLD STRIP.AUTO-MCNC: 98 MG/DL (ref 65–99)
GLUCOSE SERPL-MCNC: 102 MG/DL (ref 65–99)
GLUCOSE SERPL-MCNC: 103 MG/DL (ref 65–99)
HBV SURFACE AB SERPL IA-ACNC: <3.5 MIU/ML (ref 0–10)
HCT VFR BLD AUTO: 27.1 % (ref 37–47)
HCT VFR BLD AUTO: 28.3 % (ref 37–47)
HCT VFR BLD AUTO: 28.5 % (ref 37–47)
HGB BLD-MCNC: 8.6 G/DL (ref 12–16)
HGB BLD-MCNC: 8.9 G/DL (ref 12–16)
HGB BLD-MCNC: 9.1 G/DL (ref 12–16)
HGB RETIC QN AUTO: 28.4 PG/CELL (ref 29–35)
IMM RETICS NFR: 34.2 % (ref 2.6–16.1)
INR PPP: 2.3 (ref 0.87–1.13)
INR PPP: 2.81 (ref 0.87–1.13)
INR PPP: 3.1 (ref 0.87–1.13)
LACTATE SERPL-SCNC: 1.4 MMOL/L (ref 0.5–2)
LACTATE SERPL-SCNC: 1.5 MMOL/L (ref 0.5–2)
LACTATE SERPL-SCNC: 2 MMOL/L (ref 0.5–2)
LACTATE SERPL-SCNC: 2.4 MMOL/L (ref 0.5–2)
LDH SERPL L TO P-CCNC: 1418 U/L (ref 107–266)
LYMPHOCYTES # BLD AUTO: 0.13 K/UL (ref 1–4.8)
LYMPHOCYTES # BLD AUTO: 0.43 K/UL (ref 1–4.8)
LYMPHOCYTES NFR BLD: 0.8 % (ref 22–41)
LYMPHOCYTES NFR BLD: 2.6 % (ref 22–41)
MAGNESIUM SERPL-MCNC: 2.4 MG/DL (ref 1.5–2.5)
MANUAL DIFF BLD: NORMAL
MANUAL DIFF BLD: NORMAL
MCF BLD TEG: <40 MM (ref 52–69)
MCF.PLATELET INHIB BLD ROTEM: <4 MM (ref 15–32)
MCH RBC QN AUTO: 27.1 PG (ref 27–33)
MCH RBC QN AUTO: 27.2 PG (ref 27–33)
MCH RBC QN AUTO: 27.2 PG (ref 27–33)
MCHC RBC AUTO-ENTMCNC: 31.4 G/DL (ref 32.2–35.5)
MCHC RBC AUTO-ENTMCNC: 31.7 G/DL (ref 32.2–35.5)
MCHC RBC AUTO-ENTMCNC: 31.9 G/DL (ref 32.2–35.5)
MCV RBC AUTO: 85.3 FL (ref 81.4–97.8)
MCV RBC AUTO: 85.5 FL (ref 81.4–97.8)
MCV RBC AUTO: 86.5 FL (ref 81.4–97.8)
METAMYELOCYTES NFR BLD MANUAL: 0.9 %
MICROCYTES BLD QL SMEAR: ABNORMAL
MONOCYTES # BLD AUTO: 0.13 K/UL (ref 0–0.85)
MONOCYTES # BLD AUTO: 0.71 K/UL (ref 0–0.85)
MONOCYTES NFR BLD AUTO: 0.8 % (ref 0–13.4)
MONOCYTES NFR BLD AUTO: 4.3 % (ref 0–13.4)
MORPHOLOGY BLD-IMP: NORMAL
MORPHOLOGY BLD-IMP: NORMAL
MYELOCYTES NFR BLD MANUAL: 0.9 %
NEUTROPHILS # BLD AUTO: 15.12 K/UL (ref 1.82–7.42)
NEUTROPHILS # BLD AUTO: 15.62 K/UL (ref 1.82–7.42)
NEUTROPHILS NFR BLD: 92.2 % (ref 44–72)
NEUTROPHILS NFR BLD: 94.9 % (ref 44–72)
NEUTS BAND NFR BLD MANUAL: 0.9 % (ref 0–10)
NRBC # BLD AUTO: 0.3 K/UL
NRBC # BLD AUTO: 0.45 K/UL
NRBC BLD-RTO: 1.8 /100 WBC (ref 0–0.2)
NRBC BLD-RTO: 2.7 /100 WBC (ref 0–0.2)
OVALOCYTES BLD QL SMEAR: NORMAL
PA AA BLD-ACNC: ABNORMAL % (ref 0–11)
PA ADP BLD-ACNC: ABNORMAL % (ref 0–17)
PHOSPHATE SERPL-MCNC: 5.2 MG/DL (ref 2.5–4.5)
PLATELET # BLD AUTO: 45 K/UL (ref 164–446)
PLATELET # BLD AUTO: 46 K/UL (ref 164–446)
PLATELET # BLD AUTO: 50 K/UL (ref 164–446)
PLATELET BLD QL SMEAR: NORMAL
PLATELET BLD QL SMEAR: NORMAL
PLATELETS.RETICULATED NFR BLD AUTO: 12.1 % (ref 0.6–13.1)
PLATELETS.RETICULATED NFR BLD AUTO: 14.3 % (ref 0.6–13.1)
PLATELETS.RETICULATED NFR BLD AUTO: 15.8 % (ref 0.6–13.1)
PMV BLD AUTO: 11.4 FL (ref 9–12.9)
PMV BLD AUTO: 12.3 FL (ref 9–12.9)
PMV BLD AUTO: 9.2 FL (ref 9–12.9)
POIKILOCYTOSIS BLD QL SMEAR: NORMAL
POIKILOCYTOSIS BLD QL SMEAR: NORMAL
POTASSIUM SERPL-SCNC: 4.7 MMOL/L (ref 3.6–5.5)
POTASSIUM SERPL-SCNC: 4.8 MMOL/L (ref 3.6–5.5)
PRODUCT TYPE UPROD: NORMAL
PRODUCT TYPE UPROD: NORMAL
PROT SERPL-MCNC: 6.4 G/DL (ref 6–8.2)
PROTHROMBIN TIME: 25.6 SEC (ref 12–14.6)
PROTHROMBIN TIME: 30 SEC (ref 12–14.6)
PROTHROMBIN TIME: 32.4 SEC (ref 12–14.6)
RBC # BLD AUTO: 3.17 M/UL (ref 4.2–5.4)
RBC # BLD AUTO: 3.27 M/UL (ref 4.2–5.4)
RBC # BLD AUTO: 3.34 M/UL (ref 4.2–5.4)
RBC BLD AUTO: PRESENT
RBC BLD AUTO: PRESENT
RETICS # AUTO: 0.06 M/UL (ref 0.04–0.12)
RETICS/RBC NFR: 1.8 % (ref 0.8–2.6)
RH BLD: NORMAL
SCHISTOCYTES BLD QL SMEAR: NORMAL
SODIUM SERPL-SCNC: 130 MMOL/L (ref 135–145)
SODIUM SERPL-SCNC: 131 MMOL/L (ref 135–145)
TEG ALGORITHM TGALG: ABNORMAL
TRIGL SERPL-MCNC: 148 MG/DL (ref 0–149)
UNIT STATUS USTAT: NORMAL
UNIT STATUS USTAT: NORMAL
WBC # BLD AUTO: 16.3 K/UL (ref 4.8–10.8)
WBC # BLD AUTO: 16.4 K/UL (ref 4.8–10.8)
WBC # BLD AUTO: 17.3 K/UL (ref 4.8–10.8)

## 2024-05-02 PROCEDURE — 02HV33Z INSERTION OF INFUSION DEVICE INTO SUPERIOR VENA CAVA, PERCUTANEOUS APPROACH: ICD-10-PCS | Performed by: INTERNAL MEDICINE

## 2024-05-02 PROCEDURE — 99233 SBSQ HOSP IP/OBS HIGH 50: CPT | Performed by: INTERNAL MEDICINE

## 2024-05-02 PROCEDURE — 99292 CRITICAL CARE ADDL 30 MIN: CPT | Performed by: INTERNAL MEDICINE

## 2024-05-02 PROCEDURE — 5A1D70Z PERFORMANCE OF URINARY FILTRATION, INTERMITTENT, LESS THAN 6 HOURS PER DAY: ICD-10-PCS | Performed by: INTERNAL MEDICINE

## 2024-05-02 PROCEDURE — 99291 CRITICAL CARE FIRST HOUR: CPT | Performed by: INTERNAL MEDICINE

## 2024-05-02 RX ORDER — DEXAMETHASONE 4 MG/1
6 TABLET ORAL EVERY 6 HOURS
Status: DISCONTINUED | OUTPATIENT
Start: 2024-05-02 | End: 2024-05-04

## 2024-05-02 RX ORDER — HYDRALAZINE HYDROCHLORIDE 20 MG/ML
20 INJECTION INTRAMUSCULAR; INTRAVENOUS EVERY 4 HOURS PRN
Status: DISCONTINUED | OUTPATIENT
Start: 2024-05-02 | End: 2024-05-14

## 2024-05-02 RX ORDER — SODIUM CHLORIDE 9 MG/ML
INJECTION, SOLUTION INTRAVENOUS CONTINUOUS
Status: ACTIVE | OUTPATIENT
Start: 2024-05-02 | End: 2024-05-03

## 2024-05-02 RX ORDER — FUROSEMIDE 10 MG/ML
100 INJECTION INTRAMUSCULAR; INTRAVENOUS ONCE
Status: CANCELLED | OUTPATIENT
Start: 2024-05-02 | End: 2024-05-03

## 2024-05-02 RX ORDER — HYDRALAZINE HYDROCHLORIDE 20 MG/ML
10 INJECTION INTRAMUSCULAR; INTRAVENOUS EVERY 6 HOURS PRN
Status: DISCONTINUED | OUTPATIENT
Start: 2024-05-02 | End: 2024-05-02

## 2024-05-02 RX ORDER — HEPARIN SODIUM 5000 [USP'U]/ML
5000 INJECTION, SOLUTION INTRAVENOUS; SUBCUTANEOUS EVERY 8 HOURS
Status: DISCONTINUED | OUTPATIENT
Start: 2024-05-02 | End: 2024-05-02

## 2024-05-02 RX ORDER — HYDRALAZINE HYDROCHLORIDE 20 MG/ML
20 INJECTION INTRAMUSCULAR; INTRAVENOUS EVERY 4 HOURS PRN
Status: DISCONTINUED | OUTPATIENT
Start: 2024-05-02 | End: 2024-05-02

## 2024-05-02 RX ORDER — FUROSEMIDE 10 MG/ML
120 INJECTION INTRAMUSCULAR; INTRAVENOUS ONCE
Status: COMPLETED | OUTPATIENT
Start: 2024-05-02 | End: 2024-05-02

## 2024-05-02 RX ADMIN — HYDRALAZINE HYDROCHLORIDE 20 MG: 20 INJECTION INTRAMUSCULAR; INTRAVENOUS at 14:25

## 2024-05-02 RX ADMIN — DEXAMETHASONE 6 MG: 4 TABLET ORAL at 23:59

## 2024-05-02 RX ADMIN — CEFTRIAXONE SODIUM 1000 MG: 10 INJECTION, POWDER, FOR SOLUTION INTRAVENOUS at 05:56

## 2024-05-02 RX ADMIN — PRAMIPEXOLE DIHYDROCHLORIDE 0.12 MG: 0.12 TABLET ORAL at 15:24

## 2024-05-02 RX ADMIN — HYDRALAZINE HYDROCHLORIDE 20 MG: 20 INJECTION INTRAMUSCULAR; INTRAVENOUS at 21:06

## 2024-05-02 RX ADMIN — PRAMIPEXOLE DIHYDROCHLORIDE 0.12 MG: 0.12 TABLET ORAL at 00:07

## 2024-05-02 RX ADMIN — VENLAFAXINE HYDROCHLORIDE 37.5 MG: 37.5 CAPSULE, EXTENDED RELEASE ORAL at 08:32

## 2024-05-02 RX ADMIN — MUPIROCIN 1 DOSE: 20 OINTMENT TOPICAL at 17:22

## 2024-05-02 RX ADMIN — MUPIROCIN 1 DOSE: 20 OINTMENT TOPICAL at 08:32

## 2024-05-02 RX ADMIN — Medication: at 19:05

## 2024-05-02 RX ADMIN — FUROSEMIDE 120 MG: 10 INJECTION INTRAMUSCULAR; INTRAVENOUS at 05:56

## 2024-05-02 RX ADMIN — METRONIDAZOLE 500 MG: 500 INJECTION, SOLUTION INTRAVENOUS at 05:55

## 2024-05-02 RX ADMIN — MIRTAZAPINE 30 MG: 30 TABLET, FILM COATED ORAL at 00:07

## 2024-05-02 RX ADMIN — CALCIUM CHLORIDE 1000 MG: 100 INJECTION, SOLUTION INTRAVENOUS at 04:34

## 2024-05-02 RX ADMIN — DEXAMETHASONE 6 MG: 4 TABLET ORAL at 15:24

## 2024-05-02 RX ADMIN — HYDRALAZINE HYDROCHLORIDE 10 MG: 20 INJECTION, SOLUTION INTRAMUSCULAR; INTRAVENOUS at 09:25

## 2024-05-02 RX ADMIN — HYDRALAZINE HYDROCHLORIDE 20 MG: 20 INJECTION INTRAMUSCULAR; INTRAVENOUS at 23:59

## 2024-05-02 RX ADMIN — PRAMIPEXOLE DIHYDROCHLORIDE 0.12 MG: 0.12 TABLET ORAL at 20:46

## 2024-05-02 RX ADMIN — FUROSEMIDE 80 MG: 10 INJECTION INTRAMUSCULAR; INTRAVENOUS at 00:07

## 2024-05-02 RX ADMIN — DEXAMETHASONE 6 MG: 4 TABLET ORAL at 17:22

## 2024-05-02 ASSESSMENT — PAIN DESCRIPTION - PAIN TYPE
TYPE: ACUTE PAIN

## 2024-05-02 ASSESSMENT — FIBROSIS 4 INDEX: FIB4 SCORE: 54.44

## 2024-05-02 NOTE — ASSESSMENT & PLAN NOTE
4/29 echo with EF <15%, severe mitral valve regurgitation, grade 2 diastolic dysfunction, and moderate left atrial dilatation.   Patient is on room air laying flat and warm on exam with perserved map   These finding don't fit with this picture of cold shock  Start after load reduction with hydralazine for SBP < 100-120  Can stop dobutamine after discussion with Dr Chau as he agrees

## 2024-05-02 NOTE — PROGRESS NOTES
Physician at bedside. Patient to be transferred to higher level of care. Recommended to this RN to have rapid response at bedside until ICU transfer is completed. Rapid response called 2045.    CRN for CICU called @2052 to be transferred to RN for report. CRN said that nursing receiving patient would be on S6 for report.     Patient transferred from floor with belongings and ICU RNs @2100.

## 2024-05-02 NOTE — PROGRESS NOTES
Critical Care Progress Note    Date of admission  4/27/2024    Chief Complaint  63 y.o. female admitted 4/27/2024 with hx of CAD s/ PCI, HFrEF w/ EF < 15% severe MR, grade II diastolic dysfunction, PVD, s/p Aortofemoral bypass, COPD, preDM, HTN. Presented 4/27 for flu like illness and 10 days of abdominal pain found to have Rhinovirus/Enterovirus positive also constipated by KUB. She has had worsening renal function and transaminitis transferred to ICU for dobutamine gtt and stopped the beta blocker and ace inhibitor.     Hospital Course  5/1 transferred to ICU for cardiorenal syndrome and cardiogenic shock for dobutamine.    Interval Problem Update  Reviewed last 24 hour events:  Neuro: rass 0-+2 aox1-2 4/5 upper 3/5 lower prior CVA  HR: 60-70's  SBP: 120-130 high map 80-100s dobutamine 2.5  Tmax: afebrile  GI: cardiac diet, BM 5/1  UOP: 85ml overnight  Lines: peripheral IV, willaimson  Resp: on room air laying semi/flat   Vte: contra  PPI/H2:n/a  Antibx: Ceftriaxone stop metronidazole     ->1765, u/a reviewed  Net + 1.2L  Follow up on liver and renal U/s -> reviewed  She is laying flat on room air  Hydralazine 10mg IV Q6 SBP > 100  Check hit panel, COD, teg w/ mapping, fibrinogen, ionized huan  Bedside echo and lung u/s with no b-lines, good function 30-40 while on dobutamine  CT abdomen  TTP vs HLH? Triglycerides, ferritin, retic count, LDH  Multiple meds that can trigger TTP    Review of Systems  Review of Systems   Unable to perform ROS: Mental acuity        Vital Signs for last 24 hours   Temp:  [35.9 °C (96.6 °F)-36.7 °C (98 °F)] 36.1 °C (97 °F)  Pulse:  [62-86] 77  Resp:  [15-46] 24  BP: (111-145)/(59-90) 138/70  SpO2:  [87 %-100 %] 98 %    Hemodynamic parameters for last 24 hours       Respiratory Information for the last 24 hours       Physical Exam   Physical Exam  Vitals and nursing note reviewed.   Constitutional:       General: She is not in acute distress.     Appearance: She is not ill-appearing.       Comments: Chronic ill appearing female laying flat in bed on room air   HENT:      Nose: No congestion.      Mouth/Throat:      Mouth: Mucous membranes are dry.   Eyes:      Pupils: Pupils are equal, round, and reactive to light.   Cardiovascular:      Rate and Rhythm: Normal rate.      Heart sounds: No murmur heard.  Pulmonary:      Effort: No respiratory distress.      Breath sounds: No stridor. No wheezing or rhonchi.   Abdominal:      General: There is no distension.      Palpations: There is no mass.      Tenderness: There is no abdominal tenderness.      Hernia: No hernia is present.   Musculoskeletal:         General: Swelling present.      Comments: Some swelling of arms   Skin:     Coloration: Skin is pale. Skin is not jaundiced.      Comments: Rash on skin         Medications  Current Facility-Administered Medications   Medication Dose Route Frequency Provider Last Rate Last Admin    hydrALAZINE (Apresoline) injection 10 mg  10 mg Intravenous Q6HRS PRN Jose Adams M.D.   10 mg at 05/02/24 0925    mupirocin (Bactroban) 2 % ointment   Topical BID Jose Adams M.D.   1 Dose at 05/02/24 0832    Pharmacy Consult: HIT Monitoring   Other PRN Jose Adams M.D.        Respiratory Therapy Consult   Nebulization Continuous RT Pipe Hernandez M.D.        albuterol (Proventil) 2.5mg/0.5ml nebulizer solution 2.5 mg  2.5 mg Nebulization Q2HRS PRN (RT) Pipe Hernandez M.D.        polyethylene glycol/lytes (Miralax) Packet 1 Packet  1 Packet Oral TID Andrzej Longoria M.D.   1 Packet at 05/01/24 0900    senna-docusate (Pericolace Or Senokot S) 8.6-50 MG per tablet 2 Tablet  2 Tablet Oral Q EVENING Aristeo Valencia M.D.   2 Tablet at 05/01/24 1735    And    polyethylene glycol/lytes (Miralax) Packet 1 Packet  1 Packet Oral QDAY PRN Aristeo Valencia M.D.   1 Packet at 04/28/24 1521    ondansetron (Zofran) syringe/vial injection 4 mg  4 mg Intravenous Q4HRS PRN Aristeo Valencia M.D.        ondansetron  (Zofran ODT) dispertab 4 mg  4 mg Oral Q4HRS PRN Aristeo Valencia M.D.        promethazine (Phenergan) tablet 12.5-25 mg  12.5-25 mg Oral Q4HRS PRN Aristeo Valencia M.D.        promethazine (Phenergan) suppository 12.5-25 mg  12.5-25 mg Rectal Q4HRS PRN Aristeo Valencia M.D.        prochlorperazine (Compazine) injection 5-10 mg  5-10 mg Intravenous Q4HRS PRN Aristeo Valencia M.D.        benzonatate (Tessalon) capsule 100 mg  100 mg Oral TID PRN Aristeo Valencia M.D.        pramipexole (Mirapex) tablet 0.125 mg  0.125 mg Oral TID Aristeo Valencia M.D.   0.125 mg at 05/02/24 0007    venlafaxine XR (Effexor XR) capsule 37.5 mg  37.5 mg Oral QAM Aristeo Valencia M.D.   37.5 mg at 05/02/24 0832    oxyCODONE immediate-release (Roxicodone) tablet 5 mg  5 mg Oral Q4HRS PRN Ysabel Nash A.P.R.N.   5 mg at 04/30/24 1948       Fluids    Intake/Output Summary (Last 24 hours) at 5/2/2024 1325  Last data filed at 5/2/2024 1200  Gross per 24 hour   Intake 481.56 ml   Output 103 ml   Net 378.56 ml       Laboratory      Recent Labs     05/01/24  2255 05/02/24  0855   CPKTOTAL 630* 1765*     Recent Labs     05/01/24  0601 05/01/24 2032 05/02/24  0400   SODIUM 135  134* 133* 131*   POTASSIUM 5.8*  5.7* 5.0 4.7   CHLORIDE 97  96 97 97   CO2 12*  14* 15* 18*   BUN 52*  52* 58* 62*   CREATININE 3.19*  3.22* 3.99* 4.45*   MAGNESIUM 2.7*  --  2.4   PHOSPHORUS 6.4* 5.2* 5.2*   CALCIUM 8.3*  8.3* 7.7* 7.4*     Recent Labs     04/30/24 0357 05/01/24  0601 05/01/24 2032 05/02/24  0400   ALTSGPT 1024* 1377*  --  908*   ASTSGOT 2739* 2959*  --  1237*   ALKPHOSPHAT 135* 184*  --  156*   TBILIRUBIN 0.6 1.1  --  0.8   GLUCOSE 115* 89  87 104* 102*     Recent Labs     04/30/24 0357 05/01/24 0601 05/01/24 0736 05/01/24 2255 05/02/24  0400   WBC 12.4*  --  16.3* 16.4* 16.3*   NEUTSPOLYS  --   --  76.20* 92.20* 94.90*   LYMPHOCYTES  --   --  13.50* 2.60* 0.80*   MONOCYTES  --   --  7.10 4.30 0.80   EOSINOPHILS  --    --  0.20 0.00 0.00   BASOPHILS  --   --  0.50 0.90 0.80   ASTSGOT 2739* 2959*  --   --  1237*   ALTSGPT 1024* 1377*  --   --  908*   ALKPHOSPHAT 135* 184*  --   --  156*   TBILIRUBIN 0.6 1.1  --   --  0.8     Recent Labs     05/01/24  0736 05/01/24  2032 05/01/24  2255 05/02/24  0400 05/02/24  0435   RBC 4.17*  --  3.34* 3.27*  --    HEMOGLOBIN 11.2*  --  9.1* 8.9*  --    HEMATOCRIT 36.0*  --  28.5* 28.3*  --    PLATELETCT 79*  --  50* 46*  --    PROTHROMBTM  --  32.8*  --   --  32.4*   INR  --  3.14*  --   --  3.10*       Imaging  X-Ray:  I have personally reviewed the images and compared with prior images.  Echo:   Reviewed  Ultrasound:  Reviewed    Assessment/Plan  * Heart failure with reduced ejection fraction (HCC)- (present on admission)  Assessment & Plan  4/29 echo with EF <15%, severe mitral valve regurgitation, grade 2 diastolic dysfunction, and moderate left atrial dilatation.   Patient is on room air laying flat and warm on exam with perserved map   These finding don't fit with this picture  Start after load reduction with hydralazine for SBP < 100-120  Can stop dobutamine after discussion with Dr Chau as he agrees this doesn't fit possibly right sided?      Rhabdomyolysis  Assessment & Plan  Mild cpk elevation continue to monitor      Thrombocytopenia (HCC)  Assessment & Plan  HIT score low 3 (Hit panel sent)  Serial monitor   Consider HLH vs TTP vs medication vs ITP  Retic count, LDH, ferritin, triglycerides  May need hematology consultation      Coagulopathy (HCC)  Assessment & Plan  Elevated INR, low fibrinogen and low platelets  Serial monitor  Work up with TTP, HLH, liver failure, HIT, vs DIC  Retic count, LDH, fibrinogen, teg, ferritin, triglycerides  May need hematology consult      IVON (acute kidney injury) (HCC)  Assessment & Plan  Unclear etiology stop ace inhibitor and metoprolol no recent contrast  U/a reviewed, CPK elevated, renal u/s reviewed, continue with williamson  CHF and cold  shock seems less likely talking with cardiology  Nephrology consulted  Rule out TTP  Avoid nephrotoxins  CPK rising  Likely will need dialysis  Serial BMP      Transaminitis  Assessment & Plan  Unclear etiology improving  RUQ U/s reviewed  Hepatitis panel negative  + EBV  HLH ??  CPK     Severe mitral regurgitation- (present on admission)  Assessment & Plan  Strict afterload reduction  After discussing with Dr Chau not as severe as described      Hyperkalemia- (present on admission)  Assessment & Plan  Mild hold all replacement with rising renal function  Stop ace inhibitors    Abdominal pain- (present on admission)  Assessment & Plan  Check CT abdomen noncontrast as this is why she presented to hospital    Leukocytosis- (present on admission)  Assessment & Plan  Unclear etiology possible stress demargination  Stop antibiotics get CT Abdomen          Stroke (HCC)- (present on admission)  Assessment & Plan  History of   Residual expressive aphasia - at baseline  Delirium precautions         VTE:  Contraindicated  Ulcer: Not Indicated  Lines: Guerrero Catheter  Ongoing indication addressed    I have performed a physical exam and reviewed and updated ROS and Plan today (5/2/2024). In review of yesterday's note (5/1/2024), there are no changes except as documented above.     Discussed patient condition and risk of morbidity and/or mortality with Family, RN, RT, Charge nurse / hot rounds, Patient, and cardiology and nephrology    The patient remains critically ill with coagulopathy and dobutamine gtt.  Critical care time = 120 minutes in directly providing and coordinating critical care and extensive data review.  No time overlap and excludes procedures.

## 2024-05-02 NOTE — DOCUMENTATION QUERY
"                                                                         [facility]                                                                       Query Response Note      PATIENT:               CHRISTINA BLOUNT  ACCT #:                  [id number]  MRN:                     [id number]  :                      1960  ADMIT DATE:       2024 5:10 PM  DISCH DATE:          RESPONDING  PROVIDER #:        [id number]           QUERY TEXT:    In your clinical judgment please clarify the heart failure further.      The patient's Clinical Indicators include:  63 year old female admitted for abdominal pain.     Dorgan \"presented 2024 with worsening dyspnea found to have a acute systolic heart failure decompensation with accompanying constipation\"     David \"Pulmonary edema, ordered Lasix. Currently not hypoxic. Cardiology consult for worsening EF\"     Park  \"I have discontinued patient's IV fluids given her history of HFrEF and also elevated NT proBNP.\"  \"Chest x-ray shows possible mild edema, no acute consolidation noted\"     Valencia \"furosemide (LASIX) 20 MG Tab, patient taking No\"     BNP [id number]     ECHO \"Severely reduced left ventricular systolic  function. The left ventricular ejection fraction is visually estimated  to be 15%.Global hypokinesis with regional variation. Grade II  diastolic dysfunction.\"    Risk factors: HFrEF  Treatment: labs, CXR, holding IVF, Lasix IV    If you have any questions, please contact:  Vashti Stephens RN CDI [facility]  [email or web address]  Vashti Stephens [address] [employer]  Options provided:   -- Acute on Chronic systolic heart failure, present on admission   -- Acute on Chronic systolic heart failure, not present on admission   -- Acute on Chronic systolic heart failure, evolving on admission   -- Other explanation, (please specify other explanation)      Query created by: Vashti Stephens on 2024 11:24 AM    RESPONSE TEXT:    Acute on " Chronic systolic heart failure, evolving on admission          Electronically signed by:  SAHIL HOPPER MD 5/1/2024 6:09 PM

## 2024-05-02 NOTE — PROGRESS NOTES
1330: Pt has new onset crackles in lower lobes upon auscultation and a productive cough. Dr Adams notifed of assessment findings. Stat CXR ordered.

## 2024-05-02 NOTE — PROGRESS NOTES
This RN spoke with pts , Basilio, this AM for overnight updates. This RN also inquired as to pts baseline neuro and physical assessment. Per Basilio, pt has expressive aphasia baseline with R sided weakness. Wheelchair bound baseline as well.

## 2024-05-02 NOTE — DISCHARGE PLANNING
Case Management Discharge Planning    Admission Date: 4/27/2024  GMLOS: 3.9  ALOS: 5    6-Clicks ADL Score: 16  6-Clicks Mobility Score: 14  PT and/or OT Eval ordered: Yes  Post-acute Referrals Ordered: Yes  Post-acute Choice Obtained: Yes  Has referral(s) been sent to post-acute provider:  Yes      Anticipated Discharge Dispo: Discharge Disposition: D/T to home under A care in anticipation of covered skilled care (06)  Discharge Address: [address] [place] [id number]    DME Needed: No    Action(s) Taken: Updated Provider/Nurse on Discharge Plan    Cart review complete; Pt was discussed in IDT rounds today; Pt transferred to ICU on 5/1 for further evaluation and management of her decompensated heart failure and IVON; Pt continues to require ICU level of care; Pt on isolation for Enterovirus/Rhinovirus; Pt has been accepted with [facility]; LSW will continue to follow to assist with any CM needs.      Escalations Completed: None    Medically Clear: No    Next Steps: f/u with pt and medical team to discuss dc needs and barriers.     Barriers to Discharge: Medical clearance    Is the patient up for discharge tomorrow: No

## 2024-05-02 NOTE — PROGRESS NOTES
0830: Patient PIVs infiltrated. Dobutamine briefly stopped due to lack of IV access. Dr Adams notified. 2 new PIVs placed per LDA.

## 2024-05-02 NOTE — ASSESSMENT & PLAN NOTE
Responded to aggressive bowel regimen, last BM 4/29. Abdomen is benign non distended.   - Cont bowel regimen

## 2024-05-02 NOTE — ASSESSMENT & PLAN NOTE
Unclear etiology stop ace inhibitor and metoprolol no recent contrast  U/a reviewed, CPK elevated, renal u/s reviewed, continue with williamson  Not consistent with CHF or cold shock  Nephrology consulted  No schystocytes per discussing with lab thus not consistent with TTP  Avoid nephrotoxins  CPK elevated serial monitor  S/p iHD 5/2

## 2024-05-02 NOTE — CONSULTS
Critical Care Consultation    Date of consult: 5/1/2024    Referring Physician  Steve Swartz D.O.    Reason for Consultation  Heart failure with reduced ejection fraction    History of Presenting Illness  63 y.o. female with history of systolic and diastolic heart failure with EF of 15%  grade 2 diastolic dysfunction,severe MVR, PAD with aortofemoral bypass surgery who presented 4/27/2024 with increasing shortness of breath and was admitted to the hospital with a diagnosis of community-acquired pneumonia.     Tonight she was taken to the cardiac cath lab for NSTEMI. Coronaries were clean. Dr. Chau has requested transfer to the ICU with critical care consultation for further evaluation and management of her decompensated heart failure and IVON.    Code Status  Full Code    Review of Systems  Review of Systems   Unable to perform ROS: Mental acuity       Past Medical History   has a past medical history of At risk for delirium (9/13/2022), Chronic obstructive pulmonary disease ([facility]), Diabetes ([facility]), Encounter to establish care (10/19/2022), Heart attack ([facility]), Hydronephrosis (8/24/2022), Hypertension, Hypomagnesemia (8/29/2022), and Stroke ([facility]).    Surgical History   has a past surgical history that includes thyroidectomy; stent placement; aortobifem bypass (Bilateral, 8/26/2022); endarterectomy (Bilateral, 8/26/2022); pr exploratory of abdomen (8/26/2022); application or replacement, wound vac (8/26/2022); and pr exploratory of abdomen (8/28/2022).    Family History  family history is not on file.    Social History   reports that she has quit smoking. Her smoking use included cigarettes. She has a 96 pack-year smoking history. She has never used smokeless tobacco. She reports current drug use. Drugs: Marijuana and Inhaled. She reports that she does not drink alcohol.    Medications  Home Medications       Reviewed by Teja Heck R.N. (Registered Nurse) on 04/29/24 at 4423  Med List Status: Complete      Medication Last Dose Status   acetaminophen (TYLENOL) 500 MG Tab 4/26/2024 Active   atorvastatin (LIPITOR) 10 MG Tab  Active   clopidogrel (PLAVIX) 75 MG Tab  Active   ezetimibe (ZETIA) 10 MG Tab  Active   furosemide (LASIX) 20 MG Tab  Active   hydrOXYzine HCl (ATARAX) 25 MG Tab 4/25/2024 Active   lisinopril (PRINIVIL) 10 MG Tab  Active   metoprolol tartrate (LOPRESSOR) 25 MG Tab  Active   mirtazapine (REMERON) 30 MG Tab tablet 4/26/2024 Active   ondansetron (ZOFRAN ODT) 4 MG TABLET DISPERSIBLE PRN Active   pramipexole (MIRAPEX) 0.125 MG Tab 4/27/2024 Active   venlafaxine XR (EFFEXOR XR) 37.5 MG CAPSULE SR 24 HR 4/27/2024 Active                  Current Facility-Administered Medications   Medication Dose Route Frequency Provider Last Rate Last Admin    heparin injection 5,000 Units  5,000 Units Subcutaneous Q8HRS Steve Swartz D.O.   5,000 Units at 05/01/24 2215    DOBUTamine (Dobutrex) 1 mg/1 mL premix infusion  2.5 mcg/kg/min (Ideal) Intravenous Continuous Jeffery Vazquez M.D. 8.6 mL/hr at 05/01/24 2137 2.5 mcg/kg/min at 05/01/24 2137    furosemide (Lasix) injection 80 mg  80 mg Intravenous Once Nicky Concepcion M.D.        cefTRIAXone (Rocephin) syringe 1,000 mg  1,000 mg Intravenous Q24HRS Nicky Concepcion M.D.        metroNIDAZOLE (Flagyl) IVPB 500 mg  500 mg Intravenous Q12HRS Nicky Concepcion M.D.        Respiratory Therapy Consult   Nebulization Continuous RT Pipe Hernandez M.D.        albuterol (Proventil) 2.5mg/0.5ml nebulizer solution 2.5 mg  2.5 mg Nebulization Q2HRS PRN (RT) Pipe Hernandez M.D.        [Held by provider] metoprolol SR (Toprol XL) tablet 25 mg  25 mg Oral Q DAY Pipe Hernandez M.D.   25 mg at 05/01/24 0459    docusate sodium (Colace) capsule 100 mg  100 mg Oral BID Andrzej Longoria M.D.   100 mg at 05/01/24 1736    polyethylene glycol/lytes (Miralax) Packet 1 Packet  1 Packet Oral TID Andrzej Longoria M.D.   1 Packet at 05/01/24 0900    senna-docusate (Pericolace Or Senokot S) 8.6-50  MG per tablet 2 Tablet  2 Tablet Oral Q EVENING Aristeo Valencia M.D.   2 Tablet at 05/01/24 1735    And    polyethylene glycol/lytes (Miralax) Packet 1 Packet  1 Packet Oral QDAY PRN Aristeo Valencia M.D.   1 Packet at 04/28/24 1521    ondansetron (Zofran) syringe/vial injection 4 mg  4 mg Intravenous Q4HRS PRDONOVAN Valencia M.D.        ondansetron (Zofran ODT) dispertab 4 mg  4 mg Oral Q4HRS PRN Aristeo Valencia M.D.        promethazine (Phenergan) tablet 12.5-25 mg  12.5-25 mg Oral Q4HRS PRDONOVAN Valencia M.D.        promethazine (Phenergan) suppository 12.5-25 mg  12.5-25 mg Rectal Q4HRS PRDONOVAN Valencia M.D.        prochlorperazine (Compazine) injection 5-10 mg  5-10 mg Intravenous Q4HRS PRN Aristeo Valencia M.D.        benzonatate (Tessalon) capsule 100 mg  100 mg Oral TID PRN Aristeo Valencia M.D.        pramipexole (Mirapex) tablet 0.125 mg  0.125 mg Oral TID Aristeo Valencia M.D.   0.125 mg at 05/01/24 1556    venlafaxine XR (Effexor XR) capsule 37.5 mg  37.5 mg Oral QAM Aristeo Valencia M.D.   37.5 mg at 05/01/24 0459    mirtazapine (Remeron) tablet 30 mg  30 mg Oral QHS Aristeo Valencia M.D.   30 mg at 04/30/24 2136    oxyCODONE immediate-release (Roxicodone) tablet 5 mg  5 mg Oral Q4HRS PRN CHRISTIANO ShankarP.RTedN.   5 mg at 04/30/24 1948       Allergies  Allergies   Allergen Reactions    Pcn [Penicillins] Vomiting and Nausea    Toradol Vomiting and Nausea       Vital Signs last 24 hours  Temp:  [35.9 °C (96.6 °F)-36.4 °C (97.6 °F)] 36.4 °C (97.5 °F)  Pulse:  [60-70] 64  Resp:  [15-18] 15  BP: (124-146)/(78-89) 142/82  SpO2:  [92 %-100 %] 94 %    Physical Exam  Physical Exam  Constitutional:       General: She is not in acute distress.  HENT:      Mouth/Throat:      Mouth: Mucous membranes are moist.   Eyes:      Pupils: Pupils are equal, round, and reactive to light.   Cardiovascular:      Rate and Rhythm: Normal rate and regular rhythm.      Heart sounds: Murmur  heard.      Comments: S3  Pulmonary:      Effort: Pulmonary effort is normal. No respiratory distress.      Breath sounds: Rales present.   Abdominal:      Palpations: Abdomen is soft.      Tenderness: There is abdominal tenderness. There is no guarding or rebound.      Comments: Right upper quadrant tenderness   Musculoskeletal:      Cervical back: Neck supple.      Right lower leg: No edema.      Left lower leg: No edema.   Skin:     General: Skin is warm and dry.   Neurological:      General: No focal deficit present.      Mental Status: She is alert and oriented to person, place, and time.      Cranial Nerves: No cranial nerve deficit.   Psychiatric:         Mood and Affect: Mood normal.         Behavior: Behavior normal.         Fluids    Intake/Output Summary (Last 24 hours) at 5/2/2024 0001  Last data filed at 5/1/2024 2200  Gross per 24 hour   Intake 383.24 ml   Output --   Net 383.24 ml       Laboratory  Recent Results (from the past 48 hour(s))   CBC WITHOUT DIFFERENTIAL    Collection Time: 04/30/24  3:57 AM   Result Value Ref Range    WBC 12.4 (H) 4.8 - 10.8 K/uL    RBC 3.67 (L) 4.20 - 5.40 M/uL    Hemoglobin 9.9 (L) 12.0 - 16.0 g/dL    Hematocrit 31.7 (L) 37.0 - 47.0 %    MCV 86.4 81.4 - 97.8 fL    MCH 27.0 27.0 - 33.0 pg    MCHC 31.2 (L) 32.2 - 35.5 g/dL    RDW 50.7 (H) 35.9 - 50.0 fL    Platelet Count 191 164 - 446 K/uL    MPV 10.5 9.0 - 12.9 fL   Comp Metabolic Panel    Collection Time: 04/30/24  3:57 AM   Result Value Ref Range    Sodium 135 135 - 145 mmol/L    Potassium 5.4 3.6 - 5.5 mmol/L    Chloride 100 96 - 112 mmol/L    Co2 15 (L) 20 - 33 mmol/L    Anion Gap 20.0 (H) 7.0 - 16.0    Glucose 115 (H) 65 - 99 mg/dL    Bun 28 (H) 8 - 22 mg/dL    Creatinine 1.64 (H) 0.50 - 1.40 mg/dL    Calcium 8.5 8.5 - 10.5 mg/dL    Correct Calcium 9.0 8.5 - 10.5 mg/dL    AST(SGOT) 2739 (HH) 12 - 45 U/L    ALT(SGPT) 1024 (HH) 2 - 50 U/L    Alkaline Phosphatase 135 (H) 30 - 99 U/L    Total Bilirubin 0.6 0.1 - 1.5  mg/dL    Albumin 3.4 3.2 - 4.9 g/dL    Total Protein 7.2 6.0 - 8.2 g/dL    Globulin 3.8 (H) 1.9 - 3.5 g/dL    A-G Ratio 0.9 g/dL   ESTIMATED GFR    Collection Time: 04/30/24  3:57 AM   Result Value Ref Range    GFR (CKD-EPI) 35 (A) >60 mL/min/1.73 m 2   proBrain Natriuretic Peptide, NT    Collection Time: 04/30/24  8:34 AM   Result Value Ref Range    NT-proBNP 93925 (H) 0 - 125 pg/mL   Respiratory Panel by PCR (Inpatient ONLY)    Collection Time: 04/30/24  1:58 PM    Specimen: Nasopharyngeal   Result Value Ref Range    Adenovirus, PCR Not Detected     SARS-CoV-2 (COVID-19) RNA by ARLENE NotDetected     Coronavirus 229E, PCR Not Detected     Coronavirus HKU1, PCR Not Detected     Coronavirus NL63, PCR Not Detected     Coronavirus OC43, PCR Not Detected     Human Metapneumovirus, PCR Not Detected     Rhino/Enterovirus, PCR DETECTED (A)     Influenza A, PCR Not Detected     Influenza B, PCR Not Detected     Parainfluenza 1, PCR Not Detected     Parainfluenza 2, PCR Not Detected     Parainfluenza 3, PCR Not Detected     Parainfluenza 4, PCR Not Detected     RSV (Respiratory Syncytial Virus), PCR Not Detected     Bordetella parapertussis ([id number]), PCR Not Detected     Bordetella pertussis (ptxP), PCR Not Detected     Mycoplasma pneumoniae, PCR Not Detected     Chlamydia pneumoniae, PCR Not Detected    MAGNESIUM    Collection Time: 05/01/24  6:01 AM   Result Value Ref Range    Magnesium 2.7 (H) 1.5 - 2.5 mg/dL   Renal Function Panel    Collection Time: 05/01/24  6:01 AM   Result Value Ref Range    Sodium 134 (L) 135 - 145 mmol/L    Potassium 5.7 (H) 3.6 - 5.5 mmol/L    Chloride 96 96 - 112 mmol/L    Co2 14 (L) 20 - 33 mmol/L    Glucose 87 65 - 99 mg/dL    Creatinine 3.22 (H) 0.50 - 1.40 mg/dL    Bun 52 (H) 8 - 22 mg/dL    Calcium 8.3 (L) 8.5 - 10.5 mg/dL    Correct Calcium 8.9 8.5 - 10.5 mg/dL    Phosphorus 6.4 (H) 2.5 - 4.5 mg/dL    Albumin 3.3 3.2 - 4.9 g/dL   ESTIMATED GFR    Collection Time: 05/01/24  6:01 AM   Result  Value Ref Range    GFR (CKD-EPI) 16 (A) >60 mL/min/1.73 m 2   Comp Metabolic Panel    Collection Time: 05/01/24  6:01 AM   Result Value Ref Range    Sodium 135 135 - 145 mmol/L    Potassium 5.8 (H) 3.6 - 5.5 mmol/L    Chloride 97 96 - 112 mmol/L    Co2 12 (L) 20 - 33 mmol/L    Anion Gap 26.0 (H) 7.0 - 16.0    Glucose 89 65 - 99 mg/dL    Bun 52 (H) 8 - 22 mg/dL    Creatinine 3.19 (H) 0.50 - 1.40 mg/dL    Calcium 8.3 (L) 8.5 - 10.5 mg/dL    Correct Calcium 8.9 8.5 - 10.5 mg/dL    AST(SGOT) 2959 (HH) 12 - 45 U/L    ALT(SGPT) 1377 (HH) 2 - 50 U/L    Alkaline Phosphatase 184 (H) 30 - 99 U/L    Total Bilirubin 1.1 0.1 - 1.5 mg/dL    Albumin 3.3 3.2 - 4.9 g/dL    Total Protein 7.5 6.0 - 8.2 g/dL    Globulin 4.2 (H) 1.9 - 3.5 g/dL    A-G Ratio 0.8 g/dL   ESTIMATED GFR    Collection Time: 05/01/24  6:01 AM   Result Value Ref Range    GFR (CKD-EPI) 16 (A) >60 mL/min/1.73 m 2   CBC WITH DIFFERENTIAL    Collection Time: 05/01/24  7:36 AM   Result Value Ref Range    WBC 16.3 (H) 4.8 - 10.8 K/uL    RBC 4.17 (L) 4.20 - 5.40 M/uL    Hemoglobin 11.2 (L) 12.0 - 16.0 g/dL    Hematocrit 36.0 (L) 37.0 - 47.0 %    MCV 86.3 81.4 - 97.8 fL    MCH 26.9 (L) 27.0 - 33.0 pg    MCHC 31.1 (L) 32.2 - 35.5 g/dL    RDW 51.5 (H) 35.9 - 50.0 fL    Platelet Count 79 (L) 164 - 446 K/uL    MPV 9.7 9.0 - 12.9 fL    Neutrophils-Polys 76.20 (H) 44.00 - 72.00 %    Lymphocytes 13.50 (L) 22.00 - 41.00 %    Monocytes 7.10 0.00 - 13.40 %    Eosinophils 0.20 0.00 - 6.90 %    Basophils 0.50 0.00 - 1.80 %    Immature Granulocytes 2.50 (H) 0.00 - 0.90 %    Nucleated RBC 1.80 (H) 0.00 - 0.20 /100 WBC    Neutrophils (Absolute) 12.41 (H) 1.82 - 7.42 K/uL    Lymphs (Absolute) 2.20 1.00 - 4.80 K/uL    Monos (Absolute) 1.16 (H) 0.00 - 0.85 K/uL    Eos (Absolute) 0.04 0.00 - 0.51 K/uL    Baso (Absolute) 0.08 0.00 - 0.12 K/uL    Immature Granulocytes (abs) 0.41 (H) 0.00 - 0.11 K/uL    NRBC (Absolute) 0.30 K/uL   PLATELET ESTIMATE    Collection Time: 05/01/24  7:36 AM    Result Value Ref Range    Plt Estimation Decreased    IMMATURE PLT FRACTION    Collection Time: 05/01/24  7:36 AM   Result Value Ref Range    Imm. Plt Fraction 8.0 0.6 - 13.1 %   Renal Function Panel    Collection Time: 05/01/24  8:32 PM   Result Value Ref Range    Sodium 133 (L) 135 - 145 mmol/L    Potassium 5.0 3.6 - 5.5 mmol/L    Chloride 97 96 - 112 mmol/L    Co2 15 (L) 20 - 33 mmol/L    Glucose 104 (H) 65 - 99 mg/dL    Creatinine 3.99 (H) 0.50 - 1.40 mg/dL    Bun 58 (H) 8 - 22 mg/dL    Calcium 7.7 (L) 8.5 - 10.5 mg/dL    Correct Calcium 8.3 (L) 8.5 - 10.5 mg/dL    Phosphorus 5.2 (H) 2.5 - 4.5 mg/dL    Albumin 3.2 3.2 - 4.9 g/dL   AMMONIA    Collection Time: 05/01/24  8:32 PM   Result Value Ref Range    Ammonia 19 11 - 45 umol/L   Prothrombin Time    Collection Time: 05/01/24  8:32 PM   Result Value Ref Range    PT 32.8 (H) 12.0 - 14.6 sec    INR 3.14 (H) 0.87 - 1.13   ESTIMATED GFR    Collection Time: 05/01/24  8:32 PM   Result Value Ref Range    GFR (CKD-EPI) 12 (A) >60 mL/min/1.73 m 2   IONIZED CALCIUM    Collection Time: 05/01/24 10:55 PM   Result Value Ref Range    Ionized Calcium 1.0 (L) 1.1 - 1.3 mmol/L   LACTIC ACID    Collection Time: 05/01/24 10:55 PM   Result Value Ref Range    Lactic Acid 2.9 (H) 0.5 - 2.0 mmol/L   CREATINE KINASE    Collection Time: 05/01/24 10:55 PM   Result Value Ref Range    CPK Total 630 (H) 0 - 154 U/L   PROCALCITONIN    Collection Time: 05/01/24 10:55 PM   Result Value Ref Range    Procalcitonin 2.16 (H) <0.25 ng/mL   URINALYSIS    Collection Time: 05/01/24 10:55 PM    Specimen: Urine, Guerrero Cath   Result Value Ref Range    Color Orange (A)     Character Turbid (A)     Specific Gravity 1.017 <1.035    Ph 5.0 5.0 - 8.0    Glucose Negative Negative mg/dL    Ketones Trace (A) Negative mg/dL    Protein 300 (A) Negative mg/dL    Bilirubin Moderate (A) Negative    Urobilinogen, Urine 0.2 Negative    Nitrite Positive (A) Negative    Leukocyte Esterase Small (A) Negative    Occult  Blood Small (A) Negative    Micro Urine Req Microscopic    Urine Sodium Random    Collection Time: 05/01/24 10:55 PM   Result Value Ref Range    Sodium, Urine -per volume 52 mmol/L   Urine Creatinine Random    Collection Time: 05/01/24 10:55 PM   Result Value Ref Range    Creatinine, Random Urine 111.65 mg/dL   URINE MICROSCOPIC (W/UA)    Collection Time: 05/01/24 10:55 PM   Result Value Ref Range    WBC 5-10 (A) /hpf    RBC 0-2 /hpf    Bacteria Negative None /hpf    Epithelial Cells Few /hpf    Epithelial Cells Renal Rare /hpf    Hyaline Cast 11-20 (A) /lpf       Imaging  DX-CHEST-LIMITED (1 VIEW)   Final Result         1.  No acute cardiopulmonary disease.   2.  Atherosclerosis      EC-ECHOCARDIOGRAM COMPLETE W/ CONT   Final Result      DX-CHEST-LIMITED (1 VIEW)   Final Result      1.  Enlarged cardiac silhouette with changes of pulmonary edema.      [id number] VIEW   Final Result      1.  Nonobstructive bowel gas pattern with a large amount of colonic stool.      US-RUQ    (Results Pending)       Assessment/Plan  * Heart failure with reduced ejection fraction (HCC)- (present on admission)  Assessment & Plan  4/29 echo with EF <15%, severe mitral valve regurgitation, grade 2 diastolic dysfunction, and moderate left atrial dilatation. BNP 60K.  Prior TE echo in 2022 did not show major valvular disease and EF was 35%.   Patient is on room air, repeated CXR shows improved pulmonary edema.   Lasix 80 mg IV single dose - eval response   Start dobutamine @ 2.5, titrate based on end organ perfusion  Trend Lactic acid  Cardiology on consult   Hold beta blocker  Trend lactic acid  Strict I and Os   Cardiac diet    Coagulopathy (HCC)  Assessment & Plan  Prolonged INR in the setting of congestive hepatopathy  ABDULAZIZ in the differential given new thrombocytopenia and petechial rash   No signs of bleeding, VTE or hemolysis  Check fibrinogen   Monitor INR and platelets  TEG    IVON (acute kidney injury) (HCC)  Assessment &  Plan  Cr 1.33>1.64>3.2>3.66  BUN:Cr 14. Suspect cardiorenal syndrome.   Guerrero in place - strict I and Os   UA and FeNA pending   Lasix challenge 80mg IVP once  Monitor volume status, electrolytes, urine output.  Avoid nephrotoxic agents.      Transaminitis  Assessment & Plan  Attributed to congestive hepatopathy from heart failure and severe MR.   Viral hepatitis panel negative.  - Check RUQ US  - Monitor INR and CMP    Severe mitral regurgitation- (present on admission)  Assessment & Plan  Noted this hospitalization, absent in 2022. W/ moderate LA dilatation. Patient is hemodynamically stable at this point but concern for hepatorrenal sx and congestive hepatopathy, high risk for decompensation.   - See plan for heart failure     Colitis- (present on admission)  Assessment & Plan  On admission - concern fo bacterial translocation  Continue Flagyl and Ceftriaxone for now     Impacted stool in intestine (HCC)- (present on admission)  Assessment & Plan  Responded to aggressive bowel regimen, last BM 4/29. Abdomen is benign non distended.   - Cont bowel regimen     Hyperkalemia- (present on admission)  Assessment & Plan  Hypokalemic on admission, now hyperkalemic this AM. Potentially from supplementation.  - Check CPK, rhabdo can happen in severe enterovirus cases   - Monitor     Leukocytosis- (present on admission)  Assessment & Plan  Ongoing viral disease  Pt afebrile, no other SIRS criteria  Pending blood cultures    Stroke (HCC)- (present on admission)  Assessment & Plan  History of   Residual expressive aphasia - at baseline  Delirium precautions        Discussed patient condition and risk of morbidity and/or mortality with RN, Pharmacy, and cardiology.      The patient remains critically ill,  I have assessed and reassessed the blood pressure,  cardiovascular status, labs and response to interventions. This patient remains at high risk for worsening shock and death without the above critical care  interventions.    Critical care time = 80 minutes in directly providing and coordinating critical care and extensive data review.  No time overlap and excludes procedures.

## 2024-05-02 NOTE — PROGRESS NOTES
Dr. Nicole and Dr. Concepcion notified of critical fibrinogen of 80. Also notified of continued drop in plts to 50. No new orders at this time.

## 2024-05-02 NOTE — ASSESSMENT & PLAN NOTE
4/29 echo with EF <15%, severe mitral valve regurgitation, grade 2 diastolic dysfunction, and moderate left atrial dilatation. BNP 60K.  Prior TE echo in 2022 did not show major valvular disease and EF was 35%.     Strict I and Os   Cardiac diet  Continue lasix TID

## 2024-05-02 NOTE — PROGRESS NOTES
Lab called with critical result of fibrinogen of 91 at 1535. Critical lab result read back to Lab.   Dr. Adams notified of critical lab result at 1539.  Critical lab result read back by Dr. Adams.

## 2024-05-02 NOTE — PROGRESS NOTES
4 Eyes Skin Assessment Completed by KATIA Price and KATIA Conrad.    Head WDL  Ears WDL  Nose WDL  Mouth WDL  Neck Redness, small scattered scabs  Breast/Chest Redness and Scab, small scattered scabs  Shoulder Blades WDL  Spine Redness on lower back with scattered small scabbing  (R) Arm/Elbow/Hand Redness, Scab, and Discoloration, Dryness, Petechiae scattered scabs  (L) Arm/Elbow/Hand Discoloration, Dryness, Petechiae scattered scabs on wrist/hand/digits  Abdomen WDL  Groin WDL  Scrotum/Coccyx/Buttocks Redness and Non-Blanching, Scabbed scratches  (R) Leg Scab scattered over leg  (L) Leg Scab scattered over leg  (R) Heel/Foot/Toe Dry, Boggy, Blanching  (L) Heel/Foot/Toe Dry, Boggy, Blanching          Devices In Places ECG, Blood Pressure Cuff, Pulse Ox, Guerrero, and SCD's      Interventions In Place Sacral Mepilex, TAP System, Pillows, Q2 Turns, Low Air Loss Mattress, Heels Loaded W/Pillows, and Pressure Redistribution Mattress    Possible Skin Injury Yes    Pictures Uploaded Into Epic Yes  Wound Consult Placed Yes  RN Wound Prevention Protocol Ordered Yes

## 2024-05-02 NOTE — WOUND TEAM
Wound consult received regarding patient RUE rash/scabs and patient sacrum scratches. Patient was seen and evaluated by wound team on 4/28/24. Patient likely scratching areas and making them more excoriated. Ordered mupirocin ointment to be applied. No further advanced wound care needs at this time.

## 2024-05-02 NOTE — PROGRESS NOTES
Cardiology Follow Up Progress Note    Date of Service  5/1/2024    Attending Physician  Steve Swartz D.O.    Chief Complaint   Heart failure with reduced ejection fraction    HPI  Fouzia Santamaria is a 63 y.o. female admitted 4/27/2024 with a past medical history of heart failure with reduced ejection fraction, coronary artery disease with prior drug-eluting stent placement into the left anterior descending artery consisting of a 2.75 x 20 mm Synergy drug-eluting stent for acute anterior ST elevation myocardial infarction, prior aortic occlusion status post vascular invention, prior aortic thrombus back in August 2022, prior aortofemoral bypass surgery, prior CVA  who presented 4/27/2024 with worsening dyspnea found to have a acute systolic heart failure decompensation with accompanying constipation    Interim Events  No acute overnight events however throughout the day patient is seen worsening of her transaminitis, acute renal failure and recurrence of her word finding disorientation.  No acute events per nursing staff and discussion with night nursing staff demonstrates no significant changes through signout.    Review of Systems  Review of Systems    Vital signs in last 24 hours  Temp:  [35.9 °C (96.6 °F)-36.4 °C (97.6 °F)] 36.4 °C (97.6 °F)  Pulse:  [60-66] 66  Resp:  [17-18] 18  BP: (126-146)/(78-89) 145/78  SpO2:  [92 %-100 %] 99 %    Physical Exam  Physical Exam  Vitals and nursing note reviewed.   Constitutional:       Appearance: Normal appearance. She is ill-appearing.   HENT:      Head: Normocephalic and atraumatic.      Nose: Nose normal.      Mouth/Throat:      Mouth: Mucous membranes are moist.      Pharynx: Oropharynx is clear.   Eyes:      Pupils: Pupils are equal, round, and reactive to light.   Cardiovascular:      Rate and Rhythm: Normal rate and regular rhythm.      Heart sounds: No murmur heard.     No friction rub. No gallop.   Pulmonary:      Effort: Pulmonary effort is normal.       Breath sounds: Rales present. No wheezing or rhonchi.   Abdominal:      General: Bowel sounds are normal.      Palpations: Abdomen is soft.   Musculoskeletal:         General: Normal range of motion.      Cervical back: Normal range of motion and neck supple.      Right lower leg: No edema.      Left lower leg: No edema.   Skin:     General: Skin is warm and dry.   Neurological:      General: No focal deficit present.      Mental Status: She is alert. Mental status is at baseline.      Comments: Not able to elucidateWhere she currently is         Lab Review  Lab Results   Component Value Date/Time    WBC 16.3 (H) 05/01/2024 07:36 AM    RBC 4.17 (L) 05/01/2024 07:36 AM    HEMOGLOBIN 11.2 (L) 05/01/2024 07:36 AM    HEMATOCRIT 36.0 (L) 05/01/2024 07:36 AM    MCV 86.3 05/01/2024 07:36 AM    MCH 26.9 (L) 05/01/2024 07:36 AM    MCHC 31.1 (L) 05/01/2024 07:36 AM    MPV 9.7 05/01/2024 07:36 AM      Lab Results   Component Value Date/Time    SODIUM 134 (L) 05/01/2024 06:01 AM    SODIUM 135 05/01/2024 06:01 AM    POTASSIUM 5.7 (H) 05/01/2024 06:01 AM    POTASSIUM 5.8 (H) 05/01/2024 06:01 AM    CHLORIDE 96 05/01/2024 06:01 AM    CHLORIDE 97 05/01/2024 06:01 AM    CO2 14 (L) 05/01/2024 06:01 AM    CO2 12 (L) 05/01/2024 06:01 AM    GLUCOSE 87 05/01/2024 06:01 AM    GLUCOSE 89 05/01/2024 06:01 AM    BUN 52 (H) 05/01/2024 06:01 AM    BUN 52 (H) 05/01/2024 06:01 AM    CREATININE 3.22 (H) 05/01/2024 06:01 AM    CREATININE 3.19 (H) 05/01/2024 06:01 AM    BUNCREATRAT 25.0 07/10/2022 04:21 AM      Lab Results   Component Value Date/Time    ASTSGOT 2959 (HH) 05/01/2024 06:01 AM    ALTSGPT 1377 (HH) 05/01/2024 06:01 AM     Lab Results   Component Value Date/Time    CHOLSTRLTOT 89 (L) 08/29/2022 05:15 AM    LDL 34 08/29/2022 05:15 AM    HDL 32 (A) 08/29/2022 05:15 AM    TRIGLYCERIDE 111 08/31/2022 04:25 AM    TROPONINT 21 (H) 09/06/2023 06:16 PM       Recent Labs     04/30/24  0834   NTPROBNP 20800*         Assessment/Plan  1.  Worsening  transaminitis suggestive of acute liver dysfunction/failure  2.  Hyperkalemia  3.  Acute kidney injury/acute renal failure  4.  Heart failure with reduced ejection fraction  5.  Constipation  6.  Metabolic encephalopathy  7.  Severe mitral valve regurgitation  8.  Peripheral vascular disease with prior aortic thrombus status post aortofemoral bypass  9.  Hypertension  10.  Prior CVA      Clinically, she is doing very poorly and demonstrating signs of multiorgan failure in which her transaminitis is continue to worsen with uncertainty as to coagulopathy however suggestive of worsening liver hepatopathy concerning for worsening cardiac etiology with accompanying electrolyte abnormalities.  At this time patient has progressed to a more critical illness with signs of multiorgan failure.  Is recommended that the patient be transferred to the intensive care unit for further evaluation and critical care consultation as well as patient will be placed on dobutamine 2.5 mg/kg/min to try and augment her overall heart failure with reduced ejection fraction.  I suspect that there is a component of hepatic and renal congestion however I do not feel that this wholeheartedly explains her worsening clinical situation.  Patient is not able to answer questions such as her overall location.  She remains hemodynamically stable however I have strong reservations that she has become critically ill due to an unknown etiology at this time.  There was concern for possible pulmonary edema in the community-acquired pneumonia which is being managed by the primary medicine service but this has been ruled out.  Will make further recommendations pending her overall response to dobutamine therapy.  We will check lab work as directed by the primary medicine service and would recommend lactate as well as a comprehensive metabolic panel.  Pending overall initial workup would recommend patient be adequately resuscitated in terms of fluid hydration.   Not unreasonable to consider goals of care discussion with family when present.    In terms of her other medical conditions I will defer to the primary medicine service.    Thank you for allowing me to participate in the care of this patient.  I will continue to follow this patient    Please contact me with any questions.    Breezy Chau D.O.   Cardiologist, [facility]  [phone]

## 2024-05-02 NOTE — CONSULTS
Critical Care Consultation    Date of Service: 5/1/2024    Date of Admission:  4/27/2024  5:10 PM    Consulting Physician: Steve Swartz D.O.    Chief Complaint:  Abdominal Pain (X 10 days, seen and discharged at that time), Body Aches (X 2 days), Constipation (X 3 days), and N/V (X 2 days)      History of Present Illness:     Fouzia Santamaria is a 63 y.o. female with a history of CAD (s/p PCI to LAD), HFrEF, prior descending aorta thrombus back in August 2022, PVD with prior thoracic aorta occlusion status post vascular invention s/p aortofemoral bypass surgery, COPD, prediabetes, hypertension, and prior CVA with residual aphasia, who presented 4/27/2024 with body aches, flulike symptoms, abdominal pain with constipation and nausea vomiting.     WBC was 15.1 with a left shift.  Chest x-ray showed mild pulmonary edema vs. Infiltrates so she was started on CAP therapy with ceftriaxone and azithromycin.  Abdominal x-ray showed significant amount of stool burden, so she was started on aggressive bowel regimen and flagyl was added to her regimen.  IVF stopped due to proBNP of 28,000 and CAP therapy was dc. Her echo showed a decreased EF from 35% to <15%, severe mitral valve regurgitation, grade 2 diastolic dysfunction, normal RV size and function, normal IVC size, and moderate left atrial dilatation. Cardiology was consulted, and considered starting GDMT plus Lasix 40 BID. She is Rhinovirus/enterovirus positive.     Hospital course is complicated by transaminitis, [place] [address], Cr 1.64 --> 3.66. Given the concern for HF exacerbation with hepatic congestion and a component of cardiorenal syndrome, we have been consulted. Patient has no acute complains besides mild RUQ abdominal pain and an itchy rash.       Review of Systems   Constitutional:  Negative for fever.   HENT:  Positive for congestion.    Respiratory:  Positive for cough and sputum production.    Cardiovascular:  Negative for chest pain,  "palpitations, orthopnea, leg swelling and PND.   Gastrointestinal:  Positive for abdominal pain and constipation. Negative for blood in stool, nausea and vomiting.   Musculoskeletal:  Negative for joint pain.   Skin:  Positive for itching and rash.        Unable to tell me for how long    Neurological:  Negative for headaches.       Home Medications       Reviewed by Teja Heck R.N. (Registered Nurse) on 04/29/24 at 1843  Med List Status: Complete     Medication Last Dose Status   acetaminophen (TYLENOL) 500 MG Tab 4/26/2024 Active   atorvastatin (LIPITOR) 10 MG Tab  Active   clopidogrel (PLAVIX) 75 MG Tab  Active   ezetimibe (ZETIA) 10 MG Tab  Active   furosemide (LASIX) 20 MG Tab  Active   hydrOXYzine HCl (ATARAX) 25 MG Tab 4/25/2024 Active   lisinopril (PRINIVIL) 10 MG Tab  Active   metoprolol tartrate (LOPRESSOR) 25 MG Tab  Active   mirtazapine (REMERON) 30 MG Tab tablet 4/26/2024 Active   ondansetron (ZOFRAN ODT) 4 MG TABLET DISPERSIBLE PRN Active   pramipexole (MIRAPEX) 0.125 MG Tab 4/27/2024 Active   venlafaxine XR (EFFEXOR XR) 37.5 MG CAPSULE SR 24 HR 4/27/2024 Active                    Social History     Tobacco Use    Smoking status: Former     Current packs/day: 2.00     Average packs/day: 2.0 packs/day for 48.0 years (96.0 ttl pk-yrs)     Types: Cigarettes    Smokeless tobacco: Never   Vaping Use    Vaping Use: Never used   Substance Use Topics    Alcohol use: No    Drug use: Yes     Types: Marijuana, Inhaled        Past Medical History:   Diagnosis Date    At risk for delirium 9/13/2022    Chronic obstructive pulmonary disease (HCC)     Diabetes (HCC)     Encounter to establish care 10/19/2022    Patient here to establish care.  Patient was recently in the hospital for over a month secondary to several blood clots in need of multiple surgeries including: aorto-bifemoral bypass and bilateral left renal artery eversion endarterectomies.  Patient surgery was complicated \"by hypotension and " "evidence of hemorrhage that she was taken back to the operating room emergently for exploration where sh    Heart attack (HCC)     Hydronephrosis 8/24/2022    Hypertension     Hypomagnesemia 8/29/2022    Stroke (HCC)        Past Surgical History:   Procedure Laterality Date    DC EXPLORATORY OF ABDOMEN  8/28/2022    Procedure: LAPAROTOMY, EXPLORATORY;  Surgeon: Brandyn Siu M.D.;  Location: SURGERY [facility];  Service: General    AORTOBIFEM BYPASS Bilateral 8/26/2022    Procedure: CREATION, BYPASS, ARTERIAL, AORTA TO FEMORAL, BILATERAL;  Surgeon: Brandyn Siu M.D.;  Location: SURGERY [facility];  Service: General    ENDARTERECTOMY Bilateral 8/26/2022    Procedure: RENAL ARTERY ENDARTERECTOMY;  Surgeon: Brandyn Siu M.D.;  Location: SURGERY [facility];  Service: General    DC EXPLORATORY OF ABDOMEN  8/26/2022    Procedure: LAPAROTOMY, EXPLORATORY; CONTROL OF POST-OPERATIVE BLEEDING;  Surgeon: Brandyn Siu M.D.;  Location: SURGERY [facility];  Service: General    APPLICATION OR REPLACEMENT, WOUND VAC  8/26/2022    Procedure: APPLICATION OR REPLACEMENT, WOUND VAC;  Surgeon: Brandyn Siu M.D.;  Location: SURGERY [facility];  Service: General    STENT PLACEMENT      THYROIDECTOMY         Allergies: Pcn [penicillins] and Toradol    History reviewed. No pertinent family history.    Vitals:    04/27/24 1646 04/27/24 1655 04/27/24 1800 04/27/24 2050   Height:  1.651 m (5' 5\")     Weight:  56.7 kg (125 lb)     Weight % change since last entry.:  0 %     BP: 136/86  124/79 (!) 133/93   Pulse: 89  79 92   BMI (Calculated):  20.8     Resp: 18  20 17   Temp: 37 °C (98.6 °F)   36.8 °C (98.3 °F)   TempSrc: Temporal   Temporal    04/27/24 2150 04/28/24 0426 04/28/24 0810 04/28/24 0824   Height:       Weight:       Weight % change since last entry.:       BP:  (!) 144/111 (!) 153/119 (!) 155/104   Pulse:  74 (!) 106 99   BMI (Calculated):       Resp: 17 16 19 18   Temp:  36.8 °C (98.2 °F) 35.9 °C (96.7 °F)    TempSrc: "  Temporal Temporal     04/28/24 1639 04/28/24 1950 04/29/24 0306 04/29/24 0839   Height:       Weight:       Weight % change since last entry.:       BP: (!) 154/109 (!) 151/106 (!) 154/113 (!) 165/118   Pulse: (!) 102 (!) 106 91 91   BMI (Calculated):       Resp: 17 18 18 18   Temp: 36 °C (96.8 °F) 36.2 °C (97.2 °F) 36.3 °C (97.3 °F) 36.4 °C (97.5 °F)   TempSrc: Temporal Temporal Temporal Temporal    04/29/24 1033 04/29/24 1401 04/29/24 1440 04/29/24 1540   Height:       Weight:       Weight % change since last entry.:       BP:  (!) 141/108  99/51   Pulse: 89 81 79 79   BMI (Calculated):       Resp: 20 16 17   Temp:    36.4 °C (97.5 °F)   TempSrc:    Temporal    04/29/24 1609 04/29/24 1927 04/30/24 0342 04/30/24 0807   Height:       Weight:       Weight % change since last entry.:       BP: (!) 138/100 (!) 134/94 (!) 149/92 (!) 149/104   Pulse: 82 66 71 68   BMI (Calculated):       Resp:  19 16 18   Temp:  36.2 °C (97.2 °F) 36.7 °C (98 °F) 36.3 °C (97.4 °F)   TempSrc:  Temporal Temporal Temporal    04/30/24 1524 04/30/24 1927 04/30/24 1948 04/30/24 2055   Height:       Weight:       Weight % change since last entry.:       BP: (!) 140/100 (!) 138/90     Pulse: 64 64     BMI (Calculated):       Resp: 19 18 18 18   Temp: 36 °C (96.8 °F) 36.2 °C (97.2 °F)     TempSrc: Temporal Temporal      04/30/24 2135 05/01/24 0023 05/01/24 0446 05/01/24 0756   Height:       Weight:       Weight % change since last entry.:       BP:   134/85 (!) 146/80   Pulse:   62 60   BMI (Calculated):       Resp: 18 17 18 18   Temp:   36.1 °C (97 °F) 36.2 °C (97.2 °F)   TempSrc:   Temporal Temporal    05/01/24 1550 05/01/24 2031 05/01/24 2052 05/01/24 2115   Height:       Weight:    53.8 kg (118 lb 9.7 oz)   Weight % change since last entry.:    -5.11 %   BP: 126/89 (!) 145/78 124/88    Pulse: 66 66 66    BMI (Calculated):       Resp: 18 18 15    Temp: 35.9 °C (96.6 °F) 36.4 °C (97.6 °F) 36.4 °C (97.5 °F)    TempSrc: Temporal Temporal       05/01/24 2130 05/01/24 2145 05/01/24 2200   Height:      Weight:      Weight % change since last entry.:      BP: 134/86 138/84 (!) 142/82   Pulse: 70 62 64   BMI (Calculated):      Resp:      Temp:      TempSrc:          Physical Examination  Physical Exam  Vitals and nursing note reviewed.   Constitutional:       General: She is not in acute distress.     Appearance: She is not ill-appearing or toxic-appearing.   HENT:      Head: Normocephalic and atraumatic.      Mouth/Throat:      Mouth: Mucous membranes are moist.   Eyes:      Extraocular Movements: Extraocular movements intact.      Conjunctiva/sclera: Conjunctivae normal.      Pupils: Pupils are equal, round, and reactive to light.   Cardiovascular:      Rate and Rhythm: Normal rate and regular rhythm.      Comments: S3 present  Pulmonary:      Effort: Pulmonary effort is normal.      Breath sounds: Rales (in right base) present.   Abdominal:      General: Abdomen is flat. There is no distension.      Palpations: Abdomen is soft.      Tenderness: There is abdominal tenderness (RUQ). There is no guarding or rebound.   Musculoskeletal:         General: Normal range of motion.      Cervical back: Neck supple.   Skin:     General: Skin is warm.      Findings: Rash (petechial with scratches, compromises extensor aspect of arms, legs and plantar aspects - not much in thorax or abdomen) present.   Neurological:      Mental Status: She is alert. Mental status is at baseline.      Comments: Severe aphasia unable to speak in full sentences            Intake/Output Summary (Last 24 hours) at 5/1/2024 2138  Last data filed at 5/1/2024 1500  Gross per 24 hour   Intake 380 ml   Output --   Net 380 ml       Recent Labs     04/29/24  0523 04/30/24  0357 05/01/24  0601 05/01/24  0736   WBC 10.7 12.4*  --  16.3*   NEUTSPOLYS 81.60*  --   --  76.20*   LYMPHOCYTES 10.40*  --   --  13.50*   MONOCYTES 6.60  --   --  7.10   EOSINOPHILS 0.10  --   --  0.20   BASOPHILS 0.20  --   --   0.50   ASTSGOT 684* 2739* 2959*  --    ALTSGPT 358* 1024* 1377*  --    ALKPHOSPHAT 126* 135* 184*  --    TBILIRUBIN 0.4 0.6 1.1  --      Recent Labs     04/29/24  0523 04/30/24 0357 05/01/24  0601 05/01/24 2032   SODIUM 138 135 135  134* 133*   POTASSIUM 5.8* 5.4 5.8*  5.7* 5.0   CHLORIDE 104 100 97  96 97   CO2 15* 15* 12*  14* 15*   BUN 21 28* 52*  52* 58*   CREATININE 1.33 1.64* 3.19*  3.22* 3.99*   MAGNESIUM 2.9*  --  2.7*  --    PHOSPHORUS  --   --  6.4* 5.2*   CALCIUM 8.7 8.5 8.3*  8.3* 7.7*     Recent Labs     04/29/24 0523 04/30/24 0357 05/01/24 0601 05/01/24 2032   ALTSGPT 358* 1024* 1377*  --    ASTSGOT 684* 2739* 2959*  --    ALKPHOSPHAT 126* 135* 184*  --    TBILIRUBIN 0.4 0.6 1.1  --    GLUCOSE 185* 115* 89  87 104*         DX-CHEST-LIMITED (1 VIEW)   Final Result         1.  No acute cardiopulmonary disease.   2.  Atherosclerosis      EC-ECHOCARDIOGRAM COMPLETE W/ CONT   Final Result      DX-CHEST-LIMITED (1 VIEW)   Final Result      1.  Enlarged cardiac silhouette with changes of pulmonary edema.      [id number] VIEW   Final Result      1.  Nonobstructive bowel gas pattern with a large amount of colonic stool.      US-RUQ    (Results Pending)       Patient Active Problem List   Diagnosis    Critical ischemia of lower extremity (HCC)    CAD (coronary artery disease)    Acute blood loss anemia    QT prolongation    Peripheral arterial occlusive disease (HCC)    Hypertension    Acute kidney injury superimposed on CKD (HCC)    Hydronephrosis    COPD (chronic obstructive pulmonary disease) (HCC)    Aortic thrombus (HCC)    Chronic systolic congestive heart failure (HCC)    Stroke (HCC)    Oropharyngeal dysphagia    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HCC)    CKD (chronic kidney disease) stage 3, GFR 30-59 ml/min    Renal artery stenosis (HCC)    Postoperative hemorrhage involving circulatory system following circulatory system procedure    Cardiomyopathy (HCC)    Reactive depression    Encounter for  medication refill    Muscle pain    Vomiting    Cough    Multifocal pneumonia    Moderate protein-calorie malnutrition (HCC)    Leukocytosis    Sepsis (HCC)    Abdominal pain    Community acquired pneumonia, unspecified laterality    Hyperkalemia    Constipation    Impacted stool in intestine (HCC)    Colitis    Heart failure with reduced ejection fraction (HCC)    Severe mitral regurgitation    Transaminitis    IVON (acute kidney injury) (HCC)    CHF (congestive heart failure), NYHA class III, acute, combined (HCC)    Coagulopathy (HCC)       Assessment and Plan:    * Heart failure with reduced ejection fraction (HCC)- (present on admission)  Assessment & Plan  4/29 echo with EF <15%, severe mitral valve regurgitation, grade 2 diastolic dysfunction, and moderate left atrial dilatation. BNP 60K.  Prior TE echo in 2022 did not show major valvular disease and EF was 35%.   Patient is on room air, repeated CXR shows improved pulmonary edema.   Lasix 80 mg IV single dose - eval response   Start dobutamine @ 2.5   Cardiology on consult   Hold beta blocker  Trend lactic acid  Strict I and Os   Cardiac diet    IVON (acute kidney injury) (HCC)  Assessment & Plan  Cr 1.33>1.64>3.2>3.66  BUN:Cr 14. Suspect cardiorenal syndrome.   Guerrero in place - strict I and Os   UA and FeNA pending     Coagulopathy (HCC)  Assessment & Plan  Prolonged INR in the setting of congestive hepatopathy  ABDULAZIZ in the differential given new thrombocytopenia and petechial rash   No signs of bleeding, VTE or hemolysis  Check fibrinogen   Monitor INR and platelets  Check TEG    Transaminitis  Assessment & Plan  Suspect hepatic congestion from heart failure and severe MR.   Viral hepatitis panel negative.  - Check RUQ US  - Monitor INR and CMP    Severe mitral regurgitation- (present on admission)  Assessment & Plan  Noted this hospitalization, absent in 2022. W/ moderate LA dilatation. Patient is hemodynamically stable at this point but concern for  hepatorrenal sx and congestive hepatopathy, high risk for decompensation.   - See plan for heart failure     Colitis- (present on admission)  Assessment & Plan  On admission - concern fo bacterial translocation  Continue Flagyl and Ceftriaxone for now     Impacted stool in intestine (HCC)- (present on admission)  Assessment & Plan  Responded to aggressive bowel regimen, last BM 4/29. Abdomen is benign non distended.   - Cont bowel regimen     Hyperkalemia- (present on admission)  Assessment & Plan  Hypokalemic on admission, now hyperkalemic this AM. Potentially from supplementation.  - Check CPK, rhabdo can happen in severe enterovirus cases   - Monitor     Leukocytosis- (present on admission)  Assessment & Plan  Ongoing viral disease  Pt afebrile, no other SIRS criteria  Pending blood cultures    Stroke (HCC)- (present on admission)  Assessment & Plan  History of   Residual expressive aphasia - at baseline  Delirium precautions       Quality Measures:  Feeding: Cardiac diet  Analgesia: PRN opioid  Sedation: N/A  Thromboprophylaxis: Heparin sq  Head of bed: >30 degrees  Ulcer prophylaxis: N/A  Glycemic control: Monitor FBG  Bowel care: bowel regimen PRN  Indwelling lines: PIVs - no central line  Deescalation of antibiotics:  Ceftriaxone and flagyl      Nicky Concepcion M.D., MD  Patient is critically ill. Case discussed with Dr. Vazquez.

## 2024-05-02 NOTE — PROGRESS NOTES
2200: Dr. Concepcion notified that u/o after williamson insertion was 60mL of dark urine. Orders received    0300: Dr. Concepcion notified of 5mL u/o post lasix administration.

## 2024-05-02 NOTE — PROGRESS NOTES
Cardiology Follow Up Progress Note    Date of Service  5/2/2024    Attending Physician  Steve Swartz D.O.    Chief Complaint   Heart failure with reduced ejection fraction    HPI  Fouzia Santamaria is a 63 y.o. 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  At this time the etiology of her decline has not been fully elucidated however further workup is pending per critical care.   is currently at bedside and notes that since her prior cerebrovascular accident her overall speech patterns are very simplistic and at times word finding    Review of Systems  Review of Systems    Vital signs in last 24 hours  Temp:  [35.9 °C (96.6 °F)-36.7 °C (98 °F)] 36.2 °C (97.2 °F)  Pulse:  [62-86] 66  Resp:  [15-46] 25  BP: (111-145)/(59-90) 117/74  SpO2:  [87 %-100 %] 100 %    Physical Exam  Physical Exam  Vitals and nursing note reviewed.   Constitutional:       Appearance: Normal appearance. She is ill-appearing.   HENT:      Head: Normocephalic and atraumatic.      Nose: Nose normal.      Mouth/Throat:      Mouth: Mucous membranes are moist.      Pharynx: Oropharynx is clear.   Eyes:      Pupils: Pupils are equal, round, and reactive to light.   Cardiovascular:      Rate and Rhythm: Normal rate and regular rhythm.      Heart sounds: No murmur heard.     No friction  rub. No gallop.   Pulmonary:      Effort: Pulmonary effort is normal.      Breath sounds: No wheezing, rhonchi or rales.   Abdominal:      General: Bowel sounds are normal.      Palpations: Abdomen is soft.   Musculoskeletal:         General: Normal range of motion.      Cervical back: Normal range of motion and neck supple.      Right lower leg: No edema.      Left lower leg: No edema.   Skin:     General: Skin is warm and dry.   Neurological:      General: No focal deficit present.      Mental Status: She is alert. Mental status is at baseline.      Comments: Not able to elucidateWhere she currently is         Lab Review  Lab Results   Component Value Date/Time    WBC 16.3 (H) 05/02/2024 04:00 AM    RBC 3.27 (L) 05/02/2024 04:00 AM    HEMOGLOBIN 8.9 (L) 05/02/2024 04:00 AM    HEMATOCRIT 28.3 (L) 05/02/2024 04:00 AM    MCV 86.5 05/02/2024 04:00 AM    MCH 27.2 05/02/2024 04:00 AM    MCHC 31.4 (L) 05/02/2024 04:00 AM    MPV 9.2 05/02/2024 04:00 AM      Lab Results   Component Value Date/Time    SODIUM 131 (L) 05/02/2024 04:00 AM    POTASSIUM 4.7 05/02/2024 04:00 AM    CHLORIDE 97 05/02/2024 04:00 AM    CO2 18 (L) 05/02/2024 04:00 AM    GLUCOSE 102 (H) 05/02/2024 04:00 AM    BUN 62 (H) 05/02/2024 04:00 AM    CREATININE 4.45 (HH) 05/02/2024 04:00 AM    BUNCREATRAT 25.0 07/10/2022 04:21 AM      Lab Results   Component Value Date/Time    ASTSGOT 1237 (HH) 05/02/2024 04:00 AM    ALTSGPT 908 (H) 05/02/2024 04:00 AM     Lab Results   Component Value Date/Time    CHOLSTRLTOT 89 (L) 08/29/2022 05:15 AM    LDL 34 08/29/2022 05:15 AM    HDL 32 (A) 08/29/2022 05:15 AM    TRIGLYCERIDE 111 08/31/2022 04:25 AM    TROPONINT 21 (H) 09/06/2023 06:16 PM       Recent Labs     04/30/24  0834   NTPROBNP [id number]*         Assessment/Plan  1.  Improving transaminitis   2.  Hyperkalemia  3.  Acute kidney injury/acute renal failure with possible need for dialysis  4.  Heart failure with reduced ejection fraction  5.  Constipation  6.  Metabolic  encephalopathy, largely at baseline  7.  Severe mitral valve regurgitation, possibly overestimated  8.  Peripheral vascular disease with prior aortic thrombus status post aortofemoral bypass  9.  Hypertension  10.  Prior CVA      Clinically, she continues to remain in critical care however is seen improvement in overall liver function however her kidney function has not significantly improved and largely continues to decline.  Her liver enzymes have slightly improved.  Her total CPK is elevated and lactic acid has subsequently normalized.  At this time I have a low clinical suspicion for overt heart failure with reduced ejection fraction exacerbation as the overall etiology.  Her dobutamine therapy has been stopped and is she is undergoing further workup from the critical care service.  I had an extensive discussion with Dr. Adams regarding the patient's care and are in agreement that this likely does not represent hepatic or renal congestion given her overall systemic blood pressure.    In terms of her other medical conditions I will defer to the primary medicine service.    Thank you for allowing me to participate in the care of this patient.  I will continue to follow this patient    Please contact me with any questions.    Breezy Chau D.O.   Cardiologist, [facility]  [phone]

## 2024-05-02 NOTE — PROGRESS NOTES
Lab called with critical result of CPK 1765 at 0959. Critical lab result read back to Lab.   Dr. Adams notified of critical lab result at 1002.  Critical lab result read back by Dr. Adams.

## 2024-05-02 NOTE — PROGRESS NOTES
[facility]    Notified by Dr Swartz that Cardiology would like this patient transferred to ICU for dobutamine drip due to concern for worsening IVON.    I discussed the case with intensivist, Dr Vazquez who will put in transfer orders and consult on the case.     Ysabel Nash [facility]

## 2024-05-02 NOTE — PROGRESS NOTES
0340: Pt still maintaining -140s with q6 10 mg Hydralazine orders. Discussed with Dr Adams that pt is not at goal of SBP <100. Hydralazine order changed to 20mg q6 per request.

## 2024-05-02 NOTE — ASSESSMENT & PLAN NOTE
Elevated INR, low fibrinogen and low platelets  Serial monitor  Findings consistent with HLH  Hematology consulted  S/p cryo 2 units 5/2 with improvement

## 2024-05-02 NOTE — CONSULTS
"[facility] -  CONSULTATION NOTE               Author: Alicia Valentine M.D. Date & Time: 5/2/2024  10:09 AM       REASON FOR CONSULTATION:   - IVON    CHIEF COMPLAINT:   -  \"  \"    HISTORY OF PRESENT ILLNESS:    63 y.o. female with history of systolic and diastolic heart failure with EF of 15% (HFrEF) grade 2 diastolic dysfunction, severe MVR, CAD with h/o STEMI s/p DEWEY to LAD, PAD with aortofemoral bypass surgery who presented 4/27/2024 with increasing shortness of breath admitted for community acquired PNA and severe constipation with hosp course complicated by exacerbation of her HFrEF, medications augmented which was followed by IVON, worsening hepatopathy concerning for cardiorenal etiology.   She was then transferred to ICU 5/01 and initiated on dobutamine drip for organ perfusion.   Patient continues to be oliguric with UOP of 86 yesterday and 10 ml today so far.   Nephrology has been consulted for concerns of IVON , cardiorenal syndrome.     REVIEW OF SYSTEMS:    10 point ROS was performed and is as per HPI or otherwise negative    PAST MEDICAL HISTORY:   - Reviewed and documented in chart     PAST SURGICAL HISTORY:   - Reviewed and documented in chart     FAMILY HISTORY:   - Reviewed and non contributory to current illness    SOCIAL HISTORY:   - No tobacco  - No EtOH  - No illicits    HOME MEDICATIONS:   - Reviewed and documented in chart    LABORATORY STUDIES:   - Reviewed and documented in chart    ALLERGIES:  Pcn [penicillins] and Toradol    VS:  /73   Pulse 74   Temp 36.2 °C (97.2 °F) (Temporal)   Resp (!) 32   Ht 1.651 m (5' 5\")   Wt 53.6 kg (118 lb 2.7 oz)   SpO2 95%   BMI 19.66 kg/m²   Physical Exam  Constitutional:       Appearance: She is ill-appearing.   HENT:      Head: Normocephalic and atraumatic.      Right Ear: External ear normal.      Left Ear: External ear normal.      Nose: Nose normal.      Mouth/Throat:      Mouth: Mucous membranes are moist. "   Cardiovascular:      Rate and Rhythm: Normal rate.      Heart sounds: Murmur heard.      Comments: + S3   Pulmonary:      Effort: No respiratory distress.      Breath sounds: Rales present.   Abdominal:      General: Bowel sounds are normal.      Palpations: Abdomen is soft.      Tenderness: There is abdominal tenderness.   Skin:     General: Skin is warm.      Capillary Refill: Capillary refill takes less than 2 seconds.   Neurological:      Mental Status: She is alert.         FLUID BALANCE:  In: 550.9 [P.O.:380; I.V.:70.9]  Out: 86     LABS:  Recent Labs     05/01/24  0601 05/01/24 2032 05/02/24  0400   SODIUM 135  134* 133* 131*   POTASSIUM 5.8*  5.7* 5.0 4.7   CHLORIDE 97  96 97 97   CO2 12*  14* 15* 18*   GLUCOSE 89  87 104* 102*   BUN 52*  52* 58* 62*   CREATININE 3.19*  3.22* 3.99* 4.45*   CALCIUM 8.3*  8.3* 7.7* 7.4*        IMAGING:  - Imaging studies reviewed     IMPRESSION:    # Acute kidney injury   - Worsening Scr since 4/29. Oliguric renal failure. Patt 1.4, however patient had received diuretic   - Per chart review she received both lasix 40 mg and metoprolol 25 mg on the 29th   - Called lab to document schistocyte status 5/02, per lab schistocytes are present but not enough to document 1+ status    - Multifactorial IVON in the setting of hepatic and renal congestion in a HFrEF patient.     # Hepatic congestion     # Hyperkalemia     # Heart failure with reduced ejection fraction     # Coagulopathy     # Transamnitis     # Severe mitral regurgitation     # Peripheral vascular disease with prior aortic thrombus s/p aortofemoral bypass     # Metabolic encephalopathy     # Constipation     # Impacted stool in intestine     # Colitis     # Leukocytosis     # Prior CVA     # Essential hypertension         PLAN:  - Worsening Scr since 4/30. Per chart review she received both lasix 40 mg and metoprolol 25 mg on the 29th   - Oliguric renal failure. UOP 80 ml yesterday and about 10 ml this AM.   -  Patt 1.4, however patient had received diuretic    - Considering continuous decline in renal function, recommend initiation of dialysis.   - Agree with LDH, fibrinogen to assess ongoing coagulopathy. Called lab to document schistocyte status 5/02, per lab schistocytes are present but not enough to document 1+ status

## 2024-05-02 NOTE — CARE PLAN
Problem: Pain - Standard  Goal: Alleviation of pain or a reduction in pain to the patient’s comfort goal  Outcome: Progressing     Problem: Knowledge Deficit - Standard  Goal: Patient and family/care givers will demonstrate understanding of plan of care, disease process/condition, diagnostic tests and medications  Outcome: Progressing     Problem: Skin Integrity  Goal: Skin integrity is maintained or improved  Outcome: Progressing     Problem: Fall Risk  Goal: Patient will remain free from falls  Outcome: Progressing

## 2024-05-02 NOTE — ASSESSMENT & PLAN NOTE
Unclear etiology possible stress demargination  CT abdomen negative CXR unremarkable stop antibiotics

## 2024-05-02 NOTE — PROGRESS NOTES
Rapid Response Summary     Rapid response called at 2048 for: Altered mental status     VS:  (See Vitals Flowsheet)  Additional info: patient had active orders to be HLOC to CICU 622  MD Paged: n/a  Interventions:    Imaging/Tests: n/a   Labs: n/a   Medications: n/a   Other: n/a  Disposition: Improved with rapid response team interventions. Primary RN updated on plan of care. Patient transferred to ICU. Patient already had orders to upgrade to CICU. Bedside RN Jaz called RRT to facilitate transport to 2.

## 2024-05-02 NOTE — PROGRESS NOTES
"[facility] ICU Progress Note      Admit Date: 4/27/2024    Resident(s): Alex Ramirez M.D.   Attending:  Dr. Adams    Patient ID:    Name:  Fouzia Santamaria   YOB: 1960  Age:  63 y.o.  female   MRN:  [id number]    Hospital Course (carried forward and updated):  \"63 y.o. female with history of systolic and diastolic heart failure with EF of 15%  grade 2 diastolic dysfunction,severe MVR, PAD with aortofemoral bypass surgery who presented 4/27/2024 with increasing shortness of breath and was admitted to the hospital with a diagnosis of community-acquired pneumonia.      Tonight she was taken to the cardiac cath lab for NSTEMI. Coronaries were clean. Dr. Chau has requested transfer to the ICU with critical care consultation for further evaluation and management of her decompensated heart failure and IVON.\"    05/01: admitted to ICU for cardiogenic shock with IVON, transaminitis,coagulopathy.  Requiring inotrope. Dobutamine initiated        Interval Events:  05/02: Critically ill patient  Patient unable to state where she is and why she is here. Seems confused.   Kidney function continues to worsen. Creatinine 1.23 (on admission) >>>> to 4.45 today  UOP/24 hrs: 86ml. Nephrology consulted. Plan for dialysis  CI 1.9  SBP Target <100, has severe MR  Pro-BNP continues to increase now 61k. Lasix 120 mg iv once ordered.  Abdominal Ultrasound ordered.  AST/ALT showed improvement  Platelets decreased from 303 (on admission)>>>>46 today. No signs of active bleeding. Monitoring INR, Platelets and TEG  Coagulopathic studies along with schistocytes on peripheral smear concerning for DIC. LDH ordered  No signs of bleeding  EBV+. 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)    Consultants:  Critical Care  Cardiology     Vitals Range last 24h:  Temp:  [35.9 °C (96.6 °F)-36.7 °C (98 °F)] 36.1 °C (97 °F)  Pulse:  [62-86] 67  Resp:  [13-46] 21  BP: (111-145)/(59-90) 138/78  SpO2:  [87 %-100 %] 99 %      Intake/Output " Summary (Last 24 hours) at 5/2/2024 1521  Last data filed at 5/2/2024 1400  Gross per 24 hour   Intake 370.89 ml   Output 113 ml   Net 257.89 ml        Review of Systems   Unable to perform ROS: Acuity of condition        PHYSICAL EXAM:  Vitals:    05/02/24 1330 05/02/24 1345 05/02/24 1400 05/02/24 1415   BP: 125/86 131/62 137/68 138/78   Pulse: 74 69 78 67   Resp: 13 (!) 30 (!) 45 (!) 21   Temp:       TempSrc:       SpO2: 98% 98% 96% 99%   Weight:       Height:        Body mass index is 19.66 kg/m².    O2 therapy: Pulse Oximetry: 99 %, O2 (LPM): 0, O2 Delivery Device: Room air w/o2 available    Date 05/02/24 0700 - 05/03/24 0659   Shift 8400-5097 8796-8278 3356-5609 24 Hour Total   INTAKE   P.O. 60   60     P.O. 60   60   I.V. 58.3   58.3     Dobutamine Volume 58.3   58.3   IV Piggyback 81.6   81.6     Volume (mL) (metroNIDAZOLE (Flagyl) IVPB 500 mg) 81.6   81.6   Shift Total 200   200   OUTPUT   Urine 27   27     Output (mL) (Urethral Catheter) 27   27   Shift Total 27   27      173        Physical Exam  HENT:      Head: Normocephalic.      Nose: Nose normal.      Mouth/Throat:      Mouth: Mucous membranes are moist.   Eyes:      Pupils: Pupils are equal, round, and reactive to light.   Cardiovascular:      Rate and Rhythm: Normal rate and regular rhythm.      Heart sounds: Murmur heard.      Comments: S3 present  Pulmonary:      Effort: Pulmonary effort is normal. No respiratory distress.      Breath sounds: Rales present.   Abdominal:      General: Bowel sounds are normal.      Palpations: Abdomen is soft.      Tenderness: There is abdominal tenderness (RUQ).   Musculoskeletal:      Cervical back: Normal range of motion.      Right lower leg: No edema.      Left lower leg: No edema.   Skin:     General: Skin is warm.   Neurological:      Cranial Nerves: No cranial nerve deficit.      Comments: Mild right sided residual deficits from prior CVA   Psychiatric:         Mood and Affect: Mood normal.              Recent Labs     05/01/24 0601 05/01/24 2032 05/02/24 0400   SODIUM 135  134* 133* 131*   POTASSIUM 5.8*  5.7* 5.0 4.7   CHLORIDE 97  96 97 97   CO2 12*  14* 15* 18*   BUN 52*  52* 58* 62*   CREATININE 3.19*  3.22* 3.99* 4.45*   MAGNESIUM 2.7*  --  2.4   PHOSPHORUS 6.4* 5.2* 5.2*   CALCIUM 8.3*  8.3* 7.7* 7.4*     Recent Labs     04/30/24 0357 05/01/24 0601 05/01/24 2032 05/02/24  0400   ALTSGPT 1024* 1377*  --  908*   ASTSGOT 2739* 2959*  --  1237*   ALKPHOSPHAT 135* 184*  --  156*   TBILIRUBIN 0.6 1.1  --  0.8   GLUCOSE 115* 89  87 104* 102*     Recent Labs     05/01/24 0736 05/01/24 2032 05/01/24 2255 05/02/24 0400 05/02/24 0435 05/02/24  0855   RBC 4.17*  --  3.34* 3.27*  --   --    HEMOGLOBIN 11.2*  --  9.1* 8.9*  --   --    HEMATOCRIT 36.0*  --  28.5* 28.3*  --   --    PLATELETCT 79*  --  50* 46*  --   --    PROTHROMBTM  --  32.8*  --   --  32.4*  --    INR  --  3.14*  --   --  3.10*  --    FERRITIN  --   --   --   --   --  20541.0*     Recent Labs     04/30/24 0357 05/01/24 0601 05/01/24 0736 05/01/24 2255 05/02/24  0400   WBC 12.4*  --  16.3* 16.4* 16.3*   NEUTSPOLYS  --   --  76.20* 92.20* 94.90*   LYMPHOCYTES  --   --  13.50* 2.60* 0.80*   MONOCYTES  --   --  7.10 4.30 0.80   EOSINOPHILS  --   --  0.20 0.00 0.00   BASOPHILS  --   --  0.50 0.90 0.80   ASTSGOT 2739* 2959*  --   --  1237*   ALTSGPT 1024* 1377*  --   --  908*   ALKPHOSPHAT 135* 184*  --   --  156*   TBILIRUBIN 0.6 1.1  --   --  0.8       Meds:   mupirocin        Pharmacy Consult:        hydrALAZINE  20 mg      dexamethasone  6 mg      Respiratory Therapy Consult        albuterol  2.5 mg      polyethylene glycol/lytes  1 Packet      senna-docusate  2 Tablet      And    polyethylene glycol/lytes  1 Packet      ondansetron  4 mg      ondansetron  4 mg      promethazine  12.5-25 mg      promethazine  12.5-25 mg      prochlorperazine  5-10 mg      benzonatate  100 mg      pramipexole  0.125 mg      venlafaxine XR  37.5 mg       oxyCODONE immediate-release  5 mg          Procedures:  none    Imaging:  DX-CHEST-PORTABLE (1 VIEW)   Final Result      Cardiomegaly.      US-ABDOMEN COMPLETE SURVEY   Final Result      1.  Mild hepatomegaly.      2.  Sludge noted in the gallbladder.      3.  Small echogenic foci in the periphery of the renal collecting systems bilaterally suspicious for early nephrolithiasis.      4.  Bilateral renal cysts.         DX-CHEST-LIMITED (1 VIEW)   Final Result         1.  No acute cardiopulmonary disease.   2.  Atherosclerosis      EC-ECHOCARDIOGRAM COMPLETE W/ CONT   Final Result      DX-CHEST-LIMITED (1 VIEW)   Final Result      1.  Enlarged cardiac silhouette with changes of pulmonary edema.      AT-[id number] VIEW   Final Result      1.  Nonobstructive bowel gas pattern with a large amount of colonic stool.      CT-ABDOMEN-PELVIS W/O    (Results Pending)   DX-CHEST-FOR LINE PLACEMENT Perform procedure in: Other(comment f6 below):    (Results Pending)       ASSESSEMENT and PLAN:    * Heart failure with reduced ejection fraction (HCC)- (present on admission)  Assessment & Plan  4/29 echo with EF <15%, severe mitral valve regurgitation, grade 2 diastolic dysfunction, and moderate left atrial dilatation.   Patient is on room air laying flat and warm on exam with perserved map   These finding don't fit with this picture  Start after load reduction with hydralazine for SBP < 100-120  Can stop dobutamine after discussion with Dr Chau as he agrees this doesn't fit possibly right sided?      Rhabdomyolysis  Assessment & Plan  Mild cpk elevation continue to monitor      Thrombocytopenia (HCC)  Assessment & Plan  HIT score low 3 (Hit panel sent)  Serial monitor   Consider HLH vs TTP vs medication vs ITP  Retic count, LDH, ferritin, triglycerides  May need hematology consultation      Coagulopathy (HCC)  Assessment & Plan  Elevated INR, low fibrinogen and low platelets  Serial monitor  Work up with TTP, HLH, liver  failure, HIT, vs DIC  Retic count, LDH, fibrinogen, teg, ferritin, triglycerides  May need hematology consult      IVON (acute kidney injury) (HCC)  Assessment & Plan  Unclear etiology stop ace inhibitor and metoprolol no recent contrast  U/a reviewed, CPK elevated, renal u/s reviewed, continue with williamson  CHF and cold shock seems less likely talking with cardiology  Nephrology consulted  Rule out TTP  Avoid nephrotoxins  CPK rising  Likely will need dialysis  Serial BMP      Transaminitis  Assessment & Plan  Unclear etiology improving  RUQ U/s reviewed  Hepatitis panel negative  + EBV  HLH ??  CPK     Severe mitral regurgitation- (present on admission)  Assessment & Plan  Strict afterload reduction  After discussing with Dr Chau not as severe as described      Hyperkalemia- (present on admission)  Assessment & Plan  Mild hold all replacement with rising renal function  Stop ace inhibitors    Abdominal pain- (present on admission)  Assessment & Plan  Check CT abdomen noncontrast as this is why she presented to hospital    Leukocytosis- (present on admission)  Assessment & Plan  Unclear etiology possible stress demargination  Stop antibiotics get CT Abdomen          Stroke (HCC)- (present on admission)  Assessment & Plan  History of   Residual expressive aphasia - at baseline  Delirium precautions        DISPO: TBD    CODE STATUS: Full    Quality Measures:  Feeding: PO  Analgesia: PRN  Sedation: None  Thromboprophylaxis: Heparin  Head of bed: >30 degrees  Ulcer prophylaxis: none  Glycemic control: Correctional: none / Basal: none  Bowel care: bowel regimen: prn  Indwelling lines: piv  Deescalation of antibiotics: c3      Please note that this dictation was created using voice recognition software. I have made every reasonable attempt to correct obvious errors, but there may be errors of grammar and possibly content that I did not discover before finalizing the note.     Alex Ramirez MD  [facility]  Resident

## 2024-05-03 PROBLEM — Z86.73 HISTORY OF STROKE: Status: ACTIVE | Noted: 2024-05-03

## 2024-05-03 PROBLEM — R57.0 CARDIOGENIC SHOCK (HCC): Status: ACTIVE | Noted: 2024-05-03

## 2024-05-03 PROBLEM — D76.1 HLH (HEMOPHAGOCYTIC LYMPHOHISTIOCYTOSIS) (HCC): Status: ACTIVE | Noted: 2024-05-03

## 2024-05-03 LAB
ALBUMIN SERPL BCP-MCNC: 2.7 G/DL (ref 3.2–4.9)
ALBUMIN SERPL BCP-MCNC: 2.9 G/DL (ref 3.2–4.9)
ALBUMIN/GLOB SERPL: 0.7 G/DL
ALBUMIN/GLOB SERPL: 0.8 G/DL
ALP SERPL-CCNC: 163 U/L (ref 30–99)
ALP SERPL-CCNC: 163 U/L (ref 30–99)
ALT SERPL-CCNC: 660 U/L (ref 2–50)
ALT SERPL-CCNC: 662 U/L (ref 2–50)
ANION GAP SERPL CALC-SCNC: 14 MMOL/L (ref 7–16)
ANION GAP SERPL CALC-SCNC: 15 MMOL/L (ref 7–16)
ANION GAP SERPL CALC-SCNC: 15 MMOL/L (ref 7–16)
AST SERPL-CCNC: 446 U/L (ref 12–45)
AST SERPL-CCNC: 490 U/L (ref 12–45)
BASOPHILS # BLD AUTO: 0.2 % (ref 0–1.8)
BASOPHILS # BLD: 0.03 K/UL (ref 0–0.12)
BILIRUB SERPL-MCNC: 1 MG/DL (ref 0.1–1.5)
BILIRUB SERPL-MCNC: 1 MG/DL (ref 0.1–1.5)
BUN SERPL-MCNC: 28 MG/DL (ref 8–22)
BUN SERPL-MCNC: 31 MG/DL (ref 8–22)
BUN SERPL-MCNC: 36 MG/DL (ref 8–22)
CALCIUM ALBUM COR SERPL-MCNC: 8.8 MG/DL (ref 8.5–10.5)
CALCIUM ALBUM COR SERPL-MCNC: 8.8 MG/DL (ref 8.5–10.5)
CALCIUM SERPL-MCNC: 7.8 MG/DL (ref 8.5–10.5)
CALCIUM SERPL-MCNC: 7.9 MG/DL (ref 8.5–10.5)
CALCIUM SERPL-MCNC: 7.9 MG/DL (ref 8.5–10.5)
CHLORIDE SERPL-SCNC: 97 MMOL/L (ref 96–112)
CK SERPL-CCNC: 5148 U/L (ref 0–154)
CO2 SERPL-SCNC: 21 MMOL/L (ref 20–33)
CO2 SERPL-SCNC: 22 MMOL/L (ref 20–33)
CO2 SERPL-SCNC: 23 MMOL/L (ref 20–33)
CREAT SERPL-MCNC: 2.37 MG/DL (ref 0.5–1.4)
CREAT SERPL-MCNC: 3.03 MG/DL (ref 0.5–1.4)
CREAT SERPL-MCNC: 3.53 MG/DL (ref 0.5–1.4)
EOSINOPHIL # BLD AUTO: 0 K/UL (ref 0–0.51)
EOSINOPHIL NFR BLD: 0 % (ref 0–6.9)
ERYTHROCYTE [DISTWIDTH] IN BLOOD BY AUTOMATED COUNT: 48.3 FL (ref 35.9–50)
ERYTHROCYTE [DISTWIDTH] IN BLOOD BY AUTOMATED COUNT: 49.9 FL (ref 35.9–50)
FERRITIN SERPL-MCNC: 7333 NG/ML (ref 10–291)
FIBRINOGEN PPP-MCNC: 200 MG/DL (ref 215–460)
GFR SERPLBLD CREATININE-BSD FMLA CKD-EPI: 14 ML/MIN/1.73 M 2
GFR SERPLBLD CREATININE-BSD FMLA CKD-EPI: 17 ML/MIN/1.73 M 2
GFR SERPLBLD CREATININE-BSD FMLA CKD-EPI: 23 ML/MIN/1.73 M 2
GLOBULIN SER CALC-MCNC: 3.6 G/DL (ref 1.9–3.5)
GLOBULIN SER CALC-MCNC: 3.7 G/DL (ref 1.9–3.5)
GLUCOSE SERPL-MCNC: 111 MG/DL (ref 65–99)
GLUCOSE SERPL-MCNC: 116 MG/DL (ref 65–99)
GLUCOSE SERPL-MCNC: 121 MG/DL (ref 65–99)
HCT VFR BLD AUTO: 25.1 % (ref 37–47)
HCT VFR BLD AUTO: 25.4 % (ref 37–47)
HGB BLD-MCNC: 8.3 G/DL (ref 12–16)
HGB BLD-MCNC: 8.4 G/DL (ref 12–16)
IMM GRANULOCYTES # BLD AUTO: 0.77 K/UL (ref 0–0.11)
IMM GRANULOCYTES NFR BLD AUTO: 4.2 % (ref 0–0.9)
INR PPP: 2.3 (ref 0.87–1.13)
LYMPHOCYTES # BLD AUTO: 0.9 K/UL (ref 1–4.8)
LYMPHOCYTES NFR BLD: 4.9 % (ref 22–41)
MAGNESIUM SERPL-MCNC: 2 MG/DL (ref 1.5–2.5)
MCH RBC QN AUTO: 27.1 PG (ref 27–33)
MCH RBC QN AUTO: 27.5 PG (ref 27–33)
MCHC RBC AUTO-ENTMCNC: 32.7 G/DL (ref 32.2–35.5)
MCHC RBC AUTO-ENTMCNC: 33.5 G/DL (ref 32.2–35.5)
MCV RBC AUTO: 82.3 FL (ref 81.4–97.8)
MCV RBC AUTO: 83 FL (ref 81.4–97.8)
MONOCYTES # BLD AUTO: 0.78 K/UL (ref 0–0.85)
MONOCYTES NFR BLD AUTO: 4.2 % (ref 0–13.4)
NEUTROPHILS # BLD AUTO: 15.95 K/UL (ref 1.82–7.42)
NEUTROPHILS NFR BLD: 86.5 % (ref 44–72)
NRBC # BLD AUTO: 0.21 K/UL
NRBC BLD-RTO: 1.1 /100 WBC (ref 0–0.2)
PF4 HEPARIN CMPLX IGG SER-IMP: NEGATIVE
PF4 HEPARIN CMPLX IGG SERPL IA: 0.05 OD
PHOSPHATE SERPL-MCNC: 3.5 MG/DL (ref 2.5–4.5)
PLATELET # BLD AUTO: 52 K/UL (ref 164–446)
PLATELET # BLD AUTO: 57 K/UL (ref 164–446)
PLATELETS.RETICULATED NFR BLD AUTO: 12.9 % (ref 0.6–13.1)
PLATELETS.RETICULATED NFR BLD AUTO: 13.9 % (ref 0.6–13.1)
POTASSIUM SERPL-SCNC: 4.6 MMOL/L (ref 3.6–5.5)
POTASSIUM SERPL-SCNC: 4.9 MMOL/L (ref 3.6–5.5)
POTASSIUM SERPL-SCNC: 4.9 MMOL/L (ref 3.6–5.5)
PROT SERPL-MCNC: 6.4 G/DL (ref 6–8.2)
PROT SERPL-MCNC: 6.5 G/DL (ref 6–8.2)
PROTHROMBIN TIME: 25.6 SEC (ref 12–14.6)
RBC # BLD AUTO: 3.05 M/UL (ref 4.2–5.4)
RBC # BLD AUTO: 3.06 M/UL (ref 4.2–5.4)
SODIUM SERPL-SCNC: 133 MMOL/L (ref 135–145)
SODIUM SERPL-SCNC: 134 MMOL/L (ref 135–145)
SODIUM SERPL-SCNC: 134 MMOL/L (ref 135–145)
WBC # BLD AUTO: 18.4 K/UL (ref 4.8–10.8)
WBC # BLD AUTO: 18.5 K/UL (ref 4.8–10.8)

## 2024-05-03 PROCEDURE — 99233 SBSQ HOSP IP/OBS HIGH 50: CPT | Performed by: HOSPITALIST

## 2024-05-03 PROCEDURE — 99232 SBSQ HOSP IP/OBS MODERATE 35: CPT | Performed by: INTERNAL MEDICINE

## 2024-05-03 PROCEDURE — 36556 INSERT NON-TUNNEL CV CATH: CPT | Performed by: INTERNAL MEDICINE

## 2024-05-03 PROCEDURE — 99233 SBSQ HOSP IP/OBS HIGH 50: CPT | Mod: 25 | Performed by: INTERNAL MEDICINE

## 2024-05-03 RX ADMIN — MUPIROCIN 2 %: 20 OINTMENT TOPICAL at 17:17

## 2024-05-03 RX ADMIN — VENLAFAXINE HYDROCHLORIDE 37.5 MG: 37.5 CAPSULE, EXTENDED RELEASE ORAL at 05:06

## 2024-05-03 RX ADMIN — DEXAMETHASONE 6 MG: 4 TABLET ORAL at 12:27

## 2024-05-03 RX ADMIN — PRAMIPEXOLE DIHYDROCHLORIDE 0.12 MG: 0.12 TABLET ORAL at 20:07

## 2024-05-03 RX ADMIN — HYDRALAZINE HYDROCHLORIDE 20 MG: 20 INJECTION INTRAMUSCULAR; INTRAVENOUS at 10:15

## 2024-05-03 RX ADMIN — OXYCODONE 5 MG: 5 TABLET ORAL at 20:08

## 2024-05-03 RX ADMIN — DEXAMETHASONE 6 MG: 4 TABLET ORAL at 05:06

## 2024-05-03 RX ADMIN — PRAMIPEXOLE DIHYDROCHLORIDE 0.12 MG: 0.12 TABLET ORAL at 08:17

## 2024-05-03 RX ADMIN — MUPIROCIN 1 APPLICATION: 20 OINTMENT TOPICAL at 05:07

## 2024-05-03 RX ADMIN — DEXAMETHASONE 6 MG: 4 TABLET ORAL at 23:37

## 2024-05-03 RX ADMIN — PRAMIPEXOLE DIHYDROCHLORIDE 0.12 MG: 0.12 TABLET ORAL at 15:30

## 2024-05-03 RX ADMIN — DEXAMETHASONE 6 MG: 4 TABLET ORAL at 17:17

## 2024-05-03 ASSESSMENT — PATIENT HEALTH QUESTIONNAIRE - PHQ9
1. LITTLE INTEREST OR PLEASURE IN DOING THINGS: NOT AT ALL
SUM OF ALL RESPONSES TO PHQ9 QUESTIONS 1 AND 2: 0
2. FEELING DOWN, DEPRESSED, IRRITABLE, OR HOPELESS: NOT AT ALL

## 2024-05-03 ASSESSMENT — PAIN DESCRIPTION - PAIN TYPE
TYPE: ACUTE PAIN

## 2024-05-03 ASSESSMENT — FIBROSIS 4 INDEX: FIB4 SCORE: 48.16

## 2024-05-03 NOTE — PROGRESS NOTES
[facility] -  PROGRESS NOTE               Author: Alicia aVlentine M.D. Date & Time: 5/3/2024  9:01 AM       REASON FOR CONSULTATION:   - IVON    CHIEF COMPLAINT:   -  Abdominal pain, body aches     HISTORY OF PRESENT ILLNESS:    63 y.o. female with history of systolic and diastolic heart failure with EF of 15% (HFrEF) grade 2 diastolic dysfunction, severe MVR, CAD with h/o STEMI s/p DEWEY to LAD, PAD with aortofemoral bypass surgery who presented 4/27/2024 with increasing shortness of breath admitted for community acquired PNA and severe constipation with hosp course complicated by exacerbation of her HFrEF, medications augmented which was followed by IVON, worsening hepatopathy concerning for cardiorenal etiology.   She was then transferred to ICU 5/01 and initiated on dobutamine drip for organ perfusion.   Patient continues to be oliguric with UOP of 86 yesterday and 10 ml today so far.   Nephrology has been consulted for concerns of IVON , cardiorenal syndrome.     DAILY NEPHROLOGY SUMMARY:  05/02: Examined at bedside. Looks like she wants to talk but it is difficult for her to find appropriate words.   Dobutamine ggt held per critical care today.   Labs yesterday with fibrinogen 91, cryoppt administered followed by dialysis   1 L ultrafiltration   Uptrending WBCs since 4/30, 18.4 today  Scr improved to 3.03 after dialysis   Down trending AST/ALT   CT scan without bowel obs, extensive calcification of distal thoracic/upper abd aorta. Left renal cyst.     Working diagnosis of HLH, Hemophagocytic lymphohistiocytosis. Plan for bone marrow biopsy today.     REVIEW OF SYSTEMS:    Unable to ascertain, mental acuity. Chronic expressive aphasia.     PAST MEDICAL/SURGICAL/FAMILY/SOCIAL HISTORY:  Reviewed and documented in chart     HOME MEDICATIONS:   - Reviewed and documented in chart    LABORATORY STUDIES:   - Reviewed and documented in chart    ALLERGIES:  Heparin, Pcn [penicillins], and  "Toradol    VS:  /60   Pulse 82   Temp 36.3 °C (97.4 °F) (Temporal)   Resp 17   Ht 1.651 m (5' 5\")   Wt 52 kg (114 lb 10.2 oz)   SpO2 93%   BMI 19.08 kg/m²   Physical Exam  Constitutional:       General: She is not in acute distress.     Appearance: She is ill-appearing.   HENT:      Head: Normocephalic and atraumatic.      Right Ear: External ear normal.      Left Ear: External ear normal.      Nose: Nose normal.      Mouth/Throat:      Mouth: Mucous membranes are dry.   Eyes:      General:         Right eye: No discharge.         Left eye: No discharge.   Neck:      Comments: Right IJ trialysis catheter   Cardiovascular:      Rate and Rhythm: Normal rate.      Pulses: Normal pulses.   Pulmonary:      Effort: Pulmonary effort is normal. No respiratory distress.   Abdominal:      General: Bowel sounds are normal.      Palpations: Abdomen is soft.   Musculoskeletal:         General: Swelling present.      Comments: Swelling b/l arms    Skin:     General: Skin is warm.      Capillary Refill: Capillary refill takes less than 2 seconds.      Findings: Bruising present.      Comments: Excoriations on extremities    Neurological:      Comments: Chronic expressive aphasia          FLUID BALANCE:  In: 856 [P.O.:60; I.V.:58.3; Blood:156; Dialysis:500]  Out: 1595     LABS:  Recent Labs     05/02/24  1440 05/03/24  0009 05/03/24  0512   SODIUM 130* 134* 134*   POTASSIUM 4.8 4.6 4.9   CHLORIDE 99 97 97   CO2 15* 22 23   GLUCOSE 103* 116* 111*   BUN 72* 28* 31*   CREATININE 5.05* 2.37* 3.03*   CALCIUM 7.9* 7.9* 7.8*        IMAGING:  - Imaging studies reviewed by me      IMPRESSION:     # Acute kidney injury   - Worsening Scr since 4/29. Oliguric renal failure. Patt 1.4, however patient had received diuretic   - Per chart review she received both lasix 40 mg and metoprolol 25 mg on the 29th   - Called lab to document schistocyte status 5/02, per lab schistocytes are present but not enough to document 1+ status    - " Multifactorial IVON in the setting of hepatic and renal congestion in a HFrEF patient.      # HLH (Hemophagocytic lymphohistiocytosis)  Dr. Mcintyre consulted, plan for biopsy 5/02     # Hepatic congestion      # Hyperkalemia      # Heart failure with reduced ejection fraction      # Coagulopathy   - Fibinogen 91 5/02, s/p cryoppt     # Transamnitis  - Improving s/p dialysis 5/02      # Severe mitral regurgitation   - Cardiology on board      # Peripheral vascular disease with prior aortic thrombus s/p aortofemoral bypass      # Metabolic encephalopathy      # Constipation      # Impacted stool in intestine   - Resolved      # Colitis      # Leukocytosis   - Up trending, see above      # Prior CVA    - Chronic expressive aphasia     # Essential hypertension            PLAN:  - Ongoing oliguric renal failure. UOP 95 ml in last 24 hours.   - S/p first dialysis session 5/02, 1 L ultrafiltration   - No need for dialysis today   - Daily evaluation of RRT needs  - Avoid nephrotoxins, NSAIDs  - Renally dose meds

## 2024-05-03 NOTE — DIETARY
Nutrition Update:    Day 6 of admit.  Fouzia Santamaria is a 63 y.o. 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  Patient being followed to optimize nutrition.    Current Diet: Cardiac  Per ADLs, pt PO have been mostly <50% w/ avg of 25%. In last 2 days, PO avg is 15%.    Problem: Nutritional:  Goal: Achieve adequate nutritional intake  Description: Patient will consume >50% of meals  Outcome: Not progressing.     Recommendations/Plan:  Order Ensure Plus TID per poor PO protocol to enhance nutrition.   Encourage intake of >50% of meals and supplements  Document intake of all meals as % taken in ADL's to provide interdisciplinary communication across all shifts.   Monitor weight.    RD following.

## 2024-05-03 NOTE — PROGRESS NOTES
Cardiology Follow Up Progress Note    Date of Service  5/3/2024    Attending Physician  Steve Swartz D.O.    Chief Complaint   Heart failure with reduced ejection fraction    HPI  Fouzia Santamaria is a 63 y.o. 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  She notes that she is feeling significantly better.  Her  is currently at bedside and notes that there has been a significant improvement in her overall care    Review of Systems  Review of Systems    Vital signs in last 24 hours  Temp:  [35.9 °C (96.6 °F)-36.7 °C (98 °F)] 36.7 °C (98 °F)  Pulse:  [64-96] 78  Resp:  [15-45] 15  BP: (103-150)/(52-93) 122/68  SpO2:  [90 %-99 %] 92 %    Physical Exam  Physical Exam  Vitals and nursing note reviewed.   Constitutional:       Appearance: Normal appearance. She is not ill-appearing.   HENT:      Head: Normocephalic and atraumatic.      Nose: Nose normal.      Mouth/Throat:      Mouth: Mucous membranes are moist.      Pharynx: Oropharynx is clear.   Eyes:      Pupils: Pupils are equal, round, and reactive to light.   Cardiovascular:      Rate and Rhythm: Normal rate and regular rhythm.      Heart sounds: No murmur heard.     No friction rub. No gallop.   Pulmonary:      Effort: Pulmonary effort is normal.      Breath sounds: No wheezing, rhonchi or rales.   Abdominal:      General: Bowel sounds are normal.       "Palpations: Abdomen is soft.   Musculoskeletal:         General: Normal range of motion.      Cervical back: Normal range of motion and neck supple.      Right lower leg: No edema.      Left lower leg: No edema.   Skin:     General: Skin is warm and dry.   Neurological:      General: No focal deficit present.      Mental Status: She is alert. Mental status is at baseline.         Lab Review  Lab Results   Component Value Date/Time    WBC 18.4 (H) 05/03/2024 05:12 AM    RBC 3.06 (L) 05/03/2024 05:12 AM    HEMOGLOBIN 8.3 (L) 05/03/2024 05:12 AM    HEMATOCRIT 25.4 (L) 05/03/2024 05:12 AM    MCV 83.0 05/03/2024 05:12 AM    MCH 27.1 05/03/2024 05:12 AM    MCHC 32.7 05/03/2024 05:12 AM    MPV 11.4 05/02/2024 02:40 PM      Lab Results   Component Value Date/Time    SODIUM 133 (L) 05/03/2024 09:12 AM    POTASSIUM 4.9 05/03/2024 09:12 AM    CHLORIDE 97 05/03/2024 09:12 AM    CO2 21 05/03/2024 09:12 AM    GLUCOSE 121 (H) 05/03/2024 09:12 AM    BUN 36 (H) 05/03/2024 09:12 AM    CREATININE 3.53 (H) 05/03/2024 09:12 AM    BUNCREATRAT 25.0 07/10/2022 04:21 AM      Lab Results   Component Value Date/Time    ASTSGOT 446 (H) 05/03/2024 09:12 AM    ALTSGPT 662 (H) 05/03/2024 09:12 AM     Lab Results   Component Value Date/Time    CHOLSTRLTOT 89 (L) 08/29/2022 05:15 AM    LDL 34 08/29/2022 05:15 AM    HDL 32 (A) 08/29/2022 05:15 AM    TRIGLYCERIDE 148 05/02/2024 08:55 AM    TROPONINT 21 (H) 09/06/2023 06:16 PM       No results for input(s): \"NTPROBNP\" in the last 72 hours.        Assessment/Plan  1.  Improving transaminitis   2.  Hyperkalemia  3.  Acute kidney injury/acute renal failure with possible need for dialysis  4.  Heart failure with reduced ejection fraction  5.  Constipation  6.  Metabolic encephalopathy, largely at baseline  7.  Severe mitral valve regurgitation, possibly overestimated  8.  Peripheral vascular disease with prior aortic thrombus status post aortofemoral bypass  9.  Hypertension  10.  Prior CVA  11. " HLH    Clinically, she is doing well and continues to show significant improvement with institution of systemic steroid therapy as directed by the critical care service for her HL H diagnosis.  At this time her heart failure with reduced ejection fraction remains stable and I would not recommend any changes to her current medical therapy given that she is seeing gradual improvement but has not seen significant recovery and her acute kidney injury.  Use of SGLT2 inhibitors, ACE inhibitor/ARB/ARB ARNI combination is not recommended with her current renal function.  Patient could be started on low-dose carvedilol as part of her goal-directed medical therapy for her heart failure with reduced ejection fraction however I am hesitant to start medications at this time given the clinical picture is still improving and resolving and would prefer to have higher blood pressures at this time.  Patient is hemodynamically stable and would not be unreasonable to restart an appropriate heart failure regimen prior to discharge.    Will continue to follow patient          In terms of her other medical conditions I will defer to the primary medicine service.    Thank you for allowing me to participate in the care of this patient.  I will continue to follow this patient    Please contact me with any questions.    Breezy Chau D.O.   Cardiologist, [facility]  [phone]

## 2024-05-03 NOTE — PROGRESS NOTES
4 Eyes Skin Assessment Completed by KATIA Freire and KATIA Palmer.    Head WDL  Ears Redness and Blanching  Nose WDL  Mouth WDL  Neck Scab and Incision  Breast/Chest Redness, Scab, and Excoriation  Shoulder Blades WDL  Spine WDL  (R) Arm/Elbow/Hand Redness, Blanching, Bruising, Discoloration, and Rash  (L) Arm/Elbow/Hand Redness, Blanching, Bruising, Scab, Swelling, and Rash  Abdomen Bruising  Groin WDL  Scrotum/Coccyx/Buttocks Redness, Blanching, and Excoriation Healing scratches  (R) Leg Scab and Bruising Petechial rash  (L) Leg Redness and Bruising Petechial rash  (R) Heel/Foot/Toe Boggy  (L) Heel/Foot/Toe Boggy          Devices In Places ECG, Blood Pressure Cuff, Pulse Ox, Guerrero, SCD's, and Central Line      Interventions In Place NC W/Ear Foams, Sacral Mepilex, Pillows, Q2 Turns, Low Air Loss Mattress, Barrier Cream, Heels Loaded W/Pillows, and Pressure Redistribution Mattress    Possible Skin Injury No    Pictures Uploaded Into Epic N/A  Wound Consult Placed N/A  RN Wound Prevention Protocol Ordered Yes

## 2024-05-03 NOTE — PROGRESS NOTES
4 Eyes Skin Assessment Completed by KATIA Yu and KATIA Palmer.    Head WDL  Ears WDL  Nose WDL  Mouth WDL  Neck Redness and Scab  Breast/Chest Redness and Scab  Shoulder Blades WDL  Spine scabs, excoriation  (R) Arm/Elbow/Hand discoloration, redness, scabs, petechiae, dry  (L) Arm/Elbow/Hand discoloration, redness, scabs, petechiae, dry  Abdomen WDL  Groin WDL  Scrotum/Coccyx/Buttocks Redness and Excoriation  (R) Leg Redness and Scab, dry, petechiae  (L) Leg Redness and Scab, dry, petechiae  (R) Heel/Foot/Toe dry  (L) Heel/Foot/Toe dry      Devices In Places ECG, Blood Pressure Cuff, Pulse Ox, Guerrero, and SCD's        Interventions In Place Sacral Mepilex, TAP System, Pillows, Q2 Turns, Low Air Loss Mattress, Heels Loaded W/Pillows, and Pressure Redistribution Mattress     Possible Skin Injury Yes     Pictures Uploaded Into Epic Yes  Wound Consult Placed Yes  RN Wound Prevention Protocol Ordered Yes

## 2024-05-03 NOTE — CONSULTS
Consult Note: Hematology/Oncology    Date of consultation: 5/3/2024 1:20 PM    Referring provider: Dr Adams    Reason for consultation: Concern for HLH, TTP      History of presenting illness:     63-year-old woman who does have a PMH of coronary artery disease status post PCI, congestive heart failure ejection fraction less than 15% with severe MR, grade 2 diastolic dysfunction, PVD s/p aortofemoral bypass, COPD, prediabetes mellitus, hypertension, history of stroke with residual expressive aphasia which is at baseline.  On 4/27 presented with flulike symptoms for 10 days, abdominal pain, found to have rhinovirus and enterovirus positive also constipation, she during hospital stay found to have worsening deterioration of liver enzymes and creatinine from normal on 4/27/2024 2 and AST/ALT of 2739/1024, she will was also found to have worsening kidney dysfunction from normal to the point of requiring hemodialysis during hospital stay, she was admitted to the ICU for dobutamine drip but clinically she was not found to be in CHF exacerbation.  She was also found to have significant drop in the hemoglobin to 8.3, platelets from normal now at 57, worsening leukocytosis as well,.  Other laboratory workup found to have an LDH of 1418, triglycerides 148, proBNP 28,067, reticulocyte count 1.8, absolute reticulocyte count 0.08, INR 2.8, fibrinogen 91, elevated PT, PTT and INR up to 3.  Ferritin 14,244.  She underwent for a CT CAP which showed no hepatosplenomegaly and fatty liver, ultrasound of the abdomen showed a liver size up to 18 cm, there was no fevers.  Her mentation stayed stable.  During hospital stay.  She was started on hemodialysis yesterday, she was also started on dexamethasone 6 mg every 6 hours for concerns of HLH upon discussing case with Dr. Adams.    EBV NA abs more than 600.        Past Medical History:    Past Medical History:   Diagnosis Date    At risk for delirium 9/13/2022    Chronic obstructive  "pulmonary disease (HCC)     Diabetes (HCC)     Encounter to establish care 10/19/2022    Patient here to establish care.  Patient was recently in the hospital for over a month secondary to several blood clots in need of multiple surgeries including: aorto-bifemoral bypass and bilateral left renal artery eversion endarterectomies.  Patient surgery was complicated \"by hypotension and evidence of hemorrhage that she was taken back to the operating room emergently for exploration where sh    Heart attack (HCC)     Hydronephrosis 8/24/2022    Hypertension     Hypomagnesemia 8/29/2022    Stroke ([facility])        Past surgical history:    Past Surgical History:   Procedure Laterality Date    [facility] EXPLORATORY OF ABDOMEN  8/28/2022    Procedure: LAPAROTOMY, EXPLORATORY;  Surgeon: Brandyn Siu M.D.;  Location: SURGERY [facility];  Service: General    AORTOBIFEM BYPASS Bilateral 8/26/2022    Procedure: CREATION, BYPASS, ARTERIAL, AORTA TO FEMORAL, BILATERAL;  Surgeon: Brandyn Siu M.D.;  Location: SURGERY [facility];  Service: General    ENDARTERECTOMY Bilateral 8/26/2022    Procedure: RENAL ARTERY ENDARTERECTOMY;  Surgeon: Brandyn Siu M.D.;  Location: SURGERY [facility];  Service: General    [facility] EXPLORATORY OF ABDOMEN  8/26/2022    Procedure: LAPAROTOMY, EXPLORATORY; CONTROL OF POST-OPERATIVE BLEEDING;  Surgeon: Brandyn Siu M.D.;  Location: SURGERY [facility];  Service: General    APPLICATION OR REPLACEMENT, WOUND VAC  8/26/2022    Procedure: APPLICATION OR REPLACEMENT, WOUND VAC;  Surgeon: Brandyn Siu M.D.;  Location: SURGERY [facility];  Service: General    STENT PLACEMENT      THYROIDECTOMY         Allergies:  Pcn [penicillins] and Toradol    Medications:    Current Facility-Administered Medications   Medication Dose Route Frequency Provider Last Rate Last Admin    mupirocin (Bactroban) 2 % ointment   Topical BID Jose Adams M.D.   1 Application at 05/03/24 9987    dexamethasone (Decadron) tablet 6 mg  6 mg " Oral Q6HRS Jose Adams M.D.   6 mg at 05/03/24 1227    anticoagulant cit dextrose soln (Acd) 10 mL in empty sterile vial 10 mL vial   Intracatheter DIALYSIS PRN Spenser Ramirez M.D.   Given at 05/02/24 1905    hydrALAZINE (Apresoline) injection 20 mg  20 mg Intravenous Q4HRS PRN Nicky Concepcion M.D.   20 mg at 05/03/24 1015    Respiratory Therapy Consult   Nebulization Continuous RT Pipe Hernandez M.D.        albuterol (Proventil) 2.5mg/0.5ml nebulizer solution 2.5 mg  2.5 mg Nebulization Q2HRS PRN (RT) Pipe Hernandez M.D.        polyethylene glycol/lytes (Miralax) Packet 1 Packet  1 Packet Oral TID Andrzej Longoria M.D.   1 Packet at 05/01/24 0900    senna-docusate (Pericolace Or Senokot S) 8.6-50 MG per tablet 2 Tablet  2 Tablet Oral Q EVENING Aristeo Valencia M.D.   2 Tablet at 05/01/24 1735    And    polyethylene glycol/lytes (Miralax) Packet 1 Packet  1 Packet Oral QDAY PRN Aristeo Valencia M.D.   1 Packet at 04/28/24 1521    ondansetron (Zofran) syringe/vial injection 4 mg  4 mg Intravenous Q4HRS PRN Aristeo Valencia M.D.        ondansetron (Zofran ODT) dispertab 4 mg  4 mg Oral Q4HRS PRN Aristeo Valencia M.D.        promethazine (Phenergan) tablet 12.5-25 mg  12.5-25 mg Oral Q4HRS PRN Aristeo Valencia M.D.        promethazine (Phenergan) suppository 12.5-25 mg  12.5-25 mg Rectal Q4HRS PRN Aristeo Valencia M.D.        prochlorperazine (Compazine) injection 5-10 mg  5-10 mg Intravenous Q4HRS PRN Aristeo Valencia M.D.        benzonatate (Tessalon) capsule 100 mg  100 mg Oral TID PRN Aristeo Valencia M.D.        pramipexole (Mirapex) tablet 0.125 mg  0.125 mg Oral TID Aristeo Valencia M.D.   0.125 mg at 05/03/24 0817    venlafaxine XR (Effexor XR) capsule 37.5 mg  37.5 mg Oral QAM Aristeo Valenica M.D.   37.5 mg at 05/03/24 0506    oxyCODONE immediate-release (Roxicodone) tablet 5 mg  5 mg Oral Q4HRS PRN Ysabel Nash A.P.R.N.   5 mg at 04/30/24 1948       Social History:      Social History     Socioeconomic History    Marital status:      Spouse name: Not on file    Number of children: Not on file    Years of education: Not on file    Highest education level: Not on file   Occupational History    Not on file   Tobacco Use    Smoking status: Former     Current packs/day: 2.00     Average packs/day: 2.0 packs/day for 48.0 years (96.0 ttl pk-yrs)     Types: Cigarettes    Smokeless tobacco: Never   Vaping Use    Vaping Use: Never used   Substance and Sexual Activity    Alcohol use: No    Drug use: Yes     Types: Marijuana, Inhaled    Sexual activity: Not on file   Other Topics Concern    Not on file   Social History Narrative    Not on file     Social Determinants of Health     Financial Resource Strain: High Risk (4/30/2024)    Overall Financial Resource Strain (CARDIA)     Difficulty of Paying Living Expenses: Hard   Food Insecurity: Food Insecurity Present (4/30/2024)    Hunger Vital Sign     Worried About Running Out of Food in the Last Year: Often true     Ran Out of Food in the Last Year: Often true   Transportation Needs: Unmet Transportation Needs (4/30/2024)    PRAPARE - Transportation     Lack of Transportation (Medical): Yes     Lack of Transportation (Non-Medical): Yes   Physical Activity: Patient Declined (4/30/2024)    Exercise Vital Sign     Days of Exercise per Week: Patient declined     Minutes of Exercise per Session: Patient declined   Stress: Patient Declined (4/30/2024)    [employer] - Occupational Stress Questionnaire     Feeling of Stress : Patient declined   Social Connections: Unknown (4/30/2024)    Social Connection and Isolation Panel [NHANES]     Frequency of Communication with Friends and Family: Twice a week     Frequency of Social Gatherings with Friends and Family: More than three times a week     Attends Restorationism Services: Patient declined     Active Member of Clubs or Organizations: No     Attends Club or Organization  "Meetings: Patient declined     Marital Status:    Intimate Partner Violence: Not on file   Housing Stability: High Risk (4/30/2024)    Housing Stability Vital Sign     Unable to Pay for Housing in the Last Year: Yes     Number of Places Lived in the Last Year: 1     Unstable Housing in the Last Year: Patient declined       Family History:   History reviewed. No pertinent family history.    Review of Systems:  All other review of systems are negative except what was mentioned above in the HPI.    Constitutional: No fever, chills,. She does have malaise/fatigue.    HEENT: No new auditory or visual complaints. No sore throat and neck pain.     Respiratory:No new cough, sputum production, shortness of breath and wheezing.    Cardiovascular: No new chest pain, palpitations, orthopnea and leg swelling.    Gastrointestinal: No heartburn, nausea, vomiting ,abdominal pain, hematochezia or melena     Genitourinary: Negative for dysuria, hematuria   Musculoskeletal: No new arthralgias or myalgias, weakness  Skin: Negative for rash and itching.    Neurological: Expressive aphasia, at baseline  Endo/Heme/Allergies: No abnormal bleed/bruise.        Physical Exam:   Vitals:   /68   Pulse 78   Temp 36.7 °C (98 °F) (Temporal)   Resp 15   Ht 1.651 m (5' 5\")   Wt 52 kg (114 lb 10.2 oz)   SpO2 92%   BMI 19.08 kg/m²     General: Not in acute distress, alert and oriented x 3  HEENT: No pallor, icterus. Oropharynx clear.   Neck: Supple, no palpable masses.  Lymph nodes: No palpable cervical, supraclavicular, axillary or inguinal lymphadenopathy.    CVS: regular rate and rhythm, no rubs or gallops  RESP: Clear to auscultate bilaterally, no wheezing or crackles.   ABD: Soft, non tender, non distended, positive bowel sounds, no palpable organomegaly  EXT: No edema or cyanosis  CNS:, Expressive aphasia, at baseline  Skin- No rash      Labs:   Recent Labs     05/02/24  0855 05/02/24  1440 05/02/24  2106 05/03/24  0009 " 05/03/24 0512 05/03/24 0912   RBC  --  3.17*  --  3.05* 3.06*  --    HEMOGLOBIN  --  8.6*  --  8.4* 8.3*  --    HEMATOCRIT  --  27.1*  --  25.1* 25.4*  --    PLATELETCT  --  45*  --  52* 57*  --    PROTHROMBTM  --  30.0* 25.6*  --  25.6*  --    INR  --  2.81* 2.30*  --  2.30*  --    FERRITIN 07654.0*  --   --   --   --  7333.0*     Lab Results   Component Value Date/Time    SODIUM 133 (L) 05/03/2024 09:12 AM    POTASSIUM 4.9 05/03/2024 09:12 AM    CHLORIDE 97 05/03/2024 09:12 AM    CO2 21 05/03/2024 09:12 AM    GLUCOSE 121 (H) 05/03/2024 09:12 AM    BUN 36 (H) 05/03/2024 09:12 AM    CREATININE 3.53 (H) 05/03/2024 09:12 AM    BUNCREATRAT 25.0 07/10/2022 04:21 AM        Assessment and Plan:  Concern for HLH  -EBV+  -No fevers, there is no significant hepatosplenomegaly, normal triglycerides, from a neurologic point of view at baseline are negative findings but there is significant drop in hemoglobin and platelet, ferritin 14,000 significant transaminitis and IVON, improvement on transaminitis even before starting steroids.  Cannot be explained by infection, was not found to have cardiogenic shock.  -4/2/2024 started dexamethasone 6 mg Q6    2.  Significant anemia and thrombocytopenia,  -Related to DIC, acute decompensation, HLH cannot be ruled out  -Plasmic score 4,low risk for TTP , HIT low probability    3.  Acute transaminitis  -Can be related to acute decompensation, HLH the possibility, on steroids    4.  Coagulopathy due to DIC, acute liver decompensation, improving    5.  Signs of right Ommaya lysis, increase CPK    6.  IVON, nephrology on board, on hemodialysis  -Low potassium score, no concerns for TTP  -Due to rhabdomyolysis, acute decompensation      Plan  -  she does have a low plasmic score, not concerning for TTP, HIT with low probability.    -She was found to have signs and symptoms of concern for HLH including acute transaminitis, IVON, ferritin 14,000, positive for EBV, fast drop in the hemoglobin,  platelet count.  Other negative findings are no significant hepatosplenomegaly, no significant changes in mentation, normal triglycerides, no fevers.    -The positive signs and symptoms could not be explained by infection, cardiogenic shock.  She was started on dexamethasone 6 mg every 6 hours and she is doing better since then she will be scheduled for a bone marrow biopsy for further evaluation also soluble CD25 pending.  She is not a candidate for a liver biopsy given coagulopathy which is already getting better.  - she will continue on dexamethasone IV for now,  she does have many other comorbidities I do not feel comfortable starting systemic chemotherapy in her case  -Cardiology, nephrology also on board, appreciate their recommendations  -CBC already stabilized which is encouraging, reviewed peripheral smear with no significant evidence of schistocytes.    High complexity, complex decision making    Sh, there ise agreed and verbalized her agreement and understanding with the current plan.  I answered all questions and concerns she has at this time.              Thank you for allowing me to participate in her care.    Please note that this dictation was created using voice recognition software. I have made every reasonable attempt to correct obvious errors, but I expect that there are errors of grammar and possibly content that I did not discover before finalizing the note.      SIGNATURES:  Pranay Mcintyre III, M.D.    CC:  SHANKAR Reyes

## 2024-05-03 NOTE — PROGRESS NOTES
4 Eyes Skin Assessment Completed by KATIA Haque and KATIA Cortes.    Head WDL  Ears Redness and Blanching  Nose WDL  Mouth WDL  Neck Scab and Incision  Breast/Chest Redness and Scab  Shoulder Blades WDL  Spine Redness, Blanching, and Bruising  (R) Arm/Elbow/Hand Redness, Blanching, and Discoloration petechiae  (L) Arm/Elbow/Hand Redness, Blanching, and Swelling petechiae  Abdomen Bruising  Groin WDL  Scrotum/Coccyx/Buttocks Redness and Excoriation  (R) Leg Scab and Bruising petechiae  (L) Leg Scab and Bruising petechiae  (R) Heel/Foot/Toe Boggy  (L) Heel/Foot/Toe Boggy          Devices In Places ECG, Tele Box, Blood Pressure Cuff, Pulse Ox, Guerrero, SCD's, and Central Line      Interventions In Place Heel Mepilex, Sacral Mepilex, TAP System, Pillows, Q2 Turns, Low Air Loss Mattress, Heels Loaded W/Pillows, and Pressure Redistribution Mattress    Possible Skin Injury No    Pictures Uploaded Into Epic N/A  Wound Consult Placed N/A  RN Wound Prevention Protocol Ordered No

## 2024-05-03 NOTE — CARE PLAN
The patient is Watcher - Medium risk of patient condition declining or worsening    Shift Goals  Clinical Goals: hemodynamic stability, safety  Patient Goals: pain management  Family Goals: AFIA      Problem: Pain - Standard  Goal: Alleviation of pain or a reduction in pain to the patient’s comfort goal    5/2/2024 1658 by Aimee Castro R.N.  Outcome: Progressing  Note: Pt pain monitored with CPOT, managed with rest and repositioning     Problem: Hemodynamics  Goal: Patient's hemodynamics, fluid balance and neurologic status will be stable or improve  Outcome: Progressing  Note: Pt urine output reduced, dialysis started. PRN hydralazine given for SBP goal <100.

## 2024-05-03 NOTE — PROGRESS NOTES
Critical Care Progress Note    Date of admission  4/27/2024    Chief Complaint  63 y.o. female admitted 4/27/2024 with hx of CAD s/ PCI, HFrEF w/ EF < 15% severe MR, grade II diastolic dysfunction, PVD, s/p Aortofemoral bypass, COPD, preDM, HTN. Presented 4/27 for flu like illness and 10 days of abdominal pain found to have Rhinovirus/Enterovirus positive also constipated by KUB. She has had worsening renal function and transaminitis transferred to ICU for dobutamine gtt and stopped the beta blocker and ace inhibitor.      On further review of labs and workup acute decompensation was due to EBV associated HLH. Started on dexamethasone    Hospital Course  5/1 transferred to ICU for cardiorenal syndrome and cardiogenic shock for dobutamine.  5/2 stop dobutamine dx with HLH started on dexamethasone, hematology consulted, iHD    Interval Problem Update  Reviewed last 24 hour events:  Neuro: expressive aphasia, moving ext  HR: 80-90  SBP: 110-120's  Tmax: afebrile  GI: NPO for fees today, BM today  UOP: 100ml only last 24hrs  Lines: right IJ trialysis, williamson  Resp: room air   Vte: contra  PPI/H2:n/a  Antibx: none  -577 (dialysis -1.5L) urine 100ml net + 445ml  LFT's improving, INR stable, hg stable, PLT slowly improving  BM today     Review of Systems  Review of Systems   Unable to perform ROS: Mental acuity        Vital Signs for last 24 hours   Temp:  [35.9 °C (96.6 °F)-36.6 °C (97.9 °F)] 36.3 °C (97.4 °F)  Pulse:  [64-90] 82  Resp:  [13-45] 17  BP: (103-150)/(52-93) 124/60  SpO2:  [90 %-100 %] 93 %    Hemodynamic parameters for last 24 hours       Respiratory Information for the last 24 hours       Physical Exam   Physical Exam  Vitals and nursing note reviewed.   Constitutional:       General: She is not in acute distress.     Appearance: She is not ill-appearing.      Comments: Chronic ill appearing female sitting up in bed   HENT:      Nose: No congestion.      Mouth/Throat:      Mouth: Mucous membranes are dry.    Eyes:      Pupils: Pupils are equal, round, and reactive to light.   Neck:      Comments: Right IJ trialysis catheter  Cardiovascular:      Rate and Rhythm: Normal rate.      Heart sounds: No murmur heard.  Pulmonary:      Effort: No respiratory distress.      Breath sounds: No stridor. No wheezing or rhonchi.   Abdominal:      General: There is no distension.      Palpations: There is no mass.      Tenderness: There is no abdominal tenderness.      Hernia: No hernia is present.   Musculoskeletal:         General: Swelling present.      Comments: Some swelling of arms   Skin:     Coloration: Skin is pale. Skin is not jaundiced.      Comments: Rash on skin to right arm   Neurological:      Comments: She has chronic expressive aphasia and difficult getting word out, EOMI, no facial droop, moving ext   Psychiatric:         Mood and Affect: Mood normal.         Medications  Current Facility-Administered Medications   Medication Dose Route Frequency Provider Last Rate Last Admin    mupirocin (Bactroban) 2 % ointment   Topical BID Jose Adams M.D.   1 Application at 05/03/24 0507    dexamethasone (Decadron) tablet 6 mg  6 mg Oral Q6HRS Jose Adams M.D.   6 mg at 05/03/24 0506    anticoagulant cit dextrose soln (Acd) 10 mL in empty sterile vial 10 mL vial   Intracatheter DIALYSIS PRN Spenser Ramirez M.D.   Given at 05/02/24 1905    hydrALAZINE (Apresoline) injection 20 mg  20 mg Intravenous Q4HRS PRN Nicky Concepcion M.D.   20 mg at 05/02/24 1423    Respiratory Therapy Consult   Nebulization Continuous RT Pipe Hernandez M.D.        albuterol (Proventil) 2.5mg/0.5ml nebulizer solution 2.5 mg  2.5 mg Nebulization Q2HRS PRN (RT) Pipe Hernandez M.D.        polyethylene glycol/lytes (Miralax) Packet 1 Packet  1 Packet Oral TID Andrzej Longoria M.D.   1 Packet at 05/01/24 0900    senna-docusate (Pericolace Or Senokot S) 8.6-50 MG per tablet 2 Tablet  2 Tablet Oral Q EVENING Aristeo Valencia M.D.   2 Tablet at  05/01/24 1735    And    polyethylene glycol/lytes (Miralax) Packet 1 Packet  1 Packet Oral QDAY PRDONOVAN Valencia M.D.   1 Packet at 04/28/24 1521    ondansetron (Zofran) syringe/vial injection 4 mg  4 mg Intravenous Q4HRS PRN Aristeo Valencia M.D.        ondansetron (Zofran ODT) dispertab 4 mg  4 mg Oral Q4HRS PRN Aristeo Valencia M.D.        promethazine (Phenergan) tablet 12.5-25 mg  12.5-25 mg Oral Q4HRS PRDONOVAN Valencia M.D.        promethazine (Phenergan) suppository 12.5-25 mg  12.5-25 mg Rectal Q4HRS PRDONOVAN Valencia M.D.        prochlorperazine (Compazine) injection 5-10 mg  5-10 mg Intravenous Q4HRS PRDONOVAN Valencia M.D.        benzonatate (Tessalon) capsule 100 mg  100 mg Oral TID PRN Aristeo Valencia M.D.        pramipexole (Mirapex) tablet 0.125 mg  0.125 mg Oral TID Aristeo Valencia M.D.   0.125 mg at 05/03/24 0817    venlafaxine XR (Effexor XR) capsule 37.5 mg  37.5 mg Oral QAM Aristeo Valencia M.D.   37.5 mg at 05/03/24 0506    oxyCODONE immediate-release (Roxicodone) tablet 5 mg  5 mg Oral Q4HRS PRN Ysabel Nash A.P.RTedN.   5 mg at 04/30/24 1948       Fluids    Intake/Output Summary (Last 24 hours) at 5/3/2024 0907  Last data filed at 5/3/2024 0600  Gross per 24 hour   Intake 697.22 ml   Output 1587 ml   Net -889.78 ml       Laboratory      Recent Labs     05/01/24  2255 05/02/24  0855   CPKTOTAL 630* 1765*     Recent Labs     05/01/24  0601 05/01/24  2032 05/02/24  0400 05/02/24  1440 05/03/24  0009 05/03/24  0512   SODIUM 135  134* 133* 131* 130* 134* 134*   POTASSIUM 5.8*  5.7* 5.0 4.7 4.8 4.6 4.9   CHLORIDE 97  96 97 97 99 97 97   CO2 12*  14* 15* 18* 15* 22 23   BUN 52*  52* 58* 62* 72* 28* 31*   CREATININE 3.19*  3.22* 3.99* 4.45* 5.05* 2.37* 3.03*   MAGNESIUM 2.7*  --  2.4  --   --  2.0   PHOSPHORUS 6.4* 5.2* 5.2*  --   --  3.5   CALCIUM 8.3*  8.3* 7.7* 7.4* 7.9* 7.9* 7.8*     Recent Labs     05/01/24  0601 05/01/24 2032 05/02/24  0400  05/02/24 1440 05/03/24 0009 05/03/24 0512   ALTSGPT 1377*  --  908*  --   --  660*   ASTSGOT 2959*  --  1237*  --   --  490*   ALKPHOSPHAT 184*  --  156*  --   --  163*   TBILIRUBIN 1.1  --  0.8  --   --  1.0   GLUCOSE 89  87   < > 102* 103* 116* 111*    < > = values in this interval not displayed.     Recent Labs     05/01/24  0601 05/01/24  0736 05/01/24 2255 05/02/24  0400 05/02/24 1440 05/03/24 0009 05/03/24 0512   WBC  --    < > 16.4* 16.3* 17.3* 18.5* 18.4*   NEUTSPOLYS  --    < > 92.20* 94.90*  --   --  86.50*   LYMPHOCYTES  --    < > 2.60* 0.80*  --   --  4.90*   MONOCYTES  --    < > 4.30 0.80  --   --  4.20   EOSINOPHILS  --    < > 0.00 0.00  --   --  0.00   BASOPHILS  --    < > 0.90 0.80  --   --  0.20   ASTSGOT 2959*  --   --  1237*  --   --  490*   ALTSGPT 1377*  --   --  908*  --   --  660*   ALKPHOSPHAT 184*  --   --  156*  --   --  163*   TBILIRUBIN 1.1  --   --  0.8  --   --  1.0    < > = values in this interval not displayed.     Recent Labs     05/02/24  0855 05/02/24 1440 05/02/24 2106 05/03/24 0009 05/03/24 0512   RBC  --  3.17*  --  3.05* 3.06*   HEMOGLOBIN  --  8.6*  --  8.4* 8.3*   HEMATOCRIT  --  27.1*  --  25.1* 25.4*   PLATELETCT  --  45*  --  52* 57*   PROTHROMBTM  --  30.0* 25.6*  --  25.6*   INR  --  2.81* 2.30*  --  2.30*   FERRITIN 22999.0*  --   --   --   --        Imaging  X-Ray:  I have personally reviewed the images and compared with prior images.  Echo:   Reviewed  Ultrasound:  Reviewed    Assessment/Plan  * Heart failure with reduced ejection fraction (HCC)- (present on admission)  Assessment & Plan  4/29 echo with EF <15%, severe mitral valve regurgitation, grade 2 diastolic dysfunction, and moderate left atrial dilatation.   Patient is on room air laying flat and warm on exam with perserved map   These finding don't fit with this picture of cold shock  Start after load reduction with hydralazine for SBP < 100-120  Can stop dobutamine after discussion with   Kandi as he agrees      HLH (hemophagocytic lymphohistiocytosis) (HCC)  Assessment & Plan  Patient with EBV trigger/associated HLH  2 cell lines drop hg and plt (+24)  Fibrinogen low (+30)  LFT elevated (+19)  Trigycerides elevated (+44)  Ferritin elevated (+50)  Hepatomegaly (+23)  BM Bx pending (either 0 or +35)    H score = Total 190 pts 80% probability w/ out BM Bx if + BM Bx 98% probability  Started on dexamethasone 5/2  Dr Mcintyre consulted   Plan to get BM Bx today 5/3 to help confirm dx      Rhabdomyolysis  Assessment & Plan  cpk elevation continue to monitor      Thrombocytopenia (HCC)  Assessment & Plan  Due to HLH  Not consistent with HIT or TTP no schistocytes  Will need BM bx      Coagulopathy (HCC)  Assessment & Plan  Elevated INR, low fibrinogen and low platelets  Serial monitor  Findings consistent with HLH  Hematology consulted  S/p cryo 2 units 5/2 with improvement      IVON (acute kidney injury) (HCC)  Assessment & Plan  Unclear etiology stop ace inhibitor and metoprolol no recent contrast  U/a reviewed, CPK elevated, renal u/s reviewed, continue with williamson  Not consistent with CHF or cold shock  Nephrology consulted  No schystocytes per discussing with lab thus not consistent with TTP  Avoid nephrotoxins  CPK elevated serial monitor  S/p iHD 5/2      Transaminitis  Assessment & Plan  Due to HLH EBV and cpk  Improving    Severe mitral regurgitation- (present on admission)  Assessment & Plan  Strict afterload reduction  After discussing with Dr Chau not as severe as described      Hyperkalemia- (present on admission)  Assessment & Plan  Mild hold all replacement with rising renal function  Stop ace inhibitors  S/p iHD 5/2    Abdominal pain- (present on admission)  Assessment & Plan  Check CT abdomen noncontrast as this is why she presented to hospital    Leukocytosis- (present on admission)  Assessment & Plan  Unclear etiology possible stress demargination  CT abdomen negative CXR unremarkable  stop antibiotics          Stroke (HCC)- (present on admission)  Assessment & Plan  History of   Residual expressive aphasia - at baseline  Delirium precautions         VTE:  Contraindicated  Ulcer: Not Indicated  Lines: Guerrero Catheter  Ongoing indication addressed    I have performed a physical exam and reviewed and updated ROS and Plan today (5/3/2024). In review of yesterday's note (5/2/2024), there are no changes except as documented above.     Discussed patient condition and risk of morbidity and/or mortality with RN, RT, Charge nurse / hot rounds, Patient, and nephrology and oncology

## 2024-05-03 NOTE — CARE PLAN
The patient is Stable - Low risk of patient condition declining or worsening    Shift Goals  Clinical Goals: safety. hemodynamic stability  Patient Goals: rest  Family Goals: AFIA    Progress made toward(s) clinical / shift goals:    Problem: Pain - Standard  Goal: Alleviation of pain or a reduction in pain to the patient’s comfort goal  Outcome: Progressing  Note: Pt reported no pain throughout shift     Problem: Knowledge Deficit - Standard  Goal: Patient and family/care givers will demonstrate understanding of plan of care, disease process/condition, diagnostic tests and medications  Outcome: Progressing  Note: Pt & family updated on POC     Problem: Fall Risk  Goal: Patient will remain free from falls  Outcome: Progressing  Note: Fall precautions in place       Patient is not progressing towards the following goals:

## 2024-05-03 NOTE — PROCEDURES
Central Line Insertion    Date/Time: 5/3/2024 1:08 PM    Performed by: Jose Adams M.D.  Authorized by: Jose Adams M.D.    Consent:     Consent obtained:  Emergent situation    Consent given by:  Patient    Risks discussed:  Arterial puncture, bleeding, infection and pneumothorax    Alternatives discussed:  No treatment  Pre-procedure details:     Skin preparation:  ChloraPrep  Sedation:     Sedation type:  None  Anesthesia:     Anesthesia method:  Local infiltration    Local anesthetic:  Lidocaine 1% w/o epi  Procedure details:     Location:  R internal jugular    Patient position:  Reverse Trendelenburg    Procedural supplies: trialysis.    Catheter size: 13 fr 15cm.    Landmarks identified: yes      Ultrasound guidance: yes      Sterile ultrasound techniques: Sterile gel and sterile probe covers were used      Number of attempts:  2    Successful placement: yes    Post-procedure details:     Post-procedure:  Dressing applied    Guidewire: guidewire removal confirmed      Assessment:  Blood return through all ports and no pneumothorax on x-ray    Patient tolerance of procedure:  Tolerated well, no immediate complications  Comments:      US guided right IJ trialysis for emergent dialysis

## 2024-05-03 NOTE — PROGRESS NOTES
[facility] Progress Note     Hemodialysis treatment #1 x 3 hours done today as ordered by Dr Ramirez.    Treatment initiated at 1600 and ended at 1900.     Pt confused and restless pre-tx , VSS, no signs of respiratory distress. Jcarlos Arms with rash/scabbing noted. No next of kin/POA available, Dr Adams signed consent for HD.     Pt tolerated HD tx, repositioned and cleaned up in bed by primary Rns during Tx, Pt stable post tx. See e-flow sheets for further details.     Net UF : 1,000 mL     Post tx CVC care: R IJ non-tunneled HD ports cleansed per protocol, flushed with NS, locked with ACD-A per designated amount per port, clamped and capped. CVC dressing assessed, CDI. Aspirate ACD-A prior to next CVC use.     Report given to primary RN.

## 2024-05-03 NOTE — CARE PLAN
Problem: Pain - Standard  Goal: Alleviation of pain or a reduction in pain to the patient’s comfort goal  5/2/2024 2345 by Shabnam Britt R.N.  Outcome: Progressing  5/2/2024 2345 by Shabnam Britt R.N.  Outcome: Progressing     Problem: Knowledge Deficit - Standard  Goal: Patient and family/care givers will demonstrate understanding of plan of care, disease process/condition, diagnostic tests and medications  5/2/2024 2345 by Shabnam Britt R.N.  Outcome: Progressing  5/2/2024 2345 by Shabnam Britt R.N.  Outcome: Progressing     Problem: Skin Integrity  Goal: Skin integrity is maintained or improved  5/2/2024 2345 by Shabnam Britt R.N.  Outcome: Progressing  5/2/2024 2345 by Shabnam Britt R.N.  Outcome: Progressing     Problem: Fall Risk  Goal: Patient will remain free from falls  5/2/2024 2345 by Shabnam Britt R.N.  Outcome: Progressing  5/2/2024 2345 by Shabnam Britt R.N.  Outcome: Progressing     Problem: Hemodynamics  Goal: Patient's hemodynamics, fluid balance and neurologic status will be stable or improve  5/2/2024 2345 by Shabnam Britt R.N.  Outcome: Progressing  5/2/2024 2345 by Shabnam Britt R.N.  Outcome: Progressing   The patient is Watcher - Medium risk of patient condition declining or worsening    Shift Goals  Clinical Goals: hemodynamic stability, safety  Patient Goals: pain management  Family Goals: AFIA    Progress made toward(s) clinical / shift goals:      Patient is not progressing towards the following goals:

## 2024-05-03 NOTE — PROGRESS NOTES
Delay in administration of second unit of cryoprecipitate due to thawing time in blood bank. Documented per blood transfusion flowsheet.

## 2024-05-03 NOTE — DISCHARGE PLANNING
Case Management Discharge Planning    Admission Date: 4/27/2024  GMLOS: 3.9  ALOS: 6    6-Clicks ADL Score: 16  6-Clicks Mobility Score: 14  PT and/or OT Eval ordered: Yes  Post-acute Referrals Ordered: Yes  Post-acute Choice Obtained: Yes  Has referral(s) been sent to post-acute provider:  Yes      Anticipated Discharge Dispo: Discharge Disposition: D/T to home under HHA care in anticipation of covered skilled care (06)  Discharge Address: [address] [place] [id number]    DME Needed: No    Action(s) Taken: Updated Provider/Nurse on Discharge Plan and OTHER    LSW met with pt and pt  at bedside who had questions for this LSW. Pt  has concerns about not being able to cash the pt check in order to pay rent. LSW provided pt  with a letter as proof that the pt is in the hospital to provide for his landlord.     Escalations Completed: None    Medically Clear: No    Next Steps: f/u with pt and medical team to discuss dc needs and barriers.     Barriers to Discharge: Medical clearance

## 2024-05-03 NOTE — ASSESSMENT & PLAN NOTE
Patient with EBV trigger/associated HLH  2 cell lines drop hg and plt (+24)  Fibrinogen low (+30)  LFT elevated (+19)  Trigycerides elevated (+44)  Ferritin elevated (+50)  Hepatomegaly (+23)  BM Bx pending (either 0 or +35)    H score = Total 190 pts 80% probability w/ out BM Bx if + BM Bx 98% probability  Started on dexamethasone 5/2  Dr Mcintyre consulted   Plan to get BM Bx today 5/3 to help confirm dx

## 2024-05-04 PROBLEM — E78.5 HYPERLIPIDEMIA: Status: ACTIVE | Noted: 2024-05-04

## 2024-05-04 LAB
ALBUMIN SERPL BCP-MCNC: 3 G/DL (ref 3.2–4.9)
ALBUMIN/GLOB SERPL: 0.8 G/DL
ALP SERPL-CCNC: 164 U/L (ref 30–99)
ALT SERPL-CCNC: 570 U/L (ref 2–50)
ANION GAP SERPL CALC-SCNC: 16 MMOL/L (ref 7–16)
AST SERPL-CCNC: 310 U/L (ref 12–45)
BASOPHILS # BLD AUTO: 0.1 % (ref 0–1.8)
BASOPHILS # BLD: 0.02 K/UL (ref 0–0.12)
BILIRUB SERPL-MCNC: 1 MG/DL (ref 0.1–1.5)
BUN SERPL-MCNC: 53 MG/DL (ref 8–22)
CALCIUM ALBUM COR SERPL-MCNC: 8.7 MG/DL (ref 8.5–10.5)
CALCIUM SERPL-MCNC: 7.9 MG/DL (ref 8.5–10.5)
CHLORIDE SERPL-SCNC: 97 MMOL/L (ref 96–112)
CK SERPL-CCNC: 6464 U/L (ref 0–154)
CO2 SERPL-SCNC: 20 MMOL/L (ref 20–33)
CREAT SERPL-MCNC: 4.76 MG/DL (ref 0.5–1.4)
EOSINOPHIL # BLD AUTO: 0 K/UL (ref 0–0.51)
EOSINOPHIL NFR BLD: 0 % (ref 0–6.9)
ERYTHROCYTE [DISTWIDTH] IN BLOOD BY AUTOMATED COUNT: 50.4 FL (ref 35.9–50)
FIBRINOGEN PPP-MCNC: 122 MG/DL (ref 215–460)
GFR SERPLBLD CREATININE-BSD FMLA CKD-EPI: 10 ML/MIN/1.73 M 2
GLOBULIN SER CALC-MCNC: 3.7 G/DL (ref 1.9–3.5)
GLUCOSE SERPL-MCNC: 141 MG/DL (ref 65–99)
HCT VFR BLD AUTO: 26.1 % (ref 37–47)
HGB BLD-MCNC: 8.4 G/DL (ref 12–16)
IMM GRANULOCYTES # BLD AUTO: 0.46 K/UL (ref 0–0.11)
IMM GRANULOCYTES NFR BLD AUTO: 2.6 % (ref 0–0.9)
INR PPP: 2.68 (ref 0.87–1.13)
LYMPHOCYTES # BLD AUTO: 1.04 K/UL (ref 1–4.8)
LYMPHOCYTES NFR BLD: 5.8 % (ref 22–41)
MCH RBC QN AUTO: 27.3 PG (ref 27–33)
MCHC RBC AUTO-ENTMCNC: 32.2 G/DL (ref 32.2–35.5)
MCV RBC AUTO: 84.7 FL (ref 81.4–97.8)
MONOCYTES # BLD AUTO: 0.84 K/UL (ref 0–0.85)
MONOCYTES NFR BLD AUTO: 4.7 % (ref 0–13.4)
NEUTROPHILS # BLD AUTO: 15.62 K/UL (ref 1.82–7.42)
NEUTROPHILS NFR BLD: 86.8 % (ref 44–72)
NRBC # BLD AUTO: 0.23 K/UL
NRBC BLD-RTO: 1.3 /100 WBC (ref 0–0.2)
PLATELET # BLD AUTO: 58 K/UL (ref 164–446)
PLATELETS.RETICULATED NFR BLD AUTO: 13.5 % (ref 0.6–13.1)
PMV BLD AUTO: 11 FL (ref 9–12.9)
POTASSIUM SERPL-SCNC: 5 MMOL/L (ref 3.6–5.5)
PROT SERPL-MCNC: 6.7 G/DL (ref 6–8.2)
PROTHROMBIN TIME: 28.9 SEC (ref 12–14.6)
RBC # BLD AUTO: 3.08 M/UL (ref 4.2–5.4)
SODIUM SERPL-SCNC: 133 MMOL/L (ref 135–145)
WBC # BLD AUTO: 18 K/UL (ref 4.8–10.8)

## 2024-05-04 PROCEDURE — 99232 SBSQ HOSP IP/OBS MODERATE 35: CPT | Performed by: INTERNAL MEDICINE

## 2024-05-04 PROCEDURE — 99233 SBSQ HOSP IP/OBS HIGH 50: CPT | Performed by: INTERNAL MEDICINE

## 2024-05-04 RX ORDER — HYDRALAZINE HYDROCHLORIDE 50 MG/1
25 TABLET, FILM COATED ORAL EVERY 8 HOURS
Status: DISCONTINUED | OUTPATIENT
Start: 2024-05-04 | End: 2024-05-16

## 2024-05-04 RX ORDER — ISOSORBIDE MONONITRATE 30 MG/1
30 TABLET, EXTENDED RELEASE ORAL
Status: DISCONTINUED | OUTPATIENT
Start: 2024-05-04 | End: 2024-05-17

## 2024-05-04 RX ORDER — HYDROXYZINE HYDROCHLORIDE 25 MG/1
25 TABLET, FILM COATED ORAL 3 TIMES DAILY PRN
Status: DISCONTINUED | OUTPATIENT
Start: 2024-05-04 | End: 2024-05-17

## 2024-05-04 RX ORDER — DEXAMETHASONE 4 MG/1
6 TABLET ORAL EVERY 8 HOURS
Status: DISCONTINUED | OUTPATIENT
Start: 2024-05-04 | End: 2024-05-17

## 2024-05-04 RX ADMIN — POLYETHYLENE GLYCOL 3350 1 PACKET: 17 POWDER, FOR SOLUTION ORAL at 15:00

## 2024-05-04 RX ADMIN — DEXAMETHASONE 6 MG: 4 TABLET ORAL at 04:21

## 2024-05-04 RX ADMIN — VENLAFAXINE HYDROCHLORIDE 37.5 MG: 37.5 CAPSULE, EXTENDED RELEASE ORAL at 04:21

## 2024-05-04 RX ADMIN — Medication: at 13:20

## 2024-05-04 RX ADMIN — ISOSORBIDE MONONITRATE 30 MG: 30 TABLET, EXTENDED RELEASE ORAL at 14:45

## 2024-05-04 RX ADMIN — HYDRALAZINE HYDROCHLORIDE 25 MG: 50 TABLET ORAL at 21:52

## 2024-05-04 RX ADMIN — PRAMIPEXOLE DIHYDROCHLORIDE 0.12 MG: 0.12 TABLET ORAL at 08:44

## 2024-05-04 RX ADMIN — HYDRALAZINE HYDROCHLORIDE 25 MG: 50 TABLET ORAL at 14:45

## 2024-05-04 RX ADMIN — HYDROXYZINE HYDROCHLORIDE 25 MG: 25 TABLET, FILM COATED ORAL at 00:45

## 2024-05-04 RX ADMIN — PRAMIPEXOLE DIHYDROCHLORIDE 0.12 MG: 0.12 TABLET ORAL at 14:45

## 2024-05-04 RX ADMIN — OXYCODONE 5 MG: 5 TABLET ORAL at 14:48

## 2024-05-04 RX ADMIN — DEXAMETHASONE 6 MG: 4 TABLET ORAL at 14:45

## 2024-05-04 RX ADMIN — HYDROXYZINE HYDROCHLORIDE 25 MG: 25 TABLET, FILM COATED ORAL at 23:48

## 2024-05-04 RX ADMIN — HYDROXYZINE HYDROCHLORIDE 25 MG: 25 TABLET, FILM COATED ORAL at 04:27

## 2024-05-04 RX ADMIN — DEXAMETHASONE 6 MG: 4 TABLET ORAL at 21:52

## 2024-05-04 RX ADMIN — PRAMIPEXOLE DIHYDROCHLORIDE 0.12 MG: 0.12 TABLET ORAL at 21:52

## 2024-05-04 RX ADMIN — MUPIROCIN 1 APPLICATION: 20 OINTMENT TOPICAL at 09:00

## 2024-05-04 RX ADMIN — MUPIROCIN 1 APPLICATION: 20 OINTMENT TOPICAL at 18:00

## 2024-05-04 ASSESSMENT — ENCOUNTER SYMPTOMS
CONSTIPATION: 0
COUGH: 0
HEMOPTYSIS: 0
EYES NEGATIVE: 1
SHORTNESS OF BREATH: 0
NAUSEA: 0
SPUTUM PRODUCTION: 0
ORTHOPNEA: 0
PALPITATIONS: 0
VOMITING: 0
DIARRHEA: 0
HEARTBURN: 0
MYALGIAS: 0
ABDOMINAL PAIN: 0
BLOOD IN STOOL: 0

## 2024-05-04 ASSESSMENT — FIBROSIS 4 INDEX: FIB4 SCORE: 18.55

## 2024-05-04 ASSESSMENT — PAIN DESCRIPTION - PAIN TYPE: TYPE: ACUTE PAIN

## 2024-05-04 NOTE — PROGRESS NOTES
[facility] -  PROGRESS NOTE               Author: Spenser Ramirez M.D. Date & Time: 5/4/2024  8:13 AM       REASON FOR CONSULTATION:   - IVON    CHIEF COMPLAINT:   -  Abdominal pain, body aches     HISTORY OF PRESENT ILLNESS:    63 y.o. female with history of systolic and diastolic heart failure with EF of 15% (HFrEF) grade 2 diastolic dysfunction, severe MVR, CAD with h/o STEMI s/p DEWEY to LAD, PAD with aortofemoral bypass surgery who presented 4/27/2024 with increasing shortness of breath admitted for community acquired PNA and severe constipation with hosp course complicated by exacerbation of her HFrEF, medications augmented which was followed by IVON, worsening hepatopathy concerning for cardiorenal etiology.   She was then transferred to ICU 5/01 and initiated on dobutamine drip for organ perfusion.   Patient continues to be oliguric with UOP of 86 yesterday and 10 ml today so far.   Nephrology has been consulted for concerns of IVON , cardiorenal syndrome.     DAILY NEPHROLOGY SUMMARY:  05/02: Examined at bedside. Looks like she wants to talk but it is difficult for her to find appropriate words.   Dobutamine ggt held per critical care today.   Labs yesterday with fibrinogen 91, cryoppt administered followed by dialysis   1 L ultrafiltration   Uptrending WBCs since 4/30, 18.4 today  Scr improved to 3.03 after dialysis   Down trending AST/ALT   CT scan without bowel obs, extensive calcification of distal thoracic/upper abd aorta. Left renal cyst.   5/4: Pt transferred out of ICU, now with increased CPK and climbing    REVIEW OF SYSTEMS:    Unable to ascertain, mental acuity. Chronic expressive aphasia.     PAST MEDICAL/SURGICAL/FAMILY/SOCIAL HISTORY:  Reviewed and documented in chart     HOME MEDICATIONS:   - Reviewed and documented in chart    LABORATORY STUDIES:   - Reviewed and documented in chart    ALLERGIES:  Pcn [penicillins] and Toradol    VS:  BP (!) 122/93   Pulse 88   Temp  "36.7 °C (98.1 °F) (Temporal)   Resp 18   Ht 1.651 m (5' 5\")   Wt 58.4 kg (128 lb 12 oz)   SpO2 98%   BMI 21.42 kg/m²   Physical Exam  Constitutional:       General: She is not in acute distress.     Appearance: She is ill-appearing.   HENT:      Head: Normocephalic and atraumatic.      Right Ear: External ear normal.      Left Ear: External ear normal.      Nose: Nose normal.      Mouth/Throat:      Mouth: Mucous membranes are dry.   Eyes:      General:         Right eye: No discharge.         Left eye: No discharge.   Neck:      Comments: Right IJ trialysis catheter   Cardiovascular:      Rate and Rhythm: Normal rate.      Pulses: Normal pulses.   Pulmonary:      Effort: Pulmonary effort is normal. No respiratory distress.   Abdominal:      General: Bowel sounds are normal.      Palpations: Abdomen is soft.   Musculoskeletal:         General: Swelling present.      Comments: Swelling b/l arms    Skin:     General: Skin is warm.      Capillary Refill: Capillary refill takes less than 2 seconds.      Findings: Bruising present.      Comments: Excoriations on extremities    Neurological:      Comments: Chronic expressive aphasia          FLUID BALANCE:  In: 200 [P.O.:200]  Out: 80     LABS:  Recent Labs     05/03/24  0512 05/03/24  0912 05/04/24  0400   SODIUM 134* 133* 133*   POTASSIUM 4.9 4.9 5.0   CHLORIDE 97 97 97   CO2 23 21 20   GLUCOSE 111* 121* 141*   BUN 31* 36* 53*   CREATININE 3.03* 3.53* 4.76*   CALCIUM 7.8* 7.9* 7.9*        IMAGING:  - Imaging studies reviewed by me      IMPRESSION:     # Acute kidney injury multifactorial ? Decreased perfusion, HLH, rhabdo  - unlikley TTP  - oliguric  - HD 5/2 1st     # ? 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 ongoing  - steroids  -Bone Marrow Monday    # Hepatic congestion      #Rhabdo unclear etiology, difficult to give fluids in face of reduced EF     # Heart failure with reduced ejection fraction      # Coagulopathy   - Fibinogen 91 5/02, s/p cryoppt "     # Transamnitis  - Improving s/p dialysis 5/02      # Severe mitral regurgitation   - Cardiology on board      # Peripheral vascular disease with prior aortic thrombus s/p aortofemoral bypass       # Constipation      # Impacted stool in intestine   - Resolved      # Colitis      # Leukocytosis   - Up trending, see above      # Prior CVA    - Chronic expressive aphasia     # Essential hypertension            PLAN:  - HD Saturday 5/4  - Daily evaluation of RRT needs  - steroids  - Bone marrow pending  - Avoid nephrotoxins, NSAIDs  - Renally dose meds

## 2024-05-04 NOTE — PROGRESS NOTES
Bedside report received from off going RN/tech: Lashell assumed care of patient.     Fall Risk Score: HIGH RISK  Fall risk interventions in place: Place yellow fall risk ID band on patient, Provide patient/family education based on risk assessment, Educate patient/family to call staff for assistance when getting out of bed, Place fall precaution signage outside patient door, and Place patient in room close to nursing station, bed alarm in place   Bed type: Regular- ICU bed (Manolo Score less than 17 interventions in place)  Patient on cardiac monitor: Yes  IVF/IV medications: Not Applicable   Oxygen: Room Air  Bedside sitter: Not Applicable   Isolation: Isolation precautions in place

## 2024-05-04 NOTE — PROGRESS NOTES
Report called to Aristeo MONTALVO on T8, pt transported on monitor to T816 with CCT, pt in possession of all belongings including cell phone and .

## 2024-05-04 NOTE — PROGRESS NOTES
Oncology/Hematology Progress Note               Author: Pranay Mcintyre III, M.D. Date & Time created: 5/4/2024  1:57 PM   Concern for HLH  Interval History:  Resting in bed comfortably, she was moved to the floor, she is getting hemodialysis now, CBC stable, liver function test improving.  CPK getting worse.  She is at baseline from neurologic point of view, no fevers    Review of Systems:  Review of Systems   Constitutional:  Positive for malaise/fatigue.   HENT: Negative.     Eyes: Negative.    Respiratory:  Negative for cough, hemoptysis, sputum production and shortness of breath.    Cardiovascular:  Negative for chest pain, palpitations and orthopnea.   Gastrointestinal:  Negative for abdominal pain, blood in stool, constipation, diarrhea, heartburn, melena, nausea and vomiting.   Genitourinary: Negative.    Musculoskeletal:  Negative for myalgias.   Skin: Negative.    Neurological:         Aphasia is at baseline due to stroke       Physical Exam:  Physical Exam    Labs:      Recent Labs     05/02/24  0855 05/03/24  0912 05/04/24  0400   CPKTOTAL 1765* 5148* 6464*     Recent Labs     05/01/24 2032 05/02/24  0400 05/02/24  1440 05/03/24  0512 05/03/24  0912 05/04/24  0400   SODIUM 133* 131*   < > 134* 133* 133*   POTASSIUM 5.0 4.7   < > 4.9 4.9 5.0   CHLORIDE 97 97   < > 97 97 97   CO2 15* 18*   < > 23 21 20   BUN 58* 62*   < > 31* 36* 53*   CREATININE 3.99* 4.45*   < > 3.03* 3.53* 4.76*   MAGNESIUM  --  2.4  --  2.0  --   --    PHOSPHORUS 5.2* 5.2*  --  3.5  --   --    CALCIUM 7.7* 7.4*   < > 7.8* 7.9* 7.9*    < > = values in this interval not displayed.     Recent Labs     05/03/24 0512 05/03/24  0912 05/04/24  0400   ALTSGPT 660* 662* 570*   ASTSGOT 490* 446* 310*   ALKPHOSPHAT 163* 163* 164*   TBILIRUBIN 1.0 1.0 1.0   GLUCOSE 111* 121* 141*     Recent Labs     05/02/24  0855 05/02/24  1440 05/02/24  2106 05/03/24  0009 05/03/24  0512 05/03/24  0912 05/04/24  0400 05/04/24  1049   RBC  --    < >  --  3.05*  3.06*  --  3.08*  --    HEMOGLOBIN  --    < >  --  8.4* 8.3*  --  8.4*  --    HEMATOCRIT  --    < >  --  25.1* 25.4*  --  26.1*  --    PLATELETCT  --    < >  --  52* 57*  --  58*  --    PROTHROMBTM  --    < > 25.6*  --  25.6*  --   --  28.9*   INR  --    < > 2.30*  --  2.30*  --   --  2.68*   FERRITIN 35537.0*  --   --   --   --  7333.0*  --   --     < > = values in this interval not displayed.     Recent Labs     24  0400 24  1440 24  0009 24  0400   WBC 16.3*   < > 18.5* 18.4*  --  18.0*   NEUTSPOLYS 94.90*  --   --  86.50*  --  86.80*   LYMPHOCYTES 0.80*  --   --  4.90*  --  5.80*   MONOCYTES 0.80  --   --  4.20  --  4.70   EOSINOPHILS 0.00  --   --  0.00  --  0.00   BASOPHILS 0.80  --   --  0.20  --  0.10   ASTSGOT 1237*  --   --  490* 446* 310*   ALTSGPT 908*  --   --  660* 662* 570*   ALKPHOSPHAT 156*  --   --  163* 163* 164*   TBILIRUBIN 0.8  --   --  1.0 1.0 1.0    < > = values in this interval not displayed.     Recent Labs     24  0400   SODIUM 134* 133* 133*   POTASSIUM 4.9 4.9 5.0   CHLORIDE 97 97 97   CO2 23 21 20   GLUCOSE 111* 121* 141*   BUN 31* 36* 53*   CREATININE 3.03* 3.53* 4.76*   CALCIUM 7.8* 7.9* 7.9*     Hemodynamics:  Temp (24hrs), Av.6 °C (97.8 °F), Min:36.4 °C (97.5 °F), Max:36.7 °C (98.1 °F)  Temperature: 36.4 °C (97.5 °F)  Pulse  Av.1  Min: 60  Max: 106   Blood Pressure: (!) 149/57     Respiratory:    Respiration: 18, Pulse Oximetry: 96 %     Work Of Breathing / Effort: Within Normal Limits  RUL Breath Sounds: Clear, RML Breath Sounds: Clear;Coarse Crackles, RLL Breath Sounds: Diminished, ZAINAB Breath Sounds: Clear;Coarse Crackles, LLL Breath Sounds: Diminished  Fluids:    Intake/Output Summary (Last 24 hours) at 2024 1357  Last data filed at 2024 1335  Gross per 24 hour   Intake 1400 ml   Output 1520 ml   Net -120 ml     Weight: 58.4 kg (128 lb 12 oz)  GI/Nutrition:  Orders Placed  This Encounter   Procedures    Diet Order Diet: Cardiac     Standing Status:   Standing     Number of Occurrences:   1     Order Specific Question:   Diet:     Answer:   Cardiac [6]    Diet NPO Restrict to: Sips with Medications     Standing Status:   Standing     Number of Occurrences:   8     Order Specific Question:   Diet NPO Restrict to:     Answer:   Sips with Medications [3]     Medical Decision Making, by Problem:  Active Hospital Problems    Diagnosis     *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]     Hyperlipidemia [E78.5]     History of stroke [Z86.73]     Cardiogenic shock (HCC) [R57.0]     Thrombocytopenia (HCC) [D69.6]     Rhabdomyolysis [M62.82]     IVON (acute kidney injury) (HCC) [N17.9]     Coagulopathy (HCC) [D68.9]     Severe mitral regurgitation [I34.0]     Transaminitis [R74.01]     Heart failure with reduced ejection fraction (HCC) [I50.20]     Hyperkalemia [E87.5]     Leukocytosis [D72.829]        Plan:  Concern for HLH  -EBV+  -No fevers, there is no significant hepatosplenomegaly, normal triglycerides, from a neurologic point of view at baseline are negative findings but there is significant drop in hemoglobin and platelet, ferritin 14,000 significant transaminitis and IVON, improvement on transaminitis even before starting steroids.  Cannot be explained by infection, was not found to have cardiogenic shock.  -4/2/2024 started dexamethasone 6 mg Q6     2.  Significant anemia and thrombocytopenia,  -Related to DIC, acute decompensation, HLH cannot be ruled out  -Plasmic score 4,low risk for TTP , HIT low probability     3.  Acute transaminitis  -Can be related to acute decompensation, HLH the possibility, on steroids     4.  Coagulopathy due to DIC, acute liver decompensation, improving     5.  Signs of right Ommaya lysis, increase CPK     6.  IVON, nephrology on board, on hemodialysis  -Low potassium score, no concerns for TTP  -Due to rhabdomyolysis, acute  decompensation      Plan  -Hemoglobin and platelets stable, no evidence of bleeding,   -liver enzymes continues to improve Ferritin level dropping nicely as well  -CPK worse, on hemodialysis, nephrology on board  -Continue on dexamethasone, dose reduced to 6 mg every 8 hours  -Arrange bone marrow biopsy on Monday  -uncertain if clinical picture  is related to HLH    High complexity, complex decision making    Quality-Core Measures

## 2024-05-04 NOTE — PROGRESS NOTES
4 Eyes Skin Assessment Completed by KATIA Alberts and KATIA Connolly.    Head WDL  Ears WDL  Nose WDL  Mouth WDL  Neck WDL  Breast/Chest WDL  Shoulder Blades Redness and Blanching  Spine Redness, scabs lower back  (R) Arm/Elbow/Hand Redness, Bruising, and Scabs all over   (L) Arm/Elbow/Hand Redness, Bruising, and Scabs all over  Abdomen WDL  Groin WDL  Scrotum/Coccyx/Buttocks Redness and Scab, scratches, slow to [place]  (R) Leg Redness, Scab, and Bruising  (L) Leg Redness, Scab, and Bruising  (R) Heel/Foot/Toe Redness, Boggy, and Bruising, slow to [place]  (L) Heel/Foot/Toe Redness, Boggy, and Bruising, slow to [place]          Devices In Places Tele Box and Pulse Ox      Interventions In Place TAP System, Pillows, Q2 Turns, Dri-Milad Pads, Heels Loaded W/Pillows, and Pressure Redistribution Mattress    Possible Skin Injury Yes    Pictures Uploaded Into Epic Yes  Wound Consult Placed Yes  RN Wound Prevention Protocol Ordered Yes

## 2024-05-04 NOTE — PROGRESS NOTES
Cardiology Follow Up Progress Note    Date of Service  5/4/2024    Attending Physician  Steve Swartz D.O.    Chief Complaint   Heart failure with reduced ejection fraction    HPI  Fouzia Santamaria is a 63 y.o. 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  No acute complaints per patient    Review of Systems  Review of Systems    Vital signs in last 24 hours  Temp:  [36.4 °C (97.5 °F)-36.7 °C (98.1 °F)] 36.7 °C (98.1 °F)  Pulse:  [75-96] 88  Resp:  [15-23] 18  BP: (113-146)/(68-96) 122/93  SpO2:  [92 %-100 %] 98 %    Physical Exam  Physical Exam  Vitals and nursing note reviewed.   Constitutional:       Appearance: Normal appearance. She is not ill-appearing.   HENT:      Head: Normocephalic and atraumatic.      Nose: Nose normal.      Mouth/Throat:      Mouth: Mucous membranes are moist.      Pharynx: Oropharynx is clear.   Eyes:      Pupils: Pupils are equal, round, and reactive to light.   Cardiovascular:      Rate and Rhythm: Normal rate and regular rhythm.      Heart sounds: No murmur heard.     No friction rub. No gallop.   Pulmonary:      Effort: Pulmonary effort is normal.      Breath sounds: No wheezing, rhonchi or rales.   Abdominal:      General: Bowel sounds are normal.      Palpations: Abdomen is soft.   Musculoskeletal:         General: Normal range of motion.      Cervical back: Normal range of motion and  October 18, 2017         Patient: Sabino Alejandro   YOB: 2010   Date of Visit: 10/18/2017           To Whom it May Concern:    Sabino Alejandro was seen in my clinic on 10/18/2017. He may return to school on 10/19/2017..    If you have any questions or concerns, please don't hesitate to call.        Sincerely,           LUIS Arevalo.  Electronically Signed      "neck supple.      Right lower leg: No edema.      Left lower leg: No edema.   Skin:     General: Skin is warm and dry.   Neurological:      General: No focal deficit present.      Mental Status: She is alert. Mental status is at baseline.         Lab Review  Lab Results   Component Value Date/Time    WBC 18.0 (H) 05/04/2024 04:00 AM    RBC 3.08 (L) 05/04/2024 04:00 AM    HEMOGLOBIN 8.4 (L) 05/04/2024 04:00 AM    HEMATOCRIT 26.1 (L) 05/04/2024 04:00 AM    MCV 84.7 05/04/2024 04:00 AM    MCH 27.3 05/04/2024 04:00 AM    MCHC 32.2 05/04/2024 04:00 AM    MPV 11.0 05/04/2024 04:00 AM      Lab Results   Component Value Date/Time    SODIUM 133 (L) 05/04/2024 04:00 AM    POTASSIUM 5.0 05/04/2024 04:00 AM    CHLORIDE 97 05/04/2024 04:00 AM    CO2 20 05/04/2024 04:00 AM    GLUCOSE 141 (H) 05/04/2024 04:00 AM    BUN 53 (H) 05/04/2024 04:00 AM    CREATININE 4.76 (HH) 05/04/2024 04:00 AM    BUNCREATRAT 25.0 07/10/2022 04:21 AM      Lab Results   Component Value Date/Time    ASTSGOT 310 (H) 05/04/2024 04:00 AM    ALTSGPT 570 (H) 05/04/2024 04:00 AM     Lab Results   Component Value Date/Time    CHOLSTRLTOT 89 (L) 08/29/2022 05:15 AM    LDL 34 08/29/2022 05:15 AM    HDL 32 (A) 08/29/2022 05:15 AM    TRIGLYCERIDE 148 05/02/2024 08:55 AM    TROPONINT 21 (H) 09/06/2023 06:16 PM       No results for input(s): \"NTPROBNP\" in the last 72 hours.        Assessment/Plan  1.  Improving transaminitis   2.  Hyperkalemia  3.  Acute kidney injury/acute renal failure with possible need for dialysis  4.  Heart failure with reduced ejection fraction  5.  Constipation  6.  Metabolic encephalopathy, largely at baseline  7.  Severe mitral valve regurgitation, possibly overestimated  8.  Peripheral vascular disease with prior aortic thrombus status post aortofemoral bypass  9.  Hypertension  10.  Prior CVA  11. HLH    Clinically she is doing well with her recent diagnosis of HLH and starting steroid therapy.    Regarding her heart failure with reduced " ejection fraction would recommend restarting goal-directed medical therapy as clinically tolerated but given her acute kidney injury of unknown duration would focus in on afterload reduction utilizing a combination of hydralazine and/or nitrates.        In terms of her other medical conditions I will defer to the primary medicine service.    Thank you for allowing me to participate in the care of this patient.  At this time the cardiology service will sign off.  If you have any further questions or concerns we will be happy to reengage in the patient's care.  Patient will follow-up with the cardiology department with her primary cardiologist Dr. Ghassan padilla in the next 3 to 4 weeks.  May be seen by nurse practitioner  Please contact me with any questions.    Breezy Chau D.O.   Cardiologist, [facility]  [phone]

## 2024-05-04 NOTE — ASSESSMENT & PLAN NOTE
-In the setting of low ejection fraction of 15%.  Required dobutamine drip.  Shock resolved.  Comfort care

## 2024-05-04 NOTE — ASSESSMENT & PLAN NOTE
- In setting of HLH, thrombocytopenia.    Oncology consulted, Low suspicion/probability for TTP or HIT.  Fibrinogen low.  -DIC versus MAHA based on schistocytes which may be consumptive coagulopathy  APLAS thus far negative  Comfort care

## 2024-05-04 NOTE — PROGRESS NOTES
[facility] Progress Note     Hemodialysis treatment x3 hours completed as ordered per Dr Ramirez.   Treatment performed at bedside started at 1020 and ended at 1320.      Net UF Removed: 1000 mL     Patient tolerated treatment well. UF goal reached as tolerated.  R-side IJ non-tunneled CVC access with good patency and flow x 2  Patient stable during and post treatment.   See Acute HD flow sheets on clinical data notes under media for details.      Post tx access: R-side IJ non-tunneled HD catheter flushed with saline then locked with ACD-A per designated amount in each lumen (see MAR) then clamped, capped aseptically and labeled properly. CVC dressings clean, dry and intact. No s/s of infection to HD catheter site. ACD-A lock to be aspirated prior to next dialysis/CVC use by dialysis RN only. Please do not flush or draw from ports.     Please notify Nephrologist/Dialysis for follow-up.     Report given to primary care nurse .

## 2024-05-04 NOTE — CARE PLAN
Problem: Pain - Standard  Goal: Alleviation of pain or a reduction in pain to the patient’s comfort goal  Outcome: Progressing     Problem: Skin Integrity  Goal: Skin integrity is maintained or improved  Outcome: Progressing   The patient is Watcher - Medium risk of patient condition declining or worsening    Shift Goals  Clinical Goals: safety, hemodynamic stability  Patient Goals: rest  Family Goals: AFIA    Progress made toward(s) clinical / shift goals:  monitor vital signs, assess skin integrity, assess pain    Patient is not progressing towards the following goals: n/a

## 2024-05-04 NOTE — PROGRESS NOTES
Hospital Medicine Daily Progress Note    Date of Service  5/4/2024    Chief Complaint  abdominal pain    Hospital Course  Fouzia Santamaria is a 63 y.o. female with HFrEF (EF 35%), severe PAD status post aortic femoral bypass coronary disease status post LAD stent, COPD, diabetes, hypertension, and previous strokes, who was initially seen in the ED with abdominal pain for 10 days and subsequently discharged to home, who again presented with 2 days of bodyaches as well as worsening constipation, nausea and vomiting.  Workup showed leukocytosis with white count of 15,100 with left shift.  Chest x-ray showed mild pulmonary edema.  Abdominal x-ray showed significant amount of stool burden.  Echocardiogram was obtained which revealed that ejection fraction now down to 15% with severe mitral regurgitation.  She also had acute kidney injury and likely cardiorenal syndrome.  Cardiology and nephrology were consulted.  She was placed on a dobutamine drip.  Her renal function worsened and she required a right-sided dialysis catheter with initiation of dialysis.  Her liver enzymes went up to AST of 2959 and ALT of 1377.  She also had significant drop in both hemoglobin and platelet count, with worsening leukocytosis.  LDH was 1418, fibrinogen 91, ferritin 7333 with elevated PT, PTT and INR.  Imaging of the abdomen showed mild hepatomegaly with no splenomegaly.  Hematology/oncology was consulted for concerns for hemophagocytic lymphohistiocytosis (HLH) with low suspicion/probability for TTP or HIT. She was started on Decadron.  She is scheduled for bone marrow biopsy for 5/6/24.    Interval Problem Update  5/4/2024 - I reviewed the patient's chart. There were no significant overnight events. Remains hemodynamically stable and afebrile. Stable on RA.  WBC 18,000.  Hemoglobin is stable at 8.4.  Platelet count stable at 58,000.  Creatinine 4.76.  Potassium is 5.  Sodium 133.  CO2 and anion gap are normal.  CPK remains elevated at  "6464.  LFTs continue to trend down, AST of 310, , alkaline phosphatase of 164 with normal bilirubin.    > I have personally seen and examined the patient today.  Not fully conversant due to aphasia, but nods to questions, answers \"ya\" and \"okay.\"  Not in distress.  Does not appear to be in pain.  No leg edema.  No nausea or vomiting.  Catheter respiratory distress.    I personally reviewed all lab results mentioned above. Prior medical records from this institution and outside facilities were independently reviewed as noted. I also personally reviewed all ER physician and consultant recommendations and plans as documented above. History was independently obtained by myself. I have discussed this patient's plan of care and discharge plan at IDT rounds today with Case Management, Nursing, Nursing leadership, and other members of the IDT team.      Consultants/Specialty  Hematology  Critical care  Nephrology  Cardiology    Code Status  Full Code    Disposition  The patient is not medically cleared for discharge to home or a post-acute facility.      Discharge plan TBD.  6 clicks 14 and 16.  PT/OT evaluation.  May need outpatient hemodialysis chair placement.  I have placed the appropriate orders for post-discharge needs.    Review of Systems  ROS     Unable to obtain due to mentation/aphasia      Physical Exam  Temp:  [36.4 °C (97.5 °F)-36.7 °C (98.1 °F)] 36.4 °C (97.5 °F)  Pulse:  [75-92] 82  Resp:  [18-23] 18  BP: (117-149)/(57-96) 149/57  SpO2:  [93 %-100 %] 96 %    Physical Exam  Vitals reviewed.   Constitutional:       General: She is not in acute distress.     Appearance: Normal appearance. She is normal weight. She is ill-appearing. She is not diaphoretic.   HENT:      Head: Normocephalic and atraumatic.      Right Ear: External ear normal.      Left Ear: External ear normal.      Mouth/Throat:      Mouth: Mucous membranes are moist.      Pharynx: No oropharyngeal exudate or posterior oropharyngeal " erythema.   Eyes:      General: No scleral icterus.     Extraocular Movements: Extraocular movements intact.      Conjunctiva/sclera: Conjunctivae normal.      Pupils: Pupils are equal, round, and reactive to light.   Neck:      Comments: Right IJ dialysis cath  Cardiovascular:      Rate and Rhythm: Normal rate and regular rhythm.      Heart sounds: Normal heart sounds. No murmur heard.  Pulmonary:      Effort: Pulmonary effort is normal. No respiratory distress.      Breath sounds: Normal breath sounds. No stridor. No wheezing, rhonchi or rales.   Chest:      Chest wall: No tenderness.   Abdominal:      General: Bowel sounds are normal. There is no distension.      Palpations: Abdomen is soft. There is no mass.      Tenderness: There is no abdominal tenderness. There is no guarding or rebound.   Musculoskeletal:         General: No swelling. Normal range of motion.      Cervical back: Normal range of motion and neck supple. No rigidity. No muscular tenderness.      Right lower leg: No edema.      Left lower leg: No edema.   Lymphadenopathy:      Cervical: No cervical adenopathy.   Skin:     General: Skin is warm and dry.      Coloration: Skin is not jaundiced.      Findings: Bruising and rash present.      Comments: Small scabs right forearm and bilat feet  (+) petechial rashes   Neurological:      General: No focal deficit present.      Mental Status: She is alert. Mental status is at baseline.      Cranial Nerves: No cranial nerve deficit.      Comments: She moves her extremities equally  (+) Expressive aphasia   Psychiatric:      Comments: Unable to assess due to aphasia         Fluids    Intake/Output Summary (Last 24 hours) at 5/4/2024 1343  Last data filed at 5/4/2024 1335  Gross per 24 hour   Intake 1400 ml   Output 1520 ml   Net -120 ml       Laboratory  Recent Labs     05/02/24  0400 05/02/24  1440 05/03/24  0009 05/03/24  0512 05/04/24  0400   WBC 16.3* 17.3* 18.5* 18.4* 18.0*   RBC 3.27* 3.17* 3.05*  3.06* 3.08*   HEMOGLOBIN 8.9* 8.6* 8.4* 8.3* 8.4*   HEMATOCRIT 28.3* 27.1* 25.1* 25.4* 26.1*   MCV 86.5 85.5 82.3 83.0 84.7   MCH 27.2 27.1 27.5 27.1 27.3   MCHC 31.4* 31.7* 33.5 32.7 32.2   RDW 51.5* 51.8* 48.3 49.9 50.4*   PLATELETCT 46* 45* 52* 57* 58*   MPV 9.2 11.4  --   --  11.0     Recent Labs     05/03/24  0512 05/03/24  0912 05/04/24  0400   SODIUM 134* 133* 133*   POTASSIUM 4.9 4.9 5.0   CHLORIDE 97 97 97   CO2 23 21 20   GLUCOSE 111* 121* 141*   BUN 31* 36* 53*   CREATININE 3.03* 3.53* 4.76*   CALCIUM 7.8* 7.9* 7.9*     Recent Labs     05/02/24  2106 05/03/24  0512 05/04/24  1049   INR 2.30* 2.30* 2.68*         Recent Labs     05/02/24  0855   TRIGLYCERIDE 148       Imaging  DX-CHEST-FOR LINE PLACEMENT Perform procedure in: Other(comment f6 below):   Final Result      1.  Right IJ catheter has been placed and the tip projects appropriately over the superior vena cava.      2.  Cardiomegaly with evidence of mild fluid overload.      CT-ABDOMEN-PELVIS W/O   Final Result      1.  No evidence of bowel obstruction or focal inflammatory change.      2.  Again seen extensive atherosclerotic vascular calcification with distal thoracic/upper abdominal aortic ectasia measuring approximately 3.2 cm in diameter.      3.  Gallstones within the gallbladder.      4.  Hyperdense left renal cyst likely representing hemorrhagic or milk of calcium within a cyst.      5.  Small amount of free fluid dependently within the pelvis.      DX-CHEST-PORTABLE (1 VIEW)   Final Result      Cardiomegaly.      US-ABDOMEN COMPLETE SURVEY   Final Result      1.  Mild hepatomegaly.      2.  Sludge noted in the gallbladder.      3.  Small echogenic foci in the periphery of the renal collecting systems bilaterally suspicious for early nephrolithiasis.      4.  Bilateral renal cysts.         DX-CHEST-LIMITED (1 VIEW)   Final Result         1.  No acute cardiopulmonary disease.   2.  Atherosclerosis      EC-ECHOCARDIOGRAM COMPLETE W/ CONT    Final Result      DX-CHEST-LIMITED (1 VIEW)   Final Result      1.  Enlarged cardiac silhouette with changes of pulmonary edema.      [id number] VIEW   Final Result      1.  Nonobstructive bowel gas pattern with a large amount of colonic stool.           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  Imaging of the abdomen showed mild hepatomegaly with no splenomegaly.    - Hematology/oncology was consulted for concerns for hemophagocytic lymphohistiocytosis (HLH) with low suspicion/probability for TTP or HIT.   -Continue Decadron.  -She is scheduled for bone marrow biopsy for 5/6/24.  N.p.o. on Sunday night.      Cardiogenic shock (HCC)- (present on admission)  Assessment & Plan  -In the setting of low ejection fraction of 15%.  Required dobutamine drip.  Shock resolved.  -Management of HFrEF as above.    Heart failure with reduced ejection fraction (HCC)- (present on admission)  Assessment & Plan  - Had a drop in EF to 15% from previous 35%.  Required dobutamine drip.  -Continue volume removal with hemodialysis.  -Optimize GDMT.  Unable to give ACEi/ARB/ARNI/SGLT2i given renal function.  Start hydralazine/Imdur for afterload reduction.  Holding off on beta-blocker for now until clinically more stable.  Monitor blood pressure to make sure she tolerates medications.  -Monitor on telemetry.    Rhabdomyolysis- (present on admission)  Assessment & Plan  -CPK over 5,000. May be due to hemophagocytic lymphohistiocytosis.  -Holding off on IV fluids given IVON/dialysis dependent.  -Trend CPK.    Thrombocytopenia (HCC)- (present on admission)  Assessment & Plan  - In setting of HLH, thrombocytopenia.  Low suspicion/probability for TTP or HIT.  -Continue Decadron.  -Bone marrow biopsy on 5/5.  -Continue to monitor platelet counts.  Restrictive transfusion strategy.  Watch for  bleeding.    Coagulopathy (HCC)- (present on admission)  Assessment & Plan  - In setting of HLH, thrombocytopenia.  Low suspicion/probability for TTP or HIT.  -Continue Decadron.  -Bone marrow biopsy on 5/5.  -Monitor PT/PTT/INR.  Watch for bleeding.    IVON (acute kidney injury) (HCC)- (present on admission)  Assessment & Plan  - Could be cardiorenal in setting of HFrEF, along with possible HLH.  -Now started on hemodialysis.  -Nephrology following.  -Monitor electrolytes closely.  -If continues to require hemodialysis, will need outpatient hemodialysis chair arrangements.    Transaminitis  Assessment & Plan  -Likely congestive hepatopathy due to HFrEF.  Also in setting of rhabdomyolysis.  HLH could be contributing.  -Continue to trend LFTs, so far trending down.  -Continue to trend CPK.    Hyperkalemia- (present on admission)  Assessment & Plan  - Likely related to renal failure.  Potassium has normalized.  Continue hemodialysis.  Monitor potassium.  BMP in the morning.    Leukocytosis- (present on admission)  Assessment & Plan  -Concerning for HLH.  -Plan for bone marrow biopsy on Monday, 5/6/2024.  -No signs of sepsis or infection.  Holding off on further antibiotics.  Has had 5 days of antibiotics in the hospital.  -Trend WBC count.    Hyperlipidemia- (present on admission)  Assessment & Plan  - Hold off on statin due to rhabdomyolysis.    History of stroke- (present on admission)  Assessment & Plan  -With chronic aphasia.  Appears at baseline.  Monitor.    Severe mitral regurgitation- (present on admission)  Assessment & Plan  -Per echo.  Management as above.         VTE prophylaxis: SCD    My total time spent caring for the patient on the day of the encounter was 52 minutes. This does not include time spent on separately billable procedures/tests.

## 2024-05-04 NOTE — PROGRESS NOTES
Hospital Medicine Daily Progress Note    Date of Service  5/3/2024    Chief Complaint  Fouzia Santamaria is a 63 y.o. female admitted 4/27/2024 with abdominal pain    Hospital Course  Fouzia Santamaria is a 63 y.o. female who presented 4/27/2024 with abdominal pain with constipation and nausea vomiting.  This is a pleasant woman with a history of HFrEF 35%, severe peripheral arterial disease status post aortic femoral bypass coronary disease status post LAD stent, COPD, diabetes, hypertension, and previous strokes, who developed abdominal pain 10 days ago in the emergency room and was subsequent discharged home.  She presents with 2 days of bodyaches as well as worsening constipation nausea vomiting.  She had a leukocytosis with white count of 15.1 with a left shift.  Chest x-ray showed mild pulmonary edema.  Abdominal x-ray showed significant amount of stool burden.    An echocardiogram revealed the ejection fraction of now down to 15% with severe mitral regurg.  Cardiology was consulted.  She was placed on a dobutamine drip.  Her renal function worsened and she required a right-sided dialysis catheter with initiation of dialysis and nephrology consultation.  Her liver enzymes went up to AST of 2959 and ALT of 1377.  She had a significant drop in both hemoglobin and platelet count    She an extensive work up by [facility] Adams critical care and Dr. Mcintyre heme/onc for possible hemophagocytic lymphohistiocytosis and was started on IV decadron.   Interval Problem Update  5/3: Ms. Adam is feeling better today. Her  and daughter are at bedside.  They are amenable to bone marrow biopsy on Monday which has been ordered.    I have discussed this patient's plan of care and discharge plan at IDT rounds today with Case Management, Nursing, Nursing leadership, and other members of the IDT team.    Consultants/Specialty  Hematology  Critical care  Nephrology  Cardiology    Code Status  Full  Code    Disposition  The patient is not medically cleared for discharge to home or a post-acute facility.      I have placed the appropriate orders for post-discharge needs.    Review of Systems  Review of Systems   Unable to perform ROS: Medical condition        Physical Exam  Temp:  [35.9 °C (96.6 °F)-36.7 °C (98 °F)] 36.7 °C (98 °F)  Pulse:  [64-96] 89  Resp:  [15-29] 20  BP: (103-150)/(52-93) 120/83  SpO2:  [90 %-99 %] 95 %    Physical Exam  Constitutional:       Appearance: She is ill-appearing.   Neck:      Comments: Right IJ dialysis cath  Cardiovascular:      Rate and Rhythm: Normal rate and regular rhythm.   Pulmonary:      Effort: Pulmonary effort is normal.      Breath sounds: Normal breath sounds.   Abdominal:      General: There is no distension.      Tenderness: There is no abdominal tenderness.   Skin:     Comments: Small scabs right forearm and bilat feet   Neurological:      Mental Status: She is alert.      Comments: She moves her extremities equally  Expressive aphasia   Psychiatric:         Mood and Affect: Mood normal.         Behavior: Behavior normal.         Fluids    Intake/Output Summary (Last 24 hours) at 5/3/2024 1704  Last data filed at 5/3/2024 1600  Gross per 24 hour   Intake 856 ml   Output 1638 ml   Net -782 ml       Laboratory  Recent Labs     05/01/24  2255 05/02/24  0400 05/02/24  1440 05/03/24  0009 05/03/24  0512   WBC 16.4* 16.3* 17.3* 18.5* 18.4*   RBC 3.34* 3.27* 3.17* 3.05* 3.06*   HEMOGLOBIN 9.1* 8.9* 8.6* 8.4* 8.3*   HEMATOCRIT 28.5* 28.3* 27.1* 25.1* 25.4*   MCV 85.3 86.5 85.5 82.3 83.0   MCH 27.2 27.2 27.1 27.5 27.1   MCHC 31.9* 31.4* 31.7* 33.5 32.7   RDW 50.6* 51.5* 51.8* 48.3 49.9   PLATELETCT 50* 46* 45* 52* 57*   MPV 12.3 9.2 11.4  --   --      Recent Labs     05/03/24  0009 05/03/24  0512 05/03/24  0912   SODIUM 134* 134* 133*   POTASSIUM 4.6 4.9 4.9   CHLORIDE 97 97 97   CO2 22 23 21   GLUCOSE 116* 111* 121*   BUN 28* 31* 36*   CREATININE 2.37* 3.03* 3.53*    CALCIUM 7.9* 7.8* 7.9*     Recent Labs     05/02/24  1440 05/02/24  2106 05/03/24  0512   INR 2.81* 2.30* 2.30*         Recent Labs     05/02/24  0855   TRIGLYCERIDE 148       Imaging  DX-CHEST-FOR LINE PLACEMENT Perform procedure in: Other(comment f6 below):   Final Result      1.  Right IJ catheter has been placed and the tip projects appropriately over the superior vena cava.      2.  Cardiomegaly with evidence of mild fluid overload.      CT-ABDOMEN-PELVIS W/O   Final Result      1.  No evidence of bowel obstruction or focal inflammatory change.      2.  Again seen extensive atherosclerotic vascular calcification with distal thoracic/upper abdominal aortic ectasia measuring approximately 3.2 cm in diameter.      3.  Gallstones within the gallbladder.      4.  Hyperdense left renal cyst likely representing hemorrhagic or milk of calcium within a cyst.      5.  Small amount of free fluid dependently within the pelvis.      DX-CHEST-PORTABLE (1 VIEW)   Final Result      Cardiomegaly.      US-ABDOMEN COMPLETE SURVEY   Final Result      1.  Mild hepatomegaly.      2.  Sludge noted in the gallbladder.      3.  Small echogenic foci in the periphery of the renal collecting systems bilaterally suspicious for early nephrolithiasis.      4.  Bilateral renal cysts.         DX-CHEST-LIMITED (1 VIEW)   Final Result         1.  No acute cardiopulmonary disease.   2.  Atherosclerosis      EC-ECHOCARDIOGRAM COMPLETE W/ CONT   Final Result      DX-CHEST-LIMITED (1 VIEW)   Final Result      1.  Enlarged cardiac silhouette with changes of pulmonary edema.      [id number] VIEW   Final Result      1.  Nonobstructive bowel gas pattern with a large amount of colonic stool.           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 Mcintyre oncology has consulted  Decadron 6 mg q6 hours  Bone marrow biopsy has been ordered for 5/6/2024 at 11 in the morning therefore she  will need to be NPO midnight Sunday night.      Heart failure with reduced ejection fraction (HCC)- (present on admission)  Assessment & Plan  Drop in ejection fraction to 15%  Cardiology consulted  Status post dobutamine  Goal-directed medical therapy will be addressed once her renal failure and blood pressure have been stabilized    Cardiogenic shock (HCC)- (present on admission)  Assessment & Plan  In the setting of low ejection fraction of 15% requiring a dobutamine drip    History of stroke- (present on admission)  Assessment & Plan  History of with chronic expressive aphasia    Rhabdomyolysis- (present on admission)  Assessment & Plan  CPK over 5,000   May be due to hemophagocytic lymphohistiocytosis  CPK ordered for tomorrow      Thrombocytopenia (HCC)- (present on admission)  Assessment & Plan  Significant drop in platelets for which hematology has been consulted now 57  Platelets ordered for the  morning    Coagulopathy (HCC)- (present on admission)  Assessment & Plan  Refrain from chemical DVT prophylaxis  INR went up to 2.8 with low fibrinogen  Repeat INR in the morning    IVON (acute kidney injury) (HCC)- (present on admission)  Assessment & Plan  Creatinine continued to go up and she has required dialysis with nephrology consultation  Follow urine output with Guerrero catheter    Transaminitis  Assessment & Plan  Markedly elevated liver enzymes may be secondary to hepatic congestion in the setting of severe cardiomyopathy though she also recently had a viral infection with positive rhinovirus infection.  Possibly secondary to hemophagocytic lymphohistiocytosis  Liver enzymes ordered for the morning      Severe mitral regurgitation- (present on admission)  Assessment & Plan  Per echo    Hyperkalemia- (present on admission)  Assessment & Plan  Replacement was given         VTE prophylaxis:   SCDs/TEDs      I have performed a physical exam and reviewed and updated ROS and Plan today (5/3/2024). In review of  yesterday's note (5/2/2024), there are no changes except as documented above.

## 2024-05-04 NOTE — DISCHARGE PLANNING
Case Management Discharge Planning    Admission Date: 4/27/2024  GMLOS: 4.2  ALOS: 7    6-Clicks ADL Score: 16  6-Clicks Mobility Score: 14  PT and/or OT Eval ordered: Yes  Post-acute Referrals Ordered: Yes  Post-acute Choice Obtained: Yes  Has referral(s) been sent to post-acute provider:  Yes      Anticipated Discharge Dispo: Discharge Disposition: D/T to home under HHA care in anticipation of covered skilled care (06)  Discharge Address: [address] [place] [id number]    DME Needed: No    Action(s) Taken:   Per IDT rounds, Pt is a new HD pt.   Bone Marrow Bx scheduled for 5/6  Updated Provider/Nurse on Discharge Plan    Escalations Completed: None    Medically Clear: No    Next Steps: Need to contact HD Nurse for consult to set up OP HD    Barriers to Discharge: Medical clearance    Is the patient up for discharge tomorrow: No

## 2024-05-04 NOTE — ASSESSMENT & PLAN NOTE
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  Comfort care

## 2024-05-04 NOTE — ASSESSMENT & PLAN NOTE
- LDH was 1418, fibrinogen 91, ferritin 7333 with elevated PT, PTT and INR.  Imaging of the abdomen showed mild hepatomegaly with no splenomegaly.    - Hematology/oncology was consulted for concerns for hemophagocytic lymphohistiocytosis (HLH) with low suspicion/probability for TTP or HIT.   Status post IR transjugular liver biopsy with portal venography 5/8  Comfort care

## 2024-05-04 NOTE — ASSESSMENT & PLAN NOTE
Uncertain etiology, likely primary inflammatory process rather than infection  Probable component of steroid-induced demarginalization  Comfort care

## 2024-05-05 LAB
ABO GROUP BLD: NORMAL
ALBUMIN SERPL BCP-MCNC: 3.3 G/DL (ref 3.2–4.9)
ALBUMIN/GLOB SERPL: 0.9 G/DL
ALP SERPL-CCNC: 164 U/L (ref 30–99)
ALT SERPL-CCNC: 473 U/L (ref 2–50)
ANION GAP SERPL CALC-SCNC: 20 MMOL/L (ref 7–16)
APTT PPP: 28.9 SEC (ref 24.7–36)
AST SERPL-CCNC: 178 U/L (ref 12–45)
BILIRUB SERPL-MCNC: 0.9 MG/DL (ref 0.1–1.5)
BLD GP AB SCN SERPL QL: NORMAL
BUN SERPL-MCNC: 48 MG/DL (ref 8–22)
C3 SERPL-MCNC: 50.4 MG/DL (ref 87–200)
C4 SERPL-MCNC: 6.3 MG/DL (ref 19–52)
CALCIUM ALBUM COR SERPL-MCNC: 8.3 MG/DL (ref 8.5–10.5)
CALCIUM SERPL-MCNC: 7.7 MG/DL (ref 8.5–10.5)
CHLORIDE SERPL-SCNC: 94 MMOL/L (ref 96–112)
CK SERPL-CCNC: 3678 U/L (ref 0–154)
CO2 SERPL-SCNC: 20 MMOL/L (ref 20–33)
CREAT SERPL-MCNC: 4.07 MG/DL (ref 0.5–1.4)
ERYTHROCYTE [DISTWIDTH] IN BLOOD BY AUTOMATED COUNT: 51.5 FL (ref 35.9–50)
FERRITIN SERPL-MCNC: 2435 NG/ML (ref 10–291)
FIBRINOGEN PPP-MCNC: 101 MG/DL (ref 215–460)
GFR SERPLBLD CREATININE-BSD FMLA CKD-EPI: 12 ML/MIN/1.73 M 2
GLOBULIN SER CALC-MCNC: 3.6 G/DL (ref 1.9–3.5)
GLUCOSE SERPL-MCNC: 151 MG/DL (ref 65–99)
HCT VFR BLD AUTO: 26.3 % (ref 37–47)
HGB BLD-MCNC: 8.4 G/DL (ref 12–16)
INR PPP: 2.12 (ref 0.87–1.13)
MCH RBC QN AUTO: 27.5 PG (ref 27–33)
MCHC RBC AUTO-ENTMCNC: 31.9 G/DL (ref 32.2–35.5)
MCV RBC AUTO: 85.9 FL (ref 81.4–97.8)
PLATELET # BLD AUTO: 61 K/UL (ref 164–446)
PLATELETS.RETICULATED NFR BLD AUTO: 10.8 % (ref 0.6–13.1)
POTASSIUM SERPL-SCNC: 4.6 MMOL/L (ref 3.6–5.5)
PROT SERPL-MCNC: 6.9 G/DL (ref 6–8.2)
PROTHROMBIN TIME: 24 SEC (ref 12–14.6)
RBC # BLD AUTO: 3.06 M/UL (ref 4.2–5.4)
RH BLD: NORMAL
SODIUM SERPL-SCNC: 134 MMOL/L (ref 135–145)
WBC # BLD AUTO: 14.4 K/UL (ref 4.8–10.8)

## 2024-05-05 PROCEDURE — 99233 SBSQ HOSP IP/OBS HIGH 50: CPT | Performed by: INTERNAL MEDICINE

## 2024-05-05 RX ORDER — METOPROLOL SUCCINATE 25 MG/1
12.5 TABLET, EXTENDED RELEASE ORAL
Status: DISCONTINUED | OUTPATIENT
Start: 2024-05-05 | End: 2024-05-17

## 2024-05-05 RX ORDER — SODIUM CHLORIDE 9 MG/ML
INJECTION, SOLUTION INTRAVENOUS CONTINUOUS
Status: ACTIVE | OUTPATIENT
Start: 2024-05-05 | End: 2024-05-05

## 2024-05-05 RX ADMIN — HYDROXYZINE HYDROCHLORIDE 25 MG: 25 TABLET, FILM COATED ORAL at 20:14

## 2024-05-05 RX ADMIN — HYDRALAZINE HYDROCHLORIDE 25 MG: 50 TABLET ORAL at 21:57

## 2024-05-05 RX ADMIN — HYDRALAZINE HYDROCHLORIDE 25 MG: 50 TABLET ORAL at 05:24

## 2024-05-05 RX ADMIN — METOPROLOL SUCCINATE 12.5 MG: 25 TABLET, FILM COATED, EXTENDED RELEASE ORAL at 11:58

## 2024-05-05 RX ADMIN — DEXAMETHASONE 6 MG: 4 TABLET ORAL at 15:13

## 2024-05-05 RX ADMIN — HYDRALAZINE HYDROCHLORIDE 25 MG: 50 TABLET ORAL at 15:12

## 2024-05-05 RX ADMIN — DEXAMETHASONE 6 MG: 4 TABLET ORAL at 05:24

## 2024-05-05 RX ADMIN — HYDRALAZINE HYDROCHLORIDE 20 MG: 20 INJECTION INTRAMUSCULAR; INTRAVENOUS at 20:14

## 2024-05-05 RX ADMIN — PRAMIPEXOLE DIHYDROCHLORIDE 0.12 MG: 0.12 TABLET ORAL at 10:01

## 2024-05-05 RX ADMIN — VENLAFAXINE HYDROCHLORIDE 37.5 MG: 37.5 CAPSULE, EXTENDED RELEASE ORAL at 05:24

## 2024-05-05 RX ADMIN — HYDRALAZINE HYDROCHLORIDE 20 MG: 20 INJECTION INTRAMUSCULAR; INTRAVENOUS at 06:26

## 2024-05-05 RX ADMIN — PRAMIPEXOLE DIHYDROCHLORIDE 0.12 MG: 0.12 TABLET ORAL at 15:12

## 2024-05-05 RX ADMIN — PRAMIPEXOLE DIHYDROCHLORIDE 0.12 MG: 0.12 TABLET ORAL at 20:13

## 2024-05-05 RX ADMIN — MUPIROCIN 1 APPLICATION: 20 OINTMENT TOPICAL at 05:28

## 2024-05-05 RX ADMIN — ISOSORBIDE MONONITRATE 30 MG: 30 TABLET, EXTENDED RELEASE ORAL at 05:24

## 2024-05-05 RX ADMIN — MUPIROCIN 1 DOSE: 20 OINTMENT TOPICAL at 16:52

## 2024-05-05 RX ADMIN — HYDROXYZINE HYDROCHLORIDE 25 MG: 25 TABLET, FILM COATED ORAL at 20:13

## 2024-05-05 RX ADMIN — OXYCODONE 5 MG: 5 TABLET ORAL at 20:14

## 2024-05-05 RX ADMIN — DEXAMETHASONE 6 MG: 4 TABLET ORAL at 21:57

## 2024-05-05 ASSESSMENT — COGNITIVE AND FUNCTIONAL STATUS - GENERAL
SUGGESTED CMS G CODE MODIFIER DAILY ACTIVITY: CL
DRESSING REGULAR UPPER BODY CLOTHING: A LOT
HELP NEEDED FOR BATHING: A LOT
MOVING TO AND FROM BED TO CHAIR: A LOT
PERSONAL GROOMING: A LOT
MOBILITY SCORE: 13
SUGGESTED CMS G CODE MODIFIER MOBILITY: CL
WALKING IN HOSPITAL ROOM: TOTAL
DAILY ACTIVITIY SCORE: 12
TOILETING: A LOT
EATING MEALS: A LOT
STANDING UP FROM CHAIR USING ARMS: A LOT
MOVING FROM LYING ON BACK TO SITTING ON SIDE OF FLAT BED: A LITTLE
DRESSING REGULAR LOWER BODY CLOTHING: A LOT
CLIMB 3 TO 5 STEPS WITH RAILING: TOTAL

## 2024-05-05 ASSESSMENT — ENCOUNTER SYMPTOMS
VOMITING: 0
HEARTBURN: 0
DIARRHEA: 0
EYES NEGATIVE: 1
ORTHOPNEA: 0
MYALGIAS: 0
BLOOD IN STOOL: 0
SPUTUM PRODUCTION: 0
PALPITATIONS: 0
HEMOPTYSIS: 0
SHORTNESS OF BREATH: 0
ABDOMINAL PAIN: 0
COUGH: 0
NAUSEA: 0
CONSTIPATION: 0

## 2024-05-05 ASSESSMENT — PAIN DESCRIPTION - PAIN TYPE
TYPE: ACUTE PAIN

## 2024-05-05 ASSESSMENT — FIBROSIS 4 INDEX: FIB4 SCORE: 13.66

## 2024-05-05 NOTE — WOUND TEAM
[facility]  Inpatient Services  Initial Wound and Skin Care Evaluation    Admission Date: 4/27/2024     Last order of IP CONSULT TO WOUND CARE was found on 5/3/2024 from Hospital Encounter on 4/27/2024     HPI, PMH, SH: Reviewed    Past Surgical History:   Procedure Laterality Date    FL EXPLORATORY OF ABDOMEN  8/28/2022    Procedure: LAPAROTOMY, EXPLORATORY;  Surgeon: Brandyn Siu M.D.;  Location: SURGERY [facility];  Service: General    AORTOBIFEM BYPASS Bilateral 8/26/2022    Procedure: CREATION, BYPASS, ARTERIAL, AORTA TO FEMORAL, BILATERAL;  Surgeon: Brandyn Siu M.D.;  Location: SURGERY [facility];  Service: General    ENDARTERECTOMY Bilateral 8/26/2022    Procedure: RENAL ARTERY ENDARTERECTOMY;  Surgeon: Brandyn Siu M.D.;  Location: SURGERY [facility];  Service: General    FL EXPLORATORY OF ABDOMEN  8/26/2022    Procedure: LAPAROTOMY, EXPLORATORY; CONTROL OF POST-OPERATIVE BLEEDING;  Surgeon: Brandyn Siu M.D.;  Location: SURGERY [facility];  Service: General    APPLICATION OR REPLACEMENT, WOUND VAC  8/26/2022    Procedure: APPLICATION OR REPLACEMENT, WOUND VAC;  Surgeon: Brandyn Siu M.D.;  Location: SURGERY [facility];  Service: General    STENT PLACEMENT      THYROIDECTOMY       Social History     Tobacco Use    Smoking status: Former     Current packs/day: 2.00     Average packs/day: 2.0 packs/day for 48.0 years (96.0 ttl pk-yrs)     Types: Cigarettes    Smokeless tobacco: Never   Substance Use Topics    Alcohol use: No     Chief Complaint   Patient presents with    Abdominal Pain     X 10 days, seen and discharged at that time    Body Aches     X 2 days    Constipation     X 3 days    N/V     X 2 days     Diagnosis: Community acquired pneumonia, unspecified laterality [J18.9]  CHF (congestive heart failure), NYHA class III, acute, combined (HCC) [I50.41]  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 Contact wound team if area fails to progress, deteriorates or with any questions/concerns if something comes up before next scheduled follow up (See below as to whether wound is following and frequency of wound follow up)   Not following, consult as needed  - any area    WOUND HISTORY:   Pt is a 63yr old female who presented with shortness of breath. Pt has history of COPD, HTN, Stroke and hemophagocytic lymphohistiocytosis. Pt was found to have RSV and was admitted. Wound team was consulted for scattered bruising and scratches. Pt appears fairly non verbal.       WOUND ASSESSMENT/LDA                                              Vascular:    ARYAN:   No results found.    Lab Values:    Lab Results   Component Value Date/Time    WBC 14.4 (H) 05/05/2024 06:45 AM    RBC 3.06 (L) 05/05/2024 06:45 AM    HEMOGLOBIN 8.4 (L) 05/05/2024 06:45 AM    HEMATOCRIT 26.3 (L) 05/05/2024 06:45 AM    CREACTPROT 9.80 (H) 04/28/2024 12:38 AM    SEDRATEWES 116 (H) 04/28/2024 12:38 AM    HBA1C 5.6 08/29/2022 05:15 AM         Culture Results show:  No results found for this or any previous visit (from the past 720 hour(s)).    Pain Level/Medicated:  None, Tolerated without pain medication       INTERVENTIONS BY WOUND TEAM:  Chart and images reviewed. Discussed with bedside RN. All areas of concern (based on picture review, LDA review and discussion with bedside RN) have been thoroughly assessed. Documentation of areas based on significant findings. This RN in to assess patient. Performed standard wound care which includes appropriate positioning, dressing removal and non-selective debridement. Pictures and measurements obtained weekly if/when required.    Area Assessed:  Bilateral arms  Scattered bruising particularly to the left arm     Area Assessed:  Bilateral Elbows  Intact    Area Assessed:   Bilateral Knees  Intact, some dry small abrasions, pillow placed between knees to offload pressure    Area Assessed:  Bilateral lower Extremities  Intact, some dry small abrasions    Area Assessed:  Bilateral heels  Intact, heel offloading dressings applied    Area Assessed:  Sacrococcygeal area  Intact, barrier paste and offloading dressing placed higher on sacrum    Area Assessed:  Bilateral Buttocks  Intact, with scattered scratch marks    Area Assessed:  Bilateral I.T.s  Intact, with scattered scratch marks    Advanced Wound Care Discharge Planning  Number of Clinicians necessary to complete wound care: 1  Is patient requiring IV pain medications for dressing changes:  No   Length of time for dressing change 30 min. (This does not include chart review, pre-medication time, set up, clean up or time spent charting.)    Interdisciplinary consultation: Patient, Bedside RN Yanira ISLAS (Wound RN).  Pressure injury and staging reviewed with N/A.    EVALUATION / RATIONALE FOR TREATMENT:     Date:  05/05/24  Wound Status:  Initial evaluation    Pt with largely intact, skin moves self frequently in bed. Does have scattered bruises and scratches from scratching self.         Goals: Steady decrease in wound area and depth weekly.    NURSING PLAN OF CARE ORDERS:  RN Prevention Protocol    NUTRITION RECOMMENDATIONS   Wound Team Recommendations:  N/A    DIET ORDERS (From admission to next 24h)       Start     Ordered    05/05/24 0807  Diet NPO Restrict to: Sips with Medications  AT MIDNIGHT      Question:  Diet NPO Restrict to:  Answer:  Sips with Medications    05/04/24 0716    05/03/24 1446  Supplements  ALL MEALS        Question Answer Comment   Which Supplement Ensure    Ensure: Ensure Plus Carton        05/03/24 1446    05/01/24 2021  Diet Order Diet: Cardiac  ALL MEALS        Question:  Diet:  Answer:  Cardiac    05/01/24 2021                    PREVENTATIVE INTERVENTIONS:    Q shift Manolo - performed per nursing policy  Q  shift pressure point assessments - performed per nursing policy    Surface/Positioning  ICU Low Airloss - Currently in Place  Reposition q 2 hours - Currently in Place    Offloading/Redistribution  Sacral offloading dressing (Silicone dressing) - Applied this Visit  Heel offloading dressing (Silicone dressing) - Applied this Visit      Respiratory  Silicone O2 tubing - Currently in Place  Gray Foam Ear protectors - Currently in Place    Containment/Moisture Prevention    Dri-rin pad - Currently in Place  Guerrero Catheter - Currently in Place  Barrier paste - Currently in Place    Anticipated discharge plans:  TBD        Vac Discharge Needs:  Vac Discharge plan is purely a recommendation from wound team and not a requirement for discharge unless otherwise stated by physician.  Not Applicable Pt not on a wound vac

## 2024-05-05 NOTE — PROGRESS NOTES
[facility] -  PROGRESS NOTE               Author: Spenser Ramirez M.D. Date & Time: 5/5/2024  8:17 AM       REASON FOR CONSULTATION:   - IVON    CHIEF COMPLAINT:   -  Abdominal pain, body aches     HISTORY OF PRESENT ILLNESS:    63 y.o. female with history of systolic and diastolic heart failure with EF of 15% (HFrEF) grade 2 diastolic dysfunction, severe MVR, CAD with h/o STEMI s/p DEWEY to LAD, PAD with aortofemoral bypass surgery who presented 4/27/2024 with increasing shortness of breath admitted for community acquired PNA and severe constipation with hosp course complicated by exacerbation of her HFrEF, medications augmented which was followed by IVON, worsening hepatopathy concerning for cardiorenal etiology.   She was then transferred to ICU 5/01 and initiated on dobutamine drip for organ perfusion.   Patient continues to be oliguric with UOP of 86 yesterday and 10 ml today so far.   Nephrology has been consulted for concerns of IVON , cardiorenal syndrome.     DAILY NEPHROLOGY SUMMARY:  05/02: Examined at bedside. Looks like she wants to talk but it is difficult for her to find appropriate words.   Dobutamine ggt held per critical care today.   Labs yesterday with fibrinogen 91, cryoppt administered followed by dialysis   1 L ultrafiltration   Uptrending WBCs since 4/30, 18.4 today  Scr improved to 3.03 after dialysis   Down trending AST/ALT   CT scan without bowel obs, extensive calcification of distal thoracic/upper abd aorta. Left renal cyst.   5/4: Pt transferred out of ICU, now with increased CPK and climbing  5/5: Pt still anuric, HD yesterday    REVIEW OF SYSTEMS:    Unable to ascertain, mental acuity. Chronic expressive aphasia.     PAST MEDICAL/SURGICAL/FAMILY/SOCIAL HISTORY:  Reviewed and documented in chart     HOME MEDICATIONS:   - Reviewed and documented in chart    LABORATORY STUDIES:   - Reviewed and documented in chart    ALLERGIES:  Pcn [penicillins] and  "Toradol    VS:  BP (!) 133/98   Pulse (!) 117   Temp 36.1 °C (97 °F) (Temporal)   Resp 18   Ht 1.651 m (5' 5\")   Wt 52.7 kg (116 lb 2.9 oz)   SpO2 98%   BMI 19.33 kg/m²   Physical Exam  Constitutional:       General: She is not in acute distress.     Appearance: She is ill-appearing.   HENT:      Head: Normocephalic and atraumatic.      Right Ear: External ear normal.      Left Ear: External ear normal.      Nose: Nose normal.      Mouth/Throat:      Mouth: Mucous membranes are dry.   Eyes:      General:         Right eye: No discharge.         Left eye: No discharge.   Neck:      Comments: Right IJ trialysis catheter   Cardiovascular:      Rate and Rhythm: Normal rate.      Pulses: Normal pulses.   Pulmonary:      Effort: Pulmonary effort is normal. No respiratory distress.   Abdominal:      General: Bowel sounds are normal.      Palpations: Abdomen is soft.   Musculoskeletal:         General: Swelling present.      Comments: Swelling b/l arms    Skin:     General: Skin is warm.      Capillary Refill: Capillary refill takes less than 2 seconds.      Findings: Bruising present.      Comments: Excoriations on extremities    Neurological:      Comments: Chronic expressive aphasia          FLUID BALANCE:  In: 2620 [P.O.:2120; Dialysis:500]  Out: 1620     LABS:  Recent Labs     05/03/24  0512 05/03/24  0912 05/04/24  0400   SODIUM 134* 133* 133*   POTASSIUM 4.9 4.9 5.0   CHLORIDE 97 97 97   CO2 23 21 20   GLUCOSE 111* 121* 141*   BUN 31* 36* 53*   CREATININE 3.03* 3.53* 4.76*   CALCIUM 7.8* 7.9* 7.9*        IMAGING:  - Imaging studies reviewed by me      IMPRESSION:     # Acute kidney injury multifactorial ? Decreased perfusion, HLH, rhabdo  - unlikley TTP  - oliguric  - HD 5/2 1st  - serologies ordered  -? Biopsy Tuesday if coags/platelets stable     # ? 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 ongoing  - steroids  -Bone Marrow Monday    # Hepatic congestion      #Rhabdo unclear etiology, difficult to give " fluids in face of reduced EF     # Heart failure with reduced ejection fraction      # Coagulopathy   - Fibinogen 91 5/02, s/p cryoppt     # Transamnitis  - Improving s/p dialysis 5/02      # Severe mitral regurgitation   - Cardiology on board      # Peripheral vascular disease with prior aortic thrombus s/p aortofemoral bypass       # Constipation      # Impacted stool in intestine   - Resolved      # Colitis      # Leukocytosis   - Up trending, see above      # Prior CVA    - Chronic expressive aphasia     # Essential hypertension            PLAN:  - no HD Sunday  - Daily evaluation of RRT needs  - steroids  - Bone marrow pending Monday  - probable renal BX Tuesday if coags/ platelets stable  - Avoid nephrotoxins, NSAIDs  - Renally dose meds

## 2024-05-05 NOTE — PROGRESS NOTES
Monitor Summary  Rhythm: Sinus rhythm  Rate: 70-90  Ectopy: PVC, trig, big, triplet  .13 / .10 / .35

## 2024-05-05 NOTE — PROGRESS NOTES
Oncology/Hematology Progress Note               Author: Pranay Mcintyre III, M.D. Date & Time created: 5/5/2024  10:20 AM   Concern for HLH  Interval History:  In stable clinical condition.  Liver function test improving, no changes and mentation.  Denies pain today.  Platelet count improving slowly, hemoglobin stable, no evidence of bleeding.  Hemodialysis was performed yesterday    Review of Systems:  Review of Systems   Constitutional:  Positive for malaise/fatigue.   HENT: Negative.     Eyes: Negative.    Respiratory:  Negative for cough, hemoptysis, sputum production and shortness of breath.    Cardiovascular:  Negative for chest pain, palpitations and orthopnea.   Gastrointestinal:  Negative for abdominal pain, blood in stool, constipation, diarrhea, heartburn, melena, nausea and vomiting.   Genitourinary: Negative.    Musculoskeletal:  Negative for myalgias.   Skin: Negative.    Neurological:         Aphasia is at baseline due to stroke       Physical Exam:  Physical Exam    Labs:      Recent Labs     05/03/24 0912 05/04/24  0400 05/05/24  0645   CPKTOTAL 5148* 6464* 3678*     Recent Labs     05/03/24 0512 05/03/24  0912 05/04/24  0400 05/05/24  0645   SODIUM 134* 133* 133* 134*   POTASSIUM 4.9 4.9 5.0 4.6   CHLORIDE 97 97 97 94*   CO2 23 21 20 20   BUN 31* 36* 53* 48*   CREATININE 3.03* 3.53* 4.76* 4.07*   MAGNESIUM 2.0  --   --   --    PHOSPHORUS 3.5  --   --   --    CALCIUM 7.8* 7.9* 7.9* 7.7*     Recent Labs     05/03/24 0912 05/04/24  0400 05/05/24  0645   ALTSGPT 662* 570* 473*   ASTSGOT 446* 310* 178*   ALKPHOSPHAT 163* 164* 164*   TBILIRUBIN 1.0 1.0 0.9   GLUCOSE 121* 141* 151*     Recent Labs     05/03/24 0512 05/03/24 0912 05/04/24  0400 05/04/24  1049 05/05/24  0645   RBC 3.06*  --  3.08*  --  3.06*   HEMOGLOBIN 8.3*  --  8.4*  --  8.4*   HEMATOCRIT 25.4*  --  26.1*  --  26.3*   PLATELETCT 57*  --  58*  --  61*   PROTHROMBTM 25.6*  --   --  28.9* 24.0*   APTT  --   --   --   --  28.9   INR  2.30*  --   --  2.68* 2.12*   FERRITIN  --  7333.0*  --   --   --      Recent Labs     24  0400 24  0645   WBC 18.4*  --  18.0* 14.4*   NEUTSPOLYS 86.50*  --  86.80*  --    LYMPHOCYTES 4.90*  --  5.80*  --    MONOCYTES 4.20  --  4.70  --    EOSINOPHILS 0.00  --  0.00  --    BASOPHILS 0.20  --  0.10  --    ASTSGOT 490* 446* 310* 178*   ALTSGPT 660* 662* 570* 473*   ALKPHOSPHAT 163* 163* 164* 164*   TBILIRUBIN 1.0 1.0 1.0 0.9     Recent Labs     24  04024  0645   SODIUM 133* 133* 134*   POTASSIUM 4.9 5.0 4.6   CHLORIDE 97 97 94*   CO2 21 20 20   GLUCOSE 121* 141* 151*   BUN 36* 53* 48*   CREATININE 3.53* 4.76* 4.07*   CALCIUM 7.9* 7.9* 7.7*     Hemodynamics:  Temp (24hrs), Av.3 °C (97.3 °F), Min:36.1 °C (97 °F), Max:36.4 °C (97.5 °F)  Temperature: 36.1 °C (97 °F)  Pulse  Av.6  Min: 60  Max: 117   Blood Pressure: (!) 133/98     Respiratory:    Respiration: 18, Pulse Oximetry: 98 %     Work Of Breathing / Effort: Within Normal Limits  RUL Breath Sounds: Clear, RML Breath Sounds: Diminished, RLL Breath Sounds: Diminished, ZAINAB Breath Sounds: Clear, LLL Breath Sounds: Diminished  Fluids:    Intake/Output Summary (Last 24 hours) at 2024 1357  Last data filed at 2024 1335  Gross per 24 hour   Intake 1400 ml   Output 1520 ml   Net -120 ml     Weight: 52.7 kg (116 lb 2.9 oz)  GI/Nutrition:  Orders Placed This Encounter   Procedures    Diet Order Diet: Cardiac     Standing Status:   Standing     Number of Occurrences:   1     Order Specific Question:   Diet:     Answer:   Cardiac [6]    Diet NPO Restrict to: Sips with Medications     Standing Status:   Standing     Number of Occurrences:   8     Order Specific Question:   Diet NPO Restrict to:     Answer:   Sips with Medications [3]     Medical Decision Making, by Problem:  Active Hospital Problems    Diagnosis     *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]     Hyperlipidemia  [E78.5]     History of stroke [Z86.73]     Cardiogenic shock (HCC) [R57.0]     Thrombocytopenia (HCC) [D69.6]     Rhabdomyolysis [M62.82]     IVON (acute kidney injury) (HCC) [N17.9]     Coagulopathy (HCC) [D68.9]     Severe mitral regurgitation [I34.0]     Transaminitis [R74.01]     Heart failure with reduced ejection fraction (HCC) [I50.20]     Hyperkalemia [E87.5]     Leukocytosis [D72.829]        Plan:  Concern for HLH  -EBV+  -No fevers, there is no significant hepatosplenomegaly, normal triglycerides, from a neurologic point of view at baseline are negative findings but there is significant drop in hemoglobin and platelet, ferritin 14,000 significant transaminitis and IVON, improvement on transaminitis even before starting steroids.  Cannot be explained by infection, was not found to have cardiogenic shock.  -4/2/2024 started dexamethasone 6 mg Q6     2.  Significant anemia and thrombocytopenia,  -Related to DIC, acute decompensation, HLH cannot be ruled out  -Plasmic score 4,low risk for TTP , HIT low probability     3.  Acute transaminitis  -Can be related to acute decompensation, HLH the possibility, on steroids     4.  Coagulopathy due to DIC, acute liver decompensation, improving     5.  Signs of right Ommaya lysis, increase CPK     6.  IVON, nephrology on board, on hemodialysis  -Low potassium score, no concerns for TTP  -Due to rhabdomyolysis, acute decompensation      Plan  -Continue on same dose of dexamethasone for now, 6 mg IV every 8 hours.  -Liver enzymes with significant improvement, coags improving.  -Bone marrow biopsy planned on Monday  -Soluble CD25 pending  -CBC also improving  -HLH versus acute decompensation for other reasons still in the differential.   -Will wait for bone marrow biopsy results for further treatment decisions.     Dr. Fay will start following the patient tomorrow    High complexity, complex decision making    Quality-Core Measures

## 2024-05-05 NOTE — PROGRESS NOTES
2300 - APRN Ronnell Guzman notified regarding patient having persistent diarrhea and miralax being scheduled TID. Patient also transferring diarrhea into [facility] and underneath HD cath dressing. Miralax d/c'd. HD cath dressing changed/cleaned and reinforced. PRN Hydroxyzine given for itching and anxiety.

## 2024-05-05 NOTE — PROGRESS NOTES
Hospital Medicine Daily Progress Note    Date of Service  5/5/2024    Chief Complaint  abdominal pain    Hospital Course  Fouzia Santamaria is a 63 y.o. female with HFrEF (EF 35%), severe PAD status post aortic femoral bypass coronary disease status post LAD stent, COPD, diabetes, hypertension, and previous strokes, who was initially seen in the ED with abdominal pain for 10 days and subsequently discharged to home, who again presented with 2 days of bodyaches as well as worsening constipation, nausea and vomiting.  Workup showed leukocytosis with white count of 15,100 with left shift.  Chest x-ray showed mild pulmonary edema.  Abdominal x-ray showed significant amount of stool burden.  Echocardiogram was obtained which revealed that ejection fraction now down to 15% with severe mitral regurgitation.  She also had acute kidney injury and likely cardiorenal syndrome.  Cardiology and nephrology were consulted.  She was placed on a dobutamine drip.  Her renal function worsened and she required a right-sided dialysis catheter with initiation of dialysis.  Her liver enzymes went up to AST of 2959 and ALT of 1377.  She also had significant drop in both hemoglobin and platelet count, with worsening leukocytosis.  LDH was 1418, fibrinogen 91, ferritin 7333 with elevated PT, PTT and INR.  Imaging of the abdomen showed mild hepatomegaly with no splenomegaly.  Hematology/oncology was consulted for concerns for hemophagocytic lymphohistiocytosis (HLH) with low suspicion/probability for TTP or HIT. She was started on Decadron.  She is scheduled for bone marrow biopsy for 5/6/24.    Interval Problem Update  5/5/2024 - I reviewed the patient's chart today. Uneventful night. VSS. Afebrile. Saturating well on RA.  WBC 4400.  Hemoglobin stable at 8.4.  Sodium 134, stable.  Creatinine 4.07.  Potassium is normal.  CPK 3678.  , , alkaline phosphatase 164.  Bilirubin is normal.  Fibrinogen 101. INR 2.12.    > I have  personally seen and examined the patient today.  Not in distress.  Baseline aphasia.  Not in pain.  No other complaints.      I personally reviewed all lab results mentioned above. Prior medical records from this institution and outside facilities were independently reviewed as noted. I also personally reviewed all ER physician and consultant recommendations and plans as documented above. History was independently obtained by myself. I have discussed this patient's plan of care and discharge plan at IDT rounds today with Case Management, Nursing, Nursing leadership, and other members of the IDT team.      Consultants/Specialty  Hematology  Critical care  Nephrology  Cardiology    Code Status  Full Code    Disposition  The patient is not medically cleared for discharge to home or a post-acute facility.      Discharge plan TBD.  6 clicks 14 and 16.  OT recommends home health.  Pending PT evaluation.  May need outpatient hemodialysis chair placement.  I have placed the appropriate orders for post-discharge needs.    Review of Systems  ROS     Unable to obtain due to mentation/aphasia      Physical Exam  Temp:  [36.1 °C (97 °F)-36.4 °C (97.5 °F)] 36.1 °C (97 °F)  Pulse:  [] 117  Resp:  [18] 18  BP: (113-149)/(57-98) 133/98  SpO2:  [95 %-100 %] 98 %    Physical Exam  Vitals reviewed.   Constitutional:       General: She is not in acute distress.     Appearance: Normal appearance. She is normal weight. She is ill-appearing. She is not diaphoretic.   HENT:      Head: Normocephalic and atraumatic.      Right Ear: External ear normal.      Left Ear: External ear normal.      Mouth/Throat:      Mouth: Mucous membranes are moist.      Pharynx: No oropharyngeal exudate or posterior oropharyngeal erythema.   Eyes:      General: No scleral icterus.     Extraocular Movements: Extraocular movements intact.      Conjunctiva/sclera: Conjunctivae normal.      Pupils: Pupils are equal, round, and reactive to light.   Neck:       Comments: Right IJ dialysis cath  Cardiovascular:      Rate and Rhythm: Normal rate and regular rhythm.      Heart sounds: Normal heart sounds. No murmur heard.  Pulmonary:      Effort: Pulmonary effort is normal. No respiratory distress.      Breath sounds: Normal breath sounds. No stridor. No wheezing, rhonchi or rales.   Chest:      Chest wall: No tenderness.   Abdominal:      General: Bowel sounds are normal. There is no distension.      Palpations: Abdomen is soft. There is no mass.      Tenderness: There is no abdominal tenderness. There is no guarding or rebound.   Musculoskeletal:         General: No swelling. Normal range of motion.      Cervical back: Normal range of motion and neck supple. No rigidity. No muscular tenderness.      Right lower leg: No edema.      Left lower leg: No edema.   Lymphadenopathy:      Cervical: No cervical adenopathy.   Skin:     General: Skin is warm and dry.      Coloration: Skin is not jaundiced.      Findings: Bruising and rash present.      Comments: Small scabs right forearm and bilat feet  (+) petechial rashes   Neurological:      General: No focal deficit present.      Mental Status: She is alert. Mental status is at baseline.      Cranial Nerves: No cranial nerve deficit.      Comments: She moves her extremities equally  (+) Expressive aphasia   Psychiatric:      Comments: Unable to assess due to aphasia         I have performed the physical examination today 5/5/2024.  In review of yesterday's note, there are no new changes except as documented above.      Fluids    Intake/Output Summary (Last 24 hours) at 5/5/2024 1026  Last data filed at 5/5/2024 0612  Gross per 24 hour   Intake 2420 ml   Output 1620 ml   Net 800 ml       Laboratory  Recent Labs     05/02/24  1440 05/03/24  0009 05/03/24  0512 05/04/24  0400 05/05/24  0645   WBC 17.3*   < > 18.4* 18.0* 14.4*   RBC 3.17*   < > 3.06* 3.08* 3.06*   HEMOGLOBIN 8.6*   < > 8.3* 8.4* 8.4*   HEMATOCRIT 27.1*   < > 25.4*  26.1* 26.3*   MCV 85.5   < > 83.0 84.7 85.9   MCH 27.1   < > 27.1 27.3 27.5   MCHC 31.7*   < > 32.7 32.2 31.9*   RDW 51.8*   < > 49.9 50.4* 51.5*   PLATELETCT 45*   < > 57* 58* 61*   MPV 11.4  --   --  11.0  --     < > = values in this interval not displayed.     Recent Labs     05/03/24  0912 05/04/24  0400 05/05/24  0645   SODIUM 133* 133* 134*   POTASSIUM 4.9 5.0 4.6   CHLORIDE 97 97 94*   CO2 21 20 20   GLUCOSE 121* 141* 151*   BUN 36* 53* 48*   CREATININE 3.53* 4.76* 4.07*   CALCIUM 7.9* 7.9* 7.7*     Recent Labs     05/03/24  0512 05/04/24  1049 05/05/24  0645   APTT  --   --  28.9   INR 2.30* 2.68* 2.12*                 Imaging  DX-CHEST-FOR LINE PLACEMENT Perform procedure in: Other(comment f6 below):   Final Result      1.  Right IJ catheter has been placed and the tip projects appropriately over the superior vena cava.      2.  Cardiomegaly with evidence of mild fluid overload.      CT-ABDOMEN-PELVIS W/O   Final Result      1.  No evidence of bowel obstruction or focal inflammatory change.      2.  Again seen extensive atherosclerotic vascular calcification with distal thoracic/upper abdominal aortic ectasia measuring approximately 3.2 cm in diameter.      3.  Gallstones within the gallbladder.      4.  Hyperdense left renal cyst likely representing hemorrhagic or milk of calcium within a cyst.      5.  Small amount of free fluid dependently within the pelvis.      DX-CHEST-PORTABLE (1 VIEW)   Final Result      Cardiomegaly.      US-ABDOMEN COMPLETE SURVEY   Final Result      1.  Mild hepatomegaly.      2.  Sludge noted in the gallbladder.      3.  Small echogenic foci in the periphery of the renal collecting systems bilaterally suspicious for early nephrolithiasis.      4.  Bilateral renal cysts.         DX-CHEST-LIMITED (1 VIEW)   Final Result         1.  No acute cardiopulmonary disease.   2.  Atherosclerosis      EC-ECHOCARDIOGRAM COMPLETE W/ CONT   Final Result      DX-CHEST-LIMITED (1 VIEW)   Final  Result      1.  Enlarged cardiac silhouette with changes of pulmonary edema.      AC-[id number]-3 VIEW   Final Result      1.  Nonobstructive bowel gas pattern with a large amount of colonic stool.           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  Imaging of the abdomen showed mild hepatomegaly with no splenomegaly.    - Hematology/oncology was consulted for concerns for hemophagocytic lymphohistiocytosis (HLH) with low suspicion/probability for TTP or HIT.   -Continue Decadron.  -She is scheduled for bone marrow biopsy for 5/6/24.  N.p.o. at midnight.  -Monitor for coagulopathy.  Daily fibrinogen, PT/PTT.  Transfused with 1 unit cryoprecipitate for fibrinogen less than 100.  -Sent for IgG/IgM/IgA.  Requested CD20 staining on bone marrow.      Cardiogenic shock (HCC)- (present on admission)  Assessment & Plan  -In the setting of low ejection fraction of 15%.  Required dobutamine drip.  Shock resolved.  -Management of HFrEF as above.    Heart failure with reduced ejection fraction (HCC)- (present on admission)  Assessment & Plan  - Had a drop in EF to 15% from previous 35%.  Required dobutamine drip.  -Continue volume removal with hemodialysis.  -Optimize GDMT.  Unable to give ACEi/ARB/ARNI/SGLT2i given renal function.  Continue hydralazine/Imdur for afterload reduction.  Start Toprol-XL 12.5 mg daily, adjust up as blood pressure tolerates.  Monitor blood pressure to make sure she tolerates medications.  -Monitor on telemetry.    Rhabdomyolysis- (present on admission)  Assessment & Plan  -CPK over 5,000. May be due to hemophagocytic lymphohistiocytosis.  -Holding off on IV fluids given IVON/dialysis dependent.  -CPK so far trending down.  Continue to monitor.  CPK levels in the morning.    Thrombocytopenia (HCC)- (present on admission)  Assessment & Plan  - In setting of HLH,  thrombocytopenia.  Low suspicion/probability for TTP or HIT.  -Continue Decadron.  -Bone marrow biopsy on 5/5.  Transfuse 1 unit cryoprecipitate in preparation for bone marrow biopsy tomorrow specially with low fibrinogen level.  -Continue to monitor platelet counts.  Restrictive transfusion strategy.  Watch for bleeding.    Coagulopathy (HCC)- (present on admission)  Assessment & Plan  - In setting of HLH, thrombocytopenia.  Low suspicion/probability for TTP or HIT.  Fibrinogen low.  -Continue Decadron.  -Bone marrow biopsy on 5/5.  -Monitor PT/PTT/INR and daily fibrinogen.  Watch for bleeding.  -Transfuse with 1 unit cryoprecipitate for fibrinogen </= 100.  She gets spontaneous bleeding from with low fibrinogen levels.    IVON (acute kidney injury) (HCC)- (present on admission)  Assessment & Plan  - Could be cardiorenal in setting of HFrEF, along with possible HLH.  -Now started on hemodialysis.  -Nephrology following.  -Monitor electrolytes closely.  -If continues to require hemodialysis, will need outpatient hemodialysis chair arrangements.  -Considering kidney biopsy by nephrology.    Transaminitis  Assessment & Plan  -Likely congestive hepatopathy due to HFrEF.  Also in setting of rhabdomyolysis.  HLH could be contributing.  -Continue to trend LFTs, so far trending down.  -Continue to trend CPK.    Hyperkalemia- (present on admission)  Assessment & Plan  - Likely related to renal failure.  Potassium has normalized.  Continue hemodialysis.  Monitor potassium.  BMP in the morning.    Leukocytosis- (present on admission)  Assessment & Plan  -Concerning for HLH.  -Plan for bone marrow biopsy on Monday, 5/6/2024.  -No signs of sepsis or infection.  Holding off on further antibiotics.  Has had 5 days of antibiotics in the hospital.  -Trend WBC count.    Hyperlipidemia- (present on admission)  Assessment & Plan  - Hold off on statin due to rhabdomyolysis.    History of stroke- (present on admission)  Assessment &  Plan  -With chronic aphasia.  Appears at baseline.  Monitor.    Severe mitral regurgitation- (present on admission)  Assessment & Plan  -Per echo.  Management as above.         VTE prophylaxis: SCD    My total time spent caring for the patient on the day of the encounter was 54 minutes. This does not include time spent on separately billable procedures/tests.

## 2024-05-05 NOTE — PROGRESS NOTES
Bedside report received from off going RN: Sravani assumed care of patient.     Fall Risk Score: HIGH RISK  Fall risk interventions in place: Place yellow fall risk ID band on patient, Provide patient/family education based on risk assessment, Educate patient/family to call staff for assistance when getting out of bed, Place fall precaution signage outside patient door, Place patient in room close to nursing station, Utilize bed/chair fall alarm, Notify charge of high risk for huddle, and Bed alarm connected correctly  Bed type: Low air loss (Manolo Score less than 17 interventions in place)  Patient on cardiac monitor: Yes  IVF/IV medications: Not Applicable   Oxygen: Room Air  Bedside sitter: Not Applicable   Isolation: Isolation precautions in place

## 2024-05-05 NOTE — CARE PLAN
The patient is Watcher - Medium risk of patient condition declining or worsening    Shift Goals  Clinical Goals: Monitor Labs, VS, hemodynamics  Patient Goals: sleep, comfort  Family Goals: na    Progress made toward(s) clinical / shift goals:    Problem: Skin Integrity  Goal: Skin integrity is maintained or improved  Outcome: Progressing     Problem: Hemodynamics  Goal: Patient's hemodynamics, fluid balance and neurologic status will be stable or improve  Outcome: Progressing       Patient is not progressing towards the following goals:

## 2024-05-06 ENCOUNTER — SURGERY (OUTPATIENT)
Age: 64
End: 2024-05-06
Payer: MEDICAID

## 2024-05-06 ENCOUNTER — HISTORY (OUTPATIENT)
Dept: SURGERY | Facility: MEDICAL CENTER | Age: 64
End: 2024-05-06
Payer: MEDICAID

## 2024-05-06 LAB
ALBUMIN SERPL BCP-MCNC: 3.4 G/DL (ref 3.2–4.9)
ALBUMIN/GLOB SERPL: 0.9 G/DL
ALP SERPL-CCNC: 155 U/L (ref 30–99)
ALT SERPL-CCNC: 390 U/L (ref 2–50)
ANION GAP SERPL CALC-SCNC: 21 MMOL/L (ref 7–16)
APTT PPP: 29.4 SEC (ref 24.7–36)
AST SERPL-CCNC: 99 U/L (ref 12–45)
BILIRUB SERPL-MCNC: 0.8 MG/DL (ref 0.1–1.5)
BUN SERPL-MCNC: 61 MG/DL (ref 8–22)
CALCIUM ALBUM COR SERPL-MCNC: 7.8 MG/DL (ref 8.5–10.5)
CALCIUM SERPL-MCNC: 7.3 MG/DL (ref 8.5–10.5)
CHLORIDE SERPL-SCNC: 88 MMOL/L (ref 96–112)
CK SERPL-CCNC: 2314 U/L (ref 0–154)
CO2 SERPL-SCNC: 20 MMOL/L (ref 20–33)
CREAT SERPL-MCNC: 5.35 MG/DL (ref 0.5–1.4)
EBV DNA SERPL NAA+PROBE-ACNC: ABNORMAL IU/ML
EBV DNA SERPL NAA+PROBE-LOG#: ABNORMAL LOG IU/ML
EBV DNA SPEC QL NAA+PROBE: DETECTED
ERYTHROCYTE [DISTWIDTH] IN BLOOD BY AUTOMATED COUNT: 56.2 FL (ref 35.9–50)
FIBRINOGEN PPP-MCNC: 118 MG/DL (ref 215–460)
GFR SERPLBLD CREATININE-BSD FMLA CKD-EPI: 9 ML/MIN/1.73 M 2
GLOBULIN SER CALC-MCNC: 3.6 G/DL (ref 1.9–3.5)
GLUCOSE BLD STRIP.AUTO-MCNC: 142 MG/DL (ref 65–99)
GLUCOSE SERPL-MCNC: 210 MG/DL (ref 65–99)
HCT VFR BLD AUTO: 28.8 % (ref 37–47)
HGB BLD-MCNC: 8.4 G/DL (ref 12–16)
HGB RETIC QN AUTO: 35.2 PG/CELL (ref 29–35)
IMM RETICS NFR: 41.8 % (ref 2.6–16.1)
INR PPP: 1.76 (ref 0.87–1.13)
MCH RBC QN AUTO: 27.4 PG (ref 27–33)
MCHC RBC AUTO-ENTMCNC: 29.2 G/DL (ref 32.2–35.5)
MCV RBC AUTO: 93.8 FL (ref 81.4–97.8)
PATHOLOGY CONSULT NOTE: NORMAL
PLATELET # BLD AUTO: 61 K/UL (ref 164–446)
PLATELETS.RETICULATED NFR BLD AUTO: 11.7 % (ref 0.6–13.1)
POTASSIUM SERPL-SCNC: 4.4 MMOL/L (ref 3.6–5.5)
PROT SERPL-MCNC: 7 G/DL (ref 6–8.2)
PROTHROMBIN TIME: 20.8 SEC (ref 12–14.6)
RBC # BLD AUTO: 3.07 M/UL (ref 4.2–5.4)
RETICS # AUTO: 0.24 M/UL (ref 0.04–0.12)
RETICS/RBC NFR: 7.7 % (ref 0.8–2.6)
SODIUM SERPL-SCNC: 129 MMOL/L (ref 135–145)
WBC # BLD AUTO: 15.3 K/UL (ref 4.8–10.8)

## 2024-05-06 PROCEDURE — 99233 SBSQ HOSP IP/OBS HIGH 50: CPT | Performed by: INTERNAL MEDICINE

## 2024-05-06 PROCEDURE — 07DR3ZX EXTRACTION OF ILIAC BONE MARROW, PERCUTANEOUS APPROACH, DIAGNOSTIC: ICD-10-PCS | Performed by: HOSPITALIST

## 2024-05-06 PROCEDURE — 99152 MOD SED SAME PHYS/QHP 5/>YRS: CPT | Performed by: HOSPITALIST

## 2024-05-06 PROCEDURE — 38222 DX BONE MARROW BX & ASPIR: CPT | Mod: LT | Performed by: HOSPITALIST

## 2024-05-06 RX ORDER — MIDAZOLAM HYDROCHLORIDE 1 MG/ML
1-5 INJECTION INTRAMUSCULAR; INTRAVENOUS
Status: DISCONTINUED | OUTPATIENT
Start: 2024-05-06 | End: 2024-05-08

## 2024-05-06 RX ORDER — SODIUM CHLORIDE 9 MG/ML
INJECTION, SOLUTION INTRAVENOUS ONCE
Status: DISCONTINUED | OUTPATIENT
Start: 2024-05-06 | End: 2024-05-06 | Stop reason: HOSPADM

## 2024-05-06 RX ORDER — MIDAZOLAM HYDROCHLORIDE 1 MG/ML
INJECTION INTRAMUSCULAR; INTRAVENOUS
Status: DISCONTINUED | OUTPATIENT
Start: 2024-05-06 | End: 2024-05-06 | Stop reason: HOSPADM

## 2024-05-06 RX ORDER — MIDAZOLAM HYDROCHLORIDE 1 MG/ML
INJECTION INTRAMUSCULAR; INTRAVENOUS
Status: DISPENSED
Start: 2024-05-06 | End: 2024-05-06

## 2024-05-06 RX ADMIN — MUPIROCIN 3 G: 20 OINTMENT TOPICAL at 18:00

## 2024-05-06 RX ADMIN — DEXAMETHASONE 6 MG: 4 TABLET ORAL at 05:24

## 2024-05-06 RX ADMIN — ISOSORBIDE MONONITRATE 30 MG: 30 TABLET, EXTENDED RELEASE ORAL at 05:24

## 2024-05-06 RX ADMIN — HYDRALAZINE HYDROCHLORIDE 25 MG: 50 TABLET ORAL at 21:27

## 2024-05-06 RX ADMIN — METOPROLOL SUCCINATE 12.5 MG: 25 TABLET, FILM COATED, EXTENDED RELEASE ORAL at 05:24

## 2024-05-06 RX ADMIN — DEXAMETHASONE 6 MG: 4 TABLET ORAL at 16:41

## 2024-05-06 RX ADMIN — PRAMIPEXOLE DIHYDROCHLORIDE 0.12 MG: 0.12 TABLET ORAL at 16:41

## 2024-05-06 RX ADMIN — HYDRALAZINE HYDROCHLORIDE 25 MG: 50 TABLET ORAL at 05:23

## 2024-05-06 RX ADMIN — HYDROXYZINE HYDROCHLORIDE 25 MG: 25 TABLET, FILM COATED ORAL at 21:27

## 2024-05-06 RX ADMIN — PRAMIPEXOLE DIHYDROCHLORIDE 0.12 MG: 0.12 TABLET ORAL at 21:27

## 2024-05-06 RX ADMIN — FENTANYL CITRATE 50 MCG: 50 INJECTION, SOLUTION INTRAMUSCULAR; INTRAVENOUS at 11:35

## 2024-05-06 RX ADMIN — MUPIROCIN 1 APPLICATION: 20 OINTMENT TOPICAL at 05:32

## 2024-05-06 RX ADMIN — HYDRALAZINE HYDROCHLORIDE 25 MG: 50 TABLET ORAL at 16:40

## 2024-05-06 RX ADMIN — PRAMIPEXOLE DIHYDROCHLORIDE 0.12 MG: 0.12 TABLET ORAL at 08:11

## 2024-05-06 RX ADMIN — MIDAZOLAM HYDROCHLORIDE 2 MG: 1 INJECTION, SOLUTION INTRAMUSCULAR; INTRAVENOUS at 11:35

## 2024-05-06 RX ADMIN — HYDRALAZINE HYDROCHLORIDE 20 MG: 20 INJECTION INTRAMUSCULAR; INTRAVENOUS at 08:11

## 2024-05-06 RX ADMIN — DEXAMETHASONE 6 MG: 4 TABLET ORAL at 21:27

## 2024-05-06 RX ADMIN — VENLAFAXINE HYDROCHLORIDE 37.5 MG: 37.5 CAPSULE, EXTENDED RELEASE ORAL at 05:23

## 2024-05-06 ASSESSMENT — ENCOUNTER SYMPTOMS
DIAPHORESIS: 0
CARDIOVASCULAR NEGATIVE: 1
DIARRHEA: 0
MEMORY LOSS: 0
NEUROLOGICAL NEGATIVE: 1
MUSCULOSKELETAL NEGATIVE: 1
FOCAL WEAKNESS: 0
VOMITING: 0
NAUSEA: 0
GASTROINTESTINAL NEGATIVE: 1
CONSTIPATION: 0
CONSTITUTIONAL NEGATIVE: 1
CHILLS: 0
PALPITATIONS: 0
PND: 0
BLOOD IN STOOL: 0
PSYCHIATRIC NEGATIVE: 1
DIZZINESS: 0
BRUISES/BLEEDS EASILY: 0
ABDOMINAL PAIN: 0
MYALGIAS: 0
COUGH: 0
EYES NEGATIVE: 1
FEVER: 0
DEPRESSION: 0
ORTHOPNEA: 0
HEADACHES: 0
INSOMNIA: 0
RESPIRATORY NEGATIVE: 1
WEIGHT LOSS: 0

## 2024-05-06 ASSESSMENT — PAIN DESCRIPTION - PAIN TYPE
TYPE: SURGICAL PAIN
TYPE: ACUTE PAIN
TYPE: SURGICAL PAIN
TYPE: ACUTE PAIN

## 2024-05-06 ASSESSMENT — FIBROSIS 4 INDEX: FIB4 SCORE: 5.01

## 2024-05-06 NOTE — CARE PLAN
The patient is Stable - Low risk of patient condition declining or worsening    Shift Goals  Clinical Goals: Monitor labs/vs, control BP  Patient Goals: pain control, comfort  Family Goals: na    Progress made toward(s) clinical / shift goals:    Problem: Fall Risk  Goal: Patient will remain free from falls  Outcome: Progressing     Problem: Hemodynamics  Goal: Patient's hemodynamics, fluid balance and neurologic status will be stable or improve  Outcome: Progressing       Patient is not progressing towards the following goals:

## 2024-05-06 NOTE — PROGRESS NOTES
[facility] -  PROGRESS NOTE               Author: Sarah Ortiz D.O. Date & Time: 5/6/2024  9:12 AM     CC: abd pain, n/v    HISTORY OF PRESENT ILLNESS:    63 y.o. female with history of systolic and diastolic heart failure with EF of 15% (HFrEF) grade 2 diastolic dysfunction, severe MVR, CAD with h/o STEMI s/p DEWEY to LAD, PAD with aortofemoral bypass surgery who presented 4/27/2024 with increasing shortness of breath admitted for community acquired PNA and severe constipation with hosp course complicated by exacerbation of her HFrEF, medications augmented which was followed by IVON, worsening hepatopathy concerning for cardiorenal etiology.   She was then transferred to ICU 5/01 and initiated on dobutamine drip for organ perfusion.   Patient continues to be oliguric with UOP of 86 yesterday and 10 ml today so far.   Nephrology has been consulted for concerns of IVON , cardiorenal syndrome.     DAILY NEPHROLOGY SUMMARY:  05/02: Examined at bedside. Looks like she wants to talk but it is difficult for her to find appropriate words.   Dobutamine ggt held per critical care today.   Labs yesterday with fibrinogen 91, cryoppt administered followed by dialysis   1 L ultrafiltration   Uptrending WBCs since 4/30, 18.4 today  Scr improved to 3.03 after dialysis   Down trending AST/ALT   CT scan without bowel obs, extensive calcification of distal thoracic/upper abd aorta. Left renal cyst.   5/4: Pt transferred out of ICU, now with increased CPK and climbing  5/5: Pt still anuric, HD yesterday  5/6: minimal UOP, SBP 90s-140s, platelets stable at 61,000, CPK trended down to 2314, BM biopsy planned for today, patient denies CP/SOB    REVIEW OF SYSTEMS:    ROS limited due to mental acuity, Chronic expressive aphasia.     PAST MEDICAL/SURGICAL/FAMILY/SOCIAL HISTORY:  Reviewed and documented in chart     HOME MEDICATIONS:   - Reviewed and documented in chart    LABORATORY STUDIES:   - Reviewed and  "documented in chart    ALLERGIES:  Pcn [penicillins] and Toradol    VS:  BP (!) 140/95 Comment: Rn notified   Pulse 66   Temp 36.4 °C (97.5 °F) (Temporal)   Resp 15   Ht 1.651 m (5' 5\")   Wt 63 kg (138 lb 14.2 oz)   SpO2 98%   BMI 23.11 kg/m²   Physical Exam  Vitals and nursing note reviewed.   Constitutional:       General: She is not in acute distress.     Appearance: She is ill-appearing.   HENT:      Head: Normocephalic and atraumatic.      Right Ear: External ear normal.      Left Ear: External ear normal.      Nose: Nose normal.      Mouth/Throat:      Mouth: Mucous membranes are dry.   Eyes:      General:         Right eye: No discharge.         Left eye: No discharge.      Extraocular Movements: Extraocular movements intact.   Neck:      Comments: Right IJ trialysis catheter   Cardiovascular:      Rate and Rhythm: Normal rate and regular rhythm.      Pulses: Normal pulses.   Pulmonary:      Effort: Pulmonary effort is normal. No respiratory distress.   Abdominal:      General: There is no distension.      Palpations: Abdomen is soft.   Musculoskeletal:         General: Swelling present.      Comments: Swelling b/l arms    Skin:     General: Skin is warm.      Capillary Refill: Capillary refill takes less than 2 seconds.      Findings: Bruising present.      Comments: Excoriations on extremities    Neurological:      Mental Status: She is alert.      Comments: Chronic expressive aphasia   Answers some questions, not oriented to year         FLUID BALANCE:  In: 750 [P.O.:580; Blood:170]  Out: 50     LABS:  Recent Labs     05/04/24  0400 05/05/24  0645 05/06/24  0138   SODIUM 133* 134* 129*   POTASSIUM 5.0 4.6 4.4   CHLORIDE 97 94* 88*   CO2 20 20 20   GLUCOSE 141* 151* 210*   BUN 53* 48* 61*   CREATININE 4.76* 4.07* 5.35*   CALCIUM 7.9* 7.7* 7.3*        IMAGING:  - Imaging studies reviewed by me      IMPRESSION:     # Acute kidney injury multifactorial--Decreased perfusion, HLH, rhabdo  - unlikley " TTP  - oliguric  - HD 5/2 1st  - C3 and C4 low, ARLEEN pending, ANCA pending  - hepatitis negative  - PCR and ACR pending (minimal UOP)     # ? 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 ongoing  - steroids  -Bone Marrow Monday    # Hepatic congestion      #Rhabdo unclear etiology, difficult to give fluids in face of reduced EF     # Heart failure with reduced ejection fraction      # Coagulopathy   - Fibinogen 91 5/02, s/p cryoppt     # Transamnitis  - Improving       # Severe mitral regurgitation      # Peripheral vascular disease with prior aortic thrombus s/p aortofemoral bypass     # Constipation      # Impacted stool in intestine   - Resolved      # Colitis      # Leukocytosis      # Prior CVA    - Chronic expressive aphasia     # Essential hypertension    - goal <140/90, at goal        PLAN:  - HD qMWF and PRN for now  - Daily evaluation of RRT needs  - steroids per primary service  - Bone marrow pending 5/6  - renal biopsy ordered, though likely will only be obtained with improved platelets/coagulation status  - Avoid nephrotoxins, NSAIDs  - Renally dose meds   - daily labs  - strict I/O    Further recs to follow    Other management per primary service

## 2024-05-06 NOTE — THERAPY
Occupational Therapy Contact Note    Patient Name: Fouzia Santamaria  Age:  63 y.o., Sex:  female  Medical Record #: [id number]  Today's Date: 5/6/2024    OT re-consult received, pt off floor for bone marrow biopsy pt evaluated by OT on 4/29 pt determined to be at her functional baseline then, has assistance for all ADLs and mobility from spouse who provides 24/7 assistance. Will follow up as able.      Yuan Genao OTD, OTR/L

## 2024-05-06 NOTE — CARE PLAN
The patient is Stable - Low risk of patient condition declining or worsening    Shift Goals  Clinical Goals: Monitor labs, monitor BP, bone marrow biopsy  Patient Goals: Comfort  Family Goals: AFIA    Progress made toward(s) clinical / shift goals:      Problem: Pain - Standard  Goal: Alleviation of pain or a reduction in pain to the patient’s comfort goal  Description: Target End Date:  Prior to discharge or change in level of care    Document on Vitals flowsheet    1.  Document pain using the appropriate pain scale per order or unit policy  2.  Educate and implement non-pharmacologic comfort measures (i.e. relaxation, distraction, massage, cold/heat therapy, etc.)  3.  Pain management medications as ordered  4.  Reassess pain after pain med administration per policy  5.  If opiods administered assess patient's response to pain medication is appropriate per POSS sedation scale  6.  Follow pain management plan developed in collaboration with patient and interdisciplinary team (including palliative care or pain specialists if applicable)  Outcome: Progressing     Problem: Knowledge Deficit - Standard  Goal: Patient and family/care givers will demonstrate understanding of plan of care, disease process/condition, diagnostic tests and medications  Description: Target End Date:  1-3 days or as soon as patient condition allows    Document in Patient Education    1.  Patient and family/caregiver oriented to unit, equipment, visitation policy and means for communicating concern  2.  Complete/review Learning Assessment  3.  Assess knowledge level of disease process/condition, treatment plan, diagnostic tests and medications  4.  Explain disease process/condition, treatment plan, diagnostic tests and medications  Outcome: Progressing     Problem: Skin Integrity  Goal: Skin integrity is maintained or improved  Description: Target End Date:  Prior to discharge or change in level of care    Document interventions on Skin  Risk/Manolo flowsheet groups and corresponding LDA    1.  Assess and monitor skin integrity, appearance and/or temperature  2.  Assess risk factors for impaired skin integrity and/or pressures ulcers  3.  Implement precautions to protect skin integrity in collaboration with interdisciplinary team  4.  Implement pressure ulcer prevention protocol if at risk for skin breakdown  5.  Confirm wound care consult if at risk for skin breakdown  6.  Ensure patient use of pressure relieving devices  (Low air loss bed, waffle overlay, heel protectors, ROHO cushion, etc)  Outcome: Progressing     Problem: Fall Risk  Goal: Patient will remain free from falls  Description: Target End Date:  Prior to discharge or change in level of care    Document interventions on the [facility] Fall Risk Assessment    1.  Assess for fall risk factors  2.  Implement fall precautions  Outcome: Progressing     Problem: Care Map:  Admission Optimal Outcome for the Heart Failure Patient  Goal: Admission:  Optimal Care of the heart failure patient  Description: Target End Date:  end of day 1  Outcome: Progressing     Problem: Care Map:  Day 1 Optimal Outcome for the Heart Failure Patient  Goal: Day 1:  Optimal Care of the heart failure patient  Description: Target End Date:  end of day 1  Outcome: Progressing     Problem: Care Map:  Day of Discharge Optimal Outcome for the Heart Failure Patient  Goal: Day of Discharge:  Optimal Care of the heart failure patient  Description: Target End Date:  Prior to discharge or change in level of care  Outcome: Progressing     Problem: Hemodynamics  Goal: Patient's hemodynamics, fluid balance and neurologic status will be stable or improve  Description: Target End Date:  Prior to discharge or change in level of care    Document on Assessment and I/O flowsheet templates    1.  Monitor vital signs, pulse oximetry and cardiac monitor per provider order and/or policy  2.  Maintain blood pressure per provider  order  3.  Hemodynamic monitoring per provider order  4.  Manage IV fluids and IV infusions  5.  Monitor intake and output  6.  Daily weights per unit policy or provider order  7.  Assess peripheral pulses and capillary refill  8.  Assess color and body temperature  9.  Position patient for maximum circulation/cardiac output  10. Monitor for signs/symptoms of excessive bleeding  11. Assess mental status, restlessness and changes in level of consciousness  12. Monitor temperature and report fever or hypothermia to provider immediately. Consideration of targeted temperature management.  Outcome: Progressing

## 2024-05-06 NOTE — PROGRESS NOTES
Bedside report received from off going RN: Mena, assumed care of patient.     Fall Risk Score: HIGH RISK  Fall risk interventions in place: Place yellow fall risk ID band on patient, Provide patient/family education based on risk assessment, Educate patient/family to call staff for assistance when getting out of bed, Place fall precaution signage outside patient door, Place patient in room close to nursing station, Utilize bed/chair fall alarm, and Bed alarm connected correctly. Frame alarm on.  Bed type: Regular (Manolo Score less than 17 interventions not in place)  Patient on cardiac monitor: Yes  IVF/IV medications: Not Applicable   Oxygen: Room Air  Bedside sitter: Not Applicable   Isolation: Isolation precautions in place; droplet

## 2024-05-06 NOTE — PROGRESS NOTES
Bedside report received from off going RN/tech: Young MONTALVO, assumed care of patient.     Fall Risk Score: HIGH RISK  Fall risk interventions in place: Place yellow fall risk ID band on patient, Provide patient/family education based on risk assessment, Educate patient/family to call staff for assistance when getting out of bed, Place fall precaution signage outside patient door, Place patient in room close to nursing station, Utilize bed/chair fall alarm, Notify charge of high risk for huddle, and Bed alarm connected correctly  Bed type: Regular (Manolo Score less than 17 interventions in place)  Patient on cardiac monitor: Yes  IVF/IV medications: Not Applicable   Oxygen: Room Air  Bedside sitter: Not Applicable   Isolation: Isolation precautions in place Droplet     Pt sleeping in bed, bed in low and locked position, call light within reach. Will continue to monitor.

## 2024-05-06 NOTE — THERAPY
Speech Language Therapy Contact Note    Patient Name: Fouzia Santamaria  Age:  63 y.o., Sex:  female  Medical Record #: [id number]  Today's Date: 5/6/2024 05/06/24 0736   Treatment Variance   Reason For Missed Therapy Medical - Other (Please Comment);Medical - Patient  in Procedure   Interdisciplinary Plan of Care Collaboration   IDT Collaboration with  Other (See Comments)  (EMR)   Collaboration Comments Per EMR - patient NPO today for scheduled bone marrow biospy. SLP to follow post-procedurally when patient is medically cleared to participate in PO and as schedule allows.     - Deanna Zavala M.S. SLP

## 2024-05-06 NOTE — PROGRESS NOTES
"Hematology/Oncology Progress Note    Primary Hematologist/Oncologist: Miguelina    Oncology History:  4/27/2024: Presented to hospital with 10 days of flulike symptoms, abdominal pain, and found to have rhinovirus and enterovirus infections.  Rapidly deteriorated with LFTs rising to AST/ALT 2739/1024 along with acute renal failure requiring dialysis.  She also had decompensation of her congestive heart failure requiring transfer to the ICU for dobutamine drip.  Hemoglobin precipitously dropped to 8.3 and platelets also dropped to 57 along with a new leukocytosis.  Her workup was also not notable for normal triglycerides but a ferritin of 14,244.  CT CAP demonstrated no hepatosplenomegaly.    Interval History:  She went for a bone marrow biopsy today. She still feels very poorly overall. No acute events overnight.     Review of Systems:  Review of Systems   Constitutional: Negative.  Negative for chills, diaphoresis, fever, malaise/fatigue and weight loss.   HENT: Negative.     Eyes: Negative.    Respiratory: Negative.  Negative for cough.    Cardiovascular: Negative.  Negative for chest pain, palpitations, orthopnea, leg swelling and PND.   Gastrointestinal: Negative.  Negative for abdominal pain, blood in stool, constipation, diarrhea, melena, nausea and vomiting.   Genitourinary: Negative.  Negative for dysuria, frequency, hematuria and urgency.   Musculoskeletal: Negative.  Negative for myalgias.   Skin: Negative.    Neurological: Negative.  Negative for dizziness, focal weakness and headaches.   Endo/Heme/Allergies: Negative.  Does not bruise/bleed easily.   Psychiatric/Behavioral: Negative.  Negative for depression and memory loss. The patient does not have insomnia.         Vitals:     BP (!) 130/90 Comment: RN notified  Pulse 80   Temp 36.4 °C (97.5 °F) (Temporal)   Resp 18   Ht 1.651 m (5' 5\")   Wt 52.7 kg (116 lb 2.9 oz)   SpO2 98%   BMI 19.33 kg/m²     Physical Exam:  Physical Exam  Vitals and nursing " note reviewed.   Constitutional:       General: She is not in acute distress.     Appearance: She is not ill-appearing or toxic-appearing.   HENT:      Head: Normocephalic and atraumatic.      Mouth/Throat:      Mouth: Mucous membranes are moist.   Eyes:      Pupils: Pupils are equal, round, and reactive to light.   Cardiovascular:      Rate and Rhythm: Normal rate and regular rhythm.      Pulses: Normal pulses.      Heart sounds: Normal heart sounds. No murmur heard.     No friction rub. No gallop.   Pulmonary:      Effort: Pulmonary effort is normal. No respiratory distress.      Breath sounds: Normal breath sounds. No stridor. No wheezing, rhonchi or rales.   Chest:      Chest wall: No tenderness.   Abdominal:      General: Abdomen is flat. Bowel sounds are normal.      Palpations: Abdomen is soft.   Musculoskeletal:         General: No swelling. Normal range of motion.      Cervical back: Normal range of motion. No rigidity.      Right lower leg: No edema.   Skin:     General: Skin is warm and dry.      Capillary Refill: Capillary refill takes 2 to 3 seconds.      Coloration: Skin is not jaundiced.   Neurological:      General: No focal deficit present.      Mental Status: She is alert and oriented to person, place, and time. Mental status is at baseline.      Cranial Nerves: No cranial nerve deficit.   Psychiatric:         Mood and Affect: Mood normal.          Assessment and Plan:    Concern for HLH  Primary HLH (a true hematologic neoplasm) or secondary HLH (inflammatory dysregulation leading to cytokine storm caused by a separate nonhematologic etiology) are both on the differential.  While we are awaiting bone marrow biopsy to evaluate for true hemophagocytosis, the patient does meet modified HLH-94 criteria including 3 of 4 clinical findings (fever, cytopenias, hepatitis) and at least 1 marker of immune over-activation (increased ferritin, hypofibrinogenemia).    While we await bone marrow biopsy, I  recommend continuing dexamethasone 6mg q6h. We can decrease to 10mg/mg (16mg dex) after one week.     Acute renal failure  Nephrology following.  On hemodialysis.  Remains oliguric.    Coagulopathy  Secondary to liver dysfunction.  Transfuse cryoprecipitate to maintain fibrinogen greater than 150    Elevated LFTs  Improving following dialysis.  Suspect some level of congestive hepatopathy.    Heart failure with reduced ejection fraction  Drop in EF to 15% from previously 35%.  Required a dobutamine drip this admission.  Medicine currently optimizing GDMT.    Rhabdomyolysis  CPK greater than 5000.  Nephrology on board.  Underlying etiology is unclear, potentially secondary to HLH.    History of stroke  Chronic aphasia.  Neurologic status at baseline.    Thank you for allowing me to participate in her care. Please do not hesitate to reach out with any questions.    Please note that this dictation was created using voice recognition software. I have made every reasonable attempt to correct obvious errors, but I expect that there are errors of grammar and possibly content that I did not discover before finalizing the note.      Zachariah Fay MD  Hematologist/Oncologist  [facility]   of Medicine - [facility]

## 2024-05-06 NOTE — CARE PLAN
The patient is Watcher - Medium risk of patient condition declining or worsening    Shift Goals  Clinical Goals: Moniotr labs, kidney function, UO, hemodynamic stability  Patient Goals: Rest, comfort  Family Goals: na    Progress made toward(s) clinical / shift goals:  Patient to be NPO at midnight for bone marrow biopsy/kidney biopsy tomorrow. Patient received cycropercipate transfusion today.       Problem: Pain - Standard  Goal: Alleviation of pain or a reduction in pain to the patient’s comfort goal  Outcome: Progressing     Problem: Skin Integrity  Goal: Skin integrity is maintained or improved  Outcome: Progressing  Note: Wound team came and saw patient     Problem: Fall Risk  Goal: Patient will remain free from falls  Outcome: Progressing     Problem: Care Map:  Day Before Discharge Optimal Outcome for the Heart Failure Patient  Goal: Day Before Discharge:  Optimal Care of the heart failure patient  Outcome: Progressing     Problem: Hemodynamics  Goal: Patient's hemodynamics, fluid balance and neurologic status will be stable or improve  Outcome: Not Progressing  Note: Patient having little urine out put.        Patient is not progressing towards the following goals:      Problem: Hemodynamics  Goal: Patient's hemodynamics, fluid balance and neurologic status will be stable or improve  Outcome: Not Progressing  Note: Patient having little urine out put.

## 2024-05-06 NOTE — PROCEDURES
Bone Marrow Biopsy/Aspiration    Date/Time: 5/6/2024 11:30 AM    Performed by: Librado King D.O.  Authorized by: Librado King D.O.    Consent:     Consent obtained:  Verbal    Consent given by:  Patient and spouse    Risks discussed:  Bleeding, infection, pain, nerve damage and repeat procedure    Alternatives discussed:  No treatment  Universal protocol:     Procedure explained and questions answered to patient or proxy's satisfaction: yes      Relevant documents present and verified: yes      Test results available and properly labeled: yes      Imaging studies available: yes      Required blood products, implants, devices, and special equipment available: yes      Immediately prior to procedure a time out was called: yes      Site/side marked: yes      Patient identity confirmed:  Verbally with patient, hospital-assigned identification number and arm band  Pre-procedure details:     Procedure type:  Aspiration and biopsy    Position:  Prone    Buttock laterality:  Left    Local anesthetic:  1% Lidocaine    Subcutaneous volume:  1 mL    Periosteum anesthetic volume:  4 mL  Sedation:     Patient Sedated: Yes      Sedation type: moderate (conscious) sedation      Sedation:  Midazolam    Sedation Dosage:  2mg    Analgesia:  Fentanyl    Analgesia Dosage:  50mcg    Sedation Start Time:  11:35 PDT    Sedation Stop Time:  11:46 PDT    Moderate sedation charge selection based on time above is:  15 minutes      I was personally present and supervised the patient throughout the entirety of the moderate sedation time mentioned above.  Procedure details:     Aspirate obtained:  5 mL followed by 5 mL    Biopsy performed:  1 core    Estimated blood loss (mL):  1  Post-procedure:     Puncture site:  Adhesive bandage applied and direct pressure applied    Patient tolerance of procedure:  Tolerated well, no immediate complications

## 2024-05-06 NOTE — PROGRESS NOTES
Hospital Medicine Daily Progress Note    Date of Service  5/6/2024    Chief Complaint  abdominal pain    Hospital Course  Fouzia Santamaria is a 63 y.o. female with HFrEF (EF 35%), severe PAD status post aortic femoral bypass coronary disease status post LAD stent, COPD, diabetes, hypertension, and previous strokes, who was initially seen in the ED with abdominal pain for 10 days and subsequently discharged to home, who again presented with 2 days of bodyaches as well as worsening constipation, nausea and vomiting.  Workup showed leukocytosis with white count of 15,100 with left shift.  Chest x-ray showed mild pulmonary edema.  Abdominal x-ray showed significant amount of stool burden.  Echocardiogram was obtained which revealed that ejection fraction now down to 15% with severe mitral regurgitation.  She also had acute kidney injury and likely cardiorenal syndrome.  Cardiology and nephrology were consulted.  She was placed on a dobutamine drip.  Her renal function worsened and she required a right-sided dialysis catheter with initiation of dialysis.  Her liver enzymes went up to AST of 2959 and ALT of 1377.  She also had significant drop in both hemoglobin and platelet count, with worsening leukocytosis.  LDH was 1418, fibrinogen 91, ferritin 7333 with elevated PT, PTT and INR.  Imaging of the abdomen showed mild hepatomegaly with no splenomegaly.  Hematology/oncology was consulted for concerns for hemophagocytic lymphohistiocytosis (HLH) with low suspicion/probability for TTP or HIT. She was started on Decadron.  She had low fibrinogen for which she was given cryoprecipitate.  She is scheduled for bone marrow biopsy for 5/6/24.  Nephrology contemplating on kidney biopsy.    Interval Problem Update  5/6/2024 - I reviewed the patient's chart. There were no significant overnight events. Remains hemodynamically stable and afebrile. Stable on RA.  WBC 15,300.  Hemoglobin is stable.  Platelet count 61,000.  Creatinine  5.35.  Sodium 129.  Potassium is normal.  CPK improved to 2314.  INR 1.76, APTT 29.4.  Complement levels low including C3, C4.  Ferritin 2435.  Fibrinogen 118.  AST improved to 99, , alkaline phosphatase 155, normal bilirubin.  Maintaining sinus rhythm on telemetry.  She had a bone marrow biopsy this morning.    > I have personally seen and examined the patient today.  No complaints of pain.  No nausea or vomiting.  Not in respiratory distress.   at bedside, discussed plan of care.      I personally reviewed all lab results mentioned above. Prior medical records from this institution and outside facilities were independently reviewed as noted. I also personally reviewed all ER physician and consultant recommendations and plans as documented above. History was independently obtained by myself. I have discussed this patient's plan of care and discharge plan at IDT rounds today with Case Management, Nursing, Nursing leadership, and other members of the IDT team.      Consultants/Specialty  Hematology  Critical care  Nephrology  Cardiology    Code Status  Full Code    Disposition  The patient is not medically cleared for discharge to home or a post-acute facility.      Discharge plan TBD.  6 clicks 14 and 16.  OT recommends home health.  Pending PT evaluation.  May need outpatient hemodialysis chair placement.  I have placed the appropriate orders for post-discharge needs.    Review of Systems  ROS     Unable to obtain due to mentation/aphasia      Physical Exam  Temp:  [36.3 °C (97.4 °F)-36.7 °C (98.1 °F)] 36.3 °C (97.4 °F)  Pulse:  [66-97] 71  Resp:  [15-34] 18  BP: ()/(63-98) 131/83  SpO2:  [94 %-99 %] 97 %    Physical Exam  Vitals reviewed.   Constitutional:       General: She is not in acute distress.     Appearance: Normal appearance. She is normal weight. She is ill-appearing. She is not diaphoretic.   HENT:      Head: Normocephalic and atraumatic.      Right Ear: External ear normal.       Left Ear: External ear normal.      Mouth/Throat:      Mouth: Mucous membranes are moist.      Pharynx: No oropharyngeal exudate or posterior oropharyngeal erythema.   Eyes:      General: No scleral icterus.     Extraocular Movements: Extraocular movements intact.      Conjunctiva/sclera: Conjunctivae normal.      Pupils: Pupils are equal, round, and reactive to light.   Neck:      Comments: Right IJ dialysis cath  Cardiovascular:      Rate and Rhythm: Normal rate and regular rhythm.      Heart sounds: Normal heart sounds. No murmur heard.  Pulmonary:      Effort: Pulmonary effort is normal. No respiratory distress.      Breath sounds: Normal breath sounds. No stridor. No wheezing, rhonchi or rales.   Chest:      Chest wall: No tenderness.   Abdominal:      General: Bowel sounds are normal. There is no distension.      Palpations: Abdomen is soft. There is no mass.      Tenderness: There is no abdominal tenderness. There is no guarding or rebound.   Musculoskeletal:         General: No swelling. Normal range of motion.      Cervical back: Normal range of motion and neck supple. No rigidity. No muscular tenderness.      Right lower leg: No edema.      Left lower leg: No edema.   Lymphadenopathy:      Cervical: No cervical adenopathy.   Skin:     General: Skin is warm and dry.      Coloration: Skin is not jaundiced.      Findings: Bruising and rash present.      Comments: Small scabs right forearm and bilat feet  (+) petechial rashes   Neurological:      General: No focal deficit present.      Mental Status: She is alert. Mental status is at baseline.      Cranial Nerves: No cranial nerve deficit.      Comments: She moves her extremities equally  (+) Expressive aphasia   Psychiatric:      Comments: Unable to assess due to aphasia         I have performed the physical examination today 5/5/2024.  In review of yesterday's note, there are no new changes except as documented above.      Fluids    Intake/Output Summary  (Last 24 hours) at 5/6/2024 1237  Last data filed at 5/6/2024 0345  Gross per 24 hour   Intake 650 ml   Output 50 ml   Net 600 ml       Laboratory  Recent Labs     05/04/24  0400 05/05/24  0645 05/06/24  0139   WBC 18.0* 14.4* 15.3*   RBC 3.08* 3.06* 3.07*   HEMOGLOBIN 8.4* 8.4* 8.4*   HEMATOCRIT 26.1* 26.3* 28.8*   MCV 84.7 85.9 93.8   MCH 27.3 27.5 27.4   MCHC 32.2 31.9* 29.2*   RDW 50.4* 51.5* 56.2*   PLATELETCT 58* 61* 61*   MPV 11.0  --   --      Recent Labs     05/04/24  0400 05/05/24  0645 05/06/24  0138   SODIUM 133* 134* 129*   POTASSIUM 5.0 4.6 4.4   CHLORIDE 97 94* 88*   CO2 20 20 20   GLUCOSE 141* 151* 210*   BUN 53* 48* 61*   CREATININE 4.76* 4.07* 5.35*   CALCIUM 7.9* 7.7* 7.3*     Recent Labs     05/04/24  1049 05/05/24  0645 05/06/24  0138   APTT  --  28.9 29.4   INR 2.68* 2.12* 1.76*                 Imaging  DX-CHEST-FOR LINE PLACEMENT Perform procedure in: Other(comment f6 below):   Final Result      1.  Right IJ catheter has been placed and the tip projects appropriately over the superior vena cava.      2.  Cardiomegaly with evidence of mild fluid overload.      CT-ABDOMEN-PELVIS W/O   Final Result      1.  No evidence of bowel obstruction or focal inflammatory change.      2.  Again seen extensive atherosclerotic vascular calcification with distal thoracic/upper abdominal aortic ectasia measuring approximately 3.2 cm in diameter.      3.  Gallstones within the gallbladder.      4.  Hyperdense left renal cyst likely representing hemorrhagic or milk of calcium within a cyst.      5.  Small amount of free fluid dependently within the pelvis.      DX-CHEST-PORTABLE (1 VIEW)   Final Result      Cardiomegaly.      US-ABDOMEN COMPLETE SURVEY   Final Result      1.  Mild hepatomegaly.      2.  Sludge noted in the gallbladder.      3.  Small echogenic foci in the periphery of the renal collecting systems bilaterally suspicious for early nephrolithiasis.      4.  Bilateral renal cysts.          DX-CHEST-LIMITED (1 VIEW)   Final Result         1.  No acute cardiopulmonary disease.   2.  Atherosclerosis      EC-ECHOCARDIOGRAM COMPLETE W/ CONT   Final Result      DX-CHEST-LIMITED (1 VIEW)   Final Result      1.  Enlarged cardiac silhouette with changes of pulmonary edema.      [id number] VIEW   Final Result      1.  Nonobstructive bowel gas pattern with a large amount of colonic stool.      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  Imaging of the abdomen showed mild hepatomegaly with no splenomegaly.    - Hematology/oncology was consulted for concerns for hemophagocytic lymphohistiocytosis (HLH) with low suspicion/probability for TTP or HIT.   -Continue Decadron.  -S/p bone marrow biopsy 5/6/24.  Follow pathology.   -Monitor for coagulopathy.  Daily fibrinogen, PT/PTT.  Transfused with cryoprecipitate if fibrinogen less than 100.      Cardiogenic shock (HCC)- (present on admission)  Assessment & Plan  -In the setting of low ejection fraction of 15%.  Required dobutamine drip.  Shock resolved.  -Management of HFrEF as above.    Heart failure with reduced ejection fraction (HCC)- (present on admission)  Assessment & Plan  - Had a drop in EF to 15% from previous 35%.  Required dobutamine drip.  -Continue volume removal with hemodialysis.  -Optimize GDMT.  Unable to give ACEi/ARB/ARNI/SGLT2i given renal function.  Continue hydralazine/Imdur for afterload reduction.  Continue Toprol-XL 12.5 mg daily, adjust up as blood pressure tolerates.  Monitor blood pressure to make sure she tolerates medications.  -Monitor on telemetry.    Rhabdomyolysis- (present on admission)  Assessment & Plan  -CPK was over 5,000. May be due to hemophagocytic lymphohistiocytosis.  -Holding off on IV fluids given IVON/dialysis dependent.  -CPK  continues to trend down.  Continue to monitor.  CPK levels in the morning.    Thrombocytopenia (HCC)- (present on admission)  Assessment & Plan  - In setting of HLH, thrombocytopenia.  Low suspicion/probability for TTP or HIT.  -Continue Decadron.  -Continue to monitor platelet counts.  Restrictive transfusion strategy.  Watch for bleeding.    Coagulopathy (HCC)- (present on admission)  Assessment & Plan  - In setting of HLH, thrombocytopenia.  Low suspicion/probability for TTP or HIT.  Fibrinogen low.  -Continue Decadron.  -s/p bone marrow biopsy 5/6.  -Monitor PT/PTT/INR and daily fibrinogen.  Watch for bleeding.  -Follow fibrinogen, transfuse with cryoprecipitate if </= 100.  She gets spontaneous bleeding with low fibrinogen levels.    IVON (acute kidney injury) (HCC)- (present on admission)  Assessment & Plan  - Could be cardiorenal in setting of HFrEF, along with possible HLH.  -Now on hemodialysis.  -Nephrology following.  -Monitor electrolytes closely.  -If continues to require hemodialysis, will need outpatient hemodialysis chair arrangements.  -IR consult for kidney biopsy.    Transaminitis  Assessment & Plan  -Likely congestive hepatopathy due to HFrEF.  Also in setting of rhabdomyolysis.  HLH could be contributing.  -Continue to trend LFTs, continues to trend down.  -Continue to trend CPK.    Hyperkalemia- (present on admission)  Assessment & Plan  - Likely related to renal failure.  Potassium has normalized.  Continue hemodialysis.  Monitor potassium.  BMP in the morning.    Leukocytosis- (present on admission)  Assessment & Plan  -Concerning for HLH.  -s/p bone marrow biopsy 5/6/2024. Follow pathology.   -No signs of sepsis or infection.  Holding off on further antibiotics.  Has had 5 days of antibiotics in the hospital.  -Trend WBC count.    Hyperlipidemia- (present on admission)  Assessment & Plan  - Hold off on statin due to rhabdomyolysis.    History of stroke- (present on admission)  Assessment &  Plan  -With chronic aphasia.  Appears at baseline.  Monitor.    Severe mitral regurgitation- (present on admission)  Assessment & Plan  -Per echo.  Management as above.         VTE prophylaxis: SCD    My total time spent caring for the patient on the day of the encounter was 51 minutes. This does not include time spent on separately billable procedures/tests.

## 2024-05-07 LAB
ALBUMIN SERPL BCP-MCNC: 3.6 G/DL (ref 3.2–4.9)
ALBUMIN/GLOB SERPL: 1.1 G/DL
ALP SERPL-CCNC: 149 U/L (ref 30–99)
ALT SERPL-CCNC: 323 U/L (ref 2–50)
ANION GAP SERPL CALC-SCNC: 21 MMOL/L (ref 7–16)
APTT PPP: 27.7 SEC (ref 24.7–36)
AST SERPL-CCNC: 65 U/L (ref 12–45)
BACTERIA BLD CULT: NORMAL
BACTERIA BLD CULT: NORMAL
BARCODED ABORH UBTYP: 5100
BARCODED ABORH UBTYP: 6200
BARCODED PRD CODE UBPRD: NORMAL
BARCODED PRD CODE UBPRD: NORMAL
BARCODED UNIT NUM UBUNT: NORMAL
BARCODED UNIT NUM UBUNT: NORMAL
BILIRUB SERPL-MCNC: 1 MG/DL (ref 0.1–1.5)
BUN SERPL-MCNC: 40 MG/DL (ref 8–22)
CALCIUM ALBUM COR SERPL-MCNC: 7.9 MG/DL (ref 8.5–10.5)
CALCIUM SERPL-MCNC: 7.6 MG/DL (ref 8.5–10.5)
CHLORIDE SERPL-SCNC: 94 MMOL/L (ref 96–112)
CK SERPL-CCNC: 1072 U/L (ref 0–154)
CO2 SERPL-SCNC: 20 MMOL/L (ref 20–33)
COMPONENT CT 8504CT: NORMAL
COMPONENT CT 8504CT: NORMAL
CREAT SERPL-MCNC: 3.69 MG/DL (ref 0.5–1.4)
ERYTHROCYTE [DISTWIDTH] IN BLOOD BY AUTOMATED COUNT: 52.5 FL (ref 35.9–50)
FIBRINOGEN PPP-MCNC: 88 MG/DL (ref 215–460)
GFR SERPLBLD CREATININE-BSD FMLA CKD-EPI: 13 ML/MIN/1.73 M 2
GLOBULIN SER CALC-MCNC: 3.4 G/DL (ref 1.9–3.5)
GLUCOSE SERPL-MCNC: 135 MG/DL (ref 65–99)
HCT VFR BLD AUTO: 29.3 % (ref 37–47)
HGB BLD-MCNC: 9.1 G/DL (ref 12–16)
IGA SERPL-MCNC: 271 MG/DL (ref 68–408)
IGG SERPL-MCNC: 2022 MG/DL (ref 768–1632)
IGM SERPL-MCNC: 39 MG/DL (ref 35–263)
INR PPP: 2 (ref 0.87–1.13)
MCH RBC QN AUTO: 27.8 PG (ref 27–33)
MCHC RBC AUTO-ENTMCNC: 31.1 G/DL (ref 32.2–35.5)
MCV RBC AUTO: 89.6 FL (ref 81.4–97.8)
MYELOPEROXIDASE AB SER-ACNC: 0 AU/ML (ref 0–19)
NUCLEAR IGG SER QL IA: NORMAL
PHOSPHATE SERPL-MCNC: 5.7 MG/DL (ref 2.5–4.5)
PLATELET # BLD AUTO: 57 K/UL (ref 164–446)
PLATELETS.RETICULATED NFR BLD AUTO: 15.8 % (ref 0.6–13.1)
POTASSIUM SERPL-SCNC: 4.8 MMOL/L (ref 3.6–5.5)
PRODUCT TYPE UPROD: NORMAL
PRODUCT TYPE UPROD: NORMAL
PROT SERPL-MCNC: 7 G/DL (ref 6–8.2)
PROTEINASE3 AB SER-ACNC: 6 AU/ML (ref 0–19)
PROTHROMBIN TIME: 23 SEC (ref 12–14.6)
RBC # BLD AUTO: 3.27 M/UL (ref 4.2–5.4)
SIGNIFICANT IND 70042: NORMAL
SIGNIFICANT IND 70042: NORMAL
SITE SITE: NORMAL
SITE SITE: NORMAL
SODIUM SERPL-SCNC: 135 MMOL/L (ref 135–145)
SOURCE SOURCE: NORMAL
SOURCE SOURCE: NORMAL
UNIT STATUS USTAT: NORMAL
UNIT STATUS USTAT: NORMAL
WBC # BLD AUTO: 13.4 K/UL (ref 4.8–10.8)

## 2024-05-07 PROCEDURE — 99233 SBSQ HOSP IP/OBS HIGH 50: CPT | Performed by: INTERNAL MEDICINE

## 2024-05-07 RX ADMIN — METOPROLOL SUCCINATE 12.5 MG: 25 TABLET, FILM COATED, EXTENDED RELEASE ORAL at 05:49

## 2024-05-07 RX ADMIN — MUPIROCIN 1 APPLICATION: 20 OINTMENT TOPICAL at 05:50

## 2024-05-07 RX ADMIN — MUPIROCIN 1 DOSE: 20 OINTMENT TOPICAL at 17:27

## 2024-05-07 RX ADMIN — PRAMIPEXOLE DIHYDROCHLORIDE 0.12 MG: 0.12 TABLET ORAL at 08:31

## 2024-05-07 RX ADMIN — DEXAMETHASONE 6 MG: 4 TABLET ORAL at 05:49

## 2024-05-07 RX ADMIN — ISOSORBIDE MONONITRATE 30 MG: 30 TABLET, EXTENDED RELEASE ORAL at 05:49

## 2024-05-07 RX ADMIN — VENLAFAXINE HYDROCHLORIDE 37.5 MG: 37.5 CAPSULE, EXTENDED RELEASE ORAL at 05:48

## 2024-05-07 RX ADMIN — DEXAMETHASONE 6 MG: 4 TABLET ORAL at 14:00

## 2024-05-07 RX ADMIN — HYDRALAZINE HYDROCHLORIDE 25 MG: 50 TABLET ORAL at 14:49

## 2024-05-07 RX ADMIN — PRAMIPEXOLE DIHYDROCHLORIDE 0.12 MG: 0.12 TABLET ORAL at 14:49

## 2024-05-07 RX ADMIN — HYDRALAZINE HYDROCHLORIDE 25 MG: 50 TABLET ORAL at 05:49

## 2024-05-07 RX ADMIN — PRAMIPEXOLE DIHYDROCHLORIDE 0.12 MG: 0.12 TABLET ORAL at 20:13

## 2024-05-07 RX ADMIN — HYDRALAZINE HYDROCHLORIDE 25 MG: 50 TABLET ORAL at 20:13

## 2024-05-07 RX ADMIN — OXYCODONE 5 MG: 5 TABLET ORAL at 17:28

## 2024-05-07 RX ADMIN — DEXAMETHASONE 6 MG: 4 TABLET ORAL at 20:12

## 2024-05-07 ASSESSMENT — COGNITIVE AND FUNCTIONAL STATUS - GENERAL
DAILY ACTIVITIY SCORE: 16
DRESSING REGULAR LOWER BODY CLOTHING: A LOT
EATING MEALS: A LITTLE
SUGGESTED CMS G CODE MODIFIER MOBILITY: CK
CLIMB 3 TO 5 STEPS WITH RAILING: TOTAL
WALKING IN HOSPITAL ROOM: TOTAL
SUGGESTED CMS G CODE MODIFIER DAILY ACTIVITY: CK
MOVING TO AND FROM BED TO CHAIR: A LITTLE
HELP NEEDED FOR BATHING: A LOT
PERSONAL GROOMING: A LITTLE
TOILETING: A LITTLE
DRESSING REGULAR UPPER BODY CLOTHING: A LITTLE
MOBILITY SCORE: 15
STANDING UP FROM CHAIR USING ARMS: A LOT

## 2024-05-07 ASSESSMENT — ENCOUNTER SYMPTOMS
DIAPHORESIS: 0
INSOMNIA: 0
NEUROLOGICAL NEGATIVE: 1
PND: 0
ORTHOPNEA: 0
RESPIRATORY NEGATIVE: 1
SPEECH CHANGE: 1
CONSTIPATION: 0
FEVER: 0
VOMITING: 0
ABDOMINAL PAIN: 0
DIZZINESS: 0
MEMORY LOSS: 0
FOCAL WEAKNESS: 0
NAUSEA: 0
MUSCULOSKELETAL NEGATIVE: 1
MYALGIAS: 0
COUGH: 0
GASTROINTESTINAL NEGATIVE: 1
SHORTNESS OF BREATH: 0
WEIGHT LOSS: 0
HEADACHES: 0
DEPRESSION: 0
CONSTITUTIONAL NEGATIVE: 1
CARDIOVASCULAR NEGATIVE: 1
EYES NEGATIVE: 1
BLOOD IN STOOL: 0
BRUISES/BLEEDS EASILY: 0
CHILLS: 0
DIARRHEA: 0
PSYCHIATRIC NEGATIVE: 1
PALPITATIONS: 0

## 2024-05-07 ASSESSMENT — PAIN DESCRIPTION - PAIN TYPE
TYPE: ACUTE PAIN

## 2024-05-07 ASSESSMENT — FIBROSIS 4 INDEX: FIB4 SCORE: 3.87

## 2024-05-07 NOTE — CARE PLAN
The patient is Stable - Low risk of patient condition declining or worsening    Shift Goals  Clinical Goals: Monitor VS/Labs, NPO @ midnight  Patient Goals: Comfort  Family Goals: Safety, good lab results    Progress made toward(s) clinical / shift goals:    Problem: Skin Integrity  Goal: Skin integrity is maintained or improved  Outcome: Progressing     Problem: Hemodynamics  Goal: Patient's hemodynamics, fluid balance and neurologic status will be stable or improve  Outcome: Progressing       Patient is not progressing towards the following goals:

## 2024-05-07 NOTE — PROGRESS NOTES
"Hematology/Oncology Progress Note    Primary Hematologist/Oncologist: Miguelina    Oncology History:  4/27/2024: Presented to hospital with 10 days of flulike symptoms, abdominal pain, and found to have rhinovirus and enterovirus infections.  Rapidly deteriorated with LFTs rising to AST/ALT 2739/1024 along with acute renal failure requiring dialysis.  She also had decompensation of her congestive heart failure requiring transfer to the ICU for dobutamine drip.  Hemoglobin precipitously dropped to 8.3 and platelets also dropped to 57 along with a new leukocytosis.  Her workup was also not notable for normal triglycerides but a ferritin of 14,244.  CT CAP demonstrated no hepatosplenomegaly.    Interval History:  She has recovered from her bone marrow biopsy. She feels very tired and poorly overall.    Review of Systems:  Review of Systems   Constitutional: Negative.  Negative for chills, diaphoresis, fever, malaise/fatigue and weight loss.   HENT: Negative.     Eyes: Negative.    Respiratory: Negative.  Negative for cough.    Cardiovascular: Negative.  Negative for chest pain, palpitations, orthopnea, leg swelling and PND.   Gastrointestinal: Negative.  Negative for abdominal pain, blood in stool, constipation, diarrhea, melena, nausea and vomiting.   Genitourinary: Negative.  Negative for dysuria, frequency, hematuria and urgency.   Musculoskeletal: Negative.  Negative for myalgias.   Skin: Negative.    Neurological: Negative.  Negative for dizziness, focal weakness and headaches.   Endo/Heme/Allergies: Negative.  Does not bruise/bleed easily.   Psychiatric/Behavioral: Negative.  Negative for depression and memory loss. The patient does not have insomnia.         Vitals:     /72   Pulse 63   Temp 36 °C (96.8 °F) (Temporal)   Resp 16   Ht 1.651 m (5' 5\")   Wt 48.4 kg (106 lb 11.2 oz)   SpO2 96%   BMI 17.76 kg/m²     Physical Exam:  Physical Exam  Vitals and nursing note reviewed.   Constitutional:       " General: She is not in acute distress.     Appearance: She is not ill-appearing or toxic-appearing.   HENT:      Head: Normocephalic and atraumatic.      Mouth/Throat:      Mouth: Mucous membranes are moist.   Eyes:      Pupils: Pupils are equal, round, and reactive to light.   Cardiovascular:      Rate and Rhythm: Normal rate and regular rhythm.      Pulses: Normal pulses.      Heart sounds: Normal heart sounds. No murmur heard.     No friction rub. No gallop.   Pulmonary:      Effort: Pulmonary effort is normal. No respiratory distress.      Breath sounds: Normal breath sounds. No stridor. No wheezing, rhonchi or rales.   Chest:      Chest wall: No tenderness.   Abdominal:      General: Abdomen is flat. Bowel sounds are normal.      Palpations: Abdomen is soft.   Musculoskeletal:         General: No swelling. Normal range of motion.      Cervical back: Normal range of motion. No rigidity.      Right lower leg: No edema.   Skin:     General: Skin is warm and dry.      Capillary Refill: Capillary refill takes 2 to 3 seconds.      Coloration: Skin is not jaundiced.   Neurological:      General: No focal deficit present.      Mental Status: She is alert and oriented to person, place, and time. Mental status is at baseline.      Cranial Nerves: No cranial nerve deficit.   Psychiatric:         Mood and Affect: Mood normal.          Assessment and Plan:    Concern for HLH  Primary HLH (a true hematologic neoplasm) or secondary HLH (inflammatory dysregulation leading to cytokine storm caused by a separate nonhematologic etiology) are both on the differential.  While we are awaiting bone marrow biopsy to evaluate for true hemophagocytosis, the patient does meet modified HLH-94 criteria including 3 of 4 clinical findings (fever, cytopenias, hepatitis) and at least 1 marker of immune over-activation (increased ferritin, hypofibrinogenemia).    Bone marrow biopsy preliminary report has some scant features of HLH. In order to  further help clarify the diagnosis, pathology is recommending consideration of a liver or shweta biopsy for tissue correlation. She is currently coagulopathic and this will need to be temporized before either procedure can be done.    I will also send for soluble IL-2 receptor levels via [facility].     While we await bone marrow biopsy, I recommend continuing dexamethasone 6mg q6h. We can decrease to 10mg/mg (16mg dex) after one week.     Acute renal failure  Nephrology following.  On hemodialysis.  Remains oliguric.    Coagulopathy  Secondary to liver dysfunction.  Transfuse cryoprecipitate to maintain fibrinogen greater than 150    Elevated LFTs  Improving following dialysis.  Suspect some level of congestive hepatopathy.    Heart failure with reduced ejection fraction  Drop in EF to 15% from previously 35%.  Required a dobutamine drip this admission.  Medicine currently optimizing GDMT.    Rhabdomyolysis  CPK greater than 5000.  Nephrology on board.  Underlying etiology is unclear, potentially secondary to HLH.    History of stroke  Chronic aphasia.  Neurologic status at baseline.    Thank you for allowing me to participate in her care. Please do not hesitate to reach out with any questions.    Please note that this dictation was created using voice recognition software. I have made every reasonable attempt to correct obvious errors, but I expect that there are errors of grammar and possibly content that I did not discover before finalizing the note.      Zachariah Fay MD  Hematologist/Oncologist  [facility] [employer]

## 2024-05-07 NOTE — PROGRESS NOTES
Bedside report received from off going RN/tech: Young assumed care of patient.   Overnight Events: Per night Rn pt received 1 unit of Cryo. Npo sips with meds since midnight for possible renal biopsy today.   A&O x 2-3 waxes and wanes (back to neuro baseline per MD notes) chronic aphasia  Oxygen: Room Air  SBP Ranged: 1 teen's-120's  Patient on cardiac monitor: No  pt is medical   IVF/IV medications: Not Applicable   Fall Risk Score: High Fall Risk   Fall risk interventions in place: Place yellow fall risk ID band on patient, Provide patient/family education based on risk assessment, Educate patient/family to call staff for assistance when getting out of bed, Place fall precaution signage outside patient door, Place patient in room close to nursing station, Utilize bed/chair fall alarm, Notify charge of high risk for huddle, and Bed alarm connected correctly  Bed type: Regular (Manolo Score less than 17 interventions in place)  Bedside sitter: Not Applicable   Isolation: Isolation precautions in place ( droplet/ contact ) for respiratory enterovirus

## 2024-05-07 NOTE — THERAPY
Physical Therapy Contact Note    Patient Name: Fouzia Santamaria  Age:  63 y.o., Sex:  female  Medical Record #: [id number]  Today's Date: 5/6/2024 05/06/24 1005   Initial Contact Note    Initial Contact Note Order Received and Verified, Physical Therapy Evaluation in Progress with Full Report to Follow.   Interdisciplinary Plan of Care Collaboration   Collaboration Comments PT re-evaluation orders received. Pt was evaluated by PT on 4/30 and was determined to be at her functional baseline. Pt has 24/7 support and assistance from spouse and family at baseline. Pt currently off floor for bone marrow biopsy. Will follow up as able.     Nataliya Garcia, PT, DPT

## 2024-05-07 NOTE — PROGRESS NOTES
[facility] -  PROGRESS NOTE               Author: Sarah Ortiz D.O. Date & Time: 5/7/2024  8:17 AM     CC: abd pain, n/v    HISTORY OF PRESENT ILLNESS:    63 y.o. female with history of systolic and diastolic heart failure with EF of 15% (HFrEF) grade 2 diastolic dysfunction, severe MVR, CAD with h/o STEMI s/p DEWEY to LAD, PAD with aortofemoral bypass surgery who presented 4/27/2024 with increasing shortness of breath admitted for community acquired PNA and severe constipation with hosp course complicated by exacerbation of her HFrEF, medications augmented which was followed by IVON, worsening hepatopathy concerning for cardiorenal etiology.   She was then transferred to ICU 5/01 and initiated on dobutamine drip for organ perfusion.   Patient continues to be oliguric with UOP of 86 yesterday and 10 ml today so far.   Nephrology has been consulted for concerns of IVON , cardiorenal syndrome.     DAILY NEPHROLOGY SUMMARY:  05/02: Examined at bedside. Looks like she wants to talk but it is difficult for her to find appropriate words.   Dobutamine ggt held per critical care today.   Labs yesterday with fibrinogen 91, cryoppt administered followed by dialysis   1 L ultrafiltration   Uptrending WBCs since 4/30, 18.4 today  Scr improved to 3.03 after dialysis   Down trending AST/ALT   CT scan without bowel obs, extensive calcification of distal thoracic/upper abd aorta. Left renal cyst.   5/4: Pt transferred out of ICU, now with increased CPK and climbing  5/5: Pt still anuric, HD yesterday  5/6: minimal UOP, SBP 90s-140s, platelets stable at 61,000, CPK trended down to 2314, BM biopsy planned for today, patient denies CP/SOB  5/7: minimal UOP, tolerated HD, SBP 110s-120s, fibrinogen 88 and patient provided with 1u cryo, BM biopsy done, platelets 57,000, no new c/o    REVIEW OF SYSTEMS:    ROS limited due to mental acuity, Chronic expressive aphasia.     PAST MEDICAL/SURGICAL/FAMILY/SOCIAL  "HISTORY:  Reviewed and documented in chart     HOME MEDICATIONS:   - Reviewed and documented in chart    LABORATORY STUDIES:   - Reviewed and documented in chart    ALLERGIES:  Pcn [penicillins] and Toradol    VS:  /79   Pulse 66   Temp 36.3 °C (97.3 °F) (Temporal)   Resp 16   Ht 1.651 m (5' 5\")   Wt 48.4 kg (106 lb 11.2 oz)   SpO2 96%   BMI 17.76 kg/m²   Physical Exam  Vitals and nursing note reviewed.   Constitutional:       General: She is not in acute distress.     Appearance: She is ill-appearing.   HENT:      Head: Normocephalic and atraumatic.      Right Ear: External ear normal.      Left Ear: External ear normal.      Nose: Nose normal.      Mouth/Throat:      Mouth: Mucous membranes are dry.   Eyes:      General:         Right eye: No discharge.         Left eye: No discharge.      Extraocular Movements: Extraocular movements intact.   Neck:      Comments: Right IJ trialysis catheter   Cardiovascular:      Rate and Rhythm: Normal rate and regular rhythm.      Pulses: Normal pulses.   Pulmonary:      Effort: Pulmonary effort is normal. No respiratory distress.   Abdominal:      General: There is no distension.      Palpations: Abdomen is soft.   Musculoskeletal:         General: Swelling present.      Comments: Swelling b/l arms    Skin:     General: Skin is warm.      Capillary Refill: Capillary refill takes less than 2 seconds.      Findings: Bruising present.      Comments: Excoriations on extremities    Neurological:      Mental Status: She is alert.      Comments: Chronic expressive aphasia   Answers some questions but is not able to answer orientation questions   Psychiatric:         Mood and Affect: Mood normal.         Behavior: Behavior normal.         FLUID BALANCE:  In: 1011 [P.O.:240; Blood:271; Dialysis:500]  Out: 1550     LABS:  Recent Labs     05/05/24  0645 05/06/24  0138 05/07/24  0133   SODIUM 134* 129* 135   POTASSIUM 4.6 4.4 4.8   CHLORIDE 94* 88* 94*   CO2 20 20 20 "   GLUCOSE 151* 210* 135*   BUN 48* 61* 40*   CREATININE 4.07* 5.35* 3.69*   CALCIUM 7.7* 7.3* 7.6*        IMAGING:  - Imaging studies reviewed by me      IMPRESSION:     # Acute kidney injury multifactorial--Decreased perfusion, HLH, rhabdo  - unlikley TTP  - oliguric  - HD 5/2 1st  - C3 and C4 low, ARLEEN pending, ANCA pending  - hepatitis negative  - PCR and ACR pending (minimal UOP)     # ? 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 ongoing  - steroids  -Bone Marrow 5/6    # Hepatic congestion      #Rhabdo unclear etiology, difficult to give fluids in face of reduced EF     # Heart failure with reduced ejection fraction      # Coagulopathy   - Fibinogen monitored, cryo when <100     # Transamnitis  - Improving       # Severe mitral regurgitation      # Peripheral vascular disease with prior aortic thrombus s/p aortofemoral bypass     # Constipation      # Impacted stool in intestine   - Resolved      # Colitis      # Leukocytosis      # Prior CVA    - Chronic expressive aphasia     # Essential hypertension    - goal <140/90, at goal        PLAN:  - HD qMWF and PRN for now  - Daily evaluation of RRT needs  - steroids per primary service/hematology  - Bone marrow 5/6, f/u findings  - renal biopsy ordered, though likely will only be obtained with improved platelets/coagulation status  - Avoid nephrotoxins, NSAIDs  - Renally dose meds   - daily labs  - strict I/O    Further recs to follow    Other management per primary service

## 2024-05-07 NOTE — PROGRESS NOTES
NOC CROSSCOVER      Notified of critical fibrinogen level of 88. Per Hematology note and Primary teams note pt high risk for spontaneous bleeding if <100. 1 Unit cryoprecipitate ordered for patient.

## 2024-05-07 NOTE — THERAPY
Speech Language Pathology   Daily Treatment     Patient Name: Fouzia Santamaria  AGE:  63 y.o., SEX:  female  Medical Record #: [id number]  Date of Service: 5/7/2024      Precautions:  Precautions: Fall Risk, Swallow Precautions       Subjective  Pt and family agreeable and cooperative with SLP tx tasks. Pt has aphasia at baseline, using Y/N to indicate wants/needs. Family at bedside participated in discussion with SLP.       Assessment  Pt seen for dysphagia management. Repositioned to midline by RN. Trials of thin liquids via straw provided; pt w/ adequate self-feeding. Appropriate labial seal for straw suction and anterior containment. No overt s/sx of aspiration noted w/ single and sequential sips of TN0. No increase in WOB, no cough/clear, no change in vocal quality. One to two swallows appreciated per bolus.     Reviewed FEES findings at length with family including absence of visualized airway invasion, functional, pharyngeal swallow, and findings of retrograde flow into the pharynx. Discussed risk for ascending aspiration and PNA as well as general aspiration and reflux precautions to minimize risk. Pt's family verbalized understanding and agreement; no questions.       Clinical Impressions  Per FEES completed 5/3, patient presents with a functional oropharyngeal swallow with suspected esophogeal dysphagia and risk for ascending aspiration. Education provided to family with appropriate demonstrated understanding. Please continue RG/TN with reflux precautions. SLP will no longer follow; please re-consult with change in patient status or concern for worsening dysphagia.       Recommendations  Regular solids with thin liquids  Instrumentation: None indicated at this time  Medication: As tolerated  Supervision: Assist with meal tray set up  Positioning: Fully upright and midline during oral intake, Remain upright for 30-45 minutes after oral intake, Meals sitting upright in a chair, as tolerated  Oral Care:  "BID      SLP Treatment Plan  Treatment Plan: None  SLP Frequency: Discharge 2 goals met)       Anticipated Discharge Needs  Discharge Recommendations: Anticipate that the patient will have no further speech therapy needs after discharge from the hospital  Therapy Recommendations Upon DC: Not Indicated      Patient / Family Goals  Patient / Family Goal #1: \"She might have some trouble swallowing\" - pt's   Goal #1 Outcome: Goal met  Short Term Goals  Short Term Goal # 1: Pt will participate in a FEES to further assess swallow function  Goal Outcome # 1: Goal met  Short Term Goal # 2: Pt will consume a regular/thin liquid diet with no overt s/sx of aspiration  Goal Outcome # 2 : Goal met      SIERRA Craven  "

## 2024-05-07 NOTE — PROGRESS NOTES
Hospital Medicine Daily Progress Note    Date of Service  5/7/2024    Chief Complaint  abdominal pain    Hospital Course  Fouzia Santamaria is a 63 y.o. female with HFrEF (EF 35%), severe PAD status post aortic femoral bypass coronary disease status post LAD stent, COPD, diabetes, hypertension, and previous strokes, who was initially seen in the ED with abdominal pain for 10 days and subsequently discharged to home, who again presented with 2 days of bodyaches as well as worsening constipation, nausea and vomiting.  Workup showed leukocytosis with white count of 15,100 with left shift.  Chest x-ray showed mild pulmonary edema.  Abdominal x-ray showed significant amount of stool burden.  Echocardiogram was obtained which revealed that ejection fraction now down to 15% with severe mitral regurgitation.  She also had acute kidney injury and likely cardiorenal syndrome.  Cardiology and nephrology were consulted.  She was placed on a dobutamine drip.  Her renal function worsened and she required a right-sided dialysis catheter with initiation of dialysis.  Her liver enzymes went up to AST of 2959 and ALT of 1377.  She also had significant drop in both hemoglobin and platelet count, with worsening leukocytosis.  LDH was 1418, fibrinogen 91, ferritin 7333 with elevated PT, PTT and INR.  Imaging of the abdomen showed mild hepatomegaly with no splenomegaly.  Hematology/oncology was consulted for concerns for hemophagocytic lymphohistiocytosis (HLH) with low suspicion/probability for TTP or HIT. She was started on Decadron.  She had low fibrinogen for which she was given cryoprecipitate.  She is scheduled for bone marrow biopsy for 5/6/24.  Nephrology contemplating on kidney biopsy.    Interval Problem Update  5/6/2024 - I reviewed the patient's chart. There were no significant overnight events. Remains hemodynamically stable and afebrile. Stable on RA.  WBC 15,300.  Hemoglobin is stable.  Platelet count 61,000.  Creatinine  5.35.  Sodium 129.  Potassium is normal.  CPK improved to 2314.  INR 1.76, APTT 29.4.  Complement levels low including C3, C4.  Ferritin 2435.  Fibrinogen 118.  AST improved to 99, , alkaline phosphatase 155, normal bilirubin.  Maintaining sinus rhythm on telemetry.  She had a bone marrow biopsy this morning.  > I have personally seen and examined the patient today.  No complaints of pain.  No nausea or vomiting.  Not in respiratory distress.   at bedside, discussed plan of care.    5/7 Vitals stable on room air   WBC 13.4, Hb 9.1, plt 57   CPK 1072  INR 2, PT 23, fibrinogen 88.   -1 unit cyroprecipitate. Per oncology goal is fibrinogen >150, ok to check daily  Discussed with IR cannot do renal biopsy due to elevated INR, must be <1.8   Discussed with oncology, pathology recommended lymph node or liver biopsy for comparison for bone marrow as concern for HLH. Discussed with Dr. Longoria who approved biopsy.   Patient disoriented, main complaint is that she is hungry. Denies chest pain or dyspnea.    I have discussed this patient's plan of care and discharge plan at IDT rounds today with Case Management, Nursing, Nursing leadership, and other members of the IDT team.      Consultants/Specialty  Hematology  Critical care  Nephrology  Cardiology  Palliative care     Code Status  Full Code    Disposition  The patient is not medically cleared for discharge to home or a post-acute facility.      Discharge plan TBD.  6 clicks 14 and 16.  OT recommends home health.  Pending PT evaluation.  May need outpatient hemodialysis chair placement.  I have placed the appropriate orders for post-discharge needs.    Review of Systems  Review of Systems   Unable to perform ROS: Medical condition   Constitutional:  Positive for malaise/fatigue.   Respiratory:  Negative for shortness of breath.    Cardiovascular:  Negative for chest pain.   Neurological:  Positive for speech change.        Physical Exam  Temp:  [36 °C (96.8  °F)-36.4 °C (97.5 °F)] 36 °C (96.8 °F)  Pulse:  [57-72] 63  Resp:  [14-18] 16  BP: (101-135)/(72-91) 101/72  SpO2:  [94 %-99 %] 96 %    Physical Exam  Vitals reviewed.   Constitutional:       General: She is not in acute distress.     Appearance: She is ill-appearing.   HENT:      Head: Normocephalic and atraumatic.   Eyes:      Conjunctiva/sclera: Conjunctivae normal.   Neck:      Comments: Right IJ dialysis cath  Cardiovascular:      Rate and Rhythm: Normal rate and regular rhythm.      Heart sounds: Normal heart sounds.   Pulmonary:      Effort: Pulmonary effort is normal. No respiratory distress.      Breath sounds: Normal breath sounds. No wheezing.   Abdominal:      General: There is no distension.      Palpations: Abdomen is soft.      Tenderness: There is no abdominal tenderness. There is no guarding.   Musculoskeletal:         General: No swelling. Normal range of motion.      Cervical back: Normal range of motion and neck supple. No muscular tenderness.      Right lower leg: No edema.      Left lower leg: No edema.   Skin:     General: Skin is warm and dry.      Coloration: Skin is not jaundiced.      Findings: Bruising and rash present.      Comments: Small scabs right forearm and bilat feet  (+) petechial rashes   Neurological:      General: No focal deficit present.      Mental Status: She is alert.      Cranial Nerves: No cranial nerve deficit.      Comments: She moves her extremities equally  (+) Expressive aphasia   Psychiatric:      Comments: Unable to assess due to aphasia           Fluids    Intake/Output Summary (Last 24 hours) at 5/7/2024 1511  Last data filed at 5/7/2024 1436  Gross per 24 hour   Intake 1611 ml   Output 1550 ml   Net 61 ml       Laboratory  Recent Labs     05/05/24  0645 05/06/24  0139 05/07/24  0133   WBC 14.4* 15.3* 13.4*   RBC 3.06* 3.07* 3.27*   HEMOGLOBIN 8.4* 8.4* 9.1*   HEMATOCRIT 26.3* 28.8* 29.3*   MCV 85.9 93.8 89.6   MCH 27.5 27.4 27.8   MCHC 31.9* 29.2* 31.1*   RDW  51.5* 56.2* 52.5*   PLATELETCT 61* 61* 57*     Recent Labs     05/05/24  0645 05/06/24  0138 05/07/24  0133   SODIUM 134* 129* 135   POTASSIUM 4.6 4.4 4.8   CHLORIDE 94* 88* 94*   CO2 20 20 20   GLUCOSE 151* 210* 135*   BUN 48* 61* 40*   CREATININE 4.07* 5.35* 3.69*   CALCIUM 7.7* 7.3* 7.6*     Recent Labs     05/05/24  0645 05/06/24  0138 05/07/24 0133   APTT 28.9 29.4 27.7   INR 2.12* 1.76* 2.00*                 Imaging  DX-CHEST-FOR LINE PLACEMENT Perform procedure in: Other(comment f6 below):   Final Result      1.  Right IJ catheter has been placed and the tip projects appropriately over the superior vena cava.      2.  Cardiomegaly with evidence of mild fluid overload.      CT-ABDOMEN-PELVIS W/O   Final Result      1.  No evidence of bowel obstruction or focal inflammatory change.      2.  Again seen extensive atherosclerotic vascular calcification with distal thoracic/upper abdominal aortic ectasia measuring approximately 3.2 cm in diameter.      3.  Gallstones within the gallbladder.      4.  Hyperdense left renal cyst likely representing hemorrhagic or milk of calcium within a cyst.      5.  Small amount of free fluid dependently within the pelvis.      DX-CHEST-PORTABLE (1 VIEW)   Final Result      Cardiomegaly.      US-ABDOMEN COMPLETE SURVEY   Final Result      1.  Mild hepatomegaly.      2.  Sludge noted in the gallbladder.      3.  Small echogenic foci in the periphery of the renal collecting systems bilaterally suspicious for early nephrolithiasis.      4.  Bilateral renal cysts.         DX-CHEST-LIMITED (1 VIEW)   Final Result         1.  No acute cardiopulmonary disease.   2.  Atherosclerosis      EC-ECHOCARDIOGRAM COMPLETE W/ CONT   Final Result      DX-CHEST-LIMITED (1 VIEW)   Final Result      1.  Enlarged cardiac silhouette with changes of pulmonary edema.      [id number] VIEW   Final Result      1.  Nonobstructive bowel gas pattern with a large amount of colonic stool.      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  Imaging of the abdomen showed mild hepatomegaly with no splenomegaly.    - Hematology/oncology was consulted for concerns for hemophagocytic lymphohistiocytosis (HLH) with low suspicion/probability for TTP or HIT.   -Continue Decadron.  -S/p bone marrow biopsy 5/6/24.  Follow pathology.   -Monitor for coagulopathy.  Daily fibrinogen, PT/PTT.  Transfused with cryoprecipitate if fibrinogen less than 150.  Oncology recommended liver or lymph node biopsy   -IR consulted for biopsy       Hyperlipidemia- (present on admission)  Assessment & Plan  - Hold off on statin due to rhabdomyolysis.    Cardiogenic shock (HCC)- (present on admission)  Assessment & Plan  -In the setting of low ejection fraction of 15%.  Required dobutamine drip.  Shock resolved.  -Management of HFrEF as above.    History of stroke- (present on admission)  Assessment & Plan  -With chronic aphasia.  Appears at baseline.  Monitor.    Rhabdomyolysis- (present on admission)  Assessment & Plan  -CPK was over 5,000. May be due to hemophagocytic lymphohistiocytosis.  -Holding off on IV fluids given IVON/dialysis dependent.  -CPK continues to trend down.  Continue to monitor.  CPK levels in the morning.    Thrombocytopenia (HCC)- (present on admission)  Assessment & Plan  - In setting of HLH, thrombocytopenia.  Low suspicion/probability for TTP or HIT.  -Continue Decadron.  -Continue to monitor platelet counts.  Restrictive transfusion strategy.  Watch for bleeding.    Coagulopathy (HCC)- (present on admission)  Assessment & Plan  - In setting of HLH, thrombocytopenia.  Low suspicion/probability for TTP or HIT.  Fibrinogen low.  -Continue Decadron.  -s/p bone marrow biopsy 5/6.  -Monitor PT/PTT/INR  and daily fibrinogen.  Watch for bleeding.  -Follow fibrinogen, transfuse with cryoprecipitate if </= 100.  She gets spontaneous bleeding with low fibrinogen levels.    IVON (acute kidney injury) (HCC)- (present on admission)  Assessment & Plan  - Could be cardiorenal in setting of HFrEF, along with possible HLH.  -Now on hemodialysis.  -Nephrology following.  -Monitor electrolytes closely.  -If continues to require hemodialysis, will need outpatient hemodialysis chair arrangements.  -IR consult for kidney biopsy.    Transaminitis  Assessment & Plan  -Likely congestive hepatopathy due to HFrEF.  Also in setting of rhabdomyolysis.  HLH could be contributing.  -Continue to trend LFTs, continues to trend down.  -Continue to trend CPK.    Severe mitral regurgitation- (present on admission)  Assessment & Plan  -Per echo.  Management as above.    Heart failure with reduced ejection fraction (HCC)- (present on admission)  Assessment & Plan  - Had a drop in EF to 15% from previous 35%.  Required dobutamine drip.  -Continue volume removal with hemodialysis.  -Optimize GDMT.  Unable to give ACEi/ARB/ARNI/SGLT2i given renal function.  Continue hydralazine/Imdur for afterload reduction.  Continue Toprol-XL 12.5 mg daily, adjust up as blood pressure tolerates.  Monitor blood pressure to make sure she tolerates medications.  -Monitor on telemetry.    Hyperkalemia- (present on admission)  Assessment & Plan  - Likely related to renal failure.  Potassium has normalized.  Continue hemodialysis.  Monitor potassium.  BMP in the morning.    Leukocytosis- (present on admission)  Assessment & Plan  -Concerning for HLH.  -s/p bone marrow biopsy 5/6/2024. Follow pathology.   -No signs of sepsis or infection.  Holding off on further antibiotics.  Has had 5 days of antibiotics in the hospital.  -Trend WBC count.       Total time spent in chart review, at bedside with the patient, discussing with consultants, nursing and case management: 58  minutes    VTE prophylaxis: SCD    I have performed a physical exam and reviewed and updated ROS and Plan today (5/7/2024). In review of yesterday's note (5/6/2024), there are no changes except as documented above.

## 2024-05-07 NOTE — PROGRESS NOTES
Bedside report received from off going RN: Elida, assumed care of patient.     Fall Risk Score: HIGH RISK  Fall risk interventions in place: Place yellow fall risk ID band on patient, Provide patient/family education based on risk assessment, Educate patient/family to call staff for assistance when getting out of bed, Place fall precaution signage outside patient door, Place patient in room close to nursing station, Utilize bed/chair fall alarm, and Bed alarm connected correctly  Bed type: Regular (Manolo Score less than 17 interventions in place)  Patient on cardiac monitor: Yes  IVF/IV medications: Not Applicable   Oxygen: Room Air  Bedside sitter: Not Applicable   Isolation: Isolation precautions in place; Droplet

## 2024-05-08 ENCOUNTER — APPOINTMENT (OUTPATIENT)
Dept: RADIOLOGY | Facility: MEDICAL CENTER | Age: 64
DRG: 280 | End: 2024-05-08
Attending: INTERNAL MEDICINE
Payer: MEDICAID

## 2024-05-08 LAB
ALBUMIN SERPL BCP-MCNC: 3.5 G/DL (ref 3.2–4.9)
APTT PPP: 29.2 SEC (ref 24.7–36)
BUN SERPL-MCNC: 72 MG/DL (ref 8–22)
CALCIUM ALBUM COR SERPL-MCNC: 7.6 MG/DL (ref 8.5–10.5)
CALCIUM SERPL-MCNC: 7.2 MG/DL (ref 8.5–10.5)
CHLORIDE SERPL-SCNC: 92 MMOL/L (ref 96–112)
CO2 SERPL-SCNC: 12 MMOL/L (ref 20–33)
CREAT SERPL-MCNC: 4.86 MG/DL (ref 0.5–1.4)
EKG IMPRESSION: NORMAL
ERYTHROCYTE [DISTWIDTH] IN BLOOD BY AUTOMATED COUNT: 53.4 FL (ref 35.9–50)
FIBRINOGEN PPP-MCNC: 101 MG/DL (ref 215–460)
GFR SERPLBLD CREATININE-BSD FMLA CKD-EPI: 10 ML/MIN/1.73 M 2
GLUCOSE SERPL-MCNC: 174 MG/DL (ref 65–99)
HCT VFR BLD AUTO: 33.8 % (ref 37–47)
HGB BLD-MCNC: 10.9 G/DL (ref 12–16)
INR PPP: 2.25 (ref 0.87–1.13)
MCH RBC QN AUTO: 29.3 PG (ref 27–33)
MCHC RBC AUTO-ENTMCNC: 32.2 G/DL (ref 32.2–35.5)
MCV RBC AUTO: 90.9 FL (ref 81.4–97.8)
PATHOLOGY CONSULT NOTE: NORMAL
PHOSPHATE SERPL-MCNC: 9.5 MG/DL (ref 2.5–4.5)
PLATELET # BLD AUTO: 53 K/UL (ref 164–446)
PLATELETS.RETICULATED NFR BLD AUTO: 15.3 % (ref 0.6–13.1)
POTASSIUM SERPL-SCNC: 6.2 MMOL/L (ref 3.6–5.5)
PROTHROMBIN TIME: 25.1 SEC (ref 12–14.6)
RBC # BLD AUTO: 3.72 M/UL (ref 4.2–5.4)
SODIUM SERPL-SCNC: 134 MMOL/L (ref 135–145)
WBC # BLD AUTO: 20.6 K/UL (ref 4.8–10.8)

## 2024-05-08 PROCEDURE — 93010 ELECTROCARDIOGRAM REPORT: CPT | Performed by: INTERNAL MEDICINE

## 2024-05-08 PROCEDURE — 0FB03ZX EXCISION OF LIVER, PERCUTANEOUS APPROACH, DIAGNOSTIC: ICD-10-PCS | Performed by: RADIOLOGY

## 2024-05-08 PROCEDURE — 99233 SBSQ HOSP IP/OBS HIGH 50: CPT | Performed by: INTERNAL MEDICINE

## 2024-05-08 PROCEDURE — 02HV33Z INSERTION OF INFUSION DEVICE INTO SUPERIOR VENA CAVA, PERCUTANEOUS APPROACH: ICD-10-PCS | Performed by: RADIOLOGY

## 2024-05-08 PROCEDURE — 02PY33Z REMOVAL OF INFUSION DEVICE FROM GREAT VESSEL, PERCUTANEOUS APPROACH: ICD-10-PCS | Performed by: RADIOLOGY

## 2024-05-08 RX ORDER — SODIUM CHLORIDE 9 MG/ML
500 INJECTION, SOLUTION INTRAVENOUS
Status: ACTIVE | OUTPATIENT
Start: 2024-05-08 | End: 2024-05-08

## 2024-05-08 RX ORDER — ONDANSETRON 2 MG/ML
4 INJECTION INTRAMUSCULAR; INTRAVENOUS PRN
Status: ACTIVE | OUTPATIENT
Start: 2024-05-08 | End: 2024-05-08

## 2024-05-08 RX ORDER — MIDAZOLAM HYDROCHLORIDE 1 MG/ML
.5-2 INJECTION INTRAMUSCULAR; INTRAVENOUS PRN
Status: ACTIVE | OUTPATIENT
Start: 2024-05-08 | End: 2024-05-08

## 2024-05-08 RX ORDER — HEPARIN SODIUM 1000 [USP'U]/ML
INJECTION, SOLUTION INTRAVENOUS; SUBCUTANEOUS
Status: DISPENSED
Start: 2024-05-08 | End: 2024-05-09

## 2024-05-08 RX ORDER — LORAZEPAM 2 MG/ML
1 INJECTION INTRAMUSCULAR ONCE
Status: COMPLETED | OUTPATIENT
Start: 2024-05-08 | End: 2024-05-08

## 2024-05-08 RX ORDER — LORAZEPAM 2 MG/ML
1 INJECTION INTRAMUSCULAR EVERY 4 HOURS PRN
Status: DISCONTINUED | OUTPATIENT
Start: 2024-05-08 | End: 2024-05-12

## 2024-05-08 RX ORDER — SODIUM CHLORIDE 9 MG/ML
INJECTION, SOLUTION INTRAVENOUS CONTINUOUS
Status: ACTIVE | OUTPATIENT
Start: 2024-05-08 | End: 2024-05-08

## 2024-05-08 RX ADMIN — HYDRALAZINE HYDROCHLORIDE 25 MG: 50 TABLET ORAL at 21:41

## 2024-05-08 RX ADMIN — MUPIROCIN 2 %: 20 OINTMENT TOPICAL at 04:50

## 2024-05-08 RX ADMIN — IOHEXOL 10 ML: 300 INJECTION, SOLUTION INTRAVENOUS at 17:00

## 2024-05-08 RX ADMIN — DEXAMETHASONE 6 MG: 4 TABLET ORAL at 14:35

## 2024-05-08 RX ADMIN — HYDRALAZINE HYDROCHLORIDE 25 MG: 50 TABLET ORAL at 14:36

## 2024-05-08 RX ADMIN — Medication: at 11:45

## 2024-05-08 RX ADMIN — VENLAFAXINE HYDROCHLORIDE 37.5 MG: 37.5 CAPSULE, EXTENDED RELEASE ORAL at 04:36

## 2024-05-08 RX ADMIN — OXYCODONE 5 MG: 5 TABLET ORAL at 23:32

## 2024-05-08 RX ADMIN — METOPROLOL SUCCINATE 12.5 MG: 25 TABLET, FILM COATED, EXTENDED RELEASE ORAL at 04:37

## 2024-05-08 RX ADMIN — OXYCODONE 5 MG: 5 TABLET ORAL at 10:51

## 2024-05-08 RX ADMIN — HYDRALAZINE HYDROCHLORIDE 25 MG: 50 TABLET ORAL at 04:37

## 2024-05-08 RX ADMIN — MUPIROCIN 5 ML: 20 OINTMENT TOPICAL at 17:23

## 2024-05-08 RX ADMIN — PRAMIPEXOLE DIHYDROCHLORIDE 0.12 MG: 0.12 TABLET ORAL at 21:40

## 2024-05-08 RX ADMIN — PRAMIPEXOLE DIHYDROCHLORIDE 0.12 MG: 0.12 TABLET ORAL at 17:24

## 2024-05-08 RX ADMIN — DEXAMETHASONE 6 MG: 4 TABLET ORAL at 04:37

## 2024-05-08 RX ADMIN — ISOSORBIDE MONONITRATE 30 MG: 30 TABLET, EXTENDED RELEASE ORAL at 04:37

## 2024-05-08 RX ADMIN — PRAMIPEXOLE DIHYDROCHLORIDE 0.12 MG: 0.12 TABLET ORAL at 08:10

## 2024-05-08 RX ADMIN — SODIUM BICARBONATE 50 MEQ: 84 INJECTION INTRAVENOUS at 08:10

## 2024-05-08 RX ADMIN — LORAZEPAM 1 MG: 2 INJECTION INTRAMUSCULAR; INTRAVENOUS at 11:21

## 2024-05-08 RX ADMIN — DEXAMETHASONE 6 MG: 4 TABLET ORAL at 21:40

## 2024-05-08 ASSESSMENT — FIBROSIS 4 INDEX: FIB4 SCORE: 4.16

## 2024-05-08 ASSESSMENT — ENCOUNTER SYMPTOMS
SHORTNESS OF BREATH: 0
SPEECH CHANGE: 1

## 2024-05-08 ASSESSMENT — PAIN DESCRIPTION - PAIN TYPE: TYPE: ACUTE PAIN

## 2024-05-08 NOTE — PROGRESS NOTES
[facility] Nursing Notes     HD today x 3hrs per Dr SAPNA Ortiz  HD Consent and blood signed from , via phone  Initiated at 0840 and ended 1140     Pre HD assessment     Received patient confused and agitated  No Sob. No chest pain  Noted to have productive cough  Lungs Clear. Vital Signs stable  No complains pre HD.     Edema - no edema     Access: -Right IJ catheter, Non tunneled     No s/s of infection: no redness, no tenderness, no pus, no oozing of blood, warm to touch.     Intra HD    Change to 3 Ca bath today as per advice by Dr Ortiz  No issues during HD  Vital signs stable  SELENA x 1  IV Cryoprecipitate given near end of treatment         Post HD     UF goal  achieved: 1500mLs   - 500 mLs (200mls prime + 300mls rinseback)       Target Net UF : 1000mls     Completed HD tolerated well  Post vital signs stable  Nil complaints  Dressing: dry and intact     Documented by  KARO ALFARO RN    Report given to PCN Aristeo Ward RN

## 2024-05-08 NOTE — DIETARY
Nutrition Update:    Day 11 of admit.  Fouzia Santamaria is a 63 y.o. 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  Patient being followed to optimize nutrition.    Current Diet: MM5, TN0, Ensure Plus TID    Since last RD update on 5/3, per ADLs, pt has consumed 25-50% x 4 meals and 50-75% x 3 meals for an average intake ~48%, improved from last average of 15% per 5/3 RD note. No supplement intake recorded.    Pt with ongoing anxiety and confusion, likely contributing to compromised PO intake.     SLP has cleared pt for regular diet with thin liquids.     Problem: Nutritional:  Goal: Achieve adequate nutritional intake  Description: Patient will consume >50% of meals  Outcome: progressing      RD following

## 2024-05-08 NOTE — CARE PLAN
The patient is Watcher - Medium risk of patient condition declining or worsening    Shift Goals  Clinical Goals: ABX, npo at midnight, monitor labs/vs  Patient Goals: rest  Family Goals: alan    Progress made toward(s) clinical / shift goals:    Problem: Knowledge Deficit - Standard  Goal: Patient and family/care givers will demonstrate understanding of plan of care, disease process/condition, diagnostic tests and medications  Outcome: Progressing  Note: Discuss and review POC with patient/family. Re-educate as needed. Pt educated to condition, medication, and interventions.      Problem: Fall Risk  Goal: Patient will remain free from falls  Outcome: Progressing  Note: Fall risk assessment complete. Fall precautions implemented. Bed alarm on and connected. Non-slip socks in place. Belongings within reach.        Patient is not progressing towards the following goals:

## 2024-05-08 NOTE — CARE PLAN
The patient is Watcher - Medium risk of patient condition declining or worsening    Shift Goals  Clinical Goals: Liver biopsy,  Patient Goals: rest  Family Goals: updates    Progress made toward(s) clinical / shift goals:     Problem: Skin Integrity  Goal: Skin integrity is maintained or improved  Outcome: Progressing  Note: Patient has pillows in place for support and positioning. Guerrero in place. Skin remains intact.      Problem: Fall Risk  Goal: Patient will remain free from falls  Outcome: Progressing  Note: Patient remained free from falls throughout shift. Bed locked and in lowest position, call light and belongings within reach. Room free from clutter. Family at bedside. Patient had liver biopsy done today. Tolerated well.           Patient is not progressing towards the following goals:

## 2024-05-08 NOTE — PROGRESS NOTES
Bedside report received from off going RN/tech: Carlito assumed care of patient.     Fall Risk Score: HIGH RISK  Fall risk interventions in place: Place yellow fall risk ID band on patient, Provide patient/family education based on risk assessment, Educate patient/family to call staff for assistance when getting out of bed, Place fall precaution signage outside patient door, Place patient in room close to nursing station, Utilize bed/chair fall alarm, and Bed alarm connected correctly  Bed type: Regular (Manolo Score less than 17 interventions in place)  Patient on cardiac monitor: No   IVF/IV medications: Not Applicable   Oxygen: Room Air  Bedside sitter: Not Applicable   Isolation: Isolation precautions in place

## 2024-05-08 NOTE — PROGRESS NOTES
Hospital Medicine Daily Progress Note    Date of Service  5/8/2024    Chief Complaint  abdominal pain    Hospital Course  Fouzia Santamaria is a 63 y.o. female with HFrEF (EF 35%), severe PAD status post aortic femoral bypass coronary disease status post LAD stent, COPD, diabetes, hypertension, and previous strokes, who was initially seen in the ED with abdominal pain for 10 days and subsequently discharged to home, who again presented with 2 days of bodyaches as well as worsening constipation, nausea and vomiting.  Workup showed leukocytosis with white count of 15,100 with left shift.  Chest x-ray showed mild pulmonary edema.  Abdominal x-ray showed significant amount of stool burden.  Echocardiogram was obtained which revealed that ejection fraction now down to 15% with severe mitral regurgitation.  She also had acute kidney injury and likely cardiorenal syndrome.  Cardiology and nephrology were consulted.  She was placed on a dobutamine drip.  Her renal function worsened and she required a right-sided dialysis catheter with initiation of dialysis.  Her liver enzymes went up to AST of 2959 and ALT of 1377.  She also had significant drop in both hemoglobin and platelet count, with worsening leukocytosis.  LDH was 1418, fibrinogen 91, ferritin 7333 with elevated PT, PTT and INR.  Imaging of the abdomen showed mild hepatomegaly with no splenomegaly.  Hematology/oncology was consulted for concerns for hemophagocytic lymphohistiocytosis (HLH) with low suspicion/probability for TTP or HIT. She was started on Decadron.  She had low fibrinogen for which she was given cryoprecipitate.  She is scheduled for bone marrow biopsy for 5/6/24.  Nephrology contemplating on kidney biopsy.    Interval Problem Update  5/6/2024 - I reviewed the patient's chart. There were no significant overnight events. Remains hemodynamically stable and afebrile. Stable on RA.  WBC 15,300.  Hemoglobin is stable.  Platelet count 61,000.  Creatinine  5.35.  Sodium 129.  Potassium is normal.  CPK improved to 2314.  INR 1.76, APTT 29.4.  Complement levels low including C3, C4.  Ferritin 2435.  Fibrinogen 118.  AST improved to 99, , alkaline phosphatase 155, normal bilirubin.  Maintaining sinus rhythm on telemetry.  She had a bone marrow biopsy this morning.  > I have personally seen and examined the patient today.  No complaints of pain.  No nausea or vomiting.  Not in respiratory distress.   at bedside, discussed plan of care.    5/7 Vitals stable on room air   WBC 13.4, Hb 9.1, plt 57   CPK 1072  INR 2, PT 23, fibrinogen 88.   -1 unit cyroprecipitate. Per oncology goal is fibrinogen >150, ok to check daily  Discussed with IR cannot do renal biopsy due to elevated INR, must be <1.8   Discussed with oncology, pathology recommended lymph node or liver biopsy for comparison for bone marrow as concern for HLH. Discussed with Dr. Longoria who approved biopsy.   Patient disoriented, main complaint is that she is hungry. Denies chest pain or dyspnea.    5/8 WBC 20.6, likely from IV steroids   Hb 10.9, plt 53   Potassium 6.2, CO2 12, phos 9.5  -remote cardiac monitoring  HD per nephrology today   INR 2.25, fibrinogen 101. Ordered cryo  -unable to do IR procedure today with INR and K levels   Patient very anxious moving about the bed not answering questions.   EKG ordered, reviewed patient in sinus rhythm. Qtc 515   IV ativan ordered prn for agitation.   Palliative care consulted     I have discussed this patient's plan of care and discharge plan at IDT rounds today with Case Management, Nursing, Nursing leadership, and other members of the IDT team.    Consultants/Specialty  Hematology  Critical care  Nephrology  Cardiology  Palliative care     Code Status  Full Code    Disposition  The patient is not medically cleared for discharge to home or a post-acute facility.      Discharge plan TBD.  6 clicks 14 and 16.  OT recommends home health.  Pending PT  evaluation.  May need outpatient hemodialysis chair placement.  I have placed the appropriate orders for post-discharge needs.    Review of Systems  Review of Systems   Unable to perform ROS: Medical condition   Constitutional:  Positive for malaise/fatigue.   Respiratory:  Negative for shortness of breath.    Cardiovascular:  Negative for chest pain.   Neurological:  Positive for speech change.        Physical Exam  Temp:  [36 °C (96.8 °F)-36.6 °C (97.9 °F)] 36.6 °C (97.9 °F)  Pulse:  [59-70] 70  Resp:  [16-20] 20  BP: (101-137)/(62-95) 118/62  SpO2:  [91 %-98 %] 91 %    Physical Exam  Vitals reviewed.   Constitutional:       General: She is not in acute distress.     Appearance: She is ill-appearing.   HENT:      Head: Normocephalic and atraumatic.   Eyes:      Conjunctiva/sclera: Conjunctivae normal.   Neck:      Comments: Right IJ dialysis cath  Cardiovascular:      Rate and Rhythm: Normal rate and regular rhythm.      Heart sounds: Normal heart sounds.   Pulmonary:      Effort: Pulmonary effort is normal. No respiratory distress.      Breath sounds: Normal breath sounds. No wheezing.   Abdominal:      General: There is no distension.      Palpations: Abdomen is soft.      Tenderness: There is no abdominal tenderness. There is no guarding.   Musculoskeletal:         General: No swelling. Normal range of motion.      Cervical back: Normal range of motion and neck supple. No muscular tenderness.      Right lower leg: No edema.      Left lower leg: No edema.   Skin:     General: Skin is warm and dry.      Coloration: Skin is not jaundiced.      Findings: Bruising and rash present.      Comments: Small scabs right forearm and bilat feet  (+) petechial rashes   Neurological:      General: No focal deficit present.      Mental Status: She is alert.      Cranial Nerves: No cranial nerve deficit.      Comments: She moves her extremities equally  (+) Expressive aphasia   Psychiatric:      Comments: Unable to assess due  to aphasia           Fluids    Intake/Output Summary (Last 24 hours) at 5/8/2024 1239  Last data filed at 5/8/2024 1205  Gross per 24 hour   Intake 1060.85 ml   Output 1590 ml   Net -529.15 ml       Laboratory  Recent Labs     05/06/24 0139 05/07/24 0133 05/08/24  0246   WBC 15.3* 13.4* 20.6*   RBC 3.07* 3.27* 3.72*   HEMOGLOBIN 8.4* 9.1* 10.9*   HEMATOCRIT 28.8* 29.3* 33.8*   MCV 93.8 89.6 90.9   MCH 27.4 27.8 29.3   MCHC 29.2* 31.1* 32.2   RDW 56.2* 52.5* 53.4*   PLATELETCT 61* 57* 53*     Recent Labs     05/06/24 0138 05/07/24 0133 05/08/24  0246   SODIUM 129* 135 134*   POTASSIUM 4.4 4.8 6.2*   CHLORIDE 88* 94* 92*   CO2 20 20 12*   GLUCOSE 210* 135* 174*   BUN 61* 40* 72*   CREATININE 5.35* 3.69* 4.86*   CALCIUM 7.3* 7.6* 7.2*     Recent Labs     05/06/24 0138 05/07/24 0133 05/08/24  0633   APTT 29.4 27.7 29.2   INR 1.76* 2.00* 2.25*                 Imaging  DX-CHEST-FOR LINE PLACEMENT Perform procedure in: Other(comment f6 below):   Final Result      1.  Right IJ catheter has been placed and the tip projects appropriately over the superior vena cava.      2.  Cardiomegaly with evidence of mild fluid overload.      CT-ABDOMEN-PELVIS W/O   Final Result      1.  No evidence of bowel obstruction or focal inflammatory change.      2.  Again seen extensive atherosclerotic vascular calcification with distal thoracic/upper abdominal aortic ectasia measuring approximately 3.2 cm in diameter.      3.  Gallstones within the gallbladder.      4.  Hyperdense left renal cyst likely representing hemorrhagic or milk of calcium within a cyst.      5.  Small amount of free fluid dependently within the pelvis.      DX-CHEST-PORTABLE (1 VIEW)   Final Result      Cardiomegaly.      US-ABDOMEN COMPLETE SURVEY   Final Result      1.  Mild hepatomegaly.      2.  Sludge noted in the gallbladder.      3.  Small echogenic foci in the periphery of the renal collecting systems bilaterally suspicious for early nephrolithiasis.       4.  Bilateral renal cysts.         DX-CHEST-LIMITED (1 VIEW)   Final Result         1.  No acute cardiopulmonary disease.   2.  Atherosclerosis      EC-ECHOCARDIOGRAM COMPLETE W/ CONT   Final Result      DX-CHEST-LIMITED (1 VIEW)   Final Result      1.  Enlarged cardiac silhouette with changes of pulmonary edema.      [id number] VIEW   Final Result      1.  Nonobstructive bowel gas pattern with a large amount of colonic stool.      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  Imaging of the abdomen showed mild hepatomegaly with no splenomegaly.    - Hematology/oncology was consulted for concerns for hemophagocytic lymphohistiocytosis (HLH) with low suspicion/probability for TTP or HIT.   -Continue Decadron.  -S/p bone marrow biopsy 5/6/24.  Follow pathology.   -Monitor for coagulopathy.  Daily fibrinogen, PT/PTT.  Transfused with cryoprecipitate if fibrinogen less than 150.  Oncology recommended liver or lymph node biopsy   -IR consulted for biopsy (INR needs to be <1.8 for IR)     Hyperlipidemia- (present on admission)  Assessment & Plan  - Hold off on statin due to rhabdomyolysis.    Cardiogenic shock (HCC)- (present on admission)  Assessment & Plan  -In the setting of low ejection fraction of 15%.  Required dobutamine drip.  Shock resolved.  -Management of HFrEF as above.    History of stroke- (present on admission)  Assessment & Plan  -With chronic aphasia.  Appears at baseline.  Monitor.    Rhabdomyolysis- (present on admission)  Assessment & Plan  -CPK was over 5,000. May be due to hemophagocytic lymphohistiocytosis.  -Holding off on IV fluids given IVON/dialysis dependent.  -CPK continues to trend down.  Continue to monitor.  CPK levels in the morning.    Thrombocytopenia  (HCC)- (present on admission)  Assessment & Plan  - In setting of HLH, thrombocytopenia.  Low suspicion/probability for TTP or HIT.  -Continue Decadron.  -Continue to monitor platelet counts.  Restrictive transfusion strategy.  Watch for bleeding.    Coagulopathy (HCC)- (present on admission)  Assessment & Plan  - In setting of HLH, thrombocytopenia.  Low suspicion/probability for TTP or HIT.  Fibrinogen low.  -Continue Decadron.  -s/p bone marrow biopsy 5/6.  -Monitor PT/PTT/INR and daily fibrinogen.  Watch for bleeding.  -Follow fibrinogen, transfuse with cryoprecipitate if </= 100.  She gets spontaneous bleeding with low fibrinogen levels.    IVON (acute kidney injury) (HCC)- (present on admission)  Assessment & Plan  - Could be cardiorenal in setting of HFrEF, along with possible HLH.  -Now on hemodialysis.  -Nephrology following.  -Monitor electrolytes closely.  -If continues to require hemodialysis, will need outpatient hemodialysis chair arrangements.  -IR consult for kidney biopsy.    Transaminitis  Assessment & Plan  -Likely congestive hepatopathy due to HFrEF.  Also in setting of rhabdomyolysis.  HLH could be contributing.  -Continue to trend LFTs, continues to trend down.  -Continue to trend CPK.    Severe mitral regurgitation- (present on admission)  Assessment & Plan  -Per echo.  Management as above.    Heart failure with reduced ejection fraction (HCC)- (present on admission)  Assessment & Plan  - Had a drop in EF to 15% from previous 35%.  Required dobutamine drip.  -Continue volume removal with hemodialysis.  -Optimize GDMT.  Unable to give ACEi/ARB/ARNI/SGLT2i given renal function.  Continue hydralazine/Imdur for afterload reduction.  Continue Toprol-XL 12.5 mg daily, adjust up as blood pressure tolerates.  Monitor blood pressure to make sure she tolerates medications.  -Monitor on telemetry.    Hyperkalemia- (present on admission)  Assessment & Plan  2/2 renal failure   Continue hemodialysis per  nephro  Daily labs   Remote cardiac monitoring     Leukocytosis- (present on admission)  Assessment & Plan  -Concerning for HLH.  -s/p bone marrow biopsy 5/6/2024. Follow pathology.   -No signs of sepsis or infection.  Holding off on further antibiotics.  Has had 5 days of antibiotics in the hospital.  -Trend WBC count.       Total time spent in chart review, at bedside with the patient, discussing with consultants, nursing and case management: 53 minutes    VTE prophylaxis: SCD    I have performed a physical exam and reviewed and updated ROS and Plan today (5/8/2024). In review of yesterday's note (5/7/2024), there are no changes except as documented above.

## 2024-05-08 NOTE — PROGRESS NOTES
Bedside report received from off going RN/tech: Macie, assumed care of patient.     Fall Risk Score: HIGH RISK  Fall risk interventions in place: Place yellow fall risk ID band on patient, Provide patient/family education based on risk assessment, Educate patient/family to call staff for assistance when getting out of bed, Place fall precaution signage outside patient door, Place patient in room close to nursing station, Utilize bed/chair fall alarm, and Bed alarm connected correctly  Bed type: Regular (Manolo Score less than 17 interventions in place)  Patient on cardiac monitor: No   IVF/IV medications: Not Applicable   Oxygen: Room Air  Bedside sitter: Not Applicable   Isolation: Not applicable

## 2024-05-08 NOTE — PROGRESS NOTES
Pt presents to IR 2. Pt. was consented by MD at bedside, confirmed by this RN and consent at bedside. Pt transferred to procedure table in supine position. Patient underwent a transjugular liver biopsy by Dr. Craig. Procedure site was marked by MD and verified using imaging guidance. Pt placed on monitor, prepped and draped in a sterile fashion. Vitals were taken every 5 minutes and remained stable during procedure (see doc flow sheet for results). CO2 waveform capnography was monitored and remained WNL throughout procedure. Report called to KATIA Tabares. Pt transported by stretcher with RN to R311.    Specimen: x 2 core in formalin hand delivered to lab.    Trialysis HD cath 12 Fr  REF: [id number]  LOT: [id number]  EXP: 2025-03-31

## 2024-05-08 NOTE — PROGRESS NOTES
[facility] -  PROGRESS NOTE               Author: Sarah Ortiz D.O. Date & Time: 5/8/2024  9:04 AM     CC: abd pain, n/v    HISTORY OF PRESENT ILLNESS:    63 y.o. female with history of systolic and diastolic heart failure with EF of 15% (HFrEF) grade 2 diastolic dysfunction, severe MVR, CAD with h/o STEMI s/p DEWEY to LAD, PAD with aortofemoral bypass surgery who presented 4/27/2024 with increasing shortness of breath admitted for community acquired PNA and severe constipation with hosp course complicated by exacerbation of her HFrEF, medications augmented which was followed by IVON, worsening hepatopathy concerning for cardiorenal etiology.   She was then transferred to ICU 5/01 and initiated on dobutamine drip for organ perfusion.   Patient continues to be oliguric with UOP of 86 yesterday and 10 ml today so far.   Nephrology has been consulted for concerns of IVON , cardiorenal syndrome.     DAILY NEPHROLOGY SUMMARY:  05/02: Examined at bedside. Looks like she wants to talk but it is difficult for her to find appropriate words.   Dobutamine ggt held per critical care today.   Labs yesterday with fibrinogen 91, cryoppt administered followed by dialysis   1 L ultrafiltration   Uptrending WBCs since 4/30, 18.4 today  Scr improved to 3.03 after dialysis   Down trending AST/ALT   CT scan without bowel obs, extensive calcification of distal thoracic/upper abd aorta. Left renal cyst.   5/4: Pt transferred out of ICU, now with increased CPK and climbing  5/5: Pt still anuric, HD yesterday  5/6: minimal UOP, SBP 90s-140s, platelets stable at 61,000, CPK trended down to 2314, BM biopsy planned for today, patient denies CP/SOB  5/7: minimal UOP, tolerated HD, SBP 110s-120s, fibrinogen 88 and patient provided with 1u cryo, BM biopsy done, platelets 57,000, no new c/o  5/8: CO2 12 and K 6.2 this AM, WBC trended up (on steroids), HD planned for today, platelets 53,000, does not answer questions  "today, appears agitated, does not have gown on    REVIEW OF SYSTEMS:    ROS limited due to mental acuity, Chronic expressive aphasia.     PAST MEDICAL/SURGICAL/FAMILY/SOCIAL HISTORY:  Reviewed and documented in chart     HOME MEDICATIONS:   - Reviewed and documented in chart    LABORATORY STUDIES:   - Reviewed and documented in chart    ALLERGIES:  Pcn [penicillins] and Toradol    VS:  /86   Pulse 65   Temp 36.2 °C (97.2 °F) (Temporal)   Resp 16   Ht 1.651 m (5' 5\")   Wt 50.6 kg (111 lb 8.8 oz)   SpO2 95%   BMI 18.56 kg/m²   Physical Exam  Vitals and nursing note reviewed.   Constitutional:       General: She is not in acute distress.     Appearance: She is ill-appearing.   HENT:      Head: Normocephalic and atraumatic.      Right Ear: External ear normal.      Left Ear: External ear normal.      Nose: Nose normal.      Mouth/Throat:      Mouth: Mucous membranes are dry.   Eyes:      General:         Right eye: No discharge.         Left eye: No discharge.      Extraocular Movements: Extraocular movements intact.   Neck:      Comments: Right IJ trialysis catheter   Cardiovascular:      Rate and Rhythm: Normal rate and regular rhythm.      Pulses: Normal pulses.   Pulmonary:      Effort: Pulmonary effort is normal. No respiratory distress.   Abdominal:      General: There is no distension.      Palpations: Abdomen is soft.   Musculoskeletal:         General: Swelling present.      Comments: Swelling b/l arms    Skin:     General: Skin is warm.      Capillary Refill: Capillary refill takes less than 2 seconds.      Findings: Bruising and rash present.      Comments: Excoriations on extremities    Neurological:      Mental Status: She is alert.      Comments: Chronic expressive aphasia   Answers some questions but is not able to answer orientation questions   Psychiatric:         Mood and Affect: Mood normal.         Behavior: Behavior normal.         FLUID BALANCE:  In: 840 [P.O.:840]  Out: 90 "     LABS:  Recent Labs     05/06/24  0138 05/07/24  0133 05/08/24  0246   SODIUM 129* 135 134*   POTASSIUM 4.4 4.8 6.2*   CHLORIDE 88* 94* 92*   CO2 20 20 12*   GLUCOSE 210* 135* 174*   BUN 61* 40* 72*   CREATININE 5.35* 3.69* 4.86*   CALCIUM 7.3* 7.6* 7.2*        IMAGING:  - Imaging studies reviewed by me      IMPRESSION:     # Acute kidney injury multifactorial--Decreased perfusion, HLH, rhabdo  - unlikley TTP  - oliguric  - HD 5/2 1st  - C3 and C4 low, ARLEEN pending, ANCA pending  - hepatitis negative  - PCR and ACR pending (minimal UOP)     # ? 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 ongoing  - steroids  -Bone Marrow 5/6    # Hepatic congestion      #Rhabdo unclear etiology, difficult to give fluids in face of reduced EF     # Heart failure with reduced ejection fraction      # Coagulopathy   - Fibinogen monitored, cryo when <100     # Transamnitis  - Improving       # Severe mitral regurgitation      # Peripheral vascular disease with prior aortic thrombus s/p aortofemoral bypass     # Constipation      # Impacted stool in intestine   - Resolved      # Colitis      # Leukocytosis      # Prior CVA    - Chronic expressive aphasia     # Essential hypertension    - goal <140/90, at goal    # Hyperkalemia    # Acidosis    # CKD MBD  - Ca low, phos elevated  -         PLAN:  - HD qMWF and PRN for now  - Daily evaluation of RRT needs  - steroids per primary service/hematology  - Bone marrow 5/6, f/u findings  - renal biopsy ordered, though likely will only be obtained with improved platelets/coagulation status  - Avoid nephrotoxins, NSAIDs  - Renally dose meds   - daily labs  - strict I/O  - recommend dietary supplements be adjusted for renal failure  - start calcium acetate 1334mg TID with meals if/when taking PO  - 3Ca bath today    Further recs to follow    Other management per primary service

## 2024-05-08 NOTE — OR SURGEON
Immediate Post- Operative Note        Findings:   HLH (hemophagocytic lymphohistiocytosis) (HCC)               Procedure(s): Transjugular liver biopsy, portal  venography.      Estimated Blood Loss: Less than 5 ml        Complications: None            5/8/2024     1617 PM     Yuan Craig M.D.

## 2024-05-08 NOTE — PROGRESS NOTES
Assumed care of patient and received report from day shift RN. A&O x2-3, on RA. VSS.  Pt is medical. Plan of care discussed with patient. Bed locked and in lowest position. Pt educated to fall risk and fall precautions in place. Call light within reach. All questions answered and no other needs indicated at this time.

## 2024-05-08 NOTE — DISCHARGE PLANNING
Case Management Discharge Planning    Admission Date: 4/27/2024  GMLOS: 4.2  ALOS: 11    6-Clicks ADL Score: 16  6-Clicks Mobility Score: 15  PT and/or OT Eval ordered: Yes  PT/OT: Recommending HH  Post-acute Referrals Ordered: Yes  Post-acute Choice Obtained: Yes  Has referral(s) been sent to post-acute provider:  No      Anticipated Discharge Dispo: Discharge Disposition: D/T to home under A care in anticipation of covered skilled care (06)  Discharge Address: [address] [place] [id number]    DME Needed: pending hospital course     Action(s) Taken: Pt is discussed in IDT rounds.Pending biopsy and Palliative was consulted. [facility] has accepted.    Escalations Completed: None    Medically Clear: No    Next Steps: F/U on DC plan after GOC discussion.

## 2024-05-09 PROBLEM — Z71.89 ADVANCED CARE PLANNING/COUNSELING DISCUSSION: Status: ACTIVE | Noted: 2022-10-19

## 2024-05-09 LAB
ALBUMIN SERPL BCP-MCNC: 3.5 G/DL (ref 3.2–4.9)
APTT PPP: 27.4 SEC (ref 24.7–36)
BUN SERPL-MCNC: 46 MG/DL (ref 8–22)
CALCIUM ALBUM COR SERPL-MCNC: 8.1 MG/DL (ref 8.5–10.5)
CALCIUM SERPL-MCNC: 7.7 MG/DL (ref 8.5–10.5)
CHLORIDE SERPL-SCNC: 95 MMOL/L (ref 96–112)
CO2 SERPL-SCNC: 23 MMOL/L (ref 20–33)
CREAT SERPL-MCNC: 3.66 MG/DL (ref 0.5–1.4)
FIBRINOGEN PPP-MCNC: 130 MG/DL (ref 215–460)
GFR SERPLBLD CREATININE-BSD FMLA CKD-EPI: 13 ML/MIN/1.73 M 2
GLUCOSE SERPL-MCNC: 172 MG/DL (ref 65–99)
INR PPP: 1.99 (ref 0.87–1.13)
PHOSPHATE SERPL-MCNC: 6.2 MG/DL (ref 2.5–4.5)
POTASSIUM SERPL-SCNC: 4.6 MMOL/L (ref 3.6–5.5)
PROTHROMBIN TIME: 22.9 SEC (ref 12–14.6)
SODIUM SERPL-SCNC: 137 MMOL/L (ref 135–145)

## 2024-05-09 PROCEDURE — 99233 SBSQ HOSP IP/OBS HIGH 50: CPT | Performed by: INTERNAL MEDICINE

## 2024-05-09 RX ORDER — CALCIUM ACETATE 667 MG/1
1334 TABLET ORAL
Status: DISCONTINUED | OUTPATIENT
Start: 2024-05-09 | End: 2024-05-17

## 2024-05-09 RX ADMIN — OXYCODONE 5 MG: 5 TABLET ORAL at 21:07

## 2024-05-09 RX ADMIN — PRAMIPEXOLE DIHYDROCHLORIDE 0.12 MG: 0.12 TABLET ORAL at 21:30

## 2024-05-09 RX ADMIN — DEXAMETHASONE 6 MG: 4 TABLET ORAL at 06:13

## 2024-05-09 RX ADMIN — DEXAMETHASONE 6 MG: 4 TABLET ORAL at 21:07

## 2024-05-09 RX ADMIN — LORAZEPAM 1 MG: 2 INJECTION INTRAMUSCULAR; INTRAVENOUS at 01:33

## 2024-05-09 RX ADMIN — LORAZEPAM 1 MG: 2 INJECTION INTRAMUSCULAR; INTRAVENOUS at 11:56

## 2024-05-09 RX ADMIN — Medication 1334 MG: at 11:25

## 2024-05-09 RX ADMIN — VENLAFAXINE HYDROCHLORIDE 37.5 MG: 37.5 CAPSULE, EXTENDED RELEASE ORAL at 06:14

## 2024-05-09 RX ADMIN — HYDRALAZINE HYDROCHLORIDE 25 MG: 50 TABLET ORAL at 21:07

## 2024-05-09 RX ADMIN — HYDRALAZINE HYDROCHLORIDE 25 MG: 50 TABLET ORAL at 06:13

## 2024-05-09 RX ADMIN — METOPROLOL SUCCINATE 12.5 MG: 25 TABLET, FILM COATED, EXTENDED RELEASE ORAL at 06:13

## 2024-05-09 RX ADMIN — OXYCODONE 5 MG: 5 TABLET ORAL at 11:25

## 2024-05-09 RX ADMIN — LORAZEPAM 1 MG: 2 INJECTION INTRAMUSCULAR; INTRAVENOUS at 21:59

## 2024-05-09 RX ADMIN — MUPIROCIN 1 APPLICATION: 20 OINTMENT TOPICAL at 18:00

## 2024-05-09 RX ADMIN — ISOSORBIDE MONONITRATE 30 MG: 30 TABLET, EXTENDED RELEASE ORAL at 06:14

## 2024-05-09 RX ADMIN — MUPIROCIN 5 ML: 20 OINTMENT TOPICAL at 06:13

## 2024-05-09 ASSESSMENT — ENCOUNTER SYMPTOMS
MYALGIAS: 0
CARDIOVASCULAR NEGATIVE: 1
MEMORY LOSS: 0
DIARRHEA: 0
FOCAL WEAKNESS: 0
VOMITING: 0
EYES NEGATIVE: 1
PSYCHIATRIC NEGATIVE: 1
DEPRESSION: 0
COUGH: 0
CHILLS: 0
WEIGHT LOSS: 0
BRUISES/BLEEDS EASILY: 0
DIAPHORESIS: 0
HEADACHES: 0
NAUSEA: 0
BLOOD IN STOOL: 0
NEUROLOGICAL NEGATIVE: 1
CONSTITUTIONAL NEGATIVE: 1
ORTHOPNEA: 0
SHORTNESS OF BREATH: 0
DIZZINESS: 0
ABDOMINAL PAIN: 0
PALPITATIONS: 0
GASTROINTESTINAL NEGATIVE: 1
FEVER: 0
SPEECH CHANGE: 1
INSOMNIA: 0
MUSCULOSKELETAL NEGATIVE: 1
PND: 0
RESPIRATORY NEGATIVE: 1
CONSTIPATION: 0

## 2024-05-09 ASSESSMENT — PAIN DESCRIPTION - PAIN TYPE
TYPE: ACUTE PAIN

## 2024-05-09 NOTE — PROGRESS NOTES
Bedside report received from dayshift RN, assumed care at 1900.  Assessment complete.  A&O x 1, oriented to self. P  Patient ambulates bedbound. Bed alarm on.   Patient denies pain and shows no signs of discomfort or pain  Denies N&V. Tolerating level 5 minced and moist diet.  + void via williamson catheter with oliguria, + flatus, + BM.  Patient denies SOB. On room air.  Patient calm, and cooperative but very drowsy.  Review plan with of care with patient. Call light and personal belongings within reach. Hourly rounding in place. All needs met at this time.

## 2024-05-09 NOTE — CARE PLAN
The patient is Stable - Low risk of patient condition declining or worsening    Shift Goals  Clinical Goals: Skin integrity, safety  Patient Goals: AFIA  Family Goals: updates    Progress made toward(s) clinical / shift goals:  H9unlag, patient also turning independently. Bed alarm on, hourly rounding in place.     Patient is not progressing towards the following goals:

## 2024-05-09 NOTE — PROGRESS NOTES
"Pt primarily nonverbal. Consistently repeating, \"baby, baby, baby\". Increasingly agitated, restless, moaning, and saying \"oww\". Asked where her pain is located and patient holding her abdomen saying, \"baby\". Oxycodone given crushed in applesauce with minimal improvement. Noted pt's toes with progressive palor and blue hue. Pedal pulses thready and easily obliterated. MD notified and to the bedside. Stated he would call the patient's . Rapid nurse notified and adding patient to her list. Vital signs stable at this time. Continued agitation. Ativan given with some improvement in restlessness and agitation.   "

## 2024-05-09 NOTE — PROGRESS NOTES
Hospital Medicine Daily Progress Note    Date of Service  5/9/2024    Chief Complaint  abdominal pain    Hospital Course  Fouzia Santamaria is a 63 y.o. female with HFrEF (EF 35%), severe PAD status post aortic femoral bypass coronary disease status post LAD stent, COPD, diabetes, hypertension, and previous strokes, who was initially seen in the ED with abdominal pain for 10 days and subsequently discharged to home, who again presented with 2 days of bodyaches as well as worsening constipation, nausea and vomiting.  Workup showed leukocytosis with white count of 15,100 with left shift.  Chest x-ray showed mild pulmonary edema.  Abdominal x-ray showed significant amount of stool burden.  Echocardiogram was obtained which revealed that ejection fraction now down to 15% with severe mitral regurgitation.  She also had acute kidney injury and likely cardiorenal syndrome.  Cardiology and nephrology were consulted.  She was placed on a dobutamine drip.  Her renal function worsened and she required a right-sided dialysis catheter with initiation of dialysis.  Her liver enzymes went up to AST of 2959 and ALT of 1377.  She also had significant drop in both hemoglobin and platelet count, with worsening leukocytosis.  LDH was 1418, fibrinogen 91, ferritin 7333 with elevated PT, PTT and INR.  Imaging of the abdomen showed mild hepatomegaly with no splenomegaly.  Hematology/oncology was consulted for concerns for hemophagocytic lymphohistiocytosis (HLH) with low suspicion/probability for TTP or HIT. She was started on Decadron.  She had low fibrinogen for which she was given cryoprecipitate.  She is scheduled for bone marrow biopsy for 5/6/24.  Nephrology contemplating on kidney biopsy.    Interval Problem Update  5/6/2024 - I reviewed the patient's chart. There were no significant overnight events. Remains hemodynamically stable and afebrile. Stable on RA.  WBC 15,300.  Hemoglobin is stable.  Platelet count 61,000.  Creatinine  5.35.  Sodium 129.  Potassium is normal.  CPK improved to 2314.  INR 1.76, APTT 29.4.  Complement levels low including C3, C4.  Ferritin 2435.  Fibrinogen 118.  AST improved to 99, , alkaline phosphatase 155, normal bilirubin.  Maintaining sinus rhythm on telemetry.  She had a bone marrow biopsy this morning.  > I have personally seen and examined the patient today.  No complaints of pain.  No nausea or vomiting.  Not in respiratory distress.   at bedside, discussed plan of care.    5/7 Vitals stable on room air   WBC 13.4, Hb 9.1, plt 57   CPK 1072  INR 2, PT 23, fibrinogen 88.   -1 unit cyroprecipitate. Per oncology goal is fibrinogen >150, ok to check daily  Discussed with IR cannot do renal biopsy due to elevated INR, must be <1.8   Discussed with oncology, pathology recommended lymph node or liver biopsy for comparison for bone marrow as concern for HLH. Discussed with Dr. Longoria who approved biopsy.   Patient disoriented, main complaint is that she is hungry. Denies chest pain or dyspnea.    5/8 WBC 20.6, likely from IV steroids   Hb 10.9, plt 53   Potassium 6.2, CO2 12, phos 9.5  -remote cardiac monitoring  HD per nephrology today   INR 2.25, fibrinogen 101. Ordered cryo  -unable to do IR procedure today with INR and K levels   Patient very anxious moving about the bed not answering questions.   EKG ordered, reviewed patient in sinus rhythm. Qtc 515   IV ativan ordered prn for agitation.   Palliative care consulted     Patient was seen and examined at bedside.  I have personally reviewed and interpreted vitals, labs, and imaging.    5/9.  Afebrile.  Episode of bradycardia resolved.  Mild hypertension.  On room air.  Continue Phos binders and calcium supplementation.  Patient is very lethargic.  Keeps saying baby.  She is aphasic.  Points to her abdomen and apparently to localize pain.  Discussed with spouse Basilio.  Apparently when saying baby patient is calling for her spouse.  Long discussion  about plan of care and poor prognosis.  With renal failure now on dialysis, worsening heart failure, poor mentation and functional status.  Patient was agreeable with DNR/DNI.  Discussed with oncology.  Awaiting results of bone marrow biopsy and liver biopsy.  Transfuse cryo for fibrinogen of 130.  Wbc 13.4 > 20.6  Hgb 9.1 > 10.9  P 57 > 53  Cr 3.66  INR 1.99  Fibrinogen 130    I have discussed this patient's plan of care and discharge plan at IDT rounds today with Case Management, Nursing, Nursing leadership, and other members of the IDT team.    Consultants/Specialty  Hematology  Critical care  Nephrology  Cardiology  Palliative care     Code Status  Full Code    Disposition  The patient is not medically cleared for discharge to home or a post-acute facility.  Anticipate discharge to: home with organized home healthcare and close outpatient follow-up    Discharge plan TBD.  6 clicks 14 and 16.  OT recommends home health.  Pending PT evaluation.  May need outpatient hemodialysis chair placement.  I have placed the appropriate orders for post-discharge needs.    Review of Systems  Review of Systems   Unable to perform ROS: Medical condition   Constitutional:  Positive for malaise/fatigue.   Respiratory:  Negative for shortness of breath.    Cardiovascular:  Negative for chest pain.   Neurological:  Positive for speech change.        Physical Exam  Temp:  [36.2 °C (97.2 °F)-37 °C (98.6 °F)] 36.9 °C (98.4 °F)  Pulse:  [61-92] 69  Resp:  [16-20] 16  BP: (118-154)/() 147/86  SpO2:  [89 %-100 %] 91 %    Physical Exam  Vitals reviewed.   Constitutional:       General: She is not in acute distress.     Appearance: She is ill-appearing.   HENT:      Head: Normocephalic and atraumatic.   Eyes:      Conjunctiva/sclera: Conjunctivae normal.   Neck:      Comments: Right IJ dialysis cath  Cardiovascular:      Rate and Rhythm: Normal rate and regular rhythm.      Heart sounds: Normal heart sounds.   Pulmonary:      Effort:  Pulmonary effort is normal. No respiratory distress.      Breath sounds: Normal breath sounds. No wheezing.   Abdominal:      General: There is no distension.      Palpations: Abdomen is soft.      Tenderness: There is no abdominal tenderness. There is no guarding.   Musculoskeletal:         General: No swelling. Normal range of motion.      Cervical back: Normal range of motion and neck supple. No muscular tenderness.      Right lower leg: No edema.      Left lower leg: No edema.   Skin:     General: Skin is warm and dry.      Coloration: Skin is not jaundiced.      Findings: Bruising and rash present.      Comments: Small scabs right forearm and bilat feet  (+) petechial rashes   Neurological:      General: No focal deficit present.      Mental Status: She is alert.      Cranial Nerves: No cranial nerve deficit.      Comments: She moves her extremities equally  (+) Expressive aphasia   Psychiatric:      Comments: Unable to assess due to aphasia           Fluids    Intake/Output Summary (Last 24 hours) at 5/9/2024 0735  Last data filed at 5/9/2024 0600  Gross per 24 hour   Intake 760.85 ml   Output 1500 ml   Net -739.15 ml       Laboratory  Recent Labs     05/07/24 0133 05/08/24  0246   WBC 13.4* 20.6*   RBC 3.27* 3.72*   HEMOGLOBIN 9.1* 10.9*   HEMATOCRIT 29.3* 33.8*   MCV 89.6 90.9   MCH 27.8 29.3   MCHC 31.1* 32.2   RDW 52.5* 53.4*   PLATELETCT 57* 53*     Recent Labs     05/07/24 0133 05/08/24  0246 05/09/24  0530   SODIUM 135 134* 137   POTASSIUM 4.8 6.2* 4.6   CHLORIDE 94* 92* 95*   CO2 20 12* 23   GLUCOSE 135* 174* 172*   BUN 40* 72* 46*   CREATININE 3.69* 4.86* 3.66*   CALCIUM 7.6* 7.2* 7.7*     Recent Labs     05/07/24 0133 05/08/24  0633 05/09/24  0530   APTT 27.7 29.2 27.4   INR 2.00* 2.25* 1.99*                 Imaging  IR-LIVER BX WITH OTHER STUDY   Final Result      1.  Transjugular hepatic venography showing a patent right hepatic vein.      2. 18 transjugular liver biopsies x 2.      3. Exchange  and replacement of the right temporary dialysis catheter with a new 12 Citizen of [place] 15 cm catheter.      DX-CHEST-FOR LINE PLACEMENT Perform procedure in: Other(comment f6 below):   Final Result      1.  Right IJ catheter has been placed and the tip projects appropriately over the superior vena cava.      2.  Cardiomegaly with evidence of mild fluid overload.      CT-ABDOMEN-PELVIS W/O   Final Result      1.  No evidence of bowel obstruction or focal inflammatory change.      2.  Again seen extensive atherosclerotic vascular calcification with distal thoracic/upper abdominal aortic ectasia measuring approximately 3.2 cm in diameter.      3.  Gallstones within the gallbladder.      4.  Hyperdense left renal cyst likely representing hemorrhagic or milk of calcium within a cyst.      5.  Small amount of free fluid dependently within the pelvis.      DX-CHEST-PORTABLE (1 VIEW)   Final Result      Cardiomegaly.      US-ABDOMEN COMPLETE SURVEY   Final Result      1.  Mild hepatomegaly.      2.  Sludge noted in the gallbladder.      3.  Small echogenic foci in the periphery of the renal collecting systems bilaterally suspicious for early nephrolithiasis.      4.  Bilateral renal cysts.         DX-CHEST-LIMITED (1 VIEW)   Final Result         1.  No acute cardiopulmonary disease.   2.  Atherosclerosis      EC-ECHOCARDIOGRAM COMPLETE W/ CONT   Final Result      DX-CHEST-LIMITED (1 VIEW)   Final Result      1.  Enlarged cardiac silhouette with changes of pulmonary edema.      [id number] VIEW   Final Result      1.  Nonobstructive bowel gas pattern with a large amount of colonic stool.      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  Imaging of the abdomen showed mild hepatomegaly with no splenomegaly.    - Hematology/oncology was  consulted for concerns for hemophagocytic lymphohistiocytosis (HLH) with low suspicion/probability for TTP or HIT.   -Continue Decadron.  -S/p bone marrow biopsy 5/6/24.  Follow pathology.   -Monitor for coagulopathy.  Daily fibrinogen, PT/PTT.  Transfused with cryoprecipitate if fibrinogen less than 150.  Oncology recommended liver or lymph node biopsy   Status post IR transjugular liver biopsy with portal venography 5/8    Hyperlipidemia- (present on admission)  Assessment & Plan  5/9/2024  - Hold off on statin due to rhabdomyolysis.    Cardiogenic shock (HCC)- (present on admission)  Assessment & Plan  5/9/2024  -In the setting of low ejection fraction of 15%.  Required dobutamine drip.  Shock resolved.  -Management of HFrEF as above.    History of stroke- (present on admission)  Assessment & Plan  5/9/2024  -With chronic aphasia.  Appears at baseline.  Monitor.    Rhabdomyolysis- (present on admission)  Assessment & Plan  5/9/2024  -CPK was over 5,000. May be due to hemophagocytic lymphohistiocytosis.  -Holding off on IV fluids given IVON/dialysis dependent.  -CPK continues to trend down.  Continue to monitor.  CPK levels in the morning.    Thrombocytopenia (HCC)- (present on admission)  Assessment & Plan  5/9/2024  - In setting of HLH, thrombocytopenia.  Low suspicion/probability for TTP or HIT.  -Continue Decadron.  -Continue to monitor platelet counts.  Restrictive transfusion strategy.  Watch for bleeding.     Coagulopathy (HCC)- (present on admission)  Assessment & Plan  5/9/2024  - In setting of HLH, thrombocytopenia.  Low suspicion/probability for TTP or HIT.  Fibrinogen low.  -Continue Decadron.  -s/p bone marrow biopsy 5/6.  -Monitor PT/PTT/INR and daily fibrinogen.  Watch for bleeding.  -Follow fibrinogen, transfuse with cryoprecipitate if </= 100.  She gets spontaneous bleeding with low fibrinogen levels.    IVON (acute kidney injury) (HCC)- (present on admission)  Assessment & Plan  5/9/2024  - Could  be cardiorenal in setting of HFrEF, along with possible HLH.  -Now on hemodialysis.  -Nephrology following.  -Monitor electrolytes closely.  -If continues to require hemodialysis, will need outpatient hemodialysis chair arrangements.  -IR consult for kidney biopsy.    Transaminitis  Assessment & Plan  5/9/2024  -Likely congestive hepatopathy due to HFrEF.  Also in setting of rhabdomyolysis.  HLH could be contributing.  -Continue to trend LFTs, continues to trend down.  -Continue to trend CPK.    Severe mitral regurgitation- (present on admission)  Assessment & Plan  5/9/2024  -Per echo.  Management as above.    Heart failure with reduced ejection fraction (HCC)- (present on admission)  Assessment & Plan  5/9/2024  - Had a drop in EF to 15% from previous 35%.  Required dobutamine drip.  -Continue volume removal with hemodialysis.  -Optimize GDMT.  Unable to give ACEi/ARB/ARNI/SGLT2i given renal function.  Continue hydralazine/Imdur for afterload reduction.  Continue Toprol-XL 12.5 mg daily, adjust up as blood pressure tolerates.  Monitor blood pressure to make sure she tolerates medications.  -Monitor on telemetry.    Hyperkalemia- (present on admission)  Assessment & Plan  5/9/2024  2/2 renal failure   Continue hemodialysis per nephro  Daily labs   Remote cardiac monitoring     Leukocytosis- (present on admission)  Assessment & Plan  5/9/2024  -Concerning for HLH.  -s/p bone marrow biopsy 5/6/2024. Follow pathology.   -No signs of sepsis or infection.  Holding off on further antibiotics.  Has had 5 days of antibiotics in the hospital.  -Trend WBC count.         VTE prophylaxis: SCD    I have performed a physical exam and reviewed and updated ROS and Plan today (5/9/2024). In review of yesterday's note (5/8/2024), there are no changes except as documented above.     Greater than 51 minutes spent prepping to see patient (e.g. review of tests) obtaining and/or reviewing separately obtained history. Performing a  medically appropriate examination and/ evaluation.  Counseling and educating the patient/family/caregiver.  Ordering medications, tests, or procedures.  Referring and communicating with other health care professionals.  Documenting clinical information in EPIC.  Independently interpreting results and communicating results to patient/family/caregiver.  Care coordination.

## 2024-05-09 NOTE — DISCHARGE PLANNING
Case Management Discharge Planning    Admission Date: 4/27/2024  GMLOS: 4.2  ALOS: 12    6-Clicks ADL Score: 16  6-Clicks Mobility Score: 15  PT and/or OT Eval ordered: Yes  Post-acute Referrals Ordered: No  Post-acute Choice Obtained: No  Has referral(s) been sent to post-acute provider:  No      Anticipated Discharge Dispo: Discharge Disposition: D/T to home under [facility] care in anticipation of covered skilled care (06)  Discharge Address: [address] [place] [id number]    DME Needed: No    Action(s) Taken: Pending bone marrow and liver biopsy results. Pending kidney biopsy.  [facility]  RN CM to continue to monitor for changes in DC needs.      Escalations Completed: None    Medically Clear: No    Next Steps: RN CM to assist with any discharge planning needs     Barriers to Discharge: Medical clearance    Is the patient up for discharge tomorrow: No

## 2024-05-09 NOTE — PROGRESS NOTES
[facility] -  PROGRESS NOTE               Author: Sarah Ortiz D.O. Date & Time: 5/9/2024  9:52 AM     CC: abd pain, n/v    HISTORY OF PRESENT ILLNESS:    63 y.o. female with history of systolic and diastolic heart failure with EF of 15% (HFrEF) grade 2 diastolic dysfunction, severe MVR, CAD with h/o STEMI s/p DEWEY to LAD, PAD with aortofemoral bypass surgery who presented 4/27/2024 with increasing shortness of breath admitted for community acquired PNA and severe constipation with hosp course complicated by exacerbation of her HFrEF, medications augmented which was followed by IVON, worsening hepatopathy concerning for cardiorenal etiology.   She was then transferred to ICU 5/01 and initiated on dobutamine drip for organ perfusion.   Patient continues to be oliguric with UOP of 86 yesterday and 10 ml today so far.   Nephrology has been consulted for concerns of IVON , cardiorenal syndrome.     DAILY NEPHROLOGY SUMMARY:  05/02: Examined at bedside. Looks like she wants to talk but it is difficult for her to find appropriate words.   Dobutamine ggt held per critical care today.   Labs yesterday with fibrinogen 91, cryoppt administered followed by dialysis   1 L ultrafiltration   Uptrending WBCs since 4/30, 18.4 today  Scr improved to 3.03 after dialysis   Down trending AST/ALT   CT scan without bowel obs, extensive calcification of distal thoracic/upper abd aorta. Left renal cyst.   5/4: Pt transferred out of ICU, now with increased CPK and climbing  5/5: Pt still anuric, HD yesterday  5/6: minimal UOP, SBP 90s-140s, platelets stable at 61,000, CPK trended down to 2314, BM biopsy planned for today, patient denies CP/SOB  5/7: minimal UOP, tolerated HD, SBP 110s-120s, fibrinogen 88 and patient provided with 1u cryo, BM biopsy done, platelets 57,000, no new c/o  5/8: CO2 12 and K 6.2 this AM, WBC trended up (on steroids), HD planned for today, platelets 53,000, does not answer questions  "today, appears agitated, does not have gown on  5/9: tolerated HD, transferred to oncology floor, reports she is hungry this AM, SBP 130s-150s, plts 53,000    REVIEW OF SYSTEMS:    ROS limited due to mental acuity, Chronic expressive aphasia.     PAST MEDICAL/SURGICAL/FAMILY/SOCIAL HISTORY:  Reviewed and documented in chart     HOME MEDICATIONS:   - Reviewed and documented in chart    LABORATORY STUDIES:   - Reviewed and documented in chart    ALLERGIES:  Pcn [penicillins] and Toradol    VS:  /81   Pulse 67   Temp 36.3 °C (97.3 °F) (Temporal)   Resp 18   Ht 1.651 m (5' 5\")   Wt 50.6 kg (111 lb 8.8 oz)   SpO2 98%   BMI 18.56 kg/m²   Physical Exam  Vitals and nursing note reviewed.   Constitutional:       General: She is not in acute distress.     Appearance: She is ill-appearing.   HENT:      Head: Normocephalic and atraumatic.      Right Ear: External ear normal.      Left Ear: External ear normal.      Nose: Nose normal.      Mouth/Throat:      Mouth: Mucous membranes are dry.   Eyes:      General:         Right eye: No discharge.         Left eye: No discharge.      Extraocular Movements: Extraocular movements intact.   Neck:      Comments: Right IJ trialysis catheter   Cardiovascular:      Rate and Rhythm: Normal rate and regular rhythm.      Pulses: Normal pulses.   Pulmonary:      Effort: Pulmonary effort is normal. No respiratory distress.   Abdominal:      General: There is no distension.      Palpations: Abdomen is soft.   Musculoskeletal:         General: Swelling present.      Comments: Swelling b/l arms    Skin:     General: Skin is warm.      Capillary Refill: Capillary refill takes less than 2 seconds.      Findings: Bruising and rash present.      Comments: Excoriations on extremities    Neurological:      Mental Status: She is alert.      Comments: Chronic expressive aphasia   Answers some questions but is not able to answer orientation questions   Psychiatric:         Mood and Affect: " Mood normal.         Behavior: Behavior normal.         FLUID BALANCE:  In: 760.9 [P.O.:180; Blood:80.9; Dialysis:500]  Out: 1500     LABS:  Recent Labs     05/07/24  0133 05/08/24  0246 05/09/24  0530   SODIUM 135 134* 137   POTASSIUM 4.8 6.2* 4.6   CHLORIDE 94* 92* 95*   CO2 20 12* 23   GLUCOSE 135* 174* 172*   BUN 40* 72* 46*   CREATININE 3.69* 4.86* 3.66*   CALCIUM 7.6* 7.2* 7.7*        IMAGING:  - Imaging studies reviewed by me      IMPRESSION:     # Acute kidney injury multifactorial--Decreased perfusion, HLH, rhabdo  - unlikley TTP  - oliguric  - HD 5/2 1st  - C3 and C4 low, ARLEEN negative, ANCA negative  - hepatitis negative  - PCR and ACR pending (minimal UOP)     # ? 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 ongoing  - steroids  -Bone Marrow 5/6    # Hepatic congestion      #Rhabdo unclear etiology, difficult to give fluids in face of reduced EF     # Heart failure with reduced ejection fraction      # Coagulopathy   - Fibinogen monitored, cryo when <100     # Transamnitis  - Improving       # Severe mitral regurgitation      # Peripheral vascular disease with prior aortic thrombus s/p aortofemoral bypass     # Constipation      # Impacted stool in intestine   - Resolved      # Colitis      # Leukocytosis      # Prior CVA    - Chronic expressive aphasia     # Essential hypertension    - goal <140/90, intermittently at goal    # Hyperkalemia, improved    # Acidosis, improved    # CKD MBD  - Ca low, phos elevated  -         PLAN:  - HD qMWF and PRN for now  - Daily evaluation of RRT needs  - steroids per primary service/hematology  - Bone marrow 5/6, f/u findings  - renal biopsy ordered, though likely will only be obtained with improved platelets/coagulation status  - Avoid nephrotoxins, NSAIDs  - Renally dose meds   - daily labs  - strict I/O  - recommend dietary supplements be adjusted for renal failure  - start calcium acetate 1334mg TID with meals   - ok to increase hydralazine if BP remains  above goal, PRN BP meds ok      Further recs to follow    Other management per primary service

## 2024-05-09 NOTE — ASSESSMENT & PLAN NOTE
Patient is aphasic and lethargic, unable to participated in the conversation.  Spouse Basilio was present for the conversation.  I discussed advance care planning face to face with the patient and family for at least 30 minutes, including diagnosis, prognosis, plan of care, risks and benefits of any therapies that could be offered, as well as alternatives including palliation and hospice, as appropriate.  Forms were completed and placed in the chart.  Patient is now DNR/DNI.  5/17 - transitioned to comfort care due to apparent clinical decline and no further HD candidacy

## 2024-05-09 NOTE — CONSULTS
Palliative Care   Records reviewed. ACP order placed by Dr. Desir. Reached out to attending physician Dr. Elizabeth who conducted primary palliative care/GOC discussion and updated patient's code status to DNR/DNI. Per Dr. Elizabeth can cancel palliative consult. Encouraged him to re-consult if complex needs from palliative care team arise.     MARY Metz.  Palliative Care Nurse Practitioner  [phone]

## 2024-05-09 NOTE — PROGRESS NOTES
"Hematology/Oncology Progress Note    Primary Hematologist/Oncologist: Miguelina    Oncology History:  4/27/2024: Presented to hospital with 10 days of flulike symptoms, abdominal pain, and found to have rhinovirus and enterovirus infections.  Rapidly deteriorated with LFTs rising to AST/ALT 2739/1024 along with acute renal failure requiring dialysis.  She also had decompensation of her congestive heart failure requiring transfer to the ICU for dobutamine drip.  Hemoglobin precipitously dropped to 8.3 and platelets also dropped to 57 along with a new leukocytosis.  Her workup was also not notable for normal triglycerides but a ferritin of 14,244.  CT CAP demonstrated no hepatosplenomegaly.    Interval History:  She has recovered from her bone marrow biopsy. She feels very tired and poorly overall.    Review of Systems:  Review of Systems   Constitutional: Negative.  Negative for chills, diaphoresis, fever, malaise/fatigue and weight loss.   HENT: Negative.     Eyes: Negative.    Respiratory: Negative.  Negative for cough.    Cardiovascular: Negative.  Negative for chest pain, palpitations, orthopnea, leg swelling and PND.   Gastrointestinal: Negative.  Negative for abdominal pain, blood in stool, constipation, diarrhea, melena, nausea and vomiting.   Genitourinary: Negative.  Negative for dysuria, frequency, hematuria and urgency.   Musculoskeletal: Negative.  Negative for myalgias.   Skin: Negative.    Neurological: Negative.  Negative for dizziness, focal weakness and headaches.   Endo/Heme/Allergies: Negative.  Does not bruise/bleed easily.   Psychiatric/Behavioral: Negative.  Negative for depression and memory loss. The patient does not have insomnia.         Vitals:     /78   Pulse 60   Temp 36.3 °C (97.4 °F)   Resp 16   Ht 1.651 m (5' 5\")   Wt 50.6 kg (111 lb 8.8 oz)   SpO2 90%   BMI 18.56 kg/m²     Physical Exam:  Physical Exam  Vitals and nursing note reviewed.   Constitutional:       General: She " is not in acute distress.     Appearance: She is not ill-appearing or toxic-appearing.   HENT:      Head: Normocephalic and atraumatic.      Mouth/Throat:      Mouth: Mucous membranes are moist.   Eyes:      Pupils: Pupils are equal, round, and reactive to light.   Cardiovascular:      Rate and Rhythm: Normal rate and regular rhythm.      Pulses: Normal pulses.      Heart sounds: Normal heart sounds. No murmur heard.     No friction rub. No gallop.   Pulmonary:      Effort: Pulmonary effort is normal. No respiratory distress.      Breath sounds: Normal breath sounds. No stridor. No wheezing, rhonchi or rales.   Chest:      Chest wall: No tenderness.   Abdominal:      General: Abdomen is flat. Bowel sounds are normal.      Palpations: Abdomen is soft.   Musculoskeletal:         General: No swelling. Normal range of motion.      Cervical back: Normal range of motion. No rigidity.      Right lower leg: No edema.   Skin:     General: Skin is warm and dry.      Capillary Refill: Capillary refill takes 2 to 3 seconds.      Coloration: Skin is not jaundiced.   Neurological:      General: No focal deficit present.      Mental Status: She is alert and oriented to person, place, and time. Mental status is at baseline.      Cranial Nerves: No cranial nerve deficit.   Psychiatric:         Mood and Affect: Mood normal.          Assessment and Plan:    Concern for HLH  Primary HLH (a true hematologic neoplasm) or secondary HLH (inflammatory dysregulation leading to cytokine storm caused by a separate nonhematologic etiology) are both on the differential.  While we are awaiting bone marrow biopsy to evaluate for true hemophagocytosis, the patient does meet modified HLH-94 criteria including 3 of 4 clinical findings (fever, cytopenias, hepatitis) and at least 1 marker of immune over-activation (increased ferritin, hypofibrinogenemia).    A liver biopsy was obtained yesterday for additional tissue for comparison.  Her  hypofibrinogenemia appears to be improved.  LFTs continues to very slowly improve.    VEXAS syndrome was also considered, but is ruled out as she is female and it is an X-linked disorder.  There is a very small cohort of patients in literature to have X-inactivation syndrome in which VEXAS has occured.  However, there is no vacuolization within the bone marrow biopsy, which is a mandatory criteria for diagnosis.    We will continue to await the final reports for both the bone marrow as well as the liver biopsies in order to better clarify the underlying process.    Soluble IL-2 receptor levels and CXCL9 levels are pending via [facility].     While we await bone marrow biopsy, I recommend continuing dexamethasone 6mg q6h. We can decrease to 10mg/mg (16mg dex) after one week.     Acute renal failure  Nephrology following.  On hemodialysis.  Remains oliguric.    Coagulopathy  Secondary to liver dysfunction.  Transfuse cryoprecipitate to maintain fibrinogen greater than 150    Elevated LFTs  Improving following dialysis.  Suspect some level of congestive hepatopathy.    Heart failure with reduced ejection fraction  Drop in EF to 15% from previously 35%.  Required a dobutamine drip this admission.  Medicine currently optimizing GDMT.    Rhabdomyolysis  CPK greater than 5000.  Nephrology on board.  Underlying etiology is unclear, potentially secondary to HLH.    History of stroke  Chronic aphasia.  Neurologic status at baseline.    Thank you for allowing me to participate in her care. Please do not hesitate to reach out with any questions.    Please note that this dictation was created using voice recognition software. I have made every reasonable attempt to correct obvious errors, but I expect that there are errors of grammar and possibly content that I did not discover before finalizing the note.      Zachariah Fay MD  Hematologist/Oncologist  [facility]   of Medicine - [facility]  [facility]

## 2024-05-10 LAB
ALBUMIN SERPL BCP-MCNC: 3.5 G/DL (ref 3.2–4.9)
ALBUMIN SERPL BCP-MCNC: 3.6 G/DL (ref 3.2–4.9)
ALP SERPL-CCNC: 187 U/L (ref 30–99)
ALT SERPL-CCNC: 197 U/L (ref 2–50)
APTT PPP: 27 SEC (ref 24.7–36)
AST SERPL-CCNC: 59 U/L (ref 12–45)
BASOPHILS # BLD AUTO: 0.1 % (ref 0–1.8)
BASOPHILS # BLD: 0.03 K/UL (ref 0–0.12)
BILIRUB CONJ SERPL-MCNC: 1.1 MG/DL (ref 0.1–0.5)
BILIRUB INDIRECT SERPL-MCNC: 0.5 MG/DL (ref 0–1)
BILIRUB SERPL-MCNC: 1.6 MG/DL (ref 0.1–1.5)
BUN SERPL-MCNC: 68 MG/DL (ref 8–22)
CALCIUM ALBUM COR SERPL-MCNC: 8.3 MG/DL (ref 8.5–10.5)
CALCIUM SERPL-MCNC: 7.9 MG/DL (ref 8.5–10.5)
CFT BLD TEG: 4.7 MIN (ref 4.6–9.1)
CFT P HPASE BLD TEG: 4.8 MIN (ref 4.3–8.3)
CHLORIDE SERPL-SCNC: 94 MMOL/L (ref 96–112)
CLOT ANGLE BLD TEG: 67.2 DEGREES (ref 63–78)
CLOT LYSIS 30M P MA LENFR BLD TEG: 0 % (ref 0–2.6)
CO2 SERPL-SCNC: 19 MMOL/L (ref 20–33)
CREAT SERPL-MCNC: 4.89 MG/DL (ref 0.5–1.4)
CT.EXTRINSIC BLD ROTEM: 2.2 MIN (ref 0.8–2.1)
EOSINOPHIL # BLD AUTO: 0 K/UL (ref 0–0.51)
EOSINOPHIL NFR BLD: 0 % (ref 0–6.9)
ERYTHROCYTE [DISTWIDTH] IN BLOOD BY AUTOMATED COUNT: 57.8 FL (ref 35.9–50)
FIBRINOGEN PPP-MCNC: 156 MG/DL (ref 215–460)
GFR SERPLBLD CREATININE-BSD FMLA CKD-EPI: 10 ML/MIN/1.73 M 2
GLUCOSE SERPL-MCNC: 164 MG/DL (ref 65–99)
HCT VFR BLD AUTO: 30.7 % (ref 37–47)
HGB BLD-MCNC: 9.2 G/DL (ref 12–16)
IMM GRANULOCYTES # BLD AUTO: 0.53 K/UL (ref 0–0.11)
IMM GRANULOCYTES NFR BLD AUTO: 2.4 % (ref 0–0.9)
INR PPP: 2.02 (ref 0.87–1.13)
LYMPHOCYTES # BLD AUTO: 0.05 K/UL (ref 1–4.8)
LYMPHOCYTES NFR BLD: 0.2 % (ref 22–41)
MAGNESIUM SERPL-MCNC: 2.4 MG/DL (ref 1.5–2.5)
MCF BLD TEG: 48.9 MM (ref 52–69)
MCF.PLATELET INHIB BLD ROTEM: 12.3 MM (ref 15–32)
MCH RBC QN AUTO: 29 PG (ref 27–33)
MCHC RBC AUTO-ENTMCNC: 30 G/DL (ref 32.2–35.5)
MCV RBC AUTO: 96.8 FL (ref 81.4–97.8)
MISCELLANEOUS LAB RESULT MISCLAB: ABNORMAL
MONOCYTES # BLD AUTO: 0.54 K/UL (ref 0–0.85)
MONOCYTES NFR BLD AUTO: 2.4 % (ref 0–13.4)
NEUTROPHILS # BLD AUTO: 21.18 K/UL (ref 1.82–7.42)
NEUTROPHILS NFR BLD: 94.9 % (ref 44–72)
NRBC # BLD AUTO: 1.66 K/UL
NRBC BLD-RTO: 7.4 /100 WBC (ref 0–0.2)
PA AA BLD-ACNC: 14.8 % (ref 0–11)
PA ADP BLD-ACNC: ABNORMAL % (ref 0–17)
PHOSPHATE SERPL-MCNC: 8.4 MG/DL (ref 2.5–4.5)
PLATELET # BLD AUTO: 115 K/UL (ref 164–446)
PMV BLD AUTO: 12.5 FL (ref 9–12.9)
POTASSIUM SERPL-SCNC: 5 MMOL/L (ref 3.6–5.5)
PROT SERPL-MCNC: 7.1 G/DL (ref 6–8.2)
PROTHROMBIN TIME: 23.1 SEC (ref 12–14.6)
RBC # BLD AUTO: 3.17 M/UL (ref 4.2–5.4)
SODIUM SERPL-SCNC: 137 MMOL/L (ref 135–145)
TEG ALGORITHM TGALG: ABNORMAL
WBC # BLD AUTO: 22.3 K/UL (ref 4.8–10.8)

## 2024-05-10 PROCEDURE — 99233 SBSQ HOSP IP/OBS HIGH 50: CPT | Performed by: INTERNAL MEDICINE

## 2024-05-10 RX ADMIN — DEXAMETHASONE 6 MG: 4 TABLET ORAL at 04:41

## 2024-05-10 RX ADMIN — Medication 1334 MG: at 17:40

## 2024-05-10 RX ADMIN — HYDRALAZINE HYDROCHLORIDE 25 MG: 50 TABLET ORAL at 21:17

## 2024-05-10 RX ADMIN — HYDROXYZINE HYDROCHLORIDE 25 MG: 25 TABLET, FILM COATED ORAL at 01:39

## 2024-05-10 RX ADMIN — PRAMIPEXOLE DIHYDROCHLORIDE 0.12 MG: 0.12 TABLET ORAL at 07:51

## 2024-05-10 RX ADMIN — PRAMIPEXOLE DIHYDROCHLORIDE 0.12 MG: 0.12 TABLET ORAL at 21:17

## 2024-05-10 RX ADMIN — HYDRALAZINE HYDROCHLORIDE 25 MG: 50 TABLET ORAL at 04:41

## 2024-05-10 RX ADMIN — LORAZEPAM 1 MG: 2 INJECTION INTRAMUSCULAR; INTRAVENOUS at 07:21

## 2024-05-10 RX ADMIN — Medication: at 16:50

## 2024-05-10 RX ADMIN — VENLAFAXINE HYDROCHLORIDE 37.5 MG: 37.5 CAPSULE, EXTENDED RELEASE ORAL at 04:41

## 2024-05-10 RX ADMIN — OXYCODONE 5 MG: 5 TABLET ORAL at 01:38

## 2024-05-10 RX ADMIN — METOPROLOL SUCCINATE 12.5 MG: 25 TABLET, FILM COATED, EXTENDED RELEASE ORAL at 04:42

## 2024-05-10 RX ADMIN — Medication 1334 MG: at 07:51

## 2024-05-10 RX ADMIN — Medication 1334 MG: at 12:21

## 2024-05-10 RX ADMIN — DEXAMETHASONE 6 MG: 4 TABLET ORAL at 21:17

## 2024-05-10 RX ADMIN — ISOSORBIDE MONONITRATE 30 MG: 30 TABLET, EXTENDED RELEASE ORAL at 04:41

## 2024-05-10 ASSESSMENT — ENCOUNTER SYMPTOMS
CONSTITUTIONAL NEGATIVE: 1
HEADACHES: 0
RESPIRATORY NEGATIVE: 1
MUSCULOSKELETAL NEGATIVE: 1
MYALGIAS: 0
INSOMNIA: 0
BRUISES/BLEEDS EASILY: 0
DIZZINESS: 0
DIARRHEA: 0
SPEECH CHANGE: 1
GASTROINTESTINAL NEGATIVE: 1
FOCAL WEAKNESS: 0
WEIGHT LOSS: 0
CHILLS: 0
SHORTNESS OF BREATH: 0
VOMITING: 0
PALPITATIONS: 0
NAUSEA: 0
CARDIOVASCULAR NEGATIVE: 1
ABDOMINAL PAIN: 0
ORTHOPNEA: 0
CONSTIPATION: 0
NEUROLOGICAL NEGATIVE: 1
PND: 0
DEPRESSION: 0
EYES NEGATIVE: 1
DIAPHORESIS: 0
FEVER: 0
MEMORY LOSS: 0
BLOOD IN STOOL: 0
PSYCHIATRIC NEGATIVE: 1
COUGH: 0

## 2024-05-10 ASSESSMENT — COGNITIVE AND FUNCTIONAL STATUS - GENERAL
DRESSING REGULAR LOWER BODY CLOTHING: TOTAL
STANDING UP FROM CHAIR USING ARMS: A LOT
PERSONAL GROOMING: TOTAL
DRESSING REGULAR UPPER BODY CLOTHING: TOTAL
SUGGESTED CMS G CODE MODIFIER DAILY ACTIVITY: CN
MOVING TO AND FROM BED TO CHAIR: A LOT
MOBILITY SCORE: 10
TURNING FROM BACK TO SIDE WHILE IN FLAT BAD: A LOT
CLIMB 3 TO 5 STEPS WITH RAILING: TOTAL
DAILY ACTIVITIY SCORE: 6
SUGGESTED CMS G CODE MODIFIER MOBILITY: CL
EATING MEALS: TOTAL
TOILETING: TOTAL
MOVING FROM LYING ON BACK TO SITTING ON SIDE OF FLAT BED: A LOT
HELP NEEDED FOR BATHING: TOTAL
WALKING IN HOSPITAL ROOM: TOTAL

## 2024-05-10 ASSESSMENT — PAIN DESCRIPTION - PAIN TYPE
TYPE: ACUTE PAIN

## 2024-05-10 NOTE — DISCHARGE PLANNING
Received choice form @: No choice received  Agency/Facility name: [facility]  Sent referral per choice form @: [id number]

## 2024-05-10 NOTE — PROGRESS NOTES
Hospital Medicine Daily Progress Note    Date of Service  5/10/2024    Chief Complaint  abdominal pain    Hospital Course  Fouzia Santamaria is a 63 y.o. female with HFrEF (EF 35%), severe PAD status post aortic femoral bypass coronary disease status post LAD stent, COPD, diabetes, hypertension, and previous strokes, who was initially seen in the ED with abdominal pain for 10 days and subsequently discharged to home, who again presented with 2 days of bodyaches as well as worsening constipation, nausea and vomiting.  Workup showed leukocytosis with white count of 15,100 with left shift.  Chest x-ray showed mild pulmonary edema.  Abdominal x-ray showed significant amount of stool burden.  Echocardiogram was obtained which revealed that ejection fraction now down to 15% with severe mitral regurgitation.  She also had acute kidney injury and likely cardiorenal syndrome.  Cardiology and nephrology were consulted.  She was placed on a dobutamine drip.  Her renal function worsened and she required a right-sided dialysis catheter with initiation of dialysis.  Her liver enzymes went up to AST of 2959 and ALT of 1377.  She also had significant drop in both hemoglobin and platelet count, with worsening leukocytosis.  LDH was 1418, fibrinogen 91, ferritin 7333 with elevated PT, PTT and INR.  Imaging of the abdomen showed mild hepatomegaly with no splenomegaly.  Hematology/oncology was consulted for concerns for hemophagocytic lymphohistiocytosis (HLH) with low suspicion/probability for TTP or HIT. She was started on Decadron.  She had low fibrinogen for which she was given cryoprecipitate.  She is scheduled for bone marrow biopsy for 5/6/24.  Nephrology contemplating on kidney biopsy.    Interval Problem Update  5/6/2024 - I reviewed the patient's chart. There were no significant overnight events. Remains hemodynamically stable and afebrile. Stable on RA.  WBC 15,300.  Hemoglobin is stable.  Platelet count 61,000.   Creatinine 5.35.  Sodium 129.  Potassium is normal.  CPK improved to 2314.  INR 1.76, APTT 29.4.  Complement levels low including C3, C4.  Ferritin 2435.  Fibrinogen 118.  AST improved to 99, , alkaline phosphatase 155, normal bilirubin.  Maintaining sinus rhythm on telemetry.  She had a bone marrow biopsy this morning.  > I have personally seen and examined the patient today.  No complaints of pain.  No nausea or vomiting.  Not in respiratory distress.   at bedside, discussed plan of care.    5/7 Vitals stable on room air   WBC 13.4, Hb 9.1, plt 57   CPK 1072  INR 2, PT 23, fibrinogen 88.   -1 unit cyroprecipitate. Per oncology goal is fibrinogen >150, ok to check daily  Discussed with IR cannot do renal biopsy due to elevated INR, must be <1.8   Discussed with oncology, pathology recommended lymph node or liver biopsy for comparison for bone marrow as concern for HLH. Discussed with Dr. Longoria who approved biopsy.   Patient disoriented, main complaint is that she is hungry. Denies chest pain or dyspnea.    5/8 WBC 20.6, likely from IV steroids   Hb 10.9, plt 53   Potassium 6.2, CO2 12, phos 9.5  -remote cardiac monitoring  HD per nephrology today   INR 2.25, fibrinogen 101. Ordered cryo  -unable to do IR procedure today with INR and K levels   Patient very anxious moving about the bed not answering questions.   EKG ordered, reviewed patient in sinus rhythm. Qtc 515   IV ativan ordered prn for agitation.   Palliative care consulted     Patient was seen and examined at bedside.  I have personally reviewed and interpreted vitals, labs, and imaging.    5/9.  Afebrile.  Episode of bradycardia resolved.  Mild hypertension.  On room air.  Continue Phos binders and calcium supplementation.  Patient is very lethargic.  Keeps saying baby.  She is aphasic.  Points to her abdomen and apparently to localize pain.  Discussed with spouse Basilio.  Apparently when saying baby patient is calling for her spouse.  Long  discussion about plan of care and poor prognosis.  With renal failure now on dialysis, worsening heart failure, poor mentation and functional status.  Patient was agreeable with DNR/DNI.  Discussed with oncology.  Awaiting results of bone marrow biopsy and liver biopsy.  Transfuse cryo for fibrinogen of 130.  5/10.  Afebrile.  Intermittent bradycardia.  On room air.  Patient is lethargic.  Complains of pain but unable to localize.  In fetal position.  Due for dialysis today.  Discussed with oncology.  Awaiting results of bone marrow and liver biopsy.  Wbc 13.4 > 20.6 > 22.3  Hgb 9.1 > 10.9 > 9.2  P 57 > 53 > 115  Cr 4.89  INR 2.02  Fibrinogen 130 > 156    I have discussed this patient's plan of care and discharge plan at IDT rounds today with Case Management, Nursing, Nursing leadership, and other members of the IDT team.    Consultants/Specialty  Hematology  Critical care  Nephrology  Cardiology  Palliative care     Code Status  DNAR/DNI    Disposition  The patient is not medically cleared for discharge to home or a post-acute facility.  Anticipate discharge to: home with organized home healthcare and close outpatient follow-up    Discharge plan TBD.  6 clicks 14 and 16.  OT recommends home health.  Pending PT evaluation.  May need outpatient hemodialysis chair placement.  I have placed the appropriate orders for post-discharge needs.    Review of Systems  Review of Systems   Unable to perform ROS: Medical condition   Constitutional:  Positive for malaise/fatigue.   Respiratory:  Negative for shortness of breath.    Cardiovascular:  Negative for chest pain.   Neurological:  Positive for speech change.        Physical Exam  Temp:  [36.1 °C (97 °F)-36.9 °C (98.4 °F)] 36.4 °C (97.6 °F)  Pulse:  [50-69] 56  Resp:  [16-20] 18  BP: (123-147)/(66-94) 123/87  SpO2:  [90 %-99 %] 95 %    Physical Exam  Vitals reviewed.   Constitutional:       General: She is not in acute distress.     Appearance: She is ill-appearing.    HENT:      Head: Normocephalic and atraumatic.   Eyes:      Conjunctiva/sclera: Conjunctivae normal.   Neck:      Comments: Right IJ dialysis cath  Cardiovascular:      Rate and Rhythm: Normal rate and regular rhythm.      Heart sounds: Normal heart sounds.   Pulmonary:      Effort: Pulmonary effort is normal. No respiratory distress.      Breath sounds: Normal breath sounds. No wheezing.   Abdominal:      General: There is no distension.      Palpations: Abdomen is soft.      Tenderness: There is no abdominal tenderness. There is no guarding.   Musculoskeletal:         General: No swelling. Normal range of motion.      Cervical back: Normal range of motion and neck supple. No muscular tenderness.      Right lower leg: No edema.      Left lower leg: No edema.   Skin:     General: Skin is warm and dry.      Coloration: Skin is not jaundiced.      Findings: Bruising and rash present.      Comments: Small scabs right forearm and bilat feet  (+) petechial rashes   Neurological:      General: No focal deficit present.      Mental Status: She is alert.      Cranial Nerves: No cranial nerve deficit.      Comments: She moves her extremities equally  (+) Expressive aphasia   Psychiatric:      Comments: Unable to assess due to aphasia           Fluids    Intake/Output Summary (Last 24 hours) at 5/10/2024 0525  Last data filed at 5/10/2024 0107  Gross per 24 hour   Intake 230 ml   Output 25 ml   Net 205 ml       Laboratory  Recent Labs     05/08/24  0246 05/10/24  0000   WBC 20.6* 22.3*   RBC 3.72* 3.17*   HEMOGLOBIN 10.9* 9.2*   HEMATOCRIT 33.8* 30.7*   MCV 90.9 96.8   MCH 29.3 29.0   MCHC 32.2 30.0*   RDW 53.4* 57.8*   PLATELETCT 53* 115*   MPV  --  12.5     Recent Labs     05/08/24  0246 05/09/24  0530 05/10/24  0000   SODIUM 134* 137 137   POTASSIUM 6.2* 4.6 5.0   CHLORIDE 92* 95* 94*   CO2 12* 23 19*   GLUCOSE 174* 172* 164*   BUN 72* 46* 68*   CREATININE 4.86* 3.66* 4.89*   CALCIUM 7.2* 7.7* 7.9*     Recent Labs      05/08/24  0633 05/09/24  0530 05/10/24  0000   APTT 29.2 27.4 27.0   INR 2.25* 1.99* 2.02*                 Imaging  IR-LIVER BX WITH OTHER STUDY   Final Result      1.  Transjugular hepatic venography showing a patent right hepatic vein.      2. 18 transjugular liver biopsies x 2.      3. Exchange and replacement of the right temporary dialysis catheter with a new 12 [place] 15 cm catheter.      DX-CHEST-FOR LINE PLACEMENT Perform procedure in: Other(comment f6 below):   Final Result      1.  Right IJ catheter has been placed and the tip projects appropriately over the superior vena cava.      2.  Cardiomegaly with evidence of mild fluid overload.      CT-ABDOMEN-PELVIS W/O   Final Result      1.  No evidence of bowel obstruction or focal inflammatory change.      2.  Again seen extensive atherosclerotic vascular calcification with distal thoracic/upper abdominal aortic ectasia measuring approximately 3.2 cm in diameter.      3.  Gallstones within the gallbladder.      4.  Hyperdense left renal cyst likely representing hemorrhagic or milk of calcium within a cyst.      5.  Small amount of free fluid dependently within the pelvis.      DX-CHEST-PORTABLE (1 VIEW)   Final Result      Cardiomegaly.      US-ABDOMEN COMPLETE SURVEY   Final Result      1.  Mild hepatomegaly.      2.  Sludge noted in the gallbladder.      3.  Small echogenic foci in the periphery of the renal collecting systems bilaterally suspicious for early nephrolithiasis.      4.  Bilateral renal cysts.         DX-CHEST-LIMITED (1 VIEW)   Final Result         1.  No acute cardiopulmonary disease.   2.  Atherosclerosis      EC-ECHOCARDIOGRAM COMPLETE W/ CONT   Final Result      DX-CHEST-LIMITED (1 VIEW)   Final Result      1.  Enlarged cardiac silhouette with changes of pulmonary edema.      [id number] VIEW   Final Result      1.  Nonobstructive bowel gas pattern with a large amount of colonic stool.      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  Imaging of the abdomen showed mild hepatomegaly with no splenomegaly.    - Hematology/oncology was consulted for concerns for hemophagocytic lymphohistiocytosis (HLH) with low suspicion/probability for TTP or HIT.   -Continue Decadron.  -S/p bone marrow biopsy 5/6/24.  Follow pathology.   -Monitor for coagulopathy.  Daily fibrinogen, PT/PTT.  Transfused with cryoprecipitate if fibrinogen less than 150.  Oncology recommended liver or lymph node biopsy   Status post IR transjugular liver biopsy with portal venography 5/8    Hyperlipidemia- (present on admission)  Assessment & Plan  5/10/2024  - Hold off on statin due to rhabdomyolysis.    Cardiogenic shock (HCC)- (present on admission)  Assessment & Plan  5/10/2024  -In the setting of low ejection fraction of 15%.  Required dobutamine drip.  Shock resolved.  -Management of HFrEF as above.    History of stroke- (present on admission)  Assessment & Plan  5/10/2024  -With chronic aphasia.  Appears at baseline.  Monitor.    Rhabdomyolysis- (present on admission)  Assessment & Plan  5/10/2024  -CPK was over 5,000. May be due to hemophagocytic lymphohistiocytosis.  -Holding off on IV fluids given IVON/dialysis dependent.  -CPK continues to trend down.  Continue to monitor.  CPK levels in the morning.    Thrombocytopenia (HCC)- (present on admission)  Assessment & Plan  5/10/2024  - In setting of HLH, thrombocytopenia.  Low suspicion/probability for TTP or HIT.  -Continue Decadron.  -Continue to monitor platelet counts.  Restrictive transfusion strategy.  Watch for bleeding.     Coagulopathy (HCC)- (present on admission)  Assessment & Plan  5/10/2024  - In setting of HLH, thrombocytopenia.  Low suspicion/probability for TTP or HIT.  Fibrinogen low.  -Continue Decadron.  -s/p  bone marrow biopsy 5/6.  -Monitor PT/PTT/INR and daily fibrinogen.  Watch for bleeding.  -Follow fibrinogen, transfuse with cryoprecipitate if </= 150.  She gets spontaneous bleeding with low fibrinogen levels.    IVON (acute kidney injury) (HCC)- (present on admission)  Assessment & Plan  5/10/2024  - Could be cardiorenal in setting of HFrEF, along with possible HLH.  -Now on hemodialysis.  -Nephrology following.  -Monitor electrolytes closely.  -If continues to require hemodialysis, will need outpatient hemodialysis chair arrangements.  -IR consult for kidney biopsy.    Transaminitis  Assessment & Plan  5/10/2024  -Likely congestive hepatopathy due to HFrEF.  Also in setting of rhabdomyolysis.  HLH could be contributing.  -Continue to trend LFTs, continues to trend down.  -Continue to trend CPK.    Severe mitral regurgitation- (present on admission)  Assessment & Plan  5/10/2024  -Per echo.  Management as above.    Heart failure with reduced ejection fraction (HCC)- (present on admission)  Assessment & Plan  5/10/2024  - Had a drop in EF to 15% from previous 35%.  Required dobutamine drip.  -Continue volume removal with hemodialysis.  -Optimize GDMT.  Unable to give ACEi/ARB/ARNI/SGLT2i given renal function.  Continue hydralazine/Imdur for afterload reduction.  Continue Toprol-XL 12.5 mg daily, adjust up as blood pressure tolerates.  Monitor blood pressure to make sure she tolerates medications.  -Monitor on telemetry.    Hyperkalemia- (present on admission)  Assessment & Plan  5/10/2024  2/2 renal failure   Continue hemodialysis per nephro  Daily labs   Remote cardiac monitoring     Leukocytosis- (present on admission)  Assessment & Plan  5/10/2024  -Concerning for HLH.  -s/p bone marrow biopsy 5/6/2024. Follow pathology.   -No signs of sepsis or infection.  Holding off on further antibiotics.  Has had 5 days of antibiotics in the hospital.  -Trend WBC count.    Advanced care planning/counseling discussion-  (present on admission)  Assessment & Plan  Patient is aphasic and lethargic, unable to participated in the conversation.  Spouse Basilio was present for the conversation.  I discussed advance care planning face to face with the patient and family for at least 30 minutes, including diagnosis, prognosis, plan of care, risks and benefits of any therapies that could be offered, as well as alternatives including palliation and hospice, as appropriate.  Forms were completed and placed in the chart.  Patient is now DNR/DNI.         VTE prophylaxis: SCD    I have performed a physical exam and reviewed and updated ROS and Plan today (5/10/2024). In review of yesterday's note (5/9/2024), there are no changes except as documented above.     Greater than 50 minutes spent prepping to see patient (e.g. review of tests) obtaining and/or reviewing separately obtained history. Performing a medically appropriate examination and/ evaluation.  Counseling and educating the patient/family/caregiver.  Ordering medications, tests, or procedures.  Referring and communicating with other health care professionals.  Documenting clinical information in EPIC.  Independently interpreting results and communicating results to patient/family/caregiver.  Care coordination.

## 2024-05-10 NOTE — PROGRESS NOTES
"Hematology/Oncology Progress Note    Primary Hematologist/Oncologist: Miguelina    Oncology History:  4/27/2024: Presented to hospital with 10 days of flulike symptoms, abdominal pain, and found to have rhinovirus and enterovirus infections.  Rapidly deteriorated with LFTs rising to AST/ALT 2739/1024 along with acute renal failure requiring dialysis.  She also had decompensation of her congestive heart failure requiring transfer to the ICU for dobutamine drip.  Hemoglobin precipitously dropped to 8.3 and platelets also dropped to 57 along with a new leukocytosis.  Her workup was also not notable for normal triglycerides but a ferritin of 14,244.  CT CAP demonstrated no hepatosplenomegaly.    Interval History:  Bone marrow and liver biopsy remain pending. She remains stable but is neither improving nor worsening.     Review of Systems:  Review of Systems   Constitutional: Negative.  Negative for chills, diaphoresis, fever, malaise/fatigue and weight loss.   HENT: Negative.     Eyes: Negative.    Respiratory: Negative.  Negative for cough.    Cardiovascular: Negative.  Negative for chest pain, palpitations, orthopnea, leg swelling and PND.   Gastrointestinal: Negative.  Negative for abdominal pain, blood in stool, constipation, diarrhea, melena, nausea and vomiting.   Genitourinary: Negative.  Negative for dysuria, frequency, hematuria and urgency.   Musculoskeletal: Negative.  Negative for myalgias.   Skin: Negative.    Neurological: Negative.  Negative for dizziness, focal weakness and headaches.   Endo/Heme/Allergies: Negative.  Does not bruise/bleed easily.   Psychiatric/Behavioral: Negative.  Negative for depression and memory loss. The patient does not have insomnia.         Vitals:     /67   Pulse (!) 55   Temp 36.6 °C (97.8 °F) (Axillary)   Resp 16   Ht 1.651 m (5' 5\")   Wt 50.6 kg (111 lb 8.8 oz)   SpO2 93%   BMI 18.56 kg/m²     Physical Exam:  Physical Exam  Vitals and nursing note reviewed. "   Constitutional:       General: She is not in acute distress.     Appearance: She is not ill-appearing or toxic-appearing.   HENT:      Head: Normocephalic and atraumatic.      Mouth/Throat:      Mouth: Mucous membranes are moist.   Eyes:      Pupils: Pupils are equal, round, and reactive to light.   Cardiovascular:      Rate and Rhythm: Normal rate and regular rhythm.      Pulses: Normal pulses.      Heart sounds: Normal heart sounds. No murmur heard.     No friction rub. No gallop.   Pulmonary:      Effort: Pulmonary effort is normal. No respiratory distress.      Breath sounds: Normal breath sounds. No stridor. No wheezing, rhonchi or rales.   Chest:      Chest wall: No tenderness.   Abdominal:      General: Abdomen is flat. Bowel sounds are normal.      Palpations: Abdomen is soft.   Musculoskeletal:         General: No swelling. Normal range of motion.      Cervical back: Normal range of motion. No rigidity.      Right lower leg: No edema.   Skin:     General: Skin is warm and dry.      Capillary Refill: Capillary refill takes 2 to 3 seconds.      Coloration: Skin is not jaundiced.   Neurological:      General: No focal deficit present.      Mental Status: She is alert and oriented to person, place, and time. Mental status is at baseline.      Cranial Nerves: No cranial nerve deficit.   Psychiatric:         Mood and Affect: Mood normal.          Assessment and Plan:    Concern for HLH  Primary HLH (a true hematologic neoplasm) or secondary HLH (inflammatory dysregulation leading to cytokine storm caused by a separate nonhematologic etiology) are both on the differential.  While we are awaiting bone marrow biopsy to evaluate for true hemophagocytosis, the patient does meet some of the modified HLH-94 criteria including 2 of 4 clinical findings (cytopenias, hepatitis, but no fever) and at least 1 marker of immune over-activation (increased ferritin, hypofibrinogenemia).    BMBx and liver biopsies are still  pending.  Soluble IL-2 receptor levels and CXCL9 levels are pending via [facility].     While we await bone marrow biopsy, continue dexamethasone 6mg q8h (just over 10mg/m2).     Acute renal failure  Nephrology following.  On hemodialysis.  Remains oliguric.    Coagulopathy  Secondary to liver dysfunction vs HLH.  Transfuse cryoprecipitate to maintain fibrinogen greater than 150    Elevated LFTs  Improving following dialysis.  Suspect some level of congestive hepatopathy.    Heart failure with reduced ejection fraction  Drop in EF to 15% from previously 35%.  Required a dobutamine drip this admission.  Medicine currently optimizing GDMT.    Rhabdomyolysis  CPK greater than 5000.  Nephrology on board.  Underlying etiology is unclear, potentially secondary to HLH.    History of stroke  Chronic aphasia.  Neurologic status at baseline.    Thank you for allowing me to participate in her care. Please do not hesitate to reach out with any questions.    Please note that this dictation was created using voice recognition software. I have made every reasonable attempt to correct obvious errors, but I expect that there are errors of grammar and possibly content that I did not discover before finalizing the note.      Zachariah Fay MD  Hematologist/Oncologist  [facility]   [facility] - [employer] [facility] [employer]

## 2024-05-10 NOTE — DISCHARGE PLANNING
Case Management Discharge Planning    Admission Date: 4/27/2024  GMLOS: 4.2  ALOS: 13    6-Clicks ADL Score: 6  6-Clicks Mobility Score: 10  PT and/or OT Eval ordered: Yes  Post-acute Referrals Ordered: Yes  Post-acute Choice Obtained: No  Has referral(s) been sent to post-acute provider:  No      Anticipated Discharge Dispo: Discharge Disposition: D/T to home under A care in anticipation of covered skilled care (06)  Discharge Address: [address] [place] [id number]    DME Needed: No    Action(s) Taken: Patient discussed in IDT rounds.  Patient will go to SNF vs. home with home health.  Patient is accepted at [facility], but blanket SNF referral was sent.  Patient will also need outpatient hemodialysis.  [facility] is aware that patient will need a dialysis chair.  RN CM to continue to follow for any discharge needs.      LOC# [id number]    PASRR# [id number]    Escalations Completed: None    Medically Clear: No    Next Steps: RN CM to continue to follow for any discharge planning needs     Barriers to Discharge: None    Is the patient up for discharge tomorrow: No

## 2024-05-10 NOTE — CARE PLAN
The patient is Watcher - Medium risk of patient condition declining or worsening    Shift Goals  Clinical Goals: pain management, monitor vitals  Patient Goals: alan  Family Goals: not present    Progress made toward(s) clinical / shift goals:       Problem: Skin Integrity  Goal: Skin integrity is maintained or improved  Outcome: Progressing  Note: Patient turns self from side to side, pillows in place for support and positioning. Guerrero in place.      Problem: Fall Risk  Goal: Patient will remain free from falls  Outcome: Progressing  Note: Patient remained free from falls throughout shift. Bed locked and in lowest position, call light and belongings within reach. Room free from clutter.        Patient is not progressing towards the following goals:      Problem: Knowledge Deficit - Standard  Goal: Patient and family/care givers will demonstrate understanding of plan of care, disease process/condition, diagnostic tests and medications  Outcome: Not Progressing  Note: Patient responds to voice. Does not answer questions or follow commands, only moans.

## 2024-05-10 NOTE — PROGRESS NOTES
[facility] -  PROGRESS NOTE               Author: Sarah Ortiz D.O. Date & Time: 5/10/2024  11:37 AM     CC: abd pain, n/v    HISTORY OF PRESENT ILLNESS:    63 y.o. female with history of systolic and diastolic heart failure with EF of 15% (HFrEF) grade 2 diastolic dysfunction, severe MVR, CAD with h/o STEMI s/p DEWEY to LAD, PAD with aortofemoral bypass surgery who presented 4/27/2024 with increasing shortness of breath admitted for community acquired PNA and severe constipation with hosp course complicated by exacerbation of her HFrEF, medications augmented which was followed by IVON, worsening hepatopathy concerning for cardiorenal etiology.   She was then transferred to ICU 5/01 and initiated on dobutamine drip for organ perfusion.   Patient continues to be oliguric with UOP of 86 yesterday and 10 ml today so far.   Nephrology has been consulted for concerns of IVON , cardiorenal syndrome.     DAILY NEPHROLOGY SUMMARY:  05/02: Examined at bedside. Looks like she wants to talk but it is difficult for her to find appropriate words.   Dobutamine ggt held per critical care today.   Labs yesterday with fibrinogen 91, cryoppt administered followed by dialysis   1 L ultrafiltration   Uptrending WBCs since 4/30, 18.4 today  Scr improved to 3.03 after dialysis   Down trending AST/ALT   CT scan without bowel obs, extensive calcification of distal thoracic/upper abd aorta. Left renal cyst.   5/4: Pt transferred out of ICU, now with increased CPK and climbing  5/5: Pt still anuric, HD yesterday  5/6: minimal UOP, SBP 90s-140s, platelets stable at 61,000, CPK trended down to 2314, BM biopsy planned for today, patient denies CP/SOB  5/7: minimal UOP, tolerated HD, SBP 110s-120s, fibrinogen 88 and patient provided with 1u cryo, BM biopsy done, platelets 57,000, no new c/o  5/8: CO2 12 and K 6.2 this AM, WBC trended up (on steroids), HD planned for today, platelets 53,000, does not answer questions  "today, appears agitated, does not have gown on  5/9: tolerated HD, transferred to oncology floor, reports she is hungry this AM, SBP 130s-150s, plts 53,000  5/10: HD planned for today, SBP 120s-130s, family at bedside, patient curled in bed, c/o pain but can't identify where, does not really answer other questions    REVIEW OF SYSTEMS:    ROS limited due to mental acuity, Chronic expressive aphasia.     PAST MEDICAL/SURGICAL/FAMILY/SOCIAL HISTORY:  Reviewed and documented in chart     HOME MEDICATIONS:   - Reviewed and documented in chart    LABORATORY STUDIES:   - Reviewed and documented in chart    ALLERGIES:  Pcn [penicillins] and Toradol    VS:  /82   Pulse 65   Temp 36 °C (96.8 °F) (Temporal)   Resp 16   Ht 1.651 m (5' 5\")   Wt 50.6 kg (111 lb 8.8 oz)   SpO2 95%   BMI 18.56 kg/m²   Physical Exam  Vitals and nursing note reviewed.   Constitutional:       General: She is not in acute distress.     Appearance: She is ill-appearing.   HENT:      Head: Normocephalic and atraumatic.      Right Ear: External ear normal.      Left Ear: External ear normal.      Nose: Nose normal.      Mouth/Throat:      Mouth: Mucous membranes are dry.   Eyes:      General:         Right eye: No discharge.         Left eye: No discharge.      Extraocular Movements: Extraocular movements intact.   Neck:      Comments: Right IJ trialysis catheter   Cardiovascular:      Rate and Rhythm: Normal rate and regular rhythm.      Pulses: Normal pulses.   Pulmonary:      Effort: Pulmonary effort is normal. No respiratory distress.   Abdominal:      General: There is no distension.      Palpations: Abdomen is soft.   Musculoskeletal:      Comments: Swelling b/l arms    Skin:     General: Skin is warm.      Capillary Refill: Capillary refill takes less than 2 seconds.      Findings: Bruising and rash present.      Comments: Excoriations on extremities    Neurological:      Mental Status: She is alert.      Comments: Chronic expressive " aphasia   Answers some questions   Psychiatric:         Mood and Affect: Mood normal.         Behavior: Behavior normal.         FLUID BALANCE:  In: 170 [P.O.:170]  Out: 25     LABS:  Recent Labs     05/08/24  0246 05/09/24  0530 05/10/24  0000   SODIUM 134* 137 137   POTASSIUM 6.2* 4.6 5.0   CHLORIDE 92* 95* 94*   CO2 12* 23 19*   GLUCOSE 174* 172* 164*   BUN 72* 46* 68*   CREATININE 4.86* 3.66* 4.89*   CALCIUM 7.2* 7.7* 7.9*        IMAGING:  - Imaging studies reviewed by me      IMPRESSION:     # Acute kidney injury multifactorial--Decreased perfusion, HLH, rhabdo  - unlikley TTP  - oliguric  - HD 5/2 1st  - C3 and C4 low, RALEEN negative, ANCA negative  - hepatitis negative  - PCR and ACR pending (minimal UOP)     # ? 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 ongoing  - steroids  -Bone Marrow 5/6    # Hepatic congestion      #Rhabdo unclear etiology, difficult to give fluids in face of reduced EF     # Heart failure with reduced ejection fraction      # Coagulopathy   - Fibinogen monitored, cryo when <100     # Transamnitis  - Improving       # Severe mitral regurgitation      # Peripheral vascular disease with prior aortic thrombus s/p aortofemoral bypass     # Constipation      # Impacted stool in intestine   - Resolved      # Colitis      # Leukocytosis      # Prior CVA    - Chronic expressive aphasia     # Essential hypertension    - goal <140/90, intermittently at goal    # Hyperkalemia, improved    # Acidosis, improved    # CKD MBD  - Ca low, phos elevated  -         PLAN:  - HD qMWF and PRN for now  - Daily evaluation of RRT needs  - steroids per primary service/hematology  - Bone marrow 5/6, f/u findings  - renal biopsy ordered, though likely will only be obtained with improved platelets/coagulation status  - Avoid nephrotoxins, NSAIDs  - Renally dose meds   - daily labs  - strict I/O  - recommend dietary supplements be adjusted for renal failure  - start calcium acetate 1334mg TID with meals,  will consider adding sevelamer if phos doesn't trend down       Further recs to follow    Other management per primary service

## 2024-05-10 NOTE — CARE PLAN
The patient is Watcher - Medium risk of patient condition declining or worsening    Shift Goals  Clinical Goals: monitor vitals, pain control,  Patient Goals: alan  Family Goals: na    Progress made toward(s) clinical / shift goals:  Vitals reviewed, patient medicated for FLACC scores, reassessments ongoing,     Patient is not progressing towards the following goals:      Problem: Knowledge Deficit - Standard  Goal: Patient and family/care givers will demonstrate understanding of plan of care, disease process/condition, diagnostic tests and medications  Description: Target End Date:  1-3 days or as soon as patient condition allows    Document in Patient Education    1.  Patient and family/caregiver oriented to unit, equipment, visitation policy and means for communicating concern  2.  Complete/review Learning Assessment  3.  Assess knowledge level of disease process/condition, treatment plan, diagnostic tests and medications  4.  Explain disease process/condition, treatment plan, diagnostic tests and medications  Outcome: Not Progressing   No evidence of learning. Ongoing education in place.

## 2024-05-11 ENCOUNTER — APPOINTMENT (OUTPATIENT)
Dept: RADIOLOGY | Facility: MEDICAL CENTER | Age: 64
DRG: 280 | End: 2024-05-11
Attending: INTERNAL MEDICINE
Payer: MEDICAID

## 2024-05-11 LAB
ALBUMIN SERPL BCP-MCNC: 3.5 G/DL (ref 3.2–4.9)
ALBUMIN SERPL BCP-MCNC: 3.7 G/DL (ref 3.2–4.9)
ALBUMIN/GLOB SERPL: 1 G/DL
ALBUMIN/GLOB SERPL: 1 G/DL
ALP SERPL-CCNC: 185 U/L (ref 30–99)
ALP SERPL-CCNC: 225 U/L (ref 30–99)
ALT SERPL-CCNC: 170 U/L (ref 2–50)
ALT SERPL-CCNC: 197 U/L (ref 2–50)
ANION GAP SERPL CALC-SCNC: 25 MMOL/L (ref 7–16)
ANION GAP SERPL CALC-SCNC: 38 MMOL/L (ref 7–16)
ANISOCYTOSIS BLD QL SMEAR: ABNORMAL
APTT PPP: 29.4 SEC (ref 24.7–36)
AST SERPL-CCNC: 61 U/L (ref 12–45)
AST SERPL-CCNC: 82 U/L (ref 12–45)
BARCODED ABORH UBTYP: 5100
BARCODED ABORH UBTYP: 5100
BARCODED ABORH UBTYP: 6200
BARCODED PRD CODE UBPRD: NORMAL
BARCODED UNIT NUM UBUNT: NORMAL
BASOPHILS # BLD AUTO: 0.1 % (ref 0–1.8)
BASOPHILS # BLD: 0.02 K/UL (ref 0–0.12)
BILIRUB SERPL-MCNC: 1.6 MG/DL (ref 0.1–1.5)
BILIRUB SERPL-MCNC: 2.7 MG/DL (ref 0.1–1.5)
BUN SERPL-MCNC: 55 MG/DL (ref 8–22)
BUN SERPL-MCNC: 84 MG/DL (ref 8–22)
BURR CELLS BLD QL SMEAR: NORMAL
CALCIUM ALBUM COR SERPL-MCNC: 8.6 MG/DL (ref 8.5–10.5)
CALCIUM ALBUM COR SERPL-MCNC: 9.2 MG/DL (ref 8.5–10.5)
CALCIUM SERPL-MCNC: 8.4 MG/DL (ref 8.5–10.5)
CALCIUM SERPL-MCNC: 8.8 MG/DL (ref 8.5–10.5)
CHLORIDE SERPL-SCNC: 93 MMOL/L (ref 96–112)
CHLORIDE SERPL-SCNC: 94 MMOL/L (ref 96–112)
CO2 SERPL-SCNC: 10 MMOL/L (ref 20–33)
CO2 SERPL-SCNC: 18 MMOL/L (ref 20–33)
COMMENT 1642: NORMAL
COMPONENT CT 8504CT: NORMAL
CREAT SERPL-MCNC: 4.02 MG/DL (ref 0.5–1.4)
CREAT SERPL-MCNC: 5.62 MG/DL (ref 0.5–1.4)
EOSINOPHIL # BLD AUTO: 0 K/UL (ref 0–0.51)
EOSINOPHIL NFR BLD: 0 % (ref 0–6.9)
ERYTHROCYTE [DISTWIDTH] IN BLOOD BY AUTOMATED COUNT: 90.7 FL (ref 35.9–50)
FIBRINOGEN PPP-MCNC: 87 MG/DL (ref 215–460)
GFR SERPLBLD CREATININE-BSD FMLA CKD-EPI: 12 ML/MIN/1.73 M 2
GFR SERPLBLD CREATININE-BSD FMLA CKD-EPI: 8 ML/MIN/1.73 M 2
GLOBULIN SER CALC-MCNC: 3.4 G/DL (ref 1.9–3.5)
GLOBULIN SER CALC-MCNC: 3.7 G/DL (ref 1.9–3.5)
GLUCOSE SERPL-MCNC: 145 MG/DL (ref 65–99)
GLUCOSE SERPL-MCNC: 95 MG/DL (ref 65–99)
HCT VFR BLD AUTO: 37 % (ref 37–47)
HGB BLD-MCNC: 11.4 G/DL (ref 12–16)
HOWELL-JOLLY BOD BLD QL SMEAR: NORMAL
HYPOCHROMIA BLD QL SMEAR: ABNORMAL
IMM GRANULOCYTES # BLD AUTO: 0.33 K/UL (ref 0–0.11)
IMM GRANULOCYTES NFR BLD AUTO: 1.6 % (ref 0–0.9)
INR PPP: 2.29 (ref 0.87–1.13)
LYMPHOCYTES # BLD AUTO: 0.08 K/UL (ref 1–4.8)
LYMPHOCYTES NFR BLD: 0.4 % (ref 22–41)
MACROCYTES BLD QL SMEAR: ABNORMAL
MAGNESIUM SERPL-MCNC: 2.5 MG/DL (ref 1.5–2.5)
MCH RBC QN AUTO: 29.8 PG (ref 27–33)
MCHC RBC AUTO-ENTMCNC: 30.8 G/DL (ref 32.2–35.5)
MCV RBC AUTO: 96.9 FL (ref 81.4–97.8)
MICROCYTES BLD QL SMEAR: ABNORMAL
MONOCYTES # BLD AUTO: 0.66 K/UL (ref 0–0.85)
MONOCYTES NFR BLD AUTO: 3.3 % (ref 0–13.4)
MORPHOLOGY BLD-IMP: NORMAL
NEUTROPHILS # BLD AUTO: 19.09 K/UL (ref 1.82–7.42)
NEUTROPHILS NFR BLD: 94.6 % (ref 44–72)
NRBC # BLD AUTO: 1.15 K/UL
NRBC BLD-RTO: 5.7 /100 WBC (ref 0–0.2)
OVALOCYTES BLD QL SMEAR: NORMAL
PHOSPHATE SERPL-MCNC: 9.5 MG/DL (ref 2.5–4.5)
PLATELET # BLD AUTO: 71 K/UL (ref 164–446)
PLATELET BLD QL SMEAR: NORMAL
PLATELETS.RETICULATED NFR BLD AUTO: 13.6 % (ref 0.6–13.1)
POIKILOCYTOSIS BLD QL SMEAR: NORMAL
POLYCHROMASIA BLD QL SMEAR: NORMAL
POTASSIUM SERPL-SCNC: 5.4 MMOL/L (ref 3.6–5.5)
POTASSIUM SERPL-SCNC: 6.1 MMOL/L (ref 3.6–5.5)
PRODUCT TYPE UPROD: NORMAL
PROT SERPL-MCNC: 6.9 G/DL (ref 6–8.2)
PROT SERPL-MCNC: 7.4 G/DL (ref 6–8.2)
PROTHROMBIN TIME: 25.6 SEC (ref 12–14.6)
RBC # BLD AUTO: 3.82 M/UL (ref 4.2–5.4)
RBC BLD AUTO: PRESENT
SODIUM SERPL-SCNC: 136 MMOL/L (ref 135–145)
SODIUM SERPL-SCNC: 142 MMOL/L (ref 135–145)
UNIT STATUS USTAT: NORMAL
WBC # BLD AUTO: 20.2 K/UL (ref 4.8–10.8)

## 2024-05-11 PROCEDURE — 99233 SBSQ HOSP IP/OBS HIGH 50: CPT | Performed by: INTERNAL MEDICINE

## 2024-05-11 RX ADMIN — OXYCODONE 5 MG: 5 TABLET ORAL at 23:45

## 2024-05-11 RX ADMIN — DEXAMETHASONE 6 MG: 4 TABLET ORAL at 21:13

## 2024-05-11 RX ADMIN — PRAMIPEXOLE DIHYDROCHLORIDE 0.12 MG: 0.12 TABLET ORAL at 21:13

## 2024-05-11 RX ADMIN — VENLAFAXINE HYDROCHLORIDE 37.5 MG: 37.5 CAPSULE, EXTENDED RELEASE ORAL at 05:09

## 2024-05-11 RX ADMIN — PRAMIPEXOLE DIHYDROCHLORIDE 0.12 MG: 0.12 TABLET ORAL at 15:00

## 2024-05-11 RX ADMIN — PRAMIPEXOLE DIHYDROCHLORIDE 0.12 MG: 0.12 TABLET ORAL at 09:05

## 2024-05-11 RX ADMIN — HYDRALAZINE HYDROCHLORIDE 25 MG: 50 TABLET ORAL at 21:13

## 2024-05-11 RX ADMIN — Medication 1334 MG: at 17:09

## 2024-05-11 RX ADMIN — MUPIROCIN 10 ML: 20 OINTMENT TOPICAL at 17:09

## 2024-05-11 RX ADMIN — MUPIROCIN 1 APPLICATION: 20 OINTMENT TOPICAL at 05:09

## 2024-05-11 RX ADMIN — ISOSORBIDE MONONITRATE 30 MG: 30 TABLET, EXTENDED RELEASE ORAL at 05:10

## 2024-05-11 RX ADMIN — HYDROXYZINE HYDROCHLORIDE 25 MG: 25 TABLET, FILM COATED ORAL at 21:13

## 2024-05-11 RX ADMIN — Medication 1334 MG: at 11:18

## 2024-05-11 RX ADMIN — OXYCODONE 5 MG: 5 TABLET ORAL at 01:38

## 2024-05-11 RX ADMIN — HYDROXYZINE HYDROCHLORIDE 25 MG: 25 TABLET, FILM COATED ORAL at 06:22

## 2024-05-11 RX ADMIN — OXYCODONE 5 MG: 5 TABLET ORAL at 06:22

## 2024-05-11 RX ADMIN — HYDRALAZINE HYDROCHLORIDE 25 MG: 50 TABLET ORAL at 05:09

## 2024-05-11 RX ADMIN — METOPROLOL SUCCINATE 12.5 MG: 25 TABLET, FILM COATED, EXTENDED RELEASE ORAL at 05:09

## 2024-05-11 RX ADMIN — DEXAMETHASONE 6 MG: 4 TABLET ORAL at 15:01

## 2024-05-11 RX ADMIN — Medication 1334 MG: at 07:31

## 2024-05-11 RX ADMIN — DEXAMETHASONE 6 MG: 4 TABLET ORAL at 05:09

## 2024-05-11 RX ADMIN — HYDRALAZINE HYDROCHLORIDE 25 MG: 50 TABLET ORAL at 15:01

## 2024-05-11 ASSESSMENT — PAIN DESCRIPTION - PAIN TYPE
TYPE: ACUTE PAIN

## 2024-05-11 ASSESSMENT — ENCOUNTER SYMPTOMS
SPEECH CHANGE: 1
SHORTNESS OF BREATH: 0

## 2024-05-11 NOTE — CARE PLAN
The patient is Watcher - Medium risk of patient condition declining or worsening    Shift Goals  Clinical Goals: Safety, stable VS  Patient Goals: AFIA  Family Goals: AFIA    Progress made toward(s) clinical / shift goals:      Problem: Pain - Standard  Goal: Alleviation of pain or a reduction in pain to the patient’s comfort goal  Description: Target End Date:  Prior to discharge or change in level of care    Document on Vitals flowsheet    1.  Document pain using the appropriate pain scale per order or unit policy  2.  Educate and implement non-pharmacologic comfort measures (i.e. relaxation, distraction, massage, cold/heat therapy, etc.)  3.  Pain management medications as ordered  4.  Reassess pain after pain med administration per policy  5.  If opiods administered assess patient's response to pain medication is appropriate per POSS sedation scale  6.  Follow pain management plan developed in collaboration with patient and interdisciplinary team (including palliative care or pain specialists if applicable)  Outcome: Progressing     Problem: Skin Integrity  Goal: Skin integrity is maintained or improved  Description: Target End Date:  Prior to discharge or change in level of care    Document interventions on Skin Risk/Manolo flowsheet groups and corresponding LDA    1.  Assess and monitor skin integrity, appearance and/or temperature  2.  Assess risk factors for impaired skin integrity and/or pressures ulcers  3.  Implement precautions to protect skin integrity in collaboration with interdisciplinary team  4.  Implement pressure ulcer prevention protocol if at risk for skin breakdown  5.  Confirm wound care consult if at risk for skin breakdown  6.  Ensure patient use of pressure relieving devices  (Low air loss bed, waffle overlay, heel protectors, ROHO cushion, etc)  Outcome: Progressing

## 2024-05-11 NOTE — PROGRESS NOTES
Lab called with a critical fibrinogen result of 87. MD notified. Cryoprecipitate ordered. Transfusion completed. VSS, patient tolerated well.

## 2024-05-11 NOTE — PROGRESS NOTES
[facility] Progress Note     Hemodialysis treatment  x 3 hours done today as ordered per Dr. Ortiz.  Treatment initiated at 1343 and ended at 1643.      Pt confused, restless at times, safety and fall precaution in pace, VSS.    Pt tolerated tx well, fatigued post tx, no signs of distress, VSS.. See e-flow sheets for further details.     Net UF : 1,000 mL     Post tx CVC care: R IJ non-tunneled HD ports cleansed per protocol, flushed with NS, locked with ACD per deisgnated amount, clamped and capped. CVC dressing assessed, CDI. Aspirate ACD prior to next CVC use.     Report given to primary RN.

## 2024-05-11 NOTE — CARE PLAN
The patient is Watcher - Medium risk of patient condition declining or worsening    Shift Goals  Clinical Goals: Monitor I/Os, satefy  Patient Goals: AFIA  Family Goals: not present    Progress made toward(s) clinical / shift goals:       Problem: Fall Risk  Goal: Patient will remain free from falls  Outcome: Progressing  Note: Patient remained free from falls throughout shift. Bed locked and in lowest position, call light and belongings within reach. Room free from clutter. Family at bedside.     Problem: Hemodynamics  Goal: Patient's hemodynamics, fluid balance and neurologic status will be stable or improve  Outcome: Progressing  Note: Patient received one unit of cryoprecipitate today. Tolerated transfusion well. VSS.       Patient is not progressing towards the following goals:      Problem: Knowledge Deficit - Standard  Goal: Patient and family/care givers will demonstrate understanding of plan of care, disease process/condition, diagnostic tests and medications  Outcome: Not Progressing  Note: Patient responds only to voice with moaning. Patient does not respond to commands.

## 2024-05-11 NOTE — PROGRESS NOTES
Hospital Medicine Daily Progress Note    Date of Service  5/11/2024    Chief Complaint  abdominal pain    Hospital Course  Fouzia Santamaria is a 63 y.o. female with HFrEF (EF 35%), severe PAD status post aortic femoral bypass coronary disease status post LAD stent, COPD, diabetes, hypertension, and previous strokes, who was initially seen in the ED with abdominal pain for 10 days and subsequently discharged to home, who again presented with 2 days of bodyaches as well as worsening constipation, nausea and vomiting.  Workup showed leukocytosis with white count of 15,100 with left shift.  Chest x-ray showed mild pulmonary edema.  Abdominal x-ray showed significant amount of stool burden.  Echocardiogram was obtained which revealed that ejection fraction now down to 15% with severe mitral regurgitation.  She also had acute kidney injury and likely cardiorenal syndrome.  Cardiology and nephrology were consulted.  She was placed on a dobutamine drip.  Her renal function worsened and she required a right-sided dialysis catheter with initiation of dialysis.  Her liver enzymes went up to AST of 2959 and ALT of 1377.  She also had significant drop in both hemoglobin and platelet count, with worsening leukocytosis.  LDH was 1418, fibrinogen 91, ferritin 7333 with elevated PT, PTT and INR.  Imaging of the abdomen showed mild hepatomegaly with no splenomegaly.  Hematology/oncology was consulted for concerns for hemophagocytic lymphohistiocytosis (HLH) with low suspicion/probability for TTP or HIT. She was started on Decadron.  She had low fibrinogen for which she was given cryoprecipitate.  She is scheduled for bone marrow biopsy for 5/6/24.  Nephrology contemplating on kidney biopsy.    Interval Problem Update  5/6/2024 - I reviewed the patient's chart. There were no significant overnight events. Remains hemodynamically stable and afebrile. Stable on RA.  WBC 15,300.  Hemoglobin is stable.  Platelet count 61,000.   Creatinine 5.35.  Sodium 129.  Potassium is normal.  CPK improved to 2314.  INR 1.76, APTT 29.4.  Complement levels low including C3, C4.  Ferritin 2435.  Fibrinogen 118.  AST improved to 99, , alkaline phosphatase 155, normal bilirubin.  Maintaining sinus rhythm on telemetry.  She had a bone marrow biopsy this morning.  > I have personally seen and examined the patient today.  No complaints of pain.  No nausea or vomiting.  Not in respiratory distress.   at bedside, discussed plan of care.    5/7 Vitals stable on room air   WBC 13.4, Hb 9.1, plt 57   CPK 1072  INR 2, PT 23, fibrinogen 88.   -1 unit cyroprecipitate. Per oncology goal is fibrinogen >150, ok to check daily  Discussed with IR cannot do renal biopsy due to elevated INR, must be <1.8   Discussed with oncology, pathology recommended lymph node or liver biopsy for comparison for bone marrow as concern for HLH. Discussed with Dr. Longoria who approved biopsy.   Patient disoriented, main complaint is that she is hungry. Denies chest pain or dyspnea.    5/8 WBC 20.6, likely from IV steroids   Hb 10.9, plt 53   Potassium 6.2, CO2 12, phos 9.5  -remote cardiac monitoring  HD per nephrology today   INR 2.25, fibrinogen 101. Ordered cryo  -unable to do IR procedure today with INR and K levels   Patient very anxious moving about the bed not answering questions.   EKG ordered, reviewed patient in sinus rhythm. Qtc 515   IV ativan ordered prn for agitation.   Palliative care consulted     Patient was seen and examined at bedside.  I have personally reviewed and interpreted vitals, labs, and imaging.    5/9.  Afebrile.  Episode of bradycardia resolved.  Mild hypertension.  On room air.  Continue Phos binders and calcium supplementation.  Patient is very lethargic.  Keeps saying baby.  She is aphasic.  Points to her abdomen and apparently to localize pain.  Discussed with spouse Basilio.  Apparently when saying baby patient is calling for her spouse.  Long  discussion about plan of care and poor prognosis.  With renal failure now on dialysis, worsening heart failure, poor mentation and functional status.  Patient was agreeable with DNR/DNI.  Discussed with oncology.  Awaiting results of bone marrow biopsy and liver biopsy.  Transfuse cryo for fibrinogen of 130.  5/10.  Afebrile.  Intermittent bradycardia.  On room air.  Patient is lethargic.  Complains of pain but unable to localize.  In fetal position.  Due for dialysis today.  Discussed with oncology.  Awaiting results of bone marrow and liver biopsy.  5/11.  Afebrile.  Intermittent bradycardia.  On room air.  Patient is confused.  Keeps repeating mama.  Curled in fetal position.  Transfuse cryoprecipitate for low fibrinogen.  Bone marrow and liver biopsy still pending.  Tolerated dialysis yesterday  Wbc 13.4 > 20.6 > 22.3 > 20.2  Hgb 9.1 > 10.9 > 9.2 > 11.4  P 57 > 53 > 115 > 71  Cr 4.89 > 5.62  INR 2.02  Fibrinogen 130 > 156 > 87    I have discussed this patient's plan of care and discharge plan at IDT rounds today with Case Management, Nursing, Nursing leadership, and other members of the IDT team.    Consultants/Specialty  Hematology  Critical care  Nephrology  Cardiology  Palliative care     Code Status  DNAR/DNI    Disposition  The patient is not medically cleared for discharge to home or a post-acute facility.  Anticipate discharge to: home with organized home healthcare and close outpatient follow-up    Discharge plan TBD.  6 clicks 14 and 16.  OT recommends home health.  Pending PT evaluation.  May need outpatient hemodialysis chair placement.  I have placed the appropriate orders for post-discharge needs.    Review of Systems  Review of Systems   Unable to perform ROS: Medical condition   Constitutional:  Positive for malaise/fatigue.   Respiratory:  Negative for shortness of breath.    Cardiovascular:  Negative for chest pain.   Neurological:  Positive for speech change.        Physical Exam  Temp:  [36 °C  (96.8 °F)-36.6 °C (97.8 °F)] 36.4 °C (97.5 °F)  Pulse:  [55-75] 66  Resp:  [16-18] 18  BP: (106-146)/(67-83) 124/78  SpO2:  [91 %-97 %] 95 %    Physical Exam  Vitals reviewed.   Constitutional:       General: She is not in acute distress.     Appearance: She is ill-appearing.   HENT:      Head: Normocephalic and atraumatic.   Eyes:      Conjunctiva/sclera: Conjunctivae normal.   Neck:      Comments: Right IJ dialysis cath  Cardiovascular:      Rate and Rhythm: Normal rate and regular rhythm.      Heart sounds: Normal heart sounds.   Pulmonary:      Effort: Pulmonary effort is normal. No respiratory distress.      Breath sounds: Normal breath sounds. No wheezing.   Abdominal:      General: There is no distension.      Palpations: Abdomen is soft.      Tenderness: There is no abdominal tenderness. There is no guarding.   Musculoskeletal:         General: No swelling. Normal range of motion.      Cervical back: Normal range of motion and neck supple. No muscular tenderness.      Right lower leg: No edema.      Left lower leg: No edema.   Skin:     General: Skin is warm and dry.      Coloration: Skin is not jaundiced.      Findings: Bruising and rash present.      Comments: Small scabs right forearm and bilat feet  (+) petechial rashes   Neurological:      General: No focal deficit present.      Mental Status: She is alert.      Cranial Nerves: No cranial nerve deficit.      Comments: She moves her extremities equally  (+) Expressive aphasia   Psychiatric:      Comments: Unable to assess due to aphasia           Fluids    Intake/Output Summary (Last 24 hours) at 5/11/2024 0611  Last data filed at 5/10/2024 1800  Gross per 24 hour   Intake 525 ml   Output 1500 ml   Net -975 ml       Laboratory  Recent Labs     05/10/24  0000   WBC 22.3*   RBC 3.17*   HEMOGLOBIN 9.2*   HEMATOCRIT 30.7*   MCV 96.8   MCH 29.0   MCHC 30.0*   RDW 57.8*   PLATELETCT 115*   MPV 12.5     Recent Labs     05/09/24  0530 05/10/24  0000   SODIUM 137  137   POTASSIUM 4.6 5.0   CHLORIDE 95* 94*   CO2 23 19*   GLUCOSE 172* 164*   BUN 46* 68*   CREATININE 3.66* 4.89*   CALCIUM 7.7* 7.9*     Recent Labs     05/08/24  0633 05/09/24  0530 05/10/24  0000   APTT 29.2 27.4 27.0   INR 2.25* 1.99* 2.02*                 Imaging  IR-LIVER BX WITH OTHER STUDY   Final Result      1.  Transjugular hepatic venography showing a patent right hepatic vein.      2. 18 transjugular liver biopsies x 2.      3. Exchange and replacement of the right temporary dialysis catheter with a new 12 [place] 15 cm catheter.      DX-CHEST-FOR LINE PLACEMENT Perform procedure in: Other(comment f6 below):   Final Result      1.  Right IJ catheter has been placed and the tip projects appropriately over the superior vena cava.      2.  Cardiomegaly with evidence of mild fluid overload.      CT-ABDOMEN-PELVIS W/O   Final Result      1.  No evidence of bowel obstruction or focal inflammatory change.      2.  Again seen extensive atherosclerotic vascular calcification with distal thoracic/upper abdominal aortic ectasia measuring approximately 3.2 cm in diameter.      3.  Gallstones within the gallbladder.      4.  Hyperdense left renal cyst likely representing hemorrhagic or milk of calcium within a cyst.      5.  Small amount of free fluid dependently within the pelvis.      DX-CHEST-PORTABLE (1 VIEW)   Final Result      Cardiomegaly.      US-ABDOMEN COMPLETE SURVEY   Final Result      1.  Mild hepatomegaly.      2.  Sludge noted in the gallbladder.      3.  Small echogenic foci in the periphery of the renal collecting systems bilaterally suspicious for early nephrolithiasis.      4.  Bilateral renal cysts.         DX-CHEST-LIMITED (1 VIEW)   Final Result         1.  No acute cardiopulmonary disease.   2.  Atherosclerosis      EC-ECHOCARDIOGRAM COMPLETE W/ CONT   Final Result      DX-CHEST-LIMITED (1 VIEW)   Final Result      1.  Enlarged cardiac silhouette with changes of pulmonary edema.       [id number] VIEW   Final Result      1.  Nonobstructive bowel gas pattern with a large amount of colonic stool.      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  Imaging of the abdomen showed mild hepatomegaly with no splenomegaly.    - Hematology/oncology was consulted for concerns for hemophagocytic lymphohistiocytosis (HLH) with low suspicion/probability for TTP or HIT.   -Continue Decadron.  -S/p bone marrow biopsy 5/6/24.  Follow pathology.   -Monitor for coagulopathy.  Daily fibrinogen, PT/PTT.  Transfused with cryoprecipitate if fibrinogen less than 150.  Oncology recommended liver or lymph node biopsy   Status post IR transjugular liver biopsy with portal venography 5/8    Hyperlipidemia- (present on admission)  Assessment & Plan  5/11/2024  - Hold off on statin due to rhabdomyolysis.    Cardiogenic shock (HCC)- (present on admission)  Assessment & Plan  5/11/2024  -In the setting of low ejection fraction of 15%.  Required dobutamine drip.  Shock resolved.  -Management of HFrEF as above.    History of stroke- (present on admission)  Assessment & Plan  5/11/2024  -With chronic aphasia.  Appears at baseline.  Monitor.    Rhabdomyolysis- (present on admission)  Assessment & Plan  5/11/2024  -CPK was over 5,000. May be due to hemophagocytic lymphohistiocytosis.  -Holding off on IV fluids given IVON/dialysis dependent.  -CPK continues to trend down.  Continue to monitor.  CPK levels in the morning.    Thrombocytopenia (HCC)- (present on admission)  Assessment & Plan  5/11/2024  - In setting of HLH, thrombocytopenia.  Low suspicion/probability for TTP or HIT.  -Continue Decadron.  -Continue to monitor platelet counts.  Restrictive transfusion strategy.  Watch for bleeding.     Coagulopathy (HCC)- (present on  admission)  Assessment & Plan  5/11/2024  - In setting of HLH, thrombocytopenia.  Low suspicion/probability for TTP or HIT.  Fibrinogen low.  -Continue Decadron.  -s/p bone marrow biopsy 5/6.  -Monitor PT/PTT/INR and daily fibrinogen.  Watch for bleeding.  -Follow fibrinogen, transfuse with cryoprecipitate if </= 150.  She gets spontaneous bleeding with low fibrinogen levels.    IVON (acute kidney injury) (HCC)- (present on admission)  Assessment & Plan  5/11/2024  - Could be cardiorenal in setting of HFrEF, along with possible HLH.  -Now on hemodialysis.  -Nephrology following.  -Monitor electrolytes closely.  -If continues to require hemodialysis, will need outpatient hemodialysis chair arrangements.  -IR consult for kidney biopsy.    Transaminitis  Assessment & Plan  5/11/2024  -Likely congestive hepatopathy due to HFrEF.  Also in setting of rhabdomyolysis.  HLH could be contributing.  -Continue to trend LFTs, continues to trend down.  -Continue to trend CPK.    Severe mitral regurgitation- (present on admission)  Assessment & Plan  5/11/2024  -Per echo.  Management as above.    Heart failure with reduced ejection fraction (HCC)- (present on admission)  Assessment & Plan  5/11/2024  - Had a drop in EF to 15% from previous 35%.  Required dobutamine drip.  -Continue volume removal with hemodialysis.  -Optimize GDMT.  Unable to give ACEi/ARB/ARNI/SGLT2i given renal function.  Continue hydralazine/Imdur for afterload reduction.  Continue Toprol-XL 12.5 mg daily, adjust up as blood pressure tolerates.  Monitor blood pressure to make sure she tolerates medications.  -Monitor on telemetry.    Hyperkalemia- (present on admission)  Assessment & Plan  5/11/2024  2/2 renal failure   Continue hemodialysis per nephro  Daily labs   Remote cardiac monitoring     Leukocytosis- (present on admission)  Assessment & Plan  5/11/2024  -Concerning for HLH.  -s/p bone marrow biopsy 5/6/2024. Follow pathology.   -No signs of sepsis or  infection.  Holding off on further antibiotics.  Has had 5 days of antibiotics in the hospital.  -Trend WBC count.  Has been dexamethasone.  Check procalcitonin and continue to trend leukocytosis    Advanced care planning/counseling discussion- (present on admission)  Assessment & Plan  Patient is aphasic and lethargic, unable to participated in the conversation.  Spouse Basilio was present for the conversation.  I discussed advance care planning face to face with the patient and family for at least 30 minutes, including diagnosis, prognosis, plan of care, risks and benefits of any therapies that could be offered, as well as alternatives including palliation and hospice, as appropriate.  Forms were completed and placed in the chart.  Patient is now DNR/DNI.         VTE prophylaxis: SCD    I have performed a physical exam and reviewed and updated ROS and Plan today (5/11/2024). In review of yesterday's note (5/10/2024), there are no changes except as documented above.     Greater than 51 minutes spent prepping to see patient (e.g. review of tests) obtaining and/or reviewing separately obtained history. Performing a medically appropriate examination and/ evaluation.  Counseling and educating the patient/family/caregiver.  Ordering medications, tests, or procedures.  Referring and communicating with other health care professionals.  Documenting clinical information in EPIC.  Independently interpreting results and communicating results to patient/family/caregiver.  Care coordination.

## 2024-05-11 NOTE — PROGRESS NOTES
[facility] -  PROGRESS NOTE               Author: SHANKAR Hercules Date & Time: 5/11/2024  10:37 AM     CC: abd pain, n/v    HISTORY OF PRESENT ILLNESS:    63 y.o. female with history of systolic and diastolic heart failure with EF of 15% (HFrEF) grade 2 diastolic dysfunction, severe MVR, CAD with h/o STEMI s/p DEWEY to LAD, PAD with aortofemoral bypass surgery who presented 4/27/2024 with increasing shortness of breath admitted for community acquired PNA and severe constipation with hosp course complicated by exacerbation of her HFrEF, medications augmented which was followed by IVON, worsening hepatopathy concerning for cardiorenal etiology.   She was then transferred to ICU 5/01 and initiated on dobutamine drip for organ perfusion.   Patient continues to be oliguric with UOP of 86 yesterday and 10 ml today so far.   Nephrology has been consulted for concerns of IVON , cardiorenal syndrome.     DAILY NEPHROLOGY SUMMARY:  05/02: Examined at bedside. Looks like she wants to talk but it is difficult for her to find appropriate words.   Dobutamine ggt held per critical care today.   Labs yesterday with fibrinogen 91, cryoppt administered followed by dialysis   1 L ultrafiltration   Uptrending WBCs since 4/30, 18.4 today  Scr improved to 3.03 after dialysis   Down trending AST/ALT   CT scan without bowel obs, extensive calcification of distal thoracic/upper abd aorta. Left renal cyst.   5/4: Pt transferred out of ICU, now with increased CPK and climbing  5/5: Pt still anuric, HD yesterday  5/6: minimal UOP, SBP 90s-140s, platelets stable at 61,000, CPK trended down to 2314, BM biopsy planned for today, patient denies CP/SOB  5/7: minimal UOP, tolerated HD, SBP 110s-120s, fibrinogen 88 and patient provided with 1u cryo, BM biopsy done, platelets 57,000, no new c/o  5/8: CO2 12 and K 6.2 this AM, WBC trended up (on steroids), HD planned for today, platelets 53,000, does not  "answer questions today, appears agitated, does not have gown on  5/9: tolerated HD, transferred to oncology floor, reports she is hungry this AM, SBP 130s-150s, plts 53,000  5/10: HD planned for today, SBP 120s-130s, family at bedside, patient curled in bed, c/o pain but can't identify where, does not really answer other questions  5/11: HD yesterday net UF 1L. VSS HR 50-60.  Very restless, does not answer questions.  Labs not run this AM, added and pending. UP 25 ml.     REVIEW OF SYSTEMS:    ROS limited due to mental acuity, Chronic expressive aphasia.     PAST MEDICAL/SURGICAL/FAMILY/SOCIAL HISTORY:  Reviewed and documented in chart     HOME MEDICATIONS:   - Reviewed and documented in chart    LABORATORY STUDIES:   - Reviewed and documented in chart    ALLERGIES:  Pcn [penicillins] and Toradol    VS:  /80   Pulse (!) 53   Temp 37.1 °C (98.8 °F) (Temporal)   Resp 18   Ht 1.651 m (5' 5\")   Wt 50.6 kg (111 lb 8.8 oz)   SpO2 90%   BMI 18.56 kg/m²   Physical Exam  Vitals and nursing note reviewed.   Constitutional:       General: She is not in acute distress.     Appearance: She is ill-appearing.   HENT:      Head: Normocephalic and atraumatic.      Right Ear: External ear normal.      Left Ear: External ear normal.      Nose: Nose normal.      Mouth/Throat:      Mouth: Mucous membranes are dry.   Eyes:      General:         Right eye: No discharge.         Left eye: No discharge.      Extraocular Movements: Extraocular movements intact.   Neck:      Comments: Right IJ trialysis catheter   Cardiovascular:      Rate and Rhythm: Normal rate and regular rhythm.      Pulses: Normal pulses.   Pulmonary:      Effort: Pulmonary effort is normal. No respiratory distress.   Abdominal:      General: There is no distension.      Palpations: Abdomen is soft.   Musculoskeletal:      Comments: Swelling b/l arms    Skin:     General: Skin is warm.      Capillary Refill: Capillary refill takes less than 2 seconds.      " Findings: Bruising and rash present.      Comments: Excoriations on extremities    Neurological:      Mental Status: She is alert.      Comments: Chronic expressive aphasia      Psychiatric:         Mood and Affect: Mood normal.         Behavior: Behavior normal.         FLUID BALANCE:  In: 525 [P.O.:25; Dialysis:500]  Out: 1500     LABS:  Recent Labs     05/09/24  0530 05/10/24  0000   SODIUM 137 137   POTASSIUM 4.6 5.0   CHLORIDE 95* 94*   CO2 23 19*   GLUCOSE 172* 164*   BUN 46* 68*   CREATININE 3.66* 4.89*   CALCIUM 7.7* 7.9*        IMAGING:  - Imaging studies reviewed by me      IMPRESSION:     # Acute kidney injury multifactorial--Decreased perfusion, HLH, rhabdo  - unlikley TTP  - oliguric  - HD 5/2 1st  - C3 and C4 low, ARLEEN negative, ANCA negative  - hepatitis negative  - PCR and ACR pending (minimal UOP)     # ? 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 ongoing  - steroids  -Bone Marrow 5/6    # Hepatic congestion      #Rhabdo unclear etiology, difficult to give fluids in face of reduced EF     # Heart failure with reduced ejection fraction      # Coagulopathy   - Fibinogen monitored, cryo when <100     # Transamnitis  - Improving       # Severe mitral regurgitation      # Peripheral vascular disease with prior aortic thrombus s/p aortofemoral bypass     # Constipation      # Impacted stool in intestine   - Resolved      # Colitis      # Leukocytosis      # Prior CVA    - Chronic expressive aphasia     # Essential hypertension    - goal <140/90, intermittently at goal    # Hyperkalemia, improved    # Acidosis, improved    # CKD MBD  - Ca low, phos elevated  -         PLAN:  - HD qMWF and PRN for now, no need for HD today SAT  - Daily evaluation of RRT needs  - steroids per primary service/hematology  - Bone marrow 5/6, f/u findings  - renal biopsy ordered, though likely will only be obtained with improved platelets/coagulation status  - Avoid nephrotoxins, NSAIDs  - Renally dose meds   - daily  labs  - strict I/O  - recommend dietary supplements be adjusted for renal failure  - Calcium acetate 1334mg TID with meals, will consider adding sevelamer if phos doesn't trend down   - Daily labs, I/O    Further recs to follow    Other management per primary service

## 2024-05-12 LAB
ALBUMIN SERPL BCP-MCNC: 3.6 G/DL (ref 3.2–4.9)
ALBUMIN/GLOB SERPL: 1.2 G/DL
ALP SERPL-CCNC: 226 U/L (ref 30–99)
ALT SERPL-CCNC: 152 U/L (ref 2–50)
ANION GAP SERPL CALC-SCNC: 25 MMOL/L (ref 7–16)
APTT PPP: 28.7 SEC (ref 24.7–36)
AST SERPL-CCNC: 108 U/L (ref 12–45)
BASOPHILS # BLD AUTO: 0.1 % (ref 0–1.8)
BASOPHILS # BLD: 0.03 K/UL (ref 0–0.12)
BILIRUB SERPL-MCNC: 3.1 MG/DL (ref 0.1–1.5)
BUN SERPL-MCNC: 82 MG/DL (ref 8–22)
CALCIUM ALBUM COR SERPL-MCNC: 9.4 MG/DL (ref 8.5–10.5)
CALCIUM SERPL-MCNC: 9.1 MG/DL (ref 8.5–10.5)
CHLORIDE SERPL-SCNC: 98 MMOL/L (ref 96–112)
CO2 SERPL-SCNC: 20 MMOL/L (ref 20–33)
CREAT SERPL-MCNC: 6.2 MG/DL (ref 0.5–1.4)
EOSINOPHIL # BLD AUTO: 0 K/UL (ref 0–0.51)
EOSINOPHIL NFR BLD: 0 % (ref 0–6.9)
ERYTHROCYTE [DISTWIDTH] IN BLOOD BY AUTOMATED COUNT: 97.7 FL (ref 35.9–50)
FIBRINOGEN PPP-MCNC: 101 MG/DL (ref 215–460)
GFR SERPLBLD CREATININE-BSD FMLA CKD-EPI: 7 ML/MIN/1.73 M 2
GLOBULIN SER CALC-MCNC: 3.1 G/DL (ref 1.9–3.5)
GLUCOSE SERPL-MCNC: 113 MG/DL (ref 65–99)
HCT VFR BLD AUTO: 37.2 % (ref 37–47)
HGB BLD-MCNC: 11.6 G/DL (ref 12–16)
IMM GRANULOCYTES # BLD AUTO: 0.32 K/UL (ref 0–0.11)
IMM GRANULOCYTES NFR BLD AUTO: 1.4 % (ref 0–0.9)
INR PPP: 2.14 (ref 0.87–1.13)
LYMPHOCYTES # BLD AUTO: 0 K/UL (ref 1–4.8)
LYMPHOCYTES NFR BLD: 0 % (ref 22–41)
MAGNESIUM SERPL-MCNC: 2.8 MG/DL (ref 1.5–2.5)
MCH RBC QN AUTO: 30.2 PG (ref 27–33)
MCHC RBC AUTO-ENTMCNC: 31.2 G/DL (ref 32.2–35.5)
MCV RBC AUTO: 96.9 FL (ref 81.4–97.8)
MONOCYTES # BLD AUTO: 0.6 K/UL (ref 0–0.85)
MONOCYTES NFR BLD AUTO: 2.7 % (ref 0–13.4)
NEUTROPHILS # BLD AUTO: 21.64 K/UL (ref 1.82–7.42)
NEUTROPHILS NFR BLD: 95.8 % (ref 44–72)
NRBC # BLD AUTO: 0.63 K/UL
NRBC BLD-RTO: 2.8 /100 WBC (ref 0–0.2)
PHOSPHATE SERPL-MCNC: 7.8 MG/DL (ref 2.5–4.5)
PLATELET # BLD AUTO: 78 K/UL (ref 164–446)
PLATELETS.RETICULATED NFR BLD AUTO: 12.3 % (ref 0.6–13.1)
POTASSIUM SERPL-SCNC: 6.3 MMOL/L (ref 3.6–5.5)
PROCALCITONIN SERPL-MCNC: 0.98 NG/ML
PROT SERPL-MCNC: 6.7 G/DL (ref 6–8.2)
PROTHROMBIN TIME: 24.2 SEC (ref 12–14.6)
RBC # BLD AUTO: 3.84 M/UL (ref 4.2–5.4)
SODIUM SERPL-SCNC: 143 MMOL/L (ref 135–145)
WBC # BLD AUTO: 22.6 K/UL (ref 4.8–10.8)

## 2024-05-12 PROCEDURE — 99233 SBSQ HOSP IP/OBS HIGH 50: CPT | Performed by: INTERNAL MEDICINE

## 2024-05-12 RX ORDER — LORAZEPAM 0.5 MG/1
0.5 TABLET ORAL EVERY 4 HOURS PRN
Status: DISCONTINUED | OUTPATIENT
Start: 2024-05-12 | End: 2024-05-16

## 2024-05-12 RX ORDER — QUETIAPINE FUMARATE 25 MG/1
25 TABLET, FILM COATED ORAL NIGHTLY
Status: DISCONTINUED | OUTPATIENT
Start: 2024-05-12 | End: 2024-05-16

## 2024-05-12 RX ORDER — HEPARIN SODIUM 1000 [USP'U]/ML
INJECTION, SOLUTION INTRAVENOUS; SUBCUTANEOUS
Status: DISPENSED
Start: 2024-05-12 | End: 2024-05-13

## 2024-05-12 RX ADMIN — QUETIAPINE FUMARATE 25 MG: 25 TABLET ORAL at 20:44

## 2024-05-12 RX ADMIN — HYDRALAZINE HYDROCHLORIDE 25 MG: 50 TABLET ORAL at 05:05

## 2024-05-12 RX ADMIN — DEXAMETHASONE 6 MG: 4 TABLET ORAL at 13:22

## 2024-05-12 RX ADMIN — HYDROXYZINE HYDROCHLORIDE 25 MG: 25 TABLET, FILM COATED ORAL at 20:47

## 2024-05-12 RX ADMIN — PRAMIPEXOLE DIHYDROCHLORIDE 0.12 MG: 0.12 TABLET ORAL at 08:11

## 2024-05-12 RX ADMIN — Medication 1334 MG: at 08:11

## 2024-05-12 RX ADMIN — ISOSORBIDE MONONITRATE 30 MG: 30 TABLET, EXTENDED RELEASE ORAL at 05:05

## 2024-05-12 RX ADMIN — HYDROXYZINE HYDROCHLORIDE 25 MG: 25 TABLET, FILM COATED ORAL at 13:01

## 2024-05-12 RX ADMIN — HYDRALAZINE HYDROCHLORIDE 25 MG: 50 TABLET ORAL at 20:46

## 2024-05-12 RX ADMIN — Medication 1334 MG: at 17:48

## 2024-05-12 RX ADMIN — LORAZEPAM 0.5 MG: 0.5 TABLET ORAL at 14:45

## 2024-05-12 RX ADMIN — Medication 1334 MG: at 12:21

## 2024-05-12 RX ADMIN — HYDRALAZINE HYDROCHLORIDE 25 MG: 50 TABLET ORAL at 13:22

## 2024-05-12 RX ADMIN — DEXAMETHASONE 6 MG: 4 TABLET ORAL at 20:44

## 2024-05-12 RX ADMIN — DEXAMETHASONE 6 MG: 4 TABLET ORAL at 05:05

## 2024-05-12 RX ADMIN — LORAZEPAM 0.5 MG: 0.5 TABLET ORAL at 21:02

## 2024-05-12 RX ADMIN — SODIUM ZIRCONIUM CYCLOSILICATE 10 G: 10 POWDER, FOR SUSPENSION ORAL at 12:21

## 2024-05-12 RX ADMIN — VENLAFAXINE HYDROCHLORIDE 37.5 MG: 37.5 CAPSULE, EXTENDED RELEASE ORAL at 05:05

## 2024-05-12 RX ADMIN — METOPROLOL SUCCINATE 12.5 MG: 25 TABLET, FILM COATED, EXTENDED RELEASE ORAL at 05:05

## 2024-05-12 ASSESSMENT — ENCOUNTER SYMPTOMS
FOCAL WEAKNESS: 0
BLOOD IN STOOL: 0
MYALGIAS: 0
MUSCULOSKELETAL NEGATIVE: 1
SHORTNESS OF BREATH: 0
WEIGHT LOSS: 0
RESPIRATORY NEGATIVE: 1
FEVER: 0
ABDOMINAL PAIN: 0
EYES NEGATIVE: 1
CHILLS: 0
PSYCHIATRIC NEGATIVE: 1
DIAPHORESIS: 0
NAUSEA: 0
MEMORY LOSS: 0
CONSTIPATION: 0
DIZZINESS: 0
DIARRHEA: 0
VOMITING: 0
COUGH: 0
CONSTITUTIONAL NEGATIVE: 1
HEADACHES: 0
NEUROLOGICAL NEGATIVE: 1
GASTROINTESTINAL NEGATIVE: 1
BRUISES/BLEEDS EASILY: 0
ORTHOPNEA: 0
CARDIOVASCULAR NEGATIVE: 1
PND: 0
SPEECH CHANGE: 1
PALPITATIONS: 0
DEPRESSION: 0
INSOMNIA: 0

## 2024-05-12 ASSESSMENT — PAIN DESCRIPTION - PAIN TYPE: TYPE: ACUTE PAIN

## 2024-05-12 NOTE — PROGRESS NOTES
"Hematology/Oncology Progress Note    Primary Hematologist/Oncologist: Miguelina    Oncology History:  4/27/2024: Presented to hospital with 10 days of flulike symptoms, abdominal pain, and found to have rhinovirus and enterovirus infections.  Rapidly deteriorated with LFTs rising to AST/ALT 2739/1024 along with acute renal failure requiring dialysis.  She also had decompensation of her congestive heart failure requiring transfer to the ICU for dobutamine drip.  Hemoglobin precipitously dropped to 8.3 and platelets also dropped to 57 along with a new leukocytosis.  Her workup was also not notable for normal triglycerides but a ferritin of 14,244.  CT CAP demonstrated no hepatosplenomegaly.    Interval History:  Sleeping at time of visit. Family not around to assist with communication.     Review of Systems:  Review of Systems   Constitutional: Negative.  Negative for chills, diaphoresis, fever, malaise/fatigue and weight loss.   HENT: Negative.     Eyes: Negative.    Respiratory: Negative.  Negative for cough.    Cardiovascular: Negative.  Negative for chest pain, palpitations, orthopnea, leg swelling and PND.   Gastrointestinal: Negative.  Negative for abdominal pain, blood in stool, constipation, diarrhea, melena, nausea and vomiting.   Genitourinary: Negative.  Negative for dysuria, frequency, hematuria and urgency.   Musculoskeletal: Negative.  Negative for myalgias.   Skin: Negative.    Neurological: Negative.  Negative for dizziness, focal weakness and headaches.   Endo/Heme/Allergies: Negative.  Does not bruise/bleed easily.   Psychiatric/Behavioral: Negative.  Negative for depression and memory loss. The patient does not have insomnia.         Vitals:     BP (!) 148/90 Comment: RN notified.  Pulse 70   Temp 36.1 °C (97 °F) (Temporal)   Resp 18   Ht 1.651 m (5' 5\")   Wt 50.6 kg (111 lb 8.8 oz)   SpO2 94%   BMI 18.56 kg/m²     Physical Exam:  Physical Exam  Vitals and nursing note reviewed. "   Constitutional:       General: She is not in acute distress.     Appearance: She is not ill-appearing or toxic-appearing.   HENT:      Head: Normocephalic and atraumatic.      Mouth/Throat:      Mouth: Mucous membranes are moist.   Eyes:      Pupils: Pupils are equal, round, and reactive to light.   Cardiovascular:      Rate and Rhythm: Normal rate and regular rhythm.      Pulses: Normal pulses.      Heart sounds: Normal heart sounds. No murmur heard.     No friction rub. No gallop.   Pulmonary:      Effort: Pulmonary effort is normal. No respiratory distress.      Breath sounds: Normal breath sounds. No stridor. No wheezing, rhonchi or rales.   Chest:      Chest wall: No tenderness.   Abdominal:      General: Abdomen is flat. Bowel sounds are normal.      Palpations: Abdomen is soft.   Musculoskeletal:         General: No swelling. Normal range of motion.      Cervical back: Normal range of motion. No rigidity.      Right lower leg: No edema.   Skin:     General: Skin is warm and dry.      Capillary Refill: Capillary refill takes 2 to 3 seconds.      Coloration: Skin is not jaundiced.   Neurological:      General: No focal deficit present.      Mental Status: She is alert and oriented to person, place, and time. Mental status is at baseline.      Cranial Nerves: No cranial nerve deficit.   Psychiatric:         Mood and Affect: Mood normal.          Assessment and Plan:    Concern for HLH  Primary HLH (a true hematologic neoplasm) or secondary HLH (inflammatory dysregulation leading to cytokine storm caused by a separate nonhematologic etiology) are both on the differential.  While we are awaiting bone marrow biopsy to evaluate for true hemophagocytosis, the patient does meet some of the modified HLH-94 criteria including 2 of 4 clinical findings (cytopenias, hepatitis, but no fever) and at least 1 marker of immune over-activation (increased ferritin, hypofibrinogenemia).    BMBx and liver biopsies are still  pending.  Soluble IL-2 receptor levels and CXCL9 levels are pending via [facility].     While we await bone marrow biopsy, continue dexamethasone 6mg q8h (just over 10mg/m2).     Acute renal failure  Nephrology following.  On hemodialysis.  Remains oliguric.    Coagulopathy  Secondary to liver dysfunction vs HLH.  Transfuse cryoprecipitate to maintain fibrinogen greater than 150    Elevated LFTs  Improving following dialysis. The level of improvement is reassuring that this may not be HLH as I would not expect them to improve so greatly with steroids alone.     Heart failure with reduced ejection fraction  Drop in EF to 15% from previously 35%.  Required a dobutamine drip this admission.  Medicine currently optimizing GDMT.    Rhabdomyolysis  CPK greater than 5000.  Nephrology on board.  Underlying etiology is unclear, potentially secondary to HLH vs other precipitating factor.    History of stroke  Chronic aphasia.  Neurologic status at baseline.    Dr. Rajan will assume care of the oncology service tomorrow.    Thank you for allowing me to participate in her care. Please do not hesitate to reach out with any questions.    Please note that this dictation was created using voice recognition software. I have made every reasonable attempt to correct obvious errors, but I expect that there are errors of grammar and possibly content that I did not discover before finalizing the note.      Zachariah Fay MD  Hematologist/Oncologist  [facility] - [facility] [employer]

## 2024-05-12 NOTE — CARE PLAN
The patient is Unstable - High likelihood or risk of patient condition declining or worsening    Shift Goals  Clinical Goals: Monitor I/O, safety  Patient Goals: AFIA  Family Goals: Not present    Progress made toward(s) clinical / shift goals:     Problem: Skin Integrity  Goal: Skin integrity is maintained or improved  5/12/2024 1502 by Raysa Massey R.N.  Outcome: Progressing  Note: Patient turns self from side to side, pillows in place for support and positioning. Guerrero in place. Skin remains intact.    Patient is not progressing towards the following goals:    Problem: Hemodynamics  Goal: Patient's hemodynamics, fluid balance and neurologic status will be stable or improve  5/12/2024 1502 by Raysa Massey, R.N.  Outcome: Not Progressing  Note: Patient's potassium level resulted at 6.3, patient went to dialysis at 1400. Fibrinogen level resulted at 101, will transfuse cryoprecipitate to maintain fibrinogen above 150.

## 2024-05-12 NOTE — PROGRESS NOTES
[facility] -  PROGRESS NOTE               Author: SHANKAR Hercules Date & Time: 5/12/2024  8:48 AM     CC: abd pain, n/v    HISTORY OF PRESENT ILLNESS:    63 y.o. female with history of systolic and diastolic heart failure with EF of 15% (HFrEF) grade 2 diastolic dysfunction, severe MVR, CAD with h/o STEMI s/p DEWEY to LAD, PAD with aortofemoral bypass surgery who presented 4/27/2024 with increasing shortness of breath admitted for community acquired PNA and severe constipation with hosp course complicated by exacerbation of her HFrEF, medications augmented which was followed by IVON, worsening hepatopathy concerning for cardiorenal etiology.   She was then transferred to ICU 5/01 and initiated on dobutamine drip for organ perfusion.   Patient continues to be oliguric with UOP of 86 yesterday and 10 ml today so far.   Nephrology has been consulted for concerns of IVON , cardiorenal syndrome.     DAILY NEPHROLOGY SUMMARY:  05/02: Examined at bedside. Looks like she wants to talk but it is difficult for her to find appropriate words.   Dobutamine ggt held per critical care today.   Labs yesterday with fibrinogen 91, cryoppt administered followed by dialysis   1 L ultrafiltration   Uptrending WBCs since 4/30, 18.4 today  Scr improved to 3.03 after dialysis   Down trending AST/ALT   CT scan without bowel obs, extensive calcification of distal thoracic/upper abd aorta. Left renal cyst.   5/4: Pt transferred out of ICU, now with increased CPK and climbing  5/5: Pt still anuric, HD yesterday  5/6: minimal UOP, SBP 90s-140s, platelets stable at 61,000, CPK trended down to 2314, BM biopsy planned for today, patient denies CP/SOB  5/7: minimal UOP, tolerated HD, SBP 110s-120s, fibrinogen 88 and patient provided with 1u cryo, BM biopsy done, platelets 57,000, no new c/o  5/8: CO2 12 and K 6.2 this AM, WBC trended up (on steroids), HD planned for today, platelets 53,000, does not answer  "questions today, appears agitated, does not have gown on  5/9: tolerated HD, transferred to oncology floor, reports she is hungry this AM, SBP 130s-150s, plts 53,000  5/10: HD planned for today, SBP 120s-130s, family at bedside, patient curled in bed, c/o pain but can't identify where, does not really answer other questions  5/11: HD yesterday net UF 1L. VSS HR 50-60.  Very restless, does not answer questions.  Labs not run this AM, added and pending. UP 25 ml, has diaper in place.   5/12: VSS UOP 50ml.  Got cryo yesterday for low fibrinogen, had to use pigtail of HD cath. Today's labs pending.  Mentation unchanged.    Addendum:  ISAAC 6.3    REVIEW OF SYSTEMS:    ROS limited due to mental acuity, Chronic expressive aphasia.     PAST MEDICAL/SURGICAL/FAMILY/SOCIAL HISTORY:  Reviewed and documented in chart     HOME MEDICATIONS:   - Reviewed and documented in chart    LABORATORY STUDIES:   - Reviewed and documented in chart    ALLERGIES:  Pcn [penicillins] and Toradol    VS:  BP (!) 148/90 Comment: RN notified.  Pulse 70   Temp 36.1 °C (97 °F) (Temporal)   Resp 18   Ht 1.651 m (5' 5\")   Wt 50.6 kg (111 lb 8.8 oz)   SpO2 94%   BMI 18.56 kg/m²   Physical Exam  Vitals and nursing note reviewed.   Constitutional:       General: She is not in acute distress.     Appearance: She is ill-appearing.   HENT:      Head: Normocephalic and atraumatic.      Right Ear: External ear normal.      Left Ear: External ear normal.      Nose: Nose normal.      Mouth/Throat:      Mouth: Mucous membranes are dry.   Eyes:      General:         Right eye: No discharge.         Left eye: No discharge.      Extraocular Movements: Extraocular movements intact.   Neck:      Comments: Right IJ trialysis catheter   Cardiovascular:      Rate and Rhythm: Normal rate and regular rhythm.      Pulses: Normal pulses.   Pulmonary:      Effort: Pulmonary effort is normal. No respiratory distress.   Abdominal:      General: There is no distension.      " Palpations: Abdomen is soft.   Musculoskeletal:      Comments:     Skin:     General: Skin is warm.      Capillary Refill: Capillary refill takes less than 2 seconds.      Findings: Bruising and rash present.      Comments: Excoriations on extremities    Neurological:      Mental Status: She is alert.      Comments: Chronic expressive aphasia      Psychiatric:         Mood and Affect: Mood normal.         Behavior: Behavior normal.         FLUID BALANCE:  In: 151 [P.O.:25; Blood:126]  Out: 50     LABS:  Recent Labs     05/10/24  0000 05/10/24  1047 05/11/24  1300   SODIUM 137 142 136   POTASSIUM 5.0 6.1* 5.4   CHLORIDE 94* 94* 93*   CO2 19* 10* 18*   GLUCOSE 164* 145* 95   BUN 68* 84* 55*   CREATININE 4.89* 5.62* 4.02*   CALCIUM 7.9* 8.4* 8.8        IMAGING:  - Imaging studies reviewed by me      IMPRESSION:     # Acute kidney injury multifactorial--Decreased perfusion, HLH, rhabdo  - unlikley TTP  - oliguric  - HD 5/2 1st  - C3 and C4 low, ARLEEN negative, ANCA negative  - hepatitis negative  - PCR and ACR pending (minimal UOP)     # ? 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 ongoing  - steroids  -Bone Marrow 5/6    # Hepatic congestion      #Rhabdo unclear etiology, difficult to give fluids in face of reduced EF     # Heart failure with reduced ejection fraction      # Coagulopathy   - Fibinogen monitored, cryo when <100     # Transamnitis  - Improving       # Severe mitral regurgitation      # Peripheral vascular disease with prior aortic thrombus s/p aortofemoral bypass     # Constipation      # Impacted stool in intestine   - Resolved      # Colitis      # Leukocytosis      # Prior CVA    - Chronic expressive aphasia     # Essential hypertension    - goal <140/90, intermittently at goal    # Hyperkalemia     # Acidosis, improved    # CKD MBD  - Ca low, phos elevated 7.8  -         PLAN:  - HD qMWF and PRN for now,  HD today SUN for hyperkalemia   - Daily evaluation of RRT needs  - steroids per primary  service/hematology  - Bone marrow 5/6, f/u findings  - renal biopsy ordered, though likely will only be obtained with improved platelets/coagulation status  - Avoid nephrotoxins, NSAIDs  - Renally dose meds   - daily labs  - recommend dietary supplements be adjusted for renal failure  - Calcium acetate 1334mg TID with meals, changed to renal diet 5/12, will consider adding sevelamer if phos doesn't trend down   - Start lokelma, stop when K <4.5  - Daily labs, I/O    Further recs to follow    Other management per primary service

## 2024-05-12 NOTE — CARE PLAN
Problem: Pain - Standard  Goal: Alleviation of pain or a reduction in pain to the patient’s comfort goal  Outcome: Progressing  Note: POC regarding pain management discussed with pt.  Pt will be medicated for pain per MAR      Problem: Skin Integrity  Goal: Skin integrity is maintained or improved  Outcome: Progressing  Note: Skin assessment completed.  Pillows in use for positions.  Frequent linen changes to ensure patient stays dry.  Hourly rounding in place.      The patient is Stable - Low risk of patient condition declining or worsening    Shift Goals  Clinical Goals: Monitor I/Os, satefy  Patient Goals: AFIA  Family Goals: not present

## 2024-05-12 NOTE — PROGRESS NOTES
Hospital Medicine Daily Progress Note    Date of Service  5/12/2024    Chief Complaint  abdominal pain    Hospital Course  Fouzia Santamaria is a 63 y.o. female with HFrEF (EF 35%), severe PAD status post aortic femoral bypass coronary disease status post LAD stent, COPD, diabetes, hypertension, and previous strokes, who was initially seen in the ED with abdominal pain for 10 days and subsequently discharged to home, who again presented with 2 days of bodyaches as well as worsening constipation, nausea and vomiting.  Workup showed leukocytosis with white count of 15,100 with left shift.  Chest x-ray showed mild pulmonary edema.  Abdominal x-ray showed significant amount of stool burden.  Echocardiogram was obtained which revealed that ejection fraction now down to 15% with severe mitral regurgitation.  She also had acute kidney injury and likely cardiorenal syndrome.  Cardiology and nephrology were consulted.  She was placed on a dobutamine drip.  Her renal function worsened and she required a right-sided dialysis catheter with initiation of dialysis.  Her liver enzymes went up to AST of 2959 and ALT of 1377.  She also had significant drop in both hemoglobin and platelet count, with worsening leukocytosis.  LDH was 1418, fibrinogen 91, ferritin 7333 with elevated PT, PTT and INR.  Imaging of the abdomen showed mild hepatomegaly with no splenomegaly.  Hematology/oncology was consulted for concerns for hemophagocytic lymphohistiocytosis (HLH) with low suspicion/probability for TTP or HIT. She was started on Decadron.  She had low fibrinogen for which she was given cryoprecipitate.  She is scheduled for bone marrow biopsy for 5/6/24.  Nephrology contemplating on kidney biopsy.    Interval Problem Update  5/6/2024 - I reviewed the patient's chart. There were no significant overnight events. Remains hemodynamically stable and afebrile. Stable on RA.  WBC 15,300.  Hemoglobin is stable.  Platelet count 61,000.   Creatinine 5.35.  Sodium 129.  Potassium is normal.  CPK improved to 2314.  INR 1.76, APTT 29.4.  Complement levels low including C3, C4.  Ferritin 2435.  Fibrinogen 118.  AST improved to 99, , alkaline phosphatase 155, normal bilirubin.  Maintaining sinus rhythm on telemetry.  She had a bone marrow biopsy this morning.  > I have personally seen and examined the patient today.  No complaints of pain.  No nausea or vomiting.  Not in respiratory distress.   at bedside, discussed plan of care.    5/7 Vitals stable on room air   WBC 13.4, Hb 9.1, plt 57   CPK 1072  INR 2, PT 23, fibrinogen 88.   -1 unit cyroprecipitate. Per oncology goal is fibrinogen >150, ok to check daily  Discussed with IR cannot do renal biopsy due to elevated INR, must be <1.8   Discussed with oncology, pathology recommended lymph node or liver biopsy for comparison for bone marrow as concern for HLH. Discussed with Dr. Longoria who approved biopsy.   Patient disoriented, main complaint is that she is hungry. Denies chest pain or dyspnea.    5/8 WBC 20.6, likely from IV steroids   Hb 10.9, plt 53   Potassium 6.2, CO2 12, phos 9.5  -remote cardiac monitoring  HD per nephrology today   INR 2.25, fibrinogen 101. Ordered cryo  -unable to do IR procedure today with INR and K levels   Patient very anxious moving about the bed not answering questions.   EKG ordered, reviewed patient in sinus rhythm. Qtc 515   IV ativan ordered prn for agitation.   Palliative care consulted     Patient was seen and examined at bedside.  I have personally reviewed and interpreted vitals, labs, and imaging.    5/9.  Afebrile.  Episode of bradycardia resolved.  Mild hypertension.  On room air.  Continue Phos binders and calcium supplementation.  Patient is very lethargic.  Keeps saying baby.  She is aphasic.  Points to her abdomen and apparently to localize pain.  Discussed with spouse Basilio.  Apparently when saying baby patient is calling for her spouse.  Long  discussion about plan of care and poor prognosis.  With renal failure now on dialysis, worsening heart failure, poor mentation and functional status.  Patient was agreeable with DNR/DNI.  Discussed with oncology.  Awaiting results of bone marrow biopsy and liver biopsy.  Transfuse cryo for fibrinogen of 130.  5/10.  Afebrile.  Intermittent bradycardia.  On room air.  Patient is lethargic.  Complains of pain but unable to localize.  In fetal position.  Due for dialysis today.  Discussed with oncology.  Awaiting results of bone marrow and liver biopsy.  5/11.  Afebrile.  Intermittent bradycardia.  On room air.  Patient is confused.  Keeps repeating mama.  Curled in fetal position.  Transfuse cryoprecipitate for low fibrinogen.  Bone marrow and liver biopsy still pending.  Tolerated dialysis yesterday  5/12.  Afebrile.  Intermittent bradycardia.  On room air.  Patient moans and groans.  Is very lethargic.  Flailing around in bed.  Discussed with nephrology.  Plan for dialysis again today.  Bone marrow liver biopsy still pending.  Bilirubin was 3.1.  Abdominal ultrasound showed no obstruction and nondilated common bile duct.  Wbc 13.4 > 20.6 > 22.3 > 20.2 > 22.6  Hgb 9.1 > 10.9 > 9.2 > 11.4 > 11.6  P 57 > 53 > 115 > 71 > 78  Cr 4.89 > 5.62 > 6.2  INR 2.02 > 2.14  Fibrinogen 130 > 156 > 87 > 101.  Transfuse cryo.  Procal 0.98.  Not a very high elevation in the setting of end-stage renal disease.    I have discussed this patient's plan of care and discharge plan at IDT rounds today with Case Management, Nursing, Nursing leadership, and other members of the IDT team.    Consultants/Specialty  Hematology  Critical care  Nephrology  Cardiology  Palliative care     Code Status  DNAR/DNI    Disposition  The patient is not medically cleared for discharge to home or a post-acute facility.  Anticipate discharge to: home with organized home healthcare and close outpatient follow-up    Discharge plan TBD.  6 clicks 14 and 16.  OT  recommends home health.  Pending PT evaluation.  May need outpatient hemodialysis chair placement.  I have placed the appropriate orders for post-discharge needs.    Review of Systems  Review of Systems   Unable to perform ROS: Medical condition   Constitutional:  Positive for malaise/fatigue.   Respiratory:  Negative for shortness of breath.    Cardiovascular:  Negative for chest pain.   Neurological:  Positive for speech change.        Physical Exam  Temp:  [36.1 °C (96.9 °F)-37.3 °C (99.1 °F)] 36.6 °C (97.8 °F)  Pulse:  [53-72] 69  Resp:  [18-20] 18  BP: (105-128)/(61-83) 107/75  SpO2:  [90 %-97 %] 92 %    Physical Exam  Vitals reviewed.   Constitutional:       General: She is not in acute distress.     Appearance: She is ill-appearing.   HENT:      Head: Normocephalic and atraumatic.   Eyes:      Conjunctiva/sclera: Conjunctivae normal.   Neck:      Comments: Right IJ dialysis cath  Cardiovascular:      Rate and Rhythm: Normal rate and regular rhythm.      Heart sounds: Normal heart sounds.   Pulmonary:      Effort: Pulmonary effort is normal. No respiratory distress.      Breath sounds: Normal breath sounds. No wheezing.   Abdominal:      General: There is no distension.      Palpations: Abdomen is soft.      Tenderness: There is no abdominal tenderness. There is no guarding.   Musculoskeletal:         General: No swelling. Normal range of motion.      Cervical back: Normal range of motion and neck supple. No muscular tenderness.      Right lower leg: No edema.      Left lower leg: No edema.   Skin:     General: Skin is warm and dry.      Coloration: Skin is not jaundiced.      Findings: Bruising and rash present.      Comments: Small scabs right forearm and bilat feet  (+) petechial rashes   Neurological:      General: No focal deficit present.      Mental Status: She is alert.      Cranial Nerves: No cranial nerve deficit.      Comments: She moves her extremities equally  (+) Expressive aphasia   Psychiatric:       Comments: Unable to assess due to aphasia           Fluids    Intake/Output Summary (Last 24 hours) at 5/12/2024 0525  Last data filed at 5/11/2024 1458  Gross per 24 hour   Intake 151 ml   Output 50 ml   Net 101 ml       Laboratory  Recent Labs     05/10/24  0000 05/11/24  1139   WBC 22.3* 20.2*   RBC 3.17* 3.82*   HEMOGLOBIN 9.2* 11.4*   HEMATOCRIT 30.7* 37.0   MCV 96.8 96.9   MCH 29.0 29.8   MCHC 30.0* 30.8*   RDW 57.8* 90.7*   PLATELETCT 115* 71*   MPV 12.5  --      Recent Labs     05/10/24  0000 05/10/24  1047 05/11/24  1300   SODIUM 137 142 136   POTASSIUM 5.0 6.1* 5.4   CHLORIDE 94* 94* 93*   CO2 19* 10* 18*   GLUCOSE 164* 145* 95   BUN 68* 84* 55*   CREATININE 4.89* 5.62* 4.02*   CALCIUM 7.9* 8.4* 8.8     Recent Labs     05/09/24  0530 05/10/24  0000 05/11/24  1139   APTT 27.4 27.0 29.4   INR 1.99* 2.02* 2.29*                 Imaging  US-RUQ   Final Result      1.  Limited exam due to patient noncompliance.   2.  No flow demonstrated in the portal vein suggesting thrombosis.   3.  Probable small gallstones in the gallbladder.   4.  No biliary dilation.   5.  Atrophic RIGHT kidney, poorly visualized.      IR-LIVER BX WITH OTHER STUDY   Final Result      1.  Transjugular hepatic venography showing a patent right hepatic vein.      2. 18 transjugular liver biopsies x 2.      3. Exchange and replacement of the right temporary dialysis catheter with a new 12 Malay 15 cm catheter.      DX-CHEST-FOR LINE PLACEMENT Perform procedure in: Other(comment f6 below):   Final Result      1.  Right IJ catheter has been placed and the tip projects appropriately over the superior vena cava.      2.  Cardiomegaly with evidence of mild fluid overload.      CT-ABDOMEN-PELVIS W/O   Final Result      1.  No evidence of bowel obstruction or focal inflammatory change.      2.  Again seen extensive atherosclerotic vascular calcification with distal thoracic/upper abdominal aortic ectasia measuring approximately 3.2 cm in  diameter.      3.  Gallstones within the gallbladder.      4.  Hyperdense left renal cyst likely representing hemorrhagic or milk of calcium within a cyst.      5.  Small amount of free fluid dependently within the pelvis.      DX-CHEST-PORTABLE (1 VIEW)   Final Result      Cardiomegaly.      US-ABDOMEN COMPLETE SURVEY   Final Result      1.  Mild hepatomegaly.      2.  Sludge noted in the gallbladder.      3.  Small echogenic foci in the periphery of the renal collecting systems bilaterally suspicious for early nephrolithiasis.      4.  Bilateral renal cysts.         DX-CHEST-LIMITED (1 VIEW)   Final Result         1.  No acute cardiopulmonary disease.   2.  Atherosclerosis      EC-ECHOCARDIOGRAM COMPLETE W/ CONT   Final Result      DX-CHEST-LIMITED (1 VIEW)   Final Result      1.  Enlarged cardiac silhouette with changes of pulmonary edema.      [id number] VIEW   Final Result      1.  Nonobstructive bowel gas pattern with a large amount of colonic stool.      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  Imaging of the abdomen showed mild hepatomegaly with no splenomegaly.    - Hematology/oncology was consulted for concerns for hemophagocytic lymphohistiocytosis (HLH) with low suspicion/probability for TTP or HIT.   -Continue Decadron.  -S/p bone marrow biopsy 5/6/24.  Follow pathology.   -Monitor for coagulopathy.  Daily fibrinogen, PT/PTT.  Transfused with cryoprecipitate if fibrinogen less than 150.  Oncology recommended liver or lymph node biopsy   Status post IR transjugular liver biopsy with portal venography 5/8    Hyperlipidemia- (present on admission)  Assessment & Plan  5/12/2024  - Hold off on statin due to rhabdomyolysis.    Cardiogenic shock (HCC)- (present on admission)  Assessment &  Plan  5/12/2024  -In the setting of low ejection fraction of 15%.  Required dobutamine drip.  Shock resolved.  -Management of HFrEF as above.    History of stroke- (present on admission)  Assessment & Plan  5/12/2024  -With chronic aphasia.  Appears at baseline.  Monitor.    Rhabdomyolysis- (present on admission)  Assessment & Plan  5/12/2024  -CPK was over 5,000. May be due to hemophagocytic lymphohistiocytosis.  -Holding off on IV fluids given IVON/dialysis dependent.  -CPK continues to trend down.  Continue to monitor.  CPK levels in the morning.    Thrombocytopenia (HCC)- (present on admission)  Assessment & Plan  5/12/2024  - In setting of HLH, thrombocytopenia.  Low suspicion/probability for TTP or HIT.  -Continue Decadron.  -Continue to monitor platelet counts.  Restrictive transfusion strategy.  Watch for bleeding.     Coagulopathy (HCC)- (present on admission)  Assessment & Plan  5/12/2024  - In setting of HLH, thrombocytopenia.  Low suspicion/probability for TTP or HIT.  Fibrinogen low.  -Continue Decadron.  -s/p bone marrow biopsy 5/6.  -Monitor PT/PTT/INR and daily fibrinogen.  Watch for bleeding.  -Follow fibrinogen, transfuse with cryoprecipitate if </= 150.  She gets spontaneous bleeding with low fibrinogen levels.    IVON (acute kidney injury) (HCC)- (present on admission)  Assessment & Plan  5/12/2024  - Could be cardiorenal in setting of HFrEF, along with possible HLH.  -Now on hemodialysis.  -Nephrology following.  -Monitor electrolytes closely.  -If continues to require hemodialysis, will need outpatient hemodialysis chair arrangements.  -IR consult for kidney biopsy.    Transaminitis  Assessment & Plan  5/12/2024  -Likely congestive hepatopathy due to HFrEF.  Also in setting of rhabdomyolysis.  HLH could be contributing.  -Continue to trend LFTs, continues to trend down.  -Continue to trend CPK.    Severe mitral regurgitation- (present on admission)  Assessment & Plan  5/12/2024  -Per echo.   Management as above.    Heart failure with reduced ejection fraction (HCC)- (present on admission)  Assessment & Plan  5/12/2024  - Had a drop in EF to 15% from previous 35%.  Required dobutamine drip.  -Continue volume removal with hemodialysis.  -Optimize GDMT.  Unable to give ACEi/ARB/ARNI/SGLT2i given renal function.  Continue hydralazine/Imdur for afterload reduction.  Continue Toprol-XL 12.5 mg daily, adjust up as blood pressure tolerates.  Monitor blood pressure to make sure she tolerates medications.  -Monitor on telemetry.    Hyperkalemia- (present on admission)  Assessment & Plan  5/12/2024  2/2 renal failure   Continue hemodialysis per nephro  Daily labs   Remote cardiac monitoring     Leukocytosis- (present on admission)  Assessment & Plan  5/12/2024  -Concerning for HLH.  -s/p bone marrow biopsy 5/6/2024. Follow pathology.   -No signs of sepsis or infection.  Holding off on further antibiotics.  Has had 5 days of antibiotics in the hospital.  -Trend WBC count.  Has been dexamethasone.  Check procalcitonin and continue to trend leukocytosis  Procal 0.98.  Not a very high elevation in the setting of end-stage renal disease.    Advanced care planning/counseling discussion- (present on admission)  Assessment & Plan  Patient is aphasic and lethargic, unable to participated in the conversation.  Spouse Basilio was present for the conversation.  I discussed advance care planning face to face with the patient and family for at least 30 minutes, including diagnosis, prognosis, plan of care, risks and benefits of any therapies that could be offered, as well as alternatives including palliation and hospice, as appropriate.  Forms were completed and placed in the chart.  Patient is now DNR/DNI.         VTE prophylaxis: SCD    I have performed a physical exam and reviewed and updated ROS and Plan today (5/12/2024). In review of yesterday's note (5/11/2024), there are no changes except as documented above.      Greater than 51 minutes spent prepping to see patient (e.g. review of tests) obtaining and/or reviewing separately obtained history. Performing a medically appropriate examination and/ evaluation.  Counseling and educating the patient/family/caregiver.  Ordering medications, tests, or procedures.  Referring and communicating with other health care professionals.  Documenting clinical information in EPIC.  Independently interpreting results and communicating results to patient/family/caregiver.  Care coordination.

## 2024-05-13 ENCOUNTER — APPOINTMENT (OUTPATIENT)
Dept: RADIOLOGY | Facility: MEDICAL CENTER | Age: 64
DRG: 280 | End: 2024-05-13
Attending: INTERNAL MEDICINE
Payer: MEDICAID

## 2024-05-13 LAB
ALBUMIN SERPL BCP-MCNC: 3.2 G/DL (ref 3.2–4.9)
ALBUMIN/GLOB SERPL: 1.1 G/DL
ALP SERPL-CCNC: 178 U/L (ref 30–99)
ALT SERPL-CCNC: 127 U/L (ref 2–50)
ANION GAP SERPL CALC-SCNC: 18 MMOL/L (ref 7–16)
APTT PPP: 28.6 SEC (ref 24.7–36)
AST SERPL-CCNC: 115 U/L (ref 12–45)
BARCODED ABORH UBTYP: 5100
BARCODED ABORH UBTYP: 6200
BARCODED PRD CODE UBPRD: NORMAL
BARCODED PRD CODE UBPRD: NORMAL
BARCODED UNIT NUM UBUNT: NORMAL
BARCODED UNIT NUM UBUNT: NORMAL
BASOPHILS # BLD AUTO: 0.1 % (ref 0–1.8)
BASOPHILS # BLD: 0.03 K/UL (ref 0–0.12)
BILIRUB CONJ SERPL-MCNC: 1.5 MG/DL (ref 0.1–0.5)
BILIRUB INDIRECT SERPL-MCNC: 1.1 MG/DL (ref 0–1)
BILIRUB SERPL-MCNC: 2.6 MG/DL (ref 0.1–1.5)
BUN SERPL-MCNC: 39 MG/DL (ref 8–22)
CALCIUM ALBUM COR SERPL-MCNC: 9.4 MG/DL (ref 8.5–10.5)
CALCIUM SERPL-MCNC: 8.8 MG/DL (ref 8.5–10.5)
CHLORIDE SERPL-SCNC: 96 MMOL/L (ref 96–112)
CO2 SERPL-SCNC: 27 MMOL/L (ref 20–33)
COMPONENT CT 8504CT: NORMAL
COMPONENT CT 8504CT: NORMAL
CREAT SERPL-MCNC: 3.15 MG/DL (ref 0.5–1.4)
DAT C3D-SP REAG RBC QL: ABNORMAL
DAT IGG-SP REAG RBC QL: ABNORMAL
EOSINOPHIL # BLD AUTO: 0 K/UL (ref 0–0.51)
EOSINOPHIL NFR BLD: 0 % (ref 0–6.9)
FIBRINOGEN PPP-MCNC: 68 MG/DL (ref 215–460)
GFR SERPLBLD CREATININE-BSD FMLA CKD-EPI: 16 ML/MIN/1.73 M 2
GLOBULIN SER CALC-MCNC: 2.9 G/DL (ref 1.9–3.5)
GLUCOSE SERPL-MCNC: 115 MG/DL (ref 65–99)
HCT VFR BLD AUTO: 36.8 % (ref 37–47)
HGB BLD-MCNC: 11.3 G/DL (ref 12–16)
IMM GRANULOCYTES # BLD AUTO: 0.17 K/UL (ref 0–0.11)
IMM GRANULOCYTES NFR BLD AUTO: 0.8 % (ref 0–0.9)
INR PPP: 2.05 (ref 0.87–1.13)
LYMPHOCYTES # BLD AUTO: 0.12 K/UL (ref 1–4.8)
LYMPHOCYTES NFR BLD: 0.6 % (ref 22–41)
MAGNESIUM SERPL-MCNC: 2.2 MG/DL (ref 1.5–2.5)
MCH RBC QN AUTO: 30 PG (ref 27–33)
MCHC RBC AUTO-ENTMCNC: 30.7 G/DL (ref 32.2–35.5)
MCV RBC AUTO: 97.6 FL (ref 81.4–97.8)
MONOCYTES # BLD AUTO: 0.42 K/UL (ref 0–0.85)
MONOCYTES NFR BLD AUTO: 2 % (ref 0–13.4)
NEUTROPHILS # BLD AUTO: 20.73 K/UL (ref 1.82–7.42)
NEUTROPHILS NFR BLD: 96.5 % (ref 44–72)
NRBC # BLD AUTO: 0.14 K/UL
NRBC BLD-RTO: 0.7 /100 WBC (ref 0–0.2)
PHOSPHATE SERPL-MCNC: 4.1 MG/DL (ref 2.5–4.5)
PLATELET # BLD AUTO: 53 K/UL (ref 164–446)
PLATELETS.RETICULATED NFR BLD AUTO: 16 % (ref 0.6–13.1)
POTASSIUM SERPL-SCNC: 3.1 MMOL/L (ref 3.6–5.5)
PRODUCT TYPE UPROD: NORMAL
PRODUCT TYPE UPROD: NORMAL
PROT SERPL-MCNC: 6.1 G/DL (ref 6–8.2)
PROTHROMBIN TIME: 23.4 SEC (ref 12–14.6)
RBC # BLD AUTO: 3.77 M/UL (ref 4.2–5.4)
RHEUMATOID FACT SER IA-ACNC: 19 IU/ML (ref 0–14)
SODIUM SERPL-SCNC: 141 MMOL/L (ref 135–145)
UNIT STATUS USTAT: NORMAL
UNIT STATUS USTAT: NORMAL
WBC # BLD AUTO: 21.5 K/UL (ref 4.8–10.8)

## 2024-05-13 PROCEDURE — 99233 SBSQ HOSP IP/OBS HIGH 50: CPT | Performed by: INTERNAL MEDICINE

## 2024-05-13 RX ADMIN — VENLAFAXINE HYDROCHLORIDE 37.5 MG: 37.5 CAPSULE, EXTENDED RELEASE ORAL at 05:27

## 2024-05-13 RX ADMIN — ISOSORBIDE MONONITRATE 30 MG: 30 TABLET, EXTENDED RELEASE ORAL at 05:27

## 2024-05-13 RX ADMIN — PRAMIPEXOLE DIHYDROCHLORIDE 0.12 MG: 0.12 TABLET ORAL at 08:12

## 2024-05-13 RX ADMIN — HYDRALAZINE HYDROCHLORIDE 25 MG: 50 TABLET ORAL at 14:11

## 2024-05-13 RX ADMIN — LORAZEPAM 0.5 MG: 0.5 TABLET ORAL at 10:06

## 2024-05-13 RX ADMIN — HYDRALAZINE HYDROCHLORIDE 25 MG: 50 TABLET ORAL at 05:26

## 2024-05-13 RX ADMIN — Medication 1334 MG: at 16:52

## 2024-05-13 RX ADMIN — Medication 1334 MG: at 11:24

## 2024-05-13 RX ADMIN — Medication 1334 MG: at 08:12

## 2024-05-13 RX ADMIN — HYDROXYZINE HYDROCHLORIDE 25 MG: 25 TABLET, FILM COATED ORAL at 11:24

## 2024-05-13 RX ADMIN — HYDRALAZINE HYDROCHLORIDE 25 MG: 50 TABLET ORAL at 20:48

## 2024-05-13 RX ADMIN — DEXAMETHASONE 6 MG: 4 TABLET ORAL at 05:25

## 2024-05-13 RX ADMIN — PRAMIPEXOLE DIHYDROCHLORIDE 0.12 MG: 0.12 TABLET ORAL at 20:53

## 2024-05-13 RX ADMIN — SODIUM ZIRCONIUM CYCLOSILICATE 10 G: 10 POWDER, FOR SUSPENSION ORAL at 12:47

## 2024-05-13 RX ADMIN — DEXAMETHASONE 6 MG: 4 TABLET ORAL at 14:10

## 2024-05-13 RX ADMIN — DEXTROSE MONOHYDRATE, SODIUM CITRATE, AND CITRIC ACID MONOHYDRATE: 2.45; 2.2; .8 INJECTION, SOLUTION INTRAVENOUS at 13:38

## 2024-05-13 RX ADMIN — PRAMIPEXOLE DIHYDROCHLORIDE 0.12 MG: 0.12 TABLET ORAL at 14:10

## 2024-05-13 RX ADMIN — QUETIAPINE FUMARATE 25 MG: 25 TABLET ORAL at 20:48

## 2024-05-13 RX ADMIN — METOPROLOL SUCCINATE 12.5 MG: 25 TABLET, FILM COATED, EXTENDED RELEASE ORAL at 05:26

## 2024-05-13 RX ADMIN — DEXAMETHASONE 6 MG: 4 TABLET ORAL at 20:48

## 2024-05-13 ASSESSMENT — ENCOUNTER SYMPTOMS
SPEECH CHANGE: 1
SHORTNESS OF BREATH: 0

## 2024-05-13 ASSESSMENT — PAIN DESCRIPTION - PAIN TYPE
TYPE: ACUTE PAIN
TYPE: ACUTE PAIN

## 2024-05-13 NOTE — PROGRESS NOTES
[facility] Services Progress Note     Hemodialysis treatment x3 hours completed as ordered per Dr Carrasco.   Treatment performed at bedside started at 1038 and ended at 1338.      Net UF Removed: 1000 mL     Patient tolerated treatment well. UF goal reached as tolerated.  R-side IJ non-tunneled CVC access with good patency and flow x 2  Patient stable during and post treatment.   See Acute HD flow sheets on clinical data notes under media for details.      Post tx access: R-side IJ non-tunneled HD catheter flushed with saline then locked with ACD-A per designated amount in each lumen (see MAR) then clamped, capped aseptically and labeled properly. CVC dressings clean, dry and intact. No s/s of infection to HD catheter site. ACD-A lock to be aspirated prior to next dialysis/CVC use by dialysis RN only. Please do not flush or draw from ports.     Please notify Nephrologist/Dialysis for follow-up.     Report given to primary care nurse .

## 2024-05-13 NOTE — DISCHARGE PLANNING
Case Management Discharge Planning    Admission Date: 4/27/2024  GMLOS: 4.2  ALOS: 16    6-Clicks ADL Score: 6  6-Clicks Mobility Score: 10  PT and/or OT Eval ordered: Yes  Post-acute Referrals Ordered: Yes  Post-acute Choice Obtained: Yes  Has referral(s) been sent to post-acute provider:  Yes      Anticipated Discharge Dispo: Discharge Disposition: D/T to home under HHA care in anticipation of covered skilled care (06)  Discharge Address: [address] [place] [id number]    DME Needed: No    Action(s) Taken: Patient was discussed in IDT rounds. Bone marrow and Liver biopsy results are still pending. Patient is getting dialysis today. PT/OT recommend . [facility] patient. SNF referrals were sent out due to low six clicks. SNFs are declining due to insurance and unable to accommodate dialysis.     Escalations Completed: None    Medically Clear: No    Next Steps: Pending OP dialysis chair and medical clearance     Barriers to Discharge: Medical clearance    Is the patient up for discharge tomorrow: No

## 2024-05-13 NOTE — PROGRESS NOTES
[facility] -  PROGRESS NOTE               Author: Destiny Carrasco M.D. Date & Time: 5/13/2024  7:13 AM     CC: abd pain, n/v    HISTORY OF PRESENT ILLNESS:    63 y.o. female with history of systolic and diastolic heart failure with EF of 15% (HFrEF) grade 2 diastolic dysfunction, severe MVR, CAD with h/o STEMI s/p DEWEY to LAD, PAD with aortofemoral bypass surgery who presented 4/27/2024 with increasing shortness of breath admitted for community acquired PNA and severe constipation with hosp course complicated by exacerbation of her HFrEF, medications augmented which was followed by IVON, worsening hepatopathy concerning for cardiorenal etiology.   She was then transferred to ICU 5/01 and initiated on dobutamine drip for organ perfusion.   Patient continues to be oliguric with UOP of 86 yesterday and 10 ml today so far.   Nephrology has been consulted for concerns of IVON , cardiorenal syndrome.     DAILY NEPHROLOGY SUMMARY:  05/02: Examined at bedside. Looks like she wants to talk but it is difficult for her to find appropriate words.   Dobutamine ggt held per critical care today.   Labs yesterday with fibrinogen 91, cryoppt administered followed by dialysis   1 L ultrafiltration   Uptrending WBCs since 4/30, 18.4 today  Scr improved to 3.03 after dialysis   Down trending AST/ALT   CT scan without bowel obs, extensive calcification of distal thoracic/upper abd aorta. Left renal cyst.   5/4: Pt transferred out of ICU, now with increased CPK and climbing  5/5: Pt still anuric, HD yesterday  5/6: minimal UOP, SBP 90s-140s, platelets stable at 61,000, CPK trended down to 2314, BM biopsy planned for today, patient denies CP/SOB  5/7: minimal UOP, tolerated HD, SBP 110s-120s, fibrinogen 88 and patient provided with 1u cryo, BM biopsy done, platelets 57,000, no new c/o  5/8: CO2 12 and K 6.2 this AM, WBC trended up (on steroids), HD planned for today, platelets 53,000, does not answer questions  "today, appears agitated, does not have gown on  5/9: tolerated HD, transferred to oncology floor, reports she is hungry this AM, SBP 130s-150s, plts 53,000  5/10: HD planned for today, SBP 120s-130s, family at bedside, patient curled in bed, c/o pain but can't identify where, does not really answer other questions  5/11: HD yesterday net UF 1L. VSS HR 50-60.  Very restless, does not answer questions.  Labs not run this AM, added and pending. UP 25 ml, has diaper in place.   5/12: VSS UOP 50ml.  Got cryo yesterday for low fibrinogen, had to use pigtail of HD cath. Today's labs pending.  Mentation unchanged.    Addendum:  K 6.3  5/13: No events, underwent urgent HD yest for hyperkalemia and UF 500cc, K low this am at 3.1, Cr improved s/p HD, platelets lower at 53K and WBC still 21.5, BP stable, stable on RA, 100cc UOP overnight, does not answer questions    REVIEW OF SYSTEMS:    ROS limited due to mental acuity, Chronic expressive aphasia.     PAST MEDICAL/SURGICAL/FAMILY/SOCIAL HISTORY:  Reviewed and documented in chart     HOME MEDICATIONS:   - Reviewed and documented in chart    LABORATORY STUDIES:   - Reviewed and documented in chart    ALLERGIES:  Pcn [penicillins] and Toradol    VS:  /72   Pulse 65   Temp 36.9 °C (98.4 °F) (Temporal)   Resp 18   Ht 1.651 m (5' 5\")   Wt 50.6 kg (111 lb 8.8 oz)   SpO2 99%   BMI 18.56 kg/m²   Physical Exam  Vitals and nursing note reviewed.   Constitutional:       General: She is not in acute distress.     Appearance: She is ill-appearing.   HENT:      Head: Normocephalic and atraumatic.      Right Ear: External ear normal.      Left Ear: External ear normal.      Nose: Nose normal.      Mouth/Throat:      Mouth: Mucous membranes are dry.   Eyes:      General: No scleral icterus.     Extraocular Movements: Extraocular movements intact.   Neck:      Comments: Right IJ trialysis catheter   Cardiovascular:      Rate and Rhythm: Normal rate and regular rhythm.   Pulmonary: "      Effort: Pulmonary effort is normal. No respiratory distress.   Abdominal:      General: Bowel sounds are normal. There is no distension.      Palpations: Abdomen is soft.   Musculoskeletal:         General: No deformity.      Right lower leg: No edema.      Left lower leg: No edema.   Skin:     General: Skin is warm.      Capillary Refill: Capillary refill takes less than 2 seconds.      Findings: Bruising and rash present.      Comments: Excoriations on extremities    Neurological:      General: No focal deficit present.      Mental Status: She is alert.      Comments: Chronic expressive aphasia      Psychiatric:         Attention and Perception: She is inattentive.         FLUID BALANCE:  In: 668 [Blood:168; Dialysis:500]  Out: 1100     LABS:  Recent Labs     05/10/24  1047 05/11/24  1300 05/12/24  1050   SODIUM 142 136 143   POTASSIUM 6.1* 5.4 6.3*   CHLORIDE 94* 93* 98   CO2 10* 18* 20   GLUCOSE 145* 95 113*   BUN 84* 55* 82*   CREATININE 5.62* 4.02* 6.20*   CALCIUM 8.4* 8.8 9.1        IMAGING:  - Imaging studies reviewed by me      IMPRESSION:     # Acute kidney injury multifactorial--Decreased perfusion, HLH, rhabdo  - unlikley TTP  - oliguric  - HD 5/2 1st  - C3 and C4 low, ARLEEN negative, ANCA negative  - hepatitis negative  - PCR and ACR pending (minimal UOP)     # ? 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 ongoing  - steroids  -Bone Marrow 5/6    # Hepatic congestion      #Rhabdo unclear etiology, difficult to give fluids in face of reduced EF     # Heart failure with reduced ejection fraction      # Coagulopathy   - Fibinogen monitored, cryo when <100     # Transamnitis  - Improving       # Severe mitral regurgitation      # Peripheral vascular disease with prior aortic thrombus s/p aortofemoral bypass     # Constipation      # Impacted stool in intestine   - Resolved      # Colitis      # Leukocytosis      # Prior CVA    - Chronic expressive aphasia     # Essential hypertension    - goal <140/90,  intermittently at goal    # Hyperkalemia--resolved with HD and now with hypokalemia    # Acidosis, improved    # CKD MBD  - Ca WNL and Phos now controlled 4.  -         PLAN:  - HD today (MON) and qMWF and PRN for now  - Daily evaluation of RRT needs  - steroids per primary service/hematology  - Bone marrow 5/6, f/u findings  - renal biopsy ordered, though likely will only be obtained with improved platelets/coagulation status, hold for now  - Avoid nephrotoxins, NSAIDs  - Renally dose meds   - daily labs  - recommend dietary supplements be adjusted for renal failure  - Calcium acetate 1334mg TID with meals, changed to renal diet 5/12  - Stop lokelma  - Daily labs, I/O    Other management per primary service

## 2024-05-13 NOTE — PROGRESS NOTES
Hospital Medicine Daily Progress Note    Date of Service  5/13/2024    Chief Complaint  abdominal pain    Hospital Course  Fouzia Santamaria is a 63 y.o. female with HFrEF (EF 35%), severe PAD status post aortic femoral bypass coronary disease status post LAD stent, COPD, diabetes, hypertension, and previous strokes, who was initially seen in the ED with abdominal pain for 10 days and subsequently discharged to home, who again presented with 2 days of bodyaches as well as worsening constipation, nausea and vomiting.  Workup showed leukocytosis with white count of 15,100 with left shift.  Chest x-ray showed mild pulmonary edema.  Abdominal x-ray showed significant amount of stool burden.  Echocardiogram was obtained which revealed that ejection fraction now down to 15% with severe mitral regurgitation.  She also had acute kidney injury and likely cardiorenal syndrome.  Cardiology and nephrology were consulted.  She was placed on a dobutamine drip.  Her renal function worsened and she required a right-sided dialysis catheter with initiation of dialysis.  Her liver enzymes went up to AST of 2959 and ALT of 1377.  She also had significant drop in both hemoglobin and platelet count, with worsening leukocytosis.  LDH was 1418, fibrinogen 91, ferritin 7333 with elevated PT, PTT and INR.  Imaging of the abdomen showed mild hepatomegaly with no splenomegaly.  Hematology/oncology was consulted for concerns for hemophagocytic lymphohistiocytosis (HLH) with low suspicion/probability for TTP or HIT. She was started on Decadron.  She had low fibrinogen for which she was given cryoprecipitate.  Status post bone marrow biopsy on 5/6 and liver biopsy on    Interval Problem Update  Patient was seen and examined at bedside.  I have personally reviewed and interpreted vitals, labs, and imaging.    5/9.  Afebrile.  Episode of bradycardia resolved.  Mild hypertension.  On room air.  Continue Phos binders and calcium supplementation.   "Patient is very lethargic.  Keeps saying baby.  She is aphasic.  Points to her abdomen and apparently to localize pain.  Discussed with spouse Basilio.  Apparently when saying baby patient is calling for her spouse.  Long discussion about plan of care and poor prognosis.  With renal failure now on dialysis, worsening heart failure, poor mentation and functional status.  Patient was agreeable with DNR/DNI.  Discussed with oncology.  Awaiting results of bone marrow biopsy and liver biopsy.  Transfuse cryo for fibrinogen of 130.  5/10.  Afebrile.  Intermittent bradycardia.  On room air.  Patient is lethargic.  Complains of pain but unable to localize.  In fetal position.  Due for dialysis today.  Discussed with oncology.  Awaiting results of bone marrow and liver biopsy.  5/11.  Afebrile.  Intermittent bradycardia.  On room air.  Patient is confused.  Keeps repeating mama.  Curled in fetal position.  Transfuse cryoprecipitate for low fibrinogen.  Bone marrow and liver biopsy still pending.  Tolerated dialysis yesterday  5/12.  Afebrile.  Intermittent bradycardia.  On room air.  Patient moans and groans.  Is very lethargic.  Flailing around in bed.  Discussed with nephrology.  Plan for dialysis again today.  Bone marrow liver biopsy still pending.  Bilirubin was 3.1.  Abdominal ultrasound showed no obstruction and nondilated common bile duct.  Wbc 13.4 > 20.6 > 22.3 > 20.2 > 22.6  Hgb 9.1 > 10.9 > 9.2 > 11.4 > 11.6  P 57 > 53 > 115 > 71 > 78  Cr 4.89 > 5.62 > 6.2  INR 2.02 > 2.14  Fibrinogen 130 > 156 > 87 > 101.  Transfuse cryo.  Procal 0.98.  Not a very high elevation in the setting of end-stage renal disease.  5/13.  Afebrile. Intermittent hypertension.  On room air.  This morning patient more calm and coherent.  She actually ate a little breakfast with nursing and said \"no more\".  She then later became again more lethargic and aphasia.  Patient again will go for dialysis today.  Bone marrow biopsy and liver biopsy " still pending.  Discussed with oncology concerned about EBV.  Check CT chest  Wbc 13.4 > 20.6 > 22.3 > 20.2 > 22.6 > 21.5  Hgb 9.1 > 10.9 > 9.2 > 11.4 > 11.6 > 11.3  P 57 > 53 > 115 > 71 > 78 > 53  Cr 4.89 > 5.62 > 6.2  INR 2.02 > 2.14  Fibrinogen 130 > 156 > 87 > 101 > 68    I have discussed this patient's plan of care and discharge plan at IDT rounds today with Case Management, Nursing, Nursing leadership, and other members of the IDT team.    Consultants/Specialty  Hematology  Critical care  Nephrology  Cardiology  Palliative care     Code Status  DNAR/DNI    Disposition  The patient is not medically cleared for discharge to home or a post-acute facility.  Anticipate discharge to: home with organized home healthcare and close outpatient follow-up    Discharge plan TBD.  6 clicks 14 and 16.  OT recommends home health.  Pending PT evaluation.  May need outpatient hemodialysis chair placement.  I have placed the appropriate orders for post-discharge needs.    Review of Systems  Review of Systems   Unable to perform ROS: Medical condition   Constitutional:  Positive for malaise/fatigue.   Respiratory:  Negative for shortness of breath.    Cardiovascular:  Negative for chest pain.   Neurological:  Positive for speech change.        Physical Exam  Temp:  [35.8 °C (96.5 °F)-37.2 °C (98.9 °F)] 36.9 °C (98.4 °F)  Pulse:  [60-70] 65  Resp:  [18-20] 18  BP: (113-163)/(66-90) 113/72  SpO2:  [89 %-99 %] 99 %    Physical Exam  Vitals reviewed.   Constitutional:       General: She is not in acute distress.     Appearance: She is ill-appearing.   HENT:      Head: Normocephalic and atraumatic.   Eyes:      Conjunctiva/sclera: Conjunctivae normal.   Neck:      Comments: Right IJ dialysis cath  Cardiovascular:      Rate and Rhythm: Normal rate and regular rhythm.      Heart sounds: Normal heart sounds.   Pulmonary:      Effort: Pulmonary effort is normal. No respiratory distress.      Breath sounds: Normal breath sounds. No  wheezing.   Abdominal:      General: There is no distension.      Palpations: Abdomen is soft.      Tenderness: There is no abdominal tenderness. There is no guarding.   Musculoskeletal:         General: No swelling. Normal range of motion.      Cervical back: Normal range of motion and neck supple. No muscular tenderness.      Right lower leg: No edema.      Left lower leg: No edema.   Skin:     General: Skin is warm and dry.      Coloration: Skin is not jaundiced.      Findings: Bruising and rash present.      Comments: Small scabs right forearm and bilat feet  (+) petechial rashes   Neurological:      General: No focal deficit present.      Mental Status: She is alert.      Cranial Nerves: No cranial nerve deficit.      Comments: She moves her extremities equally  (+) Expressive aphasia   Psychiatric:      Comments: Unable to assess due to aphasia           Fluids    Intake/Output Summary (Last 24 hours) at 5/13/2024 0531  Last data filed at 5/12/2024 2200  Gross per 24 hour   Intake 668 ml   Output 1000 ml   Net -332 ml       Laboratory  Recent Labs     05/11/24  1139 05/12/24  1050   WBC 20.2* 22.6*   RBC 3.82* 3.84*   HEMOGLOBIN 11.4* 11.6*   HEMATOCRIT 37.0 37.2   MCV 96.9 96.9   MCH 29.8 30.2   MCHC 30.8* 31.2*   RDW 90.7* 97.7*   PLATELETCT 71* 78*     Recent Labs     05/10/24  1047 05/11/24  1300 05/12/24  1050   SODIUM 142 136 143   POTASSIUM 6.1* 5.4 6.3*   CHLORIDE 94* 93* 98   CO2 10* 18* 20   GLUCOSE 145* 95 113*   BUN 84* 55* 82*   CREATININE 5.62* 4.02* 6.20*   CALCIUM 8.4* 8.8 9.1     Recent Labs     05/11/24  1139 05/12/24  1050   APTT 29.4 28.7   INR 2.29* 2.14*                 Imaging  US-RUQ   Final Result      1.  Limited exam due to patient noncompliance.   2.  No flow demonstrated in the portal vein suggesting thrombosis.   3.  Probable small gallstones in the gallbladder.   4.  No biliary dilation.   5.  Atrophic RIGHT kidney, poorly visualized.      IR-LIVER BX WITH OTHER STUDY   Final  Result      1.  Transjugular hepatic venography showing a patent right hepatic vein.      2. 18 transjugular liver biopsies x 2.      3. Exchange and replacement of the right temporary dialysis catheter with a new 12 Ma[place] 15 cm catheter.      DX-CHEST-FOR LINE PLACEMENT Perform procedure in: Other(comment f6 below):   Final Result      1.  Right IJ catheter has been placed and the tip projects appropriately over the superior vena cava.      2.  Cardiomegaly with evidence of mild fluid overload.      CT-ABDOMEN-PELVIS W/O   Final Result      1.  No evidence of bowel obstruction or focal inflammatory change.      2.  Again seen extensive atherosclerotic vascular calcification with distal thoracic/upper abdominal aortic ectasia measuring approximately 3.2 cm in diameter.      3.  Gallstones within the gallbladder.      4.  Hyperdense left renal cyst likely representing hemorrhagic or milk of calcium within a cyst.      5.  Small amount of free fluid dependently within the pelvis.      DX-CHEST-PORTABLE (1 VIEW)   Final Result      Cardiomegaly.      US-ABDOMEN COMPLETE SURVEY   Final Result      1.  Mild hepatomegaly.      2.  Sludge noted in the gallbladder.      3.  Small echogenic foci in the periphery of the renal collecting systems bilaterally suspicious for early nephrolithiasis.      4.  Bilateral renal cysts.         DX-CHEST-LIMITED (1 VIEW)   Final Result         1.  No acute cardiopulmonary disease.   2.  Atherosclerosis      EC-ECHOCARDIOGRAM COMPLETE W/ CONT   Final Result      DX-CHEST-LIMITED (1 VIEW)   Final Result      1.  Enlarged cardiac silhouette with changes of pulmonary edema.      [id number] VIEW   Final Result      1.  Nonobstructive bowel gas pattern with a large amount of colonic stool.      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  Imaging of the abdomen showed mild hepatomegaly with no splenomegaly.    - Hematology/oncology was consulted for concerns for hemophagocytic lymphohistiocytosis (HLH) with low suspicion/probability for TTP or HIT.   -Continue Decadron.  -S/p bone marrow biopsy 5/6/24.  Follow pathology.   -Monitor for coagulopathy.  Daily fibrinogen, PT/PTT.  Transfused with cryoprecipitate if fibrinogen less than 150.  Oncology recommended liver or lymph node biopsy   Status post IR transjugular liver biopsy with portal venography 5/8    Hyperlipidemia- (present on admission)  Assessment & Plan  5/13/2024  - Hold off on statin due to rhabdomyolysis.    Cardiogenic shock (HCC)- (present on admission)  Assessment & Plan  5/13/2024  -In the setting of low ejection fraction of 15%.  Required dobutamine drip.  Shock resolved.  -Management of HFrEF as above.    History of stroke- (present on admission)  Assessment & Plan  5/13/2024  -With chronic aphasia.  Appears at baseline.  Monitor.    Rhabdomyolysis- (present on admission)  Assessment & Plan  5/13/2024  -CPK was over 5,000. May be due to hemophagocytic lymphohistiocytosis.  -Holding off on IV fluids given IVON/dialysis dependent.  -CPK continues to trend down.  Continue to monitor.  CPK levels in the morning.    Thrombocytopenia (HCC)- (present on admission)  Assessment & Plan  5/13/2024  - In setting of HLH, thrombocytopenia.  Low suspicion/probability for TTP or HIT.  -Continue Decadron.  -Continue to monitor platelet counts.  Restrictive transfusion strategy.  Watch for bleeding.     Coagulopathy (HCC)- (present on admission)  Assessment & Plan  5/13/2024  - In setting of HLH, thrombocytopenia.  Low suspicion/probability for TTP or HIT.  Fibrinogen low.  -Continue Decadron.  -s/p bone marrow biopsy 5/6.  -Monitor PT/PTT/INR and daily fibrinogen.  Watch for bleeding.  -Follow fibrinogen, transfuse with cryoprecipitate if </= 150.  She  gets spontaneous bleeding with low fibrinogen levels.    IVON (acute kidney injury) (HCC)- (present on admission)  Assessment & Plan  5/13/2024  - Could be cardiorenal in setting of HFrEF, along with possible HLH.  -Now on hemodialysis.  -Nephrology following.  -Monitor electrolytes closely.  -If continues to require hemodialysis, will need outpatient hemodialysis chair arrangements.  -IR consult for kidney biopsy.    Transaminitis  Assessment & Plan  5/13/2024  -Likely congestive hepatopathy due to HFrEF.  Also in setting of rhabdomyolysis.  HLH could be contributing.  -Continue to trend LFTs, continues to trend down.  -Continue to trend CPK.    Severe mitral regurgitation- (present on admission)  Assessment & Plan  5/13/2024  -Per echo.  Management as above.    Heart failure with reduced ejection fraction (HCC)- (present on admission)  Assessment & Plan  5/13/2024  - Had a drop in EF to 15% from previous 35%.  Required dobutamine drip.  -Continue volume removal with hemodialysis.  -Optimize GDMT.  Unable to give ACEi/ARB/ARNI/SGLT2i given renal function.  Continue hydralazine/Imdur for afterload reduction.  Continue Toprol-XL 12.5 mg daily, adjust up as blood pressure tolerates.  Monitor blood pressure to make sure she tolerates medications.  -Monitor on telemetry.    Hyperkalemia- (present on admission)  Assessment & Plan  5/13/2024  2/2 renal failure   Continue hemodialysis per nephro  Daily labs   Remote cardiac monitoring     Leukocytosis- (present on admission)  Assessment & Plan  5/13/2024  -Concerning for HLH.  -s/p bone marrow biopsy 5/6/2024. Follow pathology.   -No signs of sepsis or infection.  Holding off on further antibiotics.  Has had 5 days of antibiotics in the hospital.  -Trend WBC count.  Has been dexamethasone.  Check procalcitonin and continue to trend leukocytosis  Procal 0.98.  Not a very high elevation in the setting of end-stage renal disease.    Advanced care planning/counseling  discussion- (present on admission)  Assessment & Plan  Patient is aphasic and lethargic, unable to participated in the conversation.  Spouse Basilio was present for the conversation.  I discussed advance care planning face to face with the patient and family for at least 30 minutes, including diagnosis, prognosis, plan of care, risks and benefits of any therapies that could be offered, as well as alternatives including palliation and hospice, as appropriate.  Forms were completed and placed in the chart.  Patient is now DNR/DNI.         VTE prophylaxis: SCD    I have performed a physical exam and reviewed and updated ROS and Plan today (5/13/2024). In review of yesterday's note (5/12/2024), there are no changes except as documented above.     Greater than 52 minutes spent prepping to see patient (e.g. review of tests) obtaining and/or reviewing separately obtained history. Performing a medically appropriate examination and/ evaluation.  Counseling and educating the patient/family/caregiver.  Ordering medications, tests, or procedures.  Referring and communicating with other health care professionals.  Documenting clinical information in EPIC.  Independently interpreting results and communicating results to patient/family/caregiver.  Care coordination.

## 2024-05-13 NOTE — PROGRESS NOTES
[facility] Services Progress Note         STAT HD today d/t hyperkalemia x 3 hours per K. Yomi, APRN.  Tx initiated at 1412 and ended at 1712    Pt confused, restless, has no restraints, hyperkalemic with K of 6.3, needing STAT HD Tx. Also on room air, with O2 sat of 95%, no visible bleeding, and with no edema. (+) consent     NET UF: 500 ml    Pt was restless throughout the treatment; tolerated. VS within acceptable limits. Blood returned, ports flushed with NS.  ACD-A used to lock catheter per designated amount. CVC ports clamped and capped. Aspirate heparin prior to next CVC use. See eflow sheets for further details.    Report given to JOSUE Massey RN

## 2024-05-13 NOTE — PROGRESS NOTES
"Hematology/Oncology Progress Note    Primary Hematologist/Oncologist: Miguelina    Cc: Anemia, thrombocytopenia    Oncology History:  4/27/2024: Presented to hospital with 10 days of flulike symptoms, abdominal pain, and found to have rhinovirus and enterovirus infections.  Rapidly deteriorated with LFTs rising to AST/ALT 2739/1024 along with acute renal failure requiring dialysis.  She also had decompensation of her congestive heart failure requiring transfer to the ICU for dobutamine drip.  Hemoglobin precipitously dropped to 8.3 and platelets also dropped to 57 along with a new leukocytosis.  Her workup was also not notable for normal triglycerides but a ferritin of 14,244.  CT CAP demonstrated no hepatosplenomegaly.    Interval History:  Sleeping at time of visit. Family not around to assist with communication.     Review of Systems:  Review of Systems   Reason unable to perform ROS: Mental status.        Vitals:     /62   Pulse 65   Temp 36.8 °C (98.2 °F) (Temporal)   Resp 18   Ht 1.651 m (5' 5\")   Wt 50.6 kg (111 lb 8.8 oz)   SpO2 96%   BMI 18.56 kg/m²     Physical Exam:  Physical Exam  Vitals reviewed.   Constitutional:       General: She is not in acute distress.     Appearance: She is ill-appearing. She is not toxic-appearing.      Comments: ECOG equals 3-4   HENT:      Head: Normocephalic and atraumatic.      Mouth/Throat:      Mouth: Mucous membranes are moist.      Pharynx: Oropharynx is clear. No oropharyngeal exudate.   Eyes:      General:         Right eye: No discharge.         Left eye: No discharge.      Extraocular Movements: Extraocular movements intact.      Conjunctiva/sclera: Conjunctivae normal.   Cardiovascular:      Rate and Rhythm: Normal rate and regular rhythm.      Heart sounds: No murmur heard.     No friction rub. No gallop.   Pulmonary:      Effort: Pulmonary effort is normal. No respiratory distress.      Breath sounds: Normal breath sounds. No wheezing or rales. "   Abdominal:      General: Abdomen is flat. Bowel sounds are normal. There is no distension.      Palpations: Abdomen is soft.      Tenderness: There is no abdominal tenderness. There is no guarding.   Musculoskeletal:         General: No swelling or tenderness. Normal range of motion.      Cervical back: Normal range of motion and neck supple. No rigidity.      Right lower leg: No edema.      Left lower leg: No edema.   Lymphadenopathy:      Cervical: No cervical adenopathy.   Skin:     General: Skin is warm and dry.      Capillary Refill: Capillary refill takes 2 to 3 seconds.      Coloration: Skin is not jaundiced.      Findings: Erythema present. No bruising.      Comments: Diffuse purpura/petechiae   Neurological:      Mental Status: She is alert. Mental status is at baseline.      Comments: Follows minimal commands, aphasic, restless   Psychiatric:      Comments: Aphasic, appears confused          Assessment and Plan:    1.  Concern for HLH:  Presented to the hospital on 4/27/2024 with 10 days of flulike symptoms, abdominal pain.  Diagnosed with community-acquired pneumonia and treated with a full course of antibiotics.  Found to have rhinovirus/enterovirus infections.  Rapidly deteriorated with rising LFTs peaking at 8689-5402, as well as ARF requiring dialysis.  She has baseline CHF with an EF of 35% which decreased to 15%.  She was admitted to the ICU with dobutamine drip.  She has had ongoing anemia with hemoglobin as low as 8.3, platelets as low as 57, with leukocytosis.  Ferritin initially 14,244.       Hematology consulted to consider HLH syndrome.  Triglycerides were normal.  CT scan of the abdomen pelvis showed no hepatosplenomegaly.  LDH was 1418.  Fibrinogen has been less than 100 requiring cryoprecipitate.  Soluble IL-2 receptor markedly elevated at 4489.7 (from 5/8/2024).  However, speaking against the syndrome is the fact that she has been completely afebrile, she has no hepatosplenomegaly, she  has normal triglycerides.  Bone marrow biopsy done on 5/6/2024.  Liver biopsy done on 5/8/2024.  CXCL9 lab test drawn but still pending.      H score calculated at 123 (5-9% chance of HLH), but results of bone marrow or tissue biopsy still pending (if this is positive, then the score would be 158, with a 25-40% chance of HLH).  Other HLH prognostic indicators nondiagnostic.  HLH-94 shows at least 1 marker of immune over reactivation (elevated ferritin, low fibrinogen), but only  2 of 4 of the criteria (requires 3).     Primary HLH (underlying germline mutation predisposing to HLH) or a secondary HLH (inflammatory dysregulation leading to cytokine storm, caused by a separate etiology.  Separate inciting etiologies can include disorders of increased immune activation (infection such as EBV, viral, or chronic granulomatous disease), or could include disorders that suppress the immune system (HIV, rheumatologic disorders, malignancies, other inherited syndromes).   With either primary or secondary HLH, an infection can be a triggering factor.  EBV testing done, with a positive NAAT test (not quantified).  ARLEEN as well as ANCA testing negative.    Patient was started on dexamethasone 6 mg every 8 hours on 5/2/2024, and markers have been improving including a decreasing ferritin.    Not likely TTP, given low plasmic score of 4, HIT was low probability.      -- Awaiting pathology markers including bone marrow biopsy and liver biopsy  --Recommend checking a daily ferritin and LDH to look for ongoing improvement  --Continue dexamethasone for now (dosing is just over 10 mg/m²)  --Check a weekly soluble IL-2 receptor alpha level to look for improvement/response  --Jenny test  --Quantitative immunoglobulins  --Rheumatoid factor  --CT of the chest to complete staging, and look for any other underlying malignancy  --Recommend MRI of the brain to rule out CNS involvement with HLH (this would portend a very poor prognosis, could  present like PRES, may need CSF sampling for definitive evaluation--but this would be hard given the thrombocytopenia and current agitation/noncooperation).  Probably would need full anesthesia with an MRI of the brain given poor cooperation.  --Will order a [facility] HLH gene panel with NGS testing on the whole blood (not a fast turnaround time, used to determine appropriate future treatments such as stem cell transplant)  -- Recommend asking infectious disease to interpret the EBV panel--I am not sure whether this is all just indicative of a past EBV infection, or whether there may be a current EBV infection.  If ongoing EBV, this can be treated with rituximab or IVIG.  If this is the underlying inciting factor, this may improve HLH    -- May need to have big picture discussion with .  If this is HLH, very poor prognosis, with low likelihood of surviving (less than 10%).  MOS in cases like this probably only 2 to 4 months.  If she has underlying genetic predisposition, or has more refractory disease, this can only be cured with a allogenic stem cell transplant.  She is not a candidate for stem cell transplant given her other medical comorbidities.  If this is HLH, then typically the patient will need to be treated with weekly etoposide chemotherapy for 8 weeks.  Not sure that she is a good candidate for chemotherapy either.  If this is HLH, possibly moving more towards a comfort care approach would be appropriate.             2. Acute renal failure--  Nephrology following.  On hemodialysis.  Remains oliguric.  Etiology may have been hypotensive/prerenal related.  She also has rhabdomyolysis.    3.  Coagulopathy--  Secondary to liver dysfunction vs HLH.  Transfuse cryoprecipitate to maintain fibrinogen greater than 100    4. Elevated LFTs--  Improving following dialysis, improvement of blood pressure, as well as dexamethasone therapy.. The level of improvement is reassuring that this may not be HLH as I  would not expect them to improve so greatly with steroids alone.   -- Monitor daily CMP    5. Heart failure with reduced ejection fraction  Drop in EF to 15% from previously 35% (documented in August 2022, so this is chronic).  Required a dobutamine drip this admission.  Medicine currently optimizing GDMT.    6. Rhabdomyolysis--  CPK greater than 5000 on admission.  This is improving.  Nephrology on board.  Underlying etiology is unclear, potentially secondary to HLH vs other precipitating factor.    7.  History of stroke--  Chronic aphasia.  Neurologic status at baseline per .  Very poor baseline status.  Not able to perform any ADLs on her own.            Thank you for allowing me to participate in her care. Please do not hesitate to reach out with any questions.    Please note that this dictation was created using voice recognition software. I have made every reasonable attempt to correct obvious errors, but I expect that there are errors of grammar and possibly content that I did not discover before finalizing the note.

## 2024-05-13 NOTE — CARE PLAN
The patient is Unstable - High likelihood or risk of patient condition declining or worsening    Shift Goals  Clinical Goals: Safety, monitor labs  Patient Goals: AFIA  Family Goals: not present    Progress made toward(s) clinical / shift goals:     Problem: Hemodynamics  Goal: Patient's hemodynamics, fluid balance and neurologic status will be stable or improve  Outcome: Progressing  Note: Fibrinogen level resulted at 68. Patient received cryoprecipitate transfusion in the morning. Patient had dialysis treatment done in the room. Tolerated well.        Patient is not progressing towards the following goals:    Problem: Knowledge Deficit - Standard  Goal: Patient and family/care givers will demonstrate understanding of plan of care, disease process/condition, diagnostic tests and medications  Outcome: Not Progressing  Note: Patient was able to respond to yes/no questions this morning. Throughout the shift she would only moan when asked a question.

## 2024-05-14 ENCOUNTER — APPOINTMENT (OUTPATIENT)
Dept: RADIOLOGY | Facility: MEDICAL CENTER | Age: 64
DRG: 280 | End: 2024-05-14
Attending: STUDENT IN AN ORGANIZED HEALTH CARE EDUCATION/TRAINING PROGRAM
Payer: MEDICAID

## 2024-05-14 PROBLEM — E87.6 HYPOKALEMIA: Status: ACTIVE | Noted: 2024-05-14

## 2024-05-14 PROBLEM — K75.9 HEPATITIS: Status: ACTIVE | Noted: 2024-04-30

## 2024-05-14 PROBLEM — N17.0 ACUTE RENAL FAILURE WITH TUBULAR NECROSIS (HCC): Status: ACTIVE | Noted: 2024-05-01

## 2024-05-14 LAB
ALBUMIN SERPL BCP-MCNC: 3.4 G/DL (ref 3.2–4.9)
ALBUMIN/GLOB SERPL: 1 G/DL
ALP SERPL-CCNC: 187 U/L (ref 30–99)
ALT SERPL-CCNC: 130 U/L (ref 2–50)
ANION GAP SERPL CALC-SCNC: 18 MMOL/L (ref 7–16)
APTT PPP: 26.3 SEC (ref 24.7–36)
AST SERPL-CCNC: 122 U/L (ref 12–45)
BASOPHILS # BLD AUTO: 0.2 % (ref 0–1.8)
BASOPHILS # BLD: 0.04 K/UL (ref 0–0.12)
BILIRUB SERPL-MCNC: 2.8 MG/DL (ref 0.1–1.5)
BUN SERPL-MCNC: 41 MG/DL (ref 8–22)
CALCIUM ALBUM COR SERPL-MCNC: 9.5 MG/DL (ref 8.5–10.5)
CALCIUM SERPL-MCNC: 9 MG/DL (ref 8.5–10.5)
CHLORIDE SERPL-SCNC: 96 MMOL/L (ref 96–112)
CO2 SERPL-SCNC: 29 MMOL/L (ref 20–33)
CREAT SERPL-MCNC: 2.89 MG/DL (ref 0.5–1.4)
EKG IMPRESSION: NORMAL
EOSINOPHIL # BLD AUTO: 0 K/UL (ref 0–0.51)
EOSINOPHIL NFR BLD: 0 % (ref 0–6.9)
FERRITIN SERPL-MCNC: 1479 NG/ML (ref 10–291)
FIBRINOGEN PPP-MCNC: 125 MG/DL (ref 215–460)
GFR SERPLBLD CREATININE-BSD FMLA CKD-EPI: 18 ML/MIN/1.73 M 2
GLOBULIN SER CALC-MCNC: 3.5 G/DL (ref 1.9–3.5)
GLUCOSE SERPL-MCNC: 174 MG/DL (ref 65–99)
HCT VFR BLD AUTO: 43.5 % (ref 37–47)
HGB BLD-MCNC: 13.2 G/DL (ref 12–16)
IMM GRANULOCYTES # BLD AUTO: 0.23 K/UL (ref 0–0.11)
IMM GRANULOCYTES NFR BLD AUTO: 0.9 % (ref 0–0.9)
INR PPP: 1.66 (ref 0.87–1.13)
LDH SERPL L TO P-CCNC: 735 U/L (ref 107–266)
LYMPHOCYTES # BLD AUTO: 0.1 K/UL (ref 1–4.8)
LYMPHOCYTES NFR BLD: 0.4 % (ref 22–41)
MAGNESIUM SERPL-MCNC: 2.4 MG/DL (ref 1.5–2.5)
MCH RBC QN AUTO: 29.7 PG (ref 27–33)
MCHC RBC AUTO-ENTMCNC: 30.3 G/DL (ref 32.2–35.5)
MCV RBC AUTO: 97.8 FL (ref 81.4–97.8)
MONOCYTES # BLD AUTO: 0.51 K/UL (ref 0–0.85)
MONOCYTES NFR BLD AUTO: 2 % (ref 0–13.4)
NEUTROPHILS # BLD AUTO: 24.51 K/UL (ref 1.82–7.42)
NEUTROPHILS NFR BLD: 96.5 % (ref 44–72)
NRBC # BLD AUTO: 0.08 K/UL
NRBC BLD-RTO: 0.3 /100 WBC (ref 0–0.2)
PHOSPHATE SERPL-MCNC: 2.4 MG/DL (ref 2.5–4.5)
PLATELET # BLD AUTO: 45 K/UL (ref 164–446)
PLATELETS.RETICULATED NFR BLD AUTO: 17.9 % (ref 0.6–13.1)
POTASSIUM SERPL-SCNC: 3.2 MMOL/L (ref 3.6–5.5)
PROT SERPL-MCNC: 6.9 G/DL (ref 6–8.2)
PROTHROMBIN TIME: 19.8 SEC (ref 12–14.6)
RBC # BLD AUTO: 4.45 M/UL (ref 4.2–5.4)
SODIUM SERPL-SCNC: 143 MMOL/L (ref 135–145)
TSH SERPL DL<=0.005 MIU/L-ACNC: 1.47 UIU/ML (ref 0.38–5.33)
VIT B12 SERPL-MCNC: 3451 PG/ML (ref 211–911)
WBC # BLD AUTO: 25.4 K/UL (ref 4.8–10.8)

## 2024-05-14 PROCEDURE — 99233 SBSQ HOSP IP/OBS HIGH 50: CPT | Performed by: STUDENT IN AN ORGANIZED HEALTH CARE EDUCATION/TRAINING PROGRAM

## 2024-05-14 PROCEDURE — 93010 ELECTROCARDIOGRAM REPORT: CPT | Performed by: INTERNAL MEDICINE

## 2024-05-14 RX ORDER — HYDROMORPHONE HYDROCHLORIDE 1 MG/ML
0.25 INJECTION, SOLUTION INTRAMUSCULAR; INTRAVENOUS; SUBCUTANEOUS ONCE
Status: COMPLETED | OUTPATIENT
Start: 2024-05-15 | End: 2024-05-15

## 2024-05-14 RX ORDER — POTASSIUM CHLORIDE 20 MEQ/1
20 TABLET, EXTENDED RELEASE ORAL ONCE
Status: COMPLETED | OUTPATIENT
Start: 2024-05-14 | End: 2024-05-14

## 2024-05-14 RX ADMIN — PRAMIPEXOLE DIHYDROCHLORIDE 0.12 MG: 0.12 TABLET ORAL at 21:20

## 2024-05-14 RX ADMIN — HYDRALAZINE HYDROCHLORIDE 25 MG: 50 TABLET ORAL at 21:20

## 2024-05-14 RX ADMIN — POTASSIUM CHLORIDE 20 MEQ: 1500 TABLET, EXTENDED RELEASE ORAL at 12:05

## 2024-05-14 RX ADMIN — LORAZEPAM 0.5 MG: 0.5 TABLET ORAL at 21:20

## 2024-05-14 RX ADMIN — PRAMIPEXOLE DIHYDROCHLORIDE 0.12 MG: 0.12 TABLET ORAL at 14:21

## 2024-05-14 RX ADMIN — DEXAMETHASONE 6 MG: 4 TABLET ORAL at 14:21

## 2024-05-14 RX ADMIN — LORAZEPAM 0.5 MG: 0.5 TABLET ORAL at 12:05

## 2024-05-14 RX ADMIN — DEXAMETHASONE 6 MG: 4 TABLET ORAL at 21:20

## 2024-05-14 RX ADMIN — OXYCODONE 5 MG: 5 TABLET ORAL at 16:42

## 2024-05-14 RX ADMIN — METOPROLOL SUCCINATE 12.5 MG: 25 TABLET, FILM COATED, EXTENDED RELEASE ORAL at 05:05

## 2024-05-14 RX ADMIN — OXYCODONE 5 MG: 5 TABLET ORAL at 09:44

## 2024-05-14 RX ADMIN — HYDRALAZINE HYDROCHLORIDE 25 MG: 50 TABLET ORAL at 05:05

## 2024-05-14 RX ADMIN — QUETIAPINE FUMARATE 25 MG: 25 TABLET ORAL at 21:20

## 2024-05-14 RX ADMIN — DEXAMETHASONE 6 MG: 4 TABLET ORAL at 05:04

## 2024-05-14 RX ADMIN — ISOSORBIDE MONONITRATE 30 MG: 30 TABLET, EXTENDED RELEASE ORAL at 05:04

## 2024-05-14 RX ADMIN — VENLAFAXINE HYDROCHLORIDE 37.5 MG: 37.5 CAPSULE, EXTENDED RELEASE ORAL at 05:06

## 2024-05-14 RX ADMIN — Medication 1334 MG: at 12:05

## 2024-05-14 RX ADMIN — Medication 1334 MG: at 09:43

## 2024-05-14 RX ADMIN — Medication 1334 MG: at 16:44

## 2024-05-14 RX ADMIN — PRAMIPEXOLE DIHYDROCHLORIDE 0.12 MG: 0.12 TABLET ORAL at 09:43

## 2024-05-14 ASSESSMENT — PAIN DESCRIPTION - PAIN TYPE
TYPE: ACUTE PAIN

## 2024-05-14 ASSESSMENT — ENCOUNTER SYMPTOMS
FEVER: 0
COUGH: 0
ABDOMINAL PAIN: 0

## 2024-05-14 NOTE — PROGRESS NOTES
[facility] -  PROGRESS NOTE               Author: Destiny Carrasco M.D. Date & Time: 5/14/2024  6:50 AM     CC: abd pain, n/v    HISTORY OF PRESENT ILLNESS:    63 y.o. female with history of systolic and diastolic heart failure with EF of 15% (HFrEF) grade 2 diastolic dysfunction, severe MVR, CAD with h/o STEMI s/p DEWEY to LAD, PAD with aortofemoral bypass surgery who presented 4/27/2024 with increasing shortness of breath admitted for community acquired PNA and severe constipation with hosp course complicated by exacerbation of her HFrEF, medications augmented which was followed by IVON, worsening hepatopathy concerning for cardiorenal etiology.   She was then transferred to ICU 5/01 and initiated on dobutamine drip for organ perfusion.   Patient continues to be oliguric with UOP of 86 yesterday and 10 ml today so far.   Nephrology has been consulted for concerns of IVON , cardiorenal syndrome.     DAILY NEPHROLOGY SUMMARY:  05/02: Examined at bedside. Looks like she wants to talk but it is difficult for her to find appropriate words.   Dobutamine ggt held per critical care today.   Labs yesterday with fibrinogen 91, cryoppt administered followed by dialysis   1 L ultrafiltration   Uptrending WBCs since 4/30, 18.4 today  Scr improved to 3.03 after dialysis   Down trending AST/ALT   CT scan without bowel obs, extensive calcification of distal thoracic/upper abd aorta. Left renal cyst.   5/4: Pt transferred out of ICU, now with increased CPK and climbing  5/5: Pt still anuric, HD yesterday  5/6: minimal UOP, SBP 90s-140s, platelets stable at 61,000, CPK trended down to 2314, BM biopsy planned for today, patient denies CP/SOB  5/7: minimal UOP, tolerated HD, SBP 110s-120s, fibrinogen 88 and patient provided with 1u cryo, BM biopsy done, platelets 57,000, no new c/o  5/8: CO2 12 and K 6.2 this AM, WBC trended up (on steroids), HD planned for today, platelets 53,000, does not answer questions  "today, appears agitated, does not have gown on  5/9: tolerated HD, transferred to oncology floor, reports she is hungry this AM, SBP 130s-150s, plts 53,000  5/10: HD planned for today, SBP 120s-130s, family at bedside, patient curled in bed, c/o pain but can't identify where, does not really answer other questions  5/11: HD yesterday net UF 1L. VSS HR 50-60.  Very restless, does not answer questions.  Labs not run this AM, added and pending. UP 25 ml, has diaper in place.   5/12: VSS UOP 50ml.  Got cryo yesterday for low fibrinogen, had to use pigtail of HD cath. Today's labs pending.  Mentation unchanged.    Addendum:  K 6.3  5/13: No events, underwent urgent HD yest for hyperkalemia and UF 500cc, K low this am at 3.1, Cr improved s/p HD, platelets lower at 53K and WBC still 21.5, BP stable, stable on RA, 100cc UOP overnight, does not answer questions  5/14: No events, BP stable, stable on RA, remains oliguric, tolerated HD yest with 1L UF, WBC higher 25K, platelets lower 45K, K still low at 3.2, shakes head no when asked if she's having any pain but does not respond to further questions    REVIEW OF SYSTEMS:    ROS limited due to mental acuity, Chronic expressive aphasia.     PAST MEDICAL/SURGICAL/FAMILY/SOCIAL HISTORY:  Reviewed and documented in chart     HOME MEDICATIONS:   - Reviewed and documented in chart    LABORATORY STUDIES:   - Reviewed and documented in chart    ALLERGIES:  Pcn [penicillins] and Toradol    VS:  /66   Pulse 71   Temp 36.2 °C (97.1 °F) (Temporal)   Resp 16   Ht 1.651 m (5' 5\")   Wt 50.6 kg (111 lb 8.8 oz)   SpO2 94%   BMI 18.56 kg/m²   Physical Exam  Vitals and nursing note reviewed.   Constitutional:       General: She is not in acute distress.     Appearance: She is ill-appearing.   HENT:      Head: Normocephalic and atraumatic.      Right Ear: External ear normal.      Left Ear: External ear normal.      Nose: Nose normal.      Mouth/Throat:      Mouth: Mucous membranes " are dry.   Eyes:      General: No scleral icterus.     Extraocular Movements: Extraocular movements intact.   Neck:      Comments: Right IJ trialysis catheter   Cardiovascular:      Rate and Rhythm: Normal rate and regular rhythm.   Pulmonary:      Effort: Pulmonary effort is normal. No respiratory distress.   Abdominal:      General: Bowel sounds are normal. There is no distension.      Palpations: Abdomen is soft.   Musculoskeletal:         General: No deformity.      Right lower leg: No edema.      Left lower leg: No edema.   Skin:     General: Skin is warm.      Capillary Refill: Capillary refill takes less than 2 seconds.      Findings: Bruising and rash present.      Comments: Excoriations on extremities    Neurological:      General: No focal deficit present.      Mental Status: She is alert.      Comments: Chronic expressive aphasia      Psychiatric:         Attention and Perception: She is inattentive.         FLUID BALANCE:  In: 880 [Blood:380; Dialysis:500]  Out: 1600     LABS:  Recent Labs     05/11/24  1300 05/12/24  1050 05/13/24  0710   SODIUM 136 143 141   POTASSIUM 5.4 6.3* 3.1*   CHLORIDE 93* 98 96   CO2 18* 20 27   GLUCOSE 95 113* 115*   BUN 55* 82* 39*   CREATININE 4.02* 6.20* 3.15*   CALCIUM 8.8 9.1 8.8        IMAGING:  - Imaging studies reviewed by me      IMPRESSION:     # Acute kidney injury multifactorial--Decreased perfusion, HLH, rhabdo  - unlikley TTP  - oliguric  - HD 5/2 1st  - C3 and C4 low, ARLEEN negative, ANCA negative  - hepatitis negative  - PCR and ACR pending (minimal UOP)     # ? 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 ongoing  - steroids  -Bone Marrow 5/6    # Hepatic congestion      #Rhabdo unclear etiology, difficult to give fluids in face of reduced EF     # Heart failure with reduced ejection fraction      # Coagulopathy   - Fibinogen monitored, cryo when <100     # Transamnitis  - Improving       # Severe mitral regurgitation      # Peripheral vascular disease with  prior aortic thrombus s/p aortofemoral bypass     # Constipation      # Impacted stool in intestine   - Resolved      # Colitis      # Leukocytosis      # Prior CVA    - Chronic expressive aphasia     # Essential hypertension    - goal <140/90, intermittently at goal    # Hyperkalemia--resolved with HD and now with hypokalemia    # Acidosis, improved    # CKD MBD  - Ca WNL and Phos now controlled 4.  -         PLAN:  - No HD today (TUES), cont qMWF schedule and PRN for now  - Daily evaluation of RRT needs  - PO KCL 20meq x1 today  - Stop lokelma  - steroids per primary service/hematology  - Bone marrow 5/6, f/u findings  - Consider renal biopsy once improved platelets/coagulation status, hold for now  - Avoid nephrotoxins, NSAIDs  - Renally dose meds   - daily labs  - recommend dietary supplements be adjusted for renal failure  - Calcium acetate 1334mg TID with meals, changed to renal diet 5/12  - Daily labs, I/O    Other management per primary service

## 2024-05-14 NOTE — PROGRESS NOTES
Monitor Summary:    Rhythm: SR w/ BBB  Rate: 68-77  Ectopy: (O) PVC, (R) coup, (O) bigem  Measurement : .13/.10/.46

## 2024-05-14 NOTE — CARE PLAN
The patient is Watcher - Medium risk of patient condition declining or worsening    Shift Goals  Clinical Goals: Pt will not have a fall during the night.  Patient Goals: AFIA  Family Goals: not present    Progress made toward(s) clinical / shift goals:  Bed alarm in place, bedside commitment, hourly rounding.     Patient is not progressing towards the following goals:

## 2024-05-14 NOTE — PROGRESS NOTES
Monitor summary:        Rhythm: SR   Rate: 60-73  Ectopy: (f)PVC  Measurements: 12./.08/.50        12hr chart check

## 2024-05-14 NOTE — PROGRESS NOTES
Oncology/Hematology Progress Note               Author: Rodrigo Rajan M.D.    Cc: Anemia and thrombocytopenia Date & Time created: 5/14/2024  11:24 AM     Interval History:  She seems to be more responsive today.  She follows simple commands.  She answers very simple yes/no questions (although hard to ascertain how much she comprehends).      PMHx, PSurgHx, SocHX, FAMHx: All reviewed and no changes    Review of Systems:  Review of Systems   Constitutional:  Negative for fever.   Respiratory:  Negative for cough.    Cardiovascular:  Negative for chest pain.   Gastrointestinal:  Negative for abdominal pain.       Physical Exam:  Physical Exam  Vitals reviewed.   Constitutional:       General: She is not in acute distress.     Appearance: She is ill-appearing.      Comments: ECOG equals 3-4.  Very weak and fatigued.   HENT:      Head: Normocephalic and atraumatic.      Mouth/Throat:      Mouth: Mucous membranes are moist.      Pharynx: Oropharynx is clear. No oropharyngeal exudate.   Eyes:      General:         Right eye: No discharge.         Left eye: No discharge.      Extraocular Movements: Extraocular movements intact.      Conjunctiva/sclera: Conjunctivae normal.   Cardiovascular:      Rate and Rhythm: Normal rate and regular rhythm.      Heart sounds: Murmur heard.      No friction rub. No gallop.   Pulmonary:      Effort: Pulmonary effort is normal. No respiratory distress.      Breath sounds: Normal breath sounds. No wheezing or rales.   Abdominal:      General: Bowel sounds are normal. There is no distension.      Palpations: Abdomen is soft.      Tenderness: There is no abdominal tenderness. There is no guarding.   Musculoskeletal:         General: No swelling or tenderness.      Cervical back: Normal range of motion and neck supple. No tenderness.      Right lower leg: No edema.      Left lower leg: No edema.      Comments: Somewhat purple/mottled appearance to the skin on the feet and toes.   Skin:      General: Skin is warm and dry.      Findings: Bruising present.      Comments: Multiple petechiae and bruises.   Neurological:      Mental Status: She is alert.      Comments: Follows simple commands.  Answers simple no and yes questions.  Unable to ascertain orientation.   Psychiatric:      Comments: Follows simple commands.  Answers simple no and yes questions.  Unable to ascertain orientation.         Labs:          Recent Labs     24  10524  0710 24  0637   SODIUM 143 141 143   POTASSIUM 6.3* 3.1* 3.2*   CHLORIDE 98 96 96   CO2 20 27 29   BUN 82* 39* 41*   CREATININE 6.20* 3.15* 2.89*   MAGNESIUM 2.8* 2.2 2.4   PHOSPHORUS 7.8* 4.1 2.4*   CALCIUM 9.1 8.8 9.0     Recent Labs     24  0710 24  0637   ALTSGPT 152* 127* 130*   ASTSGOT 108* 115* 122*   ALKPHOSPHAT 226* 178* 187*   TBILIRUBIN 3.1* 2.6* 2.8*   DBILIRUBIN  --  1.5*  --    GLUCOSE 113* 115* 174*     Recent Labs     24  0710 24  0637   RBC 3.84* 3.77* 4.45   HEMOGLOBIN 11.6* 11.3* 13.2   HEMATOCRIT 37.2 36.8* 43.5   PLATELETCT 78* 53* 45*   PROTHROMBTM 24.2* 23.4* 19.8*   APTT 28.7 28.6 26.3   INR 2.14* 2.05* 1.66*   FERRITIN  --   --  1479.0*     Recent Labs     24  0710 24  0637   WBC 22.6* 21.5* 25.4*   NEUTSPOLYS 95.80* 96.50* 96.50*   LYMPHOCYTES 0.00* 0.60* 0.40*   MONOCYTES 2.70 2.00 2.00   EOSINOPHILS 0.00 0.00 0.00   BASOPHILS 0.10 0.10 0.20   ASTSGOT 108* 115* 122*   ALTSGPT 152* 127* 130*   ALKPHOSPHAT 226* 178* 187*   TBILIRUBIN 3.1* 2.6* 2.8*     Recent Labs     24  1050 24  0710 24  0637   SODIUM 143 141 143   POTASSIUM 6.3* 3.1* 3.2*   CHLORIDE 98 96 96   CO2 20 27 29   GLUCOSE 113* 115* 174*   BUN 82* 39* 41*   CREATININE 6.20* 3.15* 2.89*   CALCIUM 9.1 8.8 9.0     Hemodynamics:  Temp (24hrs), Av.3 °C (97.4 °F), Min:36.1 °C (96.9 °F), Max:36.8 °C (98.2 °F)  Temperature: 36.1 °C (97 °F)  Pulse  Av  Min: 50  Max: 117    Blood Pressure: 112/59     Respiratory:    Respiration: 18, Pulse Oximetry: 97 %     Work Of Breathing / Effort: Within Normal Limits  RUL Breath Sounds: Clear, RML Breath Sounds: Diminished, RLL Breath Sounds: Diminished, ZAINAB Breath Sounds: Clear, LLL Breath Sounds: Diminished  Fluids:    Intake/Output Summary (Last 24 hours) at 5/14/2024 1124  Last data filed at 5/14/2024 0555  Gross per 24 hour   Intake 712 ml   Output 1550 ml   Net -838 ml        GI/Nutrition:  Orders Placed This Encounter   Procedures    Diet Order Diet: Level 5 - Minced and Moist; Liquid level: Level 0 - Thin; Second Modifier: (optional): Renal; Nutrient modifications: (optional): Low Phos, Low Potassium     Standing Status:   Standing     Number of Occurrences:   1     Order Specific Question:   Diet:     Answer:   Level 5 - Minced and Moist [24]     Order Specific Question:   Liquid level     Answer:   Level 0 - Thin     Order Specific Question:   Second Modifier: (optional)     Answer:   Renal [8]     Order Specific Question:   Nutrient modifications: (optional)     Answer:   Low Phos [9]     Order Specific Question:   Nutrient modifications: (optional)     Answer:   Low Potassium [11]     Medical Decision Making, by Problem:  Active Hospital Problems    Diagnosis     *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  Constellation of clinical findings concern for HLH versus sepsis with DIC and MOD:  Presented to the hospital on 4/27/2024 with 10  days of flulike symptoms, abdominal pain.  Diagnosed with community-acquired pneumonia and treated initially with azithromycin and ceftriaxone.  After a day or 2 changed to ceftriaxone and Flagyl.  Found to have rhinovirus/enterovirus infections.  On admission platelet count was normal, LFTs were normal, kidney function were normal.            Starting on day 3/4 (4/30 and 5/1) had significant decline in platelet counts, marked rise in LFTs, marked rise in creatinine.  This all suggests an acute inciting factor.  Rapidly deteriorated with rising LFTs peaking at [id number], as well as ARF requiring dialysis.  She has baseline CHF with an EF of 35% which decreased to 15%, with new severe mitral regurgitation.  She was admitted to the ICU with dobutamine drip.  She has had ongoing anemia with hemoglobin as low as 8.3, platelets as low as 57, with leukocytosis.  Ferritin initially 14,244.  Also found to have rhabdomyolysis with CPK > 5000.  LDH rapidly increased.         Hematology consulted to consider HLH syndrome.  Triglycerides were normal.  CT scan of the abdomen pelvis showed no hepatosplenomegaly.  LDH was 1418.  Fibrinogen has been less than 100 requiring cryoprecipitate.  Soluble IL-2 receptor markedly elevated at 4489.7 (from 5/8/2024).  However, speaking against the syndrome is the fact that she has been completely afebrile, she has no hepatosplenomegaly, she has normal triglycerides.  Bone marrow biopsy done on 5/6/2024 now shows normocellular, no malignancy, 46 XX, MDS FISH panel negative, with extremely rare histiocytes with suggestion of hemophagocytosis (which does not significantly support HLH given the rarity).  Heme pathologist more struck by marked increase in schistocytes on peripheral blood..  Liver biopsy done on 5/8/2024.  CXCL9 lab test drawn but still pending.        H score calculated at 123 (5-9% chance of HLH).   Other HLH prognostic indicators nondiagnostic.  HLH-94 shows at least 1 marker  of immune over reactivation (elevated ferritin, low fibrinogen), but only  2 of 4 of the criteria (requires 3).  All of this suggests low probability for HLH.     Primary HLH (underlying germline mutation predisposing to HLH) or a secondary HLH (inflammatory dysregulation leading to cytokine storm, caused by a separate etiology.  Separate inciting etiologies can include disorders of increased immune activation (infection such as EBV, viral, or chronic granulomatous disease), or could include disorders that suppress the immune system (HIV, rheumatologic disorders, malignancies, other inherited syndromes).   With either primary or secondary HLH, an infection can be a triggering factor.  EBV testing done, with a positive NAAT test (not quantified).  ARLEEN as well as ANCA testing negative.     Patient was started on dexamethasone 6 mg every 8 hours on 5/2/2024, and markers have been improving including a decreasing ferritin.    Jenny test was positive for IgG, but no significant markers for hemolysis (direct bilirubin greater than indirect bilirubin).  Quantitative immunoglobulins normal.  Rheumatoid factor mildly high at 18.  CT scan of the chest was negative for any malignancy, but did show some faint patchy opacities in the left upper lobe, right upper lobe and right middle lobe.  No lymphadenopathy.       Here are the elements in the differential diagnosis, with arguments for and against:  1.  HLH--arguments for include markedly elevated ferritin, elevated soluble IL-2 receptor.  Arguments against this include no fevers, no hepatosplenomegaly, no underlying malignancy, significant coagulopathy (which is not usually seen in HLH), only mildly decreased platelet count (usually much more severe with HLH), bone marrow biopsy was nonsupportive, schistocytes are more common on blood smear, H score was low risk, improving just on dexamethasone, pattern of rhabdomyolysis not supportive of HLH, and normal triglycerides.  2.   Sepsis with DIC--arguments for this include peripheral smear with predominant schistocytes morphology, rapid progression of multiorgan failure, creatinine and LFTs on admission, pattern of rhabdomyolysis would support sepsis/DIC, significant coagulopathy fits with DIC, mildly low platelets fits with DIC.  Arguments against this would include elevated ferritin not typically seen with sepsis or DIC (although could be seen with significant liver injury), possibly soluble IL-2 receptor could be elevated with significant immune activation (although not a typical characteristic of sepsis and DIC)      -- Consider antiphospholipid antibody syndrome/CAPS as etiology for DIC (had a abdominal ultrasound that seem to demonstrate portal vein thrombosis).  --Will order lupus anticoagulant, anticardiolipin antibodies, antibeta-2 GPI antibodies to rule this out    -- Await pathology from liver biopsy  --Continue dexamethasone for now as improving (dosing is just over 10 mg/m²)  -- Continue to hold off on chemotherapy for HLH given the low probability  --Continue daily ferritin and LDH to monitor for ongoing improvement  --Check a weekly soluble IL-2 receptor alpha, to look for improvement/response  --Consider MRI of the brain with sedation/anesthesia (to rule out CNS changes associated with HLH).  --Have ordered [facility] HLH gene panel with NGS testing on the whole blood (not a fast turnaround time, used to determine appropriateness of future treatment such as stem cell transplant).          -- Recommend asking infectious disease to interpret the EBV panel--I am not sure whether this is all just indicative of a past EBV infection, or whether there may be a current EBV infection.  If ongoing EBV, this can be treated with rituximab or IVIG.  If this is the underlying inciting factor, this may improve HLH    --My overall assessment is that this is low likelihood for HLH.  I think higher likelihood for multiple organ failure,  related to DIC (possibly related to sepsis, viral syndrome, CAPS).          2.  Thrombocytopenia--this is not HIT syndrome.  HIT antibodies were negative (optical density was only 0.049; timing of low platelet count was 3 to 4 days into admission--nothing fits with HIT).    This is not TTP.  There is a low plasmic score of 1.  There is no evidence of MAHA, with no significant hemolysis.  The indirect bilirubin is very low.  Markedly elevated LDH likely related to multiple organ failure/organ damage.    Possibly related to sepsis/DIC as described above versus HLH as described above.               3. Acute renal failure--  Kidney function normal on admission.  Bump in creatinine to 1.6 on day 4 (4/30), with a marked bump in creatinine on day 5 (5/1) suggesting acute insult.  Nephrology following.  On hemodialysis.  Remains oliguric.  Etiology may have been hypotensive/prerenal related.  She also has rhabdomyolysis.     4.  Coagulopathy--  Secondary to liver dysfunction vs DIC.  HLA typically does not cause significant coagulopathy, although could see a low fibrinogen level.  Transfuse cryoprecipitate to maintain fibrinogen greater than 100     5. Elevated LFTs--  Completely normal LFTs on admission.  LFTs started to climb on day 2 (4/29).  Marked elevation to greater than 1000.  Suggest underlying perfusion etiology, versus viral etiology versus drug toxicity.             Improving following dialysis, improvement of blood pressure, as well as dexamethasone therapy. The level of improvement is reassuring that this may not be HLH as I would not expect them to improve so greatly with steroids alone.   -- Monitor daily CMP     6. Heart failure with reduced ejection fraction  Drop in EF to 15% from previously 35% (documented in August 2022, so a component of this is chronic).  Recent echo showed new severe mitral regurgitation.  Required a dobutamine drip this admission for cardiovascular support.       7.  Rhabdomyolysis--  CPK greater than 5000 on admission.  This is not a part of HLH.  This is improving.  Nephrology on board.  Underlying etiology is unclear, potentially secondary to DIC, sepsis, CAPS--as described above.      8.  History of stroke--  Chronic aphasia.  Neurologic status at baseline per .  Very poor baseline status.  Not able to perform any ADLs on her own.      9.  Possible portal vein thrombosis--  This was seen on the right upper quadrant ultrasound done during this admission.  Not seen on CT scans, but CT scans were done without contrast.  Not a good candidate for anticoagulation.  --Differential diagnosis includes CAPS, PNH  --Ordered PNH flow cytometry test (low suspicion for PNH as overall picture not totally consistent)           Thank you for allowing me to participate in her care. Please do not hesitate to reach out with any questions.     Please note that this dictation was created using voice recognition software. I have made every reasonable attempt to correct obvious errors, but I expect that there are errors of grammar and possibly content that I did not discover before finalizing the note.          Quality-Core Measures   Reviewed items::  Radiology images reviewed, Labs reviewed and Medications reviewed  Guerrero catheter::  Critically Ill - Requiring Accurate Measurement of Urinary Output  DVT prophylaxis pharmacological::  Contraindicated - High bleeding risk

## 2024-05-14 NOTE — PROGRESS NOTES
Hospital Medicine Daily Progress Note    Date of Service  5/14/2024    Chief Complaint  abdominal pain    Hospital Course  Fouzia Santamaria is a 63 y.o. female with HFrEF (EF 35%), severe PAD status post aortic femoral bypass coronary disease status post LAD stent, COPD, diabetes, hypertension, and previous strokes, who was initially seen in the ED with abdominal pain for 10 days and subsequently discharged to home, who again presented with 2 days of bodyaches as well as worsening constipation, nausea and vomiting.  Workup showed leukocytosis with white count of 15,100 with left shift.  Chest x-ray showed mild pulmonary edema.  Abdominal x-ray showed significant amount of stool burden.  Echocardiogram was obtained which revealed that ejection fraction now down to 15% with severe mitral regurgitation.  She also had acute kidney injury and likely cardiorenal syndrome.  Cardiology and nephrology were consulted.  She was placed on a dobutamine drip.  Her renal function worsened and she required a right-sided dialysis catheter with initiation of dialysis.  Her liver enzymes went up to AST of 2959 and ALT of 1377.  She also had significant drop in both hemoglobin and platelet count, with worsening leukocytosis.  LDH was 1418, fibrinogen 91, ferritin 7333 with elevated PT, PTT and INR.  Imaging of the abdomen showed mild hepatomegaly with no splenomegaly.  Hematology/oncology was consulted for concerns for hemophagocytic lymphohistiocytosis (HLH) with low suspicion/probability for TTP or HIT. She was started on Decadron.  She had low fibrinogen for which she was given cryoprecipitate.  Status post bone marrow biopsy on 5/6 and liver biopsy on    Interval Problem Update    BELKYS ON  She inconsistently answers simple questions.  Does not follow simple commands.  Indicates she is not in pain.    CBC reviewed, WBC increased 25.  Fibrinogen improved to 125.  Mg normal.  CMP reviewed, K decreased to 3.2, Cr stable 3.0,  ALT/AST/ALK stable.  Phos normal.  LDH decreased to 735.  Ferritin decreased to 1479.  EBV NAAT positive.    POC discussed with oncologist Dr. Rajan. Testing thus far does not support diagnosis of HLH. Clinically improving with immunosuppression. Due to arterial and venous thrombotic disease he recommends APLAS testing. Recommends cryo only for fibrinogen <100 or apparent bleeding.    Updated  Ian on POC at bedside.    I have discussed this patient's plan of care and discharge plan at IDT rounds today with Case Management, Nursing, Nursing leadership, and other members of the IDT team.    Consultants/Specialty  Hematology  Critical care  Nephrology  Cardiology  Palliative care     Code Status  DNAR/DNI    Disposition  The patient is not medically cleared for discharge to home or a post-acute facility.  Anticipate discharge to: home with organized home healthcare and close outpatient follow-up    Discharge plan TBD.  6 clicks 14 and 16.  OT recommends home health.  Pending PT evaluation.  May need outpatient hemodialysis chair placement.  I have placed the appropriate orders for post-discharge needs.    Review of Systems  Review of Systems   Unable to perform ROS: Mental acuity      Physical Exam  Temp:  [36.1 °C (96.9 °F)-36.6 °C (97.9 °F)] 36.4 °C (97.5 °F)  Pulse:  [61-71] 69  Resp:  [16-18] 18  BP: ()/(59-78) 99/70  SpO2:  [93 %-97 %] 93 %    Physical Exam  Vitals reviewed.   Constitutional:       General: She is not in acute distress.     Appearance: She is ill-appearing.   HENT:      Head: Normocephalic.      Nose: Nose normal.      Mouth/Throat:      Mouth: Mucous membranes are dry.   Eyes:      General: No scleral icterus.     Extraocular Movements: Extraocular movements intact.      Conjunctiva/sclera: Conjunctivae normal.   Neck:      Comments: Right IJ dialysis cath c/d/I, nonbloody nonerythematous nontender  Cardiovascular:      Rate and Rhythm: Normal rate and regular rhythm.       Pulses: Normal pulses.      Heart sounds: Normal heart sounds. No murmur heard.     No friction rub. No gallop.   Pulmonary:      Effort: Pulmonary effort is normal. No respiratory distress.      Breath sounds: Normal breath sounds. No wheezing, rhonchi or rales.   Abdominal:      General: Bowel sounds are normal. There is no distension.      Palpations: Abdomen is soft.      Tenderness: There is no abdominal tenderness. There is no guarding or rebound.   Genitourinary:     Comments: No williamson  Musculoskeletal:      Cervical back: Neck supple. No muscular tenderness.      Right lower leg: No edema.      Left lower leg: No edema.   Skin:     General: Skin is warm and dry.      Comments: Petechiae and abrasions on extremities   Neurological:      General: No focal deficit present.      Mental Status: She is alert.      Cranial Nerves: No cranial nerve deficit.      Comments: Moves all extremities. Inconsistently answers simple yes/no questions. Does not follow commands.   Psychiatric:      Comments: Unable to assess           Fluids    Intake/Output Summary (Last 24 hours) at 5/14/2024 1546  Last data filed at 5/14/2024 0555  Gross per 24 hour   Intake --   Output 50 ml   Net -50 ml       Laboratory  Recent Labs     05/12/24  1050 05/13/24  0710 05/14/24  0637   WBC 22.6* 21.5* 25.4*   RBC 3.84* 3.77* 4.45   HEMOGLOBIN 11.6* 11.3* 13.2   HEMATOCRIT 37.2 36.8* 43.5   MCV 96.9 97.6 97.8   MCH 30.2 30.0 29.7   MCHC 31.2* 30.7* 30.3*   RDW 97.7*  --   --    PLATELETCT 78* 53* 45*     Recent Labs     05/12/24  1050 05/13/24  0710 05/14/24  0637   SODIUM 143 141 143   POTASSIUM 6.3* 3.1* 3.2*   CHLORIDE 98 96 96   CO2 20 27 29   GLUCOSE 113* 115* 174*   BUN 82* 39* 41*   CREATININE 6.20* 3.15* 2.89*   CALCIUM 9.1 8.8 9.0     Recent Labs     05/12/24  1050 05/13/24  0710 05/14/24  0637   APTT 28.7 28.6 26.3   INR 2.14* 2.05* 1.66*                 Imaging  CT-CHEST (THORAX) W/O   Final Result      1.  Patchy BILATERAL airspace  disease, possibly pneumonia. This could be chronic or subclinical   2.  Atherosclerosis with ectasia of the descending thoracic aorta         US-RUQ   Final Result      1.  Limited exam due to patient noncompliance.   2.  No flow demonstrated in the portal vein suggesting thrombosis.   3.  Probable small gallstones in the gallbladder.   4.  No biliary dilation.   5.  Atrophic RIGHT kidney, poorly visualized.      IR-LIVER BX WITH OTHER STUDY   Final Result      1.  Transjugular hepatic venography showing a patent right hepatic vein.      2. 18 transjugular liver biopsies x 2.      3. Exchange and replacement of the right temporary dialysis catheter with a new 12 Croatian 15 cm catheter.      DX-CHEST-FOR LINE PLACEMENT Perform procedure in: Other(comment f6 below):   Final Result      1.  Right IJ catheter has been placed and the tip projects appropriately over the superior vena cava.      2.  Cardiomegaly with evidence of mild fluid overload.      CT-ABDOMEN-PELVIS W/O   Final Result      1.  No evidence of bowel obstruction or focal inflammatory change.      2.  Again seen extensive atherosclerotic vascular calcification with distal thoracic/upper abdominal aortic ectasia measuring approximately 3.2 cm in diameter.      3.  Gallstones within the gallbladder.      4.  Hyperdense left renal cyst likely representing hemorrhagic or milk of calcium within a cyst.      5.  Small amount of free fluid dependently within the pelvis.      DX-CHEST-PORTABLE (1 VIEW)   Final Result      Cardiomegaly.      US-ABDOMEN COMPLETE SURVEY   Final Result      1.  Mild hepatomegaly.      2.  Sludge noted in the gallbladder.      3.  Small echogenic foci in the periphery of the renal collecting systems bilaterally suspicious for early nephrolithiasis.      4.  Bilateral renal cysts.         DX-CHEST-LIMITED (1 VIEW)   Final Result         1.  No acute cardiopulmonary disease.   2.  Atherosclerosis      EC-ECHOCARDIOGRAM COMPLETE W/ CONT    Final Result      DX-CHEST-LIMITED (1 VIEW)   Final Result      1.  Enlarged cardiac silhouette with changes of pulmonary edema.      LB-HMRSEAX-0 VIEW   Final Result      1.  Nonobstructive bowel gas pattern with a large amount of colonic stool.      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  Imaging of the abdomen showed mild hepatomegaly with no splenomegaly.    - Hematology/oncology was consulted for concerns for hemophagocytic lymphohistiocytosis (HLH) with low suspicion/probability for TTP or HIT.   -Continue Decadron.  -s/p bone marrow biopsy 5/6, no morphologic dysplasia  -Monitor for coagulopathy.  Daily fibrinogen, PT/PTT.  Transfused with cryoprecipitate if fibrinogen less than 150.  Oncology recommended liver or lymph node biopsy   Status post IR transjugular liver biopsy with portal venography 5/8    Hypokalemia  Assessment & Plan  Likely due to inadequate PO intake  Not of clinical significance - defer repletion to HD dialysate    Hyperlipidemia- (present on admission)  Assessment & Plan  - Hold off on statin due to rhabdomyolysis.    Cardiogenic shock (HCC)- (present on admission)  Assessment & Plan  5/13/2024  -In the setting of low ejection fraction of 15%.  Required dobutamine drip.  Shock resolved.  -Management of HFrEF as above.    History of stroke- (present on admission)  Assessment & Plan  -With chronic aphasia.  Appears at baseline.  Monitor.    Rhabdomyolysis- (present on admission)  Assessment & Plan  -Holding off on IV fluids given IVON/dialysis dependent.  -CPK continues to trend down    Thrombocytopenia (HCC)- (present on admission)  Assessment & Plan  Likely consumptive coagulopathy AEB schistocytes and  hypofibrinogenemia  Oncology consulted, Low suspicion/probability for TTP or HIT.  -Continue Decadron.  -Continue to monitor platelet counts.  Restrictive transfusion strategy.  Watch for bleeding.     Coagulopathy (HCC)- (present on admission)  Assessment & Plan  - In setting of HLH, thrombocytopenia.    Oncology consulted, Low suspicion/probability for TTP or HIT.  Fibrinogen low.  -DIC versus MAHA based on schistocytes which may be consumptive coagulopathy  APLAS ordered  -Continue Decadron.  -s/p bone marrow biopsy 5/6, no morphologic dysplasia  -Monitor PT/PTT/INR and daily fibrinogen.  Watch for bleeding.  -Follow fibrinogen, transfuse with cryoprecipitate if </= 100 to reduce spontaneous bleeding    Acute renal failure with tubular necrosis (HCC)- (present on admission)  Assessment & Plan  -Now on hemodialysis.  -Nephrology following.  -Monitor electrolytes closely.  -If continues to require hemodialysis, will need outpatient hemodialysis chair arrangements.    Hepatitis  Assessment & Plan  -Likely inflammatory trigger  -Continue to trend LFTs, continues to trend down.    Severe mitral regurgitation- (present on admission)  Assessment & Plan  -Per echo  No acute intervention warranted  Antihypertensives per separate plan to reduce regurgitant fraction    Heart failure with reduced ejection fraction (HCC)- (present on admission)  Assessment & Plan  - Had a drop in EF to 15% from previous 35%.  Required dobutamine drip.  -Continue volume removal with hemodialysis.  -Optimize GDMT.  Unable to give ACEi/ARB/ARNI/SGLT2i given renal function.    Continue hydralazine/Imdur for afterload reduction.    Continue Toprol-XL 12.5 mg daily, adjust up as blood pressure tolerates.    -Monitor on telemetry.    Hyperkalemia- (present on admission)  Assessment & Plan  Resolved  Due to acute renal failure  Continue hemodialysis per nephro  Daily labs     Leukocytosis- (present on admission)  Assessment & Plan  Uncertain  etiology, likely primary inflammatory process rather than infection  -Concerning for HLH.  -Bone marrow biopsy 5/6/2024 results in progress. Follow pathology.   -No signs of sepsis or infection.  Holding off on further antibiotics.   -Trend WBC count.  Has been dexamethasone.    Advanced care planning/counseling discussion- (present on admission)  Assessment & Plan  Patient is aphasic and lethargic, unable to participated in the conversation.  Spouse Basilio was present for the conversation.  I discussed advance care planning face to face with the patient and family for at least 30 minutes, including diagnosis, prognosis, plan of care, risks and benefits of any therapies that could be offered, as well as alternatives including palliation and hospice, as appropriate.  Forms were completed and placed in the chart.  Patient is now DNR/DNI.       VTE prophylaxis: SCD    I have performed a physical exam and reviewed and updated ROS and Plan today (5/14/2024). In review of yesterday's note (5/13/2024), there are no changes except as documented above.     Greater than 54 minutes spent prepping to see patient (e.g. review of tests) obtaining and/or reviewing separately obtained history. Performing a medically appropriate examination and/ evaluation.  Counseling and educating the patient/family/caregiver.  Ordering medications, tests, or procedures.  Referring and communicating with other health care professionals.  Documenting clinical information in EPIC.  Independently interpreting results and communicating results to patient/family/caregiver.  Care coordination.

## 2024-05-14 NOTE — PROGRESS NOTES
Rec'd report from day shift RN. Assumed pt care. Assessment completed. Pt is alert, when this RN said pt's name pt does gaze at this RN even for a moment. Does not follow directions, no discernable answer/words. Pt ate about 50% of dinner. No outwards signs of pain noted. Repositioned pt. Guerrero draining to gravity. Bed in lowest position, bed locked, bed alarm on for safety, treaded socks in place, RN and CNA numbers provided, call light within reach.

## 2024-05-14 NOTE — DISCHARGE PLANNING
Case Management Discharge Planning    Admission Date: 4/27/2024  GMLOS: 4.2  ALOS: 17    6-Clicks ADL Score: 6  6-Clicks Mobility Score: 10  PT and/or OT Eval ordered: Yes  Post-acute Referrals Ordered: Yes  Post-acute Choice Obtained: Yes  Has referral(s) been sent to post-acute provider:  Yes      Anticipated Discharge Dispo: Discharge Disposition: D/T to home under A care in anticipation of covered skilled care (06)  Discharge Address: [address] [place] [address]    DME Needed: No    Action(s) Taken: Discussed in IDT rounds, pt will need HD chair arranged for outpatient once medically cleared. Bone Marrow and Liver biopsy results remain pending. [facility] has accepted pt.     Escalations Completed: None    Medically Clear: No    Next Steps: Pending biopsy results and HD chair.    Barriers to Discharge: Medical clearance    Is the patient up for discharge tomorrow: No

## 2024-05-14 NOTE — DIETARY
Nutrition Update:  Day 17 of admit.  Fouzia Santamaria is a 63 y.o. female being followed to optimize nutrition.    Current Diet: MM5, TN0, Ensure Plus TID    Patient not appropriate to interview per nursing due to mentation.  Nursing reported patient is assisted with meals by staff and family, but current level of mentation is likely new baseline.  Supplements provided with meals.  Intakes have been <50% for the past few days.  Patient is at risk for weight loss.    Weight 5/6 was 63 kg and then weight 5/7 was 48.4 kg.  One day change of 15 kg or 32#.  Some weight fluctuations with dialysis expected, but need stand up scale weight if possible.    SLP has cleared pt for regular diet with thin liquids, but per nursing Level 5 - minced/moist diet is working well for patient.     Problem: Nutritional:  Goal: Achieve adequate nutritional intake  Description: Patient will consume >50% of meals  Outcome: not met    Recommend:  Consider appetite stimulant if within goals of care  Continue to assist with meals and encourage PO intake.  Obtain current weight. Stand up scale if possible.    RD following

## 2024-05-15 ENCOUNTER — APPOINTMENT (OUTPATIENT)
Dept: RADIOLOGY | Facility: MEDICAL CENTER | Age: 64
DRG: 280 | End: 2024-05-15
Attending: INTERNAL MEDICINE
Payer: MEDICAID

## 2024-05-15 LAB
ALBUMIN SERPL BCP-MCNC: 3.2 G/DL (ref 3.2–4.9)
ALBUMIN/GLOB SERPL: 0.9 G/DL
ALP SERPL-CCNC: 155 U/L (ref 30–99)
ALT SERPL-CCNC: 98 U/L (ref 2–50)
ANION GAP SERPL CALC-SCNC: 18 MMOL/L (ref 7–16)
APTT PPP: 23.4 SEC (ref 24.7–36)
AST SERPL-CCNC: 68 U/L (ref 12–45)
BILIRUB SERPL-MCNC: 2.4 MG/DL (ref 0.1–1.5)
BUN SERPL-MCNC: 72 MG/DL (ref 8–22)
CALCIUM ALBUM COR SERPL-MCNC: 9.7 MG/DL (ref 8.5–10.5)
CALCIUM SERPL-MCNC: 9.1 MG/DL (ref 8.5–10.5)
CHLORIDE SERPL-SCNC: 100 MMOL/L (ref 96–112)
CO2 SERPL-SCNC: 27 MMOL/L (ref 20–33)
CREAT SERPL-MCNC: 4.72 MG/DL (ref 0.5–1.4)
EKG IMPRESSION: NORMAL
FERRITIN SERPL-MCNC: 1274 NG/ML (ref 10–291)
FIBRINOGEN PPP-MCNC: 239 MG/DL (ref 215–460)
GFR SERPLBLD CREATININE-BSD FMLA CKD-EPI: 10 ML/MIN/1.73 M 2
GLOBULIN SER CALC-MCNC: 3.5 G/DL (ref 1.9–3.5)
GLUCOSE SERPL-MCNC: 184 MG/DL (ref 65–99)
HCT VFR BLD AUTO: 49.2 % (ref 37–47)
HGB BLD-MCNC: 15.4 G/DL (ref 12–16)
IGA SERPL-MCNC: 226 MG/DL (ref 68–408)
IGG SERPL-MCNC: 1681 MG/DL (ref 768–1632)
IGM SERPL-MCNC: 54 MG/DL (ref 35–263)
INR PPP: 1.4 (ref 0.87–1.13)
LDH SERPL L TO P-CCNC: 617 U/L (ref 107–266)
MCH RBC QN AUTO: 30.8 PG (ref 27–33)
MCHC RBC AUTO-ENTMCNC: 31.3 G/DL (ref 32.2–35.5)
MCV RBC AUTO: 98.4 FL (ref 81.4–97.8)
MISCELLANEOUS LAB RESULT MISCLAB: ABNORMAL
PLATELET # BLD AUTO: 27 K/UL (ref 164–446)
PLATELETS.RETICULATED NFR BLD AUTO: 28.9 % (ref 0.6–13.1)
POTASSIUM SERPL-SCNC: 3.8 MMOL/L (ref 3.6–5.5)
PROT SERPL-MCNC: 6.7 G/DL (ref 6–8.2)
PROTHROMBIN TIME: 17.3 SEC (ref 12–14.6)
RBC # BLD AUTO: 5 M/UL (ref 4.2–5.4)
SODIUM SERPL-SCNC: 145 MMOL/L (ref 135–145)
WBC # BLD AUTO: 30.5 K/UL (ref 4.8–10.8)

## 2024-05-15 PROCEDURE — 99232 SBSQ HOSP IP/OBS MODERATE 35: CPT | Performed by: STUDENT IN AN ORGANIZED HEALTH CARE EDUCATION/TRAINING PROGRAM

## 2024-05-15 PROCEDURE — 93010 ELECTROCARDIOGRAM REPORT: CPT | Performed by: INTERNAL MEDICINE

## 2024-05-15 RX ADMIN — DEXAMETHASONE 6 MG: 4 TABLET ORAL at 04:19

## 2024-05-15 RX ADMIN — PRAMIPEXOLE DIHYDROCHLORIDE 0.12 MG: 0.12 TABLET ORAL at 10:29

## 2024-05-15 RX ADMIN — LORAZEPAM 0.5 MG: 0.5 TABLET ORAL at 10:28

## 2024-05-15 RX ADMIN — HYDROMORPHONE HYDROCHLORIDE 0.25 MG: 1 INJECTION, SOLUTION INTRAMUSCULAR; INTRAVENOUS; SUBCUTANEOUS at 00:14

## 2024-05-15 RX ADMIN — DEXAMETHASONE 6 MG: 4 TABLET ORAL at 15:51

## 2024-05-15 RX ADMIN — METOPROLOL SUCCINATE 12.5 MG: 25 TABLET, FILM COATED, EXTENDED RELEASE ORAL at 04:19

## 2024-05-15 RX ADMIN — PRAMIPEXOLE DIHYDROCHLORIDE 0.12 MG: 0.12 TABLET ORAL at 15:50

## 2024-05-15 RX ADMIN — DEXAMETHASONE 6 MG: 4 TABLET ORAL at 20:42

## 2024-05-15 RX ADMIN — HYDRALAZINE HYDROCHLORIDE 25 MG: 50 TABLET ORAL at 04:19

## 2024-05-15 RX ADMIN — PRAMIPEXOLE DIHYDROCHLORIDE 0.12 MG: 0.12 TABLET ORAL at 20:57

## 2024-05-15 RX ADMIN — LORAZEPAM 0.5 MG: 0.5 TABLET ORAL at 05:54

## 2024-05-15 RX ADMIN — Medication 1334 MG: at 18:13

## 2024-05-15 RX ADMIN — VENLAFAXINE HYDROCHLORIDE 37.5 MG: 37.5 CAPSULE, EXTENDED RELEASE ORAL at 04:21

## 2024-05-15 RX ADMIN — LORAZEPAM 0.5 MG: 0.5 TABLET ORAL at 14:26

## 2024-05-15 RX ADMIN — OXYCODONE 5 MG: 5 TABLET ORAL at 04:19

## 2024-05-15 RX ADMIN — DEXTROSE MONOHYDRATE, SODIUM CITRATE, AND CITRIC ACID MONOHYDRATE: 2.45; 2.2; .8 INJECTION, SOLUTION INTRAVENOUS at 15:20

## 2024-05-15 RX ADMIN — QUETIAPINE FUMARATE 25 MG: 25 TABLET ORAL at 20:43

## 2024-05-15 RX ADMIN — Medication 1334 MG: at 10:28

## 2024-05-15 RX ADMIN — HYDRALAZINE HYDROCHLORIDE 25 MG: 50 TABLET ORAL at 20:43

## 2024-05-15 RX ADMIN — ISOSORBIDE MONONITRATE 30 MG: 30 TABLET, EXTENDED RELEASE ORAL at 04:21

## 2024-05-15 NOTE — DISCHARGE PLANNING
Case Management Discharge Planning    Admission Date: 4/27/2024  GMLOS: 4.2  ALOS: 18    6-Clicks ADL Score: 6  6-Clicks Mobility Score: 10  PT and/or OT Eval ordered: Yes  Post-acute Referrals Ordered: Yes  Post-acute Choice Obtained: Yes  Has referral(s) been sent to post-acute provider:  Yes      Anticipated Discharge Dispo: Discharge Disposition: D/T to home under HHA care in anticipation of covered skilled care (06)  Discharge Address: [address] [place] [address]    DME Needed: No    Action(s) Taken: Discussed in IDT rounds, labs remain pending. Pending biopsy results as well. HD chair to be arranged once pt is more stable and closer to dc.     Escalations Completed: None    Medically Clear: No    Next Steps: Pending labs     Barriers to Discharge: Medical clearance and Outpatient referrals pending    Is the patient up for discharge tomorrow: No

## 2024-05-15 NOTE — PROGRESS NOTES
[facility] -  PROGRESS NOTE               Author: Destiny Carrasco M.D. Date & Time: 5/15/2024  8:32 AM     CC: abd pain, n/v    HISTORY OF PRESENT ILLNESS:    63 y.o. female with history of systolic and diastolic heart failure with EF of 15% (HFrEF) grade 2 diastolic dysfunction, severe MVR, CAD with h/o STEMI s/p DEWEY to LAD, PAD with aortofemoral bypass surgery who presented 4/27/2024 with increasing shortness of breath admitted for community acquired PNA and severe constipation with hosp course complicated by exacerbation of her HFrEF, medications augmented which was followed by IVON, worsening hepatopathy concerning for cardiorenal etiology.   She was then transferred to ICU 5/01 and initiated on dobutamine drip for organ perfusion.   Patient continues to be oliguric with UOP of 86 yesterday and 10 ml today so far.   Nephrology has been consulted for concerns of IVON , cardiorenal syndrome.     DAILY NEPHROLOGY SUMMARY:  05/02: Examined at bedside. Looks like she wants to talk but it is difficult for her to find appropriate words.   Dobutamine ggt held per critical care today.   Labs yesterday with fibrinogen 91, cryoppt administered followed by dialysis   1 L ultrafiltration   Uptrending WBCs since 4/30, 18.4 today  Scr improved to 3.03 after dialysis   Down trending AST/ALT   CT scan without bowel obs, extensive calcification of distal thoracic/upper abd aorta. Left renal cyst.   5/4: Pt transferred out of ICU, now with increased CPK and climbing  5/5: Pt still anuric, HD yesterday  5/6: minimal UOP, SBP 90s-140s, platelets stable at 61,000, CPK trended down to 2314, BM biopsy planned for today, patient denies CP/SOB  5/7: minimal UOP, tolerated HD, SBP 110s-120s, fibrinogen 88 and patient provided with 1u cryo, BM biopsy done, platelets 57,000, no new c/o  5/8: CO2 12 and K 6.2 this AM, WBC trended up (on steroids), HD planned for today, platelets 53,000, does not answer questions  "today, appears agitated, does not have gown on  5/9: tolerated HD, transferred to oncology floor, reports she is hungry this AM, SBP 130s-150s, plts 53,000  5/10: HD planned for today, SBP 120s-130s, family at bedside, patient curled in bed, c/o pain but can't identify where, does not really answer other questions  5/11: HD yesterday net UF 1L. VSS HR 50-60.  Very restless, does not answer questions.  Labs not run this AM, added and pending. UP 25 ml, has diaper in place.   5/12: VSS UOP 50ml.  Got cryo yesterday for low fibrinogen, had to use pigtail of HD cath. Today's labs pending.  Mentation unchanged.    Addendum:  K 6.3  5/13: No events, underwent urgent HD yest for hyperkalemia and UF 500cc, K low this am at 3.1, Cr improved s/p HD, platelets lower at 53K and WBC still 21.5, BP stable, stable on RA, 100cc UOP overnight, does not answer questions  5/14: No events, BP stable, stable on RA, remains oliguric, tolerated HD yest with 1L UF, WBC higher 25K, platelets lower 45K, K still low at 3.2, shakes head no when asked if she's having any pain but does not respond to further questions  5/15: No events, BP borderline low this am but stable overnight, received BP meds this am, UOP not documented, no change to mental status, does not answer questions or follow commands    REVIEW OF SYSTEMS:    ROS limited due to mental acuity, Chronic expressive aphasia.     PAST MEDICAL/SURGICAL/FAMILY/SOCIAL HISTORY:  Reviewed and documented in chart     HOME MEDICATIONS:   - Reviewed and documented in chart    LABORATORY STUDIES:   - Reviewed and documented in chart    ALLERGIES:  Pcn [penicillins] and Toradol    VS:  BP 94/63   Pulse 67   Temp 36.2 °C (97.1 °F) (Temporal)   Resp 18   Ht 1.651 m (5' 5\")   Wt 50.6 kg (111 lb 8.8 oz)   SpO2 94%   BMI 18.56 kg/m²   Physical Exam  Vitals and nursing note reviewed.   Constitutional:       General: She is not in acute distress.     Appearance: She is ill-appearing.   HENT:      " Head: Normocephalic and atraumatic.      Right Ear: External ear normal.      Left Ear: External ear normal.      Nose: Nose normal.      Mouth/Throat:      Mouth: Mucous membranes are dry.   Eyes:      General: No scleral icterus.     Extraocular Movements: Extraocular movements intact.   Neck:      Comments: Right IJ trialysis catheter   Cardiovascular:      Rate and Rhythm: Normal rate and regular rhythm.   Pulmonary:      Effort: Pulmonary effort is normal. No respiratory distress.   Abdominal:      General: Bowel sounds are normal. There is no distension.      Palpations: Abdomen is soft.   Musculoskeletal:         General: No deformity.      Right lower leg: No edema.      Left lower leg: No edema.   Skin:     General: Skin is warm.      Capillary Refill: Capillary refill takes less than 2 seconds.      Findings: Bruising and rash present.      Comments: Excoriations on extremities, +petechiae on legs   Neurological:      General: No focal deficit present.      Mental Status: She is alert.      Comments: Chronic expressive aphasia      Psychiatric:         Attention and Perception: She is inattentive.      Comments: Does not follow commands         FLUID BALANCE:  In: 200 [P.O.:200]  Out: -     LABS:  Recent Labs     05/12/24  1050 05/13/24  0710 05/14/24  0637   SODIUM 143 141 143   POTASSIUM 6.3* 3.1* 3.2*   CHLORIDE 98 96 96   CO2 20 27 29   GLUCOSE 113* 115* 174*   BUN 82* 39* 41*   CREATININE 6.20* 3.15* 2.89*   CALCIUM 9.1 8.8 9.0        IMAGING:  - Imaging studies reviewed by me      IMPRESSION:     # Acute kidney injury multifactorial--Decreased perfusion, HLH, rhabdo  - unlikley TTP  - oliguric  - HD 5/2 1st  - C3 and C4 low, ARLEEN negative, ANCA negative  - hepatitis negative  - PCR and ACR pending (minimal UOP)     # ? 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 ongoing  - steroids  -Bone Marrow 5/6    # Hepatic congestion      #Rhabdo unclear etiology, difficult to give fluids in face of reduced  EF     # Heart failure with reduced ejection fraction      # Coagulopathy   - Fibinogen monitored, cryo when <100     # Transamnitis  - Improving       # Severe mitral regurgitation      # Peripheral vascular disease with prior aortic thrombus s/p aortofemoral bypass     # Constipation      # Impacted stool in intestine   - Resolved      # Colitis      # Leukocytosis      # Prior CVA    - Chronic expressive aphasia     # Essential hypertension    - goal <140/90, intermittently at goal    # Hyperkalemia--resolved with HD and now with hypokalemia  - K WNL now    # Acidosis, improved    # CKD MBD  - Ca WNL and Phos now controlled 4.  -         PLAN:  - HD today (WED), cont qMWF schedule and PRN for now  - Daily evaluation of RRT needs  - steroids per primary service/hematology  - Bone marrow 5/6, f/u findings  - Consider renal biopsy once improved platelets/coagulation status, hold for now  - Avoid nephrotoxins, NSAIDs  - Renally dose meds   - daily labs  - recommend dietary supplements be adjusted for renal failure  - Calcium acetate 1334mg TID with meals, changed to renal diet 5/12  - Daily labs, I/O    Other management per primary service    **Discussed with hospitalist

## 2024-05-15 NOTE — PROGRESS NOTES
At about 1940 I was informed that this pt was has ST depression. Hospitalist was informed. Immediate EKG order was placed.  Pt has no signs of symptoms of distress at this time.

## 2024-05-15 NOTE — PROGRESS NOTES
Monitor summary:        Rhythm: Sinus rhythm  Rate: 61-72  Ectopy: (f)PVC, (r)coup, (r)PAC, (r)big  Measurements: .14/.08/.36        12hr chart check

## 2024-05-15 NOTE — PROGRESS NOTES
Oncology/Hematology Progress Note               Author: Rodrigo Rajan M.D.    Cc: Anemia and thrombocytopenia Date & Time created: 5/15/2024  2:58 PM     Interval History:  She is more delirious today.  She does not follow commands.  She does not seem aware.  She is not oriented.  She has restless movements.        PMHx, PSurgHx, SocHX, FAMHx: All reviewed and no changes    Review of Systems:  Review of Systems   Unable to perform ROS: Mental acuity       Physical Exam:  Physical Exam  Vitals reviewed.   Constitutional:       General: She is not in acute distress.     Appearance: She is ill-appearing.      Comments: ECOG equals 3-4.  Very weak and fatigued.   HENT:      Head: Normocephalic and atraumatic.   Eyes:      General: No scleral icterus.        Right eye: No discharge.         Left eye: No discharge.      Conjunctiva/sclera: Conjunctivae normal.   Cardiovascular:      Rate and Rhythm: Normal rate and regular rhythm.      Heart sounds: No murmur heard.     No gallop.   Pulmonary:      Effort: Pulmonary effort is normal. No respiratory distress.      Breath sounds: Normal breath sounds. No wheezing, rhonchi or rales.   Abdominal:      General: Bowel sounds are normal. There is no distension.      Tenderness: There is no abdominal tenderness. There is no guarding or rebound.   Musculoskeletal:         General: No tenderness.      Cervical back: Normal range of motion and neck supple. No tenderness.      Right lower leg: No edema.      Left lower leg: No edema.      Comments: Somewhat purple/mottled appearance to the skin on the feet and toes.   Skin:     General: Skin is warm and dry.      Findings: Bruising present.      Comments: Multiple petechiae and bruises.   Neurological:      Mental Status: She is alert.      Comments: Does not follow commands today.  Very restless.  Withdrawn.   Psychiatric:      Comments: Does not follow commands today.  Very restless.  Withdrawn.         Labs:          Recent  Labs     05/13/24  0710 05/14/24  0637 05/15/24  0736   SODIUM 141 143 145   POTASSIUM 3.1* 3.2* 3.8   CHLORIDE 96 96 100   CO2 27 29 27   BUN 39* 41* 72*   CREATININE 3.15* 2.89* 4.72*   MAGNESIUM 2.2 2.4  --    PHOSPHORUS 4.1 2.4*  --    CALCIUM 8.8 9.0 9.1     Recent Labs     05/13/24  0710 05/14/24  0637 05/15/24  0736   ALTSGPT 127* 130* 98*   ASTSGOT 115* 122* 68*   ALKPHOSPHAT 178* 187* 155*   TBILIRUBIN 2.6* 2.8* 2.4*   DBILIRUBIN 1.5*  --   --    GLUCOSE 115* 174* 184*     Recent Labs     05/13/24  0710 05/14/24  0637 05/15/24  0406 05/15/24  36   RBC 3.77* 4.45  --   --    HEMOGLOBIN 11.3* 13.2  --   --    HEMATOCRIT 36.8* 43.5  --   --    PLATELETCT 53* 45*  --   --    PROTHROMBTM 23.4* 19.8* 17.3*  --    APTT 28.6 26.3 23.4*  --    INR 2.05* 1.66* 1.40*  --    FERRITIN  --  1479.0*  --  1274.0*     Recent Labs     05/13/24  0710 05/14/24  0637 05/15/24  0736   WBC 21.5* 25.4*  --    NEUTSPOLYS 96.50* 96.50*  --    LYMPHOCYTES 0.60* 0.40*  --    MONOCYTES 2.00 2.00  --    EOSINOPHILS 0.00 0.00  --    BASOPHILS 0.10 0.20  --    ASTSGOT 115* 122* 68*   ALTSGPT 127* 130* 98*   ALKPHOSPHAT 178* 187* 155*   TBILIRUBIN 2.6* 2.8* 2.4*     Recent Labs     05/13/24  0710 05/14/24  0637 05/15/24  0736   SODIUM 141 143 145   POTASSIUM 3.1* 3.2* 3.8   CHLORIDE 96 96 100   CO2 27 29 27   GLUCOSE 115* 174* 184*   BUN 39* 41* 72*   CREATININE 3.15* 2.89* 4.72*   CALCIUM 8.8 9.0 9.1     Hemodynamics:  Temp (24hrs), Av.2 °C (97.2 °F), Min:36.1 °C (96.9 °F), Max:36.4 °C (97.6 °F)  Temperature: 36.2 °C (97.1 °F)  Pulse  Av.9  Min: 50  Max: 117   Blood Pressure: 94/63     Respiratory:    Respiration: 18, Pulse Oximetry: 94 %     Work Of Breathing / Effort: Within Normal Limits  RUL Breath Sounds: Clear, RML Breath Sounds: Diminished, RLL Breath Sounds: Diminished, ZAINAB Breath Sounds: Clear, LLL Breath Sounds: Diminished  Fluids:    Intake/Output Summary (Last 24 hours) at 2024 1124  Last data filed at  5/14/2024 0555  Gross per 24 hour   Intake 712 ml   Output 1550 ml   Net -838 ml        GI/Nutrition:  Orders Placed This Encounter   Procedures    Diet Order Diet: Level 5 - Minced and Moist; Liquid level: Level 0 - Thin; Second Modifier: (optional): Renal; Nutrient modifications: (optional): Low Phos, Low Potassium     Standing Status:   Standing     Number of Occurrences:   1     Order Specific Question:   Diet:     Answer:   Level 5 - Minced and Moist [24]     Order Specific Question:   Liquid level     Answer:   Level 0 - Thin     Order Specific Question:   Second Modifier: (optional)     Answer:   Renal [8]     Order Specific Question:   Nutrient modifications: (optional)     Answer:   Low Phos [9]     Order Specific Question:   Nutrient modifications: (optional)     Answer:   Low Potassium [11]     Medical Decision Making, by Problem:  Active Hospital Problems    Diagnosis     *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  Constellation of clinical findings concern for HLH versus sepsis with DIC and MOD:  Presented to the hospital on 4/27/2024 with 10 days of flulike symptoms, abdominal pain.  Diagnosed with community-acquired pneumonia and treated initially with azithromycin and ceftriaxone.  After a day or 2 changed to ceftriaxone and Flagyl.  Found to have rhinovirus/enterovirus infections.  On admission platelet count was normal, LFTs were normal, kidney function were normal.        Starting on day 3/4 (4/30 and  5/1) had significant decline in platelet counts, marked rise in LFTs, marked rise in creatinine.  This all suggests an acute inciting factor.  Rapidly deteriorated with rising LFTs peaking at 2608-4327, as well as ARF requiring dialysis.  She has baseline CHF with an EF of 35% which decreased to 15%, with new severe mitral regurgitation.  She was admitted to the ICU with dobutamine drip.  She has had ongoing anemia with hemoglobin as low as 8.3, platelets as low as 57, with leukocytosis.  Ferritin initially 14,244.  Also found to have rhabdomyolysis with CPK > 5000.  LDH rapidly increased.  Jenny test was positive for IgG, but no significant markers for hemolysis (direct bilirubin greater than indirect bilirubin).  Quantitative immunoglobulins normal.  Rheumatoid factor mildly high at 18.         Triglycerides were normal.  CT scan chest, abdomen, pelvis showed no hepatosplenomegaly, lymphadenopathy, or malignancy.  Fibrinogen has been less than 100 requiring cryoprecipitate.  Soluble IL-2 receptor markedly elevated at 4489.7 (from 5/8/2024).  Bone marrow biopsy done on 5/6/2024 is normocellular, no malignancy, 46 XX, MDS FISH panel negative, with extremely rare histiocytes with suggestion of hemophagocytosis (which does not significantly support HLH given the rarity).  Heme pathologist more struck by marked increase in schistocytes on peripheral blood.  Liver biopsy done on 5/8/2024.  CXCL9 lab test drawn but still pending.        In consideration of HLH, the H score calculated at 123 (5-9% chance of HLH).   Other HLH prognostic indicators nondiagnostic.  HLH-94 shows at least 1 marker of immune over reactivation (elevated ferritin, low fibrinogen), but only  2 of 4 of the criteria (requires 3).  All of this suggests low probability for HLH.     Primary HLH (underlying germline mutation predisposing to HLH) or a secondary HLH (inflammatory dysregulation leading to cytokine storm, caused by a separate etiology.   Separate inciting etiologies can include disorders of increased immune activation (infection such as EBV, viral, or chronic granulomatous disease), or could include disorders that suppress the immune system (HIV, rheumatologic disorders, malignancies, other inherited syndromes).   With either primary or secondary HLH, an infection can be a triggering factor.  EBV testing done, with a positive NAAT test (not quantified).  ARLEEN as well as ANCA testing negative.     Patient was started on dexamethasone 6 mg every 8 hours on 5/2/2024, and markers have been improving including a decreasing ferritin.           Here are the elements in the differential diagnosis, with arguments for and against:  1.  HLH--arguments for include markedly elevated ferritin, elevated soluble IL-2 receptor.  Arguments against this include no fevers, no hepatosplenomegaly, no underlying malignancy, significant coagulopathy (which is not usually seen in HLH), only mildly decreased platelet count (usually much more severe with HLH), bone marrow biopsy was nonsupportive, schistocytes are more common on blood smear, H score was low risk, improving just on dexamethasone, pattern of rhabdomyolysis not supportive of HLH, and normal triglycerides.  2.  Sepsis with DIC--arguments for this include peripheral smear with predominant schistocytes morphology, rapid progression of multiorgan failure, creatinine and LFTs on admission, pattern of rhabdomyolysis would support sepsis/DIC, significant coagulopathy fits with DIC, mildly low platelets fits with DIC.  Arguments against this would include elevated ferritin not typically seen with sepsis or DIC (although could be seen with significant liver injury), possibly soluble IL-2 receptor could be elevated with significant immune activation (although not a typical characteristic of sepsis and DIC)      -- Consider antiphospholipid antibody syndrome/CAPS as etiology for DIC (had a abdominal ultrasound that seem to  demonstrate portal vein thrombosis).  -- On 5/14 we ordered lupus anticoagulant, anticardiolipin antibodies, antibeta-2 GPI antibodies to rule this out  -- These results are still pending      -- Await pathology from liver biopsy  --Continue dexamethasone for now as improving (dosing is just over 10 mg/m²)  -- Continue to hold off on chemotherapy for HLH given the low probability  --Continue daily ferritin and LDH to monitor for ongoing improvement  --Check a weekly soluble IL-2 receptor alpha, to look for improvement/response    --Ferritin, LDH, LFTs, and soluble IL-2 receptor alpha continue to improve    --Consider MRI of the brain with sedation/anesthesia (to rule out CNS changes associated with HLH).  --Have ordered [facility] HLH gene panel with NGS testing on the whole blood (not a fast turnaround time, used to determine appropriateness of future treatment such as stem cell transplant).          -- Recommend asking infectious disease to interpret the EBV panel--I am not sure whether this is all just indicative of a past EBV infection, or whether there may be a current EBV infection.  If ongoing EBV, this can be treated with rituximab or IVIG.  If this is the underlying inciting factor, this may improve HLH    --My overall assessment is that this is low likelihood for HLH.  I think higher likelihood for multiple organ failure, related to DIC (possibly related to sepsis, viral syndrome, CAPS).          2.  Thrombocytopenia--this is not HIT syndrome.  HIT antibodies were negative (optical density was only 0.049; timing of low platelet count was 3 to 4 days into admission--nothing fits with HIT).    This is not TTP.  There is a low plasmic score of 1.  There is no evidence of MAHA, with no significant hemolysis.  The indirect bilirubin is very low.  Markedly elevated LDH likely related to multiple organ failure/organ damage.    Possibly related to sepsis/DIC as described above versus HLH as described above.                3. Acute renal failure--  Kidney function normal on admission.  Bump in creatinine to 1.6 on day 4 (4/30), with a marked bump in creatinine on day 5 (5/1) suggesting acute insult.  Nephrology following.  On hemodialysis.  Remains oliguric.  Etiology may have been hypotensive/prerenal related.  She also has rhabdomyolysis.  --Nephrology following     4.  Coagulopathy--  Secondary to liver dysfunction vs DIC.  HLA typically does not cause significant coagulopathy, although could see a low fibrinogen level.  Transfuse cryoprecipitate to maintain fibrinogen greater than 100  --Fibrinogen now seems to be improving on its own over the last 2 days     5. Elevated LFTs--  Completely normal LFTs on admission.  LFTs started to climb on day 2 (4/29).  Marked elevation to greater than 1000.  Suggest underlying perfusion etiology, versus viral etiology versus drug toxicity.             Improving following dialysis, improvement of blood pressure, as well as dexamethasone therapy. The level of improvement is reassuring that this may not be HLH as I would not expect them to improve so greatly with steroids alone.   -- Monitor daily CMP     6. Heart failure with reduced ejection fraction  Drop in EF to 15% from previously 35% (documented in August 2022, so a component of this is chronic).  Recent echo showed new severe mitral regurgitation.  Required a dobutamine drip this admission for cardiovascular support.       7. Rhabdomyolysis--  CPK greater than 5000 on admission.  This is not a part of HLH.  This is improving.  Nephrology on board.  Underlying etiology is unclear, potentially secondary to DIC, sepsis, CAPS--as described above.      8.  History of stroke--  Chronic aphasia.  Neurologic status at baseline per .  Very poor baseline status.  Not able to perform any ADLs on her own.  Previous to this hospitalization she was independent of ADLs (was in a wheelchair, but she could feed and dress herself)      9.   Possible portal vein thrombosis--  This was seen on the right upper quadrant ultrasound done during this admission.  Not seen on CT scans, but CT scans were done without contrast.  Not a good candidate for anticoagulation.  --Differential diagnosis includes CAPS, PNH  --Ordered PNH flow cytometry test (low suspicion for PNH as overall picture not totally consistent)           Thank you for allowing me to participate in her care. Please do not hesitate to reach out with any questions.     Please note that this dictation was created using voice recognition software. I have made every reasonable attempt to correct obvious errors, but I expect that there are errors of grammar and possibly content that I did not discover before finalizing the note.          Quality-Core Measures   Reviewed items::  Labs reviewed and Medications reviewed  Guerrero catheter::  Critically Ill - Requiring Accurate Measurement of Urinary Output  DVT prophylaxis pharmacological::  Contraindicated - High bleeding risk

## 2024-05-15 NOTE — PROGRESS NOTES
Name: Elif Cardona ADMIT: 2017   : 1955  PCP: Quinton Sin MD    MRN: [id number] LOS: 1 days   AGE/SEX: 61 y.o. female  ROOM: [facility]/     Date of Admission: 2017  Date of Discharge:  1/10/2017    PCP: Quinton Sin MD      DISCHARGE DIAGNOSIS  Active Hospital Problems (** Indicates Principal Problem)    Diagnosis Date Noted   • **C. difficile colitis [A04.7] 2017   • Sepsis [A41.9] 2017   • Solitary pulmonary nodule on lung CT [R91.1] 2017   • Pancreatic divisum [Q45.3] 2017   • Schizoaffective disorder, bipolar type [F25.0] 2016   • Irritable bowel syndrome (IBS) [K58.9]    • Benign essential hypertension [I10] 2015   • Generalized anxiety disorder [F41.1] 2012      Resolved Hospital Problems    Diagnosis Date Noted Date Resolved   No resolved problems to display.       SECONDARY DIAGNOSES  Past Medical History   Diagnosis Date   • Allergic rhinitis    • Benign essential hypertension 2015   • Fibromyalgia    • Generalized anxiety disorder 2012   • H/O exercise stress test 2005     CVA neg   • Hemorrhoids 2010   • History of bone density study    • Irritable bowel syndrome (IBS) 2009   • Major depressive disorder, recurrent episode, in full remission 09/10/2010   • Osteoarthritis, multiple sites 2014   • Post-menopausal    • Rash and nonspecific skin eruption 2014     Angular Chelitis   • Schizoaffective disorder    • Schizoaffective disorder, bipolar type    • Sebaceous cyst 10/25/2011   • Sinusitis, acute        CONSULTS   Consults     Date and Time Order Name Status Description    2017 1034 Inpatient Consult to Gastroenterology Completed     2017 0258 [facility] (on-call MD unless specified) Completed           PROCEDURES PERFORMED    CT ABDOMEN AND PELVIS WITH CONTRAST.        IMPRESSION:  1. Mild colitis of the descending colon and possibly the sigmoid colon.  No obstruction, perforation or abscess.  [facility] Services Progress Note      HD treatment of 3hrs completed as ordered per Dr Carrasco. Treatment performed at bedside started at 1215, ended at 1515.    Net UF Removed: 500 mL     Patient tolerated treatment with some episodes of hypotension but asymptomatic. Shows restless and agitation during treatment, family at bedside. No edema. No SOB. CVC patent, good flow. Primary RN gave anti anxiety meds towards end of treatment.    Post HD, CVC locked with ACD 1.3ml in both arterial and venous ports. CVC dressing clean, dry and intact. No s/sx of infection. CVC use by Dialysis RN only. Report given to JOSUE Gardner RN.   "  2. Pancreatic divisum, the pancreatic ducts are slightly dilated, no  obstructing mass is seen, ERCP may be performed.  3. 0.7 cm nodule in the right lung base may be further followed up in 3  months, if available previous examinations may be helpful. The nodule  was not seen on an or study of 8/31/2004.          HOSPITAL COURSE  Patient is a 61 y.o. female presented to [facility] complaining of diarrhea.  Please see the admitting history and physical for further details.      The patient is a 61-year-old female who presented with diarrhea.  She had been taking Omnicef for 2 weeks 2 weeks prior to admission.  Stool cultures and C. difficile was obtained and she was found to have C. difficile [place] colitis.  CT scan of the abdomen showed colitis.  Incidentally was found pancreatic divisum and dilated pancreatic ducts.  Due to this I consulted gastroenterology for possible ERCP.  This patient did have any evidence of pancreatitis ERCP was not indicated.  There was also no mass seen on the CAT scan.  She will follow-up with GI in 4 weeks and will need a colonoscopy in 8-10 weeks.  I will discharge her with 10 days of oral Flagyl.  She was improving nicely and tolerating oral diet, diarrhea was improving, and she was ambulating without difficulty.  I discussed the discharge plan with the patient and she is in agreement.  I also discussed not to drink any alcohol while she is taking Flagyl.  VITAL SIGNS  Visit Vitals   • /86 (BP Location: Right arm, Patient Position: Lying)   • Pulse 84   • Temp 97.3 °F (36.3 °C) (Oral)   • Resp 16   • Ht 64\" (162.6 cm)   • Wt 150 lb (68 kg)   • SpO2 94%   • BMI 25.75 kg/m2     Objective:  General Appearance:  Comfortable, well-appearing and not in pain.    Vital signs: (most recent): Blood pressure 148/86, pulse 84, temperature 97.3 °F (36.3 °C), temperature source Oral, resp. rate 16, height 64\" (162.6 cm), weight 150 lb (68 kg), SpO2 94 %, not currently " breastfeeding.  Vital signs are normal.  No fever.    Lungs:  Normal respiratory rate and normal effort.  Breath sounds clear to auscultation.    Heart: Normal rate.  Regular rhythm.  S1 normal and S2 normal.  No murmur.   Abdomen: Abdomen is soft and non-distended.  Bowel sounds are normal.   There is no abdominal tenderness.  There is no epigastric area tenderness.     Extremities: Normal range of motion.  There is no dependent edema.    Neurological: Patient is alert and oriented to person, place and time.    Skin:  Warm and dry.          CONDITION ON DISCHARGE  Stable.      DISCHARGE DISPOSITION   Home or Self Care      DISCHARGE MEDICATIONS   Elif Cardona   Home Medication Instructions MARIBEL:[id number]    Printed on:01/10/17 1143   Medication Information                      alendronate (FOSAMAX) 70 MG tablet  Take 1 tablet by mouth every 7 days. For bone             cetirizine (ZyrTEC) 10 MG chewable tablet  Chew 10 mg daily.             desvenlafaxine (PRISTIQ) 50 MG 24 hr tablet  Take 50 mg by mouth Daily.             gabapentin (NEURONTIN) 100 MG capsule  Take two pills three times per day             hyoscyamine (LEVBID) 0.375 MG 12 hr tablet  Take 1 tablet by mouth every 12 (twelve) hours as needed for cramping. PRN Abd cramping and IBS             lidocaine-hydrocortisone 3-0.5 % cream rectal cream  Change order to this dose and still BID prn             metroNIDAZOLE (FLAGYL) 500 MG tablet  Take 1 tablet by mouth Every 8 (Eight) Hours for 10 days. Indications: Clostridium Difficile Infection             Pseudoeph-Doxylamine-DM-APAP (NYQUIL PO)  Take  by mouth Daily As Needed.             risperiDONE (RisperDAL) 1 MG tablet  Take 2 mg by mouth Daily.             simethicone (MYLICON) 80 MG chewable tablet  Chew 1 tablet 4 (Four) Times a Day As Needed for flatulence.                Future Appointments  Date Time Provider Department Center   1/24/2017 1:00 PM MD JUANITA Strauss [id number] None      Follow-up Information     Follow up with CHRISTINA Pearson Follow up in 4 week(s).    Specialty:  Gastroenterology    Contact information:    [address]  [address]  [facility] [id number] [phone]          Follow up with Quinton Sin MD Follow up in 1 week(s).    Specialty:  Family Medicine    Contact information:    [address]  [address]  [facility] [id number] [phone]            TEST  RESULTS PENDING AT DISCHARGE   Order Current Status    Stool Culture Preliminary result             Garfield Holm MD  [facility]  01/10/17  11:43 AM      Time: greater than 30 minutes.      Dragon disclaimer:  Much of this encounter note is an electronic transcription/translation of spoken language to printed text. The electronic translation of spoken language may permit erroneous, or at times, nonsensical words or phrases to be inadvertently transcribed; Although I have reviewed the note for such errors, some may still exist.

## 2024-05-15 NOTE — PROGRESS NOTES
Hospital Medicine Daily Progress Note    Date of Service  5/15/2024    Chief Complaint  abdominal pain    Hospital Course  Fouzia Santamaria is a 63 y.o. female with HFrEF (EF 35%), severe PAD status post aortic femoral bypass coronary disease status post LAD stent, COPD, diabetes, hypertension, and previous strokes, who was initially seen in the ED with abdominal pain for 10 days and subsequently discharged to home, who again presented with 2 days of bodyaches as well as worsening constipation, nausea and vomiting.  Workup showed leukocytosis with white count of 15,100 with left shift.  Chest x-ray showed mild pulmonary edema.  Abdominal x-ray showed significant amount of stool burden.  Echocardiogram was obtained which revealed that ejection fraction now down to 15% with severe mitral regurgitation.  She also had acute kidney injury and likely cardiorenal syndrome.  Cardiology and nephrology were consulted.  She was placed on a dobutamine drip.  Her renal function worsened and she required a right-sided dialysis catheter with initiation of dialysis.  Her liver enzymes went up to AST of 2959 and ALT of 1377.  She also had significant drop in both hemoglobin and platelet count, with worsening leukocytosis.  LDH was 1418, fibrinogen 91, ferritin 7333 with elevated PT, PTT and INR.  Imaging of the abdomen showed mild hepatomegaly with no splenomegaly.  Hematology/oncology was consulted for concerns for hemophagocytic lymphohistiocytosis (HLH) with low suspicion/probability for TTP or HIT. She was started on Decadron.  She had low fibrinogen for which she was given cryoprecipitate.  Status post bone marrow biopsy on 5/6 and liver biopsy on 5/8.    Interval Problem Update    BELKYS ON  She does not speak words when asked about pain.  She does not follow commands.  Fibrinogen improved 239.  INR 1.4  PTT normal.  Ferritin decreased 1274  CMP reviewed, K normalized, Cr increased 4.7.  LDH decreased 617    Consulted ID. POC  discussed with ID Dr. Castaneda, no specific treatment for EBV.    POC discussed with nephrologist Dr. Carrasco. She is not a good candidate for long-term HD if mental status does not improve. Unable to currently tolerate an outpatient HD chair.    POC discussed with oncologist Dr. Rajan. Awaiting workup for APLAS.    I have discussed this patient's plan of care and discharge plan at IDT rounds today with Case Management, Nursing, Nursing leadership, and other members of the IDT team.    Consultants/Specialty  Hematology  Critical care  Nephrology  Cardiology  Palliative care     Code Status  DNAR/DNI    Disposition  The patient is not medically cleared for discharge to home or a post-acute facility.  Anticipate discharge to: hospice    I have placed the appropriate orders for post-discharge needs.    Review of Systems  Review of Systems   Unable to perform ROS: Mental acuity      Physical Exam  Temp:  [36.1 °C (96.9 °F)-36.4 °C (97.6 °F)] 36.2 °C (97.1 °F)  Pulse:  [64-77] 67  Resp:  [15-18] 18  BP: ()/(63-87) 94/63  SpO2:  [90 %-97 %] 94 %    Physical Exam  Vitals reviewed.   Constitutional:       General: She is not in acute distress.     Appearance: She is ill-appearing.   HENT:      Head: Normocephalic.      Nose: Nose normal.      Mouth/Throat:      Mouth: Mucous membranes are dry.   Eyes:      General: No scleral icterus.     Extraocular Movements: Extraocular movements intact.      Conjunctiva/sclera: Conjunctivae normal.   Neck:      Comments: Right IJ dialysis cath c/d/I, nonbloody nonerythematous nontender  Cardiovascular:      Rate and Rhythm: Normal rate and regular rhythm.      Pulses: Normal pulses.      Heart sounds: Normal heart sounds. No murmur heard.     No friction rub. No gallop.   Pulmonary:      Effort: Pulmonary effort is normal. No respiratory distress.      Breath sounds: Normal breath sounds. No wheezing, rhonchi or rales.   Abdominal:      General: Bowel sounds are normal. There  is no distension.      Palpations: Abdomen is soft.      Tenderness: There is no abdominal tenderness. There is no guarding or rebound.   Genitourinary:     Comments: No williamson  Musculoskeletal:      Cervical back: Neck supple. No muscular tenderness.      Right lower leg: No edema.      Left lower leg: No edema.   Skin:     General: Skin is warm and dry.      Comments: Petechiae and abrasions on extremities   Neurological:      General: No focal deficit present.      Mental Status: She is alert.      Cranial Nerves: No cranial nerve deficit.      Comments: Moves all extremities. Does not answer questions nor follow commands.   Psychiatric:      Comments: Unable to assess           Fluids    Intake/Output Summary (Last 24 hours) at 5/15/2024 1501  Last data filed at 5/14/2024 1540  Gross per 24 hour   Intake 100 ml   Output --   Net 100 ml       Laboratory  Recent Labs     05/13/24  0710 05/14/24  0637   WBC 21.5* 25.4*   RBC 3.77* 4.45   HEMOGLOBIN 11.3* 13.2   HEMATOCRIT 36.8* 43.5   MCV 97.6 97.8   MCH 30.0 29.7   MCHC 30.7* 30.3*   PLATELETCT 53* 45*     Recent Labs     05/13/24  0710 05/14/24  0637 05/15/24  0736   SODIUM 141 143 145   POTASSIUM 3.1* 3.2* 3.8   CHLORIDE 96 96 100   CO2 27 29 27   GLUCOSE 115* 174* 184*   BUN 39* 41* 72*   CREATININE 3.15* 2.89* 4.72*   CALCIUM 8.8 9.0 9.1     Recent Labs     05/13/24  0710 05/14/24  0637 05/15/24  0406   APTT 28.6 26.3 23.4*   INR 2.05* 1.66* 1.40*                 Imaging  IR-US GUIDED PIV   Final Result    Ultrasound-guided PERIPHERAL IV INSERTION performed by    qualified nursing staff as above.      CT-CHEST (THORAX) W/O   Final Result      1.  Patchy BILATERAL airspace disease, possibly pneumonia. This could be chronic or subclinical   2.  Atherosclerosis with ectasia of the descending thoracic aorta         US-RUQ   Final Result      1.  Limited exam due to patient noncompliance.   2.  No flow demonstrated in the portal vein suggesting thrombosis.   3.   Probable small gallstones in the gallbladder.   4.  No biliary dilation.   5.  Atrophic RIGHT kidney, poorly visualized.      IR-LIVER BX WITH OTHER STUDY   Final Result      1.  Transjugular hepatic venography showing a patent right hepatic vein.      2. 18 transjugular liver biopsies x 2.      3. Exchange and replacement of the right temporary dialysis catheter with a new 12 [place] 15 cm catheter.      DX-CHEST-FOR LINE PLACEMENT Perform procedure in: Other(comment f6 below):   Final Result      1.  Right IJ catheter has been placed and the tip projects appropriately over the superior vena cava.      2.  Cardiomegaly with evidence of mild fluid overload.      CT-ABDOMEN-PELVIS W/O   Final Result      1.  No evidence of bowel obstruction or focal inflammatory change.      2.  Again seen extensive atherosclerotic vascular calcification with distal thoracic/upper abdominal aortic ectasia measuring approximately 3.2 cm in diameter.      3.  Gallstones within the gallbladder.      4.  Hyperdense left renal cyst likely representing hemorrhagic or milk of calcium within a cyst.      5.  Small amount of free fluid dependently within the pelvis.      DX-CHEST-PORTABLE (1 VIEW)   Final Result      Cardiomegaly.      US-ABDOMEN COMPLETE SURVEY   Final Result      1.  Mild hepatomegaly.      2.  Sludge noted in the gallbladder.      3.  Small echogenic foci in the periphery of the renal collecting systems bilaterally suspicious for early nephrolithiasis.      4.  Bilateral renal cysts.         DX-CHEST-LIMITED (1 VIEW)   Final Result         1.  No acute cardiopulmonary disease.   2.  Atherosclerosis      EC-ECHOCARDIOGRAM COMPLETE W/ CONT   Final Result      DX-CHEST-LIMITED (1 VIEW)   Final Result      1.  Enlarged cardiac silhouette with changes of pulmonary edema.      [id number] VIEW   Final Result      1.  Nonobstructive bowel gas pattern with a large amount of colonic stool.      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  Imaging of the abdomen showed mild hepatomegaly with no splenomegaly.    - Hematology/oncology was consulted for concerns for hemophagocytic lymphohistiocytosis (HLH) with low suspicion/probability for TTP or HIT.   -Continue Decadron.  -s/p bone marrow biopsy 5/6, no morphologic dysplasia  -Monitor for coagulopathy.  Daily fibrinogen, PT/PTT.  Transfused with cryoprecipitate if fibrinogen less than 150.  Oncology recommended liver or lymph node biopsy   Status post IR transjugular liver biopsy with portal venography 5/8    Hypokalemia  Assessment & Plan  Resolved  Likely due to inadequate PO intake  Not of clinical significance - defer repletion to HD dialysate    Hyperlipidemia- (present on admission)  Assessment & Plan  - Hold off on statin due to rhabdomyolysis.    Cardiogenic shock (HCC)- (present on admission)  Assessment & Plan  5/13/2024  -In the setting of low ejection fraction of 15%.  Required dobutamine drip.  Shock resolved.  -Management of HFrEF as above.    History of stroke- (present on admission)  Assessment & Plan  -With chronic aphasia.  Appears at baseline.  Monitor.    Rhabdomyolysis- (present on admission)  Assessment & Plan  -Holding off on IV fluids given IVON/dialysis dependent.  -CPK continues to trend down    Thrombocytopenia (HCC)- (present on admission)  Assessment & Plan  Likely consumptive coagulopathy AEB schistocytes and hypofibrinogenemia  Oncology consulted, Low suspicion/probability for TTP or HIT.  -Continue Decadron.  -Continue to monitor platelet counts.  Restrictive transfusion strategy.  Watch for bleeding.     Coagulopathy (HCC)- (present on admission)  Assessment & Plan  - In setting of HLH, thrombocytopenia.    Oncology  consulted, Low suspicion/probability for TTP or HIT.  Fibrinogen low.  -DIC versus MAHA based on schistocytes which may be consumptive coagulopathy  APLAS ordered  -Continue Decadron.  -s/p bone marrow biopsy 5/6, no morphologic dysplasia  -Monitor PT/PTT/INR and daily fibrinogen.  Watch for bleeding.  -Follow fibrinogen, transfuse with cryoprecipitate if </= 100 to reduce spontaneous bleeding    Acute renal failure with tubular necrosis (HCC)- (present on admission)  Assessment & Plan  -Now on hemodialysis.  -Nephrology following.  -Monitor electrolytes closely.  -If continues to require hemodialysis, will need outpatient hemodialysis chair arrangements.    Hepatitis  Assessment & Plan  -Likely inflammatory trigger  -Continue to trend LFTs, continues to trend down.    Severe mitral regurgitation- (present on admission)  Assessment & Plan  -Per echo  No acute intervention warranted  Antihypertensives per separate plan to reduce regurgitant fraction    Heart failure with reduced ejection fraction (HCC)- (present on admission)  Assessment & Plan  - Had a drop in EF to 15% from previous 35%.  Required dobutamine drip.  -Continue volume removal with hemodialysis.  -Optimize GDMT.  Unable to give ACEi/ARB/ARNI/SGLT2i given renal function.    Continue hydralazine/Imdur for afterload reduction.    Continue Toprol-XL 12.5 mg daily, adjust up as blood pressure tolerates.    -Monitor on telemetry.    Hyperkalemia- (present on admission)  Assessment & Plan  Resolved  Due to acute renal failure  Continue hemodialysis per nephro  Daily labs     Leukocytosis- (present on admission)  Assessment & Plan  Uncertain etiology, likely primary inflammatory process rather than infection  Probable component of steroid-induced demarginalization  -Concerning for HLH  -Bone marrow biopsy 5/6/2024 results in progress. Follow pathology.   -No signs of sepsis or infection.  Holding off on further antibiotics.   -Trend WBC count.    Advanced care  planning/counseling discussion- (present on admission)  Assessment & Plan  Patient is aphasic and lethargic, unable to participated in the conversation.  Spouse Basilio was present for the conversation.  I discussed advance care planning face to face with the patient and family for at least 30 minutes, including diagnosis, prognosis, plan of care, risks and benefits of any therapies that could be offered, as well as alternatives including palliation and hospice, as appropriate.  Forms were completed and placed in the chart.  Patient is now DNR/DNI.       VTE prophylaxis: SCD due to risk of DIC    I have performed a physical exam and reviewed and updated ROS and Plan today (5/15/2024). In review of yesterday's note (5/14/2024), there are no changes except as documented above.

## 2024-05-16 ENCOUNTER — APPOINTMENT (OUTPATIENT)
Dept: RADIOLOGY | Facility: MEDICAL CENTER | Age: 64
DRG: 280 | End: 2024-05-16
Attending: INTERNAL MEDICINE
Payer: MEDICAID

## 2024-05-16 LAB
ALBUMIN SERPL BCP-MCNC: 3.3 G/DL (ref 3.2–4.9)
ALBUMIN/GLOB SERPL: 0.9 G/DL
ALP SERPL-CCNC: 160 U/L (ref 30–99)
ALT SERPL-CCNC: 92 U/L (ref 2–50)
ANION GAP SERPL CALC-SCNC: 19 MMOL/L (ref 7–16)
APTT PPP: 30.6 SEC (ref 24.7–36)
AST SERPL-CCNC: 59 U/L (ref 12–45)
B2 GLYCOPROT1 IGA SER-ACNC: <10 SAU
B2 GLYCOPROT1 IGG SERPL IA-ACNC: <10 SGU
B2 GLYCOPROT1 IGM SERPL IA-ACNC: <10 SMU
BILIRUB SERPL-MCNC: 2.5 MG/DL (ref 0.1–1.5)
BUN SERPL-MCNC: 50 MG/DL (ref 8–22)
CALCIUM ALBUM COR SERPL-MCNC: 9.5 MG/DL (ref 8.5–10.5)
CALCIUM SERPL-MCNC: 8.9 MG/DL (ref 8.5–10.5)
CARDIOLIPIN IGA SER IA-ACNC: <10 APL
CARDIOLIPIN IGG SER IA-ACNC: <10 GPL
CARDIOLIPIN IGM SER IA-ACNC: <10 MPL
CHLORIDE SERPL-SCNC: 102 MMOL/L (ref 96–112)
CO2 SERPL-SCNC: 22 MMOL/L (ref 20–33)
CREAT SERPL-MCNC: 3.61 MG/DL (ref 0.5–1.4)
FERRITIN SERPL-MCNC: 1488 NG/ML (ref 10–291)
FIBRINOGEN PPP-MCNC: 228 MG/DL (ref 215–460)
GFR SERPLBLD CREATININE-BSD FMLA CKD-EPI: 14 ML/MIN/1.73 M 2
GLOBULIN SER CALC-MCNC: 3.6 G/DL (ref 1.9–3.5)
GLUCOSE SERPL-MCNC: 149 MG/DL (ref 65–99)
HCT VFR BLD AUTO: 43.3 % (ref 37–47)
HGB BLD-MCNC: 13.5 G/DL (ref 12–16)
INR PPP: 1.55 (ref 0.87–1.13)
LDH SERPL L TO P-CCNC: 568 U/L (ref 107–266)
MCH RBC QN AUTO: 30.1 PG (ref 27–33)
MCHC RBC AUTO-ENTMCNC: 31.2 G/DL (ref 32.2–35.5)
MCV RBC AUTO: 96.7 FL (ref 81.4–97.8)
PLATELET # BLD AUTO: 25 K/UL (ref 164–446)
PLATELETS.RETICULATED NFR BLD AUTO: 28.7 % (ref 0.6–13.1)
POTASSIUM SERPL-SCNC: 4.1 MMOL/L (ref 3.6–5.5)
PROT SERPL-MCNC: 6.9 G/DL (ref 6–8.2)
PROTHROMBIN TIME: 18.8 SEC (ref 12–14.6)
RBC # BLD AUTO: 4.48 M/UL (ref 4.2–5.4)
SODIUM SERPL-SCNC: 143 MMOL/L (ref 135–145)
WBC # BLD AUTO: 27 K/UL (ref 4.8–10.8)

## 2024-05-16 PROCEDURE — 99232 SBSQ HOSP IP/OBS MODERATE 35: CPT | Performed by: STUDENT IN AN ORGANIZED HEALTH CARE EDUCATION/TRAINING PROGRAM

## 2024-05-16 PROCEDURE — 93970 EXTREMITY STUDY: CPT | Mod: 26 | Performed by: INTERNAL MEDICINE

## 2024-05-16 RX ORDER — ENOXAPARIN SODIUM 100 MG/ML
40 INJECTION SUBCUTANEOUS DAILY
Status: DISCONTINUED | OUTPATIENT
Start: 2024-05-16 | End: 2024-05-16

## 2024-05-16 RX ORDER — QUETIAPINE FUMARATE 25 MG/1
50 TABLET, FILM COATED ORAL NIGHTLY
Status: DISCONTINUED | OUTPATIENT
Start: 2024-05-16 | End: 2024-05-18

## 2024-05-16 RX ADMIN — DEXAMETHASONE 6 MG: 4 TABLET ORAL at 21:24

## 2024-05-16 RX ADMIN — Medication 1334 MG: at 17:11

## 2024-05-16 RX ADMIN — HYDRALAZINE HYDROCHLORIDE 25 MG: 50 TABLET ORAL at 04:38

## 2024-05-16 RX ADMIN — Medication 1334 MG: at 09:38

## 2024-05-16 RX ADMIN — PRAMIPEXOLE DIHYDROCHLORIDE 0.12 MG: 0.12 TABLET ORAL at 09:37

## 2024-05-16 RX ADMIN — ISOSORBIDE MONONITRATE 30 MG: 30 TABLET, EXTENDED RELEASE ORAL at 04:39

## 2024-05-16 RX ADMIN — QUETIAPINE FUMARATE 50 MG: 25 TABLET ORAL at 21:24

## 2024-05-16 RX ADMIN — Medication 1334 MG: at 11:54

## 2024-05-16 RX ADMIN — DEXAMETHASONE 6 MG: 4 TABLET ORAL at 04:37

## 2024-05-16 RX ADMIN — METOPROLOL SUCCINATE 12.5 MG: 25 TABLET, FILM COATED, EXTENDED RELEASE ORAL at 04:37

## 2024-05-16 RX ADMIN — PRAMIPEXOLE DIHYDROCHLORIDE 0.12 MG: 0.12 TABLET ORAL at 15:26

## 2024-05-16 RX ADMIN — PRAMIPEXOLE DIHYDROCHLORIDE 0.12 MG: 0.12 TABLET ORAL at 21:24

## 2024-05-16 RX ADMIN — VENLAFAXINE HYDROCHLORIDE 37.5 MG: 37.5 CAPSULE, EXTENDED RELEASE ORAL at 04:39

## 2024-05-16 RX ADMIN — DEXAMETHASONE 6 MG: 4 TABLET ORAL at 15:26

## 2024-05-16 ASSESSMENT — PAIN DESCRIPTION - PAIN TYPE: TYPE: ACUTE PAIN

## 2024-05-16 NOTE — PROGRESS NOTES
[facility] -  PROGRESS NOTE               Author: SHANKAR Spangler Date & Time: 5/16/2024  3:40 PM     CC: abd pain, n/v    HISTORY OF PRESENT ILLNESS:    63 y.o. female with history of systolic and diastolic heart failure with EF of 15% (HFrEF) grade 2 diastolic dysfunction, severe MVR, CAD with h/o STEMI s/p DEWEY to LAD, PAD with aortofemoral bypass surgery who presented 4/27/2024 with increasing shortness of breath admitted for community acquired PNA and severe constipation with hosp course complicated by exacerbation of her HFrEF, medications augmented which was followed by IVON, worsening hepatopathy concerning for cardiorenal etiology.   She was then transferred to ICU 5/01 and initiated on dobutamine drip for organ perfusion.   Patient continues to be oliguric with UOP of 86 yesterday and 10 ml today so far.   Nephrology has been consulted for concerns of IVON , cardiorenal syndrome.     DAILY NEPHROLOGY SUMMARY:  05/02: Examined at bedside. Looks like she wants to talk but it is difficult for her to find appropriate words.   Dobutamine ggt held per critical care today.   Labs yesterday with fibrinogen 91, cryoppt administered followed by dialysis   1 L ultrafiltration   Uptrending WBCs since 4/30, 18.4 today  Scr improved to 3.03 after dialysis   Down trending AST/ALT   CT scan without bowel obs, extensive calcification of distal thoracic/upper abd aorta. Left renal cyst.   5/4: Pt transferred out of ICU, now with increased CPK and climbing  5/5: Pt still anuric, HD yesterday  5/6: minimal UOP, SBP 90s-140s, platelets stable at 61,000, CPK trended down to 2314, BM biopsy planned for today, patient denies CP/SOB  5/7: minimal UOP, tolerated HD, SBP 110s-120s, fibrinogen 88 and patient provided with 1u cryo, BM biopsy done, platelets 57,000, no new c/o  5/8: CO2 12 and K 6.2 this AM, WBC trended up (on steroids), HD planned for today, platelets 53,000, does not answer questions  "today, appears agitated, does not have gown on  5/9: tolerated HD, transferred to oncology floor, reports she is hungry this AM, SBP 130s-150s, plts 53,000  5/10: HD planned for today, SBP 120s-130s, family at bedside, patient curled in bed, c/o pain but can't identify where, does not really answer other questions  5/11: HD yesterday net UF 1L. VSS HR 50-60.  Very restless, does not answer questions.  Labs not run this AM, added and pending. UP 25 ml, has diaper in place.   5/12: VSS UOP 50ml.  Got cryo yesterday for low fibrinogen, had to use pigtail of HD cath. Today's labs pending.  Mentation unchanged.    Addendum:  K 6.3  5/13: No events, underwent urgent HD yest for hyperkalemia and UF 500cc, K low this am at 3.1, Cr improved s/p HD, platelets lower at 53K and WBC still 21.5, BP stable, stable on RA, 100cc UOP overnight, does not answer questions  5/14: No events, BP stable, stable on RA, remains oliguric, tolerated HD yest with 1L UF, WBC higher 25K, platelets lower 45K, K still low at 3.2, shakes head no when asked if she's having any pain but does not respond to further questions  5/15: No events, BP borderline low this am but stable overnight, received BP meds this am, UOP not documented, no change to mental status, does not answer questions or follow commands  5/16: lying in bed, confused and non-verbal, continues with soft BPs, minimal PO intake per RN, no edema, for iHD tomorrow     REVIEW OF SYSTEMS:    ROS limited due to mental acuity, Chronic expressive aphasia.     PAST MEDICAL/SURGICAL/FAMILY/SOCIAL HISTORY:  Reviewed and documented in chart     HOME MEDICATIONS:   - Reviewed and documented in chart    LABORATORY STUDIES:   - Reviewed and documented in chart    ALLERGIES:  Pcn [penicillins] and Toradol    VS:  BP 95/59   Pulse 84   Temp 35.9 °C (96.7 °F) (Temporal)   Resp 20   Ht 1.651 m (5' 5\")   Wt 50.6 kg (111 lb 8.8 oz)   SpO2 96%   BMI 18.56 kg/m²   Physical Exam  Vitals and nursing " note reviewed.   Constitutional:       General: She is not in acute distress.     Appearance: She is ill-appearing.   HENT:      Head: Normocephalic and atraumatic.      Right Ear: External ear normal.      Left Ear: External ear normal.      Nose: Nose normal.      Mouth/Throat:      Mouth: Mucous membranes are dry.   Eyes:      General: No scleral icterus.     Extraocular Movements: Extraocular movements intact.   Neck:      Comments: Right IJ trialysis catheter   Cardiovascular:      Rate and Rhythm: Normal rate and regular rhythm.   Pulmonary:      Effort: Pulmonary effort is normal. No respiratory distress.   Abdominal:      General: Bowel sounds are normal. There is no distension.      Palpations: Abdomen is soft.   Musculoskeletal:         General: No deformity.      Right lower leg: No edema.      Left lower leg: No edema.   Skin:     General: Skin is warm.      Capillary Refill: Capillary refill takes less than 2 seconds.      Findings: Bruising and rash present.      Comments: Excoriations on extremities, +petechiae on legs   Neurological:      General: No focal deficit present.      Mental Status: She is alert.      Comments: Chronic expressive aphasia      Psychiatric:         Attention and Perception: She is inattentive.      Comments: Does not follow commands         FLUID BALANCE:  In: 500 [Dialysis:500]  Out: 1000     LABS:  Recent Labs     05/14/24  0637 05/15/24  0736 05/16/24  0418   SODIUM 143 145 143   POTASSIUM 3.2* 3.8 4.1   CHLORIDE 96 100 102   CO2 29 27 22   GLUCOSE 174* 184* 149*   BUN 41* 72* 50*   CREATININE 2.89* 4.72* 3.61*   CALCIUM 9.0 9.1 8.9        IMAGING:  - Imaging studies reviewed by me      IMPRESSION:     # Acute kidney injury multifactorial--Decreased perfusion, HLH, rhabdo  - unlikley TTP  - oliguric  - HD 5/2 1st  - C3 and C4 low, ARLEEN negative, ANCA negative  - hepatitis negative  - PCR and ACR pending (minimal UOP)     # ? 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  ongoing  - steroids  -Bone Marrow 5/6    # Hepatic congestion      #Rhabdo unclear etiology, difficult to give fluids in face of reduced EF     # Heart failure with reduced ejection fraction      # Coagulopathy   - Fibinogen monitored, cryo when <100     # Transamnitis  - Improving       # Severe mitral regurgitation      # Peripheral vascular disease with prior aortic thrombus s/p aortofemoral bypass     # Constipation      # Impacted stool in intestine   - Resolved      # Colitis      # Leukocytosis      # Prior CVA    - Chronic expressive aphasia     # Essential hypertension    - goal <140/90, intermittently at goal    # Hyperkalemia--resolved with HD and now with hypokalemia  - K WNL now    # Acidosis, improved    # CKD MBD  - Ca WNL and Phos now controlled 4.  -         PLAN:  - HD tomorrow (FRI), cont qMWF schedule and PRN for now  - Daily evaluation of RRT needs  - steroids per primary service/hematology  - Bone marrow 5/6, f/u findings  - Consider renal biopsy once improved platelets/coagulation status, hold for now  - Avoid nephrotoxins, NSAIDs  - Renally dose meds   - daily labs  - recommend dietary supplements be adjusted for renal failure  - Calcium acetate 1334mg TID with meals, changed to renal diet 5/12  - Daily labs, I/O    Other management per primary service

## 2024-05-16 NOTE — PROGRESS NOTES
Oncology/Hematology Progress Note               Author: Rodrigo Rajan M.D.    Cc: Anemia and thrombocytopenia Date & Time created: 5/16/2024  12:55 PM     Interval History:  She still seems delirious.  She is not following commands.  She is more alert, but confused.    No purposeful communication.        PMHx, PSurgHx, SocHX, FAMHx: All reviewed and no changes    Review of Systems:  Review of Systems   Unable to perform ROS: Mental acuity       Physical Exam:  Physical Exam  Vitals reviewed.   Constitutional:       General: She is not in acute distress.     Appearance: She is ill-appearing.      Comments: ECOG equals 3-4.  Very weak and fatigued.   HENT:      Head: Normocephalic and atraumatic.   Eyes:      General: No scleral icterus.        Right eye: No discharge.         Left eye: No discharge.      Conjunctiva/sclera: Conjunctivae normal.   Cardiovascular:      Rate and Rhythm: Normal rate and regular rhythm.      Heart sounds: No murmur heard.     No friction rub. No gallop.   Pulmonary:      Effort: Pulmonary effort is normal. No respiratory distress.      Breath sounds: Normal breath sounds. No rales.   Abdominal:      General: Bowel sounds are normal. There is no distension.      Palpations: Abdomen is soft.      Tenderness: There is no abdominal tenderness. There is no guarding.   Musculoskeletal:         General: No tenderness.      Cervical back: Normal range of motion and neck supple. No tenderness.      Right lower leg: No edema.      Left lower leg: No edema.      Comments: Somewhat purple/mottled appearance to the skin on the feet and toes.   Lymphadenopathy:      Cervical: No cervical adenopathy.   Skin:     General: Skin is warm and dry.      Findings: Bruising present.      Comments: Multiple petechiae and bruises.   Neurological:      Mental Status: She is alert.      Comments: Does not follow commands today.  Very restless.  More alert.   Psychiatric:      Comments: Does not follow commands  today.  Very restless.  More alert.         Labs:          Recent Labs     05/14/24  0637 05/15/24  0736 05/16/24  0418   SODIUM 143 145 143   POTASSIUM 3.2* 3.8 4.1   CHLORIDE 96 100 102   CO2 29 27 22   BUN 41* 72* 50*   CREATININE 2.89* 4.72* 3.61*   MAGNESIUM 2.4  --   --    PHOSPHORUS 2.4*  --   --    CALCIUM 9.0 9.1 8.9     Recent Labs     05/14/24  0637 05/15/24  0736 05/16/24  0418   ALTSGPT 130* 98* 92*   ASTSGOT 122* 68* 59*   ALKPHOSPHAT 187* 155* 160*   TBILIRUBIN 2.8* 2.4* 2.5*   GLUCOSE 174* 184* 149*     Recent Labs     05/14/24  0637 05/15/24  0406 05/15/24  0736 05/15/24  1518 05/16/24  0418   RBC 4.45  --   --  5.00 4.48   HEMOGLOBIN 13.2  --   --  15.4 13.5   HEMATOCRIT 43.5  --   --  49.2* 43.3   PLATELETCT 45*  --   --  27* 25*   PROTHROMBTM 19.8* 17.3*  --   --  18.8*   APTT 26.3 23.4*  --   --  30.6   INR 1.66* 1.40*  --   --  1.55*   FERRITIN 1479.0*  --  1274.0*  --  1488.0*     Recent Labs     05/14/24  0637 05/15/24  0736 05/15/24  1518 05/16/24  0418   WBC 25.4*  --  30.5* 27.0*   NEUTSPOLYS 96.50*  --   --   --    LYMPHOCYTES 0.40*  --   --   --    MONOCYTES 2.00  --   --   --    EOSINOPHILS 0.00  --   --   --    BASOPHILS 0.20  --   --   --    ASTSGOT 122* 68*  --  59*   ALTSGPT 130* 98*  --  92*   ALKPHOSPHAT 187* 155*  --  160*   TBILIRUBIN 2.8* 2.4*  --  2.5*     Recent Labs     05/14/24  0637 05/15/24  0736 05/16/24  0418   SODIUM 143 145 143   POTASSIUM 3.2* 3.8 4.1   CHLORIDE 96 100 102   CO2 29 27 22   GLUCOSE 174* 184* 149*   BUN 41* 72* 50*   CREATININE 2.89* 4.72* 3.61*   CALCIUM 9.0 9.1 8.9     Hemodynamics:  Temp (24hrs), Av.3 °C (97.3 °F), Min:36.1 °C (97 °F), Max:36.7 °C (98.1 °F)  Temperature: 36.7 °C (98.1 °F)  Pulse  Av.1  Min: 50  Max: 117   Blood Pressure: 90/53     Respiratory:    Respiration: 18, Pulse Oximetry: 94 %     Work Of Breathing / Effort: Within Normal Limits  RUL Breath Sounds: Diminished, RML Breath Sounds: Diminished, RLL Breath Sounds:  Diminished, ZAINAB Breath Sounds: Diminished, LLL Breath Sounds: Diminished  Fluids:    Intake/Output Summary (Last 24 hours) at 5/14/2024 1124  Last data filed at 5/14/2024 0555  Gross per 24 hour   Intake 712 ml   Output 1550 ml   Net -838 ml        GI/Nutrition:  Orders Placed This Encounter   Procedures    Diet Order Diet: Level 5 - Minced and Moist; Liquid level: Level 0 - Thin; Second Modifier: (optional): Renal; Nutrient modifications: (optional): Low Phos, Low Potassium     Standing Status:   Standing     Number of Occurrences:   1     Order Specific Question:   Diet:     Answer:   Level 5 - Minced and Moist [24]     Order Specific Question:   Liquid level     Answer:   Level 0 - Thin     Order Specific Question:   Second Modifier: (optional)     Answer:   Renal [8]     Order Specific Question:   Nutrient modifications: (optional)     Answer:   Low Phos [9]     Order Specific Question:   Nutrient modifications: (optional)     Answer:   Low Potassium [11]     Medical Decision Making, by Problem:  Active Hospital Problems    Diagnosis     *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  Constellation of clinical findings concern for HLH versus sepsis with DIC and MOD:  Presented to the hospital on 4/27/2024 with 10 days of flulike symptoms, abdominal pain.  Diagnosed with community-acquired pneumonia and treated initially with azithromycin and ceftriaxone.  After a day or 2 changed to ceftriaxone and Flagyl.  Found to have  rhinovirus/enterovirus infections.  On admission platelet count was normal, LFTs were normal, kidney function were normal.        Starting on day 3/4 (4/30 and 5/1) had significant decline in platelet counts, marked rise in LFTs, marked rise in creatinine.  This all suggests an acute inciting factor.  Rapidly deteriorated with rising LFTs peaking at 5932-7753, as well as ARF requiring dialysis.  She has baseline CHF with an EF of 35% which decreased to 15%, with new severe mitral regurgitation.  She was admitted to the ICU with dobutamine drip.  She has had ongoing anemia with hemoglobin as low as 8.3, platelets as low as 57, with leukocytosis.  Ferritin initially 14,244.  Also found to have rhabdomyolysis with CPK > 5000.  LDH rapidly increased.  Jenny test was positive for IgG, but no significant markers for hemolysis (direct bilirubin greater than indirect bilirubin).  Quantitative immunoglobulins normal.  Rheumatoid factor mildly high at 18.         Triglycerides were normal.  CT scan chest, abdomen, pelvis showed no hepatosplenomegaly, lymphadenopathy, or malignancy.  Fibrinogen has been less than 100 requiring cryoprecipitate.  Soluble IL-2 receptor markedly elevated at 4489.7 (from 5/8/2024).  Bone marrow biopsy done on 5/6/2024 is normocellular, no malignancy, 46 XX, MDS FISH panel negative, with extremely rare histiocytes with suggestion of hemophagocytosis (which does not significantly support HLH given the rarity).  Heme pathologist more struck by marked increase in schistocytes on peripheral blood.  Liver biopsy done on 5/8/2024.  CXCL9 lab test drawn but still pending.        In consideration of HLH, the H score calculated at 123 (5-9% chance of HLH).   Other HLH prognostic indicators nondiagnostic.  HLH-94 shows at least 1 marker of immune over reactivation (elevated ferritin, low fibrinogen), but only  2 of 4 of the criteria (requires 3).  All of this suggests low probability for HLH.     Primary HLH  (underlying germline mutation predisposing to HLH) or a secondary HLH (inflammatory dysregulation leading to cytokine storm, caused by a separate etiology.  Separate inciting etiologies can include disorders of increased immune activation (infection such as EBV, viral, or chronic granulomatous disease), or could include disorders that suppress the immune system (HIV, rheumatologic disorders, malignancies, other inherited syndromes).   With either primary or secondary HLH, an infection can be a triggering factor.  EBV testing done, with a positive NAAT test (not quantified).  ARLEEN as well as ANCA testing negative.     Patient was started on dexamethasone 6 mg every 8 hours on 5/2/2024, and markers have been improving including a decreasing ferritin.           Here are the elements in the differential diagnosis, with arguments for and against:  1.  HLH--arguments for include markedly elevated ferritin, elevated soluble IL-2 receptor.  Arguments against this include no fevers, no hepatosplenomegaly, no underlying malignancy, significant coagulopathy (which is not usually seen in HLH), only mildly decreased platelet count (usually much more severe with HLH), bone marrow biopsy was nonsupportive, schistocytes are more common on blood smear, H score was low risk, improving just on dexamethasone, pattern of rhabdomyolysis not supportive of HLH, and normal triglycerides.  2.  Sepsis with DIC--arguments for this include peripheral smear with predominant schistocytes morphology, rapid progression of multiorgan failure, creatinine and LFTs on admission, pattern of rhabdomyolysis would support sepsis/DIC, significant coagulopathy fits with DIC, mildly low platelets fits with DIC.  Arguments against this would include elevated ferritin not typically seen with sepsis or DIC (although could be seen with significant liver injury), possibly soluble IL-2 receptor could be elevated with significant immune activation (although not a  typical characteristic of sepsis and DIC)      -- Consider antiphospholipid antibody syndrome/CAPS as etiology for DIC (had a abdominal ultrasound that seem to demonstrate portal vein thrombosis).  -- On 5/14 we ordered lupus anticoagulant, anticardiolipin antibodies, antibeta-2 GPI antibodies to rule this out  -- These results are still pending      -- Await pathology results from liver biopsy from 5/8  --Continue dexamethasone for now as improving (dosing is just over 10 mg/m²)  -- Continue to hold off on chemotherapy for HLH given the low probability  --Continue daily ferritin and LDH to monitor for ongoing improvement  --Check a weekly soluble IL-2 receptor alpha, to look for improvement/response    --Ferritin, LDH, LFTs, and soluble IL-2 receptor alpha continue to improve    --Consider MRI of the brain with sedation/anesthesia (to rule out CNS changes associated with HLH).  --Have ordered [facility] HLH gene panel with NGS testing on the whole blood (not a fast turnaround time, used to determine appropriateness of future treatment such as stem cell transplant).          -- Recommend asking infectious disease to interpret the EBV panel--I am not sure whether this is all just indicative of a past EBV infection, or whether there may be a current EBV infection.  If ongoing EBV, this can be treated with rituximab or IVIG.  If this is the underlying inciting factor, this may improve HLH    --My overall assessment is that this is low likelihood for HLH.  I think higher likelihood for multiple organ failure, related to DIC (possibly related to sepsis, viral syndrome, CAPS).          2.  Thrombocytopenia--this is not HIT syndrome.  HIT antibodies were negative (optical density was only 0.049; timing of low platelet count was 3 to 4 days into admission--nothing fits with HIT).    This is not TTP.  There is a low plasmic score of 1.  There is no evidence of MAHA, with no significant hemolysis.  The indirect bilirubin is  very low.  Markedly elevated LDH likely related to multiple organ failure/organ damage.    Possibly related to sepsis/DIC as described above versus HLH as described above.               3. Acute renal failure--  Kidney function normal on admission.  Bump in creatinine to 1.6 on day 4 (4/30), with a marked bump in creatinine on day 5 (5/1) suggesting acute insult.  Nephrology following.  On hemodialysis.  Remains oliguric.  Etiology may have been hypotensive/prerenal related.  She also has rhabdomyolysis.  --Nephrology following     4.  Coagulopathy--  Secondary to liver dysfunction vs DIC.  HLA typically does not cause significant coagulopathy, although could see a low fibrinogen level.  Transfuse cryoprecipitate to maintain fibrinogen greater than 100  --Fibrinogen now seems to be improving on its own over the last 2 days     5. Elevated LFTs--  Completely normal LFTs on admission.  LFTs started to climb on day 2 (4/29).  Marked elevation to greater than 1000.  Suggest underlying perfusion etiology, versus viral etiology versus drug toxicity.             Improving following dialysis, improvement of blood pressure, as well as dexamethasone therapy. The level of improvement is reassuring that this may not be HLH as I would not expect them to improve so greatly with steroids alone.   -- Monitor daily CMP     6. Heart failure with reduced ejection fraction  Drop in EF to 15% from previously 35% (documented in August 2022, so a component of this is chronic).  Recent echo showed new severe mitral regurgitation.  Required a dobutamine drip this admission for cardiovascular support.       7. Rhabdomyolysis--  CPK greater than 5000 on admission.  This is not a part of HLH.  This is improving.  Nephrology on board.  Underlying etiology is unclear, potentially secondary to DIC, sepsis, CAPS--as described above.      8.  History of stroke--  Chronic aphasia.  Neurologic status at baseline per .  Very poor baseline  status.  Not able to perform any ADLs on her own.  Previous to this hospitalization she was independent of ADLs (was in a wheelchair, but she could feed and dress herself)      9.  Possible portal vein thrombosis--  This was seen on the right upper quadrant ultrasound done during this admission.  Not seen on CT scans, but CT scans were done without contrast.  Not a good candidate for anticoagulation given the thrombocytopenia.  --Differential diagnosis includes CAPS, PNH  --Ordered PNH flow cytometry test (low suspicion for PNH as overall picture not totally consistent)                Please note that this dictation was created using voice recognition software. I have made every reasonable attempt to correct obvious errors, but I expect that there are errors of grammar and possibly content that I did not discover before finalizing the note.          Quality-Core Measures   Reviewed items::  Labs reviewed and Medications reviewed  Guerrero catheter::  Critically Ill - Requiring Accurate Measurement of Urinary Output  DVT prophylaxis pharmacological::  Contraindicated - High bleeding risk

## 2024-05-16 NOTE — PROGRESS NOTES
Hospital Medicine Daily Progress Note    Date of Service  5/16/2024    Chief Complaint  abdominal pain    Hospital Course  Fouzia Santamaria is a 63 y.o. female with HFrEF (EF 35%), severe PAD status post aortic femoral bypass coronary disease status post LAD stent, COPD, diabetes, hypertension, and previous strokes, who was initially seen in the ED with abdominal pain for 10 days and subsequently discharged to home, who again presented with 2 days of bodyaches as well as worsening constipation, nausea and vomiting.  Workup showed leukocytosis with white count of 15,100 with left shift.  Chest x-ray showed mild pulmonary edema.  Abdominal x-ray showed significant amount of stool burden.  Echocardiogram was obtained which revealed that ejection fraction now down to 15% with severe mitral regurgitation.  She also had acute kidney injury and likely cardiorenal syndrome.  Cardiology and nephrology were consulted.  She was placed on a dobutamine drip.  Her renal function worsened and she required a right-sided dialysis catheter with initiation of dialysis.  Her liver enzymes went up to AST of 2959 and ALT of 1377.  She also had significant drop in both hemoglobin and platelet count, with worsening leukocytosis.  LDH was 1418, fibrinogen 91, ferritin 7333 with elevated PT, PTT and INR.  Imaging of the abdomen showed mild hepatomegaly with no splenomegaly.  Hematology/oncology was consulted for concerns for hemophagocytic lymphohistiocytosis (HLH) with low suspicion/probability for TTP or HIT. She was started on Decadron.  She had low fibrinogen for which she was given cryoprecipitate.  Status post bone marrow biopsy on 5/6 and liver biopsy on 5/8.    Interval Problem Update    BELKYS ON  Mumbles when greeted.  Does not reply to simple questions nor follow simple commands.  Fibrinogen normal.  PTT normal.  INR slight increase 1.6.  CMP reviewed, relative acidosis with stable AG, Cr 3.6  Ferritin increased 1488.  LDH  decreased 568.  CBC reviewed, WBC decreased 27, Plt decreased 25.    POC discussed with oncologist Dr. Rajan. Ongoing hematologic workup, then will either require LTAC or Hospice if encephalopathy not improved.    I have discussed this patient's plan of care and discharge plan at IDT rounds today with Case Management, Nursing, Nursing leadership, and other members of the IDT team.    Consultants/Specialty  Hematology  Critical care  Nephrology  Cardiology  Palliative care     Code Status  DNAR/DNI    Disposition  The patient is not medically cleared for discharge to home or a post-acute facility.  Anticipate discharge to: a long-term acute care hospital    I have placed the appropriate orders for post-discharge needs.    Review of Systems  Review of Systems   Unable to perform ROS: Mental acuity      Physical Exam  Temp:  [35.9 °C (96.7 °F)-36.7 °C (98.1 °F)] 35.9 °C (96.7 °F)  Pulse:  [57-91] 84  Resp:  [16-20] 20  BP: ()/(53-73) 95/59  SpO2:  [92 %-96 %] 96 %    Physical Exam  Vitals reviewed.   Constitutional:       General: She is not in acute distress.     Appearance: She is ill-appearing.   HENT:      Head: Normocephalic.      Nose: Nose normal.      Mouth/Throat:      Mouth: Mucous membranes are dry.   Eyes:      General: No scleral icterus.     Extraocular Movements: Extraocular movements intact.      Conjunctiva/sclera: Conjunctivae normal.   Neck:      Comments: Right IJ dialysis cath c/d/I, nonbloody nonerythematous nontender  Cardiovascular:      Rate and Rhythm: Normal rate and regular rhythm.      Pulses: Normal pulses.      Heart sounds: Normal heart sounds. No murmur heard.     No friction rub. No gallop.   Pulmonary:      Effort: Pulmonary effort is normal. No respiratory distress.      Breath sounds: Normal breath sounds. No wheezing, rhonchi or rales.   Abdominal:      General: Bowel sounds are normal. There is no distension.      Palpations: Abdomen is soft.      Tenderness: There is no  abdominal tenderness. There is no guarding or rebound.   Genitourinary:     Comments: No williamson  Musculoskeletal:      Cervical back: Neck supple. No muscular tenderness.      Right lower leg: No edema.      Left lower leg: No edema.   Skin:     General: Skin is warm and dry.      Findings: Bruising (Purpura on bilateral feet) present.      Comments: Petechiae and abrasions on extremities   Neurological:      General: No focal deficit present.      Mental Status: She is alert.      Cranial Nerves: No cranial nerve deficit.      Comments: Moves all extremities. Does not answer questions nor follow commands.   Psychiatric:      Comments: Unable to assess           Fluids    Intake/Output Summary (Last 24 hours) at 5/16/2024 1613  Last data filed at 5/16/2024 1400  Gross per 24 hour   Intake 70 ml   Output --   Net 70 ml       Laboratory  Recent Labs     05/14/24  0637 05/15/24  1518 05/16/24  0418   WBC 25.4* 30.5* 27.0*   RBC 4.45 5.00 4.48   HEMOGLOBIN 13.2 15.4 13.5   HEMATOCRIT 43.5 49.2* 43.3   MCV 97.8 98.4* 96.7   MCH 29.7 30.8 30.1   MCHC 30.3* 31.3* 31.2*   PLATELETCT 45* 27* 25*     Recent Labs     05/14/24  0637 05/15/24  0736 05/16/24  0418   SODIUM 143 145 143   POTASSIUM 3.2* 3.8 4.1   CHLORIDE 96 100 102   CO2 29 27 22   GLUCOSE 174* 184* 149*   BUN 41* 72* 50*   CREATININE 2.89* 4.72* 3.61*   CALCIUM 9.0 9.1 8.9     Recent Labs     05/14/24  0637 05/15/24  0406 05/16/24  0418   APTT 26.3 23.4* 30.6   INR 1.66* 1.40* 1.55*                 Imaging  US-EXTREMITY VENOUS LOWER BILAT         IR-US GUIDED PIV   Final Result    Ultrasound-guided PERIPHERAL IV INSERTION performed by    qualified nursing staff as above.      CT-CHEST (THORAX) W/O   Final Result      1.  Patchy BILATERAL airspace disease, possibly pneumonia. This could be chronic or subclinical   2.  Atherosclerosis with ectasia of the descending thoracic aorta         US-RUQ   Final Result      1.  Limited exam due to patient noncompliance.   2.   No flow demonstrated in the portal vein suggesting thrombosis.   3.  Probable small gallstones in the gallbladder.   4.  No biliary dilation.   5.  Atrophic RIGHT kidney, poorly visualized.      IR-LIVER BX WITH OTHER STUDY   Final Result      1.  Transjugular hepatic venography showing a patent right hepatic vein.      2. 18 transjugular liver biopsies x 2.      3. Exchange and replacement of the right temporary dialysis catheter with a new 12 [place] 15 cm catheter.      DX-CHEST-FOR LINE PLACEMENT Perform procedure in: Other(comment f6 below):   Final Result      1.  Right IJ catheter has been placed and the tip projects appropriately over the superior vena cava.      2.  Cardiomegaly with evidence of mild fluid overload.      CT-ABDOMEN-PELVIS W/O   Final Result      1.  No evidence of bowel obstruction or focal inflammatory change.      2.  Again seen extensive atherosclerotic vascular calcification with distal thoracic/upper abdominal aortic ectasia measuring approximately 3.2 cm in diameter.      3.  Gallstones within the gallbladder.      4.  Hyperdense left renal cyst likely representing hemorrhagic or milk of calcium within a cyst.      5.  Small amount of free fluid dependently within the pelvis.      DX-CHEST-PORTABLE (1 VIEW)   Final Result      Cardiomegaly.      US-ABDOMEN COMPLETE SURVEY   Final Result      1.  Mild hepatomegaly.      2.  Sludge noted in the gallbladder.      3.  Small echogenic foci in the periphery of the renal collecting systems bilaterally suspicious for early nephrolithiasis.      4.  Bilateral renal cysts.         DX-CHEST-LIMITED (1 VIEW)   Final Result         1.  No acute cardiopulmonary disease.   2.  Atherosclerosis      EC-ECHOCARDIOGRAM COMPLETE W/ CONT   Final Result      DX-CHEST-LIMITED (1 VIEW)   Final Result      1.  Enlarged cardiac silhouette with changes of pulmonary edema.      [id number] VIEW   Final Result      1.  Nonobstructive bowel gas pattern with a  large amount of colonic stool.      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  Imaging of the abdomen showed mild hepatomegaly with no splenomegaly.    - Hematology/oncology was consulted for concerns for hemophagocytic lymphohistiocytosis (HLH) with low suspicion/probability for TTP or HIT.   -Continue Decadron.  -s/p bone marrow biopsy 5/6, no morphologic dysplasia  -Monitor for coagulopathy.  Daily fibrinogen, PT/PTT.  Transfused with cryoprecipitate if fibrinogen less than 150.  Status post IR transjugular liver biopsy with portal venography 5/8    Hypokalemia  Assessment & Plan  Resolved  Likely due to inadequate PO intake  Not of clinical significance - defer repletion to HD dialysate    Hyperlipidemia- (present on admission)  Assessment & Plan  - Hold off on statin due to rhabdomyolysis.    Cardiogenic shock (HCC)- (present on admission)  Assessment & Plan  5/13/2024  -In the setting of low ejection fraction of 15%.  Required dobutamine drip.  Shock resolved.  -Management of HFrEF as above.    History of stroke- (present on admission)  Assessment & Plan  -With chronic aphasia.  Appears at baseline.  Monitor.    Rhabdomyolysis- (present on admission)  Assessment & Plan  -Holding off on IV fluids given IVON/dialysis dependent.  -CPK continues to trend down    Thrombocytopenia (HCC)- (present on admission)  Assessment & Plan  Likely consumptive coagulopathy AEB schistocytes and hypofibrinogenemia  Oncology consulted, Low suspicion/probability for TTP or HIT.  -Continue Decadron.  -Continue to monitor platelet counts.  Restrictive transfusion strategy.  Watch for bleeding.     Coagulopathy (HCC)- (present on admission)  Assessment & Plan  - In setting of HLH, thrombocytopenia.    Oncology consulted, Low suspicion/probability  for TTP or HIT.  Fibrinogen low.  -DIC versus MAHA based on schistocytes which may be consumptive coagulopathy  APLAS ordered  -Continue Decadron.  -s/p bone marrow biopsy 5/6, no morphologic dysplasia  -Monitor PT/PTT/INR and daily fibrinogen.  Watch for bleeding.  -Follow fibrinogen, transfuse with cryoprecipitate if </= 100 to reduce spontaneous bleeding    Acute renal failure with tubular necrosis (HCC)- (present on admission)  Assessment & Plan  -Now on hemodialysis.  -Nephrology following.  -Monitor electrolytes closely.  -If continues to require hemodialysis, will need outpatient hemodialysis chair arrangements.    Hepatitis  Assessment & Plan  -Likely inflammatory trigger  -Continue to trend LFTs, continues to trend down.    Severe mitral regurgitation- (present on admission)  Assessment & Plan  -Per echo  No acute intervention warranted  Antihypertensives per separate plan to reduce regurgitant fraction    Heart failure with reduced ejection fraction (HCC)- (present on admission)  Assessment & Plan  - Had a drop in EF to 15% from previous 35%.  Required dobutamine drip.  -Continue volume removal with hemodialysis.  -Optimize GDMT.  Unable to give ACEi/ARB/ARNI/SGLT2i given renal function.    Continue hydralazine/Imdur for afterload reduction.    Continue Toprol-XL 12.5 mg daily, adjust up as blood pressure tolerates.    -Monitor on telemetry.    Hyperkalemia- (present on admission)  Assessment & Plan  Resolved  Due to acute renal failure  Continue hemodialysis per nephro  Repeat AM BMP    Leukocytosis- (present on admission)  Assessment & Plan  Uncertain etiology, likely primary inflammatory process rather than infection  Probable component of steroid-induced demarginalization  -Concerning for HLH  -Bone marrow biopsy 5/6/2024 results in progress. Follow pathology.   -No signs of sepsis or infection.  Holding off on further antibiotics.   -Trend WBC count.    Advanced care planning/counseling discussion-  (present on admission)  Assessment & Plan  Patient is aphasic and lethargic, unable to participated in the conversation.  Spouse Basilio was present for the conversation.  I discussed advance care planning face to face with the patient and family for at least 30 minutes, including diagnosis, prognosis, plan of care, risks and benefits of any therapies that could be offered, as well as alternatives including palliation and hospice, as appropriate.  Forms were completed and placed in the chart.  Patient is now DNR/DNI.       VTE prophylaxis: SCD due to thrombocytopenia    I have performed a physical exam and reviewed and updated ROS and Plan today (5/16/2024). In review of yesterday's note (5/15/2024), there are no changes except as documented above.

## 2024-05-16 NOTE — CARE PLAN
The patient is Watcher - Medium risk of patient condition declining or worsening    Shift Goals  Clinical Goals: Monitor heart rhythm, EKG  Patient Goals: AFIA  Family Goals: not at bedside    Progress made toward(s) clinical / shift goals:      Problem: Pain - Standard  Goal: Alleviation of pain or a reduction in pain to the patient’s comfort goal  Outcome: Progressing     Problem: Skin Integrity  Goal: Skin integrity is maintained or improved  Outcome: Progressing       Patient is not progressing towards the following goals:

## 2024-05-16 NOTE — DISCHARGE PLANNING
Case Management Discharge Planning    Admission Date: 4/27/2024  GMLOS: 4.2  ALOS: 19    6-Clicks ADL Score: 6  6-Clicks Mobility Score: 10  PT and/or OT Eval ordered: Yes  Post-acute Referrals Ordered: Yes  Post-acute Choice Obtained: Yes  Has referral(s) been sent to post-acute provider:  Yes      Anticipated Discharge Dispo: Discharge Disposition: D/T to home under [facility]HA care in anticipation of covered skilled care (06)  Discharge Address: [address] [place] [id number]    DME Needed: No    Action(s) Taken: Patient was discussed in IDT rounds. Patient's encephalopathy is currently too severe for an outpatient dialysis chair. MD is going to place an LTAC referral. Plan is to monitor her mental status. Discharge disposition is pending.    Escalations Completed: None    Medically Clear: No    Next Steps: pending medical clearance     Barriers to Discharge: Medical clearance    Is the patient up for discharge tomorrow: No

## 2024-05-16 NOTE — CARE PLAN
The patient is Watcher - Medium risk of patient condition declining or worsening    Shift Goals  Clinical Goals: Pt will remain free from falls, increase PO intake  Patient Goals: AFIA  Family Goals: not at bedside    Progress made toward(s) clinical / shift goals:        Problem: Fall Risk  Goal: Patient will remain free from falls  Outcome: Progressing  Note: Pt has remained free from falls  Hourly rounding in place  Bed alarm on  Call light in reach  Pt is disoriented, AFIA orientation       Patient is not progressing towards the following goals:      Problem: Nutrition  Goal: Patient's nutritional and fluid intake will be adequate or improve  Outcome: Not Met  Note: Pt is taking in <25% of her meals  1:1 feeding patient  Patient does not take in much PO fluid

## 2024-05-17 PROBLEM — G25.81 RLS (RESTLESS LEGS SYNDROME): Status: ACTIVE | Noted: 2024-05-17

## 2024-05-17 PROBLEM — G93.41 ACUTE METABOLIC ENCEPHALOPATHY: Status: ACTIVE | Noted: 2024-05-17

## 2024-05-17 PROBLEM — F41.9 ANXIETY: Status: ACTIVE | Noted: 2024-05-17

## 2024-05-17 PROBLEM — Z51.5 COMFORT MEASURES ONLY STATUS: Status: ACTIVE | Noted: 2024-05-17

## 2024-05-17 LAB
ALBUMIN SERPL BCP-MCNC: 3.3 G/DL (ref 3.2–4.9)
ALBUMIN/GLOB SERPL: 0.9 G/DL
ALP SERPL-CCNC: 157 U/L (ref 30–99)
ALT SERPL-CCNC: 69 U/L (ref 2–50)
ANION GAP SERPL CALC-SCNC: 25 MMOL/L (ref 7–16)
APTT PPP: 26.8 SEC (ref 24.7–36)
APTT SCREEN TO CONFIRM RATIO: 1.09 {RATIO}
AST SERPL-CCNC: 36 U/L (ref 12–45)
BILIRUB SERPL-MCNC: 2.1 MG/DL (ref 0.1–1.5)
BUN SERPL-MCNC: 95 MG/DL (ref 8–22)
CALCIUM ALBUM COR SERPL-MCNC: 9.4 MG/DL (ref 8.5–10.5)
CALCIUM SERPL-MCNC: 8.8 MG/DL (ref 8.5–10.5)
CHLORIDE SERPL-SCNC: 95 MMOL/L (ref 96–112)
CO2 SERPL-SCNC: 24 MMOL/L (ref 20–33)
CONFIRM DRVVT STA-STACLOT: ABNORMAL S
COPPER SERPL-MCNC: 128.2 UG/DL (ref 80–155)
CREAT SERPL-MCNC: 5.42 MG/DL (ref 0.5–1.4)
DRVVT SCREEN TO CONFIRM RATIO: 0.99 {RATIO}
DRVVT SCREEN TO CONFIRM RATIO: 1.66 {RATIO}
DRVVT/DRVVT CFM P DOAC NEUT NORM PPP-RTO: ABNORMAL {RATIO}
ERYTHROCYTE [DISTWIDTH] IN BLOOD BY AUTOMATED COUNT: 90.1 FL (ref 35.9–50)
FIBRINOGEN PPP-MCNC: 228 MG/DL (ref 215–460)
GFR SERPLBLD CREATININE-BSD FMLA CKD-EPI: 8 ML/MIN/1.73 M 2
GLOBULIN SER CALC-MCNC: 3.8 G/DL (ref 1.9–3.5)
GLUCOSE SERPL-MCNC: 267 MG/DL (ref 65–99)
HCT VFR BLD AUTO: 45.1 % (ref 37–47)
HEPARIN NT PPP QL: ABNORMAL
HGB BLD-MCNC: 15 G/DL (ref 12–16)
INR PPP: 1.68 (ref 0.87–1.13)
LA 3 SCREEN W REFLEX-IMP: ABNORMAL
LDH SERPL L TO P-CCNC: 516 U/L (ref 107–266)
LMW HEPARIN IND PLT AB SER QL: ABNORMAL
MCH RBC QN AUTO: 30.6 PG (ref 27–33)
MCHC RBC AUTO-ENTMCNC: 33.3 G/DL (ref 32.2–35.5)
MCV RBC AUTO: 92 FL (ref 81.4–97.8)
MISCELLANEOUS LAB RESULT MISCLAB: NORMAL
MIXING DRVVT: 1.27 S
PHOSPHATE SERPL-MCNC: 4.1 MG/DL (ref 2.5–4.5)
PLATELET # BLD AUTO: 25 K/UL (ref 164–446)
PLATELETS.RETICULATED NFR BLD AUTO: 22.4 % (ref 0.6–13.1)
POTASSIUM SERPL-SCNC: 4.7 MMOL/L (ref 3.6–5.5)
PROT SERPL-MCNC: 7.1 G/DL (ref 6–8.2)
PROTHROMBIN TIME: 19.1 S (ref 12–15.5)
PROTHROMBIN TIME: 20 SEC (ref 12–14.6)
RBC # BLD AUTO: 4.9 M/UL (ref 4.2–5.4)
SCREEN APTT: ABNORMAL S
SODIUM SERPL-SCNC: 144 MMOL/L (ref 135–145)
TEST NAME 95000: NORMAL
THROMBIN TIME: ABNORMAL S
WBC # BLD AUTO: 19 K/UL (ref 4.8–10.8)

## 2024-05-17 PROCEDURE — 99233 SBSQ HOSP IP/OBS HIGH 50: CPT | Performed by: STUDENT IN AN ORGANIZED HEALTH CARE EDUCATION/TRAINING PROGRAM

## 2024-05-17 RX ORDER — CARBOXYMETHYLCELLULOSE SODIUM 5 MG/ML
1 SOLUTION/ DROPS OPHTHALMIC PRN
Status: DISCONTINUED | OUTPATIENT
Start: 2024-05-17 | End: 2024-05-19 | Stop reason: HOSPADM

## 2024-05-17 RX ORDER — BISACODYL 10 MG
10 SUPPOSITORY, RECTAL RECTAL
Status: DISCONTINUED | OUTPATIENT
Start: 2024-05-17 | End: 2024-05-19 | Stop reason: HOSPADM

## 2024-05-17 RX ORDER — HEPARIN SODIUM 1000 [USP'U]/ML
INJECTION, SOLUTION INTRAVENOUS; SUBCUTANEOUS
Status: COMPLETED
Start: 2024-05-17 | End: 2024-05-17

## 2024-05-17 RX ORDER — SODIUM CHLORIDE 9 MG/ML
500 INJECTION, SOLUTION INTRAVENOUS ONCE
Status: COMPLETED | OUTPATIENT
Start: 2024-05-17 | End: 2024-05-17

## 2024-05-17 RX ORDER — ACETAMINOPHEN 325 MG/1
650 TABLET ORAL EVERY 4 HOURS PRN
Status: DISCONTINUED | OUTPATIENT
Start: 2024-05-17 | End: 2024-05-19 | Stop reason: HOSPADM

## 2024-05-17 RX ORDER — ACETAMINOPHEN 650 MG/1
650 SUPPOSITORY RECTAL EVERY 4 HOURS PRN
Status: DISCONTINUED | OUTPATIENT
Start: 2024-05-17 | End: 2024-05-19 | Stop reason: HOSPADM

## 2024-05-17 RX ORDER — LACTULOSE 20 G/30ML
10 SOLUTION ORAL
Status: DISCONTINUED | OUTPATIENT
Start: 2024-05-17 | End: 2024-05-19 | Stop reason: HOSPADM

## 2024-05-17 RX ORDER — ONDANSETRON 2 MG/ML
8 INJECTION INTRAMUSCULAR; INTRAVENOUS EVERY 8 HOURS PRN
Status: DISCONTINUED | OUTPATIENT
Start: 2024-05-17 | End: 2024-05-19 | Stop reason: HOSPADM

## 2024-05-17 RX ORDER — LORAZEPAM 2 MG/ML
1 CONCENTRATE ORAL
Status: DISCONTINUED | OUTPATIENT
Start: 2024-05-17 | End: 2024-05-19 | Stop reason: HOSPADM

## 2024-05-17 RX ORDER — LORAZEPAM 2 MG/ML
1 INJECTION INTRAMUSCULAR
Status: DISCONTINUED | OUTPATIENT
Start: 2024-05-17 | End: 2024-05-19 | Stop reason: HOSPADM

## 2024-05-17 RX ORDER — ONDANSETRON 8 MG/1
8 TABLET, ORALLY DISINTEGRATING ORAL EVERY 8 HOURS PRN
Status: DISCONTINUED | OUTPATIENT
Start: 2024-05-17 | End: 2024-05-19 | Stop reason: HOSPADM

## 2024-05-17 RX ADMIN — LORAZEPAM 1 MG: 2 INJECTION INTRAMUSCULAR; INTRAVENOUS at 10:39

## 2024-05-17 RX ADMIN — MORPHINE SULFATE 5 MG: 10 INJECTION INTRAVENOUS at 10:39

## 2024-05-17 RX ADMIN — HEPARIN SODIUM: 1000 INJECTION, SOLUTION INTRAVENOUS; SUBCUTANEOUS at 09:15

## 2024-05-17 RX ADMIN — VENLAFAXINE HYDROCHLORIDE 37.5 MG: 37.5 CAPSULE, EXTENDED RELEASE ORAL at 05:29

## 2024-05-17 RX ADMIN — DEXAMETHASONE 6 MG: 4 TABLET ORAL at 05:29

## 2024-05-17 RX ADMIN — SODIUM CHLORIDE 500 ML: 9 INJECTION, SOLUTION INTRAVENOUS at 10:34

## 2024-05-17 ASSESSMENT — PAIN DESCRIPTION - PAIN TYPE
TYPE: ACUTE PAIN
TYPE: ACUTE PAIN

## 2024-05-17 NOTE — PROGRESS NOTES
[facility] Services Progress Note      HD treatment of 3hrs completed as ordered per Dr Carrasco. Treatment performed at [facility] treatment room started at 0822, ended at 0915.     Net UF Removed: +1,280mL       Treatment terminated early due to hypotension. SBPs dropped to 70s-80s during treatment Pt appeared restless, agitated during treatment. Had difficulty assessing mentation. Patient responding to her name only and unable to answer any other questions. Patient slow to respond, only responding to sternal rub. Given total bolus of 1000mL per MD order due to hypotension. BP slightly improved post treatment 104/56. Report given to primary nurse. Patient transported back to her room.       Post HD, CVC locked with ACD 1.3ml in both arterial and venous ports. CVC dressing clean, dry and intact. No s/sx of infection. CVC use by Dialysis RN only.

## 2024-05-17 NOTE — PROGRESS NOTES
Oncology/Hematology Progress Note               Author: Rodrigo Rajan M.D.    Cc: Anemia and thrombocytopenia Date & Time created: 5/17/2024  10:30 AM     Interval History:  She is very somnolent today.  Very slow respirations.  Her blood pressure is low.  She has minimal movements.        PMHx, PSurgHx, SocHX, FAMHx: All reviewed and no changes    Review of Systems:  Review of Systems   Unable to perform ROS: Mental acuity       Physical Exam:  Physical Exam  Vitals reviewed.   Constitutional:       General: She is not in acute distress.     Appearance: She is ill-appearing. She is not toxic-appearing.      Comments: ECOG equals 4.  Withdrawn.   HENT:      Head: Normocephalic and atraumatic.      Mouth/Throat:      Mouth: Mucous membranes are dry.      Pharynx: Oropharynx is clear. No oropharyngeal exudate.   Cardiovascular:      Rate and Rhythm: Normal rate and regular rhythm.      Heart sounds: No murmur heard.     No gallop.   Pulmonary:      Effort: Pulmonary effort is normal. No respiratory distress.      Breath sounds: Normal breath sounds. No wheezing or rales.   Abdominal:      General: Bowel sounds are normal. There is no distension.      Tenderness: There is no abdominal tenderness. There is no guarding.   Musculoskeletal:         General: No tenderness.      Cervical back: Normal range of motion and neck supple. No tenderness.      Right lower leg: No edema.      Left lower leg: No edema.      Comments: Somewhat purple/mottled appearance to the skin on the feet and toes.   Lymphadenopathy:      Cervical: No cervical adenopathy.   Skin:     General: Skin is warm and dry.      Findings: Bruising present.      Comments: Multiple petechiae and bruises.   Neurological:      Mental Status: She is alert.      Comments: Does not follow commands today.  Very restless.  Withdrawn.  Not alert.   Psychiatric:      Comments: Does not follow commands today.  Very restless.  Withdrawn.  Not alert.         Labs:           Recent Labs     05/15/24  0736 05/16/24  0418 05/17/24  0617   SODIUM 145 143 144   POTASSIUM 3.8 4.1 4.7   CHLORIDE 100 102 95*   CO2    BUN 72* 50* 95*   CREATININE 4.72* 3.61* 5.42*   PHOSPHORUS  --   --  4.1   CALCIUM 9.1 8.9 8.8     Recent Labs     05/15/24  0736 05/16/24  0418 05/17/24  0617   ALTSGPT 98* 92* 69*   ASTSGOT 68* 59* 36   ALKPHOSPHAT 155* 160* 157*   TBILIRUBIN 2.4* 2.5* 2.1*   GLUCOSE 184* 149* 267*     Recent Labs     05/15/24  0406 05/15/24  0736 05/15/24  1518 05/16/24  0418 05/17/24  0617 05/17/24  0724   RBC  --   --  5.00 4.48 4.90  --    HEMOGLOBIN  --   --  15.4 13.5 15.0  --    HEMATOCRIT  --   --  49.2* 43.3 45.1  --    PLATELETCT  --   --  27* 25* 25*  --    PROTHROMBTM 17.3*  --   --  18.8*  --  20.0*   APTT 23.4*  --   --  30.6  --  26.8   INR 1.40*  --   --  1.55*  --  1.68*   FERRITIN  --  1274.0*  --  1488.0*  --   --      Recent Labs     05/15/24  0736 05/15/24  1518 05/16/24  0418 05/17/24  0617   WBC  --  30.5* 27.0* 19.0*   ASTSGOT 68*  --  59* 36   ALTSGPT 98*  --  92* 69*   ALKPHOSPHAT 155*  --  160* 157*   TBILIRUBIN 2.4*  --  2.5* 2.1*     Recent Labs     05/15/24  0736 05/16/24  0418 05/17/24  0617   SODIUM 145 143 144   POTASSIUM 3.8 4.1 4.7   CHLORIDE 100 102 95*   CO2    GLUCOSE 184* 149* 267*   BUN 72* 50* 95*   CREATININE 4.72* 3.61* 5.42*   CALCIUM 9.1 8.9 8.8     Hemodynamics:  Temp (24hrs), Av.4 °C (97.5 °F), Min:35.9 °C (96.7 °F), Max:36.8 °C (98.2 °F)  Temperature: 36.8 °C (98.2 °F)  Pulse  Av.2  Min: 50  Max: 117   Blood Pressure: (!) 69/46     Respiratory:    Respiration: 20, Pulse Oximetry: 89 %     Work Of Breathing / Effort: Within Normal Limits  RUL Breath Sounds: Diminished, RML Breath Sounds: Diminished, RLL Breath Sounds: Diminished, ZAINAB Breath Sounds: Diminished, LLL Breath Sounds: Diminished  Fluids:    Intake/Output Summary (Last 24 hours) at 2024 1124  Last data filed at 2024 0555  Gross per 24 hour   Intake  712 ml   Output 1550 ml   Net -838 ml        GI/Nutrition:  Orders Placed This Encounter   Procedures    Diet Order Diet: Level 5 - Minced and Moist; Liquid level: Level 0 - Thin; Second Modifier: (optional): Renal; Nutrient modifications: (optional): Low Phos, Low Potassium     Standing Status:   Standing     Number of Occurrences:   1     Order Specific Question:   Diet:     Answer:   Level 5 - Minced and Moist [24]     Order Specific Question:   Liquid level     Answer:   Level 0 - Thin     Order Specific Question:   Second Modifier: (optional)     Answer:   Renal [8]     Order Specific Question:   Nutrient modifications: (optional)     Answer:   Low Phos [9]     Order Specific Question:   Nutrient modifications: (optional)     Answer:   Low Potassium [11]     Medical Decision Making, by Problem:  Active Hospital Problems    Diagnosis     *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  Constellation of clinical findings concern for HLH versus sepsis with DIC and MOD:  Presented to the hospital on 4/27/2024 with 10 days of flulike symptoms, abdominal pain.  Diagnosed with community-acquired pneumonia and treated initially with azithromycin and ceftriaxone.  After a day or 2 changed to ceftriaxone and Flagyl.  Found to have rhinovirus/enterovirus infections.  On admission platelet count was normal, LFTs were normal, kidney function were normal.        Starting on day 3/4 (4/30 and 5/1) had significant decline in platelet  counts, marked rise in LFTs, marked rise in creatinine.  This all suggests an acute inciting factor.  Rapidly deteriorated with rising LFTs peaking at 3130-7087, as well as ARF requiring dialysis.  She has baseline CHF with an EF of 35% which decreased to 15%, with new severe mitral regurgitation.  She was admitted to the ICU with dobutamine drip.  She has had ongoing anemia with hemoglobin as low as 8.3, platelets as low as 57, with leukocytosis.  Ferritin initially 14,244.  Also found to have rhabdomyolysis with CPK > 5000.  LDH rapidly increased.  Jenny test was positive for IgG, but no significant markers for hemolysis (direct bilirubin greater than indirect bilirubin).  Quantitative immunoglobulins normal.  Rheumatoid factor mildly high at 18.         Triglycerides were normal.  CT scan chest, abdomen, pelvis showed no hepatosplenomegaly, lymphadenopathy, or malignancy.  Fibrinogen has been less than 100 requiring cryoprecipitate.  Soluble IL-2 receptor markedly elevated at 4489.7 (from 5/8/2024).  Bone marrow biopsy done on 5/6/2024 is normocellular, no malignancy, 46 XX, MDS FISH panel negative, with extremely rare histiocytes with suggestion of hemophagocytosis (which does not significantly support HLH given the rarity).  Heme pathologist more struck by marked increase in schistocytes on peripheral blood.  Liver biopsy done on 5/8/2024.  CXCL9 lab test drawn but still pending.        In consideration of HLH, the H score calculated at 123 (5-9% chance of HLH).   Other HLH prognostic indicators nondiagnostic.  HLH-94 shows at least 1 marker of immune over reactivation (elevated ferritin, low fibrinogen), but only  2 of 4 of the criteria (requires 3).  All of this suggests low probability for HLH.     Primary HLH (underlying germline mutation predisposing to HLH) or a secondary HLH (inflammatory dysregulation leading to cytokine storm, caused by a separate etiology.  Separate inciting etiologies can include  disorders of increased immune activation (infection such as EBV, viral, or chronic granulomatous disease), or could include disorders that suppress the immune system (HIV, rheumatologic disorders, malignancies, other inherited syndromes).   With either primary or secondary HLH, an infection can be a triggering factor.  EBV testing done, with a positive NAAT test (not quantified).  ARLEEN as well as ANCA testing negative.     Patient was started on dexamethasone 6 mg every 8 hours on 5/2/2024, and markers have been improving including a decreasing ferritin.           Here are the elements in the differential diagnosis, with arguments for and against:  1.  HLH--arguments for include markedly elevated ferritin, elevated soluble IL-2 receptor.  Arguments against this include no fevers, no hepatosplenomegaly, no underlying malignancy, significant coagulopathy (which is not usually seen in HLH), only mildly decreased platelet count (usually much more severe with HLH), bone marrow biopsy was nonsupportive, schistocytes are more common on blood smear, H score was low risk, improving just on dexamethasone, pattern of rhabdomyolysis not supportive of HLH, and normal triglycerides.  2.  Sepsis with DIC--arguments for this include peripheral smear with predominant schistocytes morphology, rapid progression of multiorgan failure, creatinine and LFTs on admission, pattern of rhabdomyolysis would support sepsis/DIC, significant coagulopathy fits with DIC, mildly low platelets fits with DIC.  Arguments against this would include elevated ferritin not typically seen with sepsis or DIC (although could be seen with significant liver injury), possibly soluble IL-2 receptor could be elevated with significant immune activation (although not a typical characteristic of sepsis and DIC)      -- Consider antiphospholipid antibody syndrome/CAPS as etiology for DIC (had a abdominal ultrasound that seem to demonstrate portal vein thrombosis).   Anticardiolipin antibodies and beta-2 GPI antibodies are negative.  -- On 5/14 we ordered lupus anticoagulant and this is still pending        -- pathology results from liver biopsy from 5/8 show no evidence of lymphocytic infiltration or inflammation (less likely HLH), with extensive zone 3 necrosis and cholestasis.  Pathologist feels this is more likely a drug/toxin liver injury versus ischemia/perfusion injury.    -- Continue dexamethasone for now as improving (dosing is just over 10 mg/m²)  -- Continue to hold off on chemotherapy for HLH given the low probability  --Continue daily ferritin and LDH to monitor for ongoing improvement    --Ferritin, LDH, LFTs, and soluble IL-2 receptor alpha continue to improve    --Consider MRI of the brain with sedation/anesthesia (to rule out CNS changes associated with HLH).  --Have ordered [facility] HLH gene panel with NGS testing on the whole blood (not a fast turnaround time, used to determine appropriateness of future treatment such as stem cell transplant).          -- Recommend asking infectious disease to interpret the EBV panel--I am not sure whether this is all just indicative of a past EBV infection, or whether there may be a current EBV infection.  If ongoing EBV, this can be treated with rituximab or IVIG.  If this is the underlying inciting factor, this may improve HLH    --My overall assessment is that this is low likelihood for HLH.  I think higher likelihood for multiple organ failure, related to DIC (possibly related to sepsis, viral syndrome, CAPS).  -- Very poor prognosis.  Today seems actively in the process of dying.  Medicine team to discuss with  withdrawal of care.          2.  Thrombocytopenia--this is not HIT syndrome.  HIT antibodies were negative (optical density was only 0.049; timing of low platelet count was 3 to 4 days into admission--nothing fits with HIT).    This is not TTP.  There is a low plasmic score of 1.  There is no evidence of  MAHA, with no significant hemolysis.  The indirect bilirubin is very low.  Markedly elevated LDH likely related to multiple organ failure/organ damage.    Possibly related to sepsis/DIC as described above versus HLH as described above.               3. Acute renal failure--  Kidney function normal on admission.  Bump in creatinine to 1.6 on day 4 (4/30), with a marked bump in creatinine on day 5 (5/1) suggesting acute insult.  Nephrology following.  On hemodialysis.  Remains oliguric.  Etiology may have been hypotensive/prerenal related.  She also has rhabdomyolysis.  --Nephrology following  --Could not tolerate dialysis yesterday  --Hypotensive today, likely not tolerate dialysis today     4.  Coagulopathy--  Secondary to liver dysfunction vs DIC.  HLA typically does not cause significant coagulopathy, although could see a low fibrinogen level.  Transfuse cryoprecipitate to maintain fibrinogen greater than 100.  --Fibrinogen has resolved to normal over the last 3 to 4 days.     5. Elevated LFTs--  Completely normal LFTs on admission.  LFTs started to climb on day 2 (4/29).  Marked elevation to greater than 1000.  Suggest underlying perfusion etiology, versus viral etiology versus drug toxicity.             Improving following dialysis, improvement of blood pressure, as well as dexamethasone therapy. The level of improvement is reassuring that this may not be HLH as I would not expect them to improve so greatly with steroids alone.   -- Monitor daily CMP     6. Heart failure with reduced ejection fraction  Drop in EF to 15% from previously 35% (documented in August 2022, so a component of this is chronic).  Recent echo showed new severe mitral regurgitation.  Required a dobutamine drip this admission for cardiovascular support.       7. Rhabdomyolysis--  CPK greater than 5000 on admission.  This is not a part of HLH.  This is improving.  Nephrology on board.  Underlying etiology is unclear, potentially secondary to  DIC, sepsis, CAPS--as described above.      8.  History of stroke--  Chronic aphasia.  Neurologic status at baseline per .  Very poor baseline status.  Not able to perform any ADLs on her own.  Previous to this hospitalization she was independent of ADLs (was in a wheelchair, but she could feed and dress herself)      9.  Possible portal vein thrombosis--  This was seen on the right upper quadrant ultrasound done during this admission.  Not seen on CT scans, but CT scans were done without contrast.  Not a good candidate for anticoagulation given the thrombocytopenia.  --Differential diagnosis includes CAPS, PNH  --Ordered PNH flow cytometry test (low suspicion for PNH as overall picture not totally consistent)                Please note that this dictation was created using voice recognition software. I have made every reasonable attempt to correct obvious errors, but I expect that there are errors of grammar and possibly content that I did not discover before finalizing the note.          Quality-Core Measures   Reviewed items::  Labs reviewed and Medications reviewed  Guerrero catheter::  Critically Ill - Requiring Accurate Measurement of Urinary Output  DVT prophylaxis pharmacological::  Contraindicated - High bleeding risk

## 2024-05-17 NOTE — CARE PLAN
The patient is Watcher - Medium risk of patient condition declining or worsening    Shift Goals  Clinical Goals: safety, monitor VS  Patient Goals: AFIA  Family Goals: not at bedside    Progress made toward(s) clinical / shift goals:    Problem: Fall Risk  Goal: Patient will remain free from falls  Outcome: Progressing  Note: Bed is locked and in lowest position, call light is within reach, bed alarm in place.        Patient is not progressing towards the following goals:

## 2024-05-17 NOTE — CARE PLAN
The patient is Unstable - High likelihood or risk of patient condition declining or worsening    Shift Goals  Clinical Goals: safety, comfort  Patient Goals: AFIA  Family Goals: not at bedside    Progress made toward(s) clinical / shift goals:       Problem: Pain - Standard  Goal: Alleviation of pain or a reduction in pain to the patient’s comfort goal  Outcome: Progressing  Note: Medicated with PRN comfort medications. Family at bedside. Resting comfortably. Unlabored breathing.     Problem: Knowledge Deficit - Standard  Goal: Patient and family/care givers will demonstrate understanding of plan of care, disease process/condition, diagnostic tests and medications  Outcome: Progressing  Note: Patient family decided for patient to go comfort care. Family at bedside. Patient resting comfortably, unlabored breathing. All questions answered at this time.        Patient is not progressing towards the following goals:

## 2024-05-17 NOTE — PROGRESS NOTES
[facility] -  PROGRESS NOTE               Author: Destiny Carrasco M.D. Date & Time: 5/17/2024  8:05 AM     CC: abd pain, n/v    HISTORY OF PRESENT ILLNESS:    63 y.o. female with history of systolic and diastolic heart failure with EF of 15% (HFrEF) grade 2 diastolic dysfunction, severe MVR, CAD with h/o STEMI s/p DEWEY to LAD, PAD with aortofemoral bypass surgery who presented 4/27/2024 with increasing shortness of breath admitted for community acquired PNA and severe constipation with hosp course complicated by exacerbation of her HFrEF, medications augmented which was followed by IVON, worsening hepatopathy concerning for cardiorenal etiology.   She was then transferred to ICU 5/01 and initiated on dobutamine drip for organ perfusion.   Patient continues to be oliguric with UOP of 86 yesterday and 10 ml today so far.   Nephrology has been consulted for concerns of IVON , cardiorenal syndrome.     DAILY NEPHROLOGY SUMMARY:  05/02: Examined at bedside. Looks like she wants to talk but it is difficult for her to find appropriate words.   Dobutamine ggt held per critical care today.   Labs yesterday with fibrinogen 91, cryoppt administered followed by dialysis   1 L ultrafiltration   Uptrending WBCs since 4/30, 18.4 today  Scr improved to 3.03 after dialysis   Down trending AST/ALT   CT scan without bowel obs, extensive calcification of distal thoracic/upper abd aorta. Left renal cyst.   5/4: Pt transferred out of ICU, now with increased CPK and climbing  5/5: Pt still anuric, HD yesterday  5/6: minimal UOP, SBP 90s-140s, platelets stable at 61,000, CPK trended down to 2314, BM biopsy planned for today, patient denies CP/SOB  5/7: minimal UOP, tolerated HD, SBP 110s-120s, fibrinogen 88 and patient provided with 1u cryo, BM biopsy done, platelets 57,000, no new c/o  5/8: CO2 12 and K 6.2 this AM, WBC trended up (on steroids), HD planned for today, platelets 53,000, does not answer questions  today, appears agitated, does not have gown on  5/9: tolerated HD, transferred to oncology floor, reports she is hungry this AM, SBP 130s-150s, plts 53,000  5/10: HD planned for today, SBP 120s-130s, family at bedside, patient curled in bed, c/o pain but can't identify where, does not really answer other questions  5/11: HD yesterday net UF 1L. VSS HR 50-60.  Very restless, does not answer questions.  Labs not run this AM, added and pending. UP 25 ml, has diaper in place.   5/12: VSS UOP 50ml.  Got cryo yesterday for low fibrinogen, had to use pigtail of HD cath. Today's labs pending.  Mentation unchanged.    Addendum:  K 6.3  5/13: No events, underwent urgent HD yest for hyperkalemia and UF 500cc, K low this am at 3.1, Cr improved s/p HD, platelets lower at 53K and WBC still 21.5, BP stable, stable on RA, 100cc UOP overnight, does not answer questions  5/14: No events, BP stable, stable on RA, remains oliguric, tolerated HD yest with 1L UF, WBC higher 25K, platelets lower 45K, K still low at 3.2, shakes head no when asked if she's having any pain but does not respond to further questions  5/15: No events, BP borderline low this am but stable overnight, received BP meds this am, UOP not documented, no change to mental status, does not answer questions or follow commands  5/16: lying in bed, confused and non-verbal, continues with soft BPs, minimal PO intake per RN, no edema, for iHD tomorrow   5/17: No events overnight but pt acute decompensated this am, pt was on HD this am and BP very low in the 70's systolic, she was given 1L NS bolus and BP improved to 100 systolic and treatment terminated, pt now back on floor and SBP still low in the 70's despite another fluid bolus, pt on NRB mask for O2 and poorly responsive, WBC improved 19K, platelets still low at 25, BUN elevated at 95, UOP not recorded,  on his way to discuss goals of care    REVIEW OF SYSTEMS:    Unable to obtain due to altered mental status and  "medical acuity    PAST MEDICAL/SURGICAL/FAMILY/SOCIAL HISTORY:  Reviewed and documented in chart     HOME MEDICATIONS:   - Reviewed and documented in chart    LABORATORY STUDIES:   - Reviewed and documented in chart    ALLERGIES:  Pcn [penicillins] and Toradol    VS:  /57   Pulse 82   Temp 36.4 °C (97.6 °F) (Temporal)   Resp 16   Ht 1.651 m (5' 5\")   Wt 50.6 kg (111 lb 8.8 oz)   SpO2 97%   BMI 18.56 kg/m²   Physical Exam  Vitals and nursing note reviewed.   Constitutional:       Appearance: She is ill-appearing.   HENT:      Head: Normocephalic and atraumatic.      Mouth/Throat:      Mouth: Mucous membranes are dry.   Eyes:      General: No scleral icterus.  Neck:      Comments: Right IJ trialysis catheter   Cardiovascular:      Rate and Rhythm: Normal rate and regular rhythm.   Pulmonary:      Effort: Pulmonary effort is normal. No respiratory distress.      Breath sounds: Examination of the right-lower field reveals decreased breath sounds. Examination of the left-lower field reveals decreased breath sounds. Decreased breath sounds present.   Abdominal:      General: Bowel sounds are normal. There is no distension.      Palpations: Abdomen is soft.   Musculoskeletal:         General: No deformity.      Right lower leg: No edema.      Left lower leg: No edema.   Skin:     General: Skin is cool.      Findings: Bruising and rash present.      Comments: Excoriations on extremities, +petechiae on legs   Neurological:      Mental Status: She is lethargic.      Comments: Unresponsive     Psychiatric:         Attention and Perception: She is inattentive.      Comments: Unable to obtain, she is unresponsive         FLUID BALANCE:  In: 170 [P.O.:170]  Out: -     LABS:  Recent Labs     05/15/24  0736 05/16/24  0418 05/17/24  0617   SODIUM 145 143 144   POTASSIUM 3.8 4.1 4.7   CHLORIDE 100 102 95*   CO2 27 22 24   GLUCOSE 184* 149* 267*   BUN 72* 50* 95*   CREATININE 4.72* 3.61* 5.42*   CALCIUM 9.1 8.9 8.8    "     IMAGING:  - Imaging studies reviewed by me      IMPRESSION:     # Acute kidney injury multifactorial--Decreased perfusion, HLH, rhabdo  - unlikley TTP  - oliguric  - HD 5/2 1st  - C3 and C4 low, ARLEEN negative, ANCA negative  - hepatitis negative  - PCR and ACR pending (minimal UOP)     # ? 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 ongoing  - steroids  -Bone Marrow 5/6    # Hepatic congestion      #Rhabdo unclear etiology, difficult to give fluids in face of reduced EF     # Heart failure with reduced ejection fraction      # Coagulopathy   - Fibinogen monitored, cryo when <100     # Transamnitis  - Improving       # Severe mitral regurgitation      # Peripheral vascular disease with prior aortic thrombus s/p aortofemoral bypass     # Constipation      # Impacted stool in intestine   - Resolved      # Colitis      # Leukocytosis--improving     # Prior CVA    - Chronic expressive aphasia     # Essential hypertension    - goal <140/90, intermittently at goal    # Hyperkalemia--resolved with HD and now with hypokalemia  - K WNL now    # Acidosis, improved    # CKD MBD  - Ca WNL and Phos now controlled 4.  -         PLAN:  - Did not tolerate HD today  - BP did respond to IVF's prior to HD termination  - Pt too unstable for further dialysis at this point  - Hydralazine stopped due to borderline low BP  - Toprol XL held  - steroids per primary service/hematology  - Bone marrow 5/6, f/u findings  - Consider renal biopsy once improved platelets/coagulation status, hold for now  - Avoid nephrotoxins, NSAIDs  - Renally dose meds   - daily labs  - recommend dietary supplements be adjusted for renal failure  - Calcium acetate 1334mg TID with meals, changed to renal diet 5/12  - Pt acutely decompensated this am, BP persistently low even after IVF boluses and now respiratory decompensation. Pt is too unstable for further dialysis at this point. Comfort care/hospice measures would be appropriate.  - Recommend  clarifying goals of care with     **Discussed above with RN and Hospitalist

## 2024-05-17 NOTE — DIETARY
Nutrition Update:    Day 20 of admit.  Fouzia Santamaria is a 63 y.o. 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  Patient being followed to optimize nutrition.    Current Diet: MM5, TN0, renal, low phosphorous, low potassium, Ensure Plus TID    PO intake continues to be <50%. Pt with ongoing encephalopathy. Pt with >1 week of inadequate oral energy intake.    Problem: Nutritional:  Goal: Achieve adequate nutritional intake  Description: Patient will consume >50% of meals  Outcome: not met    Recommend:  Consider appetite stimulant and/or artificial nutrition support if within goals of care  Continue to assist with meals and encourage PO intake.  Obtain current weight. Stand up scale if possible.      RD following

## 2024-05-18 PROCEDURE — 99232 SBSQ HOSP IP/OBS MODERATE 35: CPT | Performed by: STUDENT IN AN ORGANIZED HEALTH CARE EDUCATION/TRAINING PROGRAM

## 2024-05-18 RX ORDER — ONDANSETRON 4 MG/1
4 TABLET, ORALLY DISINTEGRATING ORAL EVERY 4 HOURS PRN
Qty: 30 TABLET | Refills: 0
Start: 2024-05-18

## 2024-05-18 RX ORDER — MORPHINE SULFATE 100 MG/5ML
20 SOLUTION ORAL
Status: DISCONTINUED | OUTPATIENT
Start: 2024-05-18 | End: 2024-05-19 | Stop reason: HOSPADM

## 2024-05-18 RX ORDER — ACETAMINOPHEN 650 MG/1
650 SUPPOSITORY RECTAL EVERY 4 HOURS PRN
Qty: 10 SUPPOSITORY | Refills: 0
Start: 2024-05-18

## 2024-05-18 RX ORDER — ONDANSETRON 4 MG/1
4 TABLET, ORALLY DISINTEGRATING ORAL EVERY 4 HOURS PRN
Qty: 10 TABLET | Refills: 0
Start: 2024-05-18

## 2024-05-18 RX ORDER — LACTULOSE 20 G/30ML
10 SOLUTION ORAL
Qty: 450 ML | Refills: 0
Start: 2024-05-18

## 2024-05-18 RX ORDER — CARBOXYMETHYLCELLULOSE SODIUM 5 MG/ML
1 SOLUTION/ DROPS OPHTHALMIC PRN
Qty: 10 ML | Refills: 0
Start: 2024-05-18

## 2024-05-18 RX ORDER — ACETAMINOPHEN 325 MG/1
650 TABLET ORAL EVERY 4 HOURS PRN
Qty: 30 TABLET | Refills: 0
Start: 2024-05-18

## 2024-05-18 RX ORDER — MORPHINE SULFATE 100 MG/5ML
10-20 SOLUTION ORAL
Qty: 30 ML | Refills: 0
Start: 2024-05-18 | End: 2024-05-25

## 2024-05-18 RX ORDER — BISACODYL 10 MG
10 SUPPOSITORY, RECTAL RECTAL
Qty: 10 SUPPOSITORY | Refills: 0
Start: 2024-05-18

## 2024-05-18 RX ORDER — LORAZEPAM 2 MG/ML
1 CONCENTRATE ORAL
Qty: 30 ML | Refills: 0
Start: 2024-05-18 | End: 2024-05-25

## 2024-05-18 RX ORDER — MORPHINE SULFATE 100 MG/5ML
10 SOLUTION ORAL
Status: DISCONTINUED | OUTPATIENT
Start: 2024-05-18 | End: 2024-05-19 | Stop reason: HOSPADM

## 2024-05-18 RX ADMIN — LORAZEPAM 1 MG: 2 INJECTION INTRAMUSCULAR; INTRAVENOUS at 05:03

## 2024-05-18 RX ADMIN — LORAZEPAM 1 MG: 2 INJECTION INTRAMUSCULAR; INTRAVENOUS at 20:34

## 2024-05-18 RX ADMIN — MORPHINE SULFATE 10 MG: 10 INJECTION INTRAVENOUS at 05:03

## 2024-05-18 ASSESSMENT — PAIN DESCRIPTION - PAIN TYPE
TYPE: ACUTE PAIN

## 2024-05-18 NOTE — CARE PLAN
The patient is Unstable - High likelihood or risk of patient condition declining or worsening    Shift Goals  Clinical Goals: Comfort measures  Patient Goals: AFIA  Family Goals: not present    Progress made toward(s) clinical / shift goals:       Problem: Pain - Standard  Goal: Alleviation of pain or a reduction in pain to the patient’s comfort goal  Outcome: Progressing  Note: Medicated with PRN comfort medications. Patient does not appear in pain.      Problem: Skin Integrity  Goal: Skin integrity is maintained or improved  Outcome: Progressing  Note: Patient has pillows in place for support and positioning. Guerrero in place. Skin remains intact.       Patient is not progressing towards the following goals:

## 2024-05-18 NOTE — PROGRESS NOTES
Hospital Medicine Daily Progress Note    Date of Service  5/17/2024    Chief Complaint  abdominal pain    Hospital Course  Fouzia Santamaria is a 63 y.o. female with HFrEF (EF 35%), severe PAD status post aortic femoral bypass coronary disease status post LAD stent, COPD, diabetes, hypertension, and previous strokes, who was initially seen in the ED with abdominal pain for 10 days and subsequently discharged to home, who again presented with 2 days of bodyaches as well as worsening constipation, nausea and vomiting.  Workup showed leukocytosis with white count of 15,100 with left shift.  Chest x-ray showed mild pulmonary edema.  Abdominal x-ray showed significant amount of stool burden.  Echocardiogram was obtained which revealed that ejection fraction now down to 15% with severe mitral regurgitation.  She also had acute kidney injury and likely cardiorenal syndrome.  Cardiology and nephrology were consulted.  She was placed on a dobutamine drip.  Her renal function worsened and she required a right-sided dialysis catheter with initiation of dialysis.  Her liver enzymes went up to AST of 2959 and ALT of 1377.  She also had significant drop in both hemoglobin and platelet count, with worsening leukocytosis.  LDH was 1418, fibrinogen 91, ferritin 7333 with elevated PT, PTT and INR.  Imaging of the abdomen showed mild hepatomegaly with no splenomegaly.  Hematology/oncology was consulted for concerns for hemophagocytic lymphohistiocytosis (HLH) with low suspicion/probability for TTP or HIT. She was started on Decadron.  She had low fibrinogen for which she was given cryoprecipitate.  Status post bone marrow biopsy on 5/6 and liver biopsy on 5/8.    Interval Problem Update    Unable to tolerate HD this morning due to hypotension which improved after 500 cc bolus x2 and discontinuation.  After return to the floor she became bradypneic, hypotensive, and hypoxic to 10 L/min.  Called and notified her  to come to  bedside due to decline in her condition.    POC discussed with nephrologist Dr. Carrasco. Advised that she is no longer an HD candidate due to instability and poor prognosis.    POC discussed with oncologist Dr. Rajan. In agreement with poor prognosis and transition to hospice.    At bedside advised her  Basilio that she is dying. With no prospects for further HD and clinical decompensation despite best efforts, the only meaningful care for her is transition to comfort care and hospice. He was in agreement and appreciative of the efforts of the hospital staff.    I have discussed this patient's plan of care and discharge plan at IDT rounds today with Case Management, Nursing, Nursing leadership, and other members of the IDT team.    Consultants/Specialty  Hematology  Critical care  Nephrology  Cardiology  Palliative care     Code Status  Comfort Care/DNR    Disposition  The patient is medically cleared for discharge to home or a post-acute facility.  Anticipate discharge to: hospice    I have placed the appropriate orders for post-discharge needs.    Review of Systems  Review of Systems   Unable to perform ROS: Mental acuity      Physical Exam  Temp:  [36.2 °C (97.1 °F)-36.8 °C (98.2 °F)] 36.7 °C (98 °F)  Pulse:  [] 77  Resp:  [4-20] 6  BP: ()/(46-73) 73/48  SpO2:  [69 %-97 %] 80 %    Physical Exam  Vitals reviewed.   Constitutional:       General: She is not in acute distress.     Appearance: She is ill-appearing.      Interventions: Face mask in place.   HENT:      Head: Normocephalic.      Nose: Nose normal.      Mouth/Throat:      Mouth: Mucous membranes are dry.   Eyes:      General: No scleral icterus.     Conjunctiva/sclera: Conjunctivae normal.   Neck:      Comments: Right IJ dialysis cath c/d/I, nonbloody nonerythematous nontender  Cardiovascular:      Rate and Rhythm: Normal rate and regular rhythm.      Pulses: Normal pulses.      Heart sounds: Normal heart sounds. No murmur heard.      No friction rub. No gallop.   Pulmonary:      Effort: Pulmonary effort is normal. Bradypnea present. No respiratory distress.      Breath sounds: Normal breath sounds. No wheezing, rhonchi or rales.   Abdominal:      General: Bowel sounds are normal. There is no distension.      Palpations: Abdomen is soft.      Tenderness: There is no abdominal tenderness. There is no guarding or rebound.   Genitourinary:     Comments: +Guerrero, urine yellow / clear  Musculoskeletal:      Cervical back: Neck supple. No muscular tenderness.      Right lower leg: No edema.      Left lower leg: No edema.   Skin:     General: Skin is warm and dry.      Findings: Bruising (Purpura on bilateral feet) present.      Comments: Petechiae and abrasions on extremities   Neurological:      Mental Status: She is alert.      Cranial Nerves: No cranial nerve deficit.      Comments: Moves all extremities spontaneously. Opens eyes spontaneously. Does not respond to voice nor touch.   Psychiatric:      Comments: Unable to assess           Fluids    Intake/Output Summary (Last 24 hours) at 5/17/2024 1804  Last data filed at 5/17/2024 0915  Gross per 24 hour   Intake 1310 ml   Output 28 ml   Net 1282 ml       Laboratory  Recent Labs     05/15/24  1518 05/16/24  0418 05/17/24  0617   WBC 30.5* 27.0* 19.0*   RBC 5.00 4.48 4.90   HEMOGLOBIN 15.4 13.5 15.0   HEMATOCRIT 49.2* 43.3 45.1   MCV 98.4* 96.7 92.0   MCH 30.8 30.1 30.6   MCHC 31.3* 31.2* 33.3   RDW  --   --  90.1*   PLATELETCT 27* 25* 25*     Recent Labs     05/15/24  0736 05/16/24  0418 05/17/24  0617   SODIUM 145 143 144   POTASSIUM 3.8 4.1 4.7   CHLORIDE 100 102 95*   CO2 27 22 24   GLUCOSE 184* 149* 267*   BUN 72* 50* 95*   CREATININE 4.72* 3.61* 5.42*   CALCIUM 9.1 8.9 8.8     Recent Labs     05/15/24  0406 05/16/24  0418 05/17/24  0724   APTT 23.4* 30.6 26.8   INR 1.40* 1.55* 1.68*                 Imaging  US-EXTREMITY VENOUS LOWER BILAT   Final Result      IR-US GUIDED PIV   Final Result     Ultrasound-guided PERIPHERAL IV INSERTION performed by    qualified nursing staff as above.      CT-CHEST (THORAX) W/O   Final Result      1.  Patchy BILATERAL airspace disease, possibly pneumonia. This could be chronic or subclinical   2.  Atherosclerosis with ectasia of the descending thoracic aorta         US-RUQ   Final Result      1.  Limited exam due to patient noncompliance.   2.  No flow demonstrated in the portal vein suggesting thrombosis.   3.  Probable small gallstones in the gallbladder.   4.  No biliary dilation.   5.  Atrophic RIGHT kidney, poorly visualized.      IR-LIVER BX WITH OTHER STUDY   Final Result      1.  Transjugular hepatic venography showing a patent right hepatic vein.      2. 18 transjugular liver biopsies x 2.      3. Exchange and replacement of the right temporary dialysis catheter with a new 12 Italian 15 cm catheter.      DX-CHEST-FOR LINE PLACEMENT Perform procedure in: Other(comment f6 below):   Final Result      1.  Right IJ catheter has been placed and the tip projects appropriately over the superior vena cava.      2.  Cardiomegaly with evidence of mild fluid overload.      CT-ABDOMEN-PELVIS W/O   Final Result      1.  No evidence of bowel obstruction or focal inflammatory change.      2.  Again seen extensive atherosclerotic vascular calcification with distal thoracic/upper abdominal aortic ectasia measuring approximately 3.2 cm in diameter.      3.  Gallstones within the gallbladder.      4.  Hyperdense left renal cyst likely representing hemorrhagic or milk of calcium within a cyst.      5.  Small amount of free fluid dependently within the pelvis.      DX-CHEST-PORTABLE (1 VIEW)   Final Result      Cardiomegaly.      US-ABDOMEN COMPLETE SURVEY   Final Result      1.  Mild hepatomegaly.      2.  Sludge noted in the gallbladder.      3.  Small echogenic foci in the periphery of the renal collecting systems bilaterally suspicious for early nephrolithiasis.      4.  Bilateral  renal cysts.         DX-CHEST-LIMITED (1 VIEW)   Final Result         1.  No acute cardiopulmonary disease.   2.  Atherosclerosis      EC-ECHOCARDIOGRAM COMPLETE W/ CONT   Final Result      DX-CHEST-LIMITED (1 VIEW)   Final Result      1.  Enlarged cardiac silhouette with changes of pulmonary edema.      [id number] VIEW   Final Result      1.  Nonobstructive bowel gas pattern with a large amount of colonic stool.           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  Imaging of the abdomen showed mild hepatomegaly with no splenomegaly.    - Hematology/oncology was consulted for concerns for hemophagocytic lymphohistiocytosis (HLH) with low suspicion/probability for TTP or HIT.   Status post IR transjugular liver biopsy with portal venography 5/8  Comfort care    Acute metabolic encephalopathy- (present on admission)  Assessment & Plan  Polyfactorial including prior CVAs, Acute renal failure, catastrophic inflammatory process  Comfort care    RLS (restless legs syndrome)- (present on admission)  Assessment & Plan  Continue pramipexole as able  Comfort care    Anxiety- (present on admission)  Assessment & Plan  Continue venlafaxin as able  Lorazepam PRN for comfort care    Comfort measures only status  Assessment & Plan  Terminal Acute Renal Failure - no longer a candidate for dialysis  Morphine PRN pain, dyspnea  Lorazepam PRN anxiety, agitation  Zofran PRN nausea  APAP PRN fever  Lactulose, bisacodyl PRN constipation  Hospice referral placed    Hypokalemia  Assessment & Plan  Resolved  Likely due to inadequate PO intake  Comfort care    Hyperlipidemia- (present on admission)  Assessment & Plan  Comfort care    Cardiogenic shock (HCC)- (present on admission)  Assessment & Plan  -In the setting of low ejection fraction of 15%.  Required dobutamine drip.  Shock  resolved.  Comfort care    History of stroke- (present on admission)  Assessment & Plan  Comfort care    Rhabdomyolysis- (present on admission)  Assessment & Plan  Comfort care    Thrombocytopenia (HCC)- (present on admission)  Assessment & Plan  Likely consumptive coagulopathy AEB schistocytes and hypofibrinogenemia  Oncology consulted, Low suspicion/probability for TTP or HIT.  Comfort care    Coagulopathy (HCC)- (present on admission)  Assessment & Plan  - In setting of HLH, thrombocytopenia.    Oncology consulted, Low suspicion/probability for TTP or HIT.  Fibrinogen low.  -DIC versus MAHA based on schistocytes which may be consumptive coagulopathy  APLAS thus far negative  Comfort care    Acute renal failure with tubular necrosis (HCC)- (present on admission)  Assessment & Plan  Nephrology consulted, unable to further tolerate HD hemodynamically  Comfort care    Hepatitis  Assessment & Plan  -Likely inflammatory trigger  Comfort care    Severe mitral regurgitation- (present on admission)  Assessment & Plan  Comfort care    Heart failure with reduced ejection fraction (HCC)- (present on admission)  Assessment & Plan  - Had a drop in EF to 15% from previous 35%.  Required dobutamine drip.  -Comfort care    Hyperkalemia- (present on admission)  Assessment & Plan  Resolved  Due to acute renal failure  Comfort care    Leukocytosis- (present on admission)  Assessment & Plan  Uncertain etiology, likely primary inflammatory process rather than infection  Probable component of steroid-induced demarginalization  Comfort care    Advanced care planning/counseling discussion- (present on admission)  Assessment & Plan  Patient is aphasic and lethargic, unable to participated in the conversation.  Spouse Basilio was present for the conversation.  I discussed advance care planning face to face with the patient and family for at least 30 minutes, including diagnosis, prognosis, plan of care, risks and benefits of any therapies  that could be offered, as well as alternatives including palliation and hospice, as appropriate.  Forms were completed and placed in the chart.  Patient is now DNR/DNI.  5/17 - transitioned to comfort care due to apparent clinical decline and no further HD candidacy       VTE prophylaxis: Comfort care    I have performed a physical exam and reviewed and updated ROS and Plan today (5/17/2024). In review of yesterday's note (5/16/2024), there are no changes except as documented above.

## 2024-05-18 NOTE — DISCHARGE PLANNING
"Case Management Discharge Planning    Admission Date: 4/27/2024  GMLOS: 4.2  ALOS: 21    6-Clicks ADL Score: 6  6-Clicks Mobility Score: 10  PT and/or OT Eval ordered: Yes  Post-acute Referrals Ordered: Yes  Post-acute Choice Obtained: Yes  Has referral(s) been sent to post-acute provider:  Yes      Anticipated Discharge Dispo: Discharge Disposition: D/T to home under HHA care in anticipation of covered skilled care (06)  Discharge Address: [facility] [address] [place] [address]    DME Needed: No    Action(s) Taken: Updated Provider/Nurse on Discharge Plan    Pt is now on Comfort Care.  This RN CM spoke with Basilio Santamaria , Pt's Spouse via phone.  Explained that Dr Torres placed a Hospice referral.    Per Basilio , they have 2 Daughters and  a Son who can assist him with Pt's care at home.    Per Basilio \"I can take her home\"    Pt's address: [address]  [place] [address]  Per Basilio. Okay to send referral to Hospice companies that accepts Medicaid FFS.   Choices are [facility] [place] , [facility], [facility], [facility] and [facility].   Choice was faxed to RAMIN Clayton.     RAMIN Snell faxed the referral to [facility] [place].    Marianna, Liaison to see Pt /Basilio at bedside.    Pt has been accepted by [facility] [place].     Per Marianna she ordered oxygen and hospital bed to be delivered to Pt's home before 12 noon tomorrow.       [facility] request sent to RAMIN Snell.    Informed Dr Torres,  KATIA Tabares and Charge KATIA Salguero of this update.    15:20 [facility] will be arranged by Alis FAUSTIN later as [facility] scheduling  is down   16:05 Requested Jonathan YEPEZ at ED to assist with [facility]       Escalations Completed: None    Medically Clear:   Pt now on Comfort Care     Next Steps:   CM to continue to assist Pt with discharge as needed    Barriers to Discharge:   Pt's  requested Pt to discharge to home tomorrow.   Pending [facility] transport      Is the patient up for discharge tomorrow: No        "

## 2024-05-18 NOTE — DISCHARGE PLANNING
Spoke with Marianna with [facility], they have accepted patient on service per family request. Patient to discharge home 05/19/24 at 1200 per CM request. Awaiting PCS to schedule transport.

## 2024-05-18 NOTE — ASSESSMENT & PLAN NOTE
Terminal Acute Renal Failure - no longer a candidate for dialysis  Morphine PRN pain, dyspnea  Lorazepam PRN anxiety, agitation  Zofran PRN nausea  APAP PRN fever  Lactulose, bisacodyl PRN constipation  Hospice referral placed

## 2024-05-18 NOTE — ASSESSMENT & PLAN NOTE
Polyfactorial including prior CVAs, Acute renal failure, catastrophic inflammatory process  Comfort care

## 2024-05-18 NOTE — PROGRESS NOTES
[facility] Nephrology Progress Note    Patient has now transitioned to comfort care. Nephrology will sign-off, please call with any questions or if we can be of further assistance.

## 2024-05-18 NOTE — PROGRESS NOTES
Hospital Medicine Daily Progress Note    Date of Service  5/18/2024    Chief Complaint  abdominal pain    Hospital Course  Fouzia Santamaria is a 63 y.o. female with HFrEF (EF 35%), severe PAD status post aortic femoral bypass coronary disease status post LAD stent, COPD, diabetes, hypertension, and previous strokes, who was initially seen in the ED with abdominal pain for 10 days and subsequently discharged to home, who again presented with 2 days of bodyaches as well as worsening constipation, nausea and vomiting.  Workup showed leukocytosis with white count of 15,100 with left shift.  Chest x-ray showed mild pulmonary edema.  Abdominal x-ray showed significant amount of stool burden.  Echocardiogram was obtained which revealed that ejection fraction now down to 15% with severe mitral regurgitation.  She also had acute kidney injury and likely cardiorenal syndrome.  Cardiology and nephrology were consulted.  She was placed on a dobutamine drip.  Her renal function worsened and she required a right-sided dialysis catheter with initiation of dialysis.  Her liver enzymes went up to AST of 2959 and ALT of 1377.  She also had significant drop in both hemoglobin and platelet count, with worsening leukocytosis.  LDH was 1418, fibrinogen 91, ferritin 7333 with elevated PT, PTT and INR.  Imaging of the abdomen showed mild hepatomegaly with no splenomegaly.  Hematology/oncology was consulted for concerns for hemophagocytic lymphohistiocytosis (HLH) with low suspicion/probability for TTP or HIT. She was started on Decadron.  She had low fibrinogen for which she was given cryoprecipitate.  Status post bone marrow biopsy on 5/6 and liver biopsy on 5/8.    Interval Problem Update    BELKYS ON  Received SL lorazepam and morphine for comfort.  Unable to take PO medications reliably, scheduled meds discontinued from MAR.  Hospice Referral in progress.    She appears comfortable. Does not respond to my voice.    I have discussed  this patient's plan of care and discharge plan at IDT rounds today with Case Management, Nursing, Nursing leadership, and other members of the IDT team.    Consultants/Specialty  Hematology  Critical care  Nephrology  Cardiology  Palliative care     Code Status  Comfort Care/DNR    Disposition  The patient is medically cleared for discharge to home or a post-acute facility.  Anticipate discharge to: hospice    I have placed the appropriate orders for post-discharge needs.    Review of Systems  Review of Systems   Unable to perform ROS: Mental acuity      Physical Exam  Temp:  [35.9 °C (96.7 °F)] 35.9 °C (96.7 °F)  Pulse:  [77] 77  Resp:  [10] 10  BP: (118)/(70) 118/70  SpO2:  [69 %] 69 %    Physical Exam  Vitals and nursing note reviewed.   Constitutional:       General: She is sleeping.      Interventions: Face mask in place.   HENT:      Head: Normocephalic.      Nose: Nose normal.      Mouth/Throat:      Mouth: Mucous membranes are dry.   Pulmonary:      Effort: Pulmonary effort is normal. No respiratory distress.   Genitourinary:     Comments: +Guerrero. Urine brown / muddy.  Neurological:      Mental Status: She is unresponsive.           Fluids  No intake or output data in the 24 hours ending 05/18/24 1221      Laboratory  Recent Labs     05/15/24  1518 05/16/24  0418 05/17/24  0617   WBC 30.5* 27.0* 19.0*   RBC 5.00 4.48 4.90   HEMOGLOBIN 15.4 13.5 15.0   HEMATOCRIT 49.2* 43.3 45.1   MCV 98.4* 96.7 92.0   MCH 30.8 30.1 30.6   MCHC 31.3* 31.2* 33.3   RDW  --   --  90.1*   PLATELETCT 27* 25* 25*     Recent Labs     05/16/24  0418 05/17/24  0617   SODIUM 143 144   POTASSIUM 4.1 4.7   CHLORIDE 102 95*   CO2 22 24   GLUCOSE 149* 267*   BUN 50* 95*   CREATININE 3.61* 5.42*   CALCIUM 8.9 8.8     Recent Labs     05/16/24  0418 05/17/24  0724   APTT 30.6 26.8   INR 1.55* 1.68*                 Imaging  US-EXTREMITY VENOUS LOWER BILAT   Final Result      IR-US GUIDED PIV   Final Result    Ultrasound-guided PERIPHERAL IV  INSERTION performed by    qualified nursing staff as above.      CT-CHEST (THORAX) W/O   Final Result      1.  Patchy BILATERAL airspace disease, possibly pneumonia. This could be chronic or subclinical   2.  Atherosclerosis with ectasia of the descending thoracic aorta         US-RUQ   Final Result      1.  Limited exam due to patient noncompliance.   2.  No flow demonstrated in the portal vein suggesting thrombosis.   3.  Probable small gallstones in the gallbladder.   4.  No biliary dilation.   5.  Atrophic RIGHT kidney, poorly visualized.      IR-LIVER BX WITH OTHER STUDY   Final Result      1.  Transjugular hepatic venography showing a patent right hepatic vein.      2. 18 transjugular liver biopsies x 2.      3. Exchange and replacement of the right temporary dialysis catheter with a new 12 [place] 15 cm catheter.      DX-CHEST-FOR LINE PLACEMENT Perform procedure in: Other(comment f6 below):   Final Result      1.  Right IJ catheter has been placed and the tip projects appropriately over the superior vena cava.      2.  Cardiomegaly with evidence of mild fluid overload.      CT-ABDOMEN-PELVIS W/O   Final Result      1.  No evidence of bowel obstruction or focal inflammatory change.      2.  Again seen extensive atherosclerotic vascular calcification with distal thoracic/upper abdominal aortic ectasia measuring approximately 3.2 cm in diameter.      3.  Gallstones within the gallbladder.      4.  Hyperdense left renal cyst likely representing hemorrhagic or milk of calcium within a cyst.      5.  Small amount of free fluid dependently within the pelvis.      DX-CHEST-PORTABLE (1 VIEW)   Final Result      Cardiomegaly.      US-ABDOMEN COMPLETE SURVEY   Final Result      1.  Mild hepatomegaly.      2.  Sludge noted in the gallbladder.      3.  Small echogenic foci in the periphery of the renal collecting systems bilaterally suspicious for early nephrolithiasis.      4.  Bilateral renal cysts.          DX-CHEST-LIMITED (1 VIEW)   Final Result         1.  No acute cardiopulmonary disease.   2.  Atherosclerosis      EC-ECHOCARDIOGRAM COMPLETE W/ CONT   Final Result      DX-CHEST-LIMITED (1 VIEW)   Final Result      1.  Enlarged cardiac silhouette with changes of pulmonary edema.      [id number]-1 VIEW   Final Result      1.  Nonobstructive bowel gas pattern with a large amount of colonic stool.           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  Imaging of the abdomen showed mild hepatomegaly with no splenomegaly.    - Hematology/oncology was consulted for concerns for hemophagocytic lymphohistiocytosis (HLH) with low suspicion/probability for TTP or HIT.   Status post IR transjugular liver biopsy with portal venography 5/8  Comfort care    Acute metabolic encephalopathy- (present on admission)  Assessment & Plan  Polyfactorial including prior CVAs, Acute renal failure, catastrophic inflammatory process  Comfort care    RLS (restless legs syndrome)- (present on admission)  Assessment & Plan  Comfort care    Anxiety- (present on admission)  Assessment & Plan  Lorazepam PRN for comfort care    Comfort measures only status  Assessment & Plan  Terminal Acute Renal Failure - no longer a candidate for dialysis  Morphine PRN pain, dyspnea  Lorazepam PRN anxiety, agitation  Zofran PRN nausea  APAP PRN fever  Lactulose, bisacodyl PRN constipation  Hospice referral placed    Hypokalemia  Assessment & Plan  Resolved  Likely due to inadequate PO intake  Comfort care    Hyperlipidemia- (present on admission)  Assessment & Plan  Comfort care    Cardiogenic shock (HCC)- (present on admission)  Assessment & Plan  -In the setting of low ejection fraction of 15%.  Required dobutamine drip.  Shock resolved.  Comfort care    History of stroke- (present on admission)  Assessment &  Plan  Comfort care    Rhabdomyolysis- (present on admission)  Assessment & Plan  Comfort care    Thrombocytopenia (HCC)- (present on admission)  Assessment & Plan  Likely consumptive coagulopathy AEB schistocytes and hypofibrinogenemia  Oncology consulted, Low suspicion/probability for TTP or HIT.  Comfort care    Coagulopathy (HCC)- (present on admission)  Assessment & Plan  - In setting of HLH, thrombocytopenia.    Oncology consulted, Low suspicion/probability for TTP or HIT.  Fibrinogen low.  -DIC versus MAHA based on schistocytes which may be consumptive coagulopathy  APLAS thus far negative  Comfort care    Acute renal failure with tubular necrosis (HCC)- (present on admission)  Assessment & Plan  Nephrology consulted, unable to further tolerate HD hemodynamically  Comfort care    Hepatitis  Assessment & Plan  -Likely inflammatory trigger  Comfort care    Severe mitral regurgitation- (present on admission)  Assessment & Plan  Comfort care    Heart failure with reduced ejection fraction (HCC)- (present on admission)  Assessment & Plan  - Had a drop in EF to 15% from previous 35%.  Required dobutamine drip.  -Comfort care    Hyperkalemia- (present on admission)  Assessment & Plan  Resolved  Due to acute renal failure  Comfort care    Leukocytosis- (present on admission)  Assessment & Plan  Uncertain etiology, likely primary inflammatory process rather than infection  Probable component of steroid-induced demarginalization  Comfort care    Advanced care planning/counseling discussion- (present on admission)  Assessment & Plan  Patient is aphasic and lethargic, unable to participated in the conversation.  Spouse Basilio was present for the conversation.  I discussed advance care planning face to face with the patient and family for at least 30 minutes, including diagnosis, prognosis, plan of care, risks and benefits of any therapies that could be offered, as well as alternatives including palliation and hospice, as  appropriate.  Forms were completed and placed in the chart.  Patient is now DNR/DNI.  5/17 - transitioned to comfort care due to apparent clinical decline and no further HD candidacy       VTE prophylaxis: Comfort care    I have performed a physical exam and reviewed and updated ROS and Plan today (5/18/2024). In review of yesterday's note (5/17/2024), there are no changes except as documented above.

## 2024-05-19 VITALS
WEIGHT: 111.55 LBS | HEIGHT: 65 IN | SYSTOLIC BLOOD PRESSURE: 108 MMHG | BODY MASS INDEX: 18.59 KG/M2 | RESPIRATION RATE: 6 BRPM | TEMPERATURE: 96.5 F | DIASTOLIC BLOOD PRESSURE: 45 MMHG | OXYGEN SATURATION: 74 % | HEART RATE: 75 BPM

## 2024-05-19 PROBLEM — M62.82 RHABDOMYOLYSIS: Status: RESOLVED | Noted: 2024-05-02 | Resolved: 2024-05-19

## 2024-05-19 PROBLEM — E87.6 HYPOKALEMIA: Status: RESOLVED | Noted: 2024-05-14 | Resolved: 2024-05-19

## 2024-05-19 PROBLEM — Z71.89 ADVANCED CARE PLANNING/COUNSELING DISCUSSION: Status: RESOLVED | Noted: 2022-10-19 | Resolved: 2024-05-19

## 2024-05-19 PROBLEM — R57.0 CARDIOGENIC SHOCK (HCC): Status: RESOLVED | Noted: 2024-05-03 | Resolved: 2024-05-19

## 2024-05-19 PROBLEM — F41.9 ANXIETY: Status: RESOLVED | Noted: 2024-05-17 | Resolved: 2024-05-19

## 2024-05-19 PROCEDURE — 99239 HOSP IP/OBS DSCHRG MGMT >30: CPT | Performed by: STUDENT IN AN ORGANIZED HEALTH CARE EDUCATION/TRAINING PROGRAM

## 2024-05-19 RX ADMIN — LORAZEPAM 1 MG: 2 INJECTION INTRAMUSCULAR; INTRAVENOUS at 04:09

## 2024-05-19 RX ADMIN — MORPHINE SULFATE 20 MG: 100 SOLUTION ORAL at 12:58

## 2024-05-19 RX ADMIN — MORPHINE SULFATE 20 MG: 100 SOLUTION ORAL at 10:26

## 2024-05-19 RX ADMIN — MORPHINE SULFATE 20 MG: 100 SOLUTION ORAL at 04:13

## 2024-05-19 RX ADMIN — LORAZEPAM 1 MG: 2 INJECTION INTRAMUSCULAR; INTRAVENOUS at 02:44

## 2024-05-19 RX ADMIN — MORPHINE SULFATE 20 MG: 100 SOLUTION ORAL at 01:39

## 2024-05-19 NOTE — CARE PLAN
The patient is Unstable - High likelihood or risk of patient condition declining or worsening    Shift Goals  Clinical Goals: comfort  Patient Goals: alan  Family Goals: not present    Progress made toward(s) clinical / shift goals:      Problem: Psychosocial - Comfort Care  Goal: Privacy will be maintained for patient and family  Description: Target End Date:  End of day 1    Obtain private room  Outcome: Progressing         Patient is not progressing towards the following goals:

## 2024-05-19 NOTE — DISCHARGE INSTRUCTIONS
Discharge Instructions per Jese Torres M.D.    You were hospitalized for a severe immune reaction that caused your kidneys to fail. The condition was determined to be terminal despite efforts to decrease the inflammation. You have opted to transition to hospice for end-of-life care.    DIET: As able    ACTIVITY: As Able    DIAGNOSIS: Acute Renal Failure from Uncertain Etiology    Call the hospice agency for any needs, concerns, or questions. Do NOT call 911 or bring her to the ED without contacting the hospice agency.

## 2024-05-19 NOTE — DISCHARGE PLANNING
ER  Note:  Received hand off for pending transport set up via [facility] today, home with hospice. RN CM called [facility]. Per [facility], transportation set up at 1200. DPA, Unit SW and bedside RN/Charge RN updated via [employer].

## 2024-05-19 NOTE — DISCHARGE SUMMARY
Discharge Summary    CHIEF COMPLAINT ON ADMISSION  Chief Complaint   Patient presents with    Abdominal Pain     X 10 days, seen and discharged at that time    Body Aches     X 2 days    Constipation     X 3 days    N/V     X 2 days       Reason for Admission  SIRS     Admission Date  4/27/2024    CODE STATUS  Comfort Care/DNR    HPI & HOSPITAL COURSE  Fouzia Santamaria is a 63 y.o. female who presented 4/27/2024 with abdominal pain with constipation and nausea vomiting.  This is a pleasant woman with a history of HFrEF 35%, COPD, diabetes, hypertension, and previous strokes, who developed abdominal pain 10 days ago in the emergency room and was subsequently discharged home.      She presented with 2 days of bodyaches as well as worsening constipation nausea vomiting.  She had a leukocytosis with white count of 15.1 with a left shift.  Chest x-ray showed mild pulmonary edema.  Abdominal x-ray showed significant amount of stool burden. After unclear etiology an expanded diagnostic evaluation demonstrated rhinovirus by PCR. She subsequently developed multi-organ failure with encephalopathy, hepatitis, cardiogenic shock (LVEF 15%), DIC, rhabdomyloysis, and acute renal failure. Due to drastic inflammatory process and ferritin >14K, Hematology was consulted for possible HLH. She was empirically treated with steroids with resolution of DIC and improvement in ferritin and soluble IL-2 receptor. During evaluation of HLH she tested positive for EBV by NAAT, for which ID advised there is no treatment. BMBx 5/6/24 demonstrated normocellular hematopoiesis with increased B and T-cells and rare histiocytes but no dysplasia. Transjugular liver biopsy 5/8 demonstrated zone 3 necrosis without inflammation. She was evaluated for catastrophic APLAS but tested negative for anticardiolipin, B2 glycoprotein, and negative lupus anticoagulant.    Despite biochemical improvement in inflammatory markers, her encephalopathy did not improve  and she developed hypotension preventing further hemodialysis. She was deemed poor prognosis and no longer a dialysis candidate, for which her  was in agreement with transition to comfort measures and enrollment in hospice.    Therefore, she is discharged in guarded and stable condition to hospice.    The patient met 2-midnight criteria for an inpatient stay at the time of discharge.    Discharge Date  5/19/24    FOLLOW UP ITEMS POST DISCHARGE  Hospice    DISCHARGE DIAGNOSES  Principal Problem:    HLH (hemophagocytic lymphohistiocytosis) (HCC) (POA: Yes)  Active Problems:    Leukocytosis (POA: Yes)    Hyperkalemia (POA: Yes)    Heart failure with reduced ejection fraction (HCC) (POA: Yes)    Severe mitral regurgitation (POA: Yes)    Hepatitis (POA: No)    Acute renal failure with tubular necrosis (HCC) (POA: Yes)    Coagulopathy (HCC) (POA: Yes)    Thrombocytopenia (HCC) (POA: Yes)    History of stroke (POA: Yes)    Hyperlipidemia (POA: Yes)    Comfort measures only status (POA: No)    RLS (restless legs syndrome) (POA: Yes)    Acute metabolic encephalopathy (POA: Yes)  Resolved Problems:    Stroke (HCC) (POA: Yes)    Advanced care planning/counseling discussion (POA: Yes)    Community acquired pneumonia, unspecified laterality (POA: Yes)    Constipation (POA: Yes)    Impacted stool in intestine (HCC) (POA: Yes)    Colitis (POA: Yes)    Rhabdomyolysis (POA: Yes)    Cardiogenic shock (HCC) (POA: Yes)    Hypokalemia (POA: No)    Anxiety (POA: Yes)      FOLLOW UP  No future appointments.  [facility] [address]  [facility] [address] [phone]  Call  As needed      MEDICATIONS ON DISCHARGE     Medication List        START taking these medications        Instructions   albuterol 2.5mg/0.5ml Nebu  Commonly known as: Proventil   Take 0.5 mL by nebulization every four hours as needed for Shortness of Breath.  Dose: 2.5 mg     bisacodyl 10 MG Supp  Commonly known as: Dulcolax    Insert 1 Suppository into the rectum 1 time a day as needed (constipation).  Dose: 10 mg     carboxymethylcellulose 0.5 % Soln  Commonly known as: Refresh Tears   Administer 1 Drop into both eyes as needed (dry eyes).  Dose: 1 Drop     lactulose 20 GM/30ML Soln   Take 15 mL by mouth 1 time a day as needed (constipation).  Dose: 10 g     LORazepam 2 MG/ML Conc  Commonly known as: Ativan   Place 0.5 mL under the tongue every 1 hour as needed (Anxiety/Restlessness) for up to 7 days.  Dose: 1 mg     morphine 20 MG/ML Soln  Commonly known as: Roxanol   Place 0.5-1 mL under the tongue every 1 hour as needed (pain or dyspnea) for up to 7 days.  Dose: 10-20 mg            CHANGE how you take these medications        Instructions   * acetaminophen 325 MG Tabs  What changed:   medication strength  how much to take  when to take this  reasons to take this  additional instructions  Commonly known as: Tylenol   Take 2 Tablets by mouth every four hours as needed for Fever.  Dose: 650 mg     * acetaminophen 650 MG Supp  What changed: You were already taking a medication with the same name, and this prescription was added. Make sure you understand how and when to take each.  Commonly known as: Tylenol   Insert 1 Suppository into the rectum every four hours as needed for Fever.  Dose: 650 mg     * ondansetron 4 MG Tbdp  What changed: when to take this  Commonly known as: Zofran ODT   Take 1 Tablet by mouth every four hours as needed for Nausea/Vomiting.  Dose: 4 mg     * ondansetron 4 MG Tbdp  What changed: You were already taking a medication with the same name, and this prescription was added. Make sure you understand how and when to take each.  Commonly known as: Zofran ODT   Take 1 Tablet by mouth every four hours as needed for Nausea. Dissolve orally on the tongue. Do not exceed 24 mg in 24 hours.  Dose: 4 mg           * This list has 4 medication(s) that are the same as other medications prescribed for you. Read the directions  carefully, and ask your doctor or other care provider to review them with you.                STOP taking these medications      atorvastatin 10 MG Tabs  Commonly known as: Lipitor     clopidogrel 75 MG Tabs  Commonly known as: Plavix     ezetimibe 10 MG Tabs  Commonly known as: Zetia     furosemide 20 MG Tabs  Commonly known as: Lasix     hydrOXYzine HCl 25 MG Tabs  Commonly known as: Atarax     lisinopril 10 MG Tabs  Commonly known as: Prinivil     metoprolol tartrate 25 MG Tabs  Commonly known as: Lopressor     mirtazapine 30 MG Tabs tablet  Commonly known as: Remeron     pramipexole 0.125 MG Tabs  Commonly known as: Mirapex     venlafaxine XR 37.5 MG Cp24  Commonly known as: Effexor XR              Allergies  Allergies   Allergen Reactions    Pcn [Penicillins] Vomiting and Nausea    Toradol Vomiting and Nausea       DIET  Orders Placed This Encounter   Procedures    Diet Order Diet: Level 5 - Minced and Moist; Liquid level: Level 0 - Thin; Second Modifier: (optional): Renal; Nutrient modifications: (optional): Low Phos, Low Potassium     Standing Status:   Standing     Number of Occurrences:   1     Order Specific Question:   Diet:     Answer:   Level 5 - Minced and Moist [24]     Order Specific Question:   Liquid level     Answer:   Level 0 - Thin     Order Specific Question:   Second Modifier: (optional)     Answer:   Renal [8]     Order Specific Question:   Nutrient modifications: (optional)     Answer:   Low Phos [9]     Order Specific Question:   Nutrient modifications: (optional)     Answer:   Low Potassium [11]       ACTIVITY  As tolerated.  Weight bearing as tolerated    CONSULTATIONS  Cardiology  Critical Care  Nephrology  Hematology    PROCEDURES  Bone Marrow Biopsy/Aspiration     Date/Time: 5/6/2024 11:30 AM     Performed by: Librado King D.O.  Authorized by: Librado King D.O.    Consent:     Consent obtained:  Verbal    Consent given by:  Patient and spouse    Risks discussed:  Bleeding,  infection, pain, nerve damage and repeat procedure    Alternatives discussed:  No treatment  Universal protocol:     Procedure explained and questions answered to patient or proxy's satisfaction: yes      Relevant documents present and verified: yes      Test results available and properly labeled: yes      Imaging studies available: yes      Required blood products, implants, devices, and special equipment available: yes      Immediately prior to procedure a time out was called: yes      Site/side marked: yes      Patient identity confirmed:  Verbally with patient, hospital-assigned identification number and arm band  Pre-procedure details:     Procedure type:  Aspiration and biopsy    Position:  Prone    Buttock laterality:  Left    Local anesthetic:  1% Lidocaine    Subcutaneous volume:  1 mL    Periosteum anesthetic volume:  4 mL  Sedation:     Patient Sedated: Yes      Sedation type: moderate (conscious) sedation      Sedation:  Midazolam    Sedation Dosage:  2mg    Analgesia:  Fentanyl    Analgesia Dosage:  50mcg    Sedation Start Time:  11:35 PDT    Sedation Stop Time:  11:46 PDT    Moderate sedation charge selection based on time above is:  15 minutes       I was personally present and supervised the patient throughout the entirety of the moderate sedation time mentioned above.  Procedure details:     Aspirate obtained:  5 mL followed by 5 mL    Biopsy performed:  1 core    Estimated blood loss (mL):  1  Post-procedure:     Puncture site:  Adhesive bandage applied and direct pressure applied    Patient tolerance of procedure:  Tolerated well, no immediate complications    Immediate Post- Operative Note           Findings:   HLH (hemophagocytic lymphohistiocytosis) (HCC)                   Procedure(s): Transjugular liver biopsy, portal  venography.        Estimated Blood Loss: Less than 5 ml           Complications: None                 5/8/2024     1617 PM     Yuan Craig M.D.    LABORATORY  Lab Results    Component Value Date    SODIUM 144 05/17/2024    POTASSIUM 4.7 05/17/2024    CHLORIDE 95 (L) 05/17/2024    CO2 24 05/17/2024    GLUCOSE 267 (H) 05/17/2024    BUN 95 (H) 05/17/2024    CREATININE 5.42 (HH) 05/17/2024        Lab Results   Component Value Date    WBC 19.0 (H) 05/17/2024    HEMOGLOBIN 15.0 05/17/2024    HEMATOCRIT 45.1 05/17/2024    PLATELETCT 25 (L) 05/17/2024        Total time of the discharge process exceeds 33 minutes.

## 2024-05-21 ENCOUNTER — TELEPHONE (OUTPATIENT)
Dept: MEDICAL GROUP | Facility: MEDICAL CENTER | Age: 64
End: 2024-05-21

## 2024-05-23 ENCOUNTER — TELEPHONE (OUTPATIENT)
Dept: MEDICAL GROUP | Facility: MEDICAL CENTER | Age: 64
End: 2024-05-23
Payer: MEDICAID

## 2024-05-23 NOTE — TELEPHONE ENCOUNTER
I received a called stating pt had passed away and wanted to informed you about this they did not leave no info from where they were calling.

## 2024-06-01 LAB — TEST NAME 95000: NORMAL

## (undated) DEVICE — SUCTION TIP STRAIGHT ARGYLE - 50EA/CA

## (undated) DEVICE — SUTURE 3-0 VICRYL PLUS SH - 8X 18 INCH (12/BX)

## (undated) DEVICE — TUBE CONNECT SUCTION CLEAR 120 X 1/4" (50EA/CA)"

## (undated) DEVICE — BAG SPONGE COUNT 10.25 X 32 - BLUE (250/CA)

## (undated) DEVICE — VESSELOOP MAXI BLUE STERILE- SURG-I-LOOP (10EA/BX)

## (undated) DEVICE — TUBE E-T HI-LO CUFF 7.5MM (10EA/PK)

## (undated) DEVICE — SET LEADWIRE 5 LEAD BEDSIDE DISPOSABLE ECG (1SET OF 5/EA)

## (undated) DEVICE — SUTURE CV

## (undated) DEVICE — BLADE SURGICAL #11 - (50/BX)

## (undated) DEVICE — DRAPE LARGE 3 QUARTER - (20/CA)

## (undated) DEVICE — SYRINGE NON SAFETY 5 CC 20 GA X 1-1/2 IN (100/BX 4BX/CA)

## (undated) DEVICE — CLIP MED LG INTNL HRZN TI ESCP - (20/BX)

## (undated) DEVICE — GLOVE BIOGEL SZ 6 PF LATEX - (50EA/BX 4BX/CA)

## (undated) DEVICE — SUTURE 5-0 PROLENE BLUE C-1 HS 1 X 30 (36EA/BX)"

## (undated) DEVICE — STAPLER SKIN DISP - (6/BX 10BX/CA) VISISTAT

## (undated) DEVICE — SUTURE 1 VICRYL PLUSCT-1 27IN - (36/BX)

## (undated) DEVICE — DRESSING TRANSPARENT FILM TEGADERM 4 X 4.75" (50EA/BX)"

## (undated) DEVICE — SUTURE GENERAL

## (undated) DEVICE — ELECTRODE DUAL RETURN W/ CORD - (50/PK)

## (undated) DEVICE — SUCTION INSTRUMENT YANKAUER BULBOUS TIP W/O VENT (50EA/CA)

## (undated) DEVICE — PACK MAJOR BASIN - (2EA/CA)

## (undated) DEVICE — LACTATED RINGERS INJ 1000 ML - (14EA/CA 60CA/PF)

## (undated) DEVICE — CANISTER SUCTION 3000ML MECHANICAL FILTER AUTO SHUTOFF MEDI-VAC NONSTERILE LF DISP  (40EA/CA)

## (undated) DEVICE — SUTURE 3-0 PROLENE SH 36 (36PK/BX)"

## (undated) DEVICE — CELLSAVER PACK

## (undated) DEVICE — SYRINGE 30 ML LL (56/BX)

## (undated) DEVICE — SUTURE 2-0 VICRYL PLUS CT-1 36 (36PK/BX)"

## (undated) DEVICE — SUTURE 2-0 VICRYL PLUS CT-1 - 8 X 18 INCH(12/BX)

## (undated) DEVICE — SENSOR OXIMETER ADULT SPO2 RD SET (20EA/BX)

## (undated) DEVICE — SUTURE 4-0 PROLENE RB-1 D/A 36 (36PK/BX)"

## (undated) DEVICE — CLIP MED INTNL HRZN TI ESCP - (25/BX)

## (undated) DEVICE — SUTURE 4-0 PROLENE SH 36 (36PK/BX)"

## (undated) DEVICE — SLEEVE, VASO, THIGH, MED

## (undated) DEVICE — SYRINGE 3 CC 22 GA X 1-1/2 - NDL SAFETY (50/BX 8BX/CA)

## (undated) DEVICE — PAD PREP 24 X 48 CUFFED - (100/CA)

## (undated) DEVICE — DRAPE IOBAN II 23 IN X 33 IN - (10/BX)

## (undated) DEVICE — SLEEVE VASO CALF MED - (10PR/CA)

## (undated) DEVICE — DECANTER FLD BLS - (50/CA)

## (undated) DEVICE — TRAY SURESTEP FOLEY TEMP SENSING 16FR (10EA/CA) ORDER  #18764 FOR TEMP FOLEY ONLY

## (undated) DEVICE — TRAY MULTI-LUMEN 7FR PRESSURE W/MAX BARRIER AND BIOPATCH - (5/CA)

## (undated) DEVICE — KIT SURGIFLO W/OUT THROMBIN - (6EA/CA)

## (undated) DEVICE — PAD LAP STERILE 18 X 18 - (5/PK 40PK/CA)

## (undated) DEVICE — FILTER BLOOD TRANSFUSION - (40/CA) (PALL)

## (undated) DEVICE — KIT RADIAL ARTERY 20GA W/MAX BARRIER AND BIOPATCH  (5EA/CA) #10740 IS FOR THE SET RADIAL ARTERIAL

## (undated) DEVICE — SET FLUID WARMING STANDARD FLOW - (10/CA)

## (undated) DEVICE — BAG RESUSCITATION DISPOSABLE - WITH MASK (10 EA/CA)

## (undated) DEVICE — GLOVE SIZE 7.0 SURGEON ACCELERATOR FREE GREEN (50PR/BX 4BX/CA)

## (undated) DEVICE — BOVIE  BLADE 6 EXTENDED - (50/PK)

## (undated) DEVICE — CLIP LG INTNL HRZN TI ESCP LGT - (20/BX)

## (undated) DEVICE — SPONGE GAUZESTER 4 X 4 4PLY - (128PK/CA)

## (undated) DEVICE — SOD. CHL. INJ. 0.9% 1000 ML - (14EA/CA 60CA/PF)

## (undated) DEVICE — STOPCOCK 3-WAY W/SWIVEL LEVER LOCK (50EA/CA)

## (undated) DEVICE — SUTURE 3-0 SILK 12 X 18 IN - (36/BX)

## (undated) DEVICE — GLOVE COTTON STERILE (50/CA)

## (undated) DEVICE — TRANSDUCER ADULT DISP. SINGLE BONDED STERILE - (20EA/CA)

## (undated) DEVICE — SUTURE 1 VICRYL PLUS CTX - 36 INCH (36/BX)

## (undated) DEVICE — CANNULA DIVIDED ADULT CO2 - SAMPLE W/FEMALE CONNCT (25/CA)

## (undated) DEVICE — SUTURE 0 SILK TIES (36PK/BX)

## (undated) DEVICE — SUTURE 6-0 PROLENE BV-1 D/A 24 (36PK/BX)"

## (undated) DEVICE — TUBING CLEARLINK DUO-VENT - C-FLO (48EA/CA)

## (undated) DEVICE — GOWN SURGEONS LARGE - (32/CA)

## (undated) DEVICE — DRESSING LEUKOMED STERILE 11.75X4IN - (50/CA)

## (undated) DEVICE — SUTURE 2-0 PROLENE SH D/A (36PK/BX)

## (undated) DEVICE — TOWEL STOP TIMEOUT SAFETY FLAG (40EA/CA)

## (undated) DEVICE — CHLORAPREP 26 ML APPLICATOR - ORANGE TINT(25/CA)

## (undated) DEVICE — SUTURE 2-0 SILK SH (36PK/BX)

## (undated) DEVICE — SODIUM CHL. INJ. 0.9% 500ML (24EA/CA 50CA/PF)

## (undated) DEVICE — CLIP SM INTNL HRZN TI ESCP LGT - (24EA/PK 25PK/BX)

## (undated) DEVICE — SPONGE KITTNER DISSECTORS - (5EA/PK 50PK/CA)

## (undated) DEVICE — PTFE PLED STER - (250/CA)

## (undated) DEVICE — GLOVE BIOGEL SZ 8 SURGICAL PF LTX - (50PR/BX 4BX/CA)

## (undated) DEVICE — GOWN SURGEONS X-LARGE - DISP. (30/CA)

## (undated) DEVICE — BANDAID SHEER STRIP 3/4 IN (100EA/BX 12BX/CA)

## (undated) DEVICE — SPONGE RADIOPAQUE CTN X-LG - STERILE (50PK/CA) MADE TO ORDER ITEM AND HAS A 4-6 WEEK LEAD TIME

## (undated) DEVICE — VESSELOOP MINI BLUE STERILE - SURG-I-LOOP (10EA/BX)

## (undated) DEVICE — TUBE NG SALEM SUMP 16FR (50EA/CA)

## (undated) DEVICE — SET BIFURCATED BLOOD - (48EA/CS)

## (undated) DEVICE — DRAPE IOBAN II INCISE 23X17 - (10EA/BX 4BX/CA)

## (undated) DEVICE — SHEET CARDIOVASCULAR - (4/CA)

## (undated) DEVICE — SET EXTENSION WITH 2 PORTS (48EA/CA) ***PART #2C8610 IS A SUBSTITUTE*****

## (undated) DEVICE — SODIUM CHL IRRIGATION 0.9% 1000ML (12EA/CA)

## (undated) DEVICE — GOWN WARMING STANDARD FLEX - (30/CA)

## (undated) DEVICE — SPONGE XRAY 8X4 STERL. 12PL - (10EA/TY 80TY/CA)

## (undated) DEVICE — TOWELS CLOTH SURGICAL - (4/PK 20PK/CA)

## (undated) DEVICE — DRAPE MAYO STAND - (30/CA)

## (undated) DEVICE — DRAPE SURGICAL U 77X120 - (10/CA)

## (undated) DEVICE — SOD. CHL 10CC SYRINGE PREFILL - W/10 CC (30/BX)

## (undated) DEVICE — KIT INTROPERCUTANEOUS SHEATH - 8.5 FR W/MAX BARRIER AND BIOPATCH  (5/CA)

## (undated) DEVICE — CELLSAVER STAT